# Patient Record
Sex: FEMALE | Race: WHITE | NOT HISPANIC OR LATINO | Employment: OTHER | URBAN - METROPOLITAN AREA
[De-identification: names, ages, dates, MRNs, and addresses within clinical notes are randomized per-mention and may not be internally consistent; named-entity substitution may affect disease eponyms.]

---

## 2018-12-26 ENCOUNTER — HISTORICAL (OUTPATIENT)
Dept: RADIOLOGY | Facility: HOSPITAL | Age: 68
End: 2018-12-26

## 2018-12-26 LAB
ALB BF TYPE: NORMAL
ALBUMIN FLD-MCNC: 2.3 GM/DL
AMYLASE FLD-CCNC: 21 UNIT/L
APTT PPP: 28.6 SECOND(S) (ref 24.8–36.9)
ERYTHROCYTE [DISTWIDTH] IN BLOOD BY AUTOMATED COUNT: 13.2 % (ref 11.5–17)
GLUCOSE FLD-MCNC: 68 MG/DL
HCT VFR BLD AUTO: 41.4 % (ref 37–47)
HGB BLD-MCNC: 12.7 GM/DL (ref 12–16)
INR PPP: 1.1 (ref 0–1.3)
LDH FLD-CCNC: 429 U/L
MCH RBC QN AUTO: 26 PG (ref 27–31)
MCHC RBC AUTO-ENTMCNC: 30.7 GM/DL (ref 33–36)
MCV RBC AUTO: 84.7 FL (ref 80–94)
PLATELET # BLD AUTO: 300 X10(3)/MCL (ref 130–400)
PMV BLD AUTO: 9.4 FL (ref 9.4–12.4)
PROT FLD-MCNC: 5.6 GM/DL
PROTHROMBIN TIME: 14.3 SECOND(S) (ref 12.2–14.7)
RBC # BLD AUTO: 4.89 X10(6)/MCL (ref 4.2–5.4)
TRIGL FLD-MCNC: 50 MG/DL
WBC # SPEC AUTO: 9.5 X10(3)/MCL (ref 4.5–11.5)

## 2018-12-27 LAB — GRAM STN SPEC: NORMAL

## 2018-12-31 LAB — FINAL CULTURE: NORMAL

## 2019-01-07 ENCOUNTER — HISTORICAL (OUTPATIENT)
Dept: RADIOLOGY | Facility: HOSPITAL | Age: 69
End: 2019-01-07

## 2019-01-11 ENCOUNTER — HISTORICAL (OUTPATIENT)
Dept: RADIOLOGY | Facility: HOSPITAL | Age: 69
End: 2019-01-11

## 2019-01-11 LAB — POC CREATININE: 0.7 MG/DL (ref 0.6–1.3)

## 2019-01-15 ENCOUNTER — HISTORICAL (OUTPATIENT)
Dept: INFUSION THERAPY | Facility: HOSPITAL | Age: 69
End: 2019-01-15

## 2019-01-15 LAB
ABS NEUT (OLG): 6.71 X10(3)/MCL (ref 2.1–9.2)
ALBUMIN SERPL-MCNC: 2.6 GM/DL (ref 3.4–5)
ALP SERPL-CCNC: 112 UNIT/L (ref 38–126)
ALT SERPL-CCNC: 14 UNIT/L (ref 12–78)
ANION GAP SERPL CALC-SCNC: 18 MMOL/L
AST SERPL-CCNC: 39 UNIT/L (ref 15–37)
BASOPHILS # BLD AUTO: 0 X10(3)/MCL (ref 0–0.2)
BASOPHILS NFR BLD AUTO: 0.2 %
BILIRUB SERPL-MCNC: 0.7 MG/DL (ref 0.2–1)
BILIRUBIN DIRECT+TOT PNL SERPL-MCNC: 0.2 MG/DL (ref 0–0.2)
BILIRUBIN DIRECT+TOT PNL SERPL-MCNC: 0.5 MG/DL (ref 0–0.8)
BUN SERPL-MCNC: 8 MG/DL (ref 8–26)
CANCER AG125 SERPL-ACNC: 666.6 IU/ML (ref 1.5–35)
CHLORIDE SERPL-SCNC: 101 MMOL/L (ref 98–109)
CREAT SERPL-MCNC: 0.6 MG/DL (ref 0.6–1.3)
EOSINOPHIL # BLD AUTO: 0.1 X10(3)/MCL (ref 0–0.9)
EOSINOPHIL NFR BLD AUTO: 0.7 %
ERYTHROCYTE [DISTWIDTH] IN BLOOD BY AUTOMATED COUNT: 13.8 % (ref 11.5–17)
GLUCOSE SERPL-MCNC: 112 MG/DL (ref 70–105)
HCT VFR BLD AUTO: 41.1 % (ref 37–47)
HCT VFR BLD CALC: 39 % (ref 38–51)
HGB BLD-MCNC: 12.2 GM/DL (ref 12–16)
HGB BLD-MCNC: 13.3 MG/DL (ref 12–17)
LIVER PROFILE INTERP: ABNORMAL
LYMPHOCYTES # BLD AUTO: 1.1 X10(3)/MCL (ref 0.6–4.6)
LYMPHOCYTES NFR BLD AUTO: 12.9 %
MCH RBC QN AUTO: 25.6 PG (ref 27–31)
MCHC RBC AUTO-ENTMCNC: 29.7 GM/DL (ref 33–36)
MCV RBC AUTO: 86.3 FL (ref 80–94)
MONOCYTES # BLD AUTO: 0.8 X10(3)/MCL (ref 0.1–1.3)
MONOCYTES NFR BLD AUTO: 9.5 %
NEUTROPHILS # BLD AUTO: 6.7 X10(3)/MCL (ref 2.1–9.2)
NEUTROPHILS NFR BLD AUTO: 76.5 %
PLATELET # BLD AUTO: 324 X10(3)/MCL (ref 130–400)
PMV BLD AUTO: 9 FL (ref 9.4–12.4)
POC IONIZED CALCIUM: 1.1 MMOL/L (ref 1.12–1.32)
POC TCO2: 25 MMOL/L (ref 24–29)
POTASSIUM BLD-SCNC: 3.5 MMOL/L (ref 3.5–4.9)
PROT SERPL-MCNC: 6.7 GM/DL (ref 6.4–8.2)
RBC # BLD AUTO: 4.76 X10(6)/MCL (ref 4.2–5.4)
SODIUM BLD-SCNC: 140 MMOL/L (ref 138–146)
WBC # SPEC AUTO: 8.8 X10(3)/MCL (ref 4.5–11.5)

## 2019-01-22 ENCOUNTER — HISTORICAL (OUTPATIENT)
Dept: ADMINISTRATIVE | Facility: HOSPITAL | Age: 69
End: 2019-01-22

## 2019-01-22 LAB
ABS NEUT (OLG): 2.22 X10(3)/MCL (ref 2.1–9.2)
ALBUMIN SERPL-MCNC: 2.9 GM/DL (ref 3.4–5)
ALBUMIN/GLOB SERPL: 0.7 {RATIO}
ALP SERPL-CCNC: 99 UNIT/L (ref 38–126)
ALT SERPL-CCNC: 31 UNIT/L (ref 12–78)
AST SERPL-CCNC: 54 UNIT/L (ref 15–37)
BASOPHILS # BLD AUTO: 0 X10(3)/MCL (ref 0–0.2)
BASOPHILS NFR BLD AUTO: 0.3 %
BILIRUB SERPL-MCNC: 0.5 MG/DL (ref 0.2–1)
BILIRUBIN DIRECT+TOT PNL SERPL-MCNC: 0.2 MG/DL (ref 0–0.2)
BILIRUBIN DIRECT+TOT PNL SERPL-MCNC: 0.3 MG/DL (ref 0–0.8)
BUN SERPL-MCNC: 9 MG/DL (ref 7–18)
CALCIUM SERPL-MCNC: 8.9 MG/DL (ref 8.5–10.1)
CHLORIDE SERPL-SCNC: 101 MMOL/L (ref 98–107)
CO2 SERPL-SCNC: 29 MMOL/L (ref 21–32)
CREAT SERPL-MCNC: 0.73 MG/DL (ref 0.55–1.02)
EOSINOPHIL # BLD AUTO: 0 X10(3)/MCL (ref 0–0.9)
EOSINOPHIL NFR BLD AUTO: 1.6 %
ERYTHROCYTE [DISTWIDTH] IN BLOOD BY AUTOMATED COUNT: 13.9 % (ref 11.5–17)
GLOBULIN SER-MCNC: 4 GM/DL (ref 2.4–3.5)
GLUCOSE SERPL-MCNC: 126 MG/DL (ref 74–106)
HCT VFR BLD AUTO: 37.6 % (ref 37–47)
HGB BLD-MCNC: 11.4 GM/DL (ref 12–16)
LYMPHOCYTES # BLD AUTO: 0.7 X10(3)/MCL (ref 0.6–4.6)
LYMPHOCYTES NFR BLD AUTO: 23.8 %
MCH RBC QN AUTO: 25.9 PG (ref 27–31)
MCHC RBC AUTO-ENTMCNC: 30.3 GM/DL (ref 33–36)
MCV RBC AUTO: 85.3 FL (ref 80–94)
MONOCYTES # BLD AUTO: 0.1 X10(3)/MCL (ref 0.1–1.3)
MONOCYTES NFR BLD AUTO: 2.6 %
NEUTROPHILS # BLD AUTO: 2.2 X10(3)/MCL (ref 2.1–9.2)
NEUTROPHILS NFR BLD AUTO: 71.4 %
PLATELET # BLD AUTO: 259 X10(3)/MCL (ref 130–400)
PMV BLD AUTO: 9.2 FL (ref 9.4–12.4)
POTASSIUM SERPL-SCNC: 3.9 MMOL/L (ref 3.5–5.1)
PROT SERPL-MCNC: 6.9 GM/DL (ref 6.4–8.2)
RBC # BLD AUTO: 4.41 X10(6)/MCL (ref 4.2–5.4)
SODIUM SERPL-SCNC: 139 MMOL/L (ref 136–145)
WBC # SPEC AUTO: 3.1 X10(3)/MCL (ref 4.5–11.5)

## 2019-01-29 ENCOUNTER — HISTORICAL (OUTPATIENT)
Dept: ADMINISTRATIVE | Facility: HOSPITAL | Age: 69
End: 2019-01-29

## 2019-01-29 LAB
ABS NEUT (OLG): 0.42 X10(3)/MCL (ref 2.1–9.2)
ALBUMIN SERPL-MCNC: 2.8 GM/DL (ref 3.4–5)
ALBUMIN/GLOB SERPL: 0.7 RATIO (ref 1.1–2)
ALP SERPL-CCNC: 100 UNIT/L (ref 38–126)
ALT SERPL-CCNC: 28 UNIT/L (ref 12–78)
AST SERPL-CCNC: 31 UNIT/L (ref 15–37)
BASOPHILS # BLD AUTO: 0 X10(3)/MCL (ref 0–0.2)
BASOPHILS NFR BLD AUTO: 0.5 %
BILIRUB SERPL-MCNC: 0.6 MG/DL (ref 0.2–1)
BILIRUBIN DIRECT+TOT PNL SERPL-MCNC: 0.2 MG/DL (ref 0–0.5)
BILIRUBIN DIRECT+TOT PNL SERPL-MCNC: 0.4 MG/DL (ref 0–0.8)
BUN SERPL-MCNC: 6 MG/DL (ref 7–18)
CALCIUM SERPL-MCNC: 9 MG/DL (ref 8.5–10.1)
CHLORIDE SERPL-SCNC: 103 MMOL/L (ref 98–107)
CO2 SERPL-SCNC: 31 MMOL/L (ref 21–32)
CREAT SERPL-MCNC: 0.61 MG/DL (ref 0.55–1.02)
EOSINOPHIL # BLD AUTO: 0 X10(3)/MCL (ref 0–0.9)
EOSINOPHIL NFR BLD AUTO: 0.9 %
EOSINOPHIL NFR BLD MANUAL: 1 % (ref 0–8)
ERYTHROCYTE [DISTWIDTH] IN BLOOD BY AUTOMATED COUNT: 14.5 % (ref 11.5–17)
GLOBULIN SER-MCNC: 4.1 GM/DL (ref 2.4–3.5)
GLUCOSE SERPL-MCNC: 102 MG/DL (ref 74–106)
HCT VFR BLD AUTO: 37.9 % (ref 37–47)
HGB BLD-MCNC: 11.3 GM/DL (ref 12–16)
LYMPHOCYTES # BLD AUTO: 1 X10(3)/MCL (ref 0.6–4.6)
LYMPHOCYTES NFR BLD AUTO: 48.6 %
LYMPHOCYTES NFR BLD MANUAL: 3 /MCL
LYMPHOCYTES NFR BLD MANUAL: 52 % (ref 13–40)
MCH RBC QN AUTO: 25.5 PG (ref 27–31)
MCHC RBC AUTO-ENTMCNC: 29.8 GM/DL (ref 33–36)
MCV RBC AUTO: 85.4 FL (ref 80–94)
MONOCYTES # BLD AUTO: 0.6 X10(3)/MCL (ref 0.1–1.3)
MONOCYTES NFR BLD AUTO: 30.4 %
MONOCYTES NFR BLD MANUAL: 25 % (ref 2–11)
NEUTROPHILS # BLD AUTO: 0.4 X10(3)/MCL (ref 2.1–9.2)
NEUTROPHILS NFR BLD AUTO: 19.6 %
NEUTROPHILS NFR BLD MANUAL: 22 % (ref 47–80)
PLATELET # BLD AUTO: 208 X10(3)/MCL (ref 130–400)
PLATELET # BLD EST: NORMAL 10*3/UL
PMV BLD AUTO: 8.6 FL (ref 9.4–12.4)
POTASSIUM SERPL-SCNC: 3 MMOL/L (ref 3.5–5.1)
PROT SERPL-MCNC: 6.9 GM/DL (ref 6.4–8.2)
RBC # BLD AUTO: 4.44 X10(6)/MCL (ref 4.2–5.4)
RBC MORPH BLD: NORMAL
SODIUM SERPL-SCNC: 141 MMOL/L (ref 136–145)
WBC # SPEC AUTO: 2.1 X10(3)/MCL (ref 4.5–11.5)

## 2019-02-05 ENCOUNTER — HISTORICAL (OUTPATIENT)
Dept: INFUSION THERAPY | Facility: HOSPITAL | Age: 69
End: 2019-02-05

## 2019-02-05 LAB
ABS NEUT (OLG): 3.32 X10(3)/MCL (ref 2.1–9.2)
ANION GAP SERPL CALC-SCNC: 16 MMOL/L
APTT PPP: 30 SECOND(S) (ref 24.8–36.9)
BASOPHILS # BLD AUTO: 0 X10(3)/MCL (ref 0–0.2)
BASOPHILS NFR BLD AUTO: 0.4 %
BUN SERPL-MCNC: 5 MG/DL (ref 8–26)
CHLORIDE SERPL-SCNC: 103 MMOL/L (ref 98–109)
CREAT SERPL-MCNC: 0.6 MG/DL (ref 0.6–1.3)
EOSINOPHIL # BLD AUTO: 0 X10(3)/MCL (ref 0–0.9)
EOSINOPHIL NFR BLD AUTO: 1 %
ERYTHROCYTE [DISTWIDTH] IN BLOOD BY AUTOMATED COUNT: 15.4 % (ref 11.5–17)
GLUCOSE SERPL-MCNC: 100 MG/DL (ref 70–105)
HCT VFR BLD AUTO: 37.6 % (ref 37–47)
HCT VFR BLD CALC: 35 % (ref 38–51)
HGB BLD-MCNC: 11.3 GM/DL (ref 12–16)
HGB BLD-MCNC: 11.9 MG/DL (ref 12–17)
INR PPP: 1.5 (ref 0–1.3)
LYMPHOCYTES # BLD AUTO: 1.2 X10(3)/MCL (ref 0.6–4.6)
LYMPHOCYTES NFR BLD AUTO: 23 %
MCH RBC QN AUTO: 25.5 PG (ref 27–31)
MCHC RBC AUTO-ENTMCNC: 30.1 GM/DL (ref 33–36)
MCV RBC AUTO: 84.9 FL (ref 80–94)
MONOCYTES # BLD AUTO: 0.5 X10(3)/MCL (ref 0.1–1.3)
MONOCYTES NFR BLD AUTO: 9.2 %
NEUTROPHILS # BLD AUTO: 3.3 X10(3)/MCL (ref 2.1–9.2)
NEUTROPHILS NFR BLD AUTO: 66.4 %
PLATELET # BLD AUTO: 155 X10(3)/MCL (ref 130–400)
PMV BLD AUTO: 8.2 FL (ref 9.4–12.4)
POC IONIZED CALCIUM: 1.15 MMOL/L (ref 1.12–1.32)
POC TCO2: 27 MMOL/L (ref 24–29)
POTASSIUM BLD-SCNC: 3.4 MMOL/L (ref 3.5–4.9)
PROTHROMBIN TIME: 18.4 SECOND(S) (ref 12.2–14.7)
RBC # BLD AUTO: 4.43 X10(6)/MCL (ref 4.2–5.4)
SODIUM BLD-SCNC: 142 MMOL/L (ref 138–146)
WBC # SPEC AUTO: 5 X10(3)/MCL (ref 4.5–11.5)

## 2019-02-06 ENCOUNTER — HISTORICAL (OUTPATIENT)
Dept: RADIOLOGY | Facility: HOSPITAL | Age: 69
End: 2019-02-06

## 2019-02-06 LAB
APTT PPP: 27.6 SECOND(S) (ref 24.8–36.9)
INR PPP: 1.2 (ref 0–1.3)
PROTHROMBIN TIME: 15.8 SECOND(S) (ref 12.2–14.7)

## 2019-02-12 ENCOUNTER — HISTORICAL (OUTPATIENT)
Dept: ADMINISTRATIVE | Facility: HOSPITAL | Age: 69
End: 2019-02-12

## 2019-02-12 LAB
ABS NEUT (OLG): 1 X10(3)/MCL (ref 2.1–9.2)
ALBUMIN SERPL-MCNC: 3.3 GM/DL (ref 3.4–5)
ALP SERPL-CCNC: 54 UNIT/L (ref 38–126)
ALT SERPL-CCNC: 25 UNIT/L (ref 12–78)
ANION GAP SERPL CALC-SCNC: 16 MMOL/L
AST SERPL-CCNC: 29 UNIT/L (ref 15–37)
BASOPHILS # BLD AUTO: 0 X10(3)/MCL (ref 0–0.2)
BASOPHILS NFR BLD AUTO: 0.5 %
BILIRUB SERPL-MCNC: 0.3 MG/DL (ref 0.2–1)
BILIRUBIN DIRECT+TOT PNL SERPL-MCNC: 0.1 MG/DL (ref 0–0.5)
BILIRUBIN DIRECT+TOT PNL SERPL-MCNC: 0.2 MG/DL (ref 0–0.8)
BUN SERPL-MCNC: 10 MG/DL (ref 8–26)
CHLORIDE SERPL-SCNC: 101 MMOL/L (ref 98–109)
CREAT SERPL-MCNC: 0.6 MG/DL (ref 0.6–1.3)
EOSINOPHIL # BLD AUTO: 0 X10(3)/MCL (ref 0–0.9)
EOSINOPHIL NFR BLD AUTO: 2.3 %
ERYTHROCYTE [DISTWIDTH] IN BLOOD BY AUTOMATED COUNT: 15.4 % (ref 11.5–17)
GLUCOSE SERPL-MCNC: 105 MG/DL (ref 70–105)
HCT VFR BLD AUTO: 32.5 % (ref 37–47)
HCT VFR BLD CALC: 33 % (ref 38–51)
HGB BLD-MCNC: 11.2 MG/DL (ref 12–17)
HGB BLD-MCNC: 9.9 GM/DL (ref 12–16)
LIVER PROFILE INTERP: ABNORMAL
LYMPHOCYTES # BLD AUTO: 1.1 X10(3)/MCL (ref 0.6–4.6)
LYMPHOCYTES NFR BLD AUTO: 48 %
MCH RBC QN AUTO: 26.1 PG (ref 27–31)
MCHC RBC AUTO-ENTMCNC: 30.5 GM/DL (ref 33–36)
MCV RBC AUTO: 85.5 FL (ref 80–94)
MONOCYTES # BLD AUTO: 0.1 X10(3)/MCL (ref 0.1–1.3)
MONOCYTES NFR BLD AUTO: 3.6 %
NEUTROPHILS # BLD AUTO: 1 X10(3)/MCL (ref 2.1–9.2)
NEUTROPHILS NFR BLD AUTO: 45.1 %
PLATELET # BLD AUTO: 104 X10(3)/MCL (ref 130–400)
PMV BLD AUTO: 8.9 FL (ref 9.4–12.4)
POC IONIZED CALCIUM: 1.19 MMOL/L (ref 1.12–1.32)
POC TCO2: 29 MMOL/L (ref 24–29)
POTASSIUM BLD-SCNC: 3.6 MMOL/L (ref 3.5–4.9)
PROT SERPL-MCNC: 6.9 GM/DL (ref 6.4–8.2)
RBC # BLD AUTO: 3.8 X10(6)/MCL (ref 4.2–5.4)
SODIUM BLD-SCNC: 141 MMOL/L (ref 138–146)
WBC # SPEC AUTO: 2.2 X10(3)/MCL (ref 4.5–11.5)

## 2019-02-19 ENCOUNTER — HISTORICAL (OUTPATIENT)
Dept: HEMATOLOGY/ONCOLOGY | Facility: CLINIC | Age: 69
End: 2019-02-19

## 2019-02-19 LAB
ABS NEUT (OLG): 0.18 X10(3)/MCL (ref 2.1–9.2)
ALBUMIN SERPL-MCNC: 4 GM/DL (ref 3.4–5)
ALP SERPL-CCNC: 69 UNIT/L (ref 38–126)
ALT SERPL-CCNC: 22 UNIT/L (ref 12–78)
AST SERPL-CCNC: 21 UNIT/L (ref 15–37)
BASOPHILS # BLD AUTO: 0 X10(3)/MCL (ref 0–0.2)
BASOPHILS NFR BLD AUTO: 0.4 %
BILIRUB SERPL-MCNC: 0.6 MG/DL (ref 0.2–1)
BILIRUBIN DIRECT+TOT PNL SERPL-MCNC: 0.2 MG/DL (ref 0–0.5)
BILIRUBIN DIRECT+TOT PNL SERPL-MCNC: 0.4 MG/DL (ref 0–0.8)
CANCER AG125 SERPL-ACNC: 174 IU/ML (ref 1.5–35)
EOSINOPHIL # BLD AUTO: 0 X10(3)/MCL (ref 0–0.9)
EOSINOPHIL NFR BLD AUTO: 0.9 %
ERYTHROCYTE [DISTWIDTH] IN BLOOD BY AUTOMATED COUNT: 17.3 % (ref 11.5–17)
HCT VFR BLD AUTO: 39 % (ref 37–47)
HGB BLD-MCNC: 11.7 GM/DL (ref 12–16)
LIVER PROFILE INTERP: ABNORMAL
LYMPHOCYTES # BLD AUTO: 1.3 X10(3)/MCL (ref 0.6–4.6)
LYMPHOCYTES NFR BLD AUTO: 59.6 %
MCH RBC QN AUTO: 25.9 PG (ref 27–31)
MCHC RBC AUTO-ENTMCNC: 30 GM/DL (ref 33–36)
MCV RBC AUTO: 86.3 FL (ref 80–94)
MONOCYTES # BLD AUTO: 0.7 X10(3)/MCL (ref 0.1–1.3)
MONOCYTES NFR BLD AUTO: 30.9 %
NEUTROPHILS # BLD AUTO: 0.2 X10(3)/MCL (ref 2.1–9.2)
NEUTROPHILS NFR BLD AUTO: 8.2 %
PLATELET # BLD AUTO: 261 X10(3)/MCL (ref 130–400)
PMV BLD AUTO: 8.7 FL (ref 9.4–12.4)
PROT SERPL-MCNC: 8.4 GM/DL (ref 6.4–8.2)
RBC # BLD AUTO: 4.52 X10(6)/MCL (ref 4.2–5.4)
WBC # SPEC AUTO: 2.2 X10(3)/MCL (ref 4.5–11.5)

## 2019-02-26 ENCOUNTER — HISTORICAL (OUTPATIENT)
Dept: INFUSION THERAPY | Facility: HOSPITAL | Age: 69
End: 2019-02-26

## 2019-02-26 LAB
ABS NEUT (OLG): 1.85 X10(3)/MCL (ref 2.1–9.2)
ALBUMIN SERPL-MCNC: 3.8 GM/DL (ref 3.4–5)
ALP SERPL-CCNC: 65 UNIT/L (ref 38–126)
ALT SERPL-CCNC: 20 UNIT/L (ref 12–78)
ANION GAP SERPL CALC-SCNC: 19 MMOL/L
AST SERPL-CCNC: 24 UNIT/L (ref 15–37)
BASOPHILS # BLD AUTO: 0 X10(3)/MCL (ref 0–0.2)
BASOPHILS NFR BLD AUTO: 0.3 %
BILIRUB SERPL-MCNC: 0.4 MG/DL (ref 0.2–1)
BILIRUBIN DIRECT+TOT PNL SERPL-MCNC: 0.2 MG/DL (ref 0–0.2)
BILIRUBIN DIRECT+TOT PNL SERPL-MCNC: 0.2 MG/DL (ref 0–0.8)
BUN SERPL-MCNC: 7 MG/DL (ref 8–26)
CHLORIDE SERPL-SCNC: 101 MMOL/L (ref 98–109)
CREAT SERPL-MCNC: 0.7 MG/DL (ref 0.6–1.3)
EOSINOPHIL # BLD AUTO: 0 X10(3)/MCL (ref 0–0.9)
EOSINOPHIL NFR BLD AUTO: 0.3 %
ERYTHROCYTE [DISTWIDTH] IN BLOOD BY AUTOMATED COUNT: 18.2 % (ref 11.5–17)
GLUCOSE SERPL-MCNC: 97 MG/DL (ref 70–105)
HCT VFR BLD AUTO: 38.5 % (ref 37–47)
HCT VFR BLD CALC: 36 % (ref 38–51)
HGB BLD-MCNC: 11.7 GM/DL (ref 12–16)
HGB BLD-MCNC: 12.2 MG/DL (ref 12–17)
LIVER PROFILE INTERP: NORMAL
LYMPHOCYTES # BLD AUTO: 1.4 X10(3)/MCL (ref 0.6–4.6)
LYMPHOCYTES NFR BLD AUTO: 36.8 %
MCH RBC QN AUTO: 26.2 PG (ref 27–31)
MCHC RBC AUTO-ENTMCNC: 30.4 GM/DL (ref 33–36)
MCV RBC AUTO: 86.3 FL (ref 80–94)
MONOCYTES # BLD AUTO: 0.5 X10(3)/MCL (ref 0.1–1.3)
MONOCYTES NFR BLD AUTO: 13.9 %
NEUTROPHILS # BLD AUTO: 1.8 X10(3)/MCL (ref 2.1–9.2)
NEUTROPHILS NFR BLD AUTO: 48.7 %
PLATELET # BLD AUTO: 194 X10(3)/MCL (ref 130–400)
PMV BLD AUTO: 8.1 FL (ref 9.4–12.4)
POC IONIZED CALCIUM: 1.2 MMOL/L (ref 1.12–1.32)
POC TCO2: 26 MMOL/L (ref 24–29)
POTASSIUM BLD-SCNC: 3.9 MMOL/L (ref 3.5–4.9)
PROT SERPL-MCNC: 7.6 GM/DL (ref 6.4–8.2)
RBC # BLD AUTO: 4.46 X10(6)/MCL (ref 4.2–5.4)
SODIUM BLD-SCNC: 141 MMOL/L (ref 138–146)
WBC # SPEC AUTO: 3.8 X10(3)/MCL (ref 4.5–11.5)

## 2019-03-04 ENCOUNTER — HISTORICAL (OUTPATIENT)
Dept: RADIOLOGY | Facility: HOSPITAL | Age: 69
End: 2019-03-04

## 2019-03-06 ENCOUNTER — HISTORICAL (OUTPATIENT)
Dept: ADMINISTRATIVE | Facility: HOSPITAL | Age: 69
End: 2019-03-06

## 2019-03-06 LAB
ABS NEUT (OLG): 6.92 X10(3)/MCL (ref 2.1–9.2)
ALBUMIN SERPL-MCNC: 3.9 GM/DL (ref 3.4–5)
ALP SERPL-CCNC: 86 UNIT/L (ref 38–126)
ALT SERPL-CCNC: 22 UNIT/L (ref 12–78)
ANION GAP SERPL CALC-SCNC: 16 MMOL/L
AST SERPL-CCNC: 23 UNIT/L (ref 15–37)
BASOPHILS # BLD AUTO: 0 X10(3)/MCL (ref 0–0.2)
BASOPHILS NFR BLD AUTO: 0.1 %
BILIRUB SERPL-MCNC: 0.4 MG/DL (ref 0.2–1)
BILIRUBIN DIRECT+TOT PNL SERPL-MCNC: 0.1 MG/DL (ref 0–0.2)
BILIRUBIN DIRECT+TOT PNL SERPL-MCNC: 0.3 MG/DL (ref 0–0.8)
BUN SERPL-MCNC: 13 MG/DL (ref 8–26)
CHLORIDE SERPL-SCNC: 101 MMOL/L (ref 98–109)
CREAT SERPL-MCNC: 0.8 MG/DL (ref 0.6–1.3)
EOSINOPHIL # BLD AUTO: 0 X10(3)/MCL (ref 0–0.9)
EOSINOPHIL NFR BLD AUTO: 0.4 %
ERYTHROCYTE [DISTWIDTH] IN BLOOD BY AUTOMATED COUNT: 18.6 % (ref 11.5–17)
GLUCOSE SERPL-MCNC: 110 MG/DL (ref 70–105)
HCT VFR BLD AUTO: 36.6 % (ref 37–47)
HCT VFR BLD CALC: 36 % (ref 38–51)
HGB BLD-MCNC: 11.2 GM/DL (ref 12–16)
HGB BLD-MCNC: 12.2 MG/DL (ref 12–17)
LIVER PROFILE INTERP: NORMAL
LYMPHOCYTES # BLD AUTO: 2.2 X10(3)/MCL (ref 0.6–4.6)
LYMPHOCYTES NFR BLD AUTO: 19.8 %
MCH RBC QN AUTO: 26.9 PG (ref 27–31)
MCHC RBC AUTO-ENTMCNC: 30.6 GM/DL (ref 33–36)
MCV RBC AUTO: 88 FL (ref 80–94)
MONOCYTES # BLD AUTO: 1.8 X10(3)/MCL (ref 0.1–1.3)
MONOCYTES NFR BLD AUTO: 16.1 %
NEUTROPHILS # BLD AUTO: 6.9 X10(3)/MCL (ref 2.1–9.2)
NEUTROPHILS NFR BLD AUTO: 61 %
PLATELET # BLD AUTO: 138 X10(3)/MCL (ref 130–400)
PMV BLD AUTO: 9.7 FL (ref 9.4–12.4)
POC IONIZED CALCIUM: 1.17 MMOL/L (ref 1.12–1.32)
POC TCO2: 27 MMOL/L (ref 24–29)
POTASSIUM BLD-SCNC: 4.2 MMOL/L (ref 3.5–4.9)
PROT SERPL-MCNC: 7.3 GM/DL (ref 6.4–8.2)
RBC # BLD AUTO: 4.16 X10(6)/MCL (ref 4.2–5.4)
SODIUM BLD-SCNC: 139 MMOL/L (ref 138–146)
WBC # SPEC AUTO: 11.3 X10(3)/MCL (ref 4.5–11.5)

## 2019-03-12 ENCOUNTER — HISTORICAL (OUTPATIENT)
Dept: HEMATOLOGY/ONCOLOGY | Facility: CLINIC | Age: 69
End: 2019-03-12

## 2019-03-12 LAB
ABS NEUT (OLG): 6.05 X10(3)/MCL (ref 2.1–9.2)
ALBUMIN SERPL-MCNC: 3.7 GM/DL (ref 3.4–5)
ALP SERPL-CCNC: 91 UNIT/L (ref 38–126)
ALT SERPL-CCNC: 24 UNIT/L (ref 12–78)
ANION GAP SERPL CALC-SCNC: 17 MMOL/L
AST SERPL-CCNC: 22 UNIT/L (ref 15–37)
BASOPHILS # BLD AUTO: 0 X10(3)/MCL (ref 0–0.2)
BASOPHILS NFR BLD AUTO: 0.3 %
BILIRUB SERPL-MCNC: 0.4 MG/DL (ref 0.2–1)
BILIRUBIN DIRECT+TOT PNL SERPL-MCNC: 0.1 MG/DL (ref 0–0.2)
BILIRUBIN DIRECT+TOT PNL SERPL-MCNC: 0.3 MG/DL (ref 0–0.8)
BUN SERPL-MCNC: 14 MG/DL (ref 8–26)
CANCER AG125 SERPL-ACNC: 48.6 IU/ML (ref 1.5–35)
CHLORIDE SERPL-SCNC: 99 MMOL/L (ref 98–109)
CREAT SERPL-MCNC: 0.6 MG/DL (ref 0.6–1.3)
EOSINOPHIL # BLD AUTO: 0 X10(3)/MCL (ref 0–0.9)
EOSINOPHIL NFR BLD AUTO: 0.5 %
ERYTHROCYTE [DISTWIDTH] IN BLOOD BY AUTOMATED COUNT: 19.7 % (ref 11.5–17)
GLUCOSE SERPL-MCNC: 78 MG/DL (ref 70–105)
HCT VFR BLD AUTO: 38.7 % (ref 37–47)
HCT VFR BLD CALC: 36 % (ref 38–51)
HGB BLD-MCNC: 11.9 GM/DL (ref 12–16)
HGB BLD-MCNC: 12.2 MG/DL (ref 12–17)
LIVER PROFILE INTERP: NORMAL
LYMPHOCYTES # BLD AUTO: 1.7 X10(3)/MCL (ref 0.6–4.6)
LYMPHOCYTES NFR BLD AUTO: 20 %
MCH RBC QN AUTO: 27.2 PG (ref 27–31)
MCHC RBC AUTO-ENTMCNC: 30.7 GM/DL (ref 33–36)
MCV RBC AUTO: 88.6 FL (ref 80–94)
MONOCYTES # BLD AUTO: 0.8 X10(3)/MCL (ref 0.1–1.3)
MONOCYTES NFR BLD AUTO: 8.7 %
NEUTROPHILS # BLD AUTO: 6 X10(3)/MCL (ref 2.1–9.2)
NEUTROPHILS NFR BLD AUTO: 69.7 %
PLATELET # BLD AUTO: 136 X10(3)/MCL (ref 130–400)
PMV BLD AUTO: 8.9 FL (ref 9.4–12.4)
POC IONIZED CALCIUM: 1.14 MMOL/L (ref 1.12–1.32)
POC TCO2: 29 MMOL/L (ref 24–29)
POTASSIUM BLD-SCNC: 3.8 MMOL/L (ref 3.5–4.9)
PROT SERPL-MCNC: 7.3 GM/DL (ref 6.4–8.2)
RBC # BLD AUTO: 4.37 X10(6)/MCL (ref 4.2–5.4)
SODIUM BLD-SCNC: 140 MMOL/L (ref 138–146)
WBC # SPEC AUTO: 8.7 X10(3)/MCL (ref 4.5–11.5)

## 2019-04-08 ENCOUNTER — HISTORICAL (OUTPATIENT)
Dept: ADMINISTRATIVE | Facility: HOSPITAL | Age: 69
End: 2019-04-08

## 2019-04-08 LAB
ABS NEUT (OLG): 3.96 X10(3)/MCL (ref 2.1–9.2)
ALBUMIN SERPL-MCNC: 4 GM/DL (ref 3.4–5)
ALP SERPL-CCNC: 64 UNIT/L (ref 38–126)
ALT SERPL-CCNC: 19 UNIT/L (ref 12–78)
ANION GAP SERPL CALC-SCNC: 16 MMOL/L
AST SERPL-CCNC: 20 UNIT/L (ref 15–37)
BASOPHILS # BLD AUTO: 0 X10(3)/MCL (ref 0–0.2)
BASOPHILS NFR BLD AUTO: 0.2 %
BILIRUB SERPL-MCNC: 0.8 MG/DL (ref 0.2–1)
BILIRUBIN DIRECT+TOT PNL SERPL-MCNC: 0.2 MG/DL (ref 0–0.2)
BILIRUBIN DIRECT+TOT PNL SERPL-MCNC: 0.6 MG/DL (ref 0–0.8)
BUN SERPL-MCNC: 13 MG/DL (ref 8–26)
CHLORIDE SERPL-SCNC: 100 MMOL/L (ref 98–109)
CREAT SERPL-MCNC: 0.6 MG/DL (ref 0.6–1.3)
EOSINOPHIL # BLD AUTO: 0.1 X10(3)/MCL (ref 0–0.9)
EOSINOPHIL NFR BLD AUTO: 1.1 %
ERYTHROCYTE [DISTWIDTH] IN BLOOD BY AUTOMATED COUNT: 18.4 % (ref 11.5–17)
GLUCOSE SERPL-MCNC: 95 MG/DL (ref 70–105)
HCT VFR BLD AUTO: 36.8 % (ref 37–47)
HCT VFR BLD CALC: 35 % (ref 38–51)
HGB BLD-MCNC: 11.3 GM/DL (ref 12–16)
HGB BLD-MCNC: 11.9 MG/DL (ref 12–17)
LIVER PROFILE INTERP: NORMAL
LYMPHOCYTES # BLD AUTO: 1.6 X10(3)/MCL (ref 0.6–4.6)
LYMPHOCYTES NFR BLD AUTO: 25.7 %
MCH RBC QN AUTO: 28.8 PG (ref 27–31)
MCHC RBC AUTO-ENTMCNC: 30.7 GM/DL (ref 33–36)
MCV RBC AUTO: 93.6 FL (ref 80–94)
MONOCYTES # BLD AUTO: 0.5 X10(3)/MCL (ref 0.1–1.3)
MONOCYTES NFR BLD AUTO: 8.3 %
NEUTROPHILS # BLD AUTO: 4 X10(3)/MCL (ref 2.1–9.2)
NEUTROPHILS NFR BLD AUTO: 64.5 %
PLATELET # BLD AUTO: 190 X10(3)/MCL (ref 130–400)
PMV BLD AUTO: 8.3 FL (ref 9.4–12.4)
POC IONIZED CALCIUM: 1.19 MMOL/L (ref 1.12–1.32)
POC TCO2: 28 MMOL/L (ref 24–29)
POTASSIUM BLD-SCNC: 3.9 MMOL/L (ref 3.5–4.9)
PROT SERPL-MCNC: 7.8 GM/DL (ref 6.4–8.2)
RBC # BLD AUTO: 3.93 X10(6)/MCL (ref 4.2–5.4)
SODIUM BLD-SCNC: 139 MMOL/L (ref 138–146)
WBC # SPEC AUTO: 6.1 X10(3)/MCL (ref 4.5–11.5)

## 2019-04-09 ENCOUNTER — HISTORICAL (OUTPATIENT)
Dept: INFUSION THERAPY | Facility: HOSPITAL | Age: 69
End: 2019-04-09

## 2019-04-16 ENCOUNTER — HISTORICAL (OUTPATIENT)
Dept: ADMINISTRATIVE | Facility: HOSPITAL | Age: 69
End: 2019-04-16

## 2019-04-16 LAB
ABS NEUT (OLG): 2.63 X10(3)/MCL (ref 2.1–9.2)
ALBUMIN SERPL-MCNC: 3.8 GM/DL (ref 3.4–5)
ALP SERPL-CCNC: 77 UNIT/L (ref 38–126)
ALT SERPL-CCNC: 20 UNIT/L (ref 12–78)
ANION GAP SERPL CALC-SCNC: 14 MMOL/L
AST SERPL-CCNC: 21 UNIT/L (ref 15–37)
BASOPHILS # BLD AUTO: 0 X10(3)/MCL (ref 0–0.2)
BASOPHILS NFR BLD AUTO: 0.2 %
BILIRUB SERPL-MCNC: 0.4 MG/DL (ref 0.2–1)
BILIRUBIN DIRECT+TOT PNL SERPL-MCNC: 0.1 MG/DL (ref 0–0.2)
BILIRUBIN DIRECT+TOT PNL SERPL-MCNC: 0.3 MG/DL (ref 0–0.8)
BUN SERPL-MCNC: 12 MG/DL (ref 8–26)
CHLORIDE SERPL-SCNC: 100 MMOL/L (ref 98–109)
CREAT SERPL-MCNC: 0.7 MG/DL (ref 0.6–1.3)
EOSINOPHIL # BLD AUTO: 0.2 X10(3)/MCL (ref 0–0.9)
EOSINOPHIL NFR BLD AUTO: 3.1 %
ERYTHROCYTE [DISTWIDTH] IN BLOOD BY AUTOMATED COUNT: 17 % (ref 11.5–17)
GLUCOSE SERPL-MCNC: 105 MG/DL (ref 70–105)
HCT VFR BLD AUTO: 36.3 % (ref 37–47)
HCT VFR BLD CALC: 35 % (ref 38–51)
HGB BLD-MCNC: 11 GM/DL (ref 12–16)
HGB BLD-MCNC: 11.9 MG/DL (ref 12–17)
LIVER PROFILE INTERP: NORMAL
LYMPHOCYTES # BLD AUTO: 1.2 X10(3)/MCL (ref 0.6–4.6)
LYMPHOCYTES NFR BLD AUTO: 24.6 %
MCH RBC QN AUTO: 29.3 PG (ref 27–31)
MCHC RBC AUTO-ENTMCNC: 30.3 GM/DL (ref 33–36)
MCV RBC AUTO: 96.5 FL (ref 80–94)
MONOCYTES # BLD AUTO: 0.8 X10(3)/MCL (ref 0.1–1.3)
MONOCYTES NFR BLD AUTO: 17 %
NEUTROPHILS # BLD AUTO: 2.6 X10(3)/MCL (ref 2.1–9.2)
NEUTROPHILS NFR BLD AUTO: 54.5 %
PLATELET # BLD AUTO: 95 X10(3)/MCL (ref 130–400)
PMV BLD AUTO: 10 FL (ref 9.4–12.4)
POC IONIZED CALCIUM: 1.17 MMOL/L (ref 1.12–1.32)
POC TCO2: 30 MMOL/L (ref 24–29)
POTASSIUM BLD-SCNC: 4.3 MMOL/L (ref 3.5–4.9)
PROT SERPL-MCNC: 7.4 GM/DL (ref 6.4–8.2)
RBC # BLD AUTO: 3.76 X10(6)/MCL (ref 4.2–5.4)
SODIUM BLD-SCNC: 138 MMOL/L (ref 138–146)
WBC # SPEC AUTO: 4.8 X10(3)/MCL (ref 4.5–11.5)

## 2019-04-23 ENCOUNTER — HISTORICAL (OUTPATIENT)
Dept: HEMATOLOGY/ONCOLOGY | Facility: CLINIC | Age: 69
End: 2019-04-23

## 2019-04-23 LAB
ABS NEUT (OLG): 5.6 X10(3)/MCL (ref 2.1–9.2)
ALBUMIN SERPL-MCNC: 3.8 GM/DL (ref 3.4–5)
ALP SERPL-CCNC: 87 UNIT/L (ref 38–126)
ALT SERPL-CCNC: 20 UNIT/L (ref 12–78)
ANION GAP SERPL CALC-SCNC: 16 MMOL/L
AST SERPL-CCNC: 17 UNIT/L (ref 15–37)
BASOPHILS # BLD AUTO: 0 X10(3)/MCL (ref 0–0.2)
BASOPHILS NFR BLD AUTO: 0.3 %
BILIRUB SERPL-MCNC: 0.4 MG/DL (ref 0.2–1)
BILIRUBIN DIRECT+TOT PNL SERPL-MCNC: 0.1 MG/DL (ref 0–0.5)
BILIRUBIN DIRECT+TOT PNL SERPL-MCNC: 0.3 MG/DL (ref 0–0.8)
BUN SERPL-MCNC: 13 MG/DL (ref 8–26)
CANCER AG125 SERPL-ACNC: 16.6 IU/ML (ref 1.5–35)
CHLORIDE SERPL-SCNC: 98 MMOL/L (ref 98–109)
CREAT SERPL-MCNC: 0.7 MG/DL (ref 0.6–1.3)
EOSINOPHIL # BLD AUTO: 0 X10(3)/MCL (ref 0–0.9)
EOSINOPHIL NFR BLD AUTO: 0.1 %
ERYTHROCYTE [DISTWIDTH] IN BLOOD BY AUTOMATED COUNT: 17 % (ref 11.5–17)
GLUCOSE SERPL-MCNC: 95 MG/DL (ref 70–105)
HCT VFR BLD AUTO: 37.9 % (ref 37–47)
HCT VFR BLD CALC: 38 % (ref 38–51)
HGB BLD-MCNC: 11.7 GM/DL (ref 12–16)
HGB BLD-MCNC: 12.9 MG/DL (ref 12–17)
LIVER PROFILE INTERP: NORMAL
LYMPHOCYTES # BLD AUTO: 1.6 X10(3)/MCL (ref 0.6–4.6)
LYMPHOCYTES NFR BLD AUTO: 20.4 %
MCH RBC QN AUTO: 29.5 PG (ref 27–31)
MCHC RBC AUTO-ENTMCNC: 30.9 GM/DL (ref 33–36)
MCV RBC AUTO: 95.7 FL (ref 80–94)
MONOCYTES # BLD AUTO: 0.6 X10(3)/MCL (ref 0.1–1.3)
MONOCYTES NFR BLD AUTO: 8.2 %
NEUTROPHILS # BLD AUTO: 5.6 X10(3)/MCL (ref 2.1–9.2)
NEUTROPHILS NFR BLD AUTO: 70.7 %
PLATELET # BLD AUTO: 154 X10(3)/MCL (ref 130–400)
PMV BLD AUTO: 8.8 FL (ref 9.4–12.4)
POC IONIZED CALCIUM: 1.23 MMOL/L (ref 1.12–1.32)
POC TCO2: 30 MMOL/L (ref 24–29)
POTASSIUM BLD-SCNC: 4.2 MMOL/L (ref 3.5–4.9)
PROT SERPL-MCNC: 7.5 GM/DL (ref 6.4–8.2)
RBC # BLD AUTO: 3.96 X10(6)/MCL (ref 4.2–5.4)
SODIUM BLD-SCNC: 139 MMOL/L (ref 138–146)
WBC # SPEC AUTO: 7.9 X10(3)/MCL (ref 4.5–11.5)

## 2019-04-30 ENCOUNTER — HISTORICAL (OUTPATIENT)
Dept: INFUSION THERAPY | Facility: HOSPITAL | Age: 69
End: 2019-04-30

## 2019-04-30 LAB
ABS NEUT (OLG): 2.9 X10(3)/MCL (ref 2.1–9.2)
ALBUMIN SERPL-MCNC: 3.4 GM/DL (ref 3.4–5)
ALP SERPL-CCNC: 68 UNIT/L (ref 38–126)
ALT SERPL-CCNC: 19 UNIT/L (ref 12–78)
ANION GAP SERPL CALC-SCNC: 15 MMOL/L
AST SERPL-CCNC: 17 UNIT/L (ref 15–37)
BASOPHILS # BLD AUTO: 0 X10(3)/MCL (ref 0–0.2)
BASOPHILS NFR BLD AUTO: 0.2 %
BILIRUB SERPL-MCNC: 0.4 MG/DL (ref 0.2–1)
BILIRUBIN DIRECT+TOT PNL SERPL-MCNC: 0.1 MG/DL (ref 0–0.5)
BILIRUBIN DIRECT+TOT PNL SERPL-MCNC: 0.3 MG/DL (ref 0–0.8)
BUN SERPL-MCNC: 15 MG/DL (ref 8–26)
CHLORIDE SERPL-SCNC: 102 MMOL/L (ref 98–109)
CREAT SERPL-MCNC: 0.7 MG/DL (ref 0.6–1.3)
EOSINOPHIL # BLD AUTO: 0 X10(3)/MCL (ref 0–0.9)
EOSINOPHIL NFR BLD AUTO: 0.6 %
ERYTHROCYTE [DISTWIDTH] IN BLOOD BY AUTOMATED COUNT: 16.3 % (ref 11.5–17)
GLUCOSE SERPL-MCNC: 85 MG/DL (ref 70–105)
HCT VFR BLD AUTO: 36.7 % (ref 37–47)
HCT VFR BLD CALC: 35 % (ref 38–51)
HGB BLD-MCNC: 11.2 GM/DL (ref 12–16)
HGB BLD-MCNC: 11.9 MG/DL (ref 12–17)
LIVER PROFILE INTERP: NORMAL
LYMPHOCYTES # BLD AUTO: 1.2 X10(3)/MCL (ref 0.6–4.6)
LYMPHOCYTES NFR BLD AUTO: 24.9 %
MCH RBC QN AUTO: 29.9 PG (ref 27–31)
MCHC RBC AUTO-ENTMCNC: 30.5 GM/DL (ref 33–36)
MCV RBC AUTO: 97.9 FL (ref 80–94)
MONOCYTES # BLD AUTO: 0.5 X10(3)/MCL (ref 0.1–1.3)
MONOCYTES NFR BLD AUTO: 11.6 %
NEUTROPHILS # BLD AUTO: 2.9 X10(3)/MCL (ref 2.1–9.2)
NEUTROPHILS NFR BLD AUTO: 62.5 %
PLATELET # BLD AUTO: 139 X10(3)/MCL (ref 130–400)
PMV BLD AUTO: 8.2 FL (ref 9.4–12.4)
POC IONIZED CALCIUM: 1.19 MMOL/L (ref 1.12–1.32)
POC TCO2: 29 MMOL/L (ref 24–29)
POTASSIUM BLD-SCNC: 4 MMOL/L (ref 3.5–4.9)
PROT SERPL-MCNC: 6.8 GM/DL (ref 6.4–8.2)
RBC # BLD AUTO: 3.75 X10(6)/MCL (ref 4.2–5.4)
SODIUM BLD-SCNC: 141 MMOL/L (ref 138–146)
WBC # SPEC AUTO: 4.6 X10(3)/MCL (ref 4.5–11.5)

## 2019-05-07 ENCOUNTER — HISTORICAL (OUTPATIENT)
Dept: ADMINISTRATIVE | Facility: HOSPITAL | Age: 69
End: 2019-05-07

## 2019-05-07 LAB
ABS NEUT (OLG): 4.34 X10(3)/MCL (ref 2.1–9.2)
ALBUMIN SERPL-MCNC: 3.7 GM/DL (ref 3.4–5)
ALP SERPL-CCNC: 95 UNIT/L (ref 38–126)
ALT SERPL-CCNC: 34 UNIT/L (ref 12–78)
ANION GAP SERPL CALC-SCNC: 14 MMOL/L
AST SERPL-CCNC: 27 UNIT/L (ref 15–37)
BASOPHILS # BLD AUTO: 0 X10(3)/MCL (ref 0–0.2)
BASOPHILS NFR BLD AUTO: 0.1 %
BILIRUB SERPL-MCNC: 0.4 MG/DL (ref 0.2–1)
BILIRUBIN DIRECT+TOT PNL SERPL-MCNC: 0.1 MG/DL (ref 0–0.2)
BILIRUBIN DIRECT+TOT PNL SERPL-MCNC: 0.3 MG/DL (ref 0–0.8)
BUN SERPL-MCNC: 12 MG/DL (ref 8–26)
CHLORIDE SERPL-SCNC: 98 MMOL/L (ref 98–109)
CREAT SERPL-MCNC: 0.7 MG/DL (ref 0.6–1.3)
EOSINOPHIL # BLD AUTO: 0.1 X10(3)/MCL (ref 0–0.9)
EOSINOPHIL NFR BLD AUTO: 0.9 %
ERYTHROCYTE [DISTWIDTH] IN BLOOD BY AUTOMATED COUNT: 15.1 % (ref 11.5–17)
GLUCOSE SERPL-MCNC: 111 MG/DL (ref 70–105)
HCT VFR BLD AUTO: 36.4 % (ref 37–47)
HCT VFR BLD CALC: 34 % (ref 38–51)
HGB BLD-MCNC: 11.2 GM/DL (ref 12–16)
HGB BLD-MCNC: 11.6 MG/DL (ref 12–17)
LIVER PROFILE INTERP: NORMAL
LYMPHOCYTES # BLD AUTO: 1.3 X10(3)/MCL (ref 0.6–4.6)
LYMPHOCYTES NFR BLD AUTO: 19.6 %
MCH RBC QN AUTO: 29.9 PG (ref 27–31)
MCHC RBC AUTO-ENTMCNC: 30.8 GM/DL (ref 33–36)
MCV RBC AUTO: 97.3 FL (ref 80–94)
MONOCYTES # BLD AUTO: 0.9 X10(3)/MCL (ref 0.1–1.3)
MONOCYTES NFR BLD AUTO: 13.8 %
NEUTROPHILS # BLD AUTO: 4.3 X10(3)/MCL (ref 2.1–9.2)
NEUTROPHILS NFR BLD AUTO: 65 %
PLATELET # BLD AUTO: 102 X10(3)/MCL (ref 130–400)
PMV BLD AUTO: 9.3 FL (ref 9.4–12.4)
POC IONIZED CALCIUM: 1.17 MMOL/L (ref 1.12–1.32)
POC TCO2: 32 MMOL/L (ref 24–29)
POTASSIUM BLD-SCNC: 3.6 MMOL/L (ref 3.5–4.9)
PROT SERPL-MCNC: 7 GM/DL (ref 6.4–8.2)
RBC # BLD AUTO: 3.74 X10(6)/MCL (ref 4.2–5.4)
SODIUM BLD-SCNC: 139 MMOL/L (ref 138–146)
WBC # SPEC AUTO: 6.7 X10(3)/MCL (ref 4.5–11.5)

## 2019-05-14 ENCOUNTER — HISTORICAL (OUTPATIENT)
Dept: HEMATOLOGY/ONCOLOGY | Facility: CLINIC | Age: 69
End: 2019-05-14

## 2019-05-14 LAB
ABS NEUT (OLG): 6.34 X10(3)/MCL (ref 2.1–9.2)
ALBUMIN SERPL-MCNC: 3.8 GM/DL (ref 3.4–5)
ALP SERPL-CCNC: 102 UNIT/L (ref 38–126)
ALT SERPL-CCNC: 24 UNIT/L (ref 12–78)
ANION GAP SERPL CALC-SCNC: 19 MMOL/L
AST SERPL-CCNC: 24 UNIT/L (ref 15–37)
BASOPHILS # BLD AUTO: 0 X10(3)/MCL (ref 0–0.2)
BASOPHILS NFR BLD AUTO: 0.2 %
BILIRUB SERPL-MCNC: 0.3 MG/DL (ref 0.2–1)
BILIRUBIN DIRECT+TOT PNL SERPL-MCNC: 0.1 MG/DL (ref 0–0.2)
BILIRUBIN DIRECT+TOT PNL SERPL-MCNC: 0.2 MG/DL (ref 0–0.8)
BUN SERPL-MCNC: 12 MG/DL (ref 8–26)
CHLORIDE SERPL-SCNC: 98 MMOL/L (ref 98–109)
CREAT SERPL-MCNC: 0.7 MG/DL (ref 0.6–1.3)
EOSINOPHIL # BLD AUTO: 0 X10(3)/MCL (ref 0–0.9)
EOSINOPHIL NFR BLD AUTO: 0.5 %
ERYTHROCYTE [DISTWIDTH] IN BLOOD BY AUTOMATED COUNT: 15.3 % (ref 11.5–17)
GLUCOSE SERPL-MCNC: 93 MG/DL (ref 70–105)
HCT VFR BLD AUTO: 38.4 % (ref 37–47)
HCT VFR BLD CALC: 38 % (ref 38–51)
HGB BLD-MCNC: 11.9 GM/DL (ref 12–16)
HGB BLD-MCNC: 12.9 MG/DL (ref 12–17)
LIVER PROFILE INTERP: NORMAL
LYMPHOCYTES # BLD AUTO: 1.6 X10(3)/MCL (ref 0.6–4.6)
LYMPHOCYTES NFR BLD AUTO: 18.1 %
MCH RBC QN AUTO: 30.3 PG (ref 27–31)
MCHC RBC AUTO-ENTMCNC: 31 GM/DL (ref 33–36)
MCV RBC AUTO: 97.7 FL (ref 80–94)
MONOCYTES # BLD AUTO: 0.8 X10(3)/MCL (ref 0.1–1.3)
MONOCYTES NFR BLD AUTO: 9.1 %
NEUTROPHILS # BLD AUTO: 6.3 X10(3)/MCL (ref 2.1–9.2)
NEUTROPHILS NFR BLD AUTO: 71.8 %
PLATELET # BLD AUTO: 129 X10(3)/MCL (ref 130–400)
PMV BLD AUTO: 8.5 FL (ref 9.4–12.4)
POC IONIZED CALCIUM: 1.19 MMOL/L (ref 1.12–1.32)
POC TCO2: 28 MMOL/L (ref 24–29)
POTASSIUM BLD-SCNC: 3.8 MMOL/L (ref 3.5–4.9)
PROT SERPL-MCNC: 7.1 GM/DL (ref 6.4–8.2)
RBC # BLD AUTO: 3.93 X10(6)/MCL (ref 4.2–5.4)
SODIUM BLD-SCNC: 140 MMOL/L (ref 138–146)
WBC # SPEC AUTO: 8.8 X10(3)/MCL (ref 4.5–11.5)

## 2019-05-21 ENCOUNTER — HISTORICAL (OUTPATIENT)
Dept: INFUSION THERAPY | Facility: HOSPITAL | Age: 69
End: 2019-05-21

## 2019-05-21 LAB
ABS NEUT (OLG): 4.13 X10(3)/MCL (ref 2.1–9.2)
ALBUMIN SERPL-MCNC: 4.2 GM/DL (ref 3.4–5)
ALP SERPL-CCNC: 75 UNIT/L (ref 38–126)
ALT SERPL-CCNC: 23 UNIT/L (ref 12–78)
ANION GAP SERPL CALC-SCNC: 16 MMOL/L
AST SERPL-CCNC: 22 UNIT/L (ref 15–37)
BASOPHILS # BLD AUTO: 0 X10(3)/MCL (ref 0–0.2)
BASOPHILS NFR BLD AUTO: 0.2 %
BILIRUB SERPL-MCNC: 0.5 MG/DL (ref 0.2–1)
BILIRUBIN DIRECT+TOT PNL SERPL-MCNC: 0.1 MG/DL (ref 0–0.2)
BILIRUBIN DIRECT+TOT PNL SERPL-MCNC: 0.4 MG/DL (ref 0–0.8)
BUN SERPL-MCNC: 16 MG/DL (ref 8–26)
CANCER AG125 SERPL-ACNC: 11 IU/ML (ref 1.5–35)
CHLORIDE SERPL-SCNC: 103 MMOL/L (ref 98–109)
CREAT SERPL-MCNC: 0.9 MG/DL (ref 0.6–1.3)
EOSINOPHIL # BLD AUTO: 0 X10(3)/MCL (ref 0–0.9)
EOSINOPHIL NFR BLD AUTO: 0.2 %
ERYTHROCYTE [DISTWIDTH] IN BLOOD BY AUTOMATED COUNT: 15 % (ref 11.5–17)
GLUCOSE SERPL-MCNC: 112 MG/DL (ref 70–105)
HCT VFR BLD AUTO: 38.4 % (ref 37–47)
HCT VFR BLD CALC: 38 % (ref 38–51)
HGB BLD-MCNC: 12.2 GM/DL (ref 12–16)
HGB BLD-MCNC: 12.9 MG/DL (ref 12–17)
LIVER PROFILE INTERP: NORMAL
LYMPHOCYTES # BLD AUTO: 1.6 X10(3)/MCL (ref 0.6–4.6)
LYMPHOCYTES NFR BLD AUTO: 24.5 %
MCH RBC QN AUTO: 30.7 PG (ref 27–31)
MCHC RBC AUTO-ENTMCNC: 31.8 GM/DL (ref 33–36)
MCV RBC AUTO: 96.5 FL (ref 80–94)
MONOCYTES # BLD AUTO: 0.7 X10(3)/MCL (ref 0.1–1.3)
MONOCYTES NFR BLD AUTO: 10.7 %
NEUTROPHILS # BLD AUTO: 4.1 X10(3)/MCL (ref 2.1–9.2)
NEUTROPHILS NFR BLD AUTO: 64.2 %
PLATELET # BLD AUTO: 119 X10(3)/MCL (ref 130–400)
PMV BLD AUTO: 8.3 FL (ref 9.4–12.4)
POC IONIZED CALCIUM: 1.14 MMOL/L (ref 1.12–1.32)
POC TCO2: 26 MMOL/L (ref 24–29)
POTASSIUM BLD-SCNC: 3.4 MMOL/L (ref 3.5–4.9)
PROT SERPL-MCNC: 7.5 GM/DL (ref 6.4–8.2)
RBC # BLD AUTO: 3.98 X10(6)/MCL (ref 4.2–5.4)
SODIUM BLD-SCNC: 141 MMOL/L (ref 138–146)
WBC # SPEC AUTO: 6.4 X10(3)/MCL (ref 4.5–11.5)

## 2019-05-29 ENCOUNTER — HISTORICAL (OUTPATIENT)
Dept: ADMINISTRATIVE | Facility: HOSPITAL | Age: 69
End: 2019-05-29

## 2019-05-29 LAB
ABS NEUT (OLG): 3.47 X10(3)/MCL (ref 2.1–9.2)
ALBUMIN SERPL-MCNC: 3.8 GM/DL (ref 3.4–5)
ALP SERPL-CCNC: 97 UNIT/L (ref 38–126)
ALT SERPL-CCNC: 27 UNIT/L (ref 12–78)
ANION GAP SERPL CALC-SCNC: 19 MMOL/L
AST SERPL-CCNC: 22 UNIT/L (ref 15–37)
BILIRUB SERPL-MCNC: 0.3 MG/DL (ref 0.2–1)
BILIRUBIN DIRECT+TOT PNL SERPL-MCNC: 0.1 MG/DL (ref 0–0.5)
BILIRUBIN DIRECT+TOT PNL SERPL-MCNC: 0.2 MG/DL (ref 0–0.8)
BUN SERPL-MCNC: 11 MG/DL (ref 8–26)
CHLORIDE SERPL-SCNC: 97 MMOL/L (ref 98–109)
CREAT SERPL-MCNC: 0.7 MG/DL (ref 0.6–1.3)
EOSINOPHIL # BLD AUTO: 0.1 X10(3)/MCL (ref 0–0.9)
EOSINOPHIL NFR BLD AUTO: 1.4 %
ERYTHROCYTE [DISTWIDTH] IN BLOOD BY AUTOMATED COUNT: 14.4 % (ref 11.5–17)
GLUCOSE SERPL-MCNC: 100 MG/DL (ref 70–105)
HCT VFR BLD AUTO: 36.7 % (ref 37–47)
HCT VFR BLD CALC: 37 % (ref 38–51)
HGB BLD-MCNC: 11.5 GM/DL (ref 12–16)
HGB BLD-MCNC: 12.6 MG/DL (ref 12–17)
LIVER PROFILE INTERP: NORMAL
LYMPHOCYTES # BLD AUTO: 1.2 X10(3)/MCL (ref 0.6–4.6)
LYMPHOCYTES NFR BLD AUTO: 21.2 %
MCH RBC QN AUTO: 30.9 PG (ref 27–31)
MCHC RBC AUTO-ENTMCNC: 31.3 GM/DL (ref 33–36)
MCV RBC AUTO: 98.7 FL (ref 80–94)
MONOCYTES # BLD AUTO: 0.9 X10(3)/MCL (ref 0.1–1.3)
MONOCYTES NFR BLD AUTO: 15.6 %
NEUTROPHILS # BLD AUTO: 3.5 X10(3)/MCL (ref 2.1–9.2)
NEUTROPHILS NFR BLD AUTO: 61.4 %
PLATELET # BLD AUTO: 85 X10(3)/MCL (ref 130–400)
PMV BLD AUTO: 9.3 FL (ref 9.4–12.4)
POC IONIZED CALCIUM: 1.23 MMOL/L (ref 1.12–1.32)
POC TCO2: 29 MMOL/L (ref 24–29)
POTASSIUM BLD-SCNC: 4 MMOL/L (ref 3.5–4.9)
PROT SERPL-MCNC: 7.5 GM/DL (ref 6.4–8.2)
RBC # BLD AUTO: 3.72 X10(6)/MCL (ref 4.2–5.4)
SODIUM BLD-SCNC: 139 MMOL/L (ref 138–146)
WBC # SPEC AUTO: 5.6 X10(3)/MCL (ref 4.5–11.5)

## 2019-06-04 ENCOUNTER — HISTORICAL (OUTPATIENT)
Dept: HEMATOLOGY/ONCOLOGY | Facility: CLINIC | Age: 69
End: 2019-06-04

## 2019-06-04 LAB
ABS NEUT (OLG): 6.17 X10(3)/MCL (ref 2.1–9.2)
ALBUMIN SERPL-MCNC: 3.8 GM/DL (ref 3.4–5)
ALP SERPL-CCNC: 103 UNIT/L (ref 38–126)
ALT SERPL-CCNC: 22 UNIT/L (ref 12–78)
ANION GAP SERPL CALC-SCNC: 15 MMOL/L
AST SERPL-CCNC: 20 UNIT/L (ref 15–37)
BASOPHILS # BLD AUTO: 0 X10(3)/MCL (ref 0–0.2)
BASOPHILS NFR BLD AUTO: 0.1 %
BILIRUB SERPL-MCNC: 0.7 MG/DL (ref 0.2–1)
BILIRUBIN DIRECT+TOT PNL SERPL-MCNC: 0.1 MG/DL (ref 0–0.2)
BILIRUBIN DIRECT+TOT PNL SERPL-MCNC: 0.6 MG/DL (ref 0–0.8)
BUN SERPL-MCNC: 15 MG/DL (ref 8–26)
CHLORIDE SERPL-SCNC: 98 MMOL/L (ref 98–109)
CREAT SERPL-MCNC: 0.8 MG/DL (ref 0.6–1.3)
EOSINOPHIL # BLD AUTO: 0 X10(3)/MCL (ref 0–0.9)
EOSINOPHIL NFR BLD AUTO: 0.6 %
ERYTHROCYTE [DISTWIDTH] IN BLOOD BY AUTOMATED COUNT: 14.5 % (ref 11.5–17)
GLUCOSE SERPL-MCNC: 100 MG/DL (ref 70–105)
HCT VFR BLD AUTO: 38.9 % (ref 37–47)
HCT VFR BLD CALC: 37 % (ref 38–51)
HGB BLD-MCNC: 12.2 GM/DL (ref 12–16)
HGB BLD-MCNC: 12.6 MG/DL (ref 12–17)
LIVER PROFILE INTERP: NORMAL
LYMPHOCYTES # BLD AUTO: 1.5 X10(3)/MCL (ref 0.6–4.6)
LYMPHOCYTES NFR BLD AUTO: 17.5 %
MCH RBC QN AUTO: 31 PG (ref 27–31)
MCHC RBC AUTO-ENTMCNC: 31.4 GM/DL (ref 33–36)
MCV RBC AUTO: 98.7 FL (ref 80–94)
MONOCYTES # BLD AUTO: 0.7 X10(3)/MCL (ref 0.1–1.3)
MONOCYTES NFR BLD AUTO: 7.9 %
NEUTROPHILS # BLD AUTO: 6.2 X10(3)/MCL (ref 2.1–9.2)
NEUTROPHILS NFR BLD AUTO: 73.7 %
PLATELET # BLD AUTO: 127 X10(3)/MCL (ref 130–400)
PMV BLD AUTO: 9.1 FL (ref 9.4–12.4)
POC IONIZED CALCIUM: 1.11 MMOL/L (ref 1.12–1.32)
POC TCO2: 31 MMOL/L (ref 24–29)
POTASSIUM BLD-SCNC: 3.9 MMOL/L (ref 3.5–4.9)
PROT SERPL-MCNC: 7.4 GM/DL (ref 6.4–8.2)
RBC # BLD AUTO: 3.94 X10(6)/MCL (ref 4.2–5.4)
SODIUM BLD-SCNC: 139 MMOL/L (ref 138–146)
WBC # SPEC AUTO: 8.4 X10(3)/MCL (ref 4.5–11.5)

## 2019-06-07 ENCOUNTER — HISTORICAL (OUTPATIENT)
Dept: RADIOLOGY | Facility: HOSPITAL | Age: 69
End: 2019-06-07

## 2019-06-10 ENCOUNTER — HISTORICAL (OUTPATIENT)
Dept: ADMINISTRATIVE | Facility: HOSPITAL | Age: 69
End: 2019-06-10

## 2019-06-10 LAB
ABS NEUT (OLG): 2.86 X10(3)/MCL (ref 2.1–9.2)
ALBUMIN SERPL-MCNC: 3.8 GM/DL (ref 3.4–5)
ALP SERPL-CCNC: 79 UNIT/L (ref 38–126)
ALT SERPL-CCNC: 23 UNIT/L (ref 12–78)
ANION GAP SERPL CALC-SCNC: 16 MMOL/L
AST SERPL-CCNC: 18 UNIT/L (ref 15–37)
BILIRUB SERPL-MCNC: 0.4 MG/DL (ref 0.2–1)
BILIRUBIN DIRECT+TOT PNL SERPL-MCNC: 0.1 MG/DL (ref 0–0.2)
BILIRUBIN DIRECT+TOT PNL SERPL-MCNC: 0.3 MG/DL (ref 0–0.8)
BUN SERPL-MCNC: 18 MG/DL (ref 8–26)
CANCER AG125 SERPL-ACNC: 10.8 IU/ML (ref 1.5–35)
CHLORIDE SERPL-SCNC: 100 MMOL/L (ref 98–109)
CREAT SERPL-MCNC: 0.7 MG/DL (ref 0.6–1.3)
EOSINOPHIL # BLD AUTO: 0 X10(3)/MCL (ref 0–0.9)
EOSINOPHIL NFR BLD AUTO: 0.4 %
ERYTHROCYTE [DISTWIDTH] IN BLOOD BY AUTOMATED COUNT: 14.4 % (ref 11.5–17)
GLUCOSE SERPL-MCNC: 97 MG/DL (ref 70–105)
HCT VFR BLD AUTO: 39 % (ref 37–47)
HCT VFR BLD CALC: 37 % (ref 38–51)
HGB BLD-MCNC: 12.1 GM/DL (ref 12–16)
HGB BLD-MCNC: 12.6 MG/DL (ref 12–17)
LIVER PROFILE INTERP: NORMAL
LYMPHOCYTES # BLD AUTO: 1.4 X10(3)/MCL (ref 0.6–4.6)
LYMPHOCYTES NFR BLD AUTO: 28.3 %
MCH RBC QN AUTO: 31 PG (ref 27–31)
MCHC RBC AUTO-ENTMCNC: 31 GM/DL (ref 33–36)
MCV RBC AUTO: 100 FL (ref 80–94)
MONOCYTES # BLD AUTO: 0.6 X10(3)/MCL (ref 0.1–1.3)
MONOCYTES NFR BLD AUTO: 11.7 %
NEUTROPHILS # BLD AUTO: 2.9 X10(3)/MCL (ref 2.1–9.2)
NEUTROPHILS NFR BLD AUTO: 59.6 %
PLATELET # BLD AUTO: 122 X10(3)/MCL (ref 130–400)
PMV BLD AUTO: 8.2 FL (ref 9.4–12.4)
POC IONIZED CALCIUM: 1.24 MMOL/L (ref 1.12–1.32)
POC TCO2: 31 MMOL/L (ref 24–29)
POTASSIUM BLD-SCNC: 4.5 MMOL/L (ref 3.5–4.9)
PROT SERPL-MCNC: 7.4 GM/DL (ref 6.4–8.2)
RBC # BLD AUTO: 3.9 X10(6)/MCL (ref 4.2–5.4)
SODIUM BLD-SCNC: 142 MMOL/L (ref 138–146)
WBC # SPEC AUTO: 4.8 X10(3)/MCL (ref 4.5–11.5)

## 2019-09-13 ENCOUNTER — HISTORICAL (OUTPATIENT)
Dept: HEMATOLOGY/ONCOLOGY | Facility: CLINIC | Age: 69
End: 2019-09-13

## 2019-09-13 LAB
ABS NEUT (OLG): 2.48 X10(3)/MCL (ref 2.1–9.2)
ALBUMIN SERPL-MCNC: 3.9 GM/DL (ref 3.4–5)
ALBUMIN/GLOB SERPL: 1.2 {RATIO}
ALP SERPL-CCNC: 66 UNIT/L (ref 38–126)
ALT SERPL-CCNC: 23 UNIT/L (ref 12–78)
AST SERPL-CCNC: 19 UNIT/L (ref 15–37)
BASOPHILS # BLD AUTO: 0 X10(3)/MCL (ref 0–0.2)
BASOPHILS NFR BLD AUTO: 0.2 %
BILIRUB SERPL-MCNC: 0.8 MG/DL (ref 0.2–1)
BILIRUBIN DIRECT+TOT PNL SERPL-MCNC: 0.2 MG/DL (ref 0–0.2)
BILIRUBIN DIRECT+TOT PNL SERPL-MCNC: 0.6 MG/DL (ref 0–0.8)
BUN SERPL-MCNC: 16 MG/DL (ref 7–18)
CALCIUM SERPL-MCNC: 9.8 MG/DL (ref 8.5–10.1)
CANCER AG125 SERPL-ACNC: 38.1 IU/ML (ref 1.5–35)
CHLORIDE SERPL-SCNC: 99 MMOL/L (ref 98–107)
CO2 SERPL-SCNC: 32 MMOL/L (ref 21–32)
CREAT SERPL-MCNC: 0.75 MG/DL (ref 0.55–1.02)
EOSINOPHIL # BLD AUTO: 0.1 X10(3)/MCL (ref 0–0.9)
EOSINOPHIL NFR BLD AUTO: 1.6 %
ERYTHROCYTE [DISTWIDTH] IN BLOOD BY AUTOMATED COUNT: 12 % (ref 11.5–17)
GLOBULIN SER-MCNC: 3.3 GM/DL (ref 2.4–3.5)
GLUCOSE SERPL-MCNC: 77 MG/DL (ref 74–106)
HCT VFR BLD AUTO: 43.7 % (ref 37–47)
HGB BLD-MCNC: 13.9 GM/DL (ref 12–16)
LYMPHOCYTES # BLD AUTO: 1.3 X10(3)/MCL (ref 0.6–4.6)
LYMPHOCYTES NFR BLD AUTO: 30.6 %
MCH RBC QN AUTO: 30.3 PG (ref 27–31)
MCHC RBC AUTO-ENTMCNC: 31.8 GM/DL (ref 33–36)
MCV RBC AUTO: 95.2 FL (ref 80–94)
MONOCYTES # BLD AUTO: 0.4 X10(3)/MCL (ref 0.1–1.3)
MONOCYTES NFR BLD AUTO: 9.6 %
NEUTROPHILS # BLD AUTO: 2.5 X10(3)/MCL (ref 2.1–9.2)
NEUTROPHILS NFR BLD AUTO: 58 %
PLATELET # BLD AUTO: 127 X10(3)/MCL (ref 130–400)
PMV BLD AUTO: 8.4 FL (ref 9.4–12.4)
POTASSIUM SERPL-SCNC: 3.6 MMOL/L (ref 3.5–5.1)
PROT SERPL-MCNC: 7.2 GM/DL (ref 6.4–8.2)
RBC # BLD AUTO: 4.59 X10(6)/MCL (ref 4.2–5.4)
SODIUM SERPL-SCNC: 139 MMOL/L (ref 136–145)
WBC # SPEC AUTO: 4.3 X10(3)/MCL (ref 4.5–11.5)

## 2019-09-25 ENCOUNTER — HISTORICAL (OUTPATIENT)
Dept: RADIOLOGY | Facility: HOSPITAL | Age: 69
End: 2019-09-25

## 2019-09-27 ENCOUNTER — HISTORICAL (OUTPATIENT)
Dept: INFUSION THERAPY | Facility: HOSPITAL | Age: 69
End: 2019-09-27

## 2019-10-31 ENCOUNTER — HISTORICAL (OUTPATIENT)
Dept: ADMINISTRATIVE | Facility: HOSPITAL | Age: 69
End: 2019-10-31

## 2019-10-31 LAB
ABS NEUT (OLG): 2.7 X10(3)/MCL (ref 2.1–9.2)
ALBUMIN SERPL-MCNC: 3.9 GM/DL (ref 3.4–5)
ALBUMIN/GLOB SERPL: 1.1 {RATIO}
ALP SERPL-CCNC: 56 UNIT/L (ref 38–126)
ALT SERPL-CCNC: 22 UNIT/L (ref 12–78)
AST SERPL-CCNC: 16 UNIT/L (ref 15–37)
BASOPHILS # BLD AUTO: 0 X10(3)/MCL (ref 0–0.2)
BASOPHILS NFR BLD AUTO: 0.4 %
BILIRUB SERPL-MCNC: 0.6 MG/DL (ref 0.2–1)
BILIRUBIN DIRECT+TOT PNL SERPL-MCNC: 0.2 MG/DL (ref 0–0.2)
BILIRUBIN DIRECT+TOT PNL SERPL-MCNC: 0.4 MG/DL (ref 0–0.8)
BUN SERPL-MCNC: 12 MG/DL (ref 7–18)
CALCIUM SERPL-MCNC: 9.6 MG/DL (ref 8.5–10.1)
CANCER AG125 SERPL-ACNC: 25.8 IU/ML (ref 1.5–35)
CHLORIDE SERPL-SCNC: 97 MMOL/L (ref 98–107)
CO2 SERPL-SCNC: 32 MMOL/L (ref 21–32)
CREAT SERPL-MCNC: 0.73 MG/DL (ref 0.55–1.02)
EOSINOPHIL # BLD AUTO: 0.2 X10(3)/MCL (ref 0–0.9)
EOSINOPHIL NFR BLD AUTO: 4.3 %
ERYTHROCYTE [DISTWIDTH] IN BLOOD BY AUTOMATED COUNT: 12.8 % (ref 11.5–17)
GLOBULIN SER-MCNC: 3.5 GM/DL (ref 2.4–3.5)
GLUCOSE SERPL-MCNC: 99 MG/DL (ref 74–106)
HCT VFR BLD AUTO: 41.4 % (ref 37–47)
HGB BLD-MCNC: 13.1 GM/DL (ref 12–16)
LYMPHOCYTES # BLD AUTO: 1.9 X10(3)/MCL (ref 0.6–4.6)
LYMPHOCYTES NFR BLD AUTO: 34.9 %
MCH RBC QN AUTO: 29.2 PG (ref 27–31)
MCHC RBC AUTO-ENTMCNC: 31.6 GM/DL (ref 33–36)
MCV RBC AUTO: 92.4 FL (ref 80–94)
MONOCYTES # BLD AUTO: 0.6 X10(3)/MCL (ref 0.1–1.3)
MONOCYTES NFR BLD AUTO: 11.7 %
NEUTROPHILS # BLD AUTO: 2.7 X10(3)/MCL (ref 2.1–9.2)
NEUTROPHILS NFR BLD AUTO: 48.5 %
PLATELET # BLD AUTO: 483 X10(3)/MCL (ref 130–400)
PMV BLD AUTO: 8 FL (ref 9.4–12.4)
POTASSIUM SERPL-SCNC: 4 MMOL/L (ref 3.5–5.1)
PROT SERPL-MCNC: 7.4 GM/DL (ref 6.4–8.2)
RBC # BLD AUTO: 4.48 X10(6)/MCL (ref 4.2–5.4)
SODIUM SERPL-SCNC: 135 MMOL/L (ref 136–145)
WBC # SPEC AUTO: 5.6 X10(3)/MCL (ref 4.5–11.5)

## 2019-11-14 ENCOUNTER — HISTORICAL (OUTPATIENT)
Dept: HEMATOLOGY/ONCOLOGY | Facility: CLINIC | Age: 69
End: 2019-11-14

## 2019-11-14 LAB
ABS NEUT (OLG): 2.19 X10(3)/MCL (ref 2.1–9.2)
ALBUMIN SERPL-MCNC: 4 GM/DL (ref 3.4–5)
ALBUMIN/GLOB SERPL: 1.2 RATIO (ref 1.1–2)
ALP SERPL-CCNC: 61 UNIT/L (ref 38–126)
ALT SERPL-CCNC: 22 UNIT/L (ref 12–78)
AST SERPL-CCNC: 22 UNIT/L (ref 15–37)
BASOPHILS # BLD AUTO: 0 X10(3)/MCL (ref 0–0.2)
BASOPHILS NFR BLD AUTO: 0.5 %
BILIRUB SERPL-MCNC: 1.1 MG/DL (ref 0.2–1)
BILIRUBIN DIRECT+TOT PNL SERPL-MCNC: 0.2 MG/DL (ref 0–0.5)
BILIRUBIN DIRECT+TOT PNL SERPL-MCNC: 0.9 MG/DL (ref 0–0.8)
BUN SERPL-MCNC: 12 MG/DL (ref 7–18)
CALCIUM SERPL-MCNC: 9.6 MG/DL (ref 8.5–10.1)
CHLORIDE SERPL-SCNC: 102 MMOL/L (ref 98–107)
CO2 SERPL-SCNC: 30 MMOL/L (ref 21–32)
CREAT SERPL-MCNC: 0.99 MG/DL (ref 0.55–1.02)
EOSINOPHIL # BLD AUTO: 0.1 X10(3)/MCL (ref 0–0.9)
EOSINOPHIL NFR BLD AUTO: 2.3 %
ERYTHROCYTE [DISTWIDTH] IN BLOOD BY AUTOMATED COUNT: 13 % (ref 11.5–17)
GLOBULIN SER-MCNC: 3.3 GM/DL (ref 2.4–3.5)
GLUCOSE SERPL-MCNC: 96 MG/DL (ref 74–106)
HCT VFR BLD AUTO: 44.4 % (ref 37–47)
HGB BLD-MCNC: 14 GM/DL (ref 12–16)
LYMPHOCYTES # BLD AUTO: 1.6 X10(3)/MCL (ref 0.6–4.6)
LYMPHOCYTES NFR BLD AUTO: 35.7 %
MCH RBC QN AUTO: 29.5 PG (ref 27–31)
MCHC RBC AUTO-ENTMCNC: 31.5 GM/DL (ref 33–36)
MCV RBC AUTO: 93.7 FL (ref 80–94)
MONOCYTES # BLD AUTO: 0.5 X10(3)/MCL (ref 0.1–1.3)
MONOCYTES NFR BLD AUTO: 12 %
NEUTROPHILS # BLD AUTO: 2.2 X10(3)/MCL (ref 2.1–9.2)
NEUTROPHILS NFR BLD AUTO: 49.5 %
PLATELET # BLD AUTO: 326 X10(3)/MCL (ref 130–400)
PMV BLD AUTO: 8.2 FL (ref 9.4–12.4)
POTASSIUM SERPL-SCNC: 4 MMOL/L (ref 3.5–5.1)
PROT SERPL-MCNC: 7.3 GM/DL (ref 6.4–8.2)
RBC # BLD AUTO: 4.74 X10(6)/MCL (ref 4.2–5.4)
SODIUM SERPL-SCNC: 138 MMOL/L (ref 136–145)
WBC # SPEC AUTO: 4.4 X10(3)/MCL (ref 4.5–11.5)

## 2019-11-21 ENCOUNTER — HISTORICAL (OUTPATIENT)
Dept: ADMINISTRATIVE | Facility: HOSPITAL | Age: 69
End: 2019-11-21

## 2019-11-21 LAB
ABS NEUT (OLG): 2.24 X10(3)/MCL (ref 2.1–9.2)
ALBUMIN SERPL-MCNC: 3.9 GM/DL (ref 3.4–5)
ALBUMIN/GLOB SERPL: 1.1 RATIO (ref 1.1–2)
ALP SERPL-CCNC: 78 UNIT/L (ref 38–126)
ALT SERPL-CCNC: 22 UNIT/L (ref 12–78)
AST SERPL-CCNC: 24 UNIT/L (ref 15–37)
BASOPHILS # BLD AUTO: 0 X10(3)/MCL (ref 0–0.2)
BASOPHILS NFR BLD AUTO: 0.4 %
BILIRUB SERPL-MCNC: 1 MG/DL (ref 0.2–1)
BILIRUBIN DIRECT+TOT PNL SERPL-MCNC: 0.3 MG/DL (ref 0–0.5)
BILIRUBIN DIRECT+TOT PNL SERPL-MCNC: 0.7 MG/DL (ref 0–0.8)
BUN SERPL-MCNC: 14 MG/DL (ref 7–18)
CALCIUM SERPL-MCNC: 9.4 MG/DL (ref 8.5–10.1)
CHLORIDE SERPL-SCNC: 103 MMOL/L (ref 98–107)
CO2 SERPL-SCNC: 29 MMOL/L (ref 21–32)
CREAT SERPL-MCNC: 0.85 MG/DL (ref 0.55–1.02)
EOSINOPHIL # BLD AUTO: 0.2 X10(3)/MCL (ref 0–0.9)
EOSINOPHIL NFR BLD AUTO: 3.4 %
ERYTHROCYTE [DISTWIDTH] IN BLOOD BY AUTOMATED COUNT: 12.8 % (ref 11.5–17)
GLOBULIN SER-MCNC: 3.4 GM/DL (ref 2.4–3.5)
GLUCOSE SERPL-MCNC: 103 MG/DL (ref 74–106)
HCT VFR BLD AUTO: 41.5 % (ref 37–47)
HGB BLD-MCNC: 13.1 GM/DL (ref 12–16)
LYMPHOCYTES # BLD AUTO: 2 X10(3)/MCL (ref 0.6–4.6)
LYMPHOCYTES NFR BLD AUTO: 42.3 %
MCH RBC QN AUTO: 29.4 PG (ref 27–31)
MCHC RBC AUTO-ENTMCNC: 31.6 GM/DL (ref 33–36)
MCV RBC AUTO: 93.3 FL (ref 80–94)
MONOCYTES # BLD AUTO: 0.3 X10(3)/MCL (ref 0.1–1.3)
MONOCYTES NFR BLD AUTO: 6.5 %
NEUTROPHILS # BLD AUTO: 2.2 X10(3)/MCL (ref 2.1–9.2)
NEUTROPHILS NFR BLD AUTO: 47.2 %
PLATELET # BLD AUTO: 202 X10(3)/MCL (ref 130–400)
PMV BLD AUTO: 8.4 FL (ref 9.4–12.4)
POTASSIUM SERPL-SCNC: 3.7 MMOL/L (ref 3.5–5.1)
PROT SERPL-MCNC: 7.3 GM/DL (ref 6.4–8.2)
RBC # BLD AUTO: 4.45 X10(6)/MCL (ref 4.2–5.4)
SODIUM SERPL-SCNC: 139 MMOL/L (ref 136–145)
WBC # SPEC AUTO: 4.8 X10(3)/MCL (ref 4.5–11.5)

## 2019-11-26 ENCOUNTER — HISTORICAL (OUTPATIENT)
Dept: INFUSION THERAPY | Facility: HOSPITAL | Age: 69
End: 2019-11-26

## 2019-11-26 LAB
ABS NEUT (OLG): 2.73 X10(3)/MCL (ref 2.1–9.2)
ALBUMIN SERPL-MCNC: 3.8 GM/DL (ref 3.4–5)
ALBUMIN/GLOB SERPL: 1.2 RATIO (ref 1.1–2)
ALP SERPL-CCNC: 77 UNIT/L (ref 38–126)
ALT SERPL-CCNC: 24 UNIT/L (ref 12–78)
ANISOCYTOSIS BLD QL SMEAR: 0
AST SERPL-CCNC: 30 UNIT/L (ref 15–37)
BASOPHILS # BLD AUTO: 0 X10(3)/MCL (ref 0–0.2)
BASOPHILS NFR BLD AUTO: 0.4 %
BASOPHILS NFR BLD MANUAL: 2 % (ref 0–2)
BILIRUB SERPL-MCNC: 1.1 MG/DL (ref 0.2–1)
BILIRUBIN DIRECT+TOT PNL SERPL-MCNC: 0.2 MG/DL (ref 0–0.5)
BILIRUBIN DIRECT+TOT PNL SERPL-MCNC: 0.9 MG/DL (ref 0–0.8)
BUN SERPL-MCNC: 17 MG/DL (ref 7–18)
CALCIUM SERPL-MCNC: 9.1 MG/DL (ref 8.5–10.1)
CANCER AG125 SERPL-ACNC: 8.4 IU/ML (ref 1.5–35)
CHLORIDE SERPL-SCNC: 106 MMOL/L (ref 98–107)
CO2 SERPL-SCNC: 29 MMOL/L (ref 21–32)
CREAT SERPL-MCNC: 0.93 MG/DL (ref 0.55–1.02)
EOSINOPHIL # BLD AUTO: 0.1 X10(3)/MCL (ref 0–0.9)
EOSINOPHIL NFR BLD AUTO: 1.5 %
ERYTHROCYTE [DISTWIDTH] IN BLOOD BY AUTOMATED COUNT: 12.8 % (ref 11.5–17)
GLOBULIN SER-MCNC: 3.1 GM/DL (ref 2.4–3.5)
GLUCOSE SERPL-MCNC: 101 MG/DL (ref 74–106)
HCT VFR BLD AUTO: 37.5 % (ref 37–47)
HGB BLD-MCNC: 11.9 GM/DL (ref 12–16)
HYPOCHROMIA BLD QL SMEAR: 0
LYMPHOCYTES # BLD AUTO: 2.1 X10(3)/MCL (ref 0.6–4.6)
LYMPHOCYTES NFR BLD AUTO: 40.2 %
LYMPHOCYTES NFR BLD MANUAL: 34 % (ref 13–40)
MACROCYTES BLD QL SMEAR: 0
MCH RBC QN AUTO: 29.6 PG (ref 27–31)
MCHC RBC AUTO-ENTMCNC: 31.7 GM/DL (ref 33–36)
MCV RBC AUTO: 93.3 FL (ref 80–94)
MICROCYTES BLD QL SMEAR: 0
MONOCYTES # BLD AUTO: 0.3 X10(3)/MCL (ref 0.1–1.3)
MONOCYTES NFR BLD AUTO: 5.7 %
MONOCYTES NFR BLD MANUAL: 4 % (ref 2–11)
NEUTROPHILS # BLD AUTO: 2.7 X10(3)/MCL (ref 2.1–9.2)
NEUTROPHILS NFR BLD AUTO: 52 %
NEUTROPHILS NFR BLD MANUAL: 61 % (ref 47–80)
PLATELET # BLD AUTO: 87 X10(3)/MCL (ref 130–400)
PLATELET # BLD EST: NORMAL 10*3/UL
PMV BLD AUTO: 9.5 FL (ref 9.4–12.4)
POIKILOCYTOSIS BLD QL SMEAR: 0
POLYCHROMASIA BLD QL SMEAR: 0
POTASSIUM SERPL-SCNC: 3.9 MMOL/L (ref 3.5–5.1)
PROT SERPL-MCNC: 6.9 GM/DL (ref 6.4–8.2)
RBC # BLD AUTO: 4.02 X10(6)/MCL (ref 4.2–5.4)
SCHISTOCYTES BLD QL AUTO: 0
SODIUM SERPL-SCNC: 140 MMOL/L (ref 136–145)
SPHEROCYTES BLD QL SMEAR: 0
TARGETS BLD QL SMEAR: 0
WBC # SPEC AUTO: 5.2 X10(3)/MCL (ref 4.5–11.5)

## 2019-12-05 ENCOUNTER — HISTORICAL (OUTPATIENT)
Dept: HEMATOLOGY/ONCOLOGY | Facility: CLINIC | Age: 69
End: 2019-12-05

## 2019-12-05 LAB
ABS NEUT (OLG): 0.87 X10(3)/MCL (ref 2.1–9.2)
ALBUMIN SERPL-MCNC: 3.9 GM/DL (ref 3.4–5)
ALBUMIN/GLOB SERPL: 1.1 {RATIO}
ALP SERPL-CCNC: 93 UNIT/L (ref 38–126)
ALT SERPL-CCNC: 34 UNIT/L (ref 12–78)
AST SERPL-CCNC: 30 UNIT/L (ref 15–37)
BASOPHILS # BLD AUTO: 0 X10(3)/MCL (ref 0–0.2)
BASOPHILS NFR BLD AUTO: 0.6 %
BILIRUB SERPL-MCNC: 0.8 MG/DL (ref 0.2–1)
BILIRUBIN DIRECT+TOT PNL SERPL-MCNC: 0.2 MG/DL (ref 0–0.2)
BILIRUBIN DIRECT+TOT PNL SERPL-MCNC: 0.6 MG/DL (ref 0–0.8)
BUN SERPL-MCNC: 16 MG/DL (ref 7–18)
CALCIUM SERPL-MCNC: 9.4 MG/DL (ref 8.5–10.1)
CHLORIDE SERPL-SCNC: 103 MMOL/L (ref 98–107)
CO2 SERPL-SCNC: 29 MMOL/L (ref 21–32)
CREAT SERPL-MCNC: 0.95 MG/DL (ref 0.55–1.02)
EOSINOPHIL # BLD AUTO: 0.1 X10(3)/MCL (ref 0–0.9)
EOSINOPHIL NFR BLD AUTO: 3 %
ERYTHROCYTE [DISTWIDTH] IN BLOOD BY AUTOMATED COUNT: 12.4 % (ref 11.5–17)
GLOBULIN SER-MCNC: 3.7 GM/DL (ref 2.4–3.5)
GLUCOSE SERPL-MCNC: 98 MG/DL (ref 74–106)
HCT VFR BLD AUTO: 37.6 % (ref 37–47)
HGB BLD-MCNC: 12.1 GM/DL (ref 12–16)
LYMPHOCYTES # BLD AUTO: 2 X10(3)/MCL (ref 0.6–4.6)
LYMPHOCYTES NFR BLD AUTO: 59.6 %
MCH RBC QN AUTO: 29.7 PG (ref 27–31)
MCHC RBC AUTO-ENTMCNC: 32.2 GM/DL (ref 33–36)
MCV RBC AUTO: 92.2 FL (ref 80–94)
MONOCYTES # BLD AUTO: 0.3 X10(3)/MCL (ref 0.1–1.3)
MONOCYTES NFR BLD AUTO: 10.3 %
NEUTROPHILS # BLD AUTO: 0.9 X10(3)/MCL (ref 2.1–9.2)
NEUTROPHILS NFR BLD AUTO: 26.5 %
PLATELET # BLD AUTO: 54 X10(3)/MCL (ref 130–400)
PMV BLD AUTO: 9.3 FL (ref 9.4–12.4)
POTASSIUM SERPL-SCNC: 4.1 MMOL/L (ref 3.5–5.1)
PROT SERPL-MCNC: 7.6 GM/DL (ref 6.4–8.2)
RBC # BLD AUTO: 4.08 X10(6)/MCL (ref 4.2–5.4)
SODIUM SERPL-SCNC: 139 MMOL/L (ref 136–145)
WBC # SPEC AUTO: 3.3 X10(3)/MCL (ref 4.5–11.5)

## 2019-12-12 ENCOUNTER — HISTORICAL (OUTPATIENT)
Dept: HEMATOLOGY/ONCOLOGY | Facility: CLINIC | Age: 69
End: 2019-12-12

## 2019-12-12 LAB
ABS NEUT (OLG): 0.76 X10(3)/MCL (ref 2.1–9.2)
ANISOCYTOSIS BLD QL SMEAR: 0
BASOPHILS # BLD AUTO: 0 X10(3)/MCL (ref 0–0.2)
BASOPHILS NFR BLD AUTO: 0.9 %
EOSINOPHIL # BLD AUTO: 0 X10(3)/MCL (ref 0–0.9)
EOSINOPHIL NFR BLD AUTO: 0.9 %
EOSINOPHIL NFR BLD MANUAL: 3 % (ref 0–8)
ERYTHROCYTE [DISTWIDTH] IN BLOOD BY AUTOMATED COUNT: 12.4 % (ref 11.5–17)
HCT VFR BLD AUTO: 36.8 % (ref 37–47)
HGB BLD-MCNC: 11.8 GM/DL (ref 12–16)
HYPOCHROMIA BLD QL SMEAR: 0
LYMPHOCYTES # BLD AUTO: 2 X10(3)/MCL (ref 0.6–4.6)
LYMPHOCYTES NFR BLD AUTO: 59 %
LYMPHOCYTES NFR BLD MANUAL: 65 % (ref 13–40)
MACROCYTES BLD QL SMEAR: 0
MCH RBC QN AUTO: 29.2 PG (ref 27–31)
MCHC RBC AUTO-ENTMCNC: 32.1 GM/DL (ref 33–36)
MCV RBC AUTO: 91.1 FL (ref 80–94)
MICROCYTES BLD QL SMEAR: 0
MONOCYTES # BLD AUTO: 0.6 X10(3)/MCL (ref 0.1–1.3)
MONOCYTES NFR BLD AUTO: 17.3 %
MONOCYTES NFR BLD MANUAL: 10 % (ref 2–11)
NEUTROPHILS # BLD AUTO: 0.76 X10(3)/MCL (ref 2.1–9.2)
NEUTROPHILS # BLD AUTO: 0.8 X10(3)/MCL (ref 2.1–9.2)
NEUTROPHILS NFR BLD AUTO: 21.8 %
NEUTROPHILS NFR BLD MANUAL: 22 % (ref 47–80)
PLATELET # BLD AUTO: 111 X10(3)/MCL (ref 130–400)
PLATELET # BLD EST: NORMAL 10*3/UL
PMV BLD AUTO: 10.5 FL (ref 9.4–12.4)
POIKILOCYTOSIS BLD QL SMEAR: 0
POLYCHROMASIA BLD QL SMEAR: 0
RBC # BLD AUTO: 4.04 X10(6)/MCL (ref 4.2–5.4)
SCHISTOCYTES BLD QL AUTO: 0
SPHEROCYTES BLD QL SMEAR: 0
TARGETS BLD QL SMEAR: 0
WBC # SPEC AUTO: 3.5 X10(3)/MCL (ref 4.5–11.5)

## 2019-12-19 ENCOUNTER — HISTORICAL (OUTPATIENT)
Dept: HEMATOLOGY/ONCOLOGY | Facility: CLINIC | Age: 69
End: 2019-12-19

## 2019-12-19 LAB
ABS NEUT (OLG): 1.16 X10(3)/MCL (ref 2.1–9.2)
BASOPHILS # BLD AUTO: 0 X10(3)/MCL (ref 0–0.2)
BASOPHILS NFR BLD AUTO: 0.5 %
EOSINOPHIL # BLD AUTO: 0.1 X10(3)/MCL (ref 0–0.9)
EOSINOPHIL NFR BLD AUTO: 1.7 %
ERYTHROCYTE [DISTWIDTH] IN BLOOD BY AUTOMATED COUNT: 13 % (ref 11.5–17)
HCT VFR BLD AUTO: 34.8 % (ref 37–47)
HGB BLD-MCNC: 11.2 GM/DL (ref 12–16)
LYMPHOCYTES # BLD AUTO: 1.9 X10(3)/MCL (ref 0.6–4.6)
LYMPHOCYTES NFR BLD AUTO: 46.1 %
MCH RBC QN AUTO: 29.6 PG (ref 27–31)
MCHC RBC AUTO-ENTMCNC: 32.2 GM/DL (ref 33–36)
MCV RBC AUTO: 92.1 FL (ref 80–94)
MONOCYTES # BLD AUTO: 1 X10(3)/MCL (ref 0.1–1.3)
MONOCYTES NFR BLD AUTO: 23.4 %
NEUTROPHILS # BLD AUTO: 1.2 X10(3)/MCL (ref 2.1–9.2)
NEUTROPHILS NFR BLD AUTO: 28.3 %
PLATELET # BLD AUTO: 507 X10(3)/MCL (ref 130–400)
PMV BLD AUTO: 8.3 FL (ref 9.4–12.4)
RBC # BLD AUTO: 3.78 X10(6)/MCL (ref 4.2–5.4)
WBC # SPEC AUTO: 4.1 X10(3)/MCL (ref 4.5–11.5)

## 2019-12-30 ENCOUNTER — HISTORICAL (OUTPATIENT)
Dept: HEMATOLOGY/ONCOLOGY | Facility: CLINIC | Age: 69
End: 2019-12-30

## 2019-12-30 LAB
ABS NEUT (OLG): 2.53 X10(3)/MCL (ref 2.1–9.2)
ALBUMIN SERPL-MCNC: 3.7 GM/DL (ref 3.4–5)
ALBUMIN/GLOB SERPL: 1 {RATIO}
ALP SERPL-CCNC: 79 UNIT/L (ref 38–126)
ALT SERPL-CCNC: 22 UNIT/L (ref 12–78)
AST SERPL-CCNC: 21 UNIT/L (ref 15–37)
BASOPHILS # BLD AUTO: 0 X10(3)/MCL (ref 0–0.2)
BASOPHILS NFR BLD AUTO: 0.4 %
BILIRUB SERPL-MCNC: 0.6 MG/DL (ref 0.2–1)
BILIRUBIN DIRECT+TOT PNL SERPL-MCNC: 0.1 MG/DL (ref 0–0.2)
BILIRUBIN DIRECT+TOT PNL SERPL-MCNC: 0.5 MG/DL (ref 0–0.8)
BUN SERPL-MCNC: 16 MG/DL (ref 7–18)
CALCIUM SERPL-MCNC: 9.3 MG/DL (ref 8.5–10.1)
CANCER AG125 SERPL-ACNC: 8.8 IU/ML (ref 1.5–35)
CHLORIDE SERPL-SCNC: 100 MMOL/L (ref 98–107)
CO2 SERPL-SCNC: 30 MMOL/L (ref 21–32)
CREAT SERPL-MCNC: 0.72 MG/DL (ref 0.55–1.02)
EOSINOPHIL # BLD AUTO: 0.1 X10(3)/MCL (ref 0–0.9)
EOSINOPHIL NFR BLD AUTO: 2.2 %
EOSINOPHIL NFR BLD MANUAL: 5 % (ref 0–8)
ERYTHROCYTE [DISTWIDTH] IN BLOOD BY AUTOMATED COUNT: 16.5 % (ref 11.5–17)
GLOBULIN SER-MCNC: 3.6 GM/DL (ref 2.4–3.5)
GLUCOSE SERPL-MCNC: 95 MG/DL (ref 74–106)
GRANULOCYTES NFR BLD MANUAL: 46 % (ref 47–80)
HCT VFR BLD AUTO: 37.3 % (ref 37–47)
HGB BLD-MCNC: 11.7 GM/DL (ref 12–16)
LYMPHOCYTES # BLD AUTO: 1.8 X10(3)/MCL (ref 0.6–4.6)
LYMPHOCYTES NFR BLD AUTO: 33.6 %
LYMPHOCYTES NFR BLD MANUAL: 32 % (ref 13–40)
MACROCYTES BLD QL SMEAR: ABNORMAL
MCH RBC QN AUTO: 30.2 PG (ref 27–31)
MCHC RBC AUTO-ENTMCNC: 31.4 GM/DL (ref 33–36)
MCV RBC AUTO: 96.1 FL (ref 80–94)
MONOCYTES # BLD AUTO: 0.9 X10(3)/MCL (ref 0.1–1.3)
MONOCYTES NFR BLD AUTO: 16.8 %
MONOCYTES NFR BLD MANUAL: 17 % (ref 2–11)
NEUTROPHILS # BLD AUTO: 2.5 X10(3)/MCL (ref 2.1–9.2)
NEUTROPHILS NFR BLD AUTO: 46.8 %
PLATELET # BLD AUTO: 441 X10(3)/MCL (ref 130–400)
PLATELET # BLD EST: ABNORMAL 10*3/UL
PMV BLD AUTO: 8.2 FL (ref 9.4–12.4)
POTASSIUM SERPL-SCNC: 3.6 MMOL/L (ref 3.5–5.1)
PROT SERPL-MCNC: 7.3 GM/DL (ref 6.4–8.2)
RBC # BLD AUTO: 3.88 X10(6)/MCL (ref 4.2–5.4)
SODIUM SERPL-SCNC: 138 MMOL/L (ref 136–145)
WBC # SPEC AUTO: 5.4 X10(3)/MCL (ref 4.5–11.5)

## 2020-01-07 ENCOUNTER — HISTORICAL (OUTPATIENT)
Dept: HEMATOLOGY/ONCOLOGY | Facility: CLINIC | Age: 70
End: 2020-01-07

## 2020-01-07 LAB
ABS NEUT (OLG): 3.72 X10(3)/MCL (ref 2.1–9.2)
ANION GAP SERPL CALC-SCNC: 14 MMOL/L
BASOPHILS # BLD AUTO: 0 X10(3)/MCL (ref 0–0.2)
BASOPHILS NFR BLD AUTO: 0.4 %
BUN SERPL-MCNC: 10 MG/DL (ref 8–26)
CHLORIDE SERPL-SCNC: 100 MMOL/L (ref 98–109)
CREAT SERPL-MCNC: 0.8 MG/DL (ref 0.6–1.3)
EOSINOPHIL # BLD AUTO: 0.1 X10(3)/MCL (ref 0–0.9)
EOSINOPHIL NFR BLD AUTO: 1.4 %
ERYTHROCYTE [DISTWIDTH] IN BLOOD BY AUTOMATED COUNT: 17.1 % (ref 11.5–17)
GLUCOSE SERPL-MCNC: 94 MG/DL (ref 70–105)
HCT VFR BLD AUTO: 39.2 % (ref 37–47)
HCT VFR BLD CALC: 42 % (ref 38–51)
HGB BLD-MCNC: 12.5 GM/DL (ref 12–16)
HGB BLD-MCNC: 14.3 MG/DL (ref 12–17)
LYMPHOCYTES # BLD AUTO: 2.3 X10(3)/MCL (ref 0.6–4.6)
LYMPHOCYTES NFR BLD AUTO: 32.9 %
MCH RBC QN AUTO: 30.4 PG (ref 27–31)
MCHC RBC AUTO-ENTMCNC: 31.9 GM/DL (ref 33–36)
MCV RBC AUTO: 95.4 FL (ref 80–94)
MONOCYTES # BLD AUTO: 0.8 X10(3)/MCL (ref 0.1–1.3)
MONOCYTES NFR BLD AUTO: 11.3 %
NEUTROPHILS # BLD AUTO: 3.7 X10(3)/MCL (ref 2.1–9.2)
NEUTROPHILS NFR BLD AUTO: 53.9 %
PLATELET # BLD AUTO: 361 X10(3)/MCL (ref 130–400)
PMV BLD AUTO: 7.8 FL (ref 9.4–12.4)
POC IONIZED CALCIUM: 1.24 MMOL/L (ref 1.12–1.32)
POC TCO2: 29 MMOL/L (ref 24–29)
POTASSIUM BLD-SCNC: 3.9 MMOL/L (ref 3.5–4.9)
RBC # BLD AUTO: 4.11 X10(6)/MCL (ref 4.2–5.4)
SODIUM BLD-SCNC: 138 MMOL/L (ref 138–146)
WBC # SPEC AUTO: 6.9 X10(3)/MCL (ref 4.5–11.5)

## 2020-01-14 ENCOUNTER — HISTORICAL (OUTPATIENT)
Dept: HEMATOLOGY/ONCOLOGY | Facility: CLINIC | Age: 70
End: 2020-01-14

## 2020-01-14 LAB
ABS NEUT (OLG): 2.5 X10(3)/MCL (ref 2.1–9.2)
ANION GAP SERPL CALC-SCNC: 12 MMOL/L
BASOPHILS # BLD AUTO: 0 X10(3)/MCL (ref 0–0.2)
BASOPHILS NFR BLD AUTO: 0.5 %
BUN SERPL-MCNC: 12 MG/DL (ref 8–26)
CHLORIDE SERPL-SCNC: 101 MMOL/L (ref 98–109)
CREAT SERPL-MCNC: 0.7 MG/DL (ref 0.6–1.3)
EOSINOPHIL # BLD AUTO: 0.2 X10(3)/MCL (ref 0–0.9)
EOSINOPHIL NFR BLD AUTO: 3.1 %
ERYTHROCYTE [DISTWIDTH] IN BLOOD BY AUTOMATED COUNT: 17.1 % (ref 11.5–17)
GLUCOSE SERPL-MCNC: 94 MG/DL (ref 70–105)
HCT VFR BLD AUTO: 39.5 % (ref 37–47)
HCT VFR BLD CALC: 40 % (ref 38–51)
HGB BLD-MCNC: 12.3 GM/DL (ref 12–16)
HGB BLD-MCNC: 13.6 MG/DL (ref 12–17)
LYMPHOCYTES # BLD AUTO: 2.3 X10(3)/MCL (ref 0.6–4.6)
LYMPHOCYTES NFR BLD AUTO: 39.9 %
MCH RBC QN AUTO: 30.1 PG (ref 27–31)
MCHC RBC AUTO-ENTMCNC: 31.1 GM/DL (ref 33–36)
MCV RBC AUTO: 96.8 FL (ref 80–94)
MONOCYTES # BLD AUTO: 0.7 X10(3)/MCL (ref 0.1–1.3)
MONOCYTES NFR BLD AUTO: 12.8 %
NEUTROPHILS # BLD AUTO: 2.5 X10(3)/MCL (ref 2.1–9.2)
NEUTROPHILS NFR BLD AUTO: 43.5 %
PLATELET # BLD AUTO: 428 X10(3)/MCL (ref 130–400)
PMV BLD AUTO: 8.1 FL (ref 9.4–12.4)
POC IONIZED CALCIUM: 1.2 MMOL/L (ref 1.12–1.32)
POC TCO2: 29 MMOL/L (ref 24–29)
POTASSIUM BLD-SCNC: 4.1 MMOL/L (ref 3.5–4.9)
RBC # BLD AUTO: 4.08 X10(6)/MCL (ref 4.2–5.4)
SODIUM BLD-SCNC: 137 MMOL/L (ref 138–146)
WBC # SPEC AUTO: 5.8 X10(3)/MCL (ref 4.5–11.5)

## 2020-01-21 ENCOUNTER — HISTORICAL (OUTPATIENT)
Dept: HEMATOLOGY/ONCOLOGY | Facility: CLINIC | Age: 70
End: 2020-01-21

## 2020-01-21 LAB
ABS NEUT (OLG): 2.66 X10(3)/MCL (ref 2.1–9.2)
ANION GAP SERPL CALC-SCNC: 13 MMOL/L
BASOPHILS # BLD AUTO: 0 X10(3)/MCL (ref 0–0.2)
BASOPHILS NFR BLD AUTO: 0.4 %
BUN SERPL-MCNC: 11 MG/DL (ref 8–26)
CHLORIDE SERPL-SCNC: 99 MMOL/L (ref 98–109)
CREAT SERPL-MCNC: 0.8 MG/DL (ref 0.6–1.3)
EOSINOPHIL # BLD AUTO: 0.2 X10(3)/MCL (ref 0–0.9)
EOSINOPHIL NFR BLD AUTO: 2.8 %
ERYTHROCYTE [DISTWIDTH] IN BLOOD BY AUTOMATED COUNT: 17 % (ref 11.5–17)
GLUCOSE SERPL-MCNC: 100 MG/DL (ref 70–105)
HCT VFR BLD AUTO: 40.1 % (ref 37–47)
HCT VFR BLD CALC: 41 % (ref 38–51)
HGB BLD-MCNC: 12.6 GM/DL (ref 12–16)
HGB BLD-MCNC: 13.9 MG/DL (ref 12–17)
LYMPHOCYTES # BLD AUTO: 1.8 X10(3)/MCL (ref 0.6–4.6)
LYMPHOCYTES NFR BLD AUTO: 34.2 %
MCH RBC QN AUTO: 30.4 PG (ref 27–31)
MCHC RBC AUTO-ENTMCNC: 31.4 GM/DL (ref 33–36)
MCV RBC AUTO: 96.9 FL (ref 80–94)
MONOCYTES # BLD AUTO: 0.7 X10(3)/MCL (ref 0.1–1.3)
MONOCYTES NFR BLD AUTO: 13.3 %
NEUTROPHILS # BLD AUTO: 2.7 X10(3)/MCL (ref 2.1–9.2)
NEUTROPHILS NFR BLD AUTO: 49.1 %
PLATELET # BLD AUTO: 408 X10(3)/MCL (ref 130–400)
PMV BLD AUTO: 8.2 FL (ref 9.4–12.4)
POC IONIZED CALCIUM: 1.21 MMOL/L (ref 1.12–1.32)
POC TCO2: 31 MMOL/L (ref 24–29)
POTASSIUM BLD-SCNC: 4.3 MMOL/L (ref 3.5–4.9)
RBC # BLD AUTO: 4.14 X10(6)/MCL (ref 4.2–5.4)
SODIUM BLD-SCNC: 138 MMOL/L (ref 138–146)
WBC # SPEC AUTO: 5.4 X10(3)/MCL (ref 4.5–11.5)

## 2020-01-28 ENCOUNTER — HISTORICAL (OUTPATIENT)
Dept: ADMINISTRATIVE | Facility: HOSPITAL | Age: 70
End: 2020-01-28

## 2020-01-28 LAB
ABS NEUT (OLG): 2.15 X10(3)/MCL (ref 2.1–9.2)
ALBUMIN SERPL-MCNC: 3.8 GM/DL (ref 3.4–5)
ALP SERPL-CCNC: 82 UNIT/L (ref 38–126)
ALT SERPL-CCNC: 15 UNIT/L (ref 12–78)
ANION GAP SERPL CALC-SCNC: 13 MMOL/L
AST SERPL-CCNC: 21 UNIT/L (ref 15–37)
BASOPHILS # BLD AUTO: 0 X10(3)/MCL (ref 0–0.2)
BASOPHILS NFR BLD AUTO: 0.8 %
BILIRUB SERPL-MCNC: 0.9 MG/DL (ref 0.2–1)
BILIRUBIN DIRECT+TOT PNL SERPL-MCNC: 0.2 MG/DL (ref 0–0.5)
BILIRUBIN DIRECT+TOT PNL SERPL-MCNC: 0.7 MG/DL (ref 0–0.8)
BUN SERPL-MCNC: 11 MG/DL (ref 8–26)
CHLORIDE SERPL-SCNC: 100 MMOL/L (ref 98–109)
CREAT SERPL-MCNC: 0.9 MG/DL (ref 0.6–1.3)
EOSINOPHIL # BLD AUTO: 0.1 X10(3)/MCL (ref 0–0.9)
EOSINOPHIL NFR BLD AUTO: 2.2 %
ERYTHROCYTE [DISTWIDTH] IN BLOOD BY AUTOMATED COUNT: 17.1 % (ref 11.5–17)
GLUCOSE SERPL-MCNC: 95 MG/DL (ref 70–105)
HCT VFR BLD AUTO: 41.3 % (ref 37–47)
HCT VFR BLD CALC: 43 % (ref 38–51)
HGB BLD-MCNC: 13 GM/DL (ref 12–16)
HGB BLD-MCNC: 14.6 MG/DL (ref 12–17)
LIVER PROFILE INTERP: NORMAL
LYMPHOCYTES # BLD AUTO: 2 X10(3)/MCL (ref 0.6–4.6)
LYMPHOCYTES NFR BLD AUTO: 40.3 %
MCH RBC QN AUTO: 30.8 PG (ref 27–31)
MCHC RBC AUTO-ENTMCNC: 31.5 GM/DL (ref 33–36)
MCV RBC AUTO: 97.9 FL (ref 80–94)
MONOCYTES # BLD AUTO: 0.7 X10(3)/MCL (ref 0.1–1.3)
MONOCYTES NFR BLD AUTO: 14 %
NEUTROPHILS # BLD AUTO: 2.2 X10(3)/MCL (ref 2.1–9.2)
NEUTROPHILS NFR BLD AUTO: 42.5 %
PLATELET # BLD AUTO: 363 X10(3)/MCL (ref 130–400)
PMV BLD AUTO: 8.2 FL (ref 9.4–12.4)
POC IONIZED CALCIUM: 1.16 MMOL/L (ref 1.12–1.32)
POC TCO2: 30 MMOL/L (ref 24–29)
POTASSIUM BLD-SCNC: 3.9 MMOL/L (ref 3.5–4.9)
PROT SERPL-MCNC: 7.6 GM/DL (ref 6.4–8.2)
RBC # BLD AUTO: 4.22 X10(6)/MCL (ref 4.2–5.4)
SODIUM BLD-SCNC: 138 MMOL/L (ref 138–146)
WBC # SPEC AUTO: 5.1 X10(3)/MCL (ref 4.5–11.5)

## 2020-02-24 ENCOUNTER — HISTORICAL (OUTPATIENT)
Dept: HEMATOLOGY/ONCOLOGY | Facility: CLINIC | Age: 70
End: 2020-02-24

## 2020-02-24 LAB
ABS NEUT (OLG): 2.4 X10(3)/MCL (ref 2.1–9.2)
ALBUMIN SERPL-MCNC: 3.9 GM/DL (ref 3.4–5)
ALBUMIN/GLOB SERPL: 1.1 {RATIO}
ALP SERPL-CCNC: 85 UNIT/L (ref 38–126)
ALT SERPL-CCNC: 19 UNIT/L (ref 12–78)
AST SERPL-CCNC: 21 UNIT/L (ref 15–37)
BASOPHILS # BLD AUTO: 0 X10(3)/MCL (ref 0–0.2)
BASOPHILS NFR BLD AUTO: 0.6 %
BILIRUB SERPL-MCNC: 0.9 MG/DL (ref 0.2–1)
BILIRUBIN DIRECT+TOT PNL SERPL-MCNC: 0.2 MG/DL (ref 0–0.2)
BILIRUBIN DIRECT+TOT PNL SERPL-MCNC: 0.7 MG/DL (ref 0–0.8)
BUN SERPL-MCNC: 10 MG/DL (ref 7–18)
CALCIUM SERPL-MCNC: 9.4 MG/DL (ref 8.5–10.1)
CHLORIDE SERPL-SCNC: 100 MMOL/L (ref 98–107)
CO2 SERPL-SCNC: 33 MMOL/L (ref 21–32)
CREAT SERPL-MCNC: 0.78 MG/DL (ref 0.55–1.02)
EOSINOPHIL # BLD AUTO: 0.1 X10(3)/MCL (ref 0–0.9)
EOSINOPHIL NFR BLD AUTO: 1.2 %
ERYTHROCYTE [DISTWIDTH] IN BLOOD BY AUTOMATED COUNT: 15.7 % (ref 11.5–17)
GLOBULIN SER-MCNC: 3.6 GM/DL (ref 2.4–3.5)
GLUCOSE SERPL-MCNC: 124 MG/DL (ref 74–106)
HCT VFR BLD AUTO: 42.1 % (ref 37–47)
HGB BLD-MCNC: 13.2 GM/DL (ref 12–16)
LYMPHOCYTES # BLD AUTO: 2 X10(3)/MCL (ref 0.6–4.6)
LYMPHOCYTES NFR BLD AUTO: 40.2 %
MCH RBC QN AUTO: 31.5 PG (ref 27–31)
MCHC RBC AUTO-ENTMCNC: 31.4 GM/DL (ref 33–36)
MCV RBC AUTO: 100.5 FL (ref 80–94)
MONOCYTES # BLD AUTO: 0.6 X10(3)/MCL (ref 0.1–1.3)
MONOCYTES NFR BLD AUTO: 10.8 %
NEUTROPHILS # BLD AUTO: 2.4 X10(3)/MCL (ref 2.1–9.2)
NEUTROPHILS NFR BLD AUTO: 47.2 %
PLATELET # BLD AUTO: 337 X10(3)/MCL (ref 130–400)
PMV BLD AUTO: 8.3 FL (ref 9.4–12.4)
POTASSIUM SERPL-SCNC: 3.7 MMOL/L (ref 3.5–5.1)
PROT SERPL-MCNC: 7.5 GM/DL (ref 6.4–8.2)
RBC # BLD AUTO: 4.19 X10(6)/MCL (ref 4.2–5.4)
SODIUM SERPL-SCNC: 138 MMOL/L (ref 136–145)
WBC # SPEC AUTO: 5.1 X10(3)/MCL (ref 4.5–11.5)

## 2020-03-20 ENCOUNTER — HISTORICAL (OUTPATIENT)
Dept: HEMATOLOGY/ONCOLOGY | Facility: CLINIC | Age: 70
End: 2020-03-20

## 2020-03-20 LAB
ABS NEUT (OLG): 2.18 X10(3)/MCL (ref 2.1–9.2)
ALBUMIN SERPL-MCNC: 3.8 GM/DL (ref 3.4–5)
ALBUMIN/GLOB SERPL: 1.1 RATIO (ref 1.1–2)
ALP SERPL-CCNC: 82 UNIT/L (ref 38–126)
ALT SERPL-CCNC: 17 UNIT/L (ref 12–78)
AST SERPL-CCNC: 24 UNIT/L (ref 15–37)
BASOPHILS # BLD AUTO: 0 X10(3)/MCL (ref 0–0.2)
BASOPHILS NFR BLD AUTO: 0.4 %
BILIRUB SERPL-MCNC: 0.9 MG/DL (ref 0.2–1)
BILIRUBIN DIRECT+TOT PNL SERPL-MCNC: 0.2 MG/DL (ref 0–0.5)
BILIRUBIN DIRECT+TOT PNL SERPL-MCNC: 0.7 MG/DL (ref 0–0.8)
BUN SERPL-MCNC: 11 MG/DL (ref 7–18)
CALCIUM SERPL-MCNC: 9.3 MG/DL (ref 8.5–10.1)
CANCER AG125 SERPL-ACNC: 8.3 IU/ML (ref 1.5–35)
CHLORIDE SERPL-SCNC: 104 MMOL/L (ref 98–107)
CO2 SERPL-SCNC: 31 MMOL/L (ref 21–32)
CREAT SERPL-MCNC: 0.82 MG/DL (ref 0.55–1.02)
EOSINOPHIL # BLD AUTO: 0.1 X10(3)/MCL (ref 0–0.9)
EOSINOPHIL NFR BLD AUTO: 1.3 %
ERYTHROCYTE [DISTWIDTH] IN BLOOD BY AUTOMATED COUNT: 13.6 % (ref 11.5–17)
GLOBULIN SER-MCNC: 3.6 GM/DL (ref 2.4–3.5)
GLUCOSE SERPL-MCNC: 94 MG/DL (ref 74–106)
HCT VFR BLD AUTO: 42.8 % (ref 37–47)
HGB BLD-MCNC: 13.7 GM/DL (ref 12–16)
LYMPHOCYTES # BLD AUTO: 1.8 X10(3)/MCL (ref 0.6–4.6)
LYMPHOCYTES NFR BLD AUTO: 38.8 %
MCH RBC QN AUTO: 32.2 PG (ref 27–31)
MCHC RBC AUTO-ENTMCNC: 32 GM/DL (ref 33–36)
MCV RBC AUTO: 100.5 FL (ref 80–94)
MONOCYTES # BLD AUTO: 0.6 X10(3)/MCL (ref 0.1–1.3)
MONOCYTES NFR BLD AUTO: 12.6 %
NEUTROPHILS # BLD AUTO: 2.2 X10(3)/MCL (ref 2.1–9.2)
NEUTROPHILS NFR BLD AUTO: 46.7 %
PLATELET # BLD AUTO: 337 X10(3)/MCL (ref 130–400)
PMV BLD AUTO: 8.6 FL (ref 9.4–12.4)
POTASSIUM SERPL-SCNC: 4 MMOL/L (ref 3.5–5.1)
PROT SERPL-MCNC: 7.4 GM/DL (ref 6.4–8.2)
RBC # BLD AUTO: 4.26 X10(6)/MCL (ref 4.2–5.4)
SODIUM SERPL-SCNC: 141 MMOL/L (ref 136–145)
WBC # SPEC AUTO: 4.7 X10(3)/MCL (ref 4.5–11.5)

## 2020-04-21 ENCOUNTER — HISTORICAL (OUTPATIENT)
Dept: HEMATOLOGY/ONCOLOGY | Facility: CLINIC | Age: 70
End: 2020-04-21

## 2020-04-21 LAB
ABS NEUT (OLG): 2.42 X10(3)/MCL (ref 2.1–9.2)
ALBUMIN SERPL-MCNC: 4 GM/DL (ref 3.4–5)
ALBUMIN/GLOB SERPL: 1.2 RATIO (ref 1.1–2)
ALP SERPL-CCNC: 83 UNIT/L (ref 40–150)
ALT SERPL-CCNC: 12 UNIT/L (ref 0–55)
AST SERPL-CCNC: 23 UNIT/L (ref 5–34)
BASOPHILS # BLD AUTO: 0 X10(3)/MCL (ref 0–0.2)
BASOPHILS NFR BLD AUTO: 0.2 %
BILIRUB SERPL-MCNC: 0.7 MG/DL
BILIRUBIN DIRECT+TOT PNL SERPL-MCNC: 0.2 MG/DL (ref 0–0.5)
BILIRUBIN DIRECT+TOT PNL SERPL-MCNC: 0.5 MG/DL (ref 0–0.8)
BUN SERPL-MCNC: 7 MG/DL (ref 9.8–20.1)
CALCIUM SERPL-MCNC: 9.3 MG/DL (ref 8.4–10.2)
CANCER AG125 SERPL-ACNC: 12 UNIT/ML (ref 0–35)
CHLORIDE SERPL-SCNC: 104 MMOL/L (ref 98–107)
CO2 SERPL-SCNC: 28 MMOL/L (ref 23–31)
CREAT SERPL-MCNC: 0.8 MG/DL (ref 0.55–1.02)
EOSINOPHIL # BLD AUTO: 0.1 X10(3)/MCL (ref 0–0.9)
EOSINOPHIL NFR BLD AUTO: 1.3 %
ERYTHROCYTE [DISTWIDTH] IN BLOOD BY AUTOMATED COUNT: 13.3 % (ref 11.5–17)
GLOBULIN SER-MCNC: 3.4 GM/DL (ref 2.4–3.5)
GLUCOSE SERPL-MCNC: 88 MG/DL (ref 82–115)
HCT VFR BLD AUTO: 43.8 % (ref 37–47)
HGB BLD-MCNC: 14 GM/DL (ref 12–16)
LYMPHOCYTES # BLD AUTO: 2.4 X10(3)/MCL (ref 0.6–4.6)
LYMPHOCYTES NFR BLD AUTO: 43.6 %
MCH RBC QN AUTO: 31.4 PG (ref 27–31)
MCHC RBC AUTO-ENTMCNC: 32 GM/DL (ref 33–36)
MCV RBC AUTO: 98.2 FL (ref 80–94)
MONOCYTES # BLD AUTO: 0.6 X10(3)/MCL (ref 0.1–1.3)
MONOCYTES NFR BLD AUTO: 11.1 %
NEUTROPHILS # BLD AUTO: 2.4 X10(3)/MCL (ref 2.1–9.2)
NEUTROPHILS NFR BLD AUTO: 43.8 %
PLATELET # BLD AUTO: 326 X10(3)/MCL (ref 130–400)
PMV BLD AUTO: 8.6 FL (ref 9.4–12.4)
POTASSIUM SERPL-SCNC: 4.4 MMOL/L (ref 3.5–5.1)
PROT SERPL-MCNC: 7.4 GM/DL (ref 5.8–7.6)
RBC # BLD AUTO: 4.46 X10(6)/MCL (ref 4.2–5.4)
SODIUM SERPL-SCNC: 141 MMOL/L (ref 136–145)
WBC # SPEC AUTO: 5.5 X10(3)/MCL (ref 4.5–11.5)

## 2020-05-15 ENCOUNTER — HISTORICAL (OUTPATIENT)
Dept: HEMATOLOGY/ONCOLOGY | Facility: CLINIC | Age: 70
End: 2020-05-15

## 2020-05-15 LAB
ABS NEUT (OLG): 2.56 X10(3)/MCL (ref 2.1–9.2)
ALBUMIN SERPL-MCNC: 3.9 GM/DL (ref 3.4–5)
ALBUMIN/GLOB SERPL: 1.2 RATIO (ref 1.1–2)
ALP SERPL-CCNC: 84 UNIT/L (ref 40–150)
ALT SERPL-CCNC: 10 UNIT/L (ref 0–55)
AST SERPL-CCNC: 20 UNIT/L (ref 5–34)
BASOPHILS # BLD AUTO: 0 X10(3)/MCL (ref 0–0.2)
BASOPHILS NFR BLD AUTO: 0.2 %
BILIRUB SERPL-MCNC: 1.2 MG/DL
BILIRUBIN DIRECT+TOT PNL SERPL-MCNC: 0.3 MG/DL (ref 0–0.5)
BILIRUBIN DIRECT+TOT PNL SERPL-MCNC: 0.9 MG/DL (ref 0–0.8)
BUN SERPL-MCNC: 11.8 MG/DL (ref 9.8–20.1)
CALCIUM SERPL-MCNC: 9.1 MG/DL (ref 8.4–10.2)
CANCER AG125 SERPL-ACNC: 14.1 UNIT/ML (ref 0–35)
CHLORIDE SERPL-SCNC: 103 MMOL/L (ref 98–107)
CO2 SERPL-SCNC: 30 MMOL/L (ref 23–31)
CREAT SERPL-MCNC: 0.79 MG/DL (ref 0.55–1.02)
EOSINOPHIL # BLD AUTO: 0 X10(3)/MCL (ref 0–0.9)
EOSINOPHIL NFR BLD AUTO: 0.7 %
ERYTHROCYTE [DISTWIDTH] IN BLOOD BY AUTOMATED COUNT: 13.7 % (ref 11.5–17)
GLOBULIN SER-MCNC: 3.3 GM/DL (ref 2.4–3.5)
GLUCOSE SERPL-MCNC: 92 MG/DL (ref 82–115)
HCT VFR BLD AUTO: 44.2 % (ref 37–47)
HGB BLD-MCNC: 14.1 GM/DL (ref 12–16)
LYMPHOCYTES # BLD AUTO: 2.3 X10(3)/MCL (ref 0.6–4.6)
LYMPHOCYTES NFR BLD AUTO: 41.8 %
MCH RBC QN AUTO: 31 PG (ref 27–31)
MCHC RBC AUTO-ENTMCNC: 31.9 GM/DL (ref 33–36)
MCV RBC AUTO: 97.1 FL (ref 80–94)
MONOCYTES # BLD AUTO: 0.6 X10(3)/MCL (ref 0.1–1.3)
MONOCYTES NFR BLD AUTO: 10.7 %
NEUTROPHILS # BLD AUTO: 2.6 X10(3)/MCL (ref 2.1–9.2)
NEUTROPHILS NFR BLD AUTO: 46.4 %
PLATELET # BLD AUTO: 336 X10(3)/MCL (ref 130–400)
PMV BLD AUTO: 8 FL (ref 9.4–12.4)
POTASSIUM SERPL-SCNC: 4.1 MMOL/L (ref 3.5–5.1)
PROT SERPL-MCNC: 7.2 GM/DL (ref 5.8–7.6)
RBC # BLD AUTO: 4.55 X10(6)/MCL (ref 4.2–5.4)
SODIUM SERPL-SCNC: 140 MMOL/L (ref 136–145)
WBC # SPEC AUTO: 5.5 X10(3)/MCL (ref 4.5–11.5)

## 2020-06-16 ENCOUNTER — HISTORICAL (OUTPATIENT)
Dept: HEMATOLOGY/ONCOLOGY | Facility: CLINIC | Age: 70
End: 2020-06-16

## 2020-06-16 LAB
ABS NEUT (OLG): 2.84 X10(3)/MCL (ref 2.1–9.2)
ALBUMIN SERPL-MCNC: 3.8 GM/DL (ref 3.4–4.8)
ALBUMIN/GLOB SERPL: 1.2 RATIO (ref 1.1–2)
ALP SERPL-CCNC: 72 UNIT/L (ref 40–150)
ALT SERPL-CCNC: 11 UNIT/L (ref 0–55)
AST SERPL-CCNC: 20 UNIT/L (ref 5–34)
BASOPHILS # BLD AUTO: 0 X10(3)/MCL (ref 0–0.2)
BASOPHILS NFR BLD AUTO: 0.5 %
BILIRUB SERPL-MCNC: 1.3 MG/DL
BILIRUBIN DIRECT+TOT PNL SERPL-MCNC: 0.4 MG/DL (ref 0–0.5)
BILIRUBIN DIRECT+TOT PNL SERPL-MCNC: 0.9 MG/DL (ref 0–0.8)
BUN SERPL-MCNC: 13.1 MG/DL (ref 9.8–20.1)
CALCIUM SERPL-MCNC: 8.9 MG/DL (ref 8.4–10.2)
CANCER AG125 SERPL-ACNC: 10.9 UNIT/ML (ref 0–35)
CHLORIDE SERPL-SCNC: 105 MMOL/L (ref 98–107)
CO2 SERPL-SCNC: 27 MMOL/L (ref 23–31)
CREAT SERPL-MCNC: 0.79 MG/DL (ref 0.55–1.02)
EOSINOPHIL # BLD AUTO: 0.1 X10(3)/MCL (ref 0–0.9)
EOSINOPHIL NFR BLD AUTO: 1.3 %
ERYTHROCYTE [DISTWIDTH] IN BLOOD BY AUTOMATED COUNT: 14.2 % (ref 11.5–17)
GLOBULIN SER-MCNC: 3.2 GM/DL (ref 2.4–3.5)
GLUCOSE SERPL-MCNC: 94 MG/DL (ref 82–115)
HCT VFR BLD AUTO: 40.3 % (ref 37–47)
HGB BLD-MCNC: 12.9 GM/DL (ref 12–16)
LYMPHOCYTES # BLD AUTO: 1.9 X10(3)/MCL (ref 0.6–4.6)
LYMPHOCYTES NFR BLD AUTO: 34.7 %
MCH RBC QN AUTO: 31.9 PG (ref 27–31)
MCHC RBC AUTO-ENTMCNC: 32 GM/DL (ref 33–36)
MCV RBC AUTO: 99.5 FL (ref 80–94)
MONOCYTES # BLD AUTO: 0.7 X10(3)/MCL (ref 0.1–1.3)
MONOCYTES NFR BLD AUTO: 11.9 %
NEUTROPHILS # BLD AUTO: 2.8 X10(3)/MCL (ref 2.1–9.2)
NEUTROPHILS NFR BLD AUTO: 51.4 %
PLATELET # BLD AUTO: 322 X10(3)/MCL (ref 130–400)
PMV BLD AUTO: 8.4 FL (ref 9.4–12.4)
POTASSIUM SERPL-SCNC: 4.4 MMOL/L (ref 3.5–5.1)
PROT SERPL-MCNC: 7 GM/DL (ref 5.8–7.6)
RBC # BLD AUTO: 4.05 X10(6)/MCL (ref 4.2–5.4)
SODIUM SERPL-SCNC: 141 MMOL/L (ref 136–145)
WBC # SPEC AUTO: 5.5 X10(3)/MCL (ref 4.5–11.5)

## 2020-07-14 ENCOUNTER — HISTORICAL (OUTPATIENT)
Dept: HEMATOLOGY/ONCOLOGY | Facility: CLINIC | Age: 70
End: 2020-07-14

## 2020-07-14 LAB
ABS NEUT (OLG): 3.08 X10(3)/MCL (ref 2.1–9.2)
ALBUMIN SERPL-MCNC: 3.9 GM/DL (ref 3.4–5)
ALBUMIN/GLOB SERPL: 1.2 RATIO (ref 1.1–2)
ALP SERPL-CCNC: 81 UNIT/L (ref 40–150)
ALT SERPL-CCNC: 10 UNIT/L (ref 0–55)
AST SERPL-CCNC: 20 UNIT/L (ref 5–34)
BASOPHILS # BLD AUTO: 0 X10(3)/MCL (ref 0–0.2)
BASOPHILS NFR BLD AUTO: 0.5 %
BILIRUB SERPL-MCNC: 0.8 MG/DL
BILIRUBIN DIRECT+TOT PNL SERPL-MCNC: 0.2 MG/DL (ref 0–0.5)
BILIRUBIN DIRECT+TOT PNL SERPL-MCNC: 0.6 MG/DL (ref 0–0.8)
BUN SERPL-MCNC: 10.2 MG/DL (ref 9.8–20.1)
CALCIUM SERPL-MCNC: 9.5 MG/DL (ref 8.4–10.2)
CANCER AG125 SERPL-ACNC: 12.2 UNIT/ML (ref 0–35)
CHLORIDE SERPL-SCNC: 103 MMOL/L (ref 98–107)
CO2 SERPL-SCNC: 26 MMOL/L (ref 23–31)
CREAT SERPL-MCNC: 0.84 MG/DL (ref 0.55–1.02)
EOSINOPHIL # BLD AUTO: 0.1 X10(3)/MCL (ref 0–0.9)
EOSINOPHIL NFR BLD AUTO: 1.2 %
ERYTHROCYTE [DISTWIDTH] IN BLOOD BY AUTOMATED COUNT: 14 % (ref 11.5–17)
GLOBULIN SER-MCNC: 3.2 GM/DL (ref 2.4–3.5)
GLUCOSE SERPL-MCNC: 95 MG/DL (ref 82–115)
HCT VFR BLD AUTO: 42.6 % (ref 37–47)
HGB BLD-MCNC: 13.6 GM/DL (ref 12–16)
LYMPHOCYTES # BLD AUTO: 2.1 X10(3)/MCL (ref 0.6–4.6)
LYMPHOCYTES NFR BLD AUTO: 34.8 %
MCH RBC QN AUTO: 31.9 PG (ref 27–31)
MCHC RBC AUTO-ENTMCNC: 31.9 GM/DL (ref 33–36)
MCV RBC AUTO: 99.8 FL (ref 80–94)
MONOCYTES # BLD AUTO: 0.7 X10(3)/MCL (ref 0.1–1.3)
MONOCYTES NFR BLD AUTO: 11.6 %
NEUTROPHILS # BLD AUTO: 3.1 X10(3)/MCL (ref 2.1–9.2)
NEUTROPHILS NFR BLD AUTO: 51.7 %
PLATELET # BLD AUTO: 328 X10(3)/MCL (ref 130–400)
PMV BLD AUTO: 8.1 FL (ref 9.4–12.4)
POTASSIUM SERPL-SCNC: 4.3 MMOL/L (ref 3.5–5.1)
PROT SERPL-MCNC: 7.1 GM/DL (ref 5.8–7.6)
RBC # BLD AUTO: 4.27 X10(6)/MCL (ref 4.2–5.4)
SODIUM SERPL-SCNC: 139 MMOL/L (ref 136–145)
WBC # SPEC AUTO: 6 X10(3)/MCL (ref 4.5–11.5)

## 2020-08-10 ENCOUNTER — HISTORICAL (OUTPATIENT)
Dept: RADIOLOGY | Facility: HOSPITAL | Age: 70
End: 2020-08-10

## 2020-09-08 ENCOUNTER — HISTORICAL (OUTPATIENT)
Dept: HEMATOLOGY/ONCOLOGY | Facility: CLINIC | Age: 70
End: 2020-09-08

## 2020-09-08 LAB
ABS NEUT (OLG): 3.05 X10(3)/MCL (ref 2.1–9.2)
ALBUMIN SERPL-MCNC: 4 GM/DL (ref 3.4–4.8)
ALBUMIN/GLOB SERPL: 1.3 RATIO (ref 1.1–2)
ALP SERPL-CCNC: 81 UNIT/L (ref 40–150)
ALT SERPL-CCNC: 13 UNIT/L (ref 0–55)
AST SERPL-CCNC: 23 UNIT/L (ref 5–34)
BASOPHILS # BLD AUTO: 0 X10(3)/MCL (ref 0–0.2)
BASOPHILS NFR BLD AUTO: 0.5 %
BILIRUB SERPL-MCNC: 1 MG/DL
BILIRUBIN DIRECT+TOT PNL SERPL-MCNC: 0.3 MG/DL (ref 0–0.5)
BILIRUBIN DIRECT+TOT PNL SERPL-MCNC: 0.7 MG/DL (ref 0–0.8)
BUN SERPL-MCNC: 12.2 MG/DL (ref 9.8–20.1)
CALCIUM SERPL-MCNC: 9.3 MG/DL (ref 8.4–10.2)
CANCER AG125 SERPL-ACNC: 13.4 UNIT/ML (ref 0–35)
CHLORIDE SERPL-SCNC: 97 MMOL/L (ref 98–107)
CO2 SERPL-SCNC: 31 MMOL/L (ref 23–31)
CREAT SERPL-MCNC: 0.77 MG/DL (ref 0.55–1.02)
EOSINOPHIL # BLD AUTO: 0.1 X10(3)/MCL (ref 0–0.9)
EOSINOPHIL NFR BLD AUTO: 1.2 %
ERYTHROCYTE [DISTWIDTH] IN BLOOD BY AUTOMATED COUNT: 13.4 % (ref 11.5–17)
GLOBULIN SER-MCNC: 3.1 GM/DL (ref 2.4–3.5)
GLUCOSE SERPL-MCNC: 100 MG/DL (ref 82–115)
HCT VFR BLD AUTO: 44 % (ref 37–47)
HGB BLD-MCNC: 14.4 GM/DL (ref 12–16)
LYMPHOCYTES # BLD AUTO: 2.1 X10(3)/MCL (ref 0.6–4.6)
LYMPHOCYTES NFR BLD AUTO: 35.5 %
MCH RBC QN AUTO: 32.1 PG (ref 27–31)
MCHC RBC AUTO-ENTMCNC: 32.7 GM/DL (ref 33–36)
MCV RBC AUTO: 98 FL (ref 80–94)
MONOCYTES # BLD AUTO: 0.7 X10(3)/MCL (ref 0.1–1.3)
MONOCYTES NFR BLD AUTO: 12.1 %
NEUTROPHILS # BLD AUTO: 3 X10(3)/MCL (ref 2.1–9.2)
NEUTROPHILS NFR BLD AUTO: 50.7 %
PLATELET # BLD AUTO: 350 X10(3)/MCL (ref 130–400)
PMV BLD AUTO: 8.3 FL (ref 9.4–12.4)
POTASSIUM SERPL-SCNC: 3.4 MMOL/L (ref 3.5–5.1)
PROT SERPL-MCNC: 7.1 GM/DL (ref 5.8–7.6)
RBC # BLD AUTO: 4.49 X10(6)/MCL (ref 4.2–5.4)
SODIUM SERPL-SCNC: 137 MMOL/L (ref 136–145)
WBC # SPEC AUTO: 6 X10(3)/MCL (ref 4.5–11.5)

## 2020-11-10 ENCOUNTER — HISTORICAL (OUTPATIENT)
Dept: HEMATOLOGY/ONCOLOGY | Facility: CLINIC | Age: 70
End: 2020-11-10

## 2020-11-10 LAB
ABS NEUT (OLG): 3.16 X10(3)/MCL (ref 2.1–9.2)
ALBUMIN SERPL-MCNC: 3.8 GM/DL (ref 3.4–4.8)
ALBUMIN/GLOB SERPL: 1.1 RATIO (ref 1.1–2)
ALP SERPL-CCNC: 78 UNIT/L (ref 40–150)
ALT SERPL-CCNC: 14 UNIT/L (ref 0–55)
AST SERPL-CCNC: 24 UNIT/L (ref 5–34)
BASOPHILS # BLD AUTO: 0 X10(3)/MCL (ref 0–0.2)
BASOPHILS NFR BLD AUTO: 0.3 %
BILIRUB SERPL-MCNC: 0.9 MG/DL
BILIRUBIN DIRECT+TOT PNL SERPL-MCNC: 0.3 MG/DL (ref 0–0.5)
BILIRUBIN DIRECT+TOT PNL SERPL-MCNC: 0.6 MG/DL (ref 0–0.8)
BUN SERPL-MCNC: 13.7 MG/DL (ref 9.8–20.1)
CALCIUM SERPL-MCNC: 9 MG/DL (ref 8.4–10.2)
CANCER AG125 SERPL-ACNC: 42.9 UNIT/ML (ref 0–35)
CHLORIDE SERPL-SCNC: 97 MMOL/L (ref 98–107)
CO2 SERPL-SCNC: 31 MMOL/L (ref 23–31)
CREAT SERPL-MCNC: 0.88 MG/DL (ref 0.55–1.02)
EOSINOPHIL # BLD AUTO: 0.1 X10(3)/MCL (ref 0–0.9)
EOSINOPHIL NFR BLD AUTO: 1.7 %
ERYTHROCYTE [DISTWIDTH] IN BLOOD BY AUTOMATED COUNT: 13.2 % (ref 11.5–17)
GLOBULIN SER-MCNC: 3.4 GM/DL (ref 2.4–3.5)
GLUCOSE SERPL-MCNC: 78 MG/DL (ref 82–115)
HCT VFR BLD AUTO: 43.6 % (ref 37–47)
HGB BLD-MCNC: 14.3 GM/DL (ref 12–16)
LYMPHOCYTES # BLD AUTO: 1.9 X10(3)/MCL (ref 0.6–4.6)
LYMPHOCYTES NFR BLD AUTO: 31.6 %
MCH RBC QN AUTO: 32.2 PG (ref 27–31)
MCHC RBC AUTO-ENTMCNC: 32.8 GM/DL (ref 33–36)
MCV RBC AUTO: 98.2 FL (ref 80–94)
MONOCYTES # BLD AUTO: 0.8 X10(3)/MCL (ref 0.1–1.3)
MONOCYTES NFR BLD AUTO: 13 %
NEUTROPHILS # BLD AUTO: 3.2 X10(3)/MCL (ref 2.1–9.2)
NEUTROPHILS NFR BLD AUTO: 53.2 %
PLATELET # BLD AUTO: 343 X10(3)/MCL (ref 130–400)
PMV BLD AUTO: 8.2 FL (ref 9.4–12.4)
POTASSIUM SERPL-SCNC: 4.3 MMOL/L (ref 3.5–5.1)
PROT SERPL-MCNC: 7.2 GM/DL (ref 5.8–7.6)
RBC # BLD AUTO: 4.44 X10(6)/MCL (ref 4.2–5.4)
SODIUM SERPL-SCNC: 138 MMOL/L (ref 136–145)
WBC # SPEC AUTO: 5.9 X10(3)/MCL (ref 4.5–11.5)

## 2020-11-12 ENCOUNTER — HISTORICAL (OUTPATIENT)
Dept: ADMINISTRATIVE | Facility: HOSPITAL | Age: 70
End: 2020-11-12

## 2020-11-20 ENCOUNTER — HISTORICAL (OUTPATIENT)
Dept: RADIOLOGY | Facility: HOSPITAL | Age: 70
End: 2020-11-20

## 2020-12-08 ENCOUNTER — HISTORICAL (OUTPATIENT)
Dept: INFUSION THERAPY | Facility: HOSPITAL | Age: 70
End: 2020-12-08

## 2020-12-08 LAB
ABS NEUT (OLG): 7.12 X10(3)/MCL (ref 2.1–9.2)
ALBUMIN SERPL-MCNC: 3.6 GM/DL (ref 3.4–4.8)
ALP SERPL-CCNC: 77 UNIT/L (ref 40–150)
ALT SERPL-CCNC: 13 UNIT/L (ref 0–55)
ANION GAP SERPL CALC-SCNC: 15 MMOL/L
AST SERPL-CCNC: 22 UNIT/L (ref 5–34)
BASOPHILS # BLD AUTO: 0 X10(3)/MCL (ref 0–0.2)
BASOPHILS NFR BLD AUTO: 0.1 %
BILIRUB SERPL-MCNC: 1 MG/DL
BILIRUBIN DIRECT+TOT PNL SERPL-MCNC: 0.3 MG/DL (ref 0–0.5)
BILIRUBIN DIRECT+TOT PNL SERPL-MCNC: 0.7 MG/DL (ref 0–0.8)
BUN SERPL-MCNC: 8 MG/DL (ref 8–26)
CANCER AG125 SERPL-ACNC: 79.5 UNIT/ML (ref 0–35)
CHLORIDE SERPL-SCNC: 102 MMOL/L (ref 98–109)
CREAT SERPL-MCNC: 0.7 MG/DL (ref 0.6–1.3)
EOSINOPHIL # BLD AUTO: 0 X10(3)/MCL (ref 0–0.9)
EOSINOPHIL NFR BLD AUTO: 0.1 %
ERYTHROCYTE [DISTWIDTH] IN BLOOD BY AUTOMATED COUNT: 13.6 % (ref 11.5–17)
GLUCOSE SERPL-MCNC: 106 MG/DL (ref 70–105)
HCT VFR BLD AUTO: 41.3 % (ref 37–47)
HCT VFR BLD CALC: 42 % (ref 38–51)
HGB BLD-MCNC: 13.3 GM/DL (ref 12–16)
HGB BLD-MCNC: 14.3 MG/DL (ref 12–17)
LIVER PROFILE INTERP: NORMAL
LYMPHOCYTES # BLD AUTO: 3.3 X10(3)/MCL (ref 0.6–4.6)
LYMPHOCYTES NFR BLD AUTO: 28 %
MCH RBC QN AUTO: 32.2 PG (ref 27–31)
MCHC RBC AUTO-ENTMCNC: 32.2 GM/DL (ref 33–36)
MCV RBC AUTO: 100 FL (ref 80–94)
MONOCYTES # BLD AUTO: 1.4 X10(3)/MCL (ref 0.1–1.3)
MONOCYTES NFR BLD AUTO: 12 %
NEUTROPHILS # BLD AUTO: 7.1 X10(3)/MCL (ref 2.1–9.2)
NEUTROPHILS NFR BLD AUTO: 59.6 %
PLATELET # BLD AUTO: 327 X10(3)/MCL (ref 130–400)
PMV BLD AUTO: 8.2 FL (ref 9.4–12.4)
POC IONIZED CALCIUM: 1.13 MMOL/L (ref 1.12–1.32)
POC TCO2: 30 MMOL/L (ref 24–29)
POTASSIUM BLD-SCNC: 4.2 MMOL/L (ref 3.5–4.9)
PROT SERPL-MCNC: 7.2 GM/DL (ref 5.8–7.6)
RBC # BLD AUTO: 4.13 X10(6)/MCL (ref 4.2–5.4)
SODIUM BLD-SCNC: 141 MMOL/L (ref 138–146)
WBC # SPEC AUTO: 11.9 X10(3)/MCL (ref 4.5–11.5)

## 2020-12-15 ENCOUNTER — HISTORICAL (OUTPATIENT)
Dept: ADMINISTRATIVE | Facility: HOSPITAL | Age: 70
End: 2020-12-15

## 2020-12-15 LAB
ABS NEUT (OLG): 0.76 X10(3)/MCL (ref 2.1–9.2)
ALBUMIN SERPL-MCNC: 3.8 GM/DL (ref 3.4–4.8)
ALBUMIN/GLOB SERPL: 1.2 RATIO (ref 1.1–2)
ALP SERPL-CCNC: 84 UNIT/L (ref 40–150)
ALT SERPL-CCNC: 19 UNIT/L (ref 0–55)
AST SERPL-CCNC: 26 UNIT/L (ref 5–34)
BASOPHILS # BLD AUTO: 0 X10(3)/MCL (ref 0–0.2)
BASOPHILS NFR BLD AUTO: 0.3 %
BILIRUB SERPL-MCNC: 0.6 MG/DL
BILIRUBIN DIRECT+TOT PNL SERPL-MCNC: 0.2 MG/DL (ref 0–0.5)
BILIRUBIN DIRECT+TOT PNL SERPL-MCNC: 0.4 MG/DL (ref 0–0.8)
BUN SERPL-MCNC: 9.2 MG/DL (ref 9.8–20.1)
CALCIUM SERPL-MCNC: 8.8 MG/DL (ref 8.4–10.2)
CHLORIDE SERPL-SCNC: 98 MMOL/L (ref 98–107)
CO2 SERPL-SCNC: 33 MMOL/L (ref 23–31)
CREAT SERPL-MCNC: 0.69 MG/DL (ref 0.55–1.02)
EOSINOPHIL # BLD AUTO: 0.1 X10(3)/MCL (ref 0–0.9)
EOSINOPHIL NFR BLD AUTO: 3 %
ERYTHROCYTE [DISTWIDTH] IN BLOOD BY AUTOMATED COUNT: 12.9 % (ref 11.5–17)
GLOBULIN SER-MCNC: 3.1 GM/DL (ref 2.4–3.5)
GLUCOSE SERPL-MCNC: 83 MG/DL (ref 82–115)
HCT VFR BLD AUTO: 40.4 % (ref 37–47)
HGB BLD-MCNC: 13.5 GM/DL (ref 12–16)
LYMPHOCYTES # BLD AUTO: 2 X10(3)/MCL (ref 0.6–4.6)
LYMPHOCYTES NFR BLD AUTO: 66.2 %
MCH RBC QN AUTO: 32.7 PG (ref 27–31)
MCHC RBC AUTO-ENTMCNC: 33.4 GM/DL (ref 33–36)
MCV RBC AUTO: 97.8 FL (ref 80–94)
MONOCYTES # BLD AUTO: 0.1 X10(3)/MCL (ref 0.1–1.3)
MONOCYTES NFR BLD AUTO: 4.4 %
NEUTROPHILS # BLD AUTO: 0.8 X10(3)/MCL (ref 2.1–9.2)
NEUTROPHILS NFR BLD AUTO: 25.8 %
PLATELET # BLD AUTO: 244 X10(3)/MCL (ref 130–400)
PMV BLD AUTO: 9.4 FL (ref 9.4–12.4)
POTASSIUM SERPL-SCNC: 4.4 MMOL/L (ref 3.5–5.1)
PROT SERPL-MCNC: 6.9 GM/DL (ref 5.8–7.6)
RBC # BLD AUTO: 4.13 X10(6)/MCL (ref 4.2–5.4)
SODIUM SERPL-SCNC: 138 MMOL/L (ref 136–145)
WBC # SPEC AUTO: 3 X10(3)/MCL (ref 4.5–11.5)

## 2020-12-22 ENCOUNTER — HISTORICAL (OUTPATIENT)
Dept: HEMATOLOGY/ONCOLOGY | Facility: CLINIC | Age: 70
End: 2020-12-22

## 2020-12-22 LAB
ABS NEUT (OLG): 0.41 X10(3)/MCL (ref 2.1–9.2)
ALBUMIN SERPL-MCNC: 4 GM/DL (ref 3.4–4.8)
ALBUMIN/GLOB SERPL: 1.2 RATIO (ref 1.1–2)
ALP SERPL-CCNC: 84 UNIT/L (ref 40–150)
ALT SERPL-CCNC: 22 UNIT/L (ref 0–55)
AST SERPL-CCNC: 29 UNIT/L (ref 5–34)
BASOPHILS # BLD AUTO: 0 X10(3)/MCL (ref 0–0.2)
BASOPHILS NFR BLD AUTO: 0.2 %
BILIRUB SERPL-MCNC: 0.3 MG/DL
BILIRUBIN DIRECT+TOT PNL SERPL-MCNC: 0.1 MG/DL (ref 0–0.8)
BILIRUBIN DIRECT+TOT PNL SERPL-MCNC: 0.2 MG/DL (ref 0–0.5)
BUN SERPL-MCNC: 9.2 MG/DL (ref 9.8–20.1)
CALCIUM SERPL-MCNC: 9 MG/DL (ref 8.4–10.2)
CHLORIDE SERPL-SCNC: 100 MMOL/L (ref 98–107)
CO2 SERPL-SCNC: 33 MMOL/L (ref 23–31)
CREAT SERPL-MCNC: 0.76 MG/DL (ref 0.55–1.02)
EOSINOPHIL # BLD AUTO: 0 X10(3)/MCL (ref 0–0.9)
EOSINOPHIL NFR BLD AUTO: 0.7 %
ERYTHROCYTE [DISTWIDTH] IN BLOOD BY AUTOMATED COUNT: 13.3 % (ref 11.5–17)
GLOBULIN SER-MCNC: 3.3 GM/DL (ref 2.4–3.5)
GLUCOSE SERPL-MCNC: 110 MG/DL (ref 82–115)
HCT VFR BLD AUTO: 41.1 % (ref 37–47)
HGB BLD-MCNC: 13.5 GM/DL (ref 12–16)
LYMPHOCYTES # BLD AUTO: 2.9 X10(3)/MCL (ref 0.6–4.6)
LYMPHOCYTES NFR BLD AUTO: 63.6 %
MCH RBC QN AUTO: 32.1 PG (ref 27–31)
MCHC RBC AUTO-ENTMCNC: 32.8 GM/DL (ref 33–36)
MCV RBC AUTO: 97.6 FL (ref 80–94)
MONOCYTES # BLD AUTO: 1.2 X10(3)/MCL (ref 0.1–1.3)
MONOCYTES NFR BLD AUTO: 26.2 %
NEUTROPHILS # BLD AUTO: 0.4 X10(3)/MCL (ref 2.1–9.2)
NEUTROPHILS NFR BLD AUTO: 9.1 %
PLATELET # BLD AUTO: 318 X10(3)/MCL (ref 130–400)
PMV BLD AUTO: 8.4 FL (ref 9.4–12.4)
POTASSIUM SERPL-SCNC: 4.2 MMOL/L (ref 3.5–5.1)
PROT SERPL-MCNC: 7.3 GM/DL (ref 5.8–7.6)
RBC # BLD AUTO: 4.21 X10(6)/MCL (ref 4.2–5.4)
SODIUM SERPL-SCNC: 140 MMOL/L (ref 136–145)
WBC # SPEC AUTO: 4.5 X10(3)/MCL (ref 4.5–11.5)

## 2020-12-23 ENCOUNTER — HISTORICAL (OUTPATIENT)
Dept: INFUSION THERAPY | Facility: HOSPITAL | Age: 70
End: 2020-12-23

## 2020-12-24 ENCOUNTER — HISTORICAL (OUTPATIENT)
Dept: INFUSION THERAPY | Facility: HOSPITAL | Age: 70
End: 2020-12-24

## 2020-12-29 ENCOUNTER — HISTORICAL (OUTPATIENT)
Dept: INFUSION THERAPY | Facility: HOSPITAL | Age: 70
End: 2020-12-29

## 2020-12-29 LAB
ABS NEUT (OLG): 1.25 X10(3)/MCL (ref 2.1–9.2)
ANION GAP SERPL CALC-SCNC: 15 MMOL/L
BASOPHILS # BLD AUTO: 0 X10(3)/MCL (ref 0–0.2)
BASOPHILS NFR BLD AUTO: 0.4 %
BUN SERPL-MCNC: 12 MG/DL (ref 8–26)
CHLORIDE SERPL-SCNC: 100 MMOL/L (ref 98–109)
CREAT SERPL-MCNC: 0.6 MG/DL (ref 0.6–1.3)
EOSINOPHIL # BLD AUTO: 0 X10(3)/MCL (ref 0–0.9)
EOSINOPHIL NFR BLD AUTO: 0.9 %
ERYTHROCYTE [DISTWIDTH] IN BLOOD BY AUTOMATED COUNT: 14 % (ref 11.5–17)
GLUCOSE SERPL-MCNC: 105 MG/DL (ref 70–105)
HCT VFR BLD AUTO: 41.8 % (ref 37–47)
HCT VFR BLD CALC: 43 % (ref 38–51)
HGB BLD-MCNC: 13.9 GM/DL (ref 12–16)
HGB BLD-MCNC: 14.6 MG/DL (ref 12–17)
LYMPHOCYTES # BLD AUTO: 2.2 X10(3)/MCL (ref 0.6–4.6)
LYMPHOCYTES NFR BLD AUTO: 49.4 %
MCH RBC QN AUTO: 32.3 PG (ref 27–31)
MCHC RBC AUTO-ENTMCNC: 33.3 GM/DL (ref 33–36)
MCV RBC AUTO: 97.2 FL (ref 80–94)
MONOCYTES # BLD AUTO: 0.9 X10(3)/MCL (ref 0.1–1.3)
MONOCYTES NFR BLD AUTO: 20.9 %
NEUTROPHILS # BLD AUTO: 1.2 X10(3)/MCL (ref 2.1–9.2)
NEUTROPHILS NFR BLD AUTO: 28 %
PLATELET # BLD AUTO: 326 X10(3)/MCL (ref 130–400)
PMV BLD AUTO: 8.3 FL (ref 9.4–12.4)
POC IONIZED CALCIUM: 1.13 MMOL/L (ref 1.12–1.32)
POC TCO2: 29 MMOL/L (ref 24–29)
POTASSIUM BLD-SCNC: 3.7 MMOL/L (ref 3.5–4.9)
RBC # BLD AUTO: 4.3 X10(6)/MCL (ref 4.2–5.4)
SODIUM BLD-SCNC: 139 MMOL/L (ref 138–146)
WBC # SPEC AUTO: 4.5 X10(3)/MCL (ref 4.5–11.5)

## 2021-01-05 ENCOUNTER — HISTORICAL (OUTPATIENT)
Dept: ADMINISTRATIVE | Facility: HOSPITAL | Age: 71
End: 2021-01-05

## 2021-01-05 LAB
ABS NEUT (OLG): 1.22 X10(3)/MCL (ref 2.1–9.2)
ALBUMIN SERPL-MCNC: 3.7 GM/DL (ref 3.4–4.8)
ALBUMIN/GLOB SERPL: 1.1 RATIO (ref 1.1–2)
ALP SERPL-CCNC: 108 UNIT/L (ref 40–150)
ALT SERPL-CCNC: 19 UNIT/L (ref 0–55)
AST SERPL-CCNC: 22 UNIT/L (ref 5–34)
BASOPHILS # BLD AUTO: 0 X10(3)/MCL (ref 0–0.2)
BASOPHILS NFR BLD AUTO: 0.4 %
BILIRUB SERPL-MCNC: 0.6 MG/DL
BILIRUBIN DIRECT+TOT PNL SERPL-MCNC: 0.2 MG/DL (ref 0–0.5)
BILIRUBIN DIRECT+TOT PNL SERPL-MCNC: 0.4 MG/DL (ref 0–0.8)
BUN SERPL-MCNC: 12.5 MG/DL (ref 9.8–20.1)
CALCIUM SERPL-MCNC: 8.8 MG/DL (ref 8.4–10.2)
CANCER AG125 SERPL-ACNC: 28.9 UNIT/ML (ref 0–35)
CHLORIDE SERPL-SCNC: 98 MMOL/L (ref 98–107)
CO2 SERPL-SCNC: 32 MMOL/L (ref 23–31)
CREAT SERPL-MCNC: 0.65 MG/DL (ref 0.55–1.02)
EOSINOPHIL # BLD AUTO: 0.1 X10(3)/MCL (ref 0–0.9)
EOSINOPHIL NFR BLD AUTO: 1.4 %
EOSINOPHIL NFR BLD MANUAL: 1 % (ref 0–8)
ERYTHROCYTE [DISTWIDTH] IN BLOOD BY AUTOMATED COUNT: 14 % (ref 11.5–17)
GLOBULIN SER-MCNC: 3.3 GM/DL (ref 2.4–3.5)
GLUCOSE SERPL-MCNC: 89 MG/DL (ref 82–115)
HCT VFR BLD AUTO: 41.3 % (ref 37–47)
HGB BLD-MCNC: 13.4 GM/DL (ref 12–16)
LYMPHOCYTES # BLD AUTO: 2.4 X10(3)/MCL (ref 0.6–4.6)
LYMPHOCYTES NFR BLD AUTO: 46.9 %
LYMPHOCYTES NFR BLD MANUAL: 58 % (ref 13–40)
MCH RBC QN AUTO: 32 PG (ref 27–31)
MCHC RBC AUTO-ENTMCNC: 32.4 GM/DL (ref 33–36)
MCV RBC AUTO: 98.6 FL (ref 80–94)
MONOCYTES # BLD AUTO: 1.3 X10(3)/MCL (ref 0.1–1.3)
MONOCYTES NFR BLD AUTO: 26.2 %
MONOCYTES NFR BLD MANUAL: 17 % (ref 2–11)
NEUTROPHILS # BLD AUTO: 1.2 X10(3)/MCL (ref 2.1–9.2)
NEUTROPHILS NFR BLD AUTO: 24.3 %
NEUTROPHILS NFR BLD MANUAL: 24 % (ref 47–80)
PLATELET # BLD AUTO: 151 X10(3)/MCL (ref 130–400)
PLATELET # BLD EST: NORMAL 10*3/UL
PMV BLD AUTO: 10.2 FL (ref 9.4–12.4)
POTASSIUM SERPL-SCNC: 4.2 MMOL/L (ref 3.5–5.1)
PROT SERPL-MCNC: 7 GM/DL (ref 5.8–7.6)
RBC # BLD AUTO: 4.19 X10(6)/MCL (ref 4.2–5.4)
RBC MORPH BLD: NORMAL
SODIUM SERPL-SCNC: 138 MMOL/L (ref 136–145)
WBC # SPEC AUTO: 5 X10(3)/MCL (ref 4.5–11.5)

## 2021-01-12 ENCOUNTER — HISTORICAL (OUTPATIENT)
Dept: HEMATOLOGY/ONCOLOGY | Facility: CLINIC | Age: 71
End: 2021-01-12

## 2021-01-12 LAB
ABS NEUT (OLG): 5 X10(3)/MCL (ref 2.1–9.2)
ALBUMIN SERPL-MCNC: 3.7 GM/DL (ref 3.4–4.8)
ALBUMIN/GLOB SERPL: 1 RATIO (ref 1.1–2)
ALP SERPL-CCNC: 104 UNIT/L (ref 40–150)
ALT SERPL-CCNC: 19 UNIT/L (ref 0–55)
AST SERPL-CCNC: 26 UNIT/L (ref 5–34)
BASOPHILS # BLD AUTO: 0.1 X10(3)/MCL (ref 0–0.2)
BASOPHILS NFR BLD AUTO: 0.6 %
BILIRUB SERPL-MCNC: 0.3 MG/DL
BILIRUBIN DIRECT+TOT PNL SERPL-MCNC: 0.1 MG/DL (ref 0–0.5)
BILIRUBIN DIRECT+TOT PNL SERPL-MCNC: 0.2 MG/DL (ref 0–0.8)
BUN SERPL-MCNC: 12.2 MG/DL (ref 9.8–20.1)
CALCIUM SERPL-MCNC: 9.1 MG/DL (ref 8.4–10.2)
CHLORIDE SERPL-SCNC: 101 MMOL/L (ref 98–107)
CO2 SERPL-SCNC: 27 MMOL/L (ref 23–31)
CREAT SERPL-MCNC: 0.68 MG/DL (ref 0.55–1.02)
EOSINOPHIL # BLD AUTO: 0.1 X10(3)/MCL (ref 0–0.9)
EOSINOPHIL NFR BLD AUTO: 0.6 %
ERYTHROCYTE [DISTWIDTH] IN BLOOD BY AUTOMATED COUNT: 15.3 % (ref 11.5–17)
GLOBULIN SER-MCNC: 3.6 GM/DL (ref 2.4–3.5)
GLUCOSE SERPL-MCNC: 95 MG/DL (ref 82–115)
HCT VFR BLD AUTO: 40 % (ref 37–47)
HGB BLD-MCNC: 13.1 GM/DL (ref 12–16)
LYMPHOCYTES # BLD AUTO: 3.2 X10(3)/MCL (ref 0.6–4.6)
LYMPHOCYTES NFR BLD AUTO: 32.6 %
MCH RBC QN AUTO: 32.2 PG (ref 27–31)
MCHC RBC AUTO-ENTMCNC: 32.8 GM/DL (ref 33–36)
MCV RBC AUTO: 98.3 FL (ref 80–94)
MONOCYTES # BLD AUTO: 1.5 X10(3)/MCL (ref 0.1–1.3)
MONOCYTES NFR BLD AUTO: 14.9 %
NEUTROPHILS # BLD AUTO: 5 X10(3)/MCL (ref 2.1–9.2)
NEUTROPHILS NFR BLD AUTO: 51 %
PLATELET # BLD AUTO: 271 X10(3)/MCL (ref 130–400)
PMV BLD AUTO: 9.5 FL (ref 9.4–12.4)
POTASSIUM SERPL-SCNC: 3.9 MMOL/L (ref 3.5–5.1)
PROT SERPL-MCNC: 7.3 GM/DL (ref 5.8–7.6)
RBC # BLD AUTO: 4.07 X10(6)/MCL (ref 4.2–5.4)
SODIUM SERPL-SCNC: 141 MMOL/L (ref 136–145)
WBC # SPEC AUTO: 9.8 X10(3)/MCL (ref 4.5–11.5)

## 2021-01-19 ENCOUNTER — HISTORICAL (OUTPATIENT)
Dept: INFUSION THERAPY | Facility: HOSPITAL | Age: 71
End: 2021-01-19

## 2021-01-19 LAB
ABS NEUT (OLG): 3.01 X10(3)/MCL (ref 2.1–9.2)
ANION GAP SERPL CALC-SCNC: 14 MMOL/L
BASOPHILS # BLD AUTO: 0.1 X10(3)/MCL (ref 0–0.2)
BASOPHILS NFR BLD AUTO: 0.9 %
BUN SERPL-MCNC: 17 MG/DL (ref 8–26)
CHLORIDE SERPL-SCNC: 99 MMOL/L (ref 98–109)
CREAT SERPL-MCNC: 0.6 MG/DL (ref 0.6–1.3)
EOSINOPHIL # BLD AUTO: 0 X10(3)/MCL (ref 0–0.9)
EOSINOPHIL NFR BLD AUTO: 0.4 %
ERYTHROCYTE [DISTWIDTH] IN BLOOD BY AUTOMATED COUNT: 15.7 % (ref 11.5–17)
GLUCOSE SERPL-MCNC: 104 MG/DL (ref 70–105)
HCT VFR BLD AUTO: 41.9 % (ref 37–47)
HCT VFR BLD CALC: 43 % (ref 38–51)
HGB BLD-MCNC: 13.8 GM/DL (ref 12–16)
HGB BLD-MCNC: 14.6 MG/DL (ref 12–17)
LYMPHOCYTES # BLD AUTO: 2.6 X10(3)/MCL (ref 0.6–4.6)
LYMPHOCYTES NFR BLD AUTO: 37.5 %
MCH RBC QN AUTO: 32.7 PG (ref 27–31)
MCHC RBC AUTO-ENTMCNC: 32.9 GM/DL (ref 33–36)
MCV RBC AUTO: 99.3 FL (ref 80–94)
MONOCYTES # BLD AUTO: 1.3 X10(3)/MCL (ref 0.1–1.3)
MONOCYTES NFR BLD AUTO: 18.1 %
NEUTROPHILS # BLD AUTO: 3 X10(3)/MCL (ref 2.1–9.2)
NEUTROPHILS NFR BLD AUTO: 43.1 %
PLATELET # BLD AUTO: 495 X10(3)/MCL (ref 130–400)
PMV BLD AUTO: 8.2 FL (ref 9.4–12.4)
POC IONIZED CALCIUM: 1.14 MMOL/L (ref 1.12–1.32)
POC TCO2: 30 MMOL/L (ref 24–29)
POTASSIUM BLD-SCNC: 3.7 MMOL/L (ref 3.5–4.9)
RBC # BLD AUTO: 4.22 X10(6)/MCL (ref 4.2–5.4)
SODIUM BLD-SCNC: 139 MMOL/L (ref 138–146)
WBC # SPEC AUTO: 7 X10(3)/MCL (ref 4.5–11.5)

## 2021-01-21 ENCOUNTER — HISTORICAL (OUTPATIENT)
Dept: RADIOLOGY | Facility: HOSPITAL | Age: 71
End: 2021-01-21

## 2021-01-26 ENCOUNTER — HISTORICAL (OUTPATIENT)
Dept: ADMINISTRATIVE | Facility: HOSPITAL | Age: 71
End: 2021-01-26

## 2021-01-26 LAB
ABS NEUT (OLG): 4.48 X10(3)/MCL (ref 2.1–9.2)
ALBUMIN SERPL-MCNC: 3.6 GM/DL (ref 3.4–4.8)
ALBUMIN/GLOB SERPL: 1.3 RATIO (ref 1.1–2)
ALP SERPL-CCNC: 106 UNIT/L (ref 40–150)
ALT SERPL-CCNC: 19 UNIT/L (ref 0–55)
AST SERPL-CCNC: 23 UNIT/L (ref 5–34)
BASOPHILS # BLD AUTO: 0 X10(3)/MCL (ref 0–0.2)
BASOPHILS NFR BLD AUTO: 0.4 %
BILIRUB SERPL-MCNC: 0.4 MG/DL
BILIRUBIN DIRECT+TOT PNL SERPL-MCNC: 0.2 MG/DL (ref 0–0.5)
BILIRUBIN DIRECT+TOT PNL SERPL-MCNC: 0.2 MG/DL (ref 0–0.8)
BUN SERPL-MCNC: 13.8 MG/DL (ref 9.8–20.1)
CALCIUM SERPL-MCNC: 8.9 MG/DL (ref 8.4–10.2)
CANCER AG125 SERPL-ACNC: 12.8 UNIT/ML (ref 0–35)
CHLORIDE SERPL-SCNC: 96 MMOL/L (ref 98–107)
CO2 SERPL-SCNC: 31 MMOL/L (ref 23–31)
CREAT SERPL-MCNC: 0.83 MG/DL (ref 0.55–1.02)
EOSINOPHIL # BLD AUTO: 0.1 X10(3)/MCL (ref 0–0.9)
EOSINOPHIL NFR BLD AUTO: 1.1 %
ERYTHROCYTE [DISTWIDTH] IN BLOOD BY AUTOMATED COUNT: 15.2 % (ref 11.5–17)
GLOBULIN SER-MCNC: 2.8 GM/DL (ref 2.4–3.5)
GLUCOSE SERPL-MCNC: 100 MG/DL (ref 82–115)
HCT VFR BLD AUTO: 36.6 % (ref 37–47)
HGB BLD-MCNC: 11.9 GM/DL (ref 12–16)
LYMPHOCYTES # BLD AUTO: 3.2 X10(3)/MCL (ref 0.6–4.6)
LYMPHOCYTES NFR BLD AUTO: 31 %
MCH RBC QN AUTO: 32.1 PG (ref 27–31)
MCHC RBC AUTO-ENTMCNC: 32.5 GM/DL (ref 33–36)
MCV RBC AUTO: 98.7 FL (ref 80–94)
MONOCYTES # BLD AUTO: 2.4 X10(3)/MCL (ref 0.1–1.3)
MONOCYTES NFR BLD AUTO: 23.3 %
NEUTROPHILS # BLD AUTO: 4.5 X10(3)/MCL (ref 2.1–9.2)
NEUTROPHILS NFR BLD AUTO: 43.8 %
PLATELET # BLD AUTO: 191 X10(3)/MCL (ref 130–400)
PMV BLD AUTO: 9.6 FL (ref 9.4–12.4)
POTASSIUM SERPL-SCNC: 3.6 MMOL/L (ref 3.5–5.1)
PROT SERPL-MCNC: 6.4 GM/DL (ref 5.8–7.6)
RBC # BLD AUTO: 3.71 X10(6)/MCL (ref 4.2–5.4)
SODIUM SERPL-SCNC: 138 MMOL/L (ref 136–145)
WBC # SPEC AUTO: 10.2 X10(3)/MCL (ref 4.5–11.5)

## 2021-02-02 ENCOUNTER — HISTORICAL (OUTPATIENT)
Dept: HEMATOLOGY/ONCOLOGY | Facility: CLINIC | Age: 71
End: 2021-02-02

## 2021-02-02 LAB
ABS NEUT (OLG): 4.17 X10(3)/MCL (ref 2.1–9.2)
ALBUMIN SERPL-MCNC: 3.8 GM/DL (ref 3.4–4.8)
ALBUMIN/GLOB SERPL: 1.1 RATIO (ref 1.1–2)
ALP SERPL-CCNC: 106 UNIT/L (ref 40–150)
ALT SERPL-CCNC: 22 UNIT/L (ref 0–55)
AST SERPL-CCNC: 26 UNIT/L (ref 5–34)
BASOPHILS # BLD AUTO: 0 X10(3)/MCL (ref 0–0.2)
BASOPHILS NFR BLD AUTO: 0.7 %
BILIRUB SERPL-MCNC: 0.3 MG/DL
BILIRUBIN DIRECT+TOT PNL SERPL-MCNC: 0.1 MG/DL (ref 0–0.5)
BILIRUBIN DIRECT+TOT PNL SERPL-MCNC: 0.2 MG/DL (ref 0–0.8)
BUN SERPL-MCNC: 12.4 MG/DL (ref 9.8–20.1)
CALCIUM SERPL-MCNC: 8.9 MG/DL (ref 8.4–10.2)
CHLORIDE SERPL-SCNC: 103 MMOL/L (ref 98–107)
CO2 SERPL-SCNC: 30 MMOL/L (ref 23–31)
CREAT SERPL-MCNC: 0.65 MG/DL (ref 0.55–1.02)
EOSINOPHIL # BLD AUTO: 0.1 X10(3)/MCL (ref 0–0.9)
EOSINOPHIL NFR BLD AUTO: 1.2 %
ERYTHROCYTE [DISTWIDTH] IN BLOOD BY AUTOMATED COUNT: 16.3 % (ref 11.5–17)
GLOBULIN SER-MCNC: 3.4 GM/DL (ref 2.4–3.5)
GLUCOSE SERPL-MCNC: 100 MG/DL (ref 82–115)
HCT VFR BLD AUTO: 39.5 % (ref 37–47)
HGB BLD-MCNC: 13.1 GM/DL (ref 12–16)
LYMPHOCYTES # BLD AUTO: 2.1 X10(3)/MCL (ref 0.6–4.6)
LYMPHOCYTES NFR BLD AUTO: 28.1 %
MCH RBC QN AUTO: 32.9 PG (ref 27–31)
MCHC RBC AUTO-ENTMCNC: 33.2 GM/DL (ref 33–36)
MCV RBC AUTO: 99.2 FL (ref 80–94)
MONOCYTES # BLD AUTO: 1 X10(3)/MCL (ref 0.1–1.3)
MONOCYTES NFR BLD AUTO: 14 %
NEUTROPHILS # BLD AUTO: 4.2 X10(3)/MCL (ref 2.1–9.2)
NEUTROPHILS NFR BLD AUTO: 55.7 %
PLATELET # BLD AUTO: 288 X10(3)/MCL (ref 130–400)
PMV BLD AUTO: 8.7 FL (ref 9.4–12.4)
POTASSIUM SERPL-SCNC: 3.8 MMOL/L (ref 3.5–5.1)
PROT SERPL-MCNC: 7.2 GM/DL (ref 5.8–7.6)
RBC # BLD AUTO: 3.98 X10(6)/MCL (ref 4.2–5.4)
SODIUM SERPL-SCNC: 137 MMOL/L (ref 136–145)
WBC # SPEC AUTO: 7.5 X10(3)/MCL (ref 4.5–11.5)

## 2021-02-09 ENCOUNTER — HISTORICAL (OUTPATIENT)
Dept: INFUSION THERAPY | Facility: HOSPITAL | Age: 71
End: 2021-02-09

## 2021-02-09 LAB
ABS NEUT (OLG): 2.7 X10(3)/MCL (ref 2.1–9.2)
ANION GAP SERPL CALC-SCNC: 13 MMOL/L
BASOPHILS # BLD AUTO: 0 X10(3)/MCL (ref 0–0.2)
BASOPHILS NFR BLD AUTO: 0.6 %
BUN SERPL-MCNC: 16 MG/DL (ref 8–26)
CHLORIDE SERPL-SCNC: 101 MMOL/L (ref 98–109)
CREAT SERPL-MCNC: 0.6 MG/DL (ref 0.6–1.3)
EOSINOPHIL # BLD AUTO: 0.1 X10(3)/MCL (ref 0–0.9)
EOSINOPHIL NFR BLD AUTO: 0.9 %
EOSINOPHIL NFR BLD MANUAL: 1 % (ref 0–8)
ERYTHROCYTE [DISTWIDTH] IN BLOOD BY AUTOMATED COUNT: 16.1 % (ref 11.5–17)
GLUCOSE SERPL-MCNC: 102 MG/DL (ref 70–105)
HCT VFR BLD AUTO: 38.2 % (ref 37–47)
HCT VFR BLD CALC: 40 % (ref 38–51)
HGB BLD-MCNC: 12.6 GM/DL (ref 12–16)
HGB BLD-MCNC: 13.6 MG/DL (ref 12–17)
LYMPHOCYTES # BLD AUTO: 2.7 X10(3)/MCL (ref 0.6–4.6)
LYMPHOCYTES NFR BLD AUTO: 39.6 %
LYMPHOCYTES NFR BLD MANUAL: 43 % (ref 13–40)
MCH RBC QN AUTO: 32.6 PG (ref 27–31)
MCHC RBC AUTO-ENTMCNC: 33 GM/DL (ref 33–36)
MCV RBC AUTO: 99 FL (ref 80–94)
MONOCYTES # BLD AUTO: 1.3 X10(3)/MCL (ref 0.1–1.3)
MONOCYTES NFR BLD AUTO: 19.4 %
MONOCYTES NFR BLD MANUAL: 17 % (ref 2–11)
NEUTROPHILS # BLD AUTO: 2.7 X10(3)/MCL (ref 2.1–9.2)
NEUTROPHILS NFR BLD AUTO: 39.4 %
NEUTROPHILS NFR BLD MANUAL: 39 % (ref 47–80)
PLATELET # BLD AUTO: 428 X10(3)/MCL (ref 130–400)
PLATELET # BLD EST: ABNORMAL 10*3/UL
PMV BLD AUTO: 8.3 FL (ref 9.4–12.4)
POC IONIZED CALCIUM: 1.16 MMOL/L (ref 1.12–1.32)
POC TCO2: 29 MMOL/L (ref 24–29)
POTASSIUM BLD-SCNC: 3.6 MMOL/L (ref 3.5–4.9)
RBC # BLD AUTO: 3.86 X10(6)/MCL (ref 4.2–5.4)
RBC MORPH BLD: NORMAL
SODIUM BLD-SCNC: 139 MMOL/L (ref 138–146)
WBC # SPEC AUTO: 6.8 X10(3)/MCL (ref 4.5–11.5)

## 2021-02-18 ENCOUNTER — HISTORICAL (OUTPATIENT)
Dept: ADMINISTRATIVE | Facility: HOSPITAL | Age: 71
End: 2021-02-18

## 2021-02-18 LAB
ABS NEUT (OLG): 12.31 X10(3)/MCL (ref 2.1–9.2)
ALBUMIN SERPL-MCNC: 3.9 GM/DL (ref 3.4–4.8)
ALBUMIN/GLOB SERPL: 1.2 RATIO (ref 1.1–2)
ALP SERPL-CCNC: 113 UNIT/L (ref 40–150)
ALT SERPL-CCNC: 18 UNIT/L (ref 0–55)
AST SERPL-CCNC: 24 UNIT/L (ref 5–34)
BASOPHILS # BLD AUTO: 0 X10(3)/MCL (ref 0–0.2)
BASOPHILS NFR BLD AUTO: 0.3 %
BILIRUB SERPL-MCNC: 0.4 MG/DL
BILIRUBIN DIRECT+TOT PNL SERPL-MCNC: 0.2 MG/DL (ref 0–0.5)
BILIRUBIN DIRECT+TOT PNL SERPL-MCNC: 0.2 MG/DL (ref 0–0.8)
BUN SERPL-MCNC: 6.7 MG/DL (ref 9.8–20.1)
CALCIUM SERPL-MCNC: 9 MG/DL (ref 8.4–10.2)
CANCER AG125 SERPL-ACNC: 11.6 UNIT/ML (ref 0–35)
CHLORIDE SERPL-SCNC: 97 MMOL/L (ref 98–107)
CO2 SERPL-SCNC: 31 MMOL/L (ref 23–31)
CREAT SERPL-MCNC: 0.75 MG/DL (ref 0.55–1.02)
EOSINOPHIL # BLD AUTO: 0.1 X10(3)/MCL (ref 0–0.9)
EOSINOPHIL NFR BLD AUTO: 0.6 %
ERYTHROCYTE [DISTWIDTH] IN BLOOD BY AUTOMATED COUNT: 15.8 % (ref 11.5–17)
GLOBULIN SER-MCNC: 3.3 GM/DL (ref 2.4–3.5)
GLUCOSE SERPL-MCNC: 101 MG/DL (ref 82–115)
HCT VFR BLD AUTO: 38.1 % (ref 37–47)
HGB BLD-MCNC: 12.5 GM/DL (ref 12–16)
LYMPHOCYTES # BLD AUTO: 3.6 X10(3)/MCL (ref 0.6–4.6)
LYMPHOCYTES NFR BLD AUTO: 18.2 %
MCH RBC QN AUTO: 32.8 PG (ref 27–31)
MCHC RBC AUTO-ENTMCNC: 32.8 GM/DL (ref 33–36)
MCV RBC AUTO: 100 FL (ref 80–94)
MONOCYTES # BLD AUTO: 3.4 X10(3)/MCL (ref 0.1–1.3)
MONOCYTES NFR BLD AUTO: 17.5 %
NEUTROPHILS # BLD AUTO: 12.3 X10(3)/MCL (ref 2.1–9.2)
NEUTROPHILS NFR BLD AUTO: 63 %
PLATELET # BLD AUTO: 206 X10(3)/MCL (ref 130–400)
PMV BLD AUTO: 9.6 FL (ref 9.4–12.4)
POTASSIUM SERPL-SCNC: 3.6 MMOL/L (ref 3.5–5.1)
PROT SERPL-MCNC: 7.2 GM/DL (ref 5.8–7.6)
RBC # BLD AUTO: 3.81 X10(6)/MCL (ref 4.2–5.4)
SODIUM SERPL-SCNC: 136 MMOL/L (ref 136–145)
WBC # SPEC AUTO: 19.6 X10(3)/MCL (ref 4.5–11.5)

## 2021-03-02 ENCOUNTER — HISTORICAL (OUTPATIENT)
Dept: INFUSION THERAPY | Facility: HOSPITAL | Age: 71
End: 2021-03-02

## 2021-03-02 LAB
ABS NEUT (OLG): 4.08 X10(3)/MCL (ref 2.1–9.2)
ANION GAP SERPL CALC-SCNC: 16 MMOL/L
BASOPHILS # BLD AUTO: 0 X10(3)/MCL (ref 0–0.2)
BASOPHILS NFR BLD AUTO: 0.6 %
BUN SERPL-MCNC: 12 MG/DL (ref 8–26)
CHLORIDE SERPL-SCNC: 99 MMOL/L (ref 98–109)
CREAT SERPL-MCNC: 0.5 MG/DL (ref 0.6–1.3)
EOSINOPHIL # BLD AUTO: 0.1 X10(3)/MCL (ref 0–0.9)
EOSINOPHIL NFR BLD AUTO: 1.1 %
ERYTHROCYTE [DISTWIDTH] IN BLOOD BY AUTOMATED COUNT: 15.8 % (ref 11.5–17)
GLUCOSE SERPL-MCNC: 103 MG/DL (ref 70–105)
HCT VFR BLD AUTO: 37.2 % (ref 37–47)
HCT VFR BLD CALC: 38 % (ref 38–51)
HGB BLD-MCNC: 12.1 GM/DL (ref 12–16)
HGB BLD-MCNC: 12.9 MG/DL (ref 12–17)
LYMPHOCYTES # BLD AUTO: 2.3 X10(3)/MCL (ref 0.6–4.6)
LYMPHOCYTES NFR BLD AUTO: 28.4 %
MCH RBC QN AUTO: 32.8 PG (ref 27–31)
MCHC RBC AUTO-ENTMCNC: 32.5 GM/DL (ref 33–36)
MCV RBC AUTO: 100.8 FL (ref 80–94)
MONOCYTES # BLD AUTO: 1.5 X10(3)/MCL (ref 0.1–1.3)
MONOCYTES NFR BLD AUTO: 18.6 %
NEUTROPHILS # BLD AUTO: 4.1 X10(3)/MCL (ref 2.1–9.2)
NEUTROPHILS NFR BLD AUTO: 51.2 %
PLATELET # BLD AUTO: 439 X10(3)/MCL (ref 130–400)
PMV BLD AUTO: 8.2 FL (ref 9.4–12.4)
POC IONIZED CALCIUM: 1.19 MMOL/L (ref 1.12–1.32)
POC TCO2: 28 MMOL/L (ref 24–29)
POTASSIUM BLD-SCNC: 3.7 MMOL/L (ref 3.5–4.9)
RBC # BLD AUTO: 3.69 X10(6)/MCL (ref 4.2–5.4)
SODIUM BLD-SCNC: 138 MMOL/L (ref 138–146)
WBC # SPEC AUTO: 8 X10(3)/MCL (ref 4.5–11.5)

## 2021-03-09 ENCOUNTER — HISTORICAL (OUTPATIENT)
Dept: ADMINISTRATIVE | Facility: HOSPITAL | Age: 71
End: 2021-03-09

## 2021-03-09 LAB
ABS NEUT (OLG): 2.2 X10(3)/MCL (ref 2.1–9.2)
ALBUMIN SERPL-MCNC: 3.8 GM/DL (ref 3.4–4.8)
ALP SERPL-CCNC: 99 UNIT/L (ref 40–150)
ALT SERPL-CCNC: 19 UNIT/L (ref 0–55)
ANION GAP SERPL CALC-SCNC: 16 MMOL/L
AST SERPL-CCNC: 24 UNIT/L (ref 5–34)
BASOPHILS # BLD AUTO: 0 X10(3)/MCL (ref 0–0.2)
BASOPHILS NFR BLD AUTO: 0.7 %
BILIRUB SERPL-MCNC: 0.6 MG/DL
BILIRUBIN DIRECT+TOT PNL SERPL-MCNC: 0.2 MG/DL (ref 0–0.5)
BILIRUBIN DIRECT+TOT PNL SERPL-MCNC: 0.4 MG/DL (ref 0–0.8)
BUN SERPL-MCNC: 10 MG/DL (ref 8–26)
CANCER AG125 SERPL-ACNC: 9.1 UNIT/ML (ref 0–35)
CHLORIDE SERPL-SCNC: 94 MMOL/L (ref 98–109)
CREAT SERPL-MCNC: 0.5 MG/DL (ref 0.6–1.3)
EOSINOPHIL # BLD AUTO: 0.2 X10(3)/MCL (ref 0–0.9)
EOSINOPHIL NFR BLD AUTO: 2.5 %
ERYTHROCYTE [DISTWIDTH] IN BLOOD BY AUTOMATED COUNT: 15.3 % (ref 11.5–17)
GLUCOSE SERPL-MCNC: 105 MG/DL (ref 70–105)
HCT VFR BLD AUTO: 36.1 % (ref 37–47)
HCT VFR BLD CALC: 38 % (ref 38–51)
HGB BLD-MCNC: 11.8 GM/DL (ref 12–16)
HGB BLD-MCNC: 12.9 MG/DL (ref 12–17)
LIVER PROFILE INTERP: NORMAL
LYMPHOCYTES # BLD AUTO: 2.3 X10(3)/MCL (ref 0.6–4.6)
LYMPHOCYTES NFR BLD AUTO: 37.9 %
MCH RBC QN AUTO: 33 PG (ref 27–31)
MCHC RBC AUTO-ENTMCNC: 32.7 GM/DL (ref 33–36)
MCV RBC AUTO: 100.8 FL (ref 80–94)
MONOCYTES # BLD AUTO: 1.4 X10(3)/MCL (ref 0.1–1.3)
MONOCYTES NFR BLD AUTO: 22.4 %
NEUTROPHILS # BLD AUTO: 2.2 X10(3)/MCL (ref 2.1–9.2)
NEUTROPHILS NFR BLD AUTO: 36.3 %
PLATELET # BLD AUTO: 227 X10(3)/MCL (ref 130–400)
PMV BLD AUTO: 10 FL (ref 9.4–12.4)
POC IONIZED CALCIUM: 1.2 MMOL/L (ref 1.12–1.32)
POC TCO2: 30 MMOL/L (ref 24–29)
POTASSIUM BLD-SCNC: 3.5 MMOL/L (ref 3.5–4.9)
PROT SERPL-MCNC: 7 GM/DL (ref 5.8–7.6)
RBC # BLD AUTO: 3.58 X10(6)/MCL (ref 4.2–5.4)
SODIUM BLD-SCNC: 135 MMOL/L (ref 138–146)
WBC # SPEC AUTO: 6 X10(3)/MCL (ref 4.5–11.5)

## 2021-03-23 ENCOUNTER — HISTORICAL (OUTPATIENT)
Dept: INFUSION THERAPY | Facility: HOSPITAL | Age: 71
End: 2021-03-23

## 2021-03-23 LAB
ABS NEUT (OLG): 1.9 X10(3)/MCL (ref 2.1–9.2)
ANION GAP SERPL CALC-SCNC: 13 MMOL/L
BASOPHILS # BLD AUTO: 0 X10(3)/MCL (ref 0–0.2)
BASOPHILS NFR BLD AUTO: 0.6 %
BUN SERPL-MCNC: 14 MG/DL (ref 8–26)
CHLORIDE SERPL-SCNC: 99 MMOL/L (ref 98–109)
CREAT SERPL-MCNC: 0.6 MG/DL (ref 0.6–1.3)
EOSINOPHIL # BLD AUTO: 0.1 X10(3)/MCL (ref 0–0.9)
EOSINOPHIL NFR BLD AUTO: 1.1 %
ERYTHROCYTE [DISTWIDTH] IN BLOOD BY AUTOMATED COUNT: 15.8 % (ref 11.5–17)
GLUCOSE SERPL-MCNC: 103 MG/DL (ref 70–105)
HCT VFR BLD AUTO: 38.9 % (ref 37–47)
HCT VFR BLD CALC: 41 % (ref 38–51)
HGB BLD-MCNC: 12.6 GM/DL (ref 12–16)
HGB BLD-MCNC: 13.9 MG/DL (ref 12–17)
LYMPHOCYTES # BLD AUTO: 2.9 X10(3)/MCL (ref 0.6–4.6)
LYMPHOCYTES NFR BLD AUTO: 44.9 %
MCH RBC QN AUTO: 32.9 PG (ref 27–31)
MCHC RBC AUTO-ENTMCNC: 32.4 GM/DL (ref 33–36)
MCV RBC AUTO: 101.6 FL (ref 80–94)
MONOCYTES # BLD AUTO: 1.5 X10(3)/MCL (ref 0.1–1.3)
MONOCYTES NFR BLD AUTO: 23.8 %
NEUTROPHILS # BLD AUTO: 1.9 X10(3)/MCL (ref 2.1–9.2)
NEUTROPHILS NFR BLD AUTO: 29.4 %
PLATELET # BLD AUTO: 421 X10(3)/MCL (ref 130–400)
PMV BLD AUTO: 8.2 FL (ref 9.4–12.4)
POC IONIZED CALCIUM: 1.15 MMOL/L (ref 1.12–1.32)
POC TCO2: 30 MMOL/L (ref 24–29)
POTASSIUM BLD-SCNC: 3.8 MMOL/L (ref 3.5–4.9)
RBC # BLD AUTO: 3.83 X10(6)/MCL (ref 4.2–5.4)
SODIUM BLD-SCNC: 137 MMOL/L (ref 138–146)
WBC # SPEC AUTO: 6.5 X10(3)/MCL (ref 4.5–11.5)

## 2021-03-30 ENCOUNTER — HISTORICAL (OUTPATIENT)
Dept: RADIOLOGY | Facility: HOSPITAL | Age: 71
End: 2021-03-30

## 2021-03-31 ENCOUNTER — HISTORICAL (OUTPATIENT)
Dept: ADMINISTRATIVE | Facility: HOSPITAL | Age: 71
End: 2021-03-31

## 2021-03-31 LAB
ABS NEUT (OLG): 7.14 X10(3)/MCL (ref 2.1–9.2)
ALBUMIN SERPL-MCNC: 3.9 GM/DL (ref 3.4–4.8)
ALBUMIN/GLOB SERPL: 1.3 RATIO (ref 1.1–2)
ALP SERPL-CCNC: 98 UNIT/L (ref 40–150)
ALT SERPL-CCNC: 18 UNIT/L (ref 0–55)
AST SERPL-CCNC: 26 UNIT/L (ref 5–34)
BASOPHILS # BLD AUTO: 0 X10(3)/MCL (ref 0–0.2)
BASOPHILS NFR BLD AUTO: 0.2 %
BILIRUB SERPL-MCNC: 0.3 MG/DL
BILIRUBIN DIRECT+TOT PNL SERPL-MCNC: 0.1 MG/DL (ref 0–0.5)
BILIRUBIN DIRECT+TOT PNL SERPL-MCNC: 0.2 MG/DL (ref 0–0.8)
BUN SERPL-MCNC: 13.3 MG/DL (ref 9.8–20.1)
CALCIUM SERPL-MCNC: 8.9 MG/DL (ref 8.4–10.2)
CANCER AG125 SERPL-ACNC: 8.7 UNIT/ML (ref 0–35)
CHLORIDE SERPL-SCNC: 100 MMOL/L (ref 98–107)
CO2 SERPL-SCNC: 29 MMOL/L (ref 23–31)
CREAT SERPL-MCNC: 0.67 MG/DL (ref 0.55–1.02)
EOSINOPHIL # BLD AUTO: 0.2 X10(3)/MCL (ref 0–0.9)
EOSINOPHIL NFR BLD AUTO: 1.6 %
ERYTHROCYTE [DISTWIDTH] IN BLOOD BY AUTOMATED COUNT: 14.8 % (ref 11.5–17)
GLOBULIN SER-MCNC: 3.1 GM/DL (ref 2.4–3.5)
GLUCOSE SERPL-MCNC: 99 MG/DL (ref 82–115)
HCT VFR BLD AUTO: 36.8 % (ref 37–47)
HGB BLD-MCNC: 11.9 GM/DL (ref 12–16)
LYMPHOCYTES # BLD AUTO: 3.6 X10(3)/MCL (ref 0.6–4.6)
LYMPHOCYTES NFR BLD AUTO: 25.5 %
MCH RBC QN AUTO: 33 PG (ref 27–31)
MCHC RBC AUTO-ENTMCNC: 32.3 GM/DL (ref 33–36)
MCV RBC AUTO: 101.9 FL (ref 80–94)
MONOCYTES # BLD AUTO: 3 X10(3)/MCL (ref 0.1–1.3)
MONOCYTES NFR BLD AUTO: 21.5 %
NEUTROPHILS # BLD AUTO: 7.1 X10(3)/MCL (ref 2.1–9.2)
NEUTROPHILS NFR BLD AUTO: 50.8 %
PLATELET # BLD AUTO: 206 X10(3)/MCL (ref 130–400)
PMV BLD AUTO: 9.5 FL (ref 9.4–12.4)
POTASSIUM SERPL-SCNC: 3.7 MMOL/L (ref 3.5–5.1)
PROT SERPL-MCNC: 7 GM/DL (ref 5.8–7.6)
RBC # BLD AUTO: 3.61 X10(6)/MCL (ref 4.2–5.4)
SODIUM SERPL-SCNC: 138 MMOL/L (ref 136–145)
WBC # SPEC AUTO: 14 X10(3)/MCL (ref 4.5–11.5)

## 2021-04-13 ENCOUNTER — HISTORICAL (OUTPATIENT)
Dept: INFUSION THERAPY | Facility: HOSPITAL | Age: 71
End: 2021-04-13

## 2021-04-13 LAB
ABS NEUT (OLG): 2.38 X10(3)/MCL (ref 2.1–9.2)
ANION GAP SERPL CALC-SCNC: 15 MMOL/L
BASOPHILS # BLD AUTO: 0 X10(3)/MCL (ref 0–0.2)
BASOPHILS NFR BLD AUTO: 0.6 %
BUN SERPL-MCNC: 9 MG/DL (ref 8–26)
CHLORIDE SERPL-SCNC: 99 MMOL/L (ref 98–109)
CREAT SERPL-MCNC: 0.6 MG/DL (ref 0.6–1.3)
EOSINOPHIL # BLD AUTO: 0 X10(3)/MCL (ref 0–0.9)
EOSINOPHIL NFR BLD AUTO: 0.6 %
ERYTHROCYTE [DISTWIDTH] IN BLOOD BY AUTOMATED COUNT: 15.1 % (ref 11.5–17)
GLUCOSE SERPL-MCNC: 101 MG/DL (ref 70–105)
HCT VFR BLD AUTO: 39.2 % (ref 37–47)
HCT VFR BLD CALC: 41 % (ref 38–51)
HGB BLD-MCNC: 12.5 GM/DL (ref 12–16)
HGB BLD-MCNC: 13.9 MG/DL (ref 12–17)
LYMPHOCYTES # BLD AUTO: 2.8 X10(3)/MCL (ref 0.6–4.6)
LYMPHOCYTES NFR BLD AUTO: 43.7 %
MCH RBC QN AUTO: 32.5 PG (ref 27–31)
MCHC RBC AUTO-ENTMCNC: 31.9 GM/DL (ref 33–36)
MCV RBC AUTO: 101.8 FL (ref 80–94)
MONOCYTES # BLD AUTO: 1.1 X10(3)/MCL (ref 0.1–1.3)
MONOCYTES NFR BLD AUTO: 17.7 %
NEUTROPHILS # BLD AUTO: 2.4 X10(3)/MCL (ref 2.1–9.2)
NEUTROPHILS NFR BLD AUTO: 37.4 %
PLATELET # BLD AUTO: 466 X10(3)/MCL (ref 130–400)
PMV BLD AUTO: 8.4 FL (ref 9.4–12.4)
POC IONIZED CALCIUM: 1.2 MMOL/L (ref 1.12–1.32)
POC TCO2: 31 MMOL/L (ref 24–29)
POTASSIUM BLD-SCNC: 4.1 MMOL/L (ref 3.5–4.9)
RBC # BLD AUTO: 3.85 X10(6)/MCL (ref 4.2–5.4)
SODIUM BLD-SCNC: 141 MMOL/L (ref 138–146)
WBC # SPEC AUTO: 6.4 X10(3)/MCL (ref 4.5–11.5)

## 2021-05-03 ENCOUNTER — HISTORICAL (OUTPATIENT)
Dept: ADMINISTRATIVE | Facility: HOSPITAL | Age: 71
End: 2021-05-03

## 2021-05-03 LAB
ABS NEUT (OLG): 1.51 X10(3)/MCL (ref 2.1–9.2)
ANION GAP SERPL CALC-SCNC: 15 MMOL/L
BASOPHILS # BLD AUTO: 0 X10(3)/MCL (ref 0–0.2)
BASOPHILS NFR BLD AUTO: 0.4 %
BUN SERPL-MCNC: 10 MG/DL (ref 8–26)
CANCER AG125 SERPL-ACNC: 8.4 UNIT/ML (ref 0–35)
CHLORIDE SERPL-SCNC: 97 MMOL/L (ref 98–109)
CREAT SERPL-MCNC: 0.7 MG/DL (ref 0.6–1.3)
EOSINOPHIL # BLD AUTO: 0.1 X10(3)/MCL (ref 0–0.9)
EOSINOPHIL NFR BLD AUTO: 1.7 %
ERYTHROCYTE [DISTWIDTH] IN BLOOD BY AUTOMATED COUNT: 14 % (ref 11.5–17)
GLUCOSE SERPL-MCNC: 102 MG/DL (ref 70–105)
HCT VFR BLD AUTO: 41.6 % (ref 37–47)
HCT VFR BLD CALC: 44 % (ref 38–51)
HGB BLD-MCNC: 13.5 GM/DL (ref 12–16)
HGB BLD-MCNC: 15 MG/DL (ref 12–17)
LYMPHOCYTES # BLD AUTO: 3 X10(3)/MCL (ref 0.6–4.6)
LYMPHOCYTES NFR BLD AUTO: 57 %
MCH RBC QN AUTO: 32.5 PG (ref 27–31)
MCHC RBC AUTO-ENTMCNC: 32.5 GM/DL (ref 33–36)
MCV RBC AUTO: 100.2 FL (ref 80–94)
MONOCYTES # BLD AUTO: 0.7 X10(3)/MCL (ref 0.1–1.3)
MONOCYTES NFR BLD AUTO: 12.6 %
NEUTROPHILS # BLD AUTO: 1.5 X10(3)/MCL (ref 2.1–9.2)
NEUTROPHILS NFR BLD AUTO: 28.3 %
PLATELET # BLD AUTO: 354 X10(3)/MCL (ref 130–400)
PMV BLD AUTO: 8.4 FL (ref 9.4–12.4)
POC IONIZED CALCIUM: 1.18 MMOL/L (ref 1.12–1.32)
POC TCO2: 30 MMOL/L (ref 24–29)
POTASSIUM BLD-SCNC: 3.7 MMOL/L (ref 3.5–4.9)
RBC # BLD AUTO: 4.15 X10(6)/MCL (ref 4.2–5.4)
SODIUM BLD-SCNC: 137 MMOL/L (ref 138–146)
WBC # SPEC AUTO: 5.3 X10(3)/MCL (ref 4.5–11.5)

## 2021-05-04 ENCOUNTER — HISTORICAL (OUTPATIENT)
Dept: INFUSION THERAPY | Facility: HOSPITAL | Age: 71
End: 2021-05-04

## 2021-05-25 ENCOUNTER — HISTORICAL (OUTPATIENT)
Dept: INFUSION THERAPY | Facility: HOSPITAL | Age: 71
End: 2021-05-25

## 2021-05-25 LAB
ABS NEUT (OLG): 1.94 X10(3)/MCL (ref 2.1–9.2)
ANION GAP SERPL CALC-SCNC: 15 MMOL/L
BASOPHILS # BLD AUTO: 0 X10(3)/MCL (ref 0–0.2)
BASOPHILS NFR BLD AUTO: 0.2 %
BUN SERPL-MCNC: 12 MG/DL (ref 8–26)
CHLORIDE SERPL-SCNC: 97 MMOL/L (ref 98–109)
CREAT SERPL-MCNC: 0.7 MG/DL (ref 0.6–1.3)
EOSINOPHIL # BLD AUTO: 0.1 X10(3)/MCL (ref 0–0.9)
EOSINOPHIL NFR BLD AUTO: 1.5 %
ERYTHROCYTE [DISTWIDTH] IN BLOOD BY AUTOMATED COUNT: 13.6 % (ref 11.5–17)
GLUCOSE SERPL-MCNC: 88 MG/DL (ref 70–105)
HCT VFR BLD AUTO: 43.3 % (ref 37–47)
HCT VFR BLD CALC: 44 % (ref 38–51)
HGB BLD-MCNC: 13.9 GM/DL (ref 12–16)
HGB BLD-MCNC: 15 MG/DL (ref 12–17)
LYMPHOCYTES # BLD AUTO: 2.7 X10(3)/MCL (ref 0.6–4.6)
LYMPHOCYTES NFR BLD AUTO: 49.4 %
MCH RBC QN AUTO: 32 PG (ref 27–31)
MCHC RBC AUTO-ENTMCNC: 32.1 GM/DL (ref 33–36)
MCV RBC AUTO: 99.5 FL (ref 80–94)
MONOCYTES # BLD AUTO: 0.8 X10(3)/MCL (ref 0.1–1.3)
MONOCYTES NFR BLD AUTO: 13.6 %
NEUTROPHILS # BLD AUTO: 1.9 X10(3)/MCL (ref 2.1–9.2)
NEUTROPHILS NFR BLD AUTO: 35.1 %
PLATELET # BLD AUTO: 335 X10(3)/MCL (ref 130–400)
PMV BLD AUTO: 8.1 FL (ref 9.4–12.4)
POC IONIZED CALCIUM: 1.24 MMOL/L (ref 1.12–1.32)
POC TCO2: 31 MMOL/L (ref 24–29)
POTASSIUM BLD-SCNC: 4.1 MMOL/L (ref 3.5–4.9)
RBC # BLD AUTO: 4.35 X10(6)/MCL (ref 4.2–5.4)
SODIUM BLD-SCNC: 138 MMOL/L (ref 138–146)
WBC # SPEC AUTO: 5.5 X10(3)/MCL (ref 4.5–11.5)

## 2021-06-14 ENCOUNTER — HISTORICAL (OUTPATIENT)
Dept: ADMINISTRATIVE | Facility: HOSPITAL | Age: 71
End: 2021-06-14

## 2021-06-14 LAB
ABS NEUT (OLG): 1.76 X10(3)/MCL (ref 2.1–9.2)
ANION GAP SERPL CALC-SCNC: 12 MMOL/L
BASOPHILS # BLD AUTO: 0 X10(3)/MCL (ref 0–0.2)
BASOPHILS NFR BLD AUTO: 0.2 %
BUN SERPL-MCNC: 12 MG/DL (ref 8–26)
CANCER AG125 SERPL-ACNC: 7.5 UNIT/ML (ref 0–35)
CHLORIDE SERPL-SCNC: 96 MMOL/L (ref 98–109)
CREAT SERPL-MCNC: 0.7 MG/DL (ref 0.6–1.3)
EOSINOPHIL # BLD AUTO: 0.1 X10(3)/MCL (ref 0–0.9)
EOSINOPHIL NFR BLD AUTO: 1.6 %
ERYTHROCYTE [DISTWIDTH] IN BLOOD BY AUTOMATED COUNT: 13.4 % (ref 11.5–17)
GLUCOSE SERPL-MCNC: 92 MG/DL (ref 70–105)
HCT VFR BLD AUTO: 41.7 % (ref 37–47)
HCT VFR BLD CALC: 43 % (ref 38–51)
HGB BLD-MCNC: 13.6 GM/DL (ref 12–16)
HGB BLD-MCNC: 14.6 MG/DL (ref 12–17)
LYMPHOCYTES # BLD AUTO: 2.9 X10(3)/MCL (ref 0.6–4.6)
LYMPHOCYTES NFR BLD AUTO: 51.2 %
MCH RBC QN AUTO: 31.3 PG (ref 27–31)
MCHC RBC AUTO-ENTMCNC: 32.6 GM/DL (ref 33–36)
MCV RBC AUTO: 96.1 FL (ref 80–94)
MONOCYTES # BLD AUTO: 0.9 X10(3)/MCL (ref 0.1–1.3)
MONOCYTES NFR BLD AUTO: 15.7 %
NEUTROPHILS # BLD AUTO: 1.8 X10(3)/MCL (ref 2.1–9.2)
NEUTROPHILS NFR BLD AUTO: 31.3 %
PLATELET # BLD AUTO: 310 X10(3)/MCL (ref 130–400)
PMV BLD AUTO: 8 FL (ref 9.4–12.4)
POC IONIZED CALCIUM: 1.16 MMOL/L (ref 1.12–1.32)
POC TCO2: 31 MMOL/L (ref 24–29)
POTASSIUM BLD-SCNC: 3.9 MMOL/L (ref 3.5–4.9)
RBC # BLD AUTO: 4.34 X10(6)/MCL (ref 4.2–5.4)
SODIUM BLD-SCNC: 134 MMOL/L (ref 138–146)
WBC # SPEC AUTO: 5.6 X10(3)/MCL (ref 4.5–11.5)

## 2021-06-15 ENCOUNTER — HISTORICAL (OUTPATIENT)
Dept: INFUSION THERAPY | Facility: HOSPITAL | Age: 71
End: 2021-06-15

## 2021-06-17 ENCOUNTER — HOSPITAL ENCOUNTER (OUTPATIENT)
Dept: INTENSIVE CARE | Facility: HOSPITAL | Age: 71
End: 2021-06-19
Attending: INTERNAL MEDICINE | Admitting: INTERNAL MEDICINE

## 2021-06-17 LAB
ABS NEUT (OLG): 3.15 X10(3)/MCL (ref 2.1–9.2)
ALBUMIN SERPL-MCNC: 3.9 GM/DL (ref 3.4–4.8)
ALBUMIN/GLOB SERPL: 1 RATIO (ref 1.1–2)
ALP SERPL-CCNC: 57 UNIT/L (ref 40–150)
ALT SERPL-CCNC: 19 UNIT/L (ref 0–55)
AMPHET UR QL SCN: NEGATIVE
ANISOCYTOSIS BLD QL SMEAR: 1
APTT PPP: 31.2 SECOND(S) (ref 23.2–33.7)
AST SERPL-CCNC: 30 UNIT/L (ref 5–34)
BARBITURATE SCN PRESENT UR: NEGATIVE
BENZODIAZ UR QL SCN: NEGATIVE
BILIRUB SERPL-MCNC: 1 MG/DL
BILIRUBIN DIRECT+TOT PNL SERPL-MCNC: 0.3 MG/DL (ref 0–0.5)
BILIRUBIN DIRECT+TOT PNL SERPL-MCNC: 0.7 MG/DL (ref 0–0.8)
BUN SERPL-MCNC: 9 MG/DL (ref 9.8–20.1)
CALCIUM SERPL-MCNC: 10.2 MG/DL (ref 8.4–10.2)
CANNABINOIDS UR QL SCN: NEGATIVE
CHLORIDE SERPL-SCNC: 97 MMOL/L (ref 98–107)
CHOLEST SERPL-MCNC: 228 MG/DL
CHOLEST/HDLC SERPL: 4 {RATIO} (ref 0–5)
CK SERPL-CCNC: 109 U/L (ref 29–168)
CO2 SERPL-SCNC: 29 MMOL/L (ref 23–31)
COCAINE UR QL SCN: NEGATIVE
CREAT SERPL-MCNC: 0.77 MG/DL (ref 0.55–1.02)
CRP SERPL HS-MCNC: 0.06 MG/DL
EOSINOPHIL NFR BLD MANUAL: 1 % (ref 0–8)
ERYTHROCYTE [DISTWIDTH] IN BLOOD BY AUTOMATED COUNT: 13.6 % (ref 11.5–17)
ERYTHROCYTE [SEDIMENTATION RATE] IN BLOOD: 14 MM/HR (ref 0–20)
EST. AVERAGE GLUCOSE BLD GHB EST-MCNC: 108.3 MG/DL
GLOBULIN SER-MCNC: 3.9 GM/DL (ref 2.4–3.5)
GLUCOSE SERPL-MCNC: 113 MG/DL (ref 82–115)
HBA1C MFR BLD: 5.4 %
HCT VFR BLD AUTO: 45.4 % (ref 37–47)
HDLC SERPL-MCNC: 64 MG/DL (ref 35–60)
HGB BLD-MCNC: 14.7 GM/DL (ref 12–16)
INR PPP: 1 (ref 0–1.3)
LDLC SERPL CALC-MCNC: 148 MG/DL (ref 50–140)
LYMPHOCYTES NFR BLD MANUAL: 48 % (ref 13–40)
MACROCYTES BLD QL SMEAR: 1 /MCL
MCH RBC QN AUTO: 31.7 PG (ref 27–31)
MCHC RBC AUTO-ENTMCNC: 32.4 GM/DL (ref 33–36)
MCV RBC AUTO: 97.8 FL (ref 80–94)
MICROCYTES BLD QL SMEAR: 1
MONOCYTES NFR BLD MANUAL: 13 % (ref 2–11)
NEUTROPHILS NFR BLD MANUAL: 38 % (ref 47–80)
NRBC BLD MANUAL-RTO: 1 %
OPIATES UR QL SCN: NEGATIVE
PCP UR QL: NEGATIVE
PH UR STRIP.AUTO: 7.5 [PH] (ref 5–7.5)
PLATELET # BLD AUTO: 380 X10(3)/MCL (ref 130–400)
PLATELET # BLD EST: NORMAL 10*3/UL
PMV BLD AUTO: 8.7 FL (ref 7.4–10.4)
POTASSIUM SERPL-SCNC: 4.2 MMOL/L (ref 3.5–5.1)
PROT SERPL-MCNC: 7.8 GM/DL (ref 5.8–7.6)
PROTHROMBIN TIME: 12.7 SECOND(S) (ref 12.5–14.5)
RBC # BLD AUTO: 4.64 X10(6)/MCL (ref 4.2–5.4)
RBC MORPH BLD: ABNORMAL
SARS-COV-2 AG RESP QL IA.RAPID: NEGATIVE
SODIUM SERPL-SCNC: 137 MMOL/L (ref 136–145)
SP GR FLD REFRACTOMETRY: 1 (ref 1–1.03)
TRIGL SERPL-MCNC: 78 MG/DL (ref 37–140)
TROPONIN I SERPL-MCNC: <0.01 NG/ML (ref 0–0.04)
VLDLC SERPL CALC-MCNC: 16 MG/DL
WBC # SPEC AUTO: 7.5 X10(3)/MCL (ref 4.5–11.5)

## 2021-07-06 ENCOUNTER — HISTORICAL (OUTPATIENT)
Dept: INFUSION THERAPY | Facility: HOSPITAL | Age: 71
End: 2021-07-06

## 2021-07-06 LAB
ABS NEUT (OLG): 2.93 X10(3)/MCL (ref 2.1–9.2)
ANION GAP SERPL CALC-SCNC: 18 MMOL/L
BASOPHILS # BLD AUTO: 0 X10(3)/MCL (ref 0–0.2)
BASOPHILS NFR BLD AUTO: 0.3 %
BUN SERPL-MCNC: 13 MG/DL (ref 8–26)
CHLORIDE SERPL-SCNC: 95 MMOL/L (ref 98–109)
CREAT SERPL-MCNC: 0.9 MG/DL (ref 0.6–1.3)
EOSINOPHIL # BLD AUTO: 0.1 X10(3)/MCL (ref 0–0.9)
EOSINOPHIL NFR BLD AUTO: 1.9 %
ERYTHROCYTE [DISTWIDTH] IN BLOOD BY AUTOMATED COUNT: 13.2 % (ref 11.5–17)
GLUCOSE SERPL-MCNC: 101 MG/DL (ref 70–105)
HCT VFR BLD AUTO: 42.3 % (ref 37–47)
HCT VFR BLD CALC: 44 % (ref 38–51)
HGB BLD-MCNC: 13.8 GM/DL (ref 12–16)
HGB BLD-MCNC: 15 MG/DL (ref 12–17)
LYMPHOCYTES # BLD AUTO: 3.1 X10(3)/MCL (ref 0.6–4.6)
LYMPHOCYTES NFR BLD AUTO: 43.9 %
MCH RBC QN AUTO: 31.4 PG (ref 27–31)
MCHC RBC AUTO-ENTMCNC: 32.6 GM/DL (ref 33–36)
MCV RBC AUTO: 96.4 FL (ref 80–94)
MONOCYTES # BLD AUTO: 0.8 X10(3)/MCL (ref 0.1–1.3)
MONOCYTES NFR BLD AUTO: 12.1 %
NEUTROPHILS # BLD AUTO: 2.9 X10(3)/MCL (ref 2.1–9.2)
NEUTROPHILS NFR BLD AUTO: 41.7 %
PLATELET # BLD AUTO: 315 X10(3)/MCL (ref 130–400)
PMV BLD AUTO: 8.3 FL (ref 9.4–12.4)
POC IONIZED CALCIUM: 1.21 MMOL/L (ref 1.12–1.32)
POC TCO2: 32 MMOL/L (ref 24–29)
POTASSIUM BLD-SCNC: 3.7 MMOL/L (ref 3.5–4.9)
RBC # BLD AUTO: 4.39 X10(6)/MCL (ref 4.2–5.4)
SODIUM BLD-SCNC: 140 MMOL/L (ref 138–146)
WBC # SPEC AUTO: 7 X10(3)/MCL (ref 4.5–11.5)

## 2021-07-26 ENCOUNTER — HISTORICAL (OUTPATIENT)
Dept: ADMINISTRATIVE | Facility: HOSPITAL | Age: 71
End: 2021-07-26

## 2021-07-26 LAB
ABS NEUT (OLG): 2.88 X10(3)/MCL (ref 2.1–9.2)
ANION GAP SERPL CALC-SCNC: 20 MMOL/L
BASOPHILS # BLD AUTO: 0 X10(3)/MCL (ref 0–0.2)
BASOPHILS NFR BLD AUTO: 0.4 %
BUN SERPL-MCNC: 19 MG/DL (ref 8–26)
CANCER AG125 SERPL-ACNC: 8.2 UNIT/ML (ref 0–35)
CHLORIDE SERPL-SCNC: 97 MMOL/L (ref 98–109)
CREAT SERPL-MCNC: 0.8 MG/DL (ref 0.6–1.3)
EOSINOPHIL # BLD AUTO: 0.1 X10(3)/MCL (ref 0–0.9)
EOSINOPHIL NFR BLD AUTO: 1.1 %
ERYTHROCYTE [DISTWIDTH] IN BLOOD BY AUTOMATED COUNT: 13.3 % (ref 11.5–17)
GLUCOSE SERPL-MCNC: 89 MG/DL (ref 70–105)
HCT VFR BLD AUTO: 44.9 % (ref 37–47)
HCT VFR BLD CALC: 46 % (ref 38–51)
HGB BLD-MCNC: 14.7 GM/DL (ref 12–16)
HGB BLD-MCNC: 15.6 MG/DL (ref 12–17)
LYMPHOCYTES # BLD AUTO: 3.3 X10(3)/MCL (ref 0.6–4.6)
LYMPHOCYTES NFR BLD AUTO: 44.9 %
MCH RBC QN AUTO: 30.8 PG (ref 27–31)
MCHC RBC AUTO-ENTMCNC: 32.7 GM/DL (ref 33–36)
MCV RBC AUTO: 94.1 FL (ref 80–94)
MONOCYTES # BLD AUTO: 1 X10(3)/MCL (ref 0.1–1.3)
MONOCYTES NFR BLD AUTO: 14.1 %
NEUTROPHILS # BLD AUTO: 2.9 X10(3)/MCL (ref 2.1–9.2)
NEUTROPHILS NFR BLD AUTO: 39.5 %
PLATELET # BLD AUTO: 317 X10(3)/MCL (ref 130–400)
PMV BLD AUTO: 8.1 FL (ref 9.4–12.4)
POC IONIZED CALCIUM: 1.12 MMOL/L (ref 1.12–1.32)
POC TCO2: 27 MMOL/L (ref 24–29)
POTASSIUM BLD-SCNC: 3.7 MMOL/L (ref 3.5–4.9)
RBC # BLD AUTO: 4.77 X10(6)/MCL (ref 4.2–5.4)
SODIUM BLD-SCNC: 139 MMOL/L (ref 138–146)
WBC # SPEC AUTO: 7.3 X10(3)/MCL (ref 4.5–11.5)

## 2021-07-27 ENCOUNTER — HISTORICAL (OUTPATIENT)
Dept: INFUSION THERAPY | Facility: HOSPITAL | Age: 71
End: 2021-07-27

## 2021-08-17 ENCOUNTER — HISTORICAL (OUTPATIENT)
Dept: INFUSION THERAPY | Facility: HOSPITAL | Age: 71
End: 2021-08-17

## 2021-08-17 LAB
ABS NEUT (OLG): 4.81 X10(3)/MCL (ref 2.1–9.2)
ANION GAP SERPL CALC-SCNC: 19 MMOL/L
BASOPHILS # BLD AUTO: 0 X10(3)/MCL (ref 0–0.2)
BASOPHILS NFR BLD AUTO: 0.2 %
BUN SERPL-MCNC: 16 MG/DL (ref 8–26)
CANCER AG125 SERPL-ACNC: 8.9 UNIT/ML (ref 0–35)
CHLORIDE SERPL-SCNC: 96 MMOL/L (ref 98–109)
CREAT SERPL-MCNC: 0.8 MG/DL (ref 0.6–1.3)
EOSINOPHIL # BLD AUTO: 0.1 X10(3)/MCL (ref 0–0.9)
EOSINOPHIL NFR BLD AUTO: 0.9 %
ERYTHROCYTE [DISTWIDTH] IN BLOOD BY AUTOMATED COUNT: 14 % (ref 11.5–17)
GLUCOSE SERPL-MCNC: 106 MG/DL (ref 70–105)
HCT VFR BLD AUTO: 44.7 % (ref 37–47)
HCT VFR BLD CALC: 45 % (ref 38–51)
HGB BLD-MCNC: 14.2 GM/DL (ref 12–16)
HGB BLD-MCNC: 15.3 MG/DL (ref 12–17)
LYMPHOCYTES # BLD AUTO: 3 X10(3)/MCL (ref 0.6–4.6)
LYMPHOCYTES NFR BLD AUTO: 33.5 %
MCH RBC QN AUTO: 30.7 PG (ref 27–31)
MCHC RBC AUTO-ENTMCNC: 31.8 GM/DL (ref 33–36)
MCV RBC AUTO: 96.8 FL (ref 80–94)
MONOCYTES # BLD AUTO: 1 X10(3)/MCL (ref 0.1–1.3)
MONOCYTES NFR BLD AUTO: 11.3 %
NEUTROPHILS # BLD AUTO: 4.8 X10(3)/MCL (ref 2.1–9.2)
NEUTROPHILS NFR BLD AUTO: 54 %
PLATELET # BLD AUTO: 329 X10(3)/MCL (ref 130–400)
PMV BLD AUTO: 8.3 FL (ref 9.4–12.4)
POC IONIZED CALCIUM: 1.19 MMOL/L (ref 1.12–1.32)
POC TCO2: 29 MMOL/L (ref 24–29)
POTASSIUM BLD-SCNC: 3.9 MMOL/L (ref 3.5–4.9)
RBC # BLD AUTO: 4.62 X10(6)/MCL (ref 4.2–5.4)
SODIUM BLD-SCNC: 138 MMOL/L (ref 138–146)
WBC # SPEC AUTO: 8.9 X10(3)/MCL (ref 4.5–11.5)

## 2021-08-18 ENCOUNTER — HISTORICAL (OUTPATIENT)
Dept: RADIOLOGY | Facility: HOSPITAL | Age: 71
End: 2021-08-18

## 2021-09-02 ENCOUNTER — HISTORICAL (OUTPATIENT)
Dept: ADMINISTRATIVE | Facility: HOSPITAL | Age: 71
End: 2021-09-02

## 2021-09-02 LAB
ABS NEUT (OLG): 2.59 X10(3)/MCL (ref 2.1–9.2)
ANION GAP SERPL CALC-SCNC: 18 MMOL/L
BASOPHILS # BLD AUTO: 0 X10(3)/MCL (ref 0–0.2)
BASOPHILS NFR BLD AUTO: 0.3 %
BUN SERPL-MCNC: 21 MG/DL (ref 8–26)
CANCER AG125 SERPL-ACNC: 8.9 UNIT/ML (ref 0–35)
CHLORIDE SERPL-SCNC: 97 MMOL/L (ref 98–109)
CREAT SERPL-MCNC: 0.7 MG/DL (ref 0.6–1.3)
EOSINOPHIL # BLD AUTO: 0.2 X10(3)/MCL (ref 0–0.9)
EOSINOPHIL NFR BLD AUTO: 3 %
ERYTHROCYTE [DISTWIDTH] IN BLOOD BY AUTOMATED COUNT: 14.5 % (ref 11.5–17)
GLUCOSE SERPL-MCNC: 90 MG/DL (ref 70–105)
HCT VFR BLD AUTO: 44.4 % (ref 37–47)
HCT VFR BLD CALC: 45 % (ref 38–51)
HGB BLD-MCNC: 14.1 GM/DL (ref 12–16)
HGB BLD-MCNC: 15.3 MG/DL (ref 12–17)
LYMPHOCYTES # BLD AUTO: 2.9 X10(3)/MCL (ref 0.6–4.6)
LYMPHOCYTES NFR BLD AUTO: 43.1 %
MCH RBC QN AUTO: 30.5 PG (ref 27–31)
MCHC RBC AUTO-ENTMCNC: 31.8 GM/DL (ref 33–36)
MCV RBC AUTO: 95.9 FL (ref 80–94)
MONOCYTES # BLD AUTO: 1 X10(3)/MCL (ref 0.1–1.3)
MONOCYTES NFR BLD AUTO: 14.8 %
NEUTROPHILS # BLD AUTO: 2.6 X10(3)/MCL (ref 2.1–9.2)
NEUTROPHILS NFR BLD AUTO: 38.7 %
PLATELET # BLD AUTO: 350 X10(3)/MCL (ref 130–400)
PMV BLD AUTO: 8.2 FL (ref 9.4–12.4)
POC IONIZED CALCIUM: 1.2 MMOL/L (ref 1.12–1.32)
POC TCO2: 29 MMOL/L (ref 24–29)
POTASSIUM BLD-SCNC: 3.8 MMOL/L (ref 3.5–4.9)
RBC # BLD AUTO: 4.63 X10(6)/MCL (ref 4.2–5.4)
SODIUM BLD-SCNC: 139 MMOL/L (ref 138–146)
WBC # SPEC AUTO: 6.7 X10(3)/MCL (ref 4.5–11.5)

## 2021-09-07 ENCOUNTER — HISTORICAL (OUTPATIENT)
Dept: INFUSION THERAPY | Facility: HOSPITAL | Age: 71
End: 2021-09-07

## 2021-09-07 LAB
ABS NEUT (OLG): 1.97 X10(3)/MCL (ref 2.1–9.2)
ANION GAP SERPL CALC-SCNC: 16 MMOL/L
BASOPHILS # BLD AUTO: 0 X10(3)/MCL (ref 0–0.2)
BASOPHILS NFR BLD AUTO: 0.3 %
BUN SERPL-MCNC: 18 MG/DL (ref 8–26)
CANCER AG125 SERPL-ACNC: 9.1 UNIT/ML (ref 0–35)
CHLORIDE SERPL-SCNC: 97 MMOL/L (ref 98–109)
CREAT SERPL-MCNC: 0.9 MG/DL (ref 0.6–1.3)
EOSINOPHIL # BLD AUTO: 0.1 X10(3)/MCL (ref 0–0.9)
EOSINOPHIL NFR BLD AUTO: 1.7 %
EOSINOPHIL NFR BLD MANUAL: 1 % (ref 0–8)
ERYTHROCYTE [DISTWIDTH] IN BLOOD BY AUTOMATED COUNT: 14.7 % (ref 11.5–17)
GLUCOSE SERPL-MCNC: 90 MG/DL (ref 70–105)
HCT VFR BLD AUTO: 44.9 % (ref 37–47)
HCT VFR BLD CALC: 45 % (ref 38–51)
HGB BLD-MCNC: 14.1 GM/DL (ref 12–16)
HGB BLD-MCNC: 15.3 MG/DL (ref 12–17)
LYMPHOCYTES # BLD AUTO: 3.2 X10(3)/MCL (ref 0.6–4.6)
LYMPHOCYTES NFR BLD AUTO: 50.2 %
LYMPHOCYTES NFR BLD MANUAL: 1 /MCL
LYMPHOCYTES NFR BLD MANUAL: 48 % (ref 13–40)
MCH RBC QN AUTO: 30.7 PG (ref 27–31)
MCHC RBC AUTO-ENTMCNC: 31.4 GM/DL (ref 33–36)
MCV RBC AUTO: 97.8 FL (ref 80–94)
MONOCYTES # BLD AUTO: 1.1 X10(3)/MCL (ref 0.1–1.3)
MONOCYTES NFR BLD AUTO: 16.7 %
MONOCYTES NFR BLD MANUAL: 16 % (ref 2–11)
NEUTROPHILS # BLD AUTO: 2 X10(3)/MCL (ref 2.1–9.2)
NEUTROPHILS NFR BLD AUTO: 31.1 %
NEUTROPHILS NFR BLD MANUAL: 35 % (ref 47–80)
PLATELET # BLD AUTO: 339 X10(3)/MCL (ref 130–400)
PLATELET # BLD EST: NORMAL 10*3/UL
PMV BLD AUTO: 8.5 FL (ref 9.4–12.4)
POC IONIZED CALCIUM: 1.16 MMOL/L (ref 1.12–1.32)
POC TCO2: 32 MMOL/L (ref 24–29)
POTASSIUM BLD-SCNC: 4 MMOL/L (ref 3.5–4.9)
RBC # BLD AUTO: 4.59 X10(6)/MCL (ref 4.2–5.4)
RBC MORPH BLD: NORMAL
SODIUM BLD-SCNC: 140 MMOL/L (ref 138–146)
WBC # SPEC AUTO: 6.3 X10(3)/MCL (ref 4.5–11.5)

## 2021-09-28 ENCOUNTER — HISTORICAL (OUTPATIENT)
Dept: INFUSION THERAPY | Facility: HOSPITAL | Age: 71
End: 2021-09-28

## 2021-09-28 LAB
ABS NEUT (OLG): 2.88 X10(3)/MCL (ref 2.1–9.2)
ALBUMIN SERPL-MCNC: 3.8 GM/DL (ref 3.4–4.8)
ALBUMIN/GLOB SERPL: 1.2 RATIO (ref 1.1–2)
ALP SERPL-CCNC: 59 UNIT/L (ref 40–150)
ALT SERPL-CCNC: 20 UNIT/L (ref 0–55)
AST SERPL-CCNC: 32 UNIT/L (ref 5–34)
BASOPHILS # BLD AUTO: 0 X10(3)/MCL (ref 0–0.2)
BASOPHILS NFR BLD AUTO: 0.1 %
BILIRUB SERPL-MCNC: 1.6 MG/DL
BILIRUBIN DIRECT+TOT PNL SERPL-MCNC: 0.6 MG/DL (ref 0–0.5)
BILIRUBIN DIRECT+TOT PNL SERPL-MCNC: 1 MG/DL (ref 0–0.8)
BUN SERPL-MCNC: 19.8 MG/DL (ref 9.8–20.1)
CALCIUM SERPL-MCNC: 9.4 MG/DL (ref 8.4–10.2)
CHLORIDE SERPL-SCNC: 99 MMOL/L (ref 98–107)
CO2 SERPL-SCNC: 30 MMOL/L (ref 23–31)
CREAT SERPL-MCNC: 0.75 MG/DL (ref 0.55–1.02)
EOSINOPHIL # BLD AUTO: 0.2 X10(3)/MCL (ref 0–0.9)
EOSINOPHIL NFR BLD AUTO: 2.1 %
ERYTHROCYTE [DISTWIDTH] IN BLOOD BY AUTOMATED COUNT: 14.7 % (ref 11.5–17)
GLOBULIN SER-MCNC: 3.1 GM/DL (ref 2.4–3.5)
GLUCOSE SERPL-MCNC: 117 MG/DL (ref 82–115)
HCT VFR BLD AUTO: 42.1 % (ref 37–47)
HGB BLD-MCNC: 13.4 GM/DL (ref 12–16)
LYMPHOCYTES # BLD AUTO: 3.2 X10(3)/MCL (ref 0.6–4.6)
LYMPHOCYTES NFR BLD AUTO: 45.3 %
MCH RBC QN AUTO: 31.2 PG (ref 27–31)
MCHC RBC AUTO-ENTMCNC: 31.8 GM/DL (ref 33–36)
MCV RBC AUTO: 97.9 FL (ref 80–94)
MONOCYTES # BLD AUTO: 0.9 X10(3)/MCL (ref 0.1–1.3)
MONOCYTES NFR BLD AUTO: 12.3 %
NEUTROPHILS # BLD AUTO: 2.9 X10(3)/MCL (ref 2.1–9.2)
NEUTROPHILS NFR BLD AUTO: 40.1 %
PLATELET # BLD AUTO: 317 X10(3)/MCL (ref 130–400)
PMV BLD AUTO: 8.1 FL (ref 9.4–12.4)
POTASSIUM SERPL-SCNC: 4.1 MMOL/L (ref 3.5–5.1)
PROT SERPL-MCNC: 6.9 GM/DL (ref 5.8–7.6)
RBC # BLD AUTO: 4.3 X10(6)/MCL (ref 4.2–5.4)
SODIUM SERPL-SCNC: 139 MMOL/L (ref 136–145)
WBC # SPEC AUTO: 7.2 X10(3)/MCL (ref 4.5–11.5)

## 2021-10-18 ENCOUNTER — HISTORICAL (OUTPATIENT)
Dept: ADMINISTRATIVE | Facility: HOSPITAL | Age: 71
End: 2021-10-18

## 2021-10-18 LAB
ABS NEUT (OLG): 3.35 X10(3)/MCL (ref 2.1–9.2)
ANION GAP SERPL CALC-SCNC: 17 MMOL/L
ANISOCYTOSIS BLD QL SMEAR: 1
BASOPHILS # BLD AUTO: 0 X10(3)/MCL (ref 0–0.2)
BASOPHILS NFR BLD AUTO: 0.1 %
BASOPHILS NFR BLD MANUAL: 1 % (ref 0–2)
BUN SERPL-MCNC: 12 MG/DL (ref 8–26)
CANCER AG125 SERPL-ACNC: 10 UNIT/ML (ref 0–35)
CHLORIDE SERPL-SCNC: 95 MMOL/L (ref 98–109)
CREAT SERPL-MCNC: 0.8 MG/DL (ref 0.6–1.3)
EOSINOPHIL # BLD AUTO: 0 X10(3)/MCL (ref 0–0.9)
EOSINOPHIL NFR BLD AUTO: 0.6 %
EOSINOPHIL NFR BLD MANUAL: 1 % (ref 0–8)
ERYTHROCYTE [DISTWIDTH] IN BLOOD BY AUTOMATED COUNT: 15 % (ref 11.5–17)
GLUCOSE SERPL-MCNC: 90 MG/DL (ref 70–105)
HCT VFR BLD AUTO: 52 % (ref 37–47)
HCT VFR BLD CALC: 47 % (ref 38–51)
HGB BLD-MCNC: 16 GM/DL (ref 12–16)
HGB BLD-MCNC: 16 MG/DL (ref 12–17)
HYPOCHROMIA BLD QL SMEAR: 0
LYMPHOCYTES # BLD AUTO: 2.2 X10(3)/MCL (ref 0.6–4.6)
LYMPHOCYTES NFR BLD AUTO: 31.2 %
LYMPHOCYTES NFR BLD MANUAL: 14 % (ref 13–40)
MACROCYTES BLD QL SMEAR: 1
MCH RBC QN AUTO: 31 PG (ref 27–31)
MCHC RBC AUTO-ENTMCNC: 30.8 GM/DL (ref 33–36)
MCV RBC AUTO: 100.8 FL (ref 80–94)
MICROCYTES BLD QL SMEAR: 0
MONOCYTES # BLD AUTO: 1.4 X10(3)/MCL (ref 0.1–1.3)
MONOCYTES NFR BLD AUTO: 19.7 %
MONOCYTES NFR BLD MANUAL: 21 % (ref 2–11)
NEUTROPHILS # BLD AUTO: 3.4 X10(3)/MCL (ref 2.1–9.2)
NEUTROPHILS NFR BLD AUTO: 48.3 %
NEUTROPHILS NFR BLD MANUAL: 64 % (ref 47–80)
PLATELET # BLD AUTO: 256 X10(3)/MCL (ref 130–400)
PLATELET # BLD EST: NORMAL 10*3/UL
PMV BLD AUTO: 8.3 FL (ref 9.4–12.4)
POC IONIZED CALCIUM: 1.2 MMOL/L (ref 1.12–1.32)
POC TCO2: 29 MMOL/L (ref 24–29)
POIKILOCYTOSIS BLD QL SMEAR: 1
POLYCHROMASIA BLD QL SMEAR: 0
POTASSIUM BLD-SCNC: 3.8 MMOL/L (ref 3.5–4.9)
RBC # BLD AUTO: 5.16 X10(6)/MCL (ref 4.2–5.4)
RBC MORPH BLD: ABNORMAL
SCHISTOCYTES BLD QL AUTO: 0
SODIUM BLD-SCNC: 136 MMOL/L (ref 138–146)
SPHEROCYTES BLD QL SMEAR: 0
TARGETS BLD QL SMEAR: 0
WBC # SPEC AUTO: 7 X10(3)/MCL (ref 4.5–11.5)

## 2021-10-19 ENCOUNTER — HISTORICAL (OUTPATIENT)
Dept: INFUSION THERAPY | Facility: HOSPITAL | Age: 71
End: 2021-10-19

## 2021-11-09 ENCOUNTER — HISTORICAL (OUTPATIENT)
Dept: INFUSION THERAPY | Facility: HOSPITAL | Age: 71
End: 2021-11-09

## 2021-11-09 LAB
ABS NEUT (OLG): 3.39 X10(3)/MCL (ref 2.1–9.2)
ANION GAP SERPL CALC-SCNC: 18 MMOL/L
BASOPHILS # BLD AUTO: 0 X10(3)/MCL (ref 0–0.2)
BASOPHILS NFR BLD AUTO: 0.1 %
BUN SERPL-MCNC: 14 MG/DL (ref 8–26)
CHLORIDE SERPL-SCNC: 90 MMOL/L (ref 98–109)
CREAT SERPL-MCNC: 0.8 MG/DL (ref 0.6–1.3)
EOSINOPHIL # BLD AUTO: 0.1 X10(3)/MCL (ref 0–0.9)
EOSINOPHIL NFR BLD AUTO: 1.5 %
ERYTHROCYTE [DISTWIDTH] IN BLOOD BY AUTOMATED COUNT: 14.3 % (ref 11.5–17)
GLUCOSE SERPL-MCNC: 119 MG/DL (ref 70–105)
HCT VFR BLD AUTO: 43.7 % (ref 37–47)
HCT VFR BLD CALC: 46 % (ref 38–51)
HGB BLD-MCNC: 14.3 GM/DL (ref 12–16)
HGB BLD-MCNC: 15.6 MG/DL (ref 12–17)
LYMPHOCYTES # BLD AUTO: 2.8 X10(3)/MCL (ref 0.6–4.6)
LYMPHOCYTES NFR BLD AUTO: 39.2 %
MCH RBC QN AUTO: 31.3 PG (ref 27–31)
MCHC RBC AUTO-ENTMCNC: 32.7 GM/DL (ref 33–36)
MCV RBC AUTO: 95.6 FL (ref 80–94)
MONOCYTES # BLD AUTO: 0.9 X10(3)/MCL (ref 0.1–1.3)
MONOCYTES NFR BLD AUTO: 12.2 %
NEUTROPHILS # BLD AUTO: 3.4 X10(3)/MCL (ref 2.1–9.2)
NEUTROPHILS NFR BLD AUTO: 47 %
PLATELET # BLD AUTO: 317 X10(3)/MCL (ref 130–400)
PMV BLD AUTO: 8.3 FL (ref 9.4–12.4)
POC IONIZED CALCIUM: 1.07 MMOL/L (ref 1.12–1.32)
POC TCO2: 30 MMOL/L (ref 24–29)
POTASSIUM BLD-SCNC: 3.9 MMOL/L (ref 3.5–4.9)
RBC # BLD AUTO: 4.57 X10(6)/MCL (ref 4.2–5.4)
SODIUM BLD-SCNC: 132 MMOL/L (ref 138–146)
WBC # SPEC AUTO: 7.2 X10(3)/MCL (ref 4.5–11.5)

## 2021-11-29 ENCOUNTER — HISTORICAL (OUTPATIENT)
Dept: ADMINISTRATIVE | Facility: HOSPITAL | Age: 71
End: 2021-11-29

## 2021-11-29 LAB
ABS NEUT (OLG): 3.35 X10(3)/MCL (ref 2.1–9.2)
ANION GAP SERPL CALC-SCNC: 18 MMOL/L
BASOPHILS # BLD AUTO: 0 X10(3)/MCL (ref 0–0.2)
BASOPHILS NFR BLD AUTO: 0.3 %
BUN SERPL-MCNC: 18 MG/DL (ref 8–26)
CANCER AG125 SERPL-ACNC: 10.3 UNIT/ML (ref 0–35)
CHLORIDE SERPL-SCNC: 96 MMOL/L (ref 98–109)
CREAT SERPL-MCNC: 0.7 MG/DL (ref 0.6–1.3)
EOSINOPHIL # BLD AUTO: 0.2 X10(3)/MCL (ref 0–0.9)
EOSINOPHIL NFR BLD AUTO: 2.2 %
ERYTHROCYTE [DISTWIDTH] IN BLOOD BY AUTOMATED COUNT: 13.7 % (ref 11.5–17)
GLUCOSE SERPL-MCNC: 95 MG/DL (ref 70–105)
HCT VFR BLD AUTO: 44.7 % (ref 37–47)
HCT VFR BLD CALC: 45 % (ref 38–51)
HGB BLD-MCNC: 14.8 GM/DL (ref 12–16)
HGB BLD-MCNC: 15.3 MG/DL (ref 12–17)
LYMPHOCYTES # BLD AUTO: 3 X10(3)/MCL (ref 0.6–4.6)
LYMPHOCYTES NFR BLD AUTO: 39.3 %
MCH RBC QN AUTO: 31.2 PG (ref 27–31)
MCHC RBC AUTO-ENTMCNC: 33.1 GM/DL (ref 33–36)
MCV RBC AUTO: 94.3 FL (ref 80–94)
MONOCYTES # BLD AUTO: 1.2 X10(3)/MCL (ref 0.1–1.3)
MONOCYTES NFR BLD AUTO: 14.9 %
NEUTROPHILS # BLD AUTO: 3.4 X10(3)/MCL (ref 2.1–9.2)
NEUTROPHILS NFR BLD AUTO: 43.2 %
PLATELET # BLD AUTO: 334 X10(3)/MCL (ref 130–400)
PMV BLD AUTO: 8.3 FL (ref 9.4–12.4)
POC IONIZED CALCIUM: 1.14 MMOL/L (ref 1.12–1.32)
POC TCO2: 27 MMOL/L (ref 24–29)
POTASSIUM BLD-SCNC: 3.7 MMOL/L (ref 3.5–4.9)
RBC # BLD AUTO: 4.74 X10(6)/MCL (ref 4.2–5.4)
SODIUM BLD-SCNC: 137 MMOL/L (ref 138–146)
WBC # SPEC AUTO: 7.7 X10(3)/MCL (ref 4.5–11.5)

## 2021-11-30 ENCOUNTER — HISTORICAL (OUTPATIENT)
Dept: INFUSION THERAPY | Facility: HOSPITAL | Age: 71
End: 2021-11-30

## 2021-12-21 ENCOUNTER — HISTORICAL (OUTPATIENT)
Dept: INFUSION THERAPY | Facility: HOSPITAL | Age: 71
End: 2021-12-21

## 2021-12-21 LAB
ABS NEUT (OLG): 3.72 X10(3)/MCL (ref 2.1–9.2)
ANION GAP SERPL CALC-SCNC: 14 MMOL/L
BASOPHILS # BLD AUTO: 0 X10(3)/MCL (ref 0–0.2)
BASOPHILS NFR BLD AUTO: 0.2 %
BUN SERPL-MCNC: 15 MG/DL (ref 8–26)
CANCER AG125 SERPL-ACNC: 9.8 UNIT/ML (ref 0–35)
CHLORIDE SERPL-SCNC: 95 MMOL/L (ref 98–109)
CREAT SERPL-MCNC: 0.7 MG/DL (ref 0.6–1.3)
EOSINOPHIL # BLD AUTO: 0.1 X10(3)/MCL (ref 0–0.9)
EOSINOPHIL NFR BLD AUTO: 1.5 %
ERYTHROCYTE [DISTWIDTH] IN BLOOD BY AUTOMATED COUNT: 13.3 % (ref 11.5–17)
GLUCOSE SERPL-MCNC: 91 MG/DL (ref 70–105)
HCT VFR BLD AUTO: 44.2 % (ref 37–47)
HCT VFR BLD CALC: 46 % (ref 38–51)
HGB BLD-MCNC: 14.5 GM/DL (ref 12–16)
HGB BLD-MCNC: 15.6 MG/DL (ref 12–17)
LYMPHOCYTES # BLD AUTO: 3.2 X10(3)/MCL (ref 0.6–4.6)
LYMPHOCYTES NFR BLD AUTO: 39.4 %
MCH RBC QN AUTO: 31.2 PG (ref 27–31)
MCHC RBC AUTO-ENTMCNC: 32.8 GM/DL (ref 33–36)
MCV RBC AUTO: 95.1 FL (ref 80–94)
MONOCYTES # BLD AUTO: 1 X10(3)/MCL (ref 0.1–1.3)
MONOCYTES NFR BLD AUTO: 12.7 %
NEUTROPHILS # BLD AUTO: 3.7 X10(3)/MCL (ref 2.1–9.2)
NEUTROPHILS NFR BLD AUTO: 46.1 %
PLATELET # BLD AUTO: 322 X10(3)/MCL (ref 130–400)
PMV BLD AUTO: 8.3 FL (ref 9.4–12.4)
POC IONIZED CALCIUM: 1.15 MMOL/L (ref 1.12–1.32)
POC TCO2: 30 MMOL/L (ref 24–29)
POTASSIUM BLD-SCNC: 3.6 MMOL/L (ref 3.5–4.9)
RBC # BLD AUTO: 4.65 X10(6)/MCL (ref 4.2–5.4)
SODIUM BLD-SCNC: 135 MMOL/L (ref 138–146)
WBC # SPEC AUTO: 8.1 X10(3)/MCL (ref 4.5–11.5)

## 2022-01-11 ENCOUNTER — HISTORICAL (OUTPATIENT)
Dept: INFUSION THERAPY | Facility: HOSPITAL | Age: 72
End: 2022-01-11

## 2022-01-11 LAB
ABS NEUT (OLG): 3.47 X10(3)/MCL (ref 2.1–9.2)
ANION GAP SERPL CALC-SCNC: 13 MMOL/L
BASOPHILS # BLD AUTO: 0 X10(3)/MCL (ref 0–0.2)
BASOPHILS NFR BLD AUTO: 0.3 %
BUN SERPL-MCNC: 18 MG/DL (ref 8–26)
CANCER AG125 SERPL-ACNC: 10 UNIT/ML (ref 0–35)
CHLORIDE SERPL-SCNC: 96 MMOL/L (ref 98–109)
CREAT SERPL-MCNC: 0.7 MG/DL (ref 0.6–1.3)
EOSINOPHIL # BLD AUTO: 0.1 X10(3)/MCL (ref 0–0.9)
EOSINOPHIL NFR BLD AUTO: 1.5 %
ERYTHROCYTE [DISTWIDTH] IN BLOOD BY AUTOMATED COUNT: 13.8 % (ref 11.5–17)
EST CREAT CLEARANCE SER (OHS): 60.07 ML/MIN
GLUCOSE SERPL-MCNC: 79 MG/DL (ref 70–105)
HCT VFR BLD AUTO: 44.3 % (ref 37–47)
HCT VFR BLD CALC: 45 % (ref 38–51)
HGB BLD-MCNC: 14.3 GM/DL (ref 12–16)
HGB BLD-MCNC: 15.3 MG/DL (ref 12–17)
LYMPHOCYTES # BLD AUTO: 3.5 X10(3)/MCL (ref 0.6–4.6)
LYMPHOCYTES NFR BLD AUTO: 43.6 %
MCH RBC QN AUTO: 31 PG (ref 27–31)
MCHC RBC AUTO-ENTMCNC: 32.3 GM/DL (ref 33–36)
MCV RBC AUTO: 96.1 FL (ref 80–94)
MONOCYTES # BLD AUTO: 0.8 X10(3)/MCL (ref 0.1–1.3)
MONOCYTES NFR BLD AUTO: 10.7 %
NEUTROPHILS # BLD AUTO: 3.5 X10(3)/MCL (ref 2.1–9.2)
NEUTROPHILS NFR BLD AUTO: 43.8 %
PLATELET # BLD AUTO: 318 X10(3)/MCL (ref 130–400)
PMV BLD AUTO: 8.2 FL (ref 9.4–12.4)
POC IONIZED CALCIUM: 1.27 MMOL/L (ref 1.12–1.32)
POC TCO2: 34 MMOL/L (ref 24–29)
POTASSIUM BLD-SCNC: 3.7 MMOL/L (ref 3.5–4.9)
RBC # BLD AUTO: 4.61 X10(6)/MCL (ref 4.2–5.4)
SODIUM BLD-SCNC: 138 MMOL/L (ref 138–146)
WBC # SPEC AUTO: 7.9 X10(3)/MCL (ref 4.5–11.5)

## 2022-02-01 ENCOUNTER — HISTORICAL (OUTPATIENT)
Dept: INFUSION THERAPY | Facility: HOSPITAL | Age: 72
End: 2022-02-01

## 2022-02-01 LAB
ABS NEUT (OLG): 6 (ref 2.1–9.2)
ANION GAP SERPL CALC-SCNC: 16 MMOL/L
BASOPHILS # BLD AUTO: 0 10*3/UL (ref 0–0.2)
BASOPHILS NFR BLD AUTO: 0.2 %
BUN SERPL-MCNC: 15 MG/DL (ref 8–26)
CANCER AG125 SERPL-ACNC: 10.9 (ref 0–35)
CHLORIDE SERPL-SCNC: 94 MMOL/L (ref 98–109)
CREAT SERPL-MCNC: 0.7 MG/DL (ref 0.6–1.3)
EOSINOPHIL # BLD AUTO: 0 10*3/UL (ref 0–0.9)
EOSINOPHIL NFR BLD AUTO: 0.4 %
ERYTHROCYTE [DISTWIDTH] IN BLOOD BY AUTOMATED COUNT: 13.6 % (ref 11.5–17)
GLUCOSE SERPL-MCNC: 99 MG/DL (ref 70–105)
HCT VFR BLD AUTO: 45.5 % (ref 37–47)
HCT VFR BLD CALC: 47 % (ref 38–51)
HGB BLD-MCNC: 14.8 G/DL (ref 12–16)
HGB BLD-MCNC: 16 G/DL (ref 12–17)
LYMPHOCYTES # BLD AUTO: 2.3 10*3/UL (ref 0.6–4.6)
LYMPHOCYTES NFR BLD AUTO: 24.2 %
MANUAL DIFF? (OHS): NO
MCH RBC QN AUTO: 30.7 PG (ref 27–31)
MCHC RBC AUTO-ENTMCNC: 32.5 G/DL (ref 33–36)
MCV RBC AUTO: 94.4 FL (ref 80–94)
MONOCYTES # BLD AUTO: 1.1 10*3/UL (ref 0.1–1.3)
MONOCYTES NFR BLD AUTO: 11.5 %
NEUTROPHILS # BLD AUTO: 6 10*3/UL (ref 2.1–9.2)
NEUTROPHILS NFR BLD AUTO: 63.5 %
PLATELET # BLD AUTO: 332 10*3/UL (ref 130–400)
PMV BLD AUTO: 8.1 FL (ref 9.4–12.4)
POC IONIZED CALCIUM: 1.19 (ref 1.12–1.32)
POC TCO2: 29 (ref 24–29)
POTASSIUM BLD-SCNC: 3.9 MMOL/L (ref 3.5–4.9)
RBC # BLD AUTO: 4.82 10*6/UL (ref 4.2–5.4)
SODIUM BLD-SCNC: 134 MMOL/L (ref 138–146)
WBC # SPEC AUTO: 9.5 10*3/UL (ref 4.5–11.5)

## 2022-02-21 ENCOUNTER — HISTORICAL (OUTPATIENT)
Dept: ADMINISTRATIVE | Facility: HOSPITAL | Age: 72
End: 2022-02-21

## 2022-02-21 LAB
ABS NEUT (OLG): 4.43 (ref 2.1–9.2)
ANION GAP SERPL CALC-SCNC: 16 MMOL/L
BASOPHILS # BLD AUTO: 0 10*3/UL (ref 0–0.2)
BASOPHILS NFR BLD AUTO: 0.2 %
BUN SERPL-MCNC: 18 MG/DL (ref 8–26)
CANCER AG125 SERPL-ACNC: 10.2 (ref 0–35)
CHLORIDE SERPL-SCNC: 93 MMOL/L (ref 98–109)
CREAT SERPL-MCNC: 0.7 MG/DL (ref 0.6–1.3)
EOSINOPHIL # BLD AUTO: 0.1 10*3/UL (ref 0–0.9)
EOSINOPHIL NFR BLD AUTO: 1.4 %
ERYTHROCYTE [DISTWIDTH] IN BLOOD BY AUTOMATED COUNT: 13.8 % (ref 11.5–17)
GLUCOSE SERPL-MCNC: 122 MG/DL (ref 70–105)
HCT VFR BLD AUTO: 45.8 % (ref 37–47)
HCT VFR BLD CALC: 46 % (ref 38–51)
HGB BLD-MCNC: 14.7 G/DL (ref 12–16)
HGB BLD-MCNC: 15.6 G/DL (ref 12–17)
LYMPHOCYTES # BLD AUTO: 3.5 10*3/UL (ref 0.6–4.6)
LYMPHOCYTES NFR BLD AUTO: 38.7 %
MANUAL DIFF? (OHS): NO
MCH RBC QN AUTO: 30.5 PG (ref 27–31)
MCHC RBC AUTO-ENTMCNC: 32.1 G/DL (ref 33–36)
MCV RBC AUTO: 95 FL (ref 80–94)
MONOCYTES # BLD AUTO: 1 10*3/UL (ref 0.1–1.3)
MONOCYTES NFR BLD AUTO: 10.6 %
NEUTROPHILS # BLD AUTO: 4.4 10*3/UL (ref 2.1–9.2)
NEUTROPHILS NFR BLD AUTO: 49 %
PLATELET # BLD AUTO: 346 10*3/UL (ref 130–400)
PMV BLD AUTO: 8.2 FL (ref 9.4–12.4)
POC IONIZED CALCIUM: 1.1 (ref 1.12–1.32)
POC TCO2: 31 (ref 24–29)
POTASSIUM BLD-SCNC: 4.1 MMOL/L (ref 3.5–4.9)
RBC # BLD AUTO: 4.82 10*6/UL (ref 4.2–5.4)
SODIUM BLD-SCNC: 135 MMOL/L (ref 138–146)
WBC # SPEC AUTO: 9.1 10*3/UL (ref 4.5–11.5)

## 2022-02-22 ENCOUNTER — HISTORICAL (OUTPATIENT)
Dept: INFUSION THERAPY | Facility: HOSPITAL | Age: 72
End: 2022-02-22

## 2022-03-15 ENCOUNTER — HISTORICAL (OUTPATIENT)
Dept: INFUSION THERAPY | Facility: HOSPITAL | Age: 72
End: 2022-03-15

## 2022-03-15 LAB
ABS NEUT (OLG): 7.78 (ref 2.1–9.2)
ANION GAP SERPL CALC-SCNC: 17 MMOL/L
BASOPHILS # BLD AUTO: 0 10*3/UL (ref 0–0.2)
BASOPHILS NFR BLD AUTO: 0.1 %
BUN SERPL-MCNC: 14 MG/DL (ref 8–26)
CANCER AG125 SERPL-ACNC: 10.3 (ref 0–35)
CHLORIDE SERPL-SCNC: 92 MMOL/L (ref 98–109)
CREAT SERPL-MCNC: 0.6 MG/DL (ref 0.6–1.3)
EOSINOPHIL # BLD AUTO: 0.1 10*3/UL (ref 0–0.9)
EOSINOPHIL NFR BLD AUTO: 0.6 %
ERYTHROCYTE [DISTWIDTH] IN BLOOD BY AUTOMATED COUNT: 13.5 % (ref 11.5–17)
GLUCOSE SERPL-MCNC: 104 MG/DL (ref 70–105)
HCT VFR BLD AUTO: 42.1 % (ref 37–47)
HCT VFR BLD CALC: 43 % (ref 38–51)
HGB BLD-MCNC: 13.7 G/DL (ref 12–16)
HGB BLD-MCNC: 14.6 G/DL (ref 12–17)
LYMPHOCYTES # BLD AUTO: 3.2 10*3/UL (ref 0.6–4.6)
LYMPHOCYTES NFR BLD AUTO: 25.4 %
MANUAL DIFF? (OHS): NO
MCH RBC QN AUTO: 30.6 PG (ref 27–31)
MCHC RBC AUTO-ENTMCNC: 32.5 G/DL (ref 33–36)
MCV RBC AUTO: 94.2 FL (ref 80–94)
MONOCYTES # BLD AUTO: 1.5 10*3/UL (ref 0.1–1.3)
MONOCYTES NFR BLD AUTO: 12.1 %
NEUTROPHILS # BLD AUTO: 7.8 10*3/UL (ref 2.1–9.2)
NEUTROPHILS NFR BLD AUTO: 61.6 %
PLATELET # BLD AUTO: 319 10*3/UL (ref 130–400)
PMV BLD AUTO: 7.9 FL (ref 9.4–12.4)
POC IONIZED CALCIUM: 1.05 (ref 1.12–1.32)
POC TCO2: 30 (ref 24–29)
POTASSIUM BLD-SCNC: 3.9 MMOL/L (ref 3.5–4.9)
RBC # BLD AUTO: 4.47 10*6/UL (ref 4.2–5.4)
SODIUM BLD-SCNC: 134 MMOL/L (ref 138–146)
WBC # SPEC AUTO: 12.6 10*3/UL (ref 4.5–11.5)

## 2022-03-18 DIAGNOSIS — C56.9 MALIGNANT NEOPLASM OF OVARY, UNSPECIFIED LATERALITY: ICD-10-CM

## 2022-04-04 ENCOUNTER — HISTORICAL (OUTPATIENT)
Dept: HEMATOLOGY/ONCOLOGY | Facility: CLINIC | Age: 72
End: 2022-04-04

## 2022-04-04 LAB
ABS NEUT (OLG): 2.79 (ref 2.1–9.2)
ANION GAP SERPL CALC-SCNC: 16 MMOL/L
BASOPHILS # BLD AUTO: 0 10*3/UL (ref 0–0.2)
BASOPHILS NFR BLD AUTO: 0.1 %
BUN SERPL-MCNC: 16 MG/DL (ref 8–26)
CANCER AG125 SERPL-ACNC: 11.5 (ref 0–35)
CHLORIDE SERPL-SCNC: 96 MMOL/L (ref 98–109)
CREAT SERPL-MCNC: 0.7 MG/DL (ref 0.6–1.3)
EOSINOPHIL # BLD AUTO: 0.1 10*3/UL (ref 0–0.9)
EOSINOPHIL NFR BLD AUTO: 1.2 %
ERYTHROCYTE [DISTWIDTH] IN BLOOD BY AUTOMATED COUNT: 13.6 % (ref 11.5–17)
GLUCOSE SERPL-MCNC: 93 MG/DL (ref 70–105)
HCT VFR BLD AUTO: 44.2 % (ref 37–47)
HCT VFR BLD CALC: 38 % (ref 38–51)
HGB BLD-MCNC: 12.9 G/DL (ref 12–17)
HGB BLD-MCNC: 14.4 G/DL (ref 12–16)
LYMPHOCYTES # BLD AUTO: 3.1 10*3/UL (ref 0.6–4.6)
LYMPHOCYTES NFR BLD AUTO: 46 %
MANUAL DIFF? (OHS): NO
MCH RBC QN AUTO: 30.9 PG (ref 27–31)
MCHC RBC AUTO-ENTMCNC: 32.6 G/DL (ref 33–36)
MCV RBC AUTO: 94.8 FL (ref 80–94)
MONOCYTES # BLD AUTO: 0.8 10*3/UL (ref 0.1–1.3)
MONOCYTES NFR BLD AUTO: 11.6 %
NEUTROPHILS # BLD AUTO: 2.8 10*3/UL (ref 2.1–9.2)
NEUTROPHILS NFR BLD AUTO: 41 %
PLATELET # BLD AUTO: 362 10*3/UL (ref 130–400)
PMV BLD AUTO: 7.8 FL (ref 9.4–12.4)
POC IONIZED CALCIUM: 1.18 (ref 1.12–1.32)
POC TCO2: 30 (ref 24–29)
POTASSIUM BLD-SCNC: 3.8 MMOL/L (ref 3.5–4.9)
RBC # BLD AUTO: 4.66 10*6/UL (ref 4.2–5.4)
SODIUM BLD-SCNC: 137 MMOL/L (ref 138–146)
WBC # SPEC AUTO: 6.8 10*3/UL (ref 4.5–11.5)

## 2022-04-05 ENCOUNTER — HISTORICAL (OUTPATIENT)
Dept: INFUSION THERAPY | Facility: HOSPITAL | Age: 72
End: 2022-04-05

## 2022-04-26 ENCOUNTER — HISTORICAL (OUTPATIENT)
Dept: INFUSION THERAPY | Facility: HOSPITAL | Age: 72
End: 2022-04-26
Payer: MEDICARE

## 2022-04-26 LAB
ABS NEUT (OLG): 3.82 (ref 2.1–9.2)
ANION GAP SERPL CALC-SCNC: 17 MMOL/L
BASOPHILS # BLD AUTO: 0 10*3/UL (ref 0–0.2)
BASOPHILS NFR BLD AUTO: 0.1 %
BUN SERPL-MCNC: 14 MG/DL (ref 8–26)
CANCER AG125 SERPL-ACNC: 9.6 (ref 0–35)
CHLORIDE SERPL-SCNC: 94 MMOL/L (ref 98–109)
CREAT SERPL-MCNC: 0.7 MG/DL (ref 0.6–1.3)
EOSINOPHIL # BLD AUTO: 0.1 10*3/UL (ref 0–0.9)
EOSINOPHIL NFR BLD AUTO: 1 %
ERYTHROCYTE [DISTWIDTH] IN BLOOD BY AUTOMATED COUNT: 13.7 % (ref 11.5–17)
GLUCOSE SERPL-MCNC: 95 MG/DL (ref 70–105)
HCT VFR BLD AUTO: 43.6 % (ref 37–47)
HCT VFR BLD CALC: 43 % (ref 38–51)
HGB BLD-MCNC: 14 G/DL (ref 12–16)
HGB BLD-MCNC: 14.6 G/DL (ref 12–17)
LYMPHOCYTES # BLD AUTO: 3.3 10*3/UL (ref 0.6–4.6)
LYMPHOCYTES NFR BLD AUTO: 40.2 %
MANUAL DIFF? (OHS): NO
MCH RBC QN AUTO: 30.6 PG (ref 27–31)
MCHC RBC AUTO-ENTMCNC: 32.1 G/DL (ref 33–36)
MCV RBC AUTO: 95.2 FL (ref 80–94)
MONOCYTES # BLD AUTO: 1 10*3/UL (ref 0.1–1.3)
MONOCYTES NFR BLD AUTO: 12.5 %
NEUTROPHILS # BLD AUTO: 3.8 10*3/UL (ref 2.1–9.2)
NEUTROPHILS NFR BLD AUTO: 46.1 %
PLATELET # BLD AUTO: 337 10*3/UL (ref 130–400)
PMV BLD AUTO: 8.2 FL (ref 9.4–12.4)
POC IONIZED CALCIUM: 1.15 (ref 1.12–1.32)
POC TCO2: 28 (ref 24–29)
POTASSIUM BLD-SCNC: 3.6 MMOL/L (ref 3.5–4.9)
RBC # BLD AUTO: 4.58 10*6/UL (ref 4.2–5.4)
SODIUM BLD-SCNC: 134 MMOL/L (ref 138–146)
WBC # SPEC AUTO: 8.3 10*3/UL (ref 4.5–11.5)

## 2022-04-30 NOTE — PROGRESS NOTES
Patient:   Shayna Ledezma             MRN: [id number]            FIN: [id number]               Age:   68 years     Sex:  Female     :  1950   Associated Diagnoses:   None   Author:   Renetta Corral MD      Patient noted to have incidental small PE on CT scan done today.  I spoke with patient and she is not having any increasing SOB or chest pain.  She feels pretty good today.  I had patient come in to office.   Vital signs all good.  Start Eliquis 10mg PO BID X 7 days then change to 5mg PO BID.  She was given samples and started first dose in clinic today.  Prescription given to Mandie Caceres for insurance approval.  Will notify Dr. Dale Rosas will hold off on mediport for now in light of new PE diagnosis.    Renetta Corral MD

## 2022-04-30 NOTE — PROGRESS NOTES
Patient:   Shayna Ledezma            MRN: [id number]            FIN: [id number]               Age:   69 years     Sex:  Female     :  1950   Associated Diagnoses:   None   Author:   Renetta Corral MD       and platelets 54K.  Left message for patient to discontinue Zejula.  Will check labs weekly until recovery.  Plan to resume Zejula at lower dose 100mg daily once recovered.    Renetta Corral MD

## 2022-04-30 NOTE — PROGRESS NOTES
Patient:   Shayna Ledezma            MRN: [id number]            FIN: [id number]               Age:   68 years     Sex:  Female     :  1950   Associated Diagnoses:   None   Author:   Tom Carr      Received a call from Dr. Santana's office at [facility] in [place]. Plans are for surgery on Monday 3/18/19. Dr. Santana also requesting perioperative management of her Eliquis. According to UpToDate, the bleeding risk is determined primarily by the type of surgery. Guidelines recommend holding the Eliquis for 48 hours prior to the procedure and resuming 48-72 hours after surgery. In patients undergoing a very high bleeding risk procedure, a longer period of interruption may be warranted. Faxed over a copy of her most recent scan results to her office as well.   Will reschedule her follow-up appointment with Dr. Corral in 3 weeks or sooner if needed.     HORACIO Yates

## 2022-04-30 NOTE — PROGRESS NOTES
Patient:   Shyana Ledezma             MRN: [id number]            FIN: [id number]               Age:   68 years     Sex:  Female     :  1950   Associated Diagnoses:   Malignant neoplasm of left ovary   Author:   Tom Carr      Referring physician: Dr. Gamaliel Carlos  Reason for Referral: Ovarian Cancer  GYN ONC at [facility]: Dr. Mike Santana    Ovarian Cancer Stage IIIC vs. IV--diagnosed 18  Procedure/pathology: Paracentesis with removal of 5L of fluid done 18--serous carcinoma, high grade, c/w ovarian primary, PAX-8, CK7 and MOC-31 positive, CDX-2, CK-20 and Calretinin-negative.  Imagin. CT A/P w/ and w/o contrast done at [facility] 18--large volume ascites with multiple peritoneal implants, right pericolic gutter implant measures 2.5X3.3cm, LUQ omental/mesenteric implant measures 3X3.6cm, omental carcinomatosis, extensive enhancing soft tissue surrounding sigmoid colon vs. large sigmoid mass, left ovarian cystic lesion appears to have some internal septations measures 3.5X3.8cm, enhancing periportal soft tissue density likely lymphadenopathy with some narrowing of the portal vein noted, but without internal filling defect, borderline splenomegaly, subtle solid lesion w/n central spleen measures 2.4X2.5cm, likely metastatic, with peripheral area of diminished enhancement suggesting splenic infarct, soft tissue implant abutting gallbladder measures 2X2.2cm, no right adnexal mass, trace bilateral layering pleural fluid, small moderate-sized hiatal hernia, few borderline enlarged lymph nodes w/n right epicardial fat pad.  2. CT of chest on 19--Some prominent right cardiophrenic lymph nodes are nonspecific and can be followed on future surveillance scans. Otherwise no CT evidence of metastatic disease to the chest. While exam was not tailored for evaluation of the pulmonary arteries I suspect there is pulmonary thromboembolic disease. Peritoneal carcinomatosis  seen in the upper abdomen. Critical Result: Pulmonary Embolus.    Procedures:   Paracentesis on 12/28/18 with removal of 5 Liters.  Paracentesis on 01/07/19 with removal of 3.5 Liters.    :  12/19/18 1102  01/15/19 666    Treatment Plan:  1. Neoadjuvant Carboplatin/Taxol every 3 weeks x 3 cycles then repeat scans. Possible debulking surgery at that time.   Started on 1/15/19. Due for Cycle # 2 on 2/5/19. Neulasta on hold.     2. Eliquis BID for incidental PE on imaging.       Chief Complaint   1/22/2019 13:17 CST      no bowel movement x2 weeks      Interval History   Current complaint: The patient presents to clinic for scheduled treatment visit for her ovarian cancer. She was seen by Dr. Santana at [facility] on 1/14/19 who recommended 3 cycles of Carbo/Taxol then repeat imaging with possible debulking procedure. She was started on her 1st cycle on 1/15/19. She tolerated very well. Mediport was cancelled due to incidental finding of a PE on scans. No problems with peripheral IV during treatment. Her only complaint today is that she has not had a bowel movement in 2 weeks. States she has had leakage of mucous daily but no feces. She mentions her abdomen is getting more distended since her most recent paracentesis on 1/7/19 (3.5 Liters removed) but she does not feel bloated or uncomfortable. She does not have any worsening SOB at this time. She does have a 6lb weight gain since her last appointment. Denies any nausea or vomiting. Admits to decreased oral intake as well. No other problems reported.      Review of Systems   Constitutional:  Fatigue, Loss of appetite, Weight gain, No fever, No chills, No weakness.    Eye:  No recent visual problem.    Ear/Nose/Mouth/Throat:  No decreased hearing, No nasal congestion, No sore throat.    Respiratory:  No shortness of breath, No cough, No wheezing.    Cardiovascular:  No chest pain, No palpitations, No peripheral edema.    Gastrointestinal:  Constipation,  increasing abdominal distension, No nausea, No vomiting, No diarrhea.         Abdominal pain: Left, Lower quadrant.    Hematology/Lymphatics:  No bruising tendency, No bleeding tendency.    Immunologic:  Chemotherapy.    Musculoskeletal:  No back pain, No joint pain.    Integumentary:  No rash, No skin lesion.    Neurologic:  Alert and oriented X4, No confusion, No headache.    Psychiatric:  No anxiety, No depression.    All other systems are negative      Health Status   Allergies:    Allergic Reactions (Selected)  Severity Not Documented  Codeine- Itching.  Percocet 10/325- Unknown.  Percodan- Unknown.   Current medications:  (Selected)   Outpatient Medications  Ordered  Heparin Flush 100 U/mL - 5 mL: 500 units, form: Injection, IV Push, Once-chemo, first dose 01/15/19 13:33:00 CST, stop date 01/15/19 13:33:00 CST, Routine, Days 1  Documented Medications  Documented  ELIQUIS 5 MG TABS: Oral, BID  Vitamin B Complex oral capsule: 1 tab(s), Oral, Daily, 0 Refill(s)  Vitamin B6 250 mg oral capsule: 1, Oral, Daily, 0 Refill(s)  Zofran 8 mg oral tablet: See Instructions, PRN PRN as needed for nausea/vomiting, 1 tab(s) Oral TID for 3 days after chemotherapy and q8hr, # 30, 4 Refill(s)  promethazine 25 mg oral tablet: 25 mg = 1 tab(s), Oral, q4hr, PRN PRN as needed for nausea/vomiting, # 30 tab(s), 1 Refill(s)      Histories   Past Medical History:    No active or resolved past medical history items have been selected or recorded.   Family History:    COPD (chronic obstructive pulmonary disease).  Brother     Procedure history:    Endometrial polyp removal on 2018 at 67 Years.  Tonsillectomy.  Breast biopsy.  .   Social History        Social & Psychosocial Habits    Alcohol    Comment: Denies alcohol use. 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        Physical Examination   Vital Signs   1/22/2019 13:17 CST      Temperature Oral          36.9 DegC                             Temperature Oral (calculated)             98.42 DegF                             Peripheral Pulse Rate     112 bpm  HI                             SpO2                      94 %                             Systolic Blood Pressure   118 mmHg                             Diastolic Blood Pressure  89 mmHg     General:  Alert and oriented, No acute distress, pleasant white female.    Eye:  Normal conjunctiva, Vision unchanged.    HENT:  Normocephalic, Normal hearing, Oral mucosa is moist.    Neck:  Supple, No jugular venous distention, No lymphadenopathy, No thyromegaly.    Respiratory:  Respirations are non-labored, Breath sounds are equal, decreased breath sounds in bases.    Cardiovascular:  Normal rate, Regular rhythm, No murmur, No gallop, Normal peripheral perfusion, Trace edema to bilateral lower extremities.    Gastrointestinal:  Soft, Non-tender, Normal bowel sounds, No organomegaly, +moderate abdominal distention, No masses, no tenderness.    Lymphatics:  No lymphadenopathy neck, axilla, groin.    Musculoskeletal:  Normal range of motion, Normal strength, No deformity, Normal gait.    Integumentary:  Warm, Intact.    Neurologic:  Alert, Oriented, Normal sensory, Normal motor function.    Psychiatric:  Cooperative, Appropriate mood & affect.    Cognition and Speech:  Oriented, Speech clear and coherent.    ECOG Performance Scale: 1- Strenuous physical activity restricted; fully ambulatory and able to carry out light work.      Review / Management   Results review:  Lab results   1/22/2019 13:00 CST      Sodium  Lvl                139 mmol/L                             Potassium Lvl             3.9 mmol/L                             Chloride                  101 mmol/L                             CO2                       29.0 mmol/L                             Calcium Lvl               8.9 mg/dL                             Glucose Lvl               126 mg/dL  HI                             BUN                       9.0 mg/dL                             Creatinine                0.73 mg/dL                             eGFR-AA                   >60 mL/min/1.73 m2  NA                             eGFR-SERA                  >60 mL/min/1.73 m2  NA                             Bili Total                0.5 mg/dL                             Bili Direct               0.20 mg/dL                             Bili Indirect             0.30 mg/dL                             AST                       54 unit/L  HI                             ALT                       31 unit/L                             Alk Phos                  99 unit/L                             Total Protein             6.9 gm/dL                             Albumin Lvl               2.90 gm/dL  LOW                             Globulin                  4.00 gm/dL  HI                             A/G Ratio                 0.7  NA    1/22/2019 12:54 CST      WBC                       3.1 x10(3)/mcL  LOW                             RBC                       4.41 x10(6)/mcL                             Hgb                       11.4 gm/dL  LOW                             Hct                       37.6 %                             Platelet                  259 x10(3)/mcL                             MCV                       85.3 fL                             MCH                       25.9 pg  LOW                             MCHC                      30.3 gm/dL  LOW                             RDW                       13.9 %                             MPV                        9.2 fL  LOW                             Abs Neut                  2.22 x10(3)/mcL                             NEUT%                     71.4 %  NA                             NEUT#                     2.2 x10(3)/mcL                             LYMPH%                    23.8 %  NA                             LYMPH#                    0.7 x10(3)/mcL                             MONO%                     2.6 %  NA                             MONO#                     0.1 x10(3)/mcL                             EOS%                      1.6 %  NA                             EOS#                      0.0 x10(3)/mcL                             BASO%                     0.3 %  NA                             BASO#                     0.0 x10(3)/mcL  .       Impression and Plan   Diagnosis     Malignant neoplasm of left ovary ([id number] C56.2).     Plan:  Patient with left ovarian cancer diagnosed 12/26/18, appears to be stage IIIC vs. IV with diffuse peritoneal disease, possible splenic involvement, epicardial lymph nodes and bilateral pleural effusions.  Patient seen and evaluated by Dr. Mike Santana at [facility] in [place].   Treatment recommended upfront with chemotherapy Carboplatin/Paclitaxel x 3 cycles followed by repeat scans (likely will plan PET) then debulking surgery.     Started Cycle # 1 of Carbo/Taxol on 1/15/19 and tolerated fairly well.   Labs today show mild anemia and leukopenia but ANC is 2200. Labs are otherwise good. Neulasta on hold.   Her only complaint today is severe constipation. States she has not had a decent BM in over 2 weeks. Daily leakage of clear, mucous like drainage. Not taking any laxatives or stools softeners at this time.   Due to her symptoms, abdominal KUB was ordered with results negative for acute findings. This was discussed with patient and she was told to take Magnesium Citrate x 1 bottle now and start Miralax daily. If no BM by Thursday she was told to contact the  clinic again.   She has increasing abdominal distenstion as well but not too bothersome at this time. No worsening of her SOB. Most recent paracentesis was on 1/7/19 with removal of 3.5 Liters of fluid.   CT of chest for complete staging showed no evdience of metastatic disease but an incidental finding of PE. Started on Eliquis and now on the 5mg BID dosing.   Mediport has been put on hold due to starting Eliquis. No problems tolerating treatment peripherally thus far so will continue.   Continue weekly labs.   RTC for labs and treatment only on 2/5/19 for Cycle # 2.   Follow-up in 3 weeks with labs.   Genetic testing appointment on 2/19/19.     All questions answered at this time.      HORACIO Yates

## 2022-04-30 NOTE — PROGRESS NOTES
Patient:   Shayna Ledezma            MRN: [id number]            FIN: [id number]               Age:   69 years     Sex:  Female     :  1950   Associated Diagnoses:   None   Author:   Renetta Corral MD      Labs improved but ANC still slightly low.  Will hold off on restarting medication until after her visit on 20.  Plan is to resume Zejula at the 100mg daily dose.  Patient reports that her BP is still slightly elevated but improving.  Plan to monitor for now.   She will likely have to start BP medication if BP goes back up when the Zejula is restarted.    Renetta Corral MD

## 2022-04-30 NOTE — ED PROVIDER NOTES
Patient:   Shayna Ledezma            MRN: [id number]            FIN: [id number]               Age:   70 years     Sex:  Female     :  1950   Associated Diagnoses:   Brain TIA   Author:   Tho Barrios MD      Basic Information   Time seen: Date & time 2021 15:26:00.   History source: Patient.   Arrival mode: Private vehicle, walking.   History limitation: None.      History of Present Illness   The patient presents with Patient states that today she has slurred speech and difficulty speaking today. patient states symptoms have resolved. states that she is currently on chemo for tx. 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  and Patient is a 70-year-old female presenting with complaint of acute onset of expressive aphasia at around 2 PM this afternoon.  Patient states symptoms lasted 20-30 minutes.  Patient is on Eliquis 5 mg by mouth twice a day secondary to history of pulmonary embolus in .  Patient does have a history of ovarian cancer diagnosed in 2019.  She states she is currently on Avastin.  Patient denies any complaints at this time.  Patient has no primary care physician.  Patient denies headache or chest pain.  No nausea vomiting..  The character of symptoms is no weakness.  The degree at onset was moderate.  The degree at maximum was moderate.  The degree at present is none.  Associated symptoms: none.        Review of Systems   Constitutional symptoms:  No fever, no chills, no sweats, no weakness, no fatigue, no decreased activity.    Skin symptoms:  No jaundice, no rash, no pruritus, no abrasions, no breakdown, no burns, no dryness, no petechiae, no lesion.    Eye symptoms:  Vision unchanged, no pain, no blurred vision.    ENMT symptoms:  No ear pain, no sore throat, no nasal congestion, no sinus pain.    Respiratory symptoms:  No shortness of breath, no orthopnea, no cough, no hemoptysis, no stridor, no wheezing.    Cardiovascular symptoms:  No  chest pain, no palpitations, no tachycardia, no syncope, no diaphoresis, no peripheral edema.    Gastrointestinal symptoms:  No abdominal pain, no nausea, no vomiting, no diarrhea, no constipation, no rectal bleeding, no rectal pain.    Genitourinary symptoms:  No dysuria, no hematuria.    Musculoskeletal symptoms:  No back pain, no Muscle pain, no Joint pain.    Neurologic symptoms:  No headache, no dizziness, no altered level of consciousness, no numbness, no tingling, no weakness.              Additional review of systems information: All systems reviewed as documented in chart.      Health Status   Allergies:    Allergic Reactions (Selected)  Severity Not Documented  Codeine- Itching.  Percocet 10/325- Unknown.  Percodan- Unknown.  ,    Allergies (3) Active Reaction  codeine itching  Percocet 10/325 unknown  Percodan unknown    .   Medications:  (Selected)   Inpatient Medications  Ordered  Heparin Flush 100 U/mL - 5 mL: 500 units, form: Injection, IV Push, Once-chemo, first dose 04/09/19 13:05:00 CDT, stop date 04/09/19 13:05:00 CDT, Routine, Days 1  Future  Benadryl 50mg/ml Inj: 25 mg, form: Infusion, IV Piggyback, Once-chemo, Infuse over: 20 minute(s), first dose 07/06/21 14:00:00 CDT, stop date 07/06/21 14:00:00 CDT, Routine, Days 1, Future Order  Benadryl 50mg/ml Inj: 25 mg, form: Infusion, IV Piggyback, Once-chemo, Infuse over: 20 minute(s), first dose 07/27/21 14:00:00 CDT, stop date 07/27/21 14:00:00 CDT, Routine, Days 1, Future Order  Zirabev (for IVPB): 975 mg, form: Soln, IV Piggyback, Once-chemo, first dose 07/06/21 14:00:00 CDT, stop date 07/06/21 14:00:00 CDT, Future Order, Days 1  Zirabev (for IVPB): 975 mg, form: Soln, IV Piggyback, Once-chemo, first dose 07/27/21 14:00:00 CDT, stop date 07/27/21 14:00:00 CDT, Future Order, Days 1  acetaminophen: 650 mg, form: Tab, Oral, Once-chemo, first dose 07/06/21 14:00:00 CDT, stop date 07/06/21 14:00:00 CDT, Routine, Days 1, Future Order  acetaminophen: 650  mg, form: Tab, Oral, Once-chemo, first dose 21 14:00:00 CDT, stop date 21 14:00:00 CDT, Routine, Days 1, Future Order  Prescriptions  Prescribed  Compazine 5 mg tab: See Instructions, TAKE 1 TABLET BY MOUTH EVERY 6 HOURS AS NEEDED FOR NAUSEA AND VOMITING, # 30 tab(s), 1 Refill(s), Pharmacy: [id number], 157.5, cm, Height/Length Dosing, 12/15/20 10:49:00 CST, 65.4, kg, Weight Dosing, 12/15/20 10:49:...  Eliquis 5 mg oral tablet: See Instructions, TAKE 1 TABLET BY MOUTH TWICE DAILY, # 180 tab(s), 1 Refill(s), Pharmacy: [facility] #[id number], 157, cm, Height/Length Dosing, 21 10:18:00 CDT, 65.1, kg, Weight Dosing, 21 10:18:00 CDT  hydrochlorothiazide 12.5 mg oral tablet: 12.5 mg = 1 tab(s), Oral, Daily, # 30 tab(s), 3 Refill(s), Pharmacy: [facility] #[id number], 157.5, cm, Height/Length Dosing, 12/15/20 10:49:00 CST, 65.4, kg, Weight Dosing, 12/15/20 10:49:00 CST  Documented Medications  Documented  Vitamin B Complex oral capsule: 1 tab(s), Oral, Daily, 0 Refill(s)  Vitamin B6 250 mg oral capsule: tab 1, Oral, Daily, 0 Refill(s)  Zofran 8 mg oral tablet: 8 mg = 1 tab(s), Oral, q8hr, PRN PRN as needed for nausea/vomiting, # 30, 4 Refill(s)  azithromycin 250 mg oral tablet: Oral  glutamine oral powder for reconstitution: 10 gms, Oral, BID, 0 Refill(s)  polyethylene glycol 3350 ( MiraLax ): 17 gm, Oral, Daily, PRN PRN constipation, dissolve in water before taking, 0 Refill(s)  .      Past Medical/ Family/ Social History   Medical history:    No active or resolved past medical history items have been selected or recorded.  .   Surgical history:    Endometrial polyp removal on 2018 at 67 Years.  Tonsillectomy.  Breast biopsy.  .  .   Family history:    COPD (chronic obstructive pulmonary disease).  Brother    .   Social history: Not significant.      Physical Examination               Vital Signs   Vital Signs   2021 16:21 CDT      Peripheral Pulse Rate      75 bpm                             SpO2                      99 %                             Oxygen Therapy            Room air                             Systolic Blood Pressure   147 mmHg  HI                             Diastolic Blood Pressure  118 mmHg  HI                             Mean Arterial Pressure, Cuff              128 mmHg    6/17/2021 15:25 CDT      Temperature Oral          36.6 DegC                             Temperature Oral (calculated)             97.88 DegF                             Peripheral Pulse Rate     95 bpm                             Respiratory Rate          18 br/min                             SpO2                      99 %                             Oxygen Therapy            Room air                             Systolic Blood Pressure   175 mmHg  HI                             Diastolic Blood Pressure  96 mmHg  HI  .   Oxygen saturation.   General:  Alert, no acute distress.    Skin:  Warm, dry, no rash.    Head:  Normocephalic, atraumatic.    Neck:  Supple, trachea midline, no tenderness.    Eye:  Vision grossly normal.   Ears, nose, mouth and throat:  Oral mucosa moist.   Respiratory:  Lungs are clear to auscultation, respirations are non-labored, breath sounds are equal, Symmetrical chest wall expansion.    Cardiovascular:  Regular rate and rhythm, No murmur, Normal peripheral perfusion.    Gastrointestinal:  Soft, Nontender, Non distended, Normal bowel sounds, No organomegaly.    Musculoskeletal:  Normal ROM, normal strength, no tenderness, no swelling, no deformity.    Neurological:  Alert and oriented to person, place, time, and situation, No focal neurological deficit observed, normal sensory observed, normal motor observed, normal speech observed, No aphasia currently.    Psychiatric:  Cooperative, appropriate mood & affect.       Medical Decision Making   Differential Diagnosis:  Non-hemorrhagic cerebral vascular accident, hemorrhagic cerebral vascular accident,  transient ischemic attack, hypoglycemia.    Electrocardiogram:  Time 6/17/2021 17:50:00, rate 61, normal sinus rhythm, No ST-T changes, no ectopy, normal FL & QRS intervals, EP Interp.    Results review:  Lab results : Lab View   6/17/2021 16:28 CDT      Est Creat Clearance Ser   55.41 mL/min    6/17/2021 15:43 CDT      Sodium Lvl                137 mmol/L                             Potassium Lvl             4.2 mmol/L                             Chloride                  97 mmol/L  LOW                             CO2                       29 mmol/L                             Calcium Lvl               10.2 mg/dL                             Glucose Lvl               113 mg/dL                             BUN                       9.0 mg/dL  LOW                             Creatinine                0.77 mg/dL                             eGFR-AA                   >60  NA                             eGFR-SERA                  >60 mL/min/1.73 m2  NA                             Bili Total                1.0 mg/dL                             Bili Direct               0.3 mg/dL                             Bili Indirect             0.70 mg/dL                             AST                       30 unit/L                             ALT                       19 unit/L                             Alk Phos                  57 unit/L                             Total Protein             7.8 gm/dL  HI                             Albumin Lvl               3.9 gm/dL                             Globulin                  3.9 gm/dL  HI                             A/G Ratio                 1.0 ratio  LOW                             WBC                       7.5 x10(3)/mcL                             RBC                       4.64 x10(6)/mcL                             Hgb                       14.7 gm/dL                             Hct                       45.4 %                             Platelet                  380 x10(3)/mcL                              MCV                       97.8 fL  HI                             MCH                       31.7 pg  HI                             MCHC                      32.4 gm/dL  LOW                             RDW                       13.6 %                             MPV                       8.7 fL  LOW                             Abs Neut                  3.15 x10(3)/mcL                             Neut Man                  38 %  LOW                             Lymph Man                 48 %  HI                             Monocyte Man              13 %  HI                             Eos Man                   1 %                             NRBC Man                  1 %  NA                             Platelet Est              Normal                             Anisocyte                 1  HI                             Microcyte                 1  HI                             Macrocyte                 1 /mcL  HI                             RBC Morph                 Abnormal    .   Radiology results:  Rad Results (ST)  < 12 hrs   Accession: [id number]  Order: CT Head W/O Contrast  Report Dt/Tm: 06/17/2021 15:43  Report:   CT HEAD NONCONTRAST 6/17/2021     INDICATION: Neuro deficit, acute, stroke suspected.     TECHNIQUE: Multiple axial CT images were obtained from the cranial  vertex through the skull base without the administration of  intravenous contrast and reformatted in the coronal and sagittal  planes. Total  mGy*cm. Automated exposure control was utilized  for this examination as a radiation dose lowering technique.     COMPARISON: None available.        FINDINGS:  No acute hyperdense intracranial hemorrhage or abnormal  intra-axial/extra-axial fluid collections. No focally hyperdense  intracranial vasculature, intracranial mass effect, or midline shift.  Mild right cerebellar tonsillar ectopia without brianne Chiari  malformation. No sulcal effacement or focal loss of  gray-white  differentiation. Age-related global cortical involutional changes are  noted, with associated expansion of the ventricular system and  subarachnoid spaces to a degree that is commensurate with the level of  sulcal prominence.     Periventricular white matter hypodensity is noted that is nonspecific,  but most likely to represent chronic microvascular white matter  ischemic changes. The ventricular system is otherwise normal in size  and configuration. The basal cisterns are clear. Mild calcific  atherosclerosis of the bilateral carotid siphons and intracranial  vertebral artery segments.     No acute fractures are identified in the calvarium or imaged facial  bones. The imaged paranasal sinuses, mastoid air cells, and tympanic  spaces are clear. Left isis bullosa.        IMPRESSION:  No acute intracranial abnormalities. Mild age-related global cortical  involutional changes and chronic microvascular white matter ischemic  changes.        .      Reexamination/ Reevaluation   Time: 6/17/2021 17:06:00 .   Vital signs   results included from flowsheet : Vital Signs   6/17/2021 16:21 CDT      Peripheral Pulse Rate     75 bpm                             SpO2                      99 %                             Oxygen Therapy            Room air                             Systolic Blood Pressure   147 mmHg  HI                             Diastolic Blood Pressure  118 mmHg  HI                             Mean Arterial Pressure, Cuff              128 mmHg    6/17/2021 15:25 CDT      Temperature Oral          36.6 DegC                             Temperature Oral (calculated)             97.88 DegF                             Peripheral Pulse Rate     95 bpm                             Respiratory Rate          18 br/min                             SpO2                      99 %                             Oxygen Therapy            Room air                             Systolic Blood Pressure   175 mmHg  HI                              Diastolic Blood Pressure  96 mmHg  HI     Course: well controlled.      Impression and Plan   Diagnosis   Brain TIA ([id number]-GO G45.9)      Calls-Consults   -  6/17/2021 17:49:00 , Ashish CRESPO.    Plan   Condition: Stable.    Disposition: Admit time  6/17/2021 17:49:00, Admit to Inpatient Unit.    Counseled: Patient, Family, Regarding diagnosis, Regarding diagnostic results, Regarding treatment plan, Patient indicated understanding of instructions.

## 2022-04-30 NOTE — PROGRESS NOTES
Patient:   Shayna Ledezma            MRN: [id number]            FIN: [id number]               Age:   70 years     Sex:  Female     :  1950   Associated Diagnoses:   None   Author:   Renetta Corral MD      CT C/A/P shows RLL lung mass 3.3cm, likely metastasis from ovarian primary.  Case discussed with Dr. Mike Santana.  Will obtain PET/CT.  If no other disease, may consider referral to CT surgery for surgical resection to Dr. Terrell.  Patient notified of plan.  Will have her follow-up in clinic after PET.    Renetta Corral MD

## 2022-04-30 NOTE — PROGRESS NOTES
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 Gamaliel Carlos  GYN ONC at [facility]: Dr. 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 Paracentesis with removal of 5L of fluid done 18--serous carcinoma, high grade, c/w ovarian primary, PAX-8, CK7 and MOC-31 positive, CDX-2, CK-20 and Calretinin-negative.  2. S/p Exploratory Lap, radical tumor debulking including total abdominal hysterectomy, bilateral salpingo-oophorectomy, bilateral pelvic lymph node sampling, appendectomy, mobilization of the left ureter, complete gross resection of the disease with removal of mesenteric disease, as well as omental disese and parasplenic disease by Dr. Santana 3/18/19--Metastatic high grade serous carcinoma. Tissue/organ involvement: right ovary, appendectomy, omentum, mesentery (including small bowel mesentery) and multiple other sites designated: periumbilical, perisplenic, transverse colon, splenic flexure, perirectal, left periureteral. 2 lymph nodes negative. [id number]. FIGO stage IIIC. [employer] somatic instability testing positive for genomic instability, negative BRCA1 and BRCA2 tumor testing.  3. Splenectomy done 10/21/19--splenic involvement with metastatic high grade serous carcinoma, 5 benign lymph nodes. Caris testing showed LAM, NTRK1/2/3 negative, TMB low, BRCA1/2 negative, ER/MO negative, CATHY negative, SPEN pathogenic variant Exon 11, TP53 pathogenic variant Exon 5, PD-L1 positive CPS 15, Genomic ELODIA high.  Imagin. CT A/P w/ and w/o contrast done at [facility] 18--large  volume ascites with multiple peritoneal implants, right pericolic gutter implant measures 2.5X3.3cm, LUQ omental/mesenteric implant measures 3X3.6cm, omental carcinomatosis, extensive enhancing soft tissue surrounding sigmoid colon vs. large sigmoid mass, left ovarian cystic lesion appears to have some internal septations measures 3.5X3.8cm, enhancing periportal soft tissue density likely lymphadenopathy with some narrowing of the portal vein noted, but without internal filling defect, borderline splenomegaly, subtle solid lesion w/n central spleen measures 2.4X2.5cm, likely metastatic, with peripheral area of diminished enhancement suggesting splenic infarct, soft tissue implant abutting gallbladder measures 2X2.2cm, no right adnexal mass, trace bilateral layering pleural fluid, small moderate-sized hiatal hernia, few borderline enlarged lymph nodes w/n right epicardial fat pad.  2. CT of chest on 1/11/19--Some prominent right cardiophrenic lymph nodes are nonspecific and can be followed on future surveillance scans. Otherwise no CT evidence of metastatic disease to the chest. While exam was not tailored for evaluation of the pulmonary arteries I suspect there is pulmonary thromboembolic disease. Peritoneal carcinomatosis seen in the upper abdomen. Critical Result: Pulmonary Embolus.  3. CT C/A/P 3/4/19--Positive response to therapy. No new or progressive metastatic disease in the chest, abdomen or pelvis. Stable to improved findings include smaller pericardial lymph nodes, andrew hepatis adenopathy, peritoneal carcinomatosis, smaller left adnexal lesion; no evidence of PE within limitations of the study.  5. CT PE protocol chest/A/P 6/11/19--No PE, improved pericardial lymph node with minimal size on current examination, improved peritoneal carcinomatosis and left adnexal implant, splenic ischemia evolved into small infarct, size improvement of one of splenic hypodense lesion and mild size increase of second  hypodense lesion, favored to be benign/cystic, no new findings.  6. PET/CT 9/25/19--s/p hysterectomy and bilateral oophorectomy, no evidence for locally recurrent/residual disease, intensely hypermetabolic hypodense lesion within the spleen 3.5X3.3cm concerning for metastatic focus. No other abnormal focus of disease.  7. CT C/A/P 11/13/20--Right lower lobe 3.3 cm mass is presumed metastatic, otherwise no evident residual, recurrent or metastatic disease.   8. PET/CT 11/20/20--Hypermetabolic right lung base lesion and suspected metastatic periportal adenopathy, no additional sites of FDG-avid otherwise aggressive appearing pathology through the neck, chest, abdomen, or pelvis.  9. PET/CT 1/21/21--Interval decrease in size of the right lower lobe mass with decreased FDG uptake, now measures 1.7 x 2.2 cm (previous 3.3 x 3.1 cm), resolution of FDG uptake in the suspected periportal lymph node which is not identifiable on noncontrast CT. No evidence of new or progressive hypermetabolic metastatic disease.    Procedures:   1. Paracentesis on 12/28/18 with removal of 5 Liters.  2. Paracentesis on 01/07/19 with removal of 3.5 Liters.  3. Paracentesis on 02/06/19 with removal of 2.5 Liters.  4. Mediport placed 12/2020 by Dr. Serge Gentile--removed 8/26/21 due to mediport fracture.    Germline genetic testing done by [facility] 1/29/19--negative for BRCA1/2, two (2) variants of uncertain significance (VUS): CATHY p.E0416Y/p.J2497C and MLH1 p.P603L.     :  12/19/18 1102  01/15/19 666  02/19/19 174  03/12/19 48.6  04/23/19 16.6  05/21/19 11.0  06/10/19 10.8  09/13/19 38.1  10/31/19 25.8  12/30/19 8.8  03/20/20 8.3  04/21/20 12.0  05/11/20 14.0  07/14/20 12.2  09/08/20 13.4  11/10/20  42.9  12/08/20 79.5  01/05/21 28.9  01/26/21 12.8  02/18/21 11.6  03/09/21  9.1  05/03/21 8.4  06/14/21 7.5  08/17/21 8.9  12/21/21--9.8    Treatment History:  1. Neoadjuvant Carboplatin/Taxol every 3 weeks x 3 cycles. 1/15/19 - 2/26/19.  Neulasta added.   2. Surgery--tumor debulking KORI/BSO 3/18/19.  3. Adjuvant Carboplatin/Taxol x 3 additions cycles 4/9/19--5/21/19.  4. Splenectomy 10/21/19.  5. Niraparib maintenance (dose reduction to 100mg daily for cytopenias) 11/8/19--12/1/20 (stopped due to progression).  6. Carboplatin/Paclitaxel/Avastin X 6 cycles completed 12/8/21--3/24/21 (complete response).    Current Treatment:  1. Eliquis BID for incidental PE on imaging.    2. Avastin maintenance started on 4/13/21.   Cycle 14 due on 1/11/22.       Visit Information   Visit type:  Scheduled follow-up.    Accompanied by:  No one.       Chief Complaint       1/11/2022 12:59 CST      patient states no new concerns today    1/11/2022 12:54 CST      patient states no new concerns today        Interval History   Current complaint: Patient presents for scheduled follow-up of ovarian cancer. She is doing well. Continues on Avastin without problems. No other new complaints today. BP has been good at home. She has no abdominal pain, bowels are moving.      Review of Systems   Constitutional:  No fever, No chills, No weakness, No fatigue.    Eye:  No recent visual problem.    Ear/Nose/Mouth/Throat:  No decreased hearing, No nasal congestion, No sore throat.    Respiratory:  No shortness of breath, No cough, No sputum production, No wheezing.    Cardiovascular:  No chest pain, No palpitations, No peripheral edema.    Gastrointestinal:  No nausea, No vomiting, No diarrhea, No constipation, No abdominal pain.    Genitourinary:  s/p total abdominal hysterectomy, BSO and tumor debulking on 3/18/19, s/p splenectomy on 10/21/19.    Hematology/Lymphatics:  No bruising tendency, No bleeding tendency.    Immunologic   Musculoskeletal:  No back pain, No joint pain.    Integumentary:  No rash, No skin lesion.    Neurologic:  Alert and oriented X4, s/p TIA 6/2021, No confusion, No numbness, No tingling, No headache.    Psychiatric:  No anxiety, No depression.    All other  systems are negative      Health Status   Allergies:    Allergies (3) Active Reaction  codeine itching  Percocet 10/325 unknown  Percodan unknown     Current medications:  (Selected)   Outpatient Medications  Ordered  Heparin Flush 100 U/mL - 5 mL: 500 units, form: Injection, IV Push, Once-chemo, first dose 04/09/19 13:05:00 CDT, stop date 04/09/19 13:05:00 CDT, Routine, Days 1  Future  Benadryl 50mg/ml Inj: 25 mg, form: Injection, IV Piggyback, Once-chemo, Infuse over: 20 minute(s), first dose 01/11/22 13:30:00 CST, stop date 01/11/22 13:30:00 CST, Routine, Days 1, Future Order  Zirabev (for IVPB): 1,000 mg, form: Soln, IV Piggyback, Once-chemo, Infuse over: 30 minute(s), first dose 01/11/22 13:30:00 CST, stop date 01/11/22 13:30:00 CST, Future Order, Days 1  acetaminophen: 650 mg, form: Tab, Oral, Once-chemo, first dose 01/11/22 13:30:00 CST, stop date 01/11/22 13:30:00 CST, Routine, Days 1, Future Order  Prescriptions  Prescribed  Eliquis 5 mg oral tablet: See Instructions, TAKE 1 TABLET BY MOUTH TWICE DAILY, # 180 tab(s), 1 Refill(s), Pharmacy: "[facility] #[id number], 160, cm, Height/Length Dosing, 11/30/21 13:42:00 CST, 65.5, kg, Weight Dosing, 11/30/21 13:42:00 CST  aspirin 81 mg oral Delayed Release (EC) tablet: 81 mg = 1 tab(s), Oral, Daily, # 30 tab(s), 6 Refill(s), Pharmacy: "[facility] #[id number], 160, cm, Height/Length Dosing, 06/17/21 21:10:00 CDT, 62, kg, Weight Dosing, 06/17/21 21:10:00 CDT  atorvastatin 40 mg oral tablet: 40 mg = 1 tab(s), Oral, Daily, # 30 tab(s), 11 Refill(s), Pharmacy: "[facility] #[id number], 160, cm, Height/Length Dosing, 06/17/21 21:10:00 CDT, 62, kg, Weight Dosing, 06/17/21 21:10:00 CDT  hydrochlorothiazide 25 mg oral tablet: 25 mg = 1 tab(s), Oral, Daily, # 30 tab(s), 6 Refill(s), Pharmacy: [facility] #[id number], 160, cm, Height/Length Dosing, 07/26/21 10:03:00 CDT, 63.6, kg, Weight Dosing, 07/26/21 10:03:00 CDT  Documented Medications  Documented  Vitamin  B Complex oral capsule: 1 tab(s), Oral, Daily, 0 Refill(s)  Vitamin B6 250 mg oral capsule: tab 1, Oral, Daily, 0 Refill(s)  doxycycline hyclate 100 mg oral capsule: 100 mg = 1 cap(s), Oral, BID  glutamine oral powder for reconstitution: 5 gm =, Oral, 6x/Day, 0 Refill(s)   Problem list:    Active Problems (7)  Cancer of uterine adnexa   Leukopenia due to antineoplastic chemotherapy   Lung metastases   Malignant ascites   Ovarian cancer   Peritoneal carcinomatosis   Pulmonary emboli         Histories   Past Medical History:    No active or resolved past medical history items have been selected or recorded.   Family History:    COPD (chronic obstructive pulmonary disease).  Brother     Procedure history:    Total abdominal hysterectomy ([id number]) on 3/1/2019 at 68 Years.  Endometrial polyp removal on 2018 at 67 Years.  Tonsillectomy.  Breast biopsy.  .  Spleen operation ([id number]).   Social History        Social & Psychosocial Habits    Alcohol    Comment: Denies alcohol use. 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     Alcohol  Comment: Denies alcohol use.    Employment/School  Status: Retired  Description: Consultant    Home/Environment  Lives with: Alone    Tobacco  Use: Never.        Physical Examination   Vital Signs   2022 12:59 CST      Temperature Oral          36.4 DegC                             Temperature Oral (calculated)             97.52 DegF                             Peripheral Pulse Rate     69 bpm                              Respiratory Rate          16 br/min                             SpO2                      98 %                             Oxygen Therapy            Room air                             Systolic Blood Pressure   155 mmHg  HI                             Diastolic Blood Pressure  91 mmHg  HI                             Blood Pressure Location   Right arm                             Manual Cuff BP            No                             O2 SAT at rest            98 %     General:  Alert and oriented, No acute distress, Pleasant  female.    Eye:  Pupils are equal, round and reactive to light, Normal conjunctiva, Vision unchanged.    HENT:  Normocephalic, Normal hearing, Oral mucosa is moist.    Neck:  Supple, Non-tender, No lymphadenopathy.    Respiratory:  Lungs are clear to auscultation, Respirations are non-labored, Symmetrical chest wall expansion.    Cardiovascular:  Normal rate, Regular rhythm, Normal peripheral perfusion, No edema.    Gastrointestinal:  Soft, Non-tender, Non-distended, Normal bowel sounds, No organomegaly.    Lymphatics:  No lymphadenopathy neck, axilla, groin.    Musculoskeletal:  Normal range of motion, Normal strength, No deformity, Normal gait.    Integumentary:  Warm, Dry, Left CW mediport site s/p removal--healed well, intact.    Neurologic:  Alert, Oriented.    Cognition and Speech:  Oriented, Speech clear and coherent, Functional cognition intact.    Psychiatric:  Cooperative, Appropriate mood & affect.    ECOG Performance Scale: 1- Strenuous physical activity restricted; fully ambulatory and able to carry out light work.      Review / Management   Results review:  Lab results   1/11/2022 13:02 CST      Est Creat Clearance Ser   60.07 mL/min    1/11/2022 12:38 CST      POC TCO2                  34.0 mmol/L  HI                             POC Hb                    15.3 mg/dL                             POC Hct                   45.0 %                             POC  Sodium                138 mmol/L                             POC Potassium             3.7 mmol/L                             POC Chloride              96 mmol/L  LOW                             POC Ion Calcium           1.27 mmol/L                             POC Glucose               79 mg/dL                             POC BUN                   18.0 mg/dL                             POC Creatinine            0.7 mg/dL                             POC AGAP                  13.0  NA    1/11/2022 12:31 CST      WBC                       7.9 x10(3)/mcL                             RBC                       4.61 x10(6)/mcL                             Hgb                       14.3 gm/dL                             Hct                       44.3 %                             Platelet                  318 x10(3)/mcL                             MCV                       96.1 fL  HI                             MCH                       31.0 pg                             MCHC                      32.3 gm/dL  LOW                             RDW                       13.8 %                             MPV                       8.2 fL  LOW                             Abs Neut                  3.47 x10(3)/mcL                             NEUT%                     43.8 %  NA                             NEUT#                     3.5 x10(3)/mcL                             LYMPH%                    43.6 %  NA                             LYMPH#                    3.5 x10(3)/mcL                             MONO%                     10.7 %  NA                             MONO#                     0.8 x10(3)/mcL                             EOS%                      1.5 %  NA                             EOS#                      0.1 x10(3)/mcL                             BASO%                     0.3 %  NA                             BASO#                     0.0 x10(3)/mcL                             UA Prot Clinic            Negative   .       Impression and Plan   Diagnosis     Malignant neoplasm of left ovary (NBL04-PB C56.2).     Pulmonary embolism (NHO99-NW I26).     Malignant ascites (AFO75-EA R18.0).     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     Secondary malignant neoplasm of right lung (AXI23-SF C78.01).     TIA (transient ischemic attack) (YES87-NU G45.9).     Plan:  Patient with left ovarian cancer diagnosed 12/26/18, appears to be stage IIIC vs. IV with diffuse peritoneal disease, possible splenic involvement, epicardial lymph nodes and bilateral pleural effusions.  Patient seen and evaluated by Dr. Mike Santana at [facility] in [place].   Treatment recommended upfront with chemotherapy Carboplatin/Paclitaxel x 3 cycles.  Started Cycle # 1 of Carbo/Taxol on 1/15/19. Completed Cycle 3 on 2/26/19.   She underwent total abdominal hysterectomy, BSO and tumor debulking on 3/18/19 with Dr. Santana with complete gross resection of disease. FIGO Stage IIIC papillary serous fallopian tube cancer.   Patient resumed chemotherapy with cycle 4 on 4/9/19. Completed cycle 6 on 5/21/19.  Labs show anemia resolved and mild thrombocytopenia only.  CT PE protocol chest with no PE, A/P with improvement in disease with minimal measurable disease likely represents scar tissue.    Testing on tumor was positive for genomic instability but negative for BRCA mutation.  Per NCCN guidelines, maintenance can be considered for BRCA mutated germline category 1 and somatic category 2A. There is some evidence that PARP inhibitors have some activity as well in tumors with genomic instability. But the PARP maintenance is not approved for this indication. Offered treatment to patient and after discussion she declined and made decision to continue with observation only.    PET/CT done for rising CA-125 showed hypermetabolic splenic lesion which is not new, just enlarged from previous. Case discussed with Dr. Mike Santana.  Patient is now s/p splenectomy done  10/21/19. Consistent with splenic involvement with high grade serous carcinoma.     Patient received 1st round of vaccine series on 9/27/19--Flu vaccine, Prevnar 13, Menveo, Bexsero and Haemophilus B.  Additional vaccines given on  11/26/19--Pneumovax, Menveo and Bexsero followed by Pneumovax and Menveo every 5 years.    Dr. Santana recommended Niraparib maintenance based on new data showing activity in HRD positive ovarian cancer and patient started on 11/8/19, required a dose reduction due to cytopenias.  Rising CA-125 noted in 11/2020. CT showed new RLL lung mass. Follow-up PET done 11/20/20 showed RLL lung mass hypermetabolic and hypermetabolic periportal adenopathy.     Recommended 2nd line treatment with Carboplatin/Taxol/Avastin.   Started cycle 1 on 12/8/20. Neulasta added with cycle 2.   PET/CT done 1/21/21 after 3 cycles showed excellent treatment response with decrease in lung mass and resolved abdominal adenopathy.  Completed Cycle 6 on 3/24/21.  PET from 3/30/21 showed complete resolution of lung mass and no other disease.  Avastin maintenance started on 4/13/21.   Hospitalized for TIA after her 4th cycle of Avastin. Work-up for stroke and cause otherwise negative. Patient started on baby ASA by neurology.  Discussed there are no clear guidelines for changing/discontinuing treatment for TIA/CVA related to Avastin.  Since her symptoms resolved and there were no residual effects, recommended to continue with Avastin since it is working well for her disease and since a baby ASA was added for prevention.  BP has been good. Currently on HCTZ 25mg daily.  S/p mediport removal for fracture on 8/26/21. Continue Avastin through peripheral vein for now.    Patient s/p cycle 13.  CBC and BMP good, will f/u LFT and CA-125. Last CA-125 remains WNL.  Proceed with cycle 14 today.  Labs and treatment only cycle 15 on 2/1/22.  RTC T/D visit in 6 weeks. Cycle 16 due on 2/22/22.    Will continue to check . No further  scans until rise in CA-125.  She is now >6 months from completing last chemotherapy, so still considered platinum-sensitive. Will need replacement of mediport when she needs to resume chemotherapy.   Continue Eliquis 5mg BID and ASA.    All questions answered at this time.        Renetta Corral MD

## 2022-04-30 NOTE — PROGRESS NOTES
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 Gamaliel Carlos  GYN ONC at [facility]: Dr. 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 Paracentesis with removal of 5L of fluid done 18--serous carcinoma, high grade, c/w ovarian primary, PAX-8, CK7 and MOC-31 positive, CDX-2, CK-20 and Calretinin-negative.  2. S/p Exploratory Lap, radical tumor debulking including total abdominal hysterectomy, bilateral salpingo-oophorectomy, bilateral pelvic lymph node sampling, appendectomy, mobilization of the left ureter, complete gross resection of the disease with removal of mesenteric disease, as well as omental disese and parasplenic disease by Dr. Santana--Metastatic high grade serous carcinoma. Tissue/organ involvement: right ovary, appendectomy, omentum, mesentery (including small bowel mesentery) and multiple other sites designated: periumbilical, perisplenic, transverse colon, splenic flexure, perirectal, left periureteral. 2 lymph nodes negative. [id number]. FIGO stage IIIC. Myriad somatic instability testing positive for genomic instability, negative BRCA1 and BRCA2 tumor testing.  3. Splenectomy done 10/21/19--splenic involvement with metastatic high grade serous carcinoma, 5 benign lymph nodes.   Imagin. CT A/P w/ and w/o contrast done at [facility] 18--large volume ascites with multiple peritoneal implants, right pericolic gutter implant measures 2.5X3.3cm, LUQ omental/mesenteric implant measures 3X3.6cm, omental carcinomatosis, extensive enhancing soft tissue surrounding sigmoid colon vs. large sigmoid mass, left ovarian cystic lesion appears to have  some internal septations measures 3.5X3.8cm, enhancing periportal soft tissue density likely lymphadenopathy with some narrowing of the portal vein noted, but without internal filling defect, borderline splenomegaly, subtle solid lesion w/n central spleen measures 2.4X2.5cm, likely metastatic, with peripheral area of diminished enhancement suggesting splenic infarct, soft tissue implant abutting gallbladder measures 2X2.2cm, no right adnexal mass, trace bilateral layering pleural fluid, small moderate-sized hiatal hernia, few borderline enlarged lymph nodes w/n right epicardial fat pad.  2. CT of chest on 1/11/19--Some prominent right cardiophrenic lymph nodes are nonspecific and can be followed on future surveillance scans. Otherwise no CT evidence of metastatic disease to the chest. While exam was not tailored for evaluation of the pulmonary arteries I suspect there is pulmonary thromboembolic disease. Peritoneal carcinomatosis seen in the upper abdomen. Critical Result: Pulmonary Embolus.  3. CT C/A/P 3/4/19--Positive response to therapy. No new or progressive metastatic disease in the chest, abdomen or pelvis. Stable to improved findings include smaller pericardial lymph nodes, andrew hepatis adenopathy, peritoneal carcinomatosis, smaller left adnexal lesion; no evidence of PE within limitations of the study.  5. CT PE protocol chest/A/P 6/11/19--No PE, improved pericardial lymph node with minimal size on current examination, improved peritoneal carcinomatosis and left adnexal implant, splenic ischemia evolved into small infarct, size improvement of one of splenic hypodense lesion and mild size increase of second hypodense lesion, favored to be benign/cystic, no new findings.  6. PET/CT 9/25/19--s/p hysterectomy and bilateral oophorectomy, no evidence for locally recurrent/residual disease, intensely hypermetabolic hypodense lesion within the spleen 3.5X3.3cm concerning for metastatic focus. No other abnormal  focus of disease.    Procedures:   Paracentesis on 12/28/18 with removal of 5 Liters.  Paracentesis on 01/07/19 with removal of 3.5 Liters.  Paracentesis on 02/06/19 with removal of 2.5 Liters.    Germline genetic testing done by [facility] [facility] 1/29/19--negative for BRCA1/2, two (2) variants of uncertain significance (VUS): CATHY p.A2467C/p.U8072E and MLH1 p.P603L.     :  12/19/18 1102  01/15/19 666  02/19/19 174  03/12/19 48.6  04/23/19 16.6  05/21/19 11.0  06/10/19 10.8  09/13/19 38.1  10/31/19 25.8  12/30/19 8.8    Treatment History:  1. Neoadjuvant Carboplatin/Taxol every 3 weeks x 3 cycles. 1/15/19 - 2/26/19. Neulasta added.   2. Adjuvant Carboplatin/Taxol x 3 additions cycles 4/9/19--5/21/19.    Current Treatment:  1. Eliquis BID for incidental PE on imaging.    2. Niraparib 200mg PO daily started on 11/8/19. Held due to cytopenias on 12/5/19.   Restarted on 1/2/20 at 100mg PO daily.       Visit Information   Visit type:  Scheduled follow-up.    Accompanied by:  Spouse.       Chief Complaint   1/28/2020 9:45 CST       No C/O        Interval History   Current complaint: Patient presents for follow-up of ovarian cancer.  She started Niraparib at 100mg daily. Labs have all been good and she is tolerating well. She has not noticed any other side effects. BP has been running good at home and she brought in readings.      Review of Systems   Constitutional:  No fever, No chills, No weakness, No fatigue, No weight loss.    Eye:  No recent visual problem.    Ear/Nose/Mouth/Throat:  No decreased hearing, No nasal congestion, No sore throat.    Respiratory:  No shortness of breath, No cough, No wheezing.    Cardiovascular:  No chest pain, No palpitations, No peripheral edema.    Gastrointestinal:  No nausea, No vomiting, No diarrhea, No abdominal pain.    Genitourinary:  s/p total abdominal hysterectomy, BSO and tumor debulking on 3/18/19, s/p splenectomy on 10/21/19.    Hematology/Lymphatics:  No bruising tendency,  No bleeding tendency.    Immunologic   Musculoskeletal:  No back pain, No joint pain.    Integumentary:  No rash, No skin lesion.    Neurologic:  Alert and oriented X4, No confusion, No headache.    Psychiatric:  No anxiety, No depression.    All other systems are negative      Health Status   Allergies:    Allergies (3) Active Reaction  codeine itching  Percocet 10/325 unknown  Percodan unknown     Current medications:  (Selected)   Outpatient Medications  Ordered  Heparin Flush 100 U/mL - 5 mL: 500 units, form: Injection, IV Push, Once-chemo, first dose 19 13:05:00 CDT, stop date 19 13:05:00 CDT, Routine, Days 1  Prescriptions  Prescribed  Eliquis 5 mg oral tablet: 5 mg = 1 tab(s), Oral, BID, # 60 tab(s), 6 Refill(s), Pharmacy: [id number], 157.5, cm, Height/Length Dosing, 19 14:38:00 CST, 57.7, kg, Weight Dosing, 19 14:38:00 CST  Documented Medications  Documented  Zejula 100 mg oral capsule: tab  2, Oral, Daily, 0 Refill(s)   Problem list:    Active Problems (6)  Cancer of uterine adnexa   Leukopenia due to antineoplastic chemotherapy   Malignant ascites   Ovarian cancer   Peritoneal carcinomatosis   Review of care plan         Histories   Past Medical History:    No active or resolved past medical history items have been selected or recorded.   Family History:    COPD (chronic obstructive pulmonary disease).  Brother     Procedure history:    Endometrial polyp removal on 2018 at 67 Years.  Tonsillectomy.  Breast biopsy.  .   Social History        Social & Psychosocial Habits    Alcohol    Comment: Denies alcohol use. 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    Home/Environment  2019  Lives with: Alone    Tobacco  01/15/2019  Use: Never (less than 100 in l    Patient Wants Consult For Cessation Counseling N/A    2019  Use: Never (less than 100 in l    Patient Wants  Consult For Cessation Counseling N/A    01/29/2019  Use: Never (less than 100 in l    Patient Wants Consult For Cessation Counseling No    02/05/2019  Use: Never (less than 100 in l    Patient Wants Consult For Cessation Counseling N/A    02/12/2019  Use: Never (less than 100 in l    Patient Wants Consult For Cessation Counseling N/A    02/26/2019  Use: Never (less than 100 in l    Patient Wants Consult For Cessation Counseling N/A    02/27/2019  Use: Never (less than 100 in l    Patient Wants Consult For Cessation Counseling N/A    04/08/2019  Use: Never (less than 100 in l    Patient Wants Consult For Cessation Counseling N/A    04/16/2019  Use: Never (less than 100 in l    Patient Wants Consult For Cessation Counseling N/A    05/07/2019  Use: Never (less than 100 in l    Patient Wants Consult For Cessation Counseling N/A    05/29/2019  Use: Never (less than 100 in l    Patient Wants Consult For Cessation Counseling N/A    06/10/2019  Use: Never (less than 100 in l    Patient Wants Consult For Cessation Counseling No    06/10/2019  Use: Never (less than 100 in l    Patient Wants Consult For Cessation Counseling No    06/10/2019  Use: Never (less than 100 in l    Patient Wants Consult For Cessation Counseling No    Smokeless tobacco use: Never    06/10/2019  Use: Never (less than 100 in l    Patient Wants Consult For Cessation Counseling N/A    01/28/2020  Use: Never (less than 100 in l    Patient Wants Consult For Cessation Counseling N/A    Abuse/Neglect  10/31/2019  SHX Any signs of abuse or neglect No    01/28/2020  SHX Any signs of abuse or neglect No    Feels unsafe at home: No    Safe place to go: Yes  .     Alcohol  Comment: Denies alcohol use.    Employment/School  Status: Retired  Description: Consultant    Home/Environment  Lives with: Alone    Tobacco  Use: Never.        Physical Examination      Vital Signs (last 24 hrs)_____  Last Charted___________  Temp Oral     36.7 DegC  (JAN 28 09:45)  Heart Rate  Peripheral   74 bpm  (JAN 28 09:45)  SBP      H 159mmHg  (JAN 28 09:45)  DBP      90 mmHg  (JAN 28 09:45)  SpO2      98 %  (JAN 28 09:45)  Weight      59.9 kg  (JAN 28 09:45)  Height      157.5 cm  (JAN 28 09:45)  BMI      24.15  (JAN 28 09:45)     General:  Alert and oriented, No acute distress, pleasant white female.    Eye:  Normal conjunctiva, Vision unchanged.    HENT:  Normocephalic, Normal hearing, Oral mucosa is moist.    Neck:  Supple, Non-tender, No jugular venous distention, No lymphadenopathy, No thyromegaly.    Respiratory:  Lungs are clear to auscultation, Respirations are non-labored, Breath sounds are equal.    Cardiovascular:  Normal rate, Regular rhythm, No murmur, No gallop, Bradycardia, Normal peripheral perfusion, No edema.    Gastrointestinal:  Soft, Non-tender, Non-distended, Normal bowel sounds, No organomegaly, No masses, no tenderness, Midline abdominal incision intact, healed well .    Lymphatics:  No lymphadenopathy neck, axilla, groin.    Musculoskeletal:  Normal range of motion, Normal strength, No deformity, Normal gait.    Integumentary:  Warm, Intact.    Neurologic:  Alert, Oriented, Normal sensory, Normal motor function.    Psychiatric:  Cooperative, Appropriate mood & affect.    Cognition and Speech:  Oriented, Speech clear and coherent.    ECOG Performance Scale: 1- Strenuous physical activity restricted; fully ambulatory and able to carry out light work.      Review / Management   Results review:  Lab results   1/28/2020 9:49 CST       POC TCO2                  30.0 mmol/L  HI                             POC Hb                    14.6 mg/dL                             POC Hct                   43.0 %                             POC Sodium                138 mmol/L                             POC Potassium             3.9 mmol/L                             POC Chloride              100 mmol/L                             POC Ion Calcium           1.16 mmol/L                              POC Glucose               95 mg/dL                             POC BUN                   11.0 mg/dL                             POC Creatinine            0.9 mg/dL                             POC AGAP                  13.0  NA    1/28/2020 9:37 CST       WBC                       5.1 x10(3)/mcL                             RBC                       4.22 x10(6)/mcL                             Hgb                       13.0 gm/dL                             Hct                       41.3 %                             Platelet                  363 x10(3)/mcL                             MCV                       97.9 fL  HI                             MCH                       30.8 pg                             MCHC                      31.5 gm/dL  LOW                             RDW                       17.1 %  HI                             MPV                       8.2 fL  LOW                             Abs Neut                  2.15 x10(3)/mcL                             NEUT%                     42.5 %  NA                             NEUT#                     2.2 x10(3)/mcL                             LYMPH%                    40.3 %  NA                             LYMPH#                    2.0 x10(3)/mcL                             MONO%                     14.0 %  NA                             MONO#                     0.7 x10(3)/mcL                             EOS%                      2.2 %  NA                             EOS#                      0.1 x10(3)/mcL                             BASO%                     0.8 %  NA                             BASO#                     0.0 x10(3)/mcL  .       Impression and Plan   Diagnosis     Malignant neoplasm of left ovary (FWK74-AL C56.2).     Pulmonary embolism (ACA20-KN I26).     Malignant ascites (WHR42-LN R18.0).     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     Plan:  Patient with left ovarian cancer diagnosed 12/26/18, appears to be stage IIIC vs. IV  with diffuse peritoneal disease, possible splenic involvement, epicardial lymph nodes and bilateral pleural effusions.  Patient seen and evaluated by Dr. Mike Santana at [facility] in [place].   Treatment recommended upfront with chemotherapy Carboplatin/Paclitaxel x 3 cycles.  Started Cycle # 1 of Carbo/Taxol on 1/15/19. Completed Cycle 3 on 2/26/19.   She underwent total abdominal hysterectomy, BSO and tumor debulking on 3/18/19 with Dr. Santana with complete gross resection of disease. FIGO Stage IIIC papillary serous fallopian tube cancer.   Patient resumed chemotherapy with cycle 4 on 4/9/19. Completed cycle 6 on 5/21/19.  Labs show anemia resolved and mild thrombocytopenia only.  CT PE protocol chest with no PE, A/P with improvement in disease with minimal measurable disease likely represents scar tissue.    Testing on tumor was positive for genomic instability but negative for BRCA mutation.  Per NCCN guidelines, maintenance can be considered for BRCA mutated germline category 1 and somatic category 2A. There is some evidence that PARP inhibitors have some activity as well in tumors with genomic instability. But the PARP maintenance is not approved for this indication. Offered treatment to patient and after discussion she declined and made decision to continue with observation only.    PET/CT done for rising CA-125 showed hypermetabolic splenic lesion which is not new, just enlarged from previous.  Case discussed with Dr. Mike Santana.  Patient is now s/p splenectomy done 10/21/19. Awaiting final pathology report but was c/w splenic involvement with high grade serous carcinoma.     Patient received 1st round of vaccine series on 9/27/19--Flu vaccine, Prevnar 13, Menveo, Bexsero and Haemophilus B.  Additional vaccines needed in 8 weeks and will give on 11/26/19--Pneumovax, Menveo and Bexsero followed by Pneumovax and Menveo every 5 years.    Dr. Santana has recommended Niraparib maintenance based on new  data showing activity in HRD positive ovarian cancer.   Patient started Niraparib 200mg PO daily on 11/8/19 and she was tolerating well but on 12/5/19 it was held due to cytopenias.   Dose decreased to 100mg and restarted on 1/2/20 and she is tolerating well.  Weekly labs all good and BP also good.    Labs only in 4 weeks.  RTC 8 weeks for follow-up with repeat labs including CA-125.    Continue Eliquis 5mg BID.   Continue to monitor BP at home.     All questions answered at this time.        Renetta Corral MD

## 2022-04-30 NOTE — PROGRESS NOTES
Patient:   Shayna Ledezma             MRN: [id number]            FIN: [id number]               Age:   68 years     Sex:  Female     :  1950   Associated Diagnoses:   None   Author:   Renetta Corral MD      . No fever.  Patient reports she has an antibiotic that we called in for her at home.  Patient instructed to call and notify nurse which antibiotic.  She will need to be on Levaquin X 7 days.  Fever precautions given.  Neulasta with next cycle. Patient apparently told infusion nurses she was not supposed to get neulasta with her treatment for cycle 2 which was incorrect.  She did not receive neulasta and now has grade 4 neutropenia.    Renetta Corral MD

## 2022-05-03 NOTE — HISTORICAL [facility] CERNER
This is a historical note converted from Addi. Formatting and pictures may have been removed.  Please reference Addi for original formatting and attached multimedia. Chief Complaint  Difficulty speaking  History of Present Illness  This is?a 70-year-old female medical history of?left ovarian cancer Stage IV?diagnosed?in December 2018?currently on Avastin maintenance?last dose 6/15/2021,?incidental pulmonary emboli found on staging CT scan?currently on Eliquis,?presented the ED?with complaint of?difficulty speaking?that started?around 2 PM and lasted for about 15 minutes. ?Report?that today afternoon she was talking over the phone with her friend?and?she was trying to read?a news article?suddenly she was?unable?to read the word, report that she understand the word but?what she was saying was?gibberish, this lasted for about?15 minutes and completely resolved. Denies any blurry vision,?headache,?extremity weakness or numbness.?of note?patient report a similar episode?1 year ago?but did not seek medical attention at that time.?  ?  On arrival to ED, She was afebrile, hypertensive, EKG NSR.? CT head show no acute intracranial findings.? Referred to hospitalist medicine service for further evaluation management.  Review of Systems  Except as documented, all other systems reviewed and are negative.  Physical Exam  Vitals & Measurements  T:?36.6? ?C (Oral)? HR:?68(Peripheral)? RR:?18? BP:?171/84? SpO2:?98%? WT:?62?kg? BMI:?53.13?  General: appears comfortable  HEENT:?NC/AT  Neck:?no JVD  Chest:?CTABL, no rales or rhonchi, no accessory muscle use  CVS:?regular rhythm. Normal S1/S2.? No pedal edema  Abdomen:?nondistended, normoactive bowel sound, soft and non-tender.  Extremities:?no clubbing or cyanosis  Skin:?warm and dry  Neuro:?AAOx3, face symmetrical?with no facial droop,?EOMI,?PERRLA,?power 5 5 in all extremities,?sensation intact?to light touch  Psych:?cooperative  Assessment/Plan  1. ?Transient expressive  aphasia--TIA  2.? Hypertension,?uncontrolled  3.? Hypercholesterolemia-   ?  HX of Stage IV left ovarian cancer?on Avastin, chemotherapy-induced neuropathy, incidental pulmonary emboli on Eliquis  ?  Plan:  ?  -Stroke?protocol  -MRI brain WO, CTA H&N, TTE - pending  -Continue Eliquis 5mg BID  -Start aspirin 81mg daily  -Start atorvastatin 40mg daily  -Allowing permissive HTN, Start?lisinopril 20mg and resume HCTZ from tomorrow AM  -PT/OT/SLP evaluation  -Neurology consult  -VTE PPX: On Eliquis  -GI PPX: PPI PO?daily  ?  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  ?   Incidental pulmonary emboli -on Eliquis  Chemotherapy-induced neuropathy  Hypertension  Procedure/Surgical History  Abdominal paracentesis (diagnostic or therapeutic); with imaging guidance (2019)  Drainage of Peritoneal Cavity, Percutaneous Approach (2019)  Ultrasonography of Abdomen (2019)  Abdominal paracentesis (diagnostic or therapeutic); with imaging guidance (2019)  Drainage of Peritoneal Cavity, Percutaneous Approach (2019)  Ultrasonography of Abdomen (2019)  Abdominal paracentesis (diagnostic or therapeutic); with imaging guidance (2018)  Drainage of Peritoneal Cavity, Percutaneous Approach (2018)  Ultrasonography of Abdomen (2018)  Endometrial polyp removal (2018)  Breast biopsy    Tonsillectomy   Medications  Inpatient  atorvastatin, 40 mg= 1 tab(s), Oral, Daily  Eliquis 5 mg oral tablet, 5 mg= 1 tab(s), Oral, BID  Heparin Flush 100  U/mL - 5 mL, 500 units= 5 mL, IV Push, Once-chemo  hydrALAZINE 20 mg/mL injectable solution, 10 mg= 0.5 mL, IV Push, q4hr, PRN  hydrochlorothiazide, 12.5 mg= 0.5 tab(s), Oral, Daily  labetalol, 10 mg= 2 mL, IV Push, q10min, PRN  lisinopril 10 mg oral tablet, 20 mg= 2 tab(s), Oral, Daily  MiraLax (polyethylene glycol 3350), 17 gm= 1 packet(s), Oral, Daily, PRN  NS (0.9% Sodium Chloride) Infusion 1,000 mL, 1000 mL, IV  Protonix, 40 mg= 1 tab(s), Oral, Daily  pyridoxine, 250 mg= 5 tab(s), Oral, Daily  Tylenol, 1000 mg= 2 tab(s), Oral, q6hr, PRN  Vitamin B Complex with C and Folic Acid oral capsule, 1 cap(s), Oral, Daily  Zofran 2 mg/mL injectable solution, 4 mg= 2 mL, IV Push, q4hr, PRN  Home  Compazine 5 mg tab, See Instructions  Eliquis 5 mg oral tablet, See Instructions  glutamine oral powder for reconstitution, 10 gms, Oral, BID,? ?Still taking, not as prescribed: Pt states that sghe has been take med every now and then  hydrochlorothiazide 12.5 mg oral tablet, 12.5 mg= 1 tab(s), Oral, Daily, 3 refills  polyethylene glycol 3350 ( MiraLax ), 17 gm, Oral, Daily, PRN  Vitamin B Complex oral capsule, 1 tab(s), Oral, Daily  Vitamin B6 250 mg oral capsule, tab 1, Oral, Daily  Zofran 8 mg oral tablet, 8 mg= 1 tab(s), Oral, q8hr, PRN  Allergies  Percocet 10/325?(unknown)  Percodan?(unknown)  codeine?(itching)  Social History  Alcohol  Substance Use  Never, 11/23/2020  Tobacco  Never (less than 100 in lifetime), No, 06/17/2021  Never (less than 100 in lifetime), N/A, 06/15/2021  Family History  COPD (chronic obstructive pulmonary disease).: Brother.  Father: History is negative  Mother: History is negative  Lab Results  General Lab  BUN:?9 mg/dL?Low (06/17/21 15:43:00)  Creatinine: 0.77 mg/dL (06/17/21 15:43:00)  POC Creatinine: 0.7 mg/dL (06/14/21 10:15:00)  Glucose Lvl: 113 mg/dL (06/17/21 15:43:00)  Hct: 45.4 % (06/17/21 15:43:00)  Hgb: 14.7 gm/dL (06/17/21 15:43:00)  INR: 1 (06/17/21 15:43:00)  WBC: 7.5 x10(3)/mcL  (06/17/21 15:43:00)  Platelet: 380 x10(3)/mcL (06/17/21 15:43:00)  Potassium Lvl: 4.2 mmol/L (06/17/21 15:43:00)  Sodium Lvl: 137 mmol/L (06/17/21 15:43:00)  Diagnostic Results  Accession:?[id number]  Order:?CT Head W/O Contrast  Report Dt/Tm:?06/17/2021 15:43  Report:?  CT HEAD NONCONTRAST 6/17/2021  ?  INDICATION: Neuro deficit, acute, stroke suspected.  ?  TECHNIQUE: Multiple axial CT images were obtained from the cranial  vertex through the skull base without the administration of  intravenous contrast and reformatted in the coronal and sagittal  planes. Total  mGy*cm. Automated exposure control was utilized  for this examination as a radiation dose lowering technique.  ?  COMPARISON: None available.  ?  ?  FINDINGS:  No acute hyperdense intracranial hemorrhage or abnormal  intra-axial/extra-axial fluid collections. No focally hyperdense  intracranial vasculature, intracranial mass effect, or midline shift.  Mild right cerebellar tonsillar ectopia without brianne Chiari  malformation. No sulcal effacement or focal loss of gray-white  differentiation. Age-related global cortical involutional changes are  noted, with associated expansion of the ventricular system and  subarachnoid spaces to a degree that is commensurate with the level of  sulcal prominence.  ?  Periventricular white matter hypodensity is noted that is nonspecific,  but most likely to represent chronic microvascular white matter  ischemic changes. The ventricular system is otherwise normal in size  and configuration. The basal cisterns are clear. Mild calcific  atherosclerosis of the bilateral carotid siphons and intracranial  vertebral artery segments.  ?  No acute fractures are identified in the calvarium or imaged facial  bones. The imaged paranasal sinuses, mastoid air cells, and tympanic  spaces are clear. Left isis bullosa.  ?  ?  IMPRESSION:  No acute intracranial abnormalities. Mild age-related global cortical  involutional changes  and chronic microvascular white matter ischemic  changes.

## 2022-05-03 NOTE — HISTORICAL [facility]
This is a historical note converted from Addi. Formatting and pictures may have been removed.  Please reference [place] for original formatting and attached multimedia. Admit and Discharge Dates  Admit Date: 06/17/2021  Discharge Date: 06/19/2021  Physicians  Attending Physician - Rodrigue GOFF, Bubba ESCOBEDO  Admitting Physician - Rodrigue GOFF, Bubba ESCOBEDO  Consulting Physician - Clayton GOFF, Dyllan RYDER  Primary Care Physician - No PCP, No  Discharge Diagnosis  1.?Transient ischemic attack?G45.9  2.?Hyperlipidemia?E78.5  3.?Hypertension?I10  Surgical Procedures  No procedures recorded for this visit.  Immunizations  No immunizations recorded for this visit.  Admission Information  Chief Complaint  ?   Difficulty speaking  History of Present Illness  This is?a 70-year-old female medical history of?left ovarian cancer Stage IV?diagnosed?in December 2018?currently on Avastin maintenance?last dose 6/15/2021,?incidental pulmonary emboli found on staging CT scan?currently on Eliquis,?presented the ED?with complaint of?difficulty speaking?that started?around 2 PM and lasted for about 15 minutes. ?Report?that today afternoon she was talking over the phone with her friend?and?she was trying to read?a news article?suddenly she was?unable?to read the word, report that she understand the word but?what she was saying was?gibberish, this lasted for about?15 minutes and completely resolved. Denies any blurry vision,?headache,?extremity weakness or numbness.?of note?patient report a similar episode?1 year ago?but did not seek medical attention at that time.?  ?   On arrival to ED, She was afebrile, hypertensive, EKG NSR.? CT head show no acute intracranial findings.? Referred to hospitalist medicine service for further evaluation management.  ?   Patient currently admitted for expressive aphasia due to TIA. ?Symptoms currently resolved. ?MRI brain?is negative.? She is currently on aspirin and?statin  ?  ?  Significant Findings  mri brain-  negative  ?  Medications (13) Active  Scheduled: (8)  apixaban 5 mg Tab ?5 mg 1 tab(s), Oral, BID  aspirin 81 mg EC Tab ?81 mg 1 tab(s), Oral, Daily  atorvastatin 40 mg Tab ?40 mg 1 tab(s), Oral, Daily  hydrochlorothiazide 25 mg Tab UD ?12.5 mg 0.5 tab(s), Oral, Daily  lisinopril 10 mg Tab UD ?20 mg 2 tab(s), Oral, Daily  mVit B Comp(NEPHROCAPS) with C & FA ?1 cap(s), Oral, Daily  pantoprazole 40 mg EC Tab ?40 mg 1 tab(s), Oral, Daily  pyridoxine 50 mg Tab UD ?250 mg 5 tab(s), Oral, Daily  Continuous: (0)  PRN: (5)  acetaminophen 500 mg Tab ?1,000 mg 2 tab(s), Oral, q6hr  hydrALAzine 20 mg/ml Inj- 1 mL ?10 mg 0.5 mL, IV Push, q4hr  labetalol 5 mg/mL 20mL vial ?10 mg 2 mL, IV Push, q10min  ondansetron 2 mg/mL inj - 2mL ?4 mg 2 mL, IV Push, q4hr  polyethylene glycol (Miralax 17 gm pkt) ?17 gm 1 packet(s), Oral, Daily  Time Spent on discharge  > 30 mins  Objective  Vitals & Measurements  T:?36.5? ?C (Oral)? TMIN:?36.5? ?C (Oral)? TMAX:?36.7? ?C (Oral)? HR:?59(Peripheral)? RR:?16? BP:?90/57? SpO2:?96%?  Physical Exam  General: age appropriate, in no acute distress  Eye: pupils are equal , round and reactive to light?  HEENT: Normocephalic  Neck: Supple?  Respiratory; Lungs are clear to auscultation, respirations are non labored?  Cardiovascular: Normal rate and rhythm, s1s2 only no edema?  Gastrointestinal: soft , non tender, normal bowel sounds?  Integumentary: Warm,?  Neurologic: Alert, oriented , no focal deficits?  Psychiatric: cooperative  Patient Discharge Condition  stable  Discharge Disposition  home   Discharge Medication Reconciliation  Prescribed  aspirin (aspirin 81 mg oral Delayed Release (EC) tablet)?81 mg, Oral, Daily  atorvastatin (atorvastatin 40 mg oral tablet)?40 mg, Oral, Daily  Continue  apixaban (Eliquis 5 mg oral tablet)?See Instructions  glutamine (glutamine oral powder for reconstitution)?10 gms, Oral, BID  hydrochlorothiazide (hydrochlorothiazide 12.5 mg oral tablet)?12.5 mg, Oral,  Daily  multivitamin (Vitamin B Complex oral capsule)?1 tab(s), Oral, Daily  ondansetron (Zofran 8 mg oral tablet)?8 mg, Oral, q8hr, PRN as needed for nausea/vomiting  polyethylene glycol 3350 (polyethylene glycol 3350 ( MiraLax ))?17 gm, Oral, Daily, PRN constipation  prochlorperazine (Compazine 5 mg tab)?See Instructions  pyridoxine (Vitamin B6 250 mg oral capsule)?tab 1, Oral, Daily  Discontinue  lisinopril (lisinopril 10 mg oral tablet)?20 mg, Oral, Daily  pantoprazole (Protonix 40 mg ORAL enteric coated tablet)?40 mg, Oral, Daily  Education and Orders Provided  Carotid Artery Disease, Easy-to-Read  Aspirin and Your Heart  Transient Ischemic Attack, Easy-to-Read  Discharge - 06/18/21 12:08:00 CDT, Home, pending mri brain read?  Follow up  Follow up with Arranged Physician  Car Seat Challenge  No Qualifying Data

## 2022-05-03 NOTE — HISTORICAL [facility]
This is a historical note converted from Addi. Formatting and pictures may have been removed.  Please reference Addi for original formatting and attached multimedia. Chief Complaint  was talking to a friend on phone and all of a sudden had trouble getting her words out around 1400. speech back to normal. slight left sided facial droop but states her smile is always crooked. speech back to normal per pt and son. +bt  Reason for Consultation  TIA  History of Present Illness  This is a 70-year-old female with PMH of left ovarian cancer stage IV currently on Avastin, pulmonary emboli on Eliquis, and HTN who presented to ED with expressive aphasia that started around 2 PM on 6/17/2021.? The expressive aphasia lasted for about 15 minutes.? The pressure in the /96, EKG NSR.? CT head on arrival was negative for acute intracranial abnormalities, mild age-related global cortical involutional changes and chronic microvascular ischemic changes noted.? Carotid ultrasound demonstrated less than 50% stenosis of the right ICA and no hemodynamically significant stenosis of the left ICA, bilateral vertebrals patent.? CT angios pending.  ?  Patient lying in bed, NAD.? She tells me?she?was on the telephone yesterday afternoon?around 2 PM. ?She started speaking gibberish. ?States she?is trying to read her friend?a newspaper article, but the words coming out were not the words she was trying to say.? This began to improve after about 15 minutes and?completely resolved after 30 minutes.? She denies any?alteration in consciousness or any associated seizure-like features.??She denies any history of seizure.? She denies any associated?vision change, focal weakness, focal paresthesias, alteration in gait.? She tells me she did not miss any doses of her Eliquis. ?She was not on statin medication at home.? She tells me she had one other episode like this in the past. ?It occurred last summer.? She had the exact same expressive  aphasia, but it resolved more quickly. ?She thought it was due to the extreme heat outside and she did not seek any care?because of the brevity of?the episode.  Review of Systems  Reviewed a 12 point review of systems which is negative unless otherwise stated in HPI  Physical Exam  Vitals & Measurements  T:?36.7? ?C (Oral)? TMIN:?36.5? ?C (Oral)? TMAX:?36.8? ?C (Oral)? HR:?60(Peripheral)? RR:?16? BP:?149/85? SpO2:?97%? WT:?62?kg? BMI:?53.13?  General:  Alert and oriented  NAD  No overt edema  ?   Cognition and Comprehension:  Speech and language intact  Follows commands  speech fluent  Attention intact  Memory for recent events intact from history taking  Affect pleasant  Fund of knowledge adequate  ?   Cranial nerves:?  CN 2_ Visual?fields (full to confrontation both eyes)  CN 3, 4, 6_ Intact, EDU, no nystagmus  CN 5_facial tactile sensation intact  CN 7_left facial asymmetry which is patients physiologic?asymmetry  CN 8_hearing intact to spoken voice  CN 9, 10, 11_voice normal, shoulder shrug ok; deltoids not fatigable?  CN 12 tongue_protrudes mid line  ?   Muscle Strength and Tone:  Normal upper extremity tone  Normal lower extremity tone  Normal upper extremity strength  Normal lower extremity strength  ?   Reflexes:  Normal and symmetric  ?   Sensation:  Intact to light touch and temperature  ?   Coordination and Gait:  Coordination?is normal  Assessment/Plan  Impression/Plan:  1.? TIA  2.? HTN  3.? HLD  4.? Stage 4 ovarian CA  5.? Hx of bilat PEs on eliquis  ?   Stroke workup  -ECHO, MRI brain ordered  -Carotid US unremarkable  -CTAMinimal atherosclerotic change with no large vessel occlusion or  critical stenosis identified  -?  -a1c 5.4  PT/OT/ST  ?   -Continue home Eliquis.? Primary team has added ASA 81 mg.? Will defer decision?as to continuation of?aspirin to MD  -Atorvastatin 40 mg?with LDL goal less than 70  ?   Further recommendations may follow from MD   Problem List/Past Medical  History  Ongoing  Cancer of uterine adnexa  Leukopenia due to antineoplastic chemotherapy  Lung metastases  Malignant ascites  Ovarian cancer  Peritoneal carcinomatosis  Pulmonary emboli  Historical  No qualifying data  Procedure/Surgical History  Abdominal paracentesis (diagnostic or therapeutic); with imaging guidance (2019)  Drainage of Peritoneal Cavity, Percutaneous Approach (2019)  Ultrasonography of Abdomen (2019)  Abdominal paracentesis (diagnostic or therapeutic); with imaging guidance (2019)  Drainage of Peritoneal Cavity, Percutaneous Approach (2019)  Ultrasonography of Abdomen (2019)  Abdominal paracentesis (diagnostic or therapeutic); with imaging guidance (2018)  Drainage of Peritoneal Cavity, Percutaneous Approach (2018)  Ultrasonography of Abdomen (2018)  Endometrial polyp removal (2018)  Breast biopsy    Tonsillectomy   Medications  Inpatient  aspirin 81 mg oral Delayed Release (EC) tablet, 81 mg= 1 tab(s), Oral, Daily  atorvastatin, 40 mg= 1 tab(s), Oral, Daily  Eliquis 5 mg oral tablet, 5 mg= 1 tab(s), Oral, BID  Heparin Flush 100 U/mL - 5 mL, 500 units= 5 mL, IV Push, Once-chemo  hydrALAZINE 20 mg/mL injectable solution, 10 mg= 0.5 mL, IV Push, q4hr, PRN  hydrochlorothiazide, 12.5 mg= 0.5 tab(s), Oral, Daily  labetalol, 10 mg= 2 mL, IV Push, q10min, PRN  lisinopril 10 mg oral tablet, 20 mg= 2 tab(s), Oral, Daily  MiraLax (polyethylene glycol 3350), 17 gm= 1 packet(s), Oral, Daily, PRN  Protonix, 40 mg= 1 tab(s), Oral, Daily  pyridoxine, 250 mg= 5 tab(s), Oral, Daily  Tylenol, 1000 mg= 2 tab(s), Oral, q6hr, PRN  Vitamin B Complex with C and Folic Acid oral capsule, 1 cap(s), Oral, Daily  Zofran 2 mg/mL injectable solution, 4 mg= 2 mL, IV Push, q4hr, PRN  Home  Compazine 5 mg tab, See Instructions  Eliquis 5 mg oral tablet, See Instructions  glutamine oral powder for reconstitution, 10 gms, Oral, BID,? ?Still taking, not as  prescribed: Pt states that sghe has been take med every now and then  hydrochlorothiazide 12.5 mg oral tablet, 12.5 mg= 1 tab(s), Oral, Daily, 3 refills  polyethylene glycol 3350 ( MiraLax ), 17 gm, Oral, Daily, PRN  Vitamin B Complex oral capsule, 1 tab(s), Oral, Daily  Vitamin B6 250 mg oral capsule, tab 1, Oral, Daily  Zofran 8 mg oral tablet, 8 mg= 1 tab(s), Oral, q8hr, PRN  Allergies  Percocet 10/325?(unknown)  Percodan?(unknown)  codeine?(itching)  Social History  Abuse/Neglect  No, 06/17/2021  No, No, Yes, 06/15/2021  Alcohol  Employment/School  Retired, Work/School description: Consultant., 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  Father: History is negative  Mother: History is negative  Immunizations  Vaccine Date Status   meningococcal conjugate vaccine 11/26/2019 Given   pneumococcal 23-polyvalent vaccine 11/26/2019 Given   meningococcal group B vaccine 11/26/2019 Given   pneumococcal 13-valent conjugate vaccine 09/27/2019 Given   haemophilus b conjugate (PRP-T) vaccine 09/27/2019 Given   meningococcal group B vaccine 09/27/2019 Given   meningococcal conjugate vaccine 09/27/2019 Given   Lab Results  Labs Last 48 hours?  ?Chemistry? Hematology/Coagulation?   Sodium Lvl: 137 mmol/L (06/17/21 15:43:00) PT: 12.7 second(s) (06/17/21 15:43:00)   Potassium Lvl: 4.2 mmol/L (06/17/21 15:43:00) INR: 1 (06/17/21 15:43:00)   Chloride:?97 mmol/L?Low (06/17/21 15:43:00) PTT: 31.2 second(s) (06/17/21 15:43:00)   CO2: 29 mmol/L (06/17/21 15:43:00) WBC: 7.5 x10(3)/mcL (06/17/21 15:43:00)   Calcium Lvl: 10.2 mg/dL (06/17/21 15:43:00) RBC: 4.64 x10(6)/mcL (06/17/21 15:43:00)   Glucose Lvl: 113 mg/dL (06/17/21 15:43:00) Hgb: 14.7 gm/dL (06/17/21  15:43:00)   EA.3 mg/dL (21 18:35:00) Hct: 45.4 % (21 15:43:00)   BUN:?9 mg/dL?Low (21 15:43:00) Platelet: 380 x10(3)/mcL (21 15:43:00)   Creatinine: 0.77 mg/dL (21 15:43:00) MCV:?97.8 fL?High (21 15:43:00)   Est Creat Clearance Ser: 55.41 mL/min (21 16:28:54) MCH:?31.7 pg?High (21 15:43:00)   eGFR-AA: >60 (21 15:43:00) MCHC:?32.4 gm/dL?Low (21 15:43:00)   eGFR-SERA: >60 (21 15:43:00) RDW: 13.6 % (21 15:43:00)   Bili Total: 1 mg/dL (21 15:43:00) MPV:?8.7 fL?Low (21 15:43:00)   Bili Direct: 0.3 mg/dL (21 15:43:00) Abs Neut: 3.15 x10(3)/mcL (21 15:43:00)   Bili Indirect: 0.7 mg/dL (21 15:43:00) Neut Man:?38 %?Low (21 15:43:00)   AST: 30 unit/L (21 15:43:00) Lymph Man:?48 %?High (21 15:43:00)   ALT: 19 unit/L (21 15:43:00) Monocyte Man:?13 %?High (21 15:43:00)   Alk Phos: 57 unit/L (21 15:43:00) Eos Man: 1 % (21 15:43:00)   Total Protein:?7.8 gm/dL?High (21 15:43:00) NRBC Man: 1 % (21 15:43:00)   Albumin Lvl: 3.9 gm/dL (21 15:43:00) Platelet Est: Normal (21 15:43:00)   Globulin:?3.9 gm/dL?High (21 15:43:00) Anisocyte:?1?High (21 15:43:00)   A/G Ratio:?1 ratio?Low (21 15:43:00) Microcyte:?1?High (21 15:43:00)   Hgb A1c: 5.4 % (21 18:35:00) Macrocyte:?1 /mcL?High (21 15:43:00)   CRP High Sens: 0.06 mg/dL (21 18:35:00) RBC Morph: Abnormal (21 15:43:00)   Chol:?228 mg/dL?High (21 18:35:00) Sed Rate: 14 mm/hr (21 18:35:00)   HDL:?64 mg/dL?High (21 18:35:00)    Tri mg/dL (21 18:35:00)    LDL:?148 mg/dL?High (21 18:35:00)    Chol/HDL: 4 (21 18:35:00)    VLDL: 16 (21 18:35:00)    Total CK: 109 U/L (21 18:35:00)    Troponin-I: <0.010 (21 18:35:00)    U pH: 7.5 (21 00:18:00)    U Spec Grav: 1.005 (21:18:00)    Diagnostic  Results  (06/17/2021 22:22 CDT CT Angio Head W W/O Contrast)  FINDINGS:  No acute intracranial change since recent prior head CT same date  cervical carotids are minimally diseased widely patent. Intracranial  carotids are minimally diseased widely patent. M1 and A1 segments  widely patent. PCAs and basilar artery widely patent. Vertebral  arteries patent.  ?  IMPRESSION:  Minimal atherosclerotic change with no large vessel occlusion or  critical stenosis identified  ?  Concur with preliminary repor      pt seen w nursing  ?  working on her laptop when I entered her room and neurologically appeared intact  ?  CTh ok  ?  await MRI  ?  Dx: TIA w aphasia in setting of eliquis but metastatic fallopian carcinoma  ?  asa added and I concur  if mri shows multiple embolic appearing strokes in different territories then would be better to switch her anticoag perhaps  if MRI neg then ok as is w asa and eliquis  ?  aim reassess tomorrow and fu on MRI

## 2022-05-09 DIAGNOSIS — C57.00 CARCINOMA OF FALLOPIAN TUBE, UNSPECIFIED LATERALITY: Primary | ICD-10-CM

## 2022-05-09 PROBLEM — C78.00 MALIGNANT NEOPLASM METASTATIC TO LUNG: Status: RESOLVED | Noted: 2022-05-09 | Resolved: 2022-05-09

## 2022-05-09 PROBLEM — R18.0 MALIGNANT ASCITES: Status: ACTIVE | Noted: 2022-05-09

## 2022-05-09 PROBLEM — D72.819 LEUKOPENIA: Status: ACTIVE | Noted: 2022-05-09

## 2022-05-09 PROBLEM — I10 HYPERTENSION: Status: ACTIVE | Noted: 2022-05-09

## 2022-05-09 PROBLEM — C78.00 MALIGNANT NEOPLASM METASTATIC TO LUNG: Status: ACTIVE | Noted: 2022-05-09

## 2022-05-09 PROBLEM — I26.99 PULMONARY EMBOLISM: Status: ACTIVE | Noted: 2022-05-09

## 2022-05-09 PROBLEM — C78.6 PERITONEAL CARCINOMATOSIS: Status: ACTIVE | Noted: 2022-03-18

## 2022-05-09 PROBLEM — G45.9 TRANSIENT ISCHEMIC ATTACK: Status: ACTIVE | Noted: 2022-05-09

## 2022-05-09 PROBLEM — C57.4: Status: ACTIVE | Noted: 2022-05-09

## 2022-05-09 NOTE — PROGRESS NOTES
Subjective:       Patient ID: Shayna Ledezma is a 71 y.o. female.  GI: Dr. Gamaliel Carlos  GYN ONC at [facility]: Dr. 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 Paracentesis with removal of 5L of fluid done 18--serous carcinoma, high grade, c/w ovarian primary, PAX-8, CK7 and MOC-31 positive, CDX-2, CK-20 and Calretinin-negative.  2. S/p Exploratory Lap, radical tumor debulking including total abdominal hysterectomy, bilateral salpingo-oophorectomy, bilateral pelvic lymph node sampling, appendectomy, mobilization of the left ureter, complete gross resection of the disease with removal of mesenteric disease, as well as omental disese and parasplenic disease by Dr. Santana 3/18/19--Metastatic high grade serous carcinoma. Tissue/organ involvement: right ovary, appendectomy, omentum, mesentery (including small bowel mesentery) and multiple other sites designated: periumbilical, perisplenic, transverse colon, splenic flexure, perirectal, left periureteral. 2 lymph nodes negative. [id number]. FIGO stage IIIC. Myriad somatic instability testing positive for genomic instability, negative BRCA1 and BRCA2 tumor testing.  3. Splenectomy done 10/21/19--splenic involvement with metastatic high grade serous carcinoma, 5 benign lymph nodes. Caris testing showed LAM, NTRK1/2/3 negative, TMB low, BRCA1/2 negative, ER/UT negative, CATHY negative, SPEN pathogenic variant Exon 11, TP53 pathogenic variant Exon 5, PD-L1 positive CPS 15, Genomic ELODIA high.  Imagin. CT A/P w/ and w/o contrast done at [facility] 18--large volume ascites with multiple peritoneal implants, right pericolic gutter implant measures 2.5X3.3cm, LUQ omental/mesenteric implant measures 3X3.6cm, omental carcinomatosis, extensive enhancing soft tissue surrounding sigmoid colon vs. large sigmoid mass, left ovarian cystic lesion appears to have some internal septations  measures 3.5X3.8cm, enhancing periportal soft tissue density likely lymphadenopathy with some narrowing of the portal vein noted, but without internal filling defect, borderline splenomegaly, subtle solid lesion w/n central spleen measures 2.4X2.5cm, likely metastatic, with peripheral area of diminished enhancement suggesting splenic infarct, soft tissue implant abutting gallbladder measures 2X2.2cm, no right adnexal mass, trace bilateral layering pleural fluid, small moderate-sized hiatal hernia, few borderline enlarged lymph nodes w/n right epicardial fat pad.  2. CT of chest on 1/11/19--Some prominent right cardiophrenic lymph nodes are nonspecific and can be followed on future surveillance scans. Otherwise no CT evidence of metastatic disease to the chest. While exam was not tailored for evaluation of the pulmonary arteries I suspect there is pulmonary thromboembolic disease. Peritoneal carcinomatosis seen in the upper abdomen. Critical Result: Pulmonary Embolus.  3. CT C/A/P 3/4/19--Positive response to therapy. No new or progressive metastatic disease in the chest, abdomen or pelvis. Stable to improved findings include smaller pericardial lymph nodes, andrew hepatis adenopathy, peritoneal carcinomatosis, smaller left adnexal lesion; no evidence of PE within limitations of the study.  5. CT PE protocol chest/A/P 6/11/19--No PE, improved pericardial lymph node with minimal size on current examination, improved peritoneal carcinomatosis and left adnexal implant, splenic ischemia evolved into small infarct, size improvement of one of splenic hypodense lesion and mild size increase of second hypodense lesion, favored to be benign/cystic, no new findings.  6. PET/CT 9/25/19--s/p hysterectomy and bilateral oophorectomy, no evidence for locally recurrent/residual disease, intensely hypermetabolic hypodense lesion within the spleen 3.5X3.3cm concerning for metastatic focus. No other abnormal focus of disease.  7. CT  C/A/P 11/13/20--Right lower lobe 3.3 cm mass is presumed metastatic, otherwise no evident residual, recurrent or metastatic disease.   8. PET/CT 11/20/20--Hypermetabolic right lung base lesion and suspected metastatic periportal adenopathy, no additional sites of FDG-avid otherwise aggressive appearing pathology through the neck, chest, abdomen, or pelvis.  9. PET/CT 1/21/21--Interval decrease in size of the right lower lobe mass with decreased FDG uptake, now measures 1.7 x 2.2 cm (previous 3.3 x 3.1 cm), resolution of FDG uptake in the suspected periportal lymph node which is not identifiable on noncontrast CT. No evidence of new or progressive hypermetabolic metastatic disease.    Procedures:   1. Paracentesis on 12/28/18 with removal of 5 Liters.  2. Paracentesis on 01/07/19 with removal of 3.5 Liters.  3. Paracentesis on 02/06/19 with removal of 2.5 Liters.  4. Mediport placed 12/2020 by Dr. Serge Gentile--removed 8/26/21 due to mediport fracture.    Germline genetic testing done by [facility] 1/29/19--negative for BRCA1/2, two (2) variants of uncertain significance (VUS): CATHY p.O5370S/p.G8055Y and MLH1 p.P603L.     :  12/19/18 1102  11/10/20  42.9  12/08/20 79.5  01/05/21 28.9  01/26/21 12.8  02/18/21 11.6  03/15/22--10.3    Treatment History:  1. Neoadjuvant Carboplatin/Taxol every 3 weeks x 3 cycles. 1/15/19 - 2/26/19. Neulasta added.   2. Surgery--tumor debulking KORI/BSO 3/18/19.  3. Adjuvant Carboplatin/Taxol x 3 additions cycles 4/9/19--5/21/19.  4. Splenectomy 10/21/19.  5. Niraparib maintenance (dose reduction to 100mg daily for cytopenias) 11/8/19--12/1/20 (stopped due to progression).  6. Carboplatin/Paclitaxel/Avastin X 6 cycles completed 12/8/21--3/24/21 (complete response).    Current Treatment:  1. Eliquis BID for incidental PE on imaging.    2. Avastin maintenance started on 4/13/21.   Cycle 20 due on 5/17/22.      Chief Complaint: Other Misc (Pt reports no new concerns  today.)    Patient presents for scheduled follow-up of ovarian cancer. She is doing well. Continues on Avastin without problems. No new complaints today. BP has been good at home. She has no abdominal pain, bowels are moving. Admits to occasional bloated feeling and fatigue but not bothersome. No other problems reported.     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     Ovarian cancer     Peritoneal carcinomatosis     TIA (transient ischemic attack)       Review of patient's allergies indicates:   Allergen Reactions    Aspirin     Cat/feline products     Codeine Itching    Oxycodone-acetaminophen Other (See Comments)    Oxycodone-aspirin Other (See Comments)      Current Outpatient Medications on File Prior to Visit   Medication Sig Dispense Refill    apixaban (ELIQUIS) 5 mg Tab   See Instructions, TAKE 1 TABLET BY MOUTH TWICE DAILY, # 180 tab(s), 1 Refill(s), Pharmacy: [id number], 160, cm, Height/Length Dosing, 11/30/21 13:42:00 CST, 65.5, kg, Weight Dosing, 11/30/21 13:42:00 CST      atorvastatin (LIPITOR) 40 MG tablet Take 40 mg by mouth.      GLUTAMINE ORAL Take 5 g by mouth.      hydroCHLOROthiazide (HYDRODIURIL) 25 MG tablet Take 25 mg by mouth.      [DISCONTINUED] aspirin (ECOTRIN) 81 MG EC tablet Take 81 mg by mouth once daily.       No current facility-administered medications on file prior to visit.      Review of Systems   Constitutional: Positive for fatigue. Negative for activity change, fever and unexpected weight change.   Eyes: Negative for visual disturbance.   Respiratory: Negative for cough and shortness of breath.    Cardiovascular: Negative for chest pain.   Gastrointestinal: Negative for abdominal pain, blood in stool, constipation, diarrhea, nausea and vomiting.        Occasional bloating   Genitourinary: Negative for difficulty urinating.   Musculoskeletal: Negative for back pain.    Integumentary:  Negative for rash.   Neurological: Negative for dizziness, weakness and headaches.   Psychiatric/Behavioral: Negative for behavioral problems and suicidal ideas.            Vitals:    05/16/22 0855   BP: 131/88   Pulse: 66   Resp: 14   Temp: 97.7 °F (36.5 °C)      Physical Exam  Vitals reviewed.   Constitutional:       Appearance: Normal appearance. She is normal weight.   HENT:      Head: Normocephalic.   Eyes:      General: Lids are normal. Vision grossly intact.      Extraocular Movements: Extraocular movements intact.      Conjunctiva/sclera: Conjunctivae normal.   Cardiovascular:      Rate and Rhythm: Normal rate and regular rhythm.      Pulses: Normal pulses.      Heart sounds: Normal heart sounds, S1 normal and S2 normal.   Pulmonary:      Effort: Pulmonary effort is normal.      Breath sounds: Normal breath sounds.   Abdominal:      General: Abdomen is flat. Bowel sounds are normal.      Palpations: Abdomen is soft.   Musculoskeletal:      Cervical back: Normal, normal range of motion and neck supple.      Thoracic back: Normal.      Lumbar back: Normal.      Right lower leg: No edema.      Left lower leg: No edema.   Feet:      Right foot:      Skin integrity: Skin integrity normal.   Lymphadenopathy:      Comments: No palpable adenopathy   Skin:     General: Skin is warm.      Capillary Refill: Capillary refill takes less than 2 seconds.      Comments: Left CW mediport removed, site intact   Neurological:      Mental Status: She is alert and oriented to person, place, and time.   Psychiatric:         Attention and Perception: Attention normal.         Mood and Affect: Mood normal.         Speech: Speech normal.         Behavior: Behavior normal. Behavior is cooperative.         Cognition and Memory: Cognition normal.         Judgment: Judgment normal.       ECOG SCORE    1 - Restricted in strenuous activity-ambulatory and able to carry out work of a light nature       Lab Results    Component Value Date    WBC 7.7 05/16/2022    RBC 4.78 05/16/2022    HGB 14.9 05/16/2022    HCT 45.7 05/16/2022    MCV 95.6 (H) 05/16/2022    MCH 31.2 (H) 05/16/2022    MCHC 32.6 (L) 05/16/2022    RDW 13.7 05/16/2022     05/16/2022    MPV 8.0 (L) 05/16/2022    CMP      Assessment:       Problem List Items Addressed This Visit        Hematology    Pulmonary embolism       Oncology    Peritoneal carcinomatosis    Relevant Orders    Protein,UA (Dipstick)    Protein,UA (Dipstick)        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 IV with diffuse peritoneal disease, possible splenic involvement, epicardial lymph nodes and bilateral pleural effusions.  Patient seen and evaluated by Dr. Mike Santana at [facility] in [place].   Treatment recommended upfront with chemotherapy Carboplatin/Paclitaxel x 3 cycles.  Completed Cycle 3 on 2/26/19.   She underwent total abdominal hysterectomy, BSO and tumor debulking on 3/18/19 with Dr. Santana with complete gross resection of disease. FIGO Stage IIIC papillary serous fallopian tube cancer.   Patient resumed chemotherapy with cycle 4 on 4/9/19. Completed cycle 6 on 5/21/19.    Testing on tumor was positive for genomic instability but negative for BRCA mutation.  Per NCCN guidelines, maintenance can be considered for BRCA mutated germline category 1 and somatic category 2A. There is some evidence that PARP inhibitors have some activity as well in tumors with genomic instability. But the PARP maintenance is not approved for this indication. Offered treatment to patient and after discussion she declined and made decision to continue with observation only.    PET/CT done for rising CA-125 showed hypermetabolic splenic lesion which is not new, just enlarged from previous. Case  discussed with Dr. Mike Santana.  Patient is now s/p splenectomy done 10/21/19. Consistent with splenic involvement with high grade serous carcinoma.   Dr. Santana recommended Niraparib maintenance based on new data showing activity in HRD positive ovarian cancer and patient started on 11/8/19, required a dose reduction due to cytopenias.  Rising CA-125 noted in 11/2020. CT showed new RLL lung mass.   Follow-up PET done 11/20/20 showed RLL lung mass hypermetabolic and hypermetabolic periportal adenopathy.     Recommended 2nd line treatment with Carboplatin/Taxol/Avastin.   Started cycle 1 on 12/8/20. Neulasta added with cycle 2.   Completed Cycle 6 on 3/24/21.  PET from 3/30/21 showed complete resolution of lung mass and no other disease.  Avastin maintenance started on 4/13/21.   Hospitalized for TIA after her 4th cycle of Avastin. Work-up for stroke and cause otherwise negative. Patient started on baby ASA by neurology.  Discussed there are no clear guidelines for changing/discontinuing treatment for TIA/CVA related to Avastin.  Since her symptoms resolved and there were no residual effects, recommended to continue with Avastin since it is working well for her disease and since a baby ASA was added for prevention.  BP has been good. Currently on HCTZ 25mg daily.  S/p mediport removal for fracture on 8/26/21. Continue Avastin through peripheral vein for now.    Due for Avastin maintenance Cycle 20 tomorrow.    CBC good, will f/u CMP and CA-125. Last CA-125 remains WNL.  Labs and treatment only cycle 21 on 6/7/22.  RTC T/D visit in 6 weeks.   Will continue to check . No further scans until rise in CA-125.  She is now >6 months from completing last chemotherapy, so still considered platinum-sensitive. Will need replacement of mediport when she needs to resume chemotherapy.   Continue Eliquis 5mg BID and ASA.  All questions answered at this time.        Tom Angeles, HORACIO

## 2022-05-11 ENCOUNTER — TELEPHONE (OUTPATIENT)
Dept: INTERNAL MEDICINE | Facility: CLINIC | Age: 72
End: 2022-05-11

## 2022-05-11 RX ORDER — ATORVASTATIN CALCIUM 40 MG/1
40 TABLET, FILM COATED ORAL
COMMUNITY
Start: 2021-06-18 | End: 2022-06-01 | Stop reason: SDUPTHER

## 2022-05-11 RX ORDER — HYDROCHLOROTHIAZIDE 25 MG/1
25 TABLET ORAL
COMMUNITY
Start: 2021-07-26 | End: 2022-08-08 | Stop reason: SDUPTHER

## 2022-05-11 RX ORDER — ATORVASTATIN CALCIUM 40 MG/1
40 TABLET, FILM COATED ORAL DAILY
Qty: 90 TABLET | Refills: 3 | Status: CANCELLED | OUTPATIENT
Start: 2022-05-11 | End: 2023-05-06

## 2022-05-11 NOTE — TELEPHONE ENCOUNTER
----- Message from Nellie Braden sent at 2022 11:40 AM CDT -----  Regarding: Medication refill  MEDICATION REFILL REQUEST      PATIENT PHONE #:[phone]     : 1950     PHARMACY: [facility] [address] [facility]      PHARMACY PHONE #: [phone]     ALLERGIES: Codeine,Percocet 10/325, Percodan     MESSAGE Pt called and sated she needs a refill on her Atorvastatin 40mg 1 tablet daily by mouth.. please advise        Proposed refill

## 2022-05-14 NOTE — PROGRESS NOTES
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 Gamaliel Carlos  GYN ONC at [facility]: Dr. 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 Paracentesis with removal of 5L of fluid done 18--serous carcinoma, high grade, c/w ovarian primary, PAX-8, CK7 and MOC-31 positive, CDX-2, CK-20 and Calretinin-negative.  2. S/p Exploratory Lap, radical tumor debulking including total abdominal hysterectomy, bilateral salpingo-oophorectomy, bilateral pelvic lymph node sampling, appendectomy, mobilization of the left ureter, complete gross resection of the disease with removal of mesenteric disease, as well as omental disese and parasplenic disease by Dr. Santana 3/18/19--Metastatic high grade serous carcinoma. Tissue/organ involvement: right ovary, appendectomy, omentum, mesentery (including small bowel mesentery) and multiple other sites designated: periumbilical, perisplenic, transverse colon, splenic flexure, perirectal, left periureteral. 2 lymph nodes negative. [id number]. FIGO stage IIIC. Swopboard somatic instability testing positive for genomic instability, negative BRCA1 and BRCA2 tumor testing.  3. Splenectomy done 10/21/19--splenic involvement with metastatic high grade serous carcinoma, 5 benign lymph nodes. Caris testing showed LAM, NTRK1/2/3 negative, TMB low, BRCA1/2 negative, ER/LA negative, CATHY negative, SPEN pathogenic variant Exon 11, TP53 pathogenic variant Exon 5, PD-L1 positive CPS 15, Genomic ELODIA high.  Imagin. CT A/P w/ and w/o contrast done at [facility] 18--large volume  ascites with multiple peritoneal implants, right pericolic gutter implant measures 2.5X3.3cm, LUQ omental/mesenteric implant measures 3X3.6cm, omental carcinomatosis, extensive enhancing soft tissue surrounding sigmoid colon vs. large sigmoid mass, left ovarian cystic lesion appears to have some internal septations measures 3.5X3.8cm, enhancing periportal soft tissue density likely lymphadenopathy with some narrowing of the portal vein noted, but without internal filling defect, borderline splenomegaly, subtle solid lesion w/n central spleen measures 2.4X2.5cm, likely metastatic, with peripheral area of diminished enhancement suggesting splenic infarct, soft tissue implant abutting gallbladder measures 2X2.2cm, no right adnexal mass, trace bilateral layering pleural fluid, small moderate-sized hiatal hernia, few borderline enlarged lymph nodes w/n right epicardial fat pad.  2. CT of chest on 1/11/19--Some prominent right cardiophrenic lymph nodes are nonspecific and can be followed on future surveillance scans. Otherwise no CT evidence of metastatic disease to the chest. While exam was not tailored for evaluation of the pulmonary arteries I suspect there is pulmonary thromboembolic disease. Peritoneal carcinomatosis seen in the upper abdomen. Critical Result: Pulmonary Embolus.  3. CT C/A/P 3/4/19--Positive response to therapy. No new or progressive metastatic disease in the chest, abdomen or pelvis. Stable to improved findings include smaller pericardial lymph nodes, andrew hepatis adenopathy, peritoneal carcinomatosis, smaller left adnexal lesion; no evidence of PE within limitations of the study.  5. CT PE protocol chest/A/P 6/11/19--No PE, improved pericardial lymph node with minimal size on current examination, improved peritoneal carcinomatosis and left adnexal implant, splenic ischemia evolved into small infarct, size improvement of one of splenic hypodense lesion and mild size increase of second hypodense  lesion, favored to be benign/cystic, no new findings.  6. PET/CT 9/25/19--s/p hysterectomy and bilateral oophorectomy, no evidence for locally recurrent/residual disease, intensely hypermetabolic hypodense lesion within the spleen 3.5X3.3cm concerning for metastatic focus. No other abnormal focus of disease.  7. CT C/A/P 11/13/20--Right lower lobe 3.3 cm mass is presumed metastatic, otherwise no evident residual, recurrent or metastatic disease.   8. PET/CT 11/20/20--Hypermetabolic right lung base lesion and suspected metastatic periportal adenopathy, no additional sites of FDG-avid otherwise aggressive appearing pathology through the neck, chest, abdomen, or pelvis.  9. PET/CT 1/21/21--Interval decrease in size of the right lower lobe mass with decreased FDG uptake, now measures 1.7 x 2.2 cm (previous 3.3 x 3.1 cm), resolution of FDG uptake in the suspected periportal lymph node which is not identifiable on noncontrast CT. No evidence of new or progressive hypermetabolic metastatic disease.    Procedures:   1. Paracentesis on 12/28/18 with removal of 5 Liters.  2. Paracentesis on 01/07/19 with removal of 3.5 Liters.  3. Paracentesis on 02/06/19 with removal of 2.5 Liters.  4. Mediport placed 12/2020 by Dr. Serge Gentile--removed 8/26/21 due to mediport fracture.    Germline genetic testing done by [facility] 1/29/19--negative for BRCA1/2, two (2) variants of uncertain significance (VUS): CATHY p.X3982R/p.D5311F and MLH1 p.P603L.     :  12/19/18 1102  01/15/19 666  02/19/19 174  03/12/19 48.6  04/23/19 16.6  05/21/19 11.0  06/10/19 10.8  09/13/19 38.1  10/31/19 25.8  12/30/19 8.8  03/20/20 8.3  04/21/20 12.0  05/11/20 14.0  07/14/20 12.2  09/08/20 13.4  11/10/20  42.9  12/08/20 79.5  01/05/21 28.9  01/26/21 12.8  02/18/21 11.6  03/09/21  9.1  05/03/21 8.4  06/14/21 7.5  08/17/21 8.9  12/21/21--9.8  03/15/22--10.3    Treatment History:  1. Neoadjuvant Carboplatin/Taxol every 3 weeks x 3 cycles. 1/15/19 -  2/26/19. Neulasta added.   2. Surgery--tumor debulking KORI/BSO 3/18/19.  3. Adjuvant Carboplatin/Taxol x 3 additions cycles 4/9/19--5/21/19.  4. Splenectomy 10/21/19.  5. Niraparib maintenance (dose reduction to 100mg daily for cytopenias) 11/8/19--12/1/20 (stopped due to progression).  6. Carboplatin/Paclitaxel/Avastin X 6 cycles completed 12/8/21--3/24/21 (complete response).    Current Treatment:  1. Eliquis BID for incidental PE on imaging.    2. Avastin maintenance started on 4/13/21.   Cycle 18 due on 4/5/22.       Visit Information   Visit type:  Scheduled follow-up.    Accompanied by:  No one.       Chief Complaint   4/4/2022 8:32 CDT        Pt reports no new concerns today.      Interval History   Current complaint: Patient presents for scheduled follow-up of ovarian cancer. She is doing well. Continues on Avastin without problems. No new complaints today. BP has been good at home. She has no abdominal pain, bowels are moving. Admits to occasional bloated feeling and fatigue.  remains normal. No other problems reported.      Review of Systems   Constitutional:  Fatigue, No fever, No chills, No weakness.    Eye:  No recent visual problem.    Ear/Nose/Mouth/Throat:  No decreased hearing, No nasal congestion, No sore throat.    Respiratory:  No shortness of breath, No cough, No sputum production, No wheezing.    Cardiovascular:  No chest pain, No palpitations, No peripheral edema.    Gastrointestinal:  occasional bloating, No nausea, No vomiting, No diarrhea, No constipation, No abdominal pain.    Genitourinary:  s/p total abdominal hysterectomy, BSO and tumor debulking on 3/18/19, s/p splenectomy on 10/21/19.    Hematology/Lymphatics:  No bruising tendency, No bleeding tendency.    Immunologic   Musculoskeletal:  No back pain, No joint pain.    Integumentary:  No rash, No skin lesion.    Neurologic:  Alert and oriented X4, s/p TIA 6/2021, No confusion, No numbness, No tingling, No headache.     Psychiatric:  No anxiety, No depression.    All other systems are negative      Health Status   Allergies:    Allergic Reactions (Selected)  Severity Not Documented  Codeine- Itching.  Percocet 10/325- Unknown.  Percodan- Unknown.   Current medications:  (Selected)   Outpatient Medications  Ordered  Heparin Flush 100 U/mL - 5 mL: 500 units, form: Injection, IV Push, Once-chemo, first dose 04/09/19 13:05:00 CDT, stop date 04/09/19 13:05:00 CDT, Routine, Days 1  Future  Benadryl 50mg/ml Inj: 25 mg, form: Injection, IV Piggyback, Once-chemo, Infuse over: 20 minute(s), first dose 04/05/22 13:00:00 CDT, stop date 04/05/22 13:00:00 CDT, Routine, Days 1, Future Order  Zirabev (for IVPB): 1,000 mg, form: Soln, IV Piggyback, Once-chemo, Infuse over: 30 minute(s), first dose 04/05/22 13:00:00 CDT, stop date 04/05/22 13:00:00 CDT, Future Order, Days 1  acetaminophen: 650 mg, form: Tab, Oral, Once-chemo, first dose 04/05/22 13:00:00 CDT, stop date 04/05/22 13:00:00 CDT, Routine, Days 1, Future Order  Prescriptions  Prescribed  Eliquis 5 mg oral tablet: See Instructions, TAKE 1 TABLET BY MOUTH TWICE DAILY, # 180 tab(s), 1 Refill(s), Pharmacy: [facility] #[id number], 160, cm, Height/Length Dosing, 02/22/22 14:33:00 CST, 65.7, kg, Weight Dosing, 02/22/22 14:33:00 CST  aspirin 81 mg oral Delayed Release (EC) tablet: 81 mg = 1 tab(s), Oral, Daily, # 30 tab(s), 6 Refill(s), Pharmacy: [facility] #[id number], 160, cm, Height/Length Dosing, 06/17/21 21:10:00 CDT, 62, kg, Weight Dosing, 06/17/21 21:10:00 CDT  atorvastatin 40 mg oral tablet: 40 mg = 1 tab(s), Oral, Daily, # 30 tab(s), 11 Refill(s), Pharmacy: [facility] #[id number], 160, cm, Height/Length Dosing, 06/17/21 21:10:00 CDT, 62, kg, Weight Dosing, 06/17/21 21:10:00 CDT  hydrochlorothiazide 25 mg oral tablet: 25 mg = 1 tab(s), Oral, Daily, # 30 tab(s), 6 Refill(s), Pharmacy: [facility] #[id number], 160, cm, Height/Length Dosing, 02/01/22 13:02:00 CST, 65.77,  kg, Weight Dosing, 22 13:02:00 CST  hydrochlorothiazide 25 mg oral tablet: 25 mg = 1 tab(s), Oral, Daily, # 30 tab(s), 6 Refill(s), Pharmacy: [facility] #[id number], 160, cm, Height/Length Dosing, 21 10:03:00 CDT, 63.6, kg, Weight Dosing, 21 10:03:00 CDT  Documented Medications  Documented  Vitamin B Complex oral capsule: 1 tab(s), Oral, Daily, 0 Refill(s)  Vitamin B6 250 mg oral capsule: tab 1, Oral, Daily, 0 Refill(s)  glutamine oral powder for reconstitution: 5 gm =, Oral, 6x/Day, 0 Refill(s)      Histories   Past Medical History:    No active or resolved past medical history items have been selected or recorded.   Family History:    COPD (chronic obstructive pulmonary disease).  Brother     Procedure history:    Total abdominal hysterectomy ([id number]) on 3/1/2019 at 68 Years.  Endometrial polyp removal on 2018 at 67 Years.  Tonsillectomy.  Breast biopsy.  .  Spleen operation ([id number]).   Social History     Alcohol  Comment: Denies alcohol use.    Employment/School  Status: Retired  Description: Consultant    Home/Environment  Lives with: Alone    Tobacco  Use: Never.        Physical Examination   Vital Signs   2022 8:34 CDT        Temperature Oral          36.6 DegC                             Temperature Oral (calculated)             97.88 DegF                             Peripheral Pulse Rate     70 bpm                             Respiratory Rate          14 br/min                             SpO2                      98 %                             Oxygen Therapy            Room air                             Systolic Blood Pressure   150 mmHg  HI                             Diastolic Blood Pressure  84 mmHg                             Blood Pressure Location   Right arm                             Manual Cuff BP            No                             O2 SAT at rest            98 %     General:  Alert and oriented, No acute distress, Pleasant  female.     Eye:  Pupils are equal, round and reactive to light, Normal conjunctiva, Vision unchanged.    HENT:  Normocephalic, Normal hearing, Oral mucosa is moist.    Neck:  Supple, Non-tender, No lymphadenopathy.    Respiratory:  Lungs are clear to auscultation, Respirations are non-labored, Symmetrical chest wall expansion.    Cardiovascular:  Normal rate, Regular rhythm, Normal peripheral perfusion, No edema.    Gastrointestinal:  Soft, Non-tender, Non-distended, Normal bowel sounds, No organomegaly.    Lymphatics:  No lymphadenopathy neck, axilla, groin.    Musculoskeletal:  Normal range of motion, Normal strength, No deformity, Normal gait.    Integumentary:  Warm, Dry, Intact, Left CW mediport site s/p removal--healed well, intact.    Neurologic:  Alert, Oriented.    Cognition and Speech:  Oriented, Speech clear and coherent, Functional cognition intact.    Psychiatric:  Cooperative, Appropriate mood & affect.    ECOG Performance Scale: 1- Strenuous physical activity restricted; fully ambulatory and able to carry out light work.      Review / Management   Results review:  Lab results   4/4/2022 8:22 CDT        WBC                       6.8 x10(3)/mcL                             RBC                       4.66 x10(6)/mcL                             Hgb                       14.4 gm/dL                             Hct                       44.2 %                             Platelet                  362 x10(3)/mcL                             MCV                       94.8 fL  HI                             MCH                       30.9 pg                             MCHC                      32.6 gm/dL  LOW                             RDW                       13.6 %                             MPV                       7.8 fL  LOW                             Abs Neut                  2.79 x10(3)/mcL                             NEUT%                     41.0 %  NA                             NEUT#                     2.8  x10(3)/mcL                             LYMPH%                    46.0 %  NA                             LYMPH#                    3.1 x10(3)/mcL                             MONO%                     11.6 %  NA                             MONO#                     0.8 x10(3)/mcL                             EOS%                      1.2 %  NA                             EOS#                      0.1 x10(3)/mcL                             BASO%                     0.1 %  NA                             BASO#                     0.0 x10(3)/mcL  .       Impression and Plan   Diagnosis     Malignant neoplasm of left ovary (KQA55-JG C56.2).     Pulmonary embolism (XKI61-ZR I26).     Malignant ascites (MOU19-PK R18.0).     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     Secondary malignant neoplasm of right lung (QPP62-GH C78.01).     TIA (transient ischemic attack) (TWN03-TW G45.9).     Plan:  Patient with left ovarian cancer diagnosed 12/26/18, appears to be stage IIIC vs. IV with diffuse peritoneal disease, possible splenic involvement, epicardial lymph nodes and bilateral pleural effusions.  Patient seen and evaluated by Dr. Mike Santana at [facility] in [place].   Treatment recommended upfront with chemotherapy Carboplatin/Paclitaxel x 3 cycles.  Started Cycle # 1 of Carbo/Taxol on 1/15/19. Completed Cycle 3 on 2/26/19.   She underwent total abdominal hysterectomy, BSO and tumor debulking on 3/18/19 with Dr. Santana with complete gross resection of disease. FIGO Stage IIIC papillary serous fallopian tube cancer.   Patient resumed chemotherapy with cycle 4 on 4/9/19. Completed cycle 6 on 5/21/19.  Labs show anemia resolved and mild thrombocytopenia only.  CT PE protocol chest with no PE, A/P with improvement in disease with minimal measurable disease likely represents scar tissue.    Testing on tumor was positive for genomic instability but negative for BRCA mutation.  Per NCCN guidelines, maintenance can be considered  for BRCA mutated germline category 1 and somatic category 2A. There is some evidence that PARP inhibitors have some activity as well in tumors with genomic instability. But the PARP maintenance is not approved for this indication. Offered treatment to patient and after discussion she declined and made decision to continue with observation only.    PET/CT done for rising CA-125 showed hypermetabolic splenic lesion which is not new, just enlarged from previous. Case discussed with Dr. Mike Santana.  Patient is now s/p splenectomy done 10/21/19. Consistent with splenic involvement with high grade serous carcinoma.     Patient received 1st round of vaccine series on 9/27/19--Flu vaccine, Prevnar 13, Menveo, Bexsero and Haemophilus B.  Additional vaccines given on  11/26/19--Pneumovax, Menveo and Bexsero followed by Pneumovax and Menveo every 5 years.    Dr. Santana recommended Niraparib maintenance based on new data showing activity in HRD positive ovarian cancer and patient started on 11/8/19, required a dose reduction due to cytopenias.  Rising CA-125 noted in 11/2020. CT showed new RLL lung mass. Follow-up PET done 11/20/20 showed RLL lung mass hypermetabolic and hypermetabolic periportal adenopathy.     Recommended 2nd line treatment with Carboplatin/Taxol/Avastin.   Started cycle 1 on 12/8/20. Neulasta added with cycle 2.   PET/CT done 1/21/21 after 3 cycles showed excellent treatment response with decrease in lung mass and resolved abdominal adenopathy.  Completed Cycle 6 on 3/24/21.  PET from 3/30/21 showed complete resolution of lung mass and no other disease.  Avastin maintenance started on 4/13/21.   Hospitalized for TIA after her 4th cycle of Avastin. Work-up for stroke and cause otherwise negative. Patient started on baby ASA by neurology.  Discussed there are no clear guidelines for changing/discontinuing treatment for TIA/CVA related to Avastin.  Since her symptoms resolved and there were no residual  effects, recommended to continue with Avastin since it is working well for her disease and since a baby ASA was added for prevention.  BP has been good. Currently on HCTZ 25mg daily.  S/p mediport removal for fracture on 8/26/21. Continue Avastin through peripheral vein for now.    Due for Avastin maintenance Cycle 18 tomorrow.   CBC good, will f/u CMP and CA-125. Last CA-125 remains WNL.  Labs and treatment only cycle 19 on 4/26/22.  RTC T/D visit in 6 weeks.   Will continue to check . No further scans until rise in CA-125.  She is now >6 months from completing last chemotherapy, so still considered platinum-sensitive. Will need replacement of mediport when she needs to resume chemotherapy.   Continue Eliquis 5mg BID and ASA.    All questions answered at this time.        Tom Angeles, MIAPC

## 2022-05-16 ENCOUNTER — LAB VISIT (OUTPATIENT)
Dept: LAB | Facility: HOSPITAL | Age: 72
End: 2022-05-16
Payer: MEDICARE

## 2022-05-16 ENCOUNTER — OFFICE VISIT (OUTPATIENT)
Dept: HEMATOLOGY/ONCOLOGY | Facility: CLINIC | Age: 72
End: 2022-05-16
Payer: MEDICARE

## 2022-05-16 VITALS
HEART RATE: 66 BPM | SYSTOLIC BLOOD PRESSURE: 131 MMHG | HEIGHT: 63 IN | TEMPERATURE: 98 F | WEIGHT: 145.31 LBS | DIASTOLIC BLOOD PRESSURE: 88 MMHG | RESPIRATION RATE: 14 BRPM | BODY MASS INDEX: 25.75 KG/M2 | OXYGEN SATURATION: 98 %

## 2022-05-16 DIAGNOSIS — C57.4 MALIGNANT NEOPLASM OF UTERINE ADNEXA: Primary | ICD-10-CM

## 2022-05-16 DIAGNOSIS — C78.6 PERITONEAL CARCINOMATOSIS: ICD-10-CM

## 2022-05-16 DIAGNOSIS — I26.99 PULMONARY EMBOLISM, UNSPECIFIED CHRONICITY, UNSPECIFIED PULMONARY EMBOLISM TYPE, UNSPECIFIED WHETHER ACUTE COR PULMONALE PRESENT: ICD-10-CM

## 2022-05-16 DIAGNOSIS — C57.00 CARCINOMA OF FALLOPIAN TUBE, UNSPECIFIED LATERALITY: ICD-10-CM

## 2022-05-16 LAB
ALBUMIN SERPL-MCNC: 4 GM/DL (ref 3.4–4.8)
ALP SERPL-CCNC: 61 UNIT/L (ref 40–150)
ALT SERPL-CCNC: 21 UNIT/L (ref 0–55)
AST SERPL-CCNC: 34 UNIT/L (ref 5–34)
BASOPHILS # BLD AUTO: 0.02 X10(3)/MCL (ref 0–0.2)
BASOPHILS NFR BLD AUTO: 0.3 %
BILIRUBIN DIRECT+TOT PNL SERPL-MCNC: 0.8 MG/DL (ref 0–0.5)
BILIRUBIN DIRECT+TOT PNL SERPL-MCNC: 1.4 MG/DL (ref 0–0.8)
BILIRUBIN DIRECT+TOT PNL SERPL-MCNC: 2.2 MG/DL
CANCER AG125 SERPL-ACNC: 10.2 UNIT/ML (ref 0–35)
EOSINOPHIL # BLD AUTO: 0.11 X10(3)/MCL (ref 0–0.9)
EOSINOPHIL NFR BLD AUTO: 1.4 %
ERYTHROCYTE [DISTWIDTH] IN BLOOD BY AUTOMATED COUNT: 13.7 % (ref 11.5–17)
HCT VFR BLD AUTO: 45.7 % (ref 37–47)
HGB BLD-MCNC: 14.9 GM/DL (ref 12–16)
IMM GRANULOCYTES # BLD AUTO: 0.01 X10(3)/MCL (ref 0–0.02)
IMM GRANULOCYTES NFR BLD AUTO: 0.1 % (ref 0–0.43)
LYMPHOCYTES # BLD AUTO: 3.3 X10(3)/MCL (ref 0.6–4.6)
LYMPHOCYTES NFR BLD AUTO: 42.7 %
MCH RBC QN AUTO: 31.2 PG (ref 27–31)
MCHC RBC AUTO-ENTMCNC: 32.6 MG/DL (ref 33–36)
MCV RBC AUTO: 95.6 FL (ref 80–94)
MONOCYTES # BLD AUTO: 0.94 X10(3)/MCL (ref 0.1–1.3)
MONOCYTES NFR BLD AUTO: 12.2 %
NEUTROPHILS # BLD AUTO: 3.3 X10(3)/MCL (ref 2.1–9.2)
NEUTROPHILS NFR BLD AUTO: 43.3 %
PLATELET # BLD AUTO: 348 X10(3)/MCL (ref 130–400)
PMV BLD AUTO: 8 FL (ref 9.4–12.4)
PROT SERPL-MCNC: 7.3 GM/DL (ref 5.8–7.6)
RBC # BLD AUTO: 4.78 X10(6)/MCL (ref 4.2–5.4)
WBC # SPEC AUTO: 7.7 X10(3)/MCL (ref 4.5–11.5)

## 2022-05-16 PROCEDURE — 3079F DIAST BP 80-89 MM HG: CPT | Mod: CPTII,S$GLB,, | Performed by: NURSE PRACTITIONER

## 2022-05-16 PROCEDURE — 36415 COLL VENOUS BLD VENIPUNCTURE: CPT

## 2022-05-16 PROCEDURE — 1101F PT FALLS ASSESS-DOCD LE1/YR: CPT | Mod: CPTII,S$GLB,, | Performed by: NURSE PRACTITIONER

## 2022-05-16 PROCEDURE — 3075F SYST BP GE 130 - 139MM HG: CPT | Mod: CPTII,S$GLB,, | Performed by: NURSE PRACTITIONER

## 2022-05-16 PROCEDURE — 1160F PR REVIEW ALL MEDS BY PRESCRIBER/CLIN PHARMACIST DOCUMENTED: ICD-10-PCS | Mod: CPTII,S$GLB,, | Performed by: NURSE PRACTITIONER

## 2022-05-16 PROCEDURE — 3008F PR BODY MASS INDEX (BMI) DOCUMENTED: ICD-10-PCS | Mod: CPTII,S$GLB,, | Performed by: NURSE PRACTITIONER

## 2022-05-16 PROCEDURE — 85025 COMPLETE CBC W/AUTO DIFF WBC: CPT

## 2022-05-16 PROCEDURE — 99999 PR PBB SHADOW E&M-EST. PATIENT-LVL III: ICD-10-PCS | Mod: PBBFAC,,, | Performed by: NURSE PRACTITIONER

## 2022-05-16 PROCEDURE — 3079F PR MOST RECENT DIASTOLIC BLOOD PRESSURE 80-89 MM HG: ICD-10-PCS | Mod: CPTII,S$GLB,, | Performed by: NURSE PRACTITIONER

## 2022-05-16 PROCEDURE — 1159F PR MEDICATION LIST DOCUMENTED IN MEDICAL RECORD: ICD-10-PCS | Mod: CPTII,S$GLB,, | Performed by: NURSE PRACTITIONER

## 2022-05-16 PROCEDURE — 1101F PR PT FALLS ASSESS DOC 0-1 FALLS W/OUT INJ PAST YR: ICD-10-PCS | Mod: CPTII,S$GLB,, | Performed by: NURSE PRACTITIONER

## 2022-05-16 PROCEDURE — 81005 URINALYSIS: CPT | Performed by: NURSE PRACTITIONER

## 2022-05-16 PROCEDURE — 86304 IMMUNOASSAY TUMOR CA 125: CPT

## 2022-05-16 PROCEDURE — 3288F FALL RISK ASSESSMENT DOCD: CPT | Mod: CPTII,S$GLB,, | Performed by: NURSE PRACTITIONER

## 2022-05-16 PROCEDURE — 99214 OFFICE O/P EST MOD 30 MIN: CPT | Mod: S$GLB,,, | Performed by: NURSE PRACTITIONER

## 2022-05-16 PROCEDURE — 3008F BODY MASS INDEX DOCD: CPT | Mod: CPTII,S$GLB,, | Performed by: NURSE PRACTITIONER

## 2022-05-16 PROCEDURE — 99999 PR PBB SHADOW E&M-EST. PATIENT-LVL III: CPT | Mod: PBBFAC,,, | Performed by: NURSE PRACTITIONER

## 2022-05-16 PROCEDURE — 1126F PR PAIN SEVERITY QUANTIFIED, NO PAIN PRESENT: ICD-10-PCS | Mod: CPTII,S$GLB,, | Performed by: NURSE PRACTITIONER

## 2022-05-16 PROCEDURE — 1126F AMNT PAIN NOTED NONE PRSNT: CPT | Mod: CPTII,S$GLB,, | Performed by: NURSE PRACTITIONER

## 2022-05-16 PROCEDURE — 1160F RVW MEDS BY RX/DR IN RCRD: CPT | Mod: CPTII,S$GLB,, | Performed by: NURSE PRACTITIONER

## 2022-05-16 PROCEDURE — 1159F MED LIST DOCD IN RCRD: CPT | Mod: CPTII,S$GLB,, | Performed by: NURSE PRACTITIONER

## 2022-05-16 PROCEDURE — 99214 PR OFFICE/OUTPT VISIT, EST, LEVL IV, 30-39 MIN: ICD-10-PCS | Mod: S$GLB,,, | Performed by: NURSE PRACTITIONER

## 2022-05-16 PROCEDURE — 80076 HEPATIC FUNCTION PANEL: CPT

## 2022-05-16 PROCEDURE — 3288F PR FALLS RISK ASSESSMENT DOCUMENTED: ICD-10-PCS | Mod: CPTII,S$GLB,, | Performed by: NURSE PRACTITIONER

## 2022-05-16 PROCEDURE — 3075F PR MOST RECENT SYSTOLIC BLOOD PRESS GE 130-139MM HG: ICD-10-PCS | Mod: CPTII,S$GLB,, | Performed by: NURSE PRACTITIONER

## 2022-05-16 RX ORDER — SODIUM CHLORIDE 0.9 % (FLUSH) 0.9 %
10 SYRINGE (ML) INJECTION
Status: CANCELLED | OUTPATIENT
Start: 2022-05-17

## 2022-05-16 RX ORDER — DIPHENHYDRAMINE HYDROCHLORIDE 50 MG/ML
50 INJECTION INTRAMUSCULAR; INTRAVENOUS ONCE AS NEEDED
Status: CANCELLED | OUTPATIENT
Start: 2022-05-17

## 2022-05-16 RX ORDER — EPINEPHRINE 0.3 MG/.3ML
0.3 INJECTION SUBCUTANEOUS ONCE AS NEEDED
Status: CANCELLED | OUTPATIENT
Start: 2022-05-17

## 2022-05-16 RX ORDER — ASPIRIN 81 MG/1
81 TABLET ORAL DAILY
COMMUNITY
Start: 2021-12-12 | End: 2022-05-16 | Stop reason: SDUPTHER

## 2022-05-16 RX ORDER — ACETAMINOPHEN 325 MG/1
650 TABLET ORAL
Status: CANCELLED
Start: 2022-05-17

## 2022-05-16 RX ORDER — DIPHENHYDRAMINE HYDROCHLORIDE 50 MG/ML
25 INJECTION INTRAMUSCULAR; INTRAVENOUS
Status: CANCELLED
Start: 2022-05-17

## 2022-05-16 RX ORDER — HEPARIN 100 UNIT/ML
500 SYRINGE INTRAVENOUS
Status: CANCELLED | OUTPATIENT
Start: 2022-05-17

## 2022-05-17 ENCOUNTER — INFUSION (OUTPATIENT)
Dept: INFUSION THERAPY | Facility: HOSPITAL | Age: 72
End: 2022-05-17
Attending: INTERNAL MEDICINE
Payer: MEDICARE

## 2022-05-17 VITALS
RESPIRATION RATE: 18 BRPM | TEMPERATURE: 98 F | HEIGHT: 63 IN | BODY MASS INDEX: 25.69 KG/M2 | HEART RATE: 78 BPM | DIASTOLIC BLOOD PRESSURE: 78 MMHG | WEIGHT: 145 LBS | SYSTOLIC BLOOD PRESSURE: 178 MMHG

## 2022-05-17 DIAGNOSIS — C57.4 MALIGNANT NEOPLASM OF UTERINE ADNEXA: ICD-10-CM

## 2022-05-17 DIAGNOSIS — C78.6 PERITONEAL CARCINOMATOSIS: Primary | ICD-10-CM

## 2022-05-17 LAB
PATH REV: NORMAL
PROT UR QL STRIP.AUTO: ABNORMAL MG/DL

## 2022-05-17 PROCEDURE — 25000003 PHARM REV CODE 250: Performed by: NURSE PRACTITIONER

## 2022-05-17 PROCEDURE — 96367 TX/PROPH/DG ADDL SEQ IV INF: CPT

## 2022-05-17 PROCEDURE — 96413 CHEMO IV INFUSION 1 HR: CPT

## 2022-05-17 PROCEDURE — 63600175 PHARM REV CODE 636 W HCPCS: Performed by: NURSE PRACTITIONER

## 2022-05-17 RX ORDER — SODIUM CHLORIDE 0.9 % (FLUSH) 0.9 %
10 SYRINGE (ML) INJECTION
Status: DISCONTINUED | OUTPATIENT
Start: 2022-05-17 | End: 2022-05-17 | Stop reason: HOSPADM

## 2022-05-17 RX ORDER — HEPARIN 100 UNIT/ML
500 SYRINGE INTRAVENOUS
Status: DISCONTINUED | OUTPATIENT
Start: 2022-05-17 | End: 2022-05-17 | Stop reason: HOSPADM

## 2022-05-17 RX ORDER — DIPHENHYDRAMINE HYDROCHLORIDE 50 MG/ML
50 INJECTION INTRAMUSCULAR; INTRAVENOUS ONCE AS NEEDED
Status: DISCONTINUED | OUTPATIENT
Start: 2022-05-17 | End: 2022-05-17 | Stop reason: HOSPADM

## 2022-05-17 RX ORDER — DIPHENHYDRAMINE HYDROCHLORIDE 50 MG/ML
25 INJECTION INTRAMUSCULAR; INTRAVENOUS
Status: COMPLETED | OUTPATIENT
Start: 2022-05-17 | End: 2022-05-17

## 2022-05-17 RX ORDER — ACETAMINOPHEN 325 MG/1
650 TABLET ORAL
Status: DISCONTINUED | OUTPATIENT
Start: 2022-05-17 | End: 2022-05-17 | Stop reason: HOSPADM

## 2022-05-17 RX ORDER — EPINEPHRINE 0.3 MG/.3ML
0.3 INJECTION SUBCUTANEOUS ONCE AS NEEDED
Status: DISCONTINUED | OUTPATIENT
Start: 2022-05-17 | End: 2022-05-17 | Stop reason: HOSPADM

## 2022-05-17 RX ADMIN — DIPHENHYDRAMINE HYDROCHLORIDE 25 MG: 50 INJECTION INTRAMUSCULAR; INTRAVENOUS at 02:05

## 2022-05-17 RX ADMIN — SODIUM CHLORIDE 1000 MG: 9 INJECTION, SOLUTION INTRAVENOUS at 02:05

## 2022-05-31 RX ORDER — SODIUM CHLORIDE 0.9 % (FLUSH) 0.9 %
10 SYRINGE (ML) INJECTION
Status: CANCELLED | OUTPATIENT
Start: 2022-06-07

## 2022-05-31 RX ORDER — EPINEPHRINE 0.3 MG/.3ML
0.3 INJECTION SUBCUTANEOUS ONCE AS NEEDED
Status: CANCELLED | OUTPATIENT
Start: 2022-06-07

## 2022-05-31 RX ORDER — DIPHENHYDRAMINE HYDROCHLORIDE 50 MG/ML
25 INJECTION INTRAMUSCULAR; INTRAVENOUS
Status: CANCELLED
Start: 2022-06-28

## 2022-05-31 RX ORDER — ACETAMINOPHEN 325 MG/1
650 TABLET ORAL
Status: CANCELLED
Start: 2022-06-07

## 2022-05-31 RX ORDER — HEPARIN 100 UNIT/ML
500 SYRINGE INTRAVENOUS
Status: CANCELLED | OUTPATIENT
Start: 2022-06-07

## 2022-05-31 RX ORDER — DIPHENHYDRAMINE HYDROCHLORIDE 50 MG/ML
50 INJECTION INTRAMUSCULAR; INTRAVENOUS ONCE AS NEEDED
Status: CANCELLED | OUTPATIENT
Start: 2022-06-28

## 2022-05-31 RX ORDER — EPINEPHRINE 0.3 MG/.3ML
0.3 INJECTION SUBCUTANEOUS ONCE AS NEEDED
Status: CANCELLED | OUTPATIENT
Start: 2022-06-28

## 2022-05-31 RX ORDER — ACETAMINOPHEN 325 MG/1
650 TABLET ORAL
Status: CANCELLED
Start: 2022-06-28

## 2022-05-31 RX ORDER — DIPHENHYDRAMINE HYDROCHLORIDE 50 MG/ML
25 INJECTION INTRAMUSCULAR; INTRAVENOUS
Status: CANCELLED
Start: 2022-06-07

## 2022-05-31 RX ORDER — SODIUM CHLORIDE 0.9 % (FLUSH) 0.9 %
10 SYRINGE (ML) INJECTION
Status: CANCELLED | OUTPATIENT
Start: 2022-06-28

## 2022-05-31 RX ORDER — HEPARIN 100 UNIT/ML
500 SYRINGE INTRAVENOUS
Status: CANCELLED | OUTPATIENT
Start: 2022-06-28

## 2022-05-31 RX ORDER — DIPHENHYDRAMINE HYDROCHLORIDE 50 MG/ML
50 INJECTION INTRAMUSCULAR; INTRAVENOUS ONCE AS NEEDED
Status: CANCELLED | OUTPATIENT
Start: 2022-06-07

## 2022-06-01 RX ORDER — ATORVASTATIN CALCIUM 40 MG/1
40 TABLET, FILM COATED ORAL DAILY
Qty: 90 TABLET | Refills: 3 | Status: SHIPPED | OUTPATIENT
Start: 2022-06-01 | End: 2023-02-17

## 2022-06-07 ENCOUNTER — INFUSION (OUTPATIENT)
Dept: INFUSION THERAPY | Facility: HOSPITAL | Age: 72
End: 2022-06-07
Attending: INTERNAL MEDICINE
Payer: MEDICARE

## 2022-06-07 VITALS
HEART RATE: 89 BPM | TEMPERATURE: 98 F | DIASTOLIC BLOOD PRESSURE: 78 MMHG | HEIGHT: 63 IN | WEIGHT: 145 LBS | SYSTOLIC BLOOD PRESSURE: 154 MMHG | BODY MASS INDEX: 25.69 KG/M2

## 2022-06-07 DIAGNOSIS — C78.6 PERITONEAL CARCINOMATOSIS: ICD-10-CM

## 2022-06-07 DIAGNOSIS — C57.4 MALIGNANT NEOPLASM OF UTERINE ADNEXA: ICD-10-CM

## 2022-06-07 DIAGNOSIS — C57.4 MALIGNANT NEOPLASM OF UTERINE ADNEXA: Primary | ICD-10-CM

## 2022-06-07 DIAGNOSIS — C78.6 SECONDARY MALIGNANT NEOPLASM OF RETROPERITONEUM AND PERITONEUM: Primary | ICD-10-CM

## 2022-06-07 LAB — PROT UR QL STRIP.AUTO: 1 MG/DL

## 2022-06-07 PROCEDURE — 96413 CHEMO IV INFUSION 1 HR: CPT

## 2022-06-07 PROCEDURE — 81005 URINALYSIS: CPT

## 2022-06-07 PROCEDURE — 63600175 PHARM REV CODE 636 W HCPCS: Performed by: INTERNAL MEDICINE

## 2022-06-07 PROCEDURE — 96375 TX/PRO/DX INJ NEW DRUG ADDON: CPT

## 2022-06-07 PROCEDURE — 25000003 PHARM REV CODE 250: Performed by: INTERNAL MEDICINE

## 2022-06-07 RX ORDER — ACETAMINOPHEN 325 MG/1
650 TABLET ORAL
Status: DISCONTINUED | OUTPATIENT
Start: 2022-06-07 | End: 2022-06-07 | Stop reason: HOSPADM

## 2022-06-07 RX ORDER — HEPARIN 100 UNIT/ML
500 SYRINGE INTRAVENOUS
Status: DISCONTINUED | OUTPATIENT
Start: 2022-06-07 | End: 2022-06-07 | Stop reason: HOSPADM

## 2022-06-07 RX ORDER — SODIUM CHLORIDE 0.9 % (FLUSH) 0.9 %
10 SYRINGE (ML) INJECTION
Status: DISCONTINUED | OUTPATIENT
Start: 2022-06-07 | End: 2022-06-07 | Stop reason: HOSPADM

## 2022-06-07 RX ORDER — DIPHENHYDRAMINE HYDROCHLORIDE 50 MG/ML
50 INJECTION INTRAMUSCULAR; INTRAVENOUS ONCE AS NEEDED
Status: DISCONTINUED | OUTPATIENT
Start: 2022-06-07 | End: 2022-06-07 | Stop reason: HOSPADM

## 2022-06-07 RX ORDER — EPINEPHRINE 0.3 MG/.3ML
0.3 INJECTION SUBCUTANEOUS ONCE AS NEEDED
Status: DISCONTINUED | OUTPATIENT
Start: 2022-06-07 | End: 2022-06-07 | Stop reason: HOSPADM

## 2022-06-07 RX ORDER — DIPHENHYDRAMINE HYDROCHLORIDE 50 MG/ML
25 INJECTION INTRAMUSCULAR; INTRAVENOUS
Status: COMPLETED | OUTPATIENT
Start: 2022-06-07 | End: 2022-06-07

## 2022-06-07 RX ADMIN — DIPHENHYDRAMINE HYDROCHLORIDE 25 MG: 50 INJECTION, SOLUTION INTRAMUSCULAR; INTRAVENOUS at 01:06

## 2022-06-07 RX ADMIN — SODIUM CHLORIDE 900 MG: 9 INJECTION, SOLUTION INTRAVENOUS at 01:06

## 2022-06-21 PROBLEM — C57.00 FALLOPIAN TUBE CARCINOMA: Status: ACTIVE | Noted: 2022-06-21

## 2022-06-21 NOTE — PROGRESS NOTES
Subjective:       Patient ID: Shayna Ledezma is a 71 y.o. female.  GI: Dr. Gamaliel Carlos  GYN ONC at [facility]: Dr. Mike Santana    1. 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 Pulmonary Embolism (Incidental on CT)--19    Procedure/pathology:   1. Paracentesis with removal of 5L of fluid done 18--serous carcinoma, high grade, c/w ovarian primary, PAX-8, CK7 and MOC-31 positive, CDX-2, CK-20 and Calretinin-negative.  2. S/p Exploratory Lap, radical tumor debulking including total abdominal hysterectomy, bilateral salpingo-oophorectomy, bilateral pelvic lymph node sampling, appendectomy, mobilization of the left ureter, complete gross resection of the disease with removal of mesenteric disease, as well as omental disese and parasplenic disease by Dr. Santana 3/18/19--Metastatic high grade serous carcinoma. Tissue/organ involvement: right ovary, appendectomy, omentum, mesentery (including small bowel mesentery) and multiple other sites designated: periumbilical, perisplenic, transverse colon, splenic flexure, perirectal, left periureteral. 2 lymph nodes negative. [id number]. FIGO stage IIIC. Myriad somatic instability testing positive for genomic instability, negative BRCA1 and BRCA2 tumor testing.  3. Splenectomy done 10/21/19--splenic involvement with metastatic high grade serous carcinoma, 5 benign lymph nodes. Caris testing showed LAM, NTRK1/2/3 negative, TMB low, BRCA1/2 negative, ER/DE negative, CATHY negative, SPEN pathogenic variant Exon 11, TP53 pathogenic variant Exon 5, PD-L1 positive CPS 15, Genomic ELODIA high.  Imagin. CT A/P w/ and w/o contrast done at [facility] 18--large volume ascites with multiple peritoneal implants, right pericolic gutter implant measures 2.5X3.3cm, LUQ omental/mesenteric implant measures 3X3.6cm, omental carcinomatosis, extensive enhancing soft tissue surrounding sigmoid colon vs. large sigmoid mass, left  ovarian cystic lesion appears to have some internal septations measures 3.5X3.8cm, enhancing periportal soft tissue density likely lymphadenopathy with some narrowing of the portal vein noted, but without internal filling defect, borderline splenomegaly, subtle solid lesion w/n central spleen measures 2.4X2.5cm, likely metastatic, with peripheral area of diminished enhancement suggesting splenic infarct, soft tissue implant abutting gallbladder measures 2X2.2cm, no right adnexal mass, trace bilateral layering pleural fluid, small moderate-sized hiatal hernia, few borderline enlarged lymph nodes w/n right epicardial fat pad.  2. CT of chest on 1/11/19--Some prominent right cardiophrenic lymph nodes are nonspecific and can be followed on future surveillance scans. Otherwise no CT evidence of metastatic disease to the chest. While exam was not tailored for evaluation of the pulmonary arteries I suspect there is pulmonary thromboembolic disease. Peritoneal carcinomatosis seen in the upper abdomen. Critical Result: Pulmonary Embolus.  3. CT C/A/P 3/4/19--Positive response to therapy. No new or progressive metastatic disease in the chest, abdomen or pelvis. Stable to improved findings include smaller pericardial lymph nodes, andrew hepatis adenopathy, peritoneal carcinomatosis, smaller left adnexal lesion; no evidence of PE within limitations of the study.  5. CT PE protocol chest/A/P 6/11/19--No PE, improved pericardial lymph node with minimal size on current examination, improved peritoneal carcinomatosis and left adnexal implant, splenic ischemia evolved into small infarct, size improvement of one of splenic hypodense lesion and mild size increase of second hypodense lesion, favored to be benign/cystic, no new findings.  6. PET/CT 9/25/19--s/p hysterectomy and bilateral oophorectomy, no evidence for locally recurrent/residual disease, intensely hypermetabolic hypodense lesion within the spleen 3.5X3.3cm concerning for  metastatic focus. No other abnormal focus of disease.  7. CT C/A/P 11/13/20--Right lower lobe 3.3 cm mass is presumed metastatic, otherwise no evident residual, recurrent or metastatic disease.   8. PET/CT 11/20/20--Hypermetabolic right lung base lesion and suspected metastatic periportal adenopathy, no additional sites of FDG-avid otherwise aggressive appearing pathology through the neck, chest, abdomen, or pelvis.  9. PET/CT 1/21/21--Interval decrease in size of the right lower lobe mass with decreased FDG uptake, now measures 1.7 x 2.2 cm (previous 3.3 x 3.1 cm), resolution of FDG uptake in the suspected periportal lymph node which is not identifiable on noncontrast CT. No evidence of new or progressive hypermetabolic metastatic disease.    Procedures:   1. Paracentesis on 12/28/18 with removal of 5 Liters.  2. Paracentesis on 01/07/19 with removal of 3.5 Liters.  3. Paracentesis on 02/06/19 with removal of 2.5 Liters.  4. Mediport placed 12/2020 by Dr. Serge Gentile--removed 8/26/21 due to mediport fracture.    Germline genetic testing done by "[employer] ([email or web address] 1/29/19--negative for BRCA1/2, two (2) variants of uncertain significance (VUS): CATHY p.Q5468V/p.L7789V and MLH1 p.P603L.     :  12/19/18 1102  11/10/20  42.9  12/08/20 79.5  01/05/21 28.9  01/26/21 12.8  02/18/21 11.6  03/15/22--10.3  04/26/22--9.6  05/16/22--10.2  06/07/22--10.1    Treatment History:  1. Neoadjuvant Carboplatin/Taxol every 3 weeks x 3 cycles. 1/15/19 - 2/26/19. Neulasta added.   2. Surgery--tumor debulking KORI/BSO 3/18/19.  3. Adjuvant Carboplatin/Taxol x 3 additions cycles 4/9/19--5/21/19.  4. Splenectomy 10/21/19.  5. Niraparib maintenance (dose reduction to 100mg daily for cytopenias) 11/8/19--12/1/20 (stopped due to progression).  6. Carboplatin/Paclitaxel/Avastin X 6 cycles completed 12/8/21--3/24/21 (complete response).    Current Treatment:  1. Eliquis BID for incidental PE on imaging 1/2019  2. Avastin maintenance started on  4/13/21.   Cycle 22 due on 6/28/22.      Chief Complaint: Other Misc (Pt reports having scabs in her sinus cavity and a lump on the inside L cheek.)    HPI   Patient presents for scheduled follow-up of ovarian cancer. She continues on Avastin every 3 weeks. Reports having some nose bleeding at times and easy bruising. She attributes it to allergies and is using nasal saline 3-4 X per day. Discussed ENT referral but she would like to hold off for now. She remains on Eliquis 5mg BID and ASA. BP has been good. Denies any other problems or complaints. She is overall feeling well.     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     Ovarian cancer     Peritoneal carcinomatosis     TIA (transient ischemic attack)       Review of patient's allergies indicates:   Allergen Reactions    Aspirin     Cat/feline products     Codeine Itching    Oxycodone-acetaminophen Other (See Comments)    Oxycodone-aspirin Other (See Comments)      Current Outpatient Medications on File Prior to Visit   Medication Sig Dispense Refill    apixaban (ELIQUIS) 5 mg Tab   See Instructions, TAKE 1 TABLET BY MOUTH TWICE DAILY, # 180 tab(s), 1 Refill(s), Pharmacy: [id number], 160, cm, Height/Length Dosing, 11/30/21 13:42:00 CST, 65.5, kg, Weight Dosing, 11/30/21 13:42:00 CST      aspirin (ECOTRIN) 81 MG EC tablet Take 81 mg by mouth once daily.      atorvastatin (LIPITOR) 40 MG tablet Take 1 tablet (40 mg total) by mouth once daily. 90 tablet 3    b complex vitamins capsule Take 1 capsule by mouth once daily.      GLUTAMINE ORAL Take 5 g by mouth.      hydroCHLOROthiazide (HYDRODIURIL) 25 MG tablet Take 25 mg by mouth.      pyridoxine, vitamin B6, (VITAMIN B-6) 250 MG Tab Take 250 mg by mouth once daily.       No current facility-administered medications on file prior to visit.      Review of Systems   Constitutional: Positive for fatigue. Negative for  activity change, fever and unexpected weight change.   HENT:        +nose bleeding    Eyes: Negative for visual disturbance.   Respiratory: Negative for cough and shortness of breath.    Cardiovascular: Negative for chest pain.   Gastrointestinal: Negative for abdominal pain, blood in stool, constipation, diarrhea, nausea and vomiting.   Genitourinary: Negative for difficulty urinating.   Musculoskeletal: Negative for back pain.   Integumentary:  Negative for rash.   Neurological: Negative for dizziness, weakness and headaches.   Psychiatric/Behavioral: Negative for behavioral problems and suicidal ideas.           Vitals:    06/27/22 0922   BP: 130/74   Pulse: 62   Resp: 14   Temp: 97.7 °F (36.5 °C)      Physical Exam  Vitals reviewed.   Constitutional:       Appearance: Normal appearance. She is normal weight.   HENT:      Head: Normocephalic.   Eyes:      General: Lids are normal. Vision grossly intact.      Extraocular Movements: Extraocular movements intact.      Conjunctiva/sclera: Conjunctivae normal.   Cardiovascular:      Rate and Rhythm: Normal rate and regular rhythm.      Pulses: Normal pulses.      Heart sounds: Normal heart sounds, S1 normal and S2 normal.   Pulmonary:      Effort: Pulmonary effort is normal.      Breath sounds: Normal breath sounds.   Abdominal:      General: Abdomen is flat. Bowel sounds are normal.      Palpations: Abdomen is soft.   Musculoskeletal:      Cervical back: Normal, normal range of motion and neck supple.      Thoracic back: Normal.      Lumbar back: Normal.      Right lower leg: No edema.      Left lower leg: No edema.   Feet:      Right foot:      Skin integrity: Skin integrity normal.   Lymphadenopathy:      Comments: No palpable adenopathy   Skin:     General: Skin is warm.      Capillary Refill: Capillary refill takes less than 2 seconds.      Comments: Left CW mediport removed, site intact   Neurological:      Mental Status: She is alert and oriented to person,  place, and time.   Psychiatric:         Attention and Perception: Attention normal.         Mood and Affect: Mood normal.         Speech: Speech normal.         Behavior: Behavior normal. Behavior is cooperative.         Cognition and Memory: Cognition normal.         Judgment: Judgment normal.       Lab Results   Component Value Date    WBC 10.8 06/27/2022    RBC 4.54 06/27/2022    HGB 14.0 06/27/2022    HCT 43.5 06/27/2022    MCV 95.8 (H) 06/27/2022    MCH 30.8 06/27/2022    MCHC 32.2 (L) 06/27/2022    RDW 13.4 06/27/2022     06/27/2022    MPV 7.9 (L) 06/27/2022    CMP      Assessment:       1. Peritoneal carcinomatosis    2. Carcinoma of fallopian tube, unspecified laterality    3. Malignant neoplasm of uterine adnexa    4. Secondary malignant neoplasm of retroperitoneum and peritoneum    5. Pulmonary embolism, unspecified chronicity, unspecified pulmonary embolism type, unspecified whether acute cor pulmonale present             Plan:     Patient with left ovarian cancer diagnosed 12/26/18, appears to be stage IIIC vs. IV with diffuse peritoneal disease, possible splenic involvement, epicardial lymph nodes and bilateral pleural effusions.  Patient seen and evaluated by Dr. Mike Santana at [facility] in [place].   Treatment recommended upfront with chemotherapy Carboplatin/Paclitaxel x 3 cycles.  Completed Cycle 3 on 2/26/19.   She underwent total abdominal hysterectomy, BSO and tumor debulking on 3/18/19 with Dr. Santana with complete gross resection of disease. FIGO Stage IIIC papillary serous fallopian tube cancer.   Patient resumed chemotherapy with cycle 4 on 4/9/19. Completed cycle 6 on 5/21/19.    Testing on tumor was positive for genomic instability but negative for BRCA mutation.  Per NCCN guidelines, maintenance can be considered for BRCA mutated germline category 1 and somatic category 2A. There is some evidence that PARP inhibitors have some activity as well in tumors with genomic  instability. But the PARP maintenance is not approved for this indication. Offered treatment to patient and after discussion she declined and made decision to continue with observation only.    PET/CT done for rising CA-125 showed hypermetabolic splenic lesion which is not new, just enlarged from previous. Case discussed with Dr. Mike Santana.  Patient is now s/p splenectomy done 10/21/19. Consistent with splenic involvement with high grade serous carcinoma.   Dr. Santana recommended Niraparib maintenance based on new data showing activity in HRD positive ovarian cancer and patient started on 11/8/19, required a dose reduction due to cytopenias.  Rising CA-125 noted in 11/2020. CT showed new RLL lung mass.   Follow-up PET done 11/20/20 showed RLL lung mass hypermetabolic and hypermetabolic periportal adenopathy.     Recommended 2nd line treatment with Carboplatin/Taxol/Avastin.   Started cycle 1 on 12/8/20. Neulasta added with cycle 2.   Completed Cycle 6 on 3/24/21.  PET from 3/30/21 showed complete resolution of lung mass and no other disease.  Avastin maintenance started on 4/13/21.   Hospitalized for TIA after her 4th cycle of Avastin. Work-up for stroke and cause otherwise negative. Patient started on baby ASA by neurology.  Discussed there are no clear guidelines for changing/discontinuing treatment for TIA/CVA related to Avastin.  Since her symptoms resolved and there were no residual effects, recommended to continue with Avastin since it is working well for her disease and since a baby ASA was added for prevention.  BP has been good. Currently on HCTZ 25mg daily.  S/p mediport removal for fracture on 8/26/21.   Continue Avastin through peripheral vein for now.    Due for Avastin maintenance Cycle 22 tomorrow.    She is tolerating well but having some nose bleeding that is improved on nasal saline. Discussed ENT referral if worsens and also discussed lowering dose of Eliquis to 2.5mg PO BID.  CBC good, will f/u  CMP and CA-125. Last CA-125 remains WNL.  Labs and treatment only cycle 23 on 7/19/22.  RTC T/D visit in 6 weeks prior to cycle 24 due on 8/9/22.    Will continue to check . No further scans until rise in CA-125.  She is now >12 months from completing last chemotherapy, so still considered platinum-sensitive. Will need replacement of mediport when she needs to resume chemotherapy.   Continue Eliquis 5mg BID and ASA.  All questions answered at this time.        Renetta Corral MD

## 2022-06-24 DIAGNOSIS — C57.4 MALIGNANT NEOPLASM OF UTERINE ADNEXA: ICD-10-CM

## 2022-06-24 DIAGNOSIS — C57.00 CARCINOMA OF FALLOPIAN TUBE, UNSPECIFIED LATERALITY: ICD-10-CM

## 2022-06-24 DIAGNOSIS — C78.6 PERITONEAL CARCINOMATOSIS: Primary | ICD-10-CM

## 2022-06-27 ENCOUNTER — LAB VISIT (OUTPATIENT)
Dept: LAB | Facility: HOSPITAL | Age: 72
End: 2022-06-27
Attending: INTERNAL MEDICINE
Payer: MEDICARE

## 2022-06-27 ENCOUNTER — OFFICE VISIT (OUTPATIENT)
Dept: HEMATOLOGY/ONCOLOGY | Facility: CLINIC | Age: 72
End: 2022-06-27
Payer: MEDICARE

## 2022-06-27 VITALS
HEIGHT: 63 IN | BODY MASS INDEX: 25.47 KG/M2 | TEMPERATURE: 98 F | SYSTOLIC BLOOD PRESSURE: 130 MMHG | OXYGEN SATURATION: 97 % | RESPIRATION RATE: 14 BRPM | WEIGHT: 143.75 LBS | HEART RATE: 62 BPM | DIASTOLIC BLOOD PRESSURE: 74 MMHG

## 2022-06-27 DIAGNOSIS — C78.6 PERITONEAL CARCINOMATOSIS: Primary | ICD-10-CM

## 2022-06-27 DIAGNOSIS — C57.4 MALIGNANT NEOPLASM OF UTERINE ADNEXA: ICD-10-CM

## 2022-06-27 DIAGNOSIS — C57.00 CARCINOMA OF FALLOPIAN TUBE, UNSPECIFIED LATERALITY: ICD-10-CM

## 2022-06-27 DIAGNOSIS — C78.6 SECONDARY MALIGNANT NEOPLASM OF RETROPERITONEUM AND PERITONEUM: ICD-10-CM

## 2022-06-27 DIAGNOSIS — I26.99 PULMONARY EMBOLISM, UNSPECIFIED CHRONICITY, UNSPECIFIED PULMONARY EMBOLISM TYPE, UNSPECIFIED WHETHER ACUTE COR PULMONALE PRESENT: ICD-10-CM

## 2022-06-27 DIAGNOSIS — C57.00 CARCINOMA OF FALLOPIAN TUBE, UNSPECIFIED LATERALITY: Primary | ICD-10-CM

## 2022-06-27 DIAGNOSIS — C78.6 PERITONEAL CARCINOMATOSIS: ICD-10-CM

## 2022-06-27 LAB
ALBUMIN SERPL-MCNC: 3.6 GM/DL (ref 3.4–4.8)
ALBUMIN/GLOB SERPL: 1.2 RATIO (ref 1.1–2)
ALP SERPL-CCNC: 63 UNIT/L (ref 40–150)
ALT SERPL-CCNC: 18 UNIT/L (ref 0–55)
AST SERPL-CCNC: 28 UNIT/L (ref 5–34)
BASOPHILS # BLD AUTO: 0.02 X10(3)/MCL (ref 0–0.2)
BASOPHILS NFR BLD AUTO: 0.2 %
BILIRUBIN DIRECT+TOT PNL SERPL-MCNC: 1.8 MG/DL
BUN SERPL-MCNC: 16.9 MG/DL (ref 9.8–20.1)
CALCIUM SERPL-MCNC: 9.2 MG/DL (ref 8.4–10.2)
CANCER AG125 SERPL-ACNC: 12.9 UNIT/ML (ref 0–35)
CHLORIDE SERPL-SCNC: 97 MMOL/L (ref 98–107)
CO2 SERPL-SCNC: 30 MMOL/L (ref 23–31)
CREAT SERPL-MCNC: 0.74 MG/DL (ref 0.55–1.02)
EOSINOPHIL # BLD AUTO: 0.15 X10(3)/MCL (ref 0–0.9)
EOSINOPHIL NFR BLD AUTO: 1.4 %
ERYTHROCYTE [DISTWIDTH] IN BLOOD BY AUTOMATED COUNT: 13.4 % (ref 11.5–17)
GLOBULIN SER-MCNC: 3 GM/DL (ref 2.4–3.5)
GLUCOSE SERPL-MCNC: 92 MG/DL (ref 82–115)
HCT VFR BLD AUTO: 43.5 % (ref 37–47)
HGB BLD-MCNC: 14 GM/DL (ref 12–16)
IMM GRANULOCYTES # BLD AUTO: 0.02 X10(3)/MCL (ref 0–0.02)
IMM GRANULOCYTES NFR BLD AUTO: 0.2 % (ref 0–0.43)
LYMPHOCYTES # BLD AUTO: 3.46 X10(3)/MCL (ref 0.6–4.6)
LYMPHOCYTES NFR BLD AUTO: 32 %
MCH RBC QN AUTO: 30.8 PG (ref 27–31)
MCHC RBC AUTO-ENTMCNC: 32.2 MG/DL (ref 33–36)
MCV RBC AUTO: 95.8 FL (ref 80–94)
MONOCYTES # BLD AUTO: 1.34 X10(3)/MCL (ref 0.1–1.3)
MONOCYTES NFR BLD AUTO: 12.4 %
NEUTROPHILS # BLD AUTO: 5.8 X10(3)/MCL (ref 2.1–9.2)
NEUTROPHILS NFR BLD AUTO: 53.8 %
PLATELET # BLD AUTO: 384 X10(3)/MCL (ref 130–400)
PMV BLD AUTO: 7.9 FL (ref 9.4–12.4)
POTASSIUM SERPL-SCNC: 3.7 MMOL/L (ref 3.5–5.1)
PROT SERPL-MCNC: 6.6 GM/DL (ref 5.8–7.6)
PROT UR QL STRIP.AUTO: ABNORMAL MG/DL
RBC # BLD AUTO: 4.54 X10(6)/MCL (ref 4.2–5.4)
SODIUM SERPL-SCNC: 135 MMOL/L (ref 136–145)
WBC # SPEC AUTO: 10.8 X10(3)/MCL (ref 4.5–11.5)

## 2022-06-27 PROCEDURE — 1101F PR PT FALLS ASSESS DOC 0-1 FALLS W/OUT INJ PAST YR: ICD-10-PCS | Mod: CPTII,S$GLB,, | Performed by: INTERNAL MEDICINE

## 2022-06-27 PROCEDURE — 99215 OFFICE O/P EST HI 40 MIN: CPT | Mod: S$GLB,,, | Performed by: INTERNAL MEDICINE

## 2022-06-27 PROCEDURE — 1159F MED LIST DOCD IN RCRD: CPT | Mod: CPTII,S$GLB,, | Performed by: INTERNAL MEDICINE

## 2022-06-27 PROCEDURE — 99999 PR PBB SHADOW E&M-EST. PATIENT-LVL IV: CPT | Mod: PBBFAC,,, | Performed by: INTERNAL MEDICINE

## 2022-06-27 PROCEDURE — 80053 COMPREHEN METABOLIC PANEL: CPT

## 2022-06-27 PROCEDURE — 3288F FALL RISK ASSESSMENT DOCD: CPT | Mod: CPTII,S$GLB,, | Performed by: INTERNAL MEDICINE

## 2022-06-27 PROCEDURE — 1101F PT FALLS ASSESS-DOCD LE1/YR: CPT | Mod: CPTII,S$GLB,, | Performed by: INTERNAL MEDICINE

## 2022-06-27 PROCEDURE — 1159F PR MEDICATION LIST DOCUMENTED IN MEDICAL RECORD: ICD-10-PCS | Mod: CPTII,S$GLB,, | Performed by: INTERNAL MEDICINE

## 2022-06-27 PROCEDURE — 1126F AMNT PAIN NOTED NONE PRSNT: CPT | Mod: CPTII,S$GLB,, | Performed by: INTERNAL MEDICINE

## 2022-06-27 PROCEDURE — 3078F DIAST BP <80 MM HG: CPT | Mod: CPTII,S$GLB,, | Performed by: INTERNAL MEDICINE

## 2022-06-27 PROCEDURE — 99999 PR PBB SHADOW E&M-EST. PATIENT-LVL IV: ICD-10-PCS | Mod: PBBFAC,,, | Performed by: INTERNAL MEDICINE

## 2022-06-27 PROCEDURE — 86304 IMMUNOASSAY TUMOR CA 125: CPT

## 2022-06-27 PROCEDURE — 1126F PR PAIN SEVERITY QUANTIFIED, NO PAIN PRESENT: ICD-10-PCS | Mod: CPTII,S$GLB,, | Performed by: INTERNAL MEDICINE

## 2022-06-27 PROCEDURE — 3078F PR MOST RECENT DIASTOLIC BLOOD PRESSURE < 80 MM HG: ICD-10-PCS | Mod: CPTII,S$GLB,, | Performed by: INTERNAL MEDICINE

## 2022-06-27 PROCEDURE — 1160F RVW MEDS BY RX/DR IN RCRD: CPT | Mod: CPTII,S$GLB,, | Performed by: INTERNAL MEDICINE

## 2022-06-27 PROCEDURE — 3075F SYST BP GE 130 - 139MM HG: CPT | Mod: CPTII,S$GLB,, | Performed by: INTERNAL MEDICINE

## 2022-06-27 PROCEDURE — 3288F PR FALLS RISK ASSESSMENT DOCUMENTED: ICD-10-PCS | Mod: CPTII,S$GLB,, | Performed by: INTERNAL MEDICINE

## 2022-06-27 PROCEDURE — 1160F PR REVIEW ALL MEDS BY PRESCRIBER/CLIN PHARMACIST DOCUMENTED: ICD-10-PCS | Mod: CPTII,S$GLB,, | Performed by: INTERNAL MEDICINE

## 2022-06-27 PROCEDURE — 85025 COMPLETE CBC W/AUTO DIFF WBC: CPT

## 2022-06-27 PROCEDURE — 36415 COLL VENOUS BLD VENIPUNCTURE: CPT

## 2022-06-27 PROCEDURE — 3008F BODY MASS INDEX DOCD: CPT | Mod: CPTII,S$GLB,, | Performed by: INTERNAL MEDICINE

## 2022-06-27 PROCEDURE — 3008F PR BODY MASS INDEX (BMI) DOCUMENTED: ICD-10-PCS | Mod: CPTII,S$GLB,, | Performed by: INTERNAL MEDICINE

## 2022-06-27 PROCEDURE — 99215 PR OFFICE/OUTPT VISIT, EST, LEVL V, 40-54 MIN: ICD-10-PCS | Mod: S$GLB,,, | Performed by: INTERNAL MEDICINE

## 2022-06-27 PROCEDURE — 81005 URINALYSIS: CPT

## 2022-06-27 PROCEDURE — 3075F PR MOST RECENT SYSTOLIC BLOOD PRESS GE 130-139MM HG: ICD-10-PCS | Mod: CPTII,S$GLB,, | Performed by: INTERNAL MEDICINE

## 2022-06-27 RX ORDER — PYRIDOXINE HCL (VITAMIN B6) 250 MG
250 TABLET ORAL DAILY
COMMUNITY

## 2022-06-27 RX ORDER — VITAMIN B COMPLEX
1 CAPSULE ORAL DAILY
COMMUNITY

## 2022-06-27 RX ORDER — ASPIRIN 81 MG/1
81 TABLET ORAL DAILY
COMMUNITY

## 2022-06-28 ENCOUNTER — INFUSION (OUTPATIENT)
Dept: INFUSION THERAPY | Facility: HOSPITAL | Age: 72
End: 2022-06-28
Attending: INTERNAL MEDICINE
Payer: MEDICARE

## 2022-06-28 VITALS
RESPIRATION RATE: 16 BRPM | SYSTOLIC BLOOD PRESSURE: 154 MMHG | TEMPERATURE: 98 F | DIASTOLIC BLOOD PRESSURE: 83 MMHG | HEART RATE: 69 BPM

## 2022-06-28 DIAGNOSIS — C78.6 PERITONEAL CARCINOMATOSIS: Primary | ICD-10-CM

## 2022-06-28 PROCEDURE — 25000003 PHARM REV CODE 250: Performed by: INTERNAL MEDICINE

## 2022-06-28 PROCEDURE — 96413 CHEMO IV INFUSION 1 HR: CPT

## 2022-06-28 PROCEDURE — 96375 TX/PRO/DX INJ NEW DRUG ADDON: CPT

## 2022-06-28 PROCEDURE — 63600175 PHARM REV CODE 636 W HCPCS: Performed by: INTERNAL MEDICINE

## 2022-06-28 RX ORDER — ACETAMINOPHEN 325 MG/1
650 TABLET ORAL
Status: DISCONTINUED | OUTPATIENT
Start: 2022-06-28 | End: 2022-06-28 | Stop reason: HOSPADM

## 2022-06-28 RX ORDER — HEPARIN 100 UNIT/ML
500 SYRINGE INTRAVENOUS
Status: DISCONTINUED | OUTPATIENT
Start: 2022-06-28 | End: 2022-06-28 | Stop reason: HOSPADM

## 2022-06-28 RX ORDER — DIPHENHYDRAMINE HYDROCHLORIDE 50 MG/ML
50 INJECTION INTRAMUSCULAR; INTRAVENOUS ONCE AS NEEDED
Status: DISCONTINUED | OUTPATIENT
Start: 2022-06-28 | End: 2022-06-28 | Stop reason: HOSPADM

## 2022-06-28 RX ORDER — DIPHENHYDRAMINE HYDROCHLORIDE 50 MG/ML
25 INJECTION INTRAMUSCULAR; INTRAVENOUS
Status: COMPLETED | OUTPATIENT
Start: 2022-06-28 | End: 2022-06-28

## 2022-06-28 RX ORDER — EPINEPHRINE 0.3 MG/.3ML
0.3 INJECTION SUBCUTANEOUS ONCE AS NEEDED
Status: DISCONTINUED | OUTPATIENT
Start: 2022-06-28 | End: 2022-06-28 | Stop reason: HOSPADM

## 2022-06-28 RX ORDER — SODIUM CHLORIDE 0.9 % (FLUSH) 0.9 %
10 SYRINGE (ML) INJECTION
Status: DISCONTINUED | OUTPATIENT
Start: 2022-06-28 | End: 2022-06-28 | Stop reason: HOSPADM

## 2022-06-28 RX ADMIN — SODIUM CHLORIDE 900 MG: 9 INJECTION, SOLUTION INTRAVENOUS at 02:06

## 2022-06-28 RX ADMIN — DIPHENHYDRAMINE HYDROCHLORIDE 25 MG: 50 INJECTION, SOLUTION INTRAMUSCULAR; INTRAVENOUS at 02:06

## 2022-07-18 RX ORDER — DIPHENHYDRAMINE HYDROCHLORIDE 50 MG/ML
50 INJECTION INTRAMUSCULAR; INTRAVENOUS ONCE AS NEEDED
Status: CANCELLED | OUTPATIENT
Start: 2022-07-19 | End: 2033-12-14

## 2022-07-18 RX ORDER — DIPHENHYDRAMINE HYDROCHLORIDE 50 MG/ML
25 INJECTION INTRAMUSCULAR; INTRAVENOUS
Status: CANCELLED | OUTPATIENT
Start: 2022-07-19

## 2022-07-18 RX ORDER — EPINEPHRINE 0.3 MG/.3ML
0.3 INJECTION SUBCUTANEOUS ONCE AS NEEDED
Status: CANCELLED | OUTPATIENT
Start: 2022-07-19 | End: 2033-12-14

## 2022-07-18 RX ORDER — SODIUM CHLORIDE 0.9 % (FLUSH) 0.9 %
10 SYRINGE (ML) INJECTION
Status: CANCELLED | OUTPATIENT
Start: 2022-07-19

## 2022-07-18 RX ORDER — ACETAMINOPHEN 325 MG/1
650 TABLET ORAL
Status: CANCELLED | OUTPATIENT
Start: 2022-07-19

## 2022-07-18 RX ORDER — HEPARIN 100 UNIT/ML
500 SYRINGE INTRAVENOUS
Status: CANCELLED | OUTPATIENT
Start: 2022-07-19

## 2022-07-19 ENCOUNTER — INFUSION (OUTPATIENT)
Dept: INFUSION THERAPY | Facility: HOSPITAL | Age: 72
End: 2022-07-19
Attending: INTERNAL MEDICINE
Payer: MEDICARE

## 2022-07-19 VITALS
RESPIRATION RATE: 16 BRPM | TEMPERATURE: 98 F | HEIGHT: 63 IN | HEART RATE: 59 BPM | BODY MASS INDEX: 25.5 KG/M2 | WEIGHT: 143.94 LBS | SYSTOLIC BLOOD PRESSURE: 148 MMHG | DIASTOLIC BLOOD PRESSURE: 80 MMHG

## 2022-07-19 DIAGNOSIS — C78.6 PERITONEAL CARCINOMATOSIS: Primary | ICD-10-CM

## 2022-07-19 PROCEDURE — 25000003 PHARM REV CODE 250: Performed by: NURSE PRACTITIONER

## 2022-07-19 PROCEDURE — 63600175 PHARM REV CODE 636 W HCPCS: Performed by: NURSE PRACTITIONER

## 2022-07-19 PROCEDURE — 96413 CHEMO IV INFUSION 1 HR: CPT

## 2022-07-19 RX ORDER — EPINEPHRINE 0.3 MG/.3ML
0.3 INJECTION SUBCUTANEOUS ONCE AS NEEDED
Status: DISCONTINUED | OUTPATIENT
Start: 2022-07-19 | End: 2022-07-19 | Stop reason: HOSPADM

## 2022-07-19 RX ORDER — DIPHENHYDRAMINE HYDROCHLORIDE 50 MG/ML
25 INJECTION INTRAMUSCULAR; INTRAVENOUS
Status: COMPLETED | OUTPATIENT
Start: 2022-07-19 | End: 2022-07-19

## 2022-07-19 RX ORDER — HEPARIN 100 UNIT/ML
500 SYRINGE INTRAVENOUS
Status: DISCONTINUED | OUTPATIENT
Start: 2022-07-19 | End: 2022-07-19 | Stop reason: HOSPADM

## 2022-07-19 RX ORDER — ACETAMINOPHEN 325 MG/1
650 TABLET ORAL
Status: DISCONTINUED | OUTPATIENT
Start: 2022-07-19 | End: 2022-07-19 | Stop reason: HOSPADM

## 2022-07-19 RX ORDER — SODIUM CHLORIDE 0.9 % (FLUSH) 0.9 %
10 SYRINGE (ML) INJECTION
Status: DISCONTINUED | OUTPATIENT
Start: 2022-07-19 | End: 2022-07-19 | Stop reason: HOSPADM

## 2022-07-19 RX ORDER — DIPHENHYDRAMINE HYDROCHLORIDE 50 MG/ML
50 INJECTION INTRAMUSCULAR; INTRAVENOUS ONCE AS NEEDED
Status: DISCONTINUED | OUTPATIENT
Start: 2022-07-19 | End: 2022-07-19 | Stop reason: HOSPADM

## 2022-07-19 RX ADMIN — SODIUM CHLORIDE: 9 INJECTION, SOLUTION INTRAVENOUS at 01:07

## 2022-07-19 RX ADMIN — SODIUM CHLORIDE 900 MG: 9 INJECTION, SOLUTION INTRAVENOUS at 02:07

## 2022-07-19 RX ADMIN — DIPHENHYDRAMINE HYDROCHLORIDE 25 MG: 50 INJECTION, SOLUTION INTRAMUSCULAR; INTRAVENOUS at 01:07

## 2022-07-25 ENCOUNTER — OFFICE VISIT (OUTPATIENT)
Dept: INTERNAL MEDICINE | Facility: CLINIC | Age: 72
End: 2022-07-25
Payer: MEDICARE

## 2022-07-25 VITALS
DIASTOLIC BLOOD PRESSURE: 88 MMHG | BODY MASS INDEX: 25.7 KG/M2 | SYSTOLIC BLOOD PRESSURE: 138 MMHG | HEIGHT: 62 IN | WEIGHT: 139.63 LBS | TEMPERATURE: 98 F | HEART RATE: 63 BPM | OXYGEN SATURATION: 98 %

## 2022-07-25 DIAGNOSIS — Z12.31 BREAST CANCER SCREENING BY MAMMOGRAM: Primary | ICD-10-CM

## 2022-07-25 DIAGNOSIS — G45.9 TRANSIENT ISCHEMIC ATTACK: ICD-10-CM

## 2022-07-25 DIAGNOSIS — I26.99 PULMONARY EMBOLISM, UNSPECIFIED CHRONICITY, UNSPECIFIED PULMONARY EMBOLISM TYPE, UNSPECIFIED WHETHER ACUTE COR PULMONALE PRESENT: ICD-10-CM

## 2022-07-25 DIAGNOSIS — C57.4 MALIGNANT NEOPLASM OF UTERINE ADNEXA: ICD-10-CM

## 2022-07-25 DIAGNOSIS — I10 HYPERTENSION, UNSPECIFIED TYPE: Primary | ICD-10-CM

## 2022-07-25 DIAGNOSIS — C57.00 CARCINOMA OF FALLOPIAN TUBE, UNSPECIFIED LATERALITY: ICD-10-CM

## 2022-07-25 PROCEDURE — 1126F AMNT PAIN NOTED NONE PRSNT: CPT | Mod: CPTII,,, | Performed by: INTERNAL MEDICINE

## 2022-07-25 PROCEDURE — 1126F PR PAIN SEVERITY QUANTIFIED, NO PAIN PRESENT: ICD-10-PCS | Mod: CPTII,,, | Performed by: INTERNAL MEDICINE

## 2022-07-25 PROCEDURE — 1101F PR PT FALLS ASSESS DOC 0-1 FALLS W/OUT INJ PAST YR: ICD-10-PCS | Mod: CPTII,,, | Performed by: INTERNAL MEDICINE

## 2022-07-25 PROCEDURE — 1160F PR REVIEW ALL MEDS BY PRESCRIBER/CLIN PHARMACIST DOCUMENTED: ICD-10-PCS | Mod: CPTII,,, | Performed by: INTERNAL MEDICINE

## 2022-07-25 PROCEDURE — 3008F PR BODY MASS INDEX (BMI) DOCUMENTED: ICD-10-PCS | Mod: CPTII,,, | Performed by: INTERNAL MEDICINE

## 2022-07-25 PROCEDURE — 3008F BODY MASS INDEX DOCD: CPT | Mod: CPTII,,, | Performed by: INTERNAL MEDICINE

## 2022-07-25 PROCEDURE — 99214 OFFICE O/P EST MOD 30 MIN: CPT | Mod: ,,, | Performed by: INTERNAL MEDICINE

## 2022-07-25 PROCEDURE — 3079F DIAST BP 80-89 MM HG: CPT | Mod: CPTII,,, | Performed by: INTERNAL MEDICINE

## 2022-07-25 PROCEDURE — 3075F PR MOST RECENT SYSTOLIC BLOOD PRESS GE 130-139MM HG: ICD-10-PCS | Mod: CPTII,,, | Performed by: INTERNAL MEDICINE

## 2022-07-25 PROCEDURE — 3075F SYST BP GE 130 - 139MM HG: CPT | Mod: CPTII,,, | Performed by: INTERNAL MEDICINE

## 2022-07-25 PROCEDURE — 1160F RVW MEDS BY RX/DR IN RCRD: CPT | Mod: CPTII,,, | Performed by: INTERNAL MEDICINE

## 2022-07-25 PROCEDURE — 3079F PR MOST RECENT DIASTOLIC BLOOD PRESSURE 80-89 MM HG: ICD-10-PCS | Mod: CPTII,,, | Performed by: INTERNAL MEDICINE

## 2022-07-25 PROCEDURE — 3288F PR FALLS RISK ASSESSMENT DOCUMENTED: ICD-10-PCS | Mod: CPTII,,, | Performed by: INTERNAL MEDICINE

## 2022-07-25 PROCEDURE — 1159F MED LIST DOCD IN RCRD: CPT | Mod: CPTII,,, | Performed by: INTERNAL MEDICINE

## 2022-07-25 PROCEDURE — 1101F PT FALLS ASSESS-DOCD LE1/YR: CPT | Mod: CPTII,,, | Performed by: INTERNAL MEDICINE

## 2022-07-25 PROCEDURE — 3288F FALL RISK ASSESSMENT DOCD: CPT | Mod: CPTII,,, | Performed by: INTERNAL MEDICINE

## 2022-07-25 PROCEDURE — 99214 PR OFFICE/OUTPT VISIT, EST, LEVL IV, 30-39 MIN: ICD-10-PCS | Mod: ,,, | Performed by: INTERNAL MEDICINE

## 2022-07-25 PROCEDURE — 1159F PR MEDICATION LIST DOCUMENTED IN MEDICAL RECORD: ICD-10-PCS | Mod: CPTII,,, | Performed by: INTERNAL MEDICINE

## 2022-07-25 RX ORDER — LISINOPRIL 10 MG/1
10 TABLET ORAL DAILY
Qty: 90 TABLET | Refills: 3 | Status: SHIPPED | OUTPATIENT
Start: 2022-07-25 | End: 2023-07-07

## 2022-07-25 NOTE — PROGRESS NOTES
Subjective:      Patient ID: Shayna Ledezma is a 71 y.o. female.    Chief Complaint: Follow-up (6 months)    Ms. Ledezma is a 71-year-old female who is here for her 6 month follow up. 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 Currently on maintenance Avastin since 2021. Patient is s/p tumor debulking KORI/BSO in 2019 after completion of neoadjuvant carboplatin Taxol and then followed by adjuvant carboplatin Taxol. Being followed by oncology closely with CA-125's. Recommendation to hold off on further scanning until elevation of CA-125.  Patient also has had a splenectomy in 2019.  Patient did have an incidental PE on her imaging and now remains on Eliquis twice daily.  Patient also has had a TIA and since then has been on aspirin and on a statin.  Blood pressure is mildly elevated with her current hydrochlorothiazide.  We discussed getting a started on a low-dose of lisinopril and she is willing to try.  Advised on importance of watching her salt intake.    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     Ovarian cancer     Peritoneal carcinomatosis     TIA (transient ischemic attack)      Past Surgical History:   Procedure Laterality Date    BREAST BIOPSY       SECTION      Endometrial polyp removal      Spleen operation      TONSILLECTOMY      TOTAL ABDOMINAL HYSTERECTOMY       Review of patient's allergies indicates:   Allergen  "Reactions    Cat/feline products     Codeine Itching    Oxycodone-acetaminophen Other (See Comments)    Oxycodone-aspirin Other (See Comments)     Social History     Socioeconomic History    Marital status: Single   Tobacco Use    Smoking status: Never Smoker    Smokeless tobacco: Never Used   Substance and Sexual Activity    Alcohol use: Not Currently     Family History   Problem Relation Age of Onset    No Known Problems Mother     No Known Problems Father     COPD Brother        Review of Systems   Constitutional: Negative.         Alert, Oriented and no acute distress   HENT: Negative for congestion, rhinorrhea, sinus pain and sore throat.    Eyes: Negative for photophobia and discharge.   Respiratory: Negative for cough, chest tightness, shortness of breath and wheezing.    Cardiovascular: Negative for chest pain, palpitations and leg swelling.   Gastrointestinal: Negative for abdominal distention, constipation, diarrhea, nausea and vomiting.   Genitourinary: Negative for difficulty urinating, dyspareunia, dysuria, frequency, hematuria and menstrual problem.   Musculoskeletal: Negative for arthralgias, joint swelling, myalgias and neck pain.   Skin: Negative for rash.   Allergic/Immunologic: Negative for immunocompromised state.   Neurological: Negative for dizziness, seizures and weakness.   Psychiatric/Behavioral: Negative for agitation and suicidal ideas.       Objective:   /88 (BP Location: Left arm, Patient Position: Sitting, BP Method: Small (Manual))   Pulse 63   Temp 97.5 °F (36.4 °C) (Temporal)   Ht 5' 2" (1.575 m)   Wt 63.3 kg (139 lb 9.6 oz)   SpO2 98%   BMI 25.53 kg/m²     Physical Exam  Constitutional:       Appearance: Normal appearance.   HENT:      Head: Normocephalic and atraumatic.      Nose: Nose normal.      Mouth/Throat:      Mouth: Mucous membranes are moist.      Pharynx: Oropharynx is clear.   Eyes:      Extraocular Movements: Extraocular movements intact.      " Pupils: Pupils are equal, round, and reactive to light.   Cardiovascular:      Rate and Rhythm: Normal rate and regular rhythm.      Pulses: Normal pulses.   Pulmonary:      Effort: Pulmonary effort is normal.      Breath sounds: Normal breath sounds.   Abdominal:      General: Bowel sounds are normal.      Palpations: Abdomen is soft.   Musculoskeletal:         General: Normal range of motion.      Cervical back: Normal range of motion and neck supple.   Skin:     General: Skin is warm.   Neurological:      General: No focal deficit present.      Mental Status: She is alert and oriented to person, place, and time. Mental status is at baseline.   Psychiatric:         Mood and Affect: Mood normal.       Assessment/ Plan:   1. Hypertension, unspecified type  Well-controlled except tends to get elevated for a few weeks after taking her chemo.  Advised getting started on lisinopril 10 mg p.o. daily and to monitor blood pressure closely  -continue hydrochlorothiazide   low-sodium diet  Some form of routine aerobic exercises emphasized  - lisinopriL 10 MG tablet; Take 1 tablet (10 mg total) by mouth once daily.  Dispense: 90 tablet; Refill: 3    2. Pulmonary embolism, unspecified chronicity, unspecified pulmonary embolism type, unspecified whether acute cor pulmonale present  -anticoagulated with Eliquis, continue    3. Carcinoma of fallopian tube, unspecified laterality  -followed by Oncology,  has been    4. Transient ischemic attack  -on aspirin and statin, advised on importance of keeping blood pressure under control    5. Malignant neoplasm of uterine adnexa  -as noted above       No orders of the defined types were placed in this encounter.      Medication List with Changes/Refills   New Medications    LISINOPRIL 10 MG TABLET    Take 1 tablet (10 mg total) by mouth once daily.   Current Medications    APIXABAN (ELIQUIS) 5 MG TAB      See Instructions, TAKE 1 TABLET BY MOUTH TWICE DAILY, # 180 tab(s), 1  Refill(s), Pharmacy: [facility] #[id number], 160, cm, Height/Length Dosing, 11/30/21 13:42:00 CST, 65.5, kg, Weight Dosing, 11/30/21 13:42:00 CST    ASPIRIN (ECOTRIN) 81 MG EC TABLET    Take 81 mg by mouth once daily.    ATORVASTATIN (LIPITOR) 40 MG TABLET    Take 1 tablet (40 mg total) by mouth once daily.    B COMPLEX VITAMINS CAPSULE    Take 1 capsule by mouth once daily.    GLUTAMINE ORAL    Take 5 g by mouth.    HYDROCHLOROTHIAZIDE (HYDRODIURIL) 25 MG TABLET    Take 25 mg by mouth.    PYRIDOXINE, VITAMIN B6, (B-6) 250 MG TAB    Take 250 mg by mouth once daily.    SODIUM CHLORIDE 0.9% SOLP 100 ML WITH BEVACIZUMAB 25 MG/ML SOLN    Inject into the vein every 21 days.      Medication List with Changes/Refills   New Medications    LISINOPRIL 10 MG TABLET    Take 1 tablet (10 mg total) by mouth once daily.       Start Date: 7/25/2022 End Date: 7/25/2023   Current Medications    APIXABAN (ELIQUIS) 5 MG TAB      See Instructions, TAKE 1 TABLET BY MOUTH TWICE DAILY, # 180 tab(s), 1 Refill(s), Pharmacy: [facility] #[id number], 160, cm, Height/Length Dosing, 11/30/21 13:42:00 CST, 65.5, kg, Weight Dosing, 11/30/21 13:42:00 CST       Start Date: 12/3/2021 End Date: --    ASPIRIN (ECOTRIN) 81 MG EC TABLET    Take 81 mg by mouth once daily.       Start Date: --        End Date: --    ATORVASTATIN (LIPITOR) 40 MG TABLET    Take 1 tablet (40 mg total) by mouth once daily.       Start Date: 6/1/2022  End Date: 5/27/2023    B COMPLEX VITAMINS CAPSULE    Take 1 capsule by mouth once daily.       Start Date: --        End Date: --    GLUTAMINE ORAL    Take 5 g by mouth.       Start Date: 11/29/2021End Date: --    HYDROCHLOROTHIAZIDE (HYDRODIURIL) 25 MG TABLET    Take 25 mg by mouth.       Start Date: 7/26/2021 End Date: --    PYRIDOXINE, VITAMIN B6, (B-6) 250 MG TAB    Take 250 mg by mouth once daily.       Start Date: --        End Date: --    SODIUM CHLORIDE 0.9% SOLP 100 ML WITH BEVACIZUMAB 25 MG/ML SOLN    Inject  into the vein every 21 days.       Start Date: --        End Date: --

## 2022-08-01 NOTE — PROGRESS NOTES
Subjective:       Patient ID: Shayna Ledezma is a 71 y.o. female.  GI: Dr. Gamaliel Carlos  GYN ONC at [facility]: Dr. Mike Santana    1. 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 Pulmonary Embolism (Incidental on CT)--19    Procedure/pathology:   1. Paracentesis with removal of 5L of fluid done 18--serous carcinoma, high grade, c/w ovarian primary, PAX-8, CK7 and MOC-31 positive, CDX-2, CK-20 and Calretinin-negative.  2. S/p Exploratory Lap, radical tumor debulking including total abdominal hysterectomy, bilateral salpingo-oophorectomy, bilateral pelvic lymph node sampling, appendectomy, mobilization of the left ureter, complete gross resection of the disease with removal of mesenteric disease, as well as omental disese and parasplenic disease by Dr. Santana 3/18/19--Metastatic high grade serous carcinoma. Tissue/organ involvement: right ovary, appendectomy, omentum, mesentery (including small bowel mesentery) and multiple other sites designated: periumbilical, perisplenic, transverse colon, splenic flexure, perirectal, left periureteral. 2 lymph nodes negative. [id number]. FIGO stage IIIC. Myriad somatic instability testing positive for genomic instability, negative BRCA1 and BRCA2 tumor testing.  3. Splenectomy done 10/21/19--splenic involvement with metastatic high grade serous carcinoma, 5 benign lymph nodes. Caris testing showed LAM, NTRK1/2/3 negative, TMB low, BRCA1/2 negative, ER/ID negative, CATHY negative, SPEN pathogenic variant Exon 11, TP53 pathogenic variant Exon 5, PD-L1 positive CPS 15, Genomic ELODIA high.  Imagin. CT A/P w/ and w/o contrast done at [facility] 18--large volume ascites with multiple peritoneal implants, right pericolic gutter implant measures 2.5X3.3cm, LUQ omental/mesenteric implant measures 3X3.6cm, omental carcinomatosis, extensive enhancing soft tissue surrounding sigmoid colon vs. large sigmoid mass, left  ovarian cystic lesion appears to have some internal septations measures 3.5X3.8cm, enhancing periportal soft tissue density likely lymphadenopathy with some narrowing of the portal vein noted, but without internal filling defect, borderline splenomegaly, subtle solid lesion w/n central spleen measures 2.4X2.5cm, likely metastatic, with peripheral area of diminished enhancement suggesting splenic infarct, soft tissue implant abutting gallbladder measures 2X2.2cm, no right adnexal mass, trace bilateral layering pleural fluid, small moderate-sized hiatal hernia, few borderline enlarged lymph nodes w/n right epicardial fat pad.  2. CT of chest on 1/11/19--Some prominent right cardiophrenic lymph nodes are nonspecific and can be followed on future surveillance scans. Otherwise no CT evidence of metastatic disease to the chest. While exam was not tailored for evaluation of the pulmonary arteries I suspect there is pulmonary thromboembolic disease. Peritoneal carcinomatosis seen in the upper abdomen. Critical Result: Pulmonary Embolus.  3. CT C/A/P 3/4/19--Positive response to therapy. No new or progressive metastatic disease in the chest, abdomen or pelvis. Stable to improved findings include smaller pericardial lymph nodes, andrew hepatis adenopathy, peritoneal carcinomatosis, smaller left adnexal lesion; no evidence of PE within limitations of the study.  5. CT PE protocol chest/A/P 6/11/19--No PE, improved pericardial lymph node with minimal size on current examination, improved peritoneal carcinomatosis and left adnexal implant, splenic ischemia evolved into small infarct, size improvement of one of splenic hypodense lesion and mild size increase of second hypodense lesion, favored to be benign/cystic, no new findings.  6. PET/CT 9/25/19--s/p hysterectomy and bilateral oophorectomy, no evidence for locally recurrent/residual disease, intensely hypermetabolic hypodense lesion within the spleen 3.5X3.3cm concerning for  metastatic focus. No other abnormal focus of disease.  7. CT C/A/P 11/13/20--Right lower lobe 3.3 cm mass is presumed metastatic, otherwise no evident residual, recurrent or metastatic disease.   8. PET/CT 11/20/20--Hypermetabolic right lung base lesion and suspected metastatic periportal adenopathy, no additional sites of FDG-avid otherwise aggressive appearing pathology through the neck, chest, abdomen, or pelvis.  9. PET/CT 1/21/21--Interval decrease in size of the right lower lobe mass with decreased FDG uptake, now measures 1.7 x 2.2 cm (previous 3.3 x 3.1 cm), resolution of FDG uptake in the suspected periportal lymph node which is not identifiable on noncontrast CT. No evidence of new or progressive hypermetabolic metastatic disease.    Procedures:   1. Paracentesis on 12/28/18 with removal of 5 Liters.  2. Paracentesis on 01/07/19 with removal of 3.5 Liters.  3. Paracentesis on 02/06/19 with removal of 2.5 Liters.  4. Mediport placed 12/2020 by Dr. Serge Gentile--removed 8/26/21 due to mediport fracture.    Germline genetic testing done by [facility] 1/29/19--negative for BRCA1/2, two (2) variants of uncertain significance (VUS): CATHY p.[id number]/p.P3456X and MLH1 p.P603L.     :  12/19/18 1102  11/10/20  42.9  12/08/20 79.5  01/05/21 28.9  01/26/21 12.8  02/18/21 11.6  03/15/22--10.3  04/26/22--9.6  05/16/22--10.2  06/07/22--10.1  07/19/22--11.6    Treatment History:  1. Neoadjuvant Carboplatin/Taxol every 3 weeks x 3 cycles. 1/15/19 - 2/26/19. Neulasta added.   2. Surgery--tumor debulking KORI/BSO 3/18/19.  3. Adjuvant Carboplatin/Taxol x 3 additions cycles 4/9/19--5/21/19.  4. Splenectomy 10/21/19.  5. Niraparib maintenance (dose reduction to 100mg daily for cytopenias) 11/8/19--12/1/20 (stopped due to progression).  6. Carboplatin/Paclitaxel/Avastin X 6 cycles completed 12/8/21--3/24/21 (complete response).    Current Treatment:  1. Eliquis BID for incidental PE on imaging 1/2019  2. Avastin  maintenance started on 4/13/21.   Cycle 24 due on 8/9/22.      Chief Complaint: Other Misc (Pt reports no new concerns today.)    HPI   Patient presents for scheduled follow-up of ovarian cancer. She continues on Avastin every 3 weeks. Reports that her nose bleeding has resolved. She attributes it to allergies and continues using nasal saline 3-4 X per day. She remains on Eliquis 5mg BID and ASA. BP has been good. Her PCP started her on low-dose Lisinopril and her BP has improved. Denies any other problems or complaints. She is overall feeling well.     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     Ovarian cancer     Peritoneal carcinomatosis     TIA (transient ischemic attack)       Review of patient's allergies indicates:   Allergen Reactions    Cat/feline products     Codeine Itching    Oxycodone-acetaminophen Other (See Comments)    Oxycodone-aspirin Other (See Comments)      Current Outpatient Medications on File Prior to Visit   Medication Sig Dispense Refill    apixaban (ELIQUIS) 5 mg Tab   See Instructions, TAKE 1 TABLET BY MOUTH TWICE DAILY, # 180 tab(s), 1 Refill(s), Pharmacy: [id number], 160, cm, Height/Length Dosing, 11/30/21 13:42:00 CST, 65.5, kg, Weight Dosing, 11/30/21 13:42:00 CST      aspirin (ECOTRIN) 81 MG EC tablet Take 81 mg by mouth once daily.      atorvastatin (LIPITOR) 40 MG tablet Take 1 tablet (40 mg total) by mouth once daily. 90 tablet 3    b complex vitamins capsule Take 1 capsule by mouth once daily.      GLUTAMINE ORAL Take 5 g by mouth.      lisinopriL 10 MG tablet Take 1 tablet (10 mg total) by mouth once daily. 90 tablet 3    pyridoxine, vitamin B6, (B-6) 250 MG Tab Take 250 mg by mouth once daily.      [DISCONTINUED] hydroCHLOROthiazide (HYDRODIURIL) 25 MG tablet  Take 25 mg by mouth.      sodium chloride 0.9% SolP 100 mL with bevacizumab 25 mg/mL Soln Inject into the vein every 21 days.       No current facility-administered medications on file prior to visit.      Review of Systems   Constitutional: Negative for activity change, appetite change, fever and unexpected weight change.   HENT: Negative for mouth sores.         +nose bleeding, better   Eyes: Negative for visual disturbance.   Respiratory: Negative for cough and shortness of breath.    Cardiovascular: Negative for chest pain.   Gastrointestinal: Negative for abdominal pain, blood in stool, constipation, diarrhea, nausea and vomiting.   Genitourinary: Negative for difficulty urinating and frequency.   Musculoskeletal: Negative for back pain.   Integumentary:  Negative for rash.   Neurological: Negative for dizziness, weakness and headaches.   Hematological: Negative for adenopathy.   Psychiatric/Behavioral: Negative for behavioral problems and suicidal ideas. The patient is not nervous/anxious.         Vitals:    08/08/22 1259   BP: 105/68   Pulse: 64   Resp: 14   Temp: 97.9 °F (36.6 °C)      Physical Exam  Vitals reviewed.   Constitutional:       Appearance: Normal appearance. She is normal weight.   HENT:      Head: Normocephalic.   Eyes:      General: Lids are normal. Vision grossly intact.      Extraocular Movements: Extraocular movements intact.      Conjunctiva/sclera: Conjunctivae normal.   Cardiovascular:      Rate and Rhythm: Normal rate and regular rhythm.      Pulses: Normal pulses.      Heart sounds: Normal heart sounds, S1 normal and S2 normal.   Pulmonary:      Effort: Pulmonary effort is normal.      Breath sounds: Normal breath sounds.   Abdominal:      General: Abdomen is flat. Bowel sounds are normal. There is no distension.      Palpations: Abdomen is soft.      Tenderness: There is no abdominal tenderness.   Musculoskeletal:      Cervical back: Normal range of motion and neck supple.      Right  lower leg: No edema.      Left lower leg: No edema.   Feet:      Right foot:      Skin integrity: Skin integrity normal.   Skin:     General: Skin is warm and moist.      Capillary Refill: Capillary refill takes less than 2 seconds.      Comments: Left CW mediport removed, site intact   Neurological:      General: No focal deficit present.      Mental Status: She is alert and oriented to person, place, and time.   Psychiatric:         Attention and Perception: Attention normal.         Mood and Affect: Mood normal.         Speech: Speech normal.         Behavior: Behavior normal. Behavior is cooperative.         Cognition and Memory: Cognition normal.         Judgment: Judgment normal.       Lab Results   Component Value Date    WBC 10.2 08/08/2022    RBC 4.45 08/08/2022    HGB 13.8 08/08/2022    HCT 42.0 08/08/2022    MCV 94.4 (H) 08/08/2022    MCH 31.0 08/08/2022    MCHC 32.9 (L) 08/08/2022    RDW 13.4 08/08/2022     08/08/2022    MPV 8.1 08/08/2022    CMP      Assessment:       1. Malignant neoplasm of uterine adnexa    2. Peritoneal carcinomatosis             Plan:     Patient with left ovarian cancer diagnosed 12/26/18, appears to be stage IIIC vs. IV with diffuse peritoneal disease, possible splenic involvement, epicardial lymph nodes and bilateral pleural effusions.  Patient seen and evaluated by Dr. Mike Santana at [facility] in [place].   Treatment recommended upfront with chemotherapy Carboplatin/Paclitaxel x 3 cycles.  Completed Cycle 3 on 2/26/19.   She underwent total abdominal hysterectomy, BSO and tumor debulking on 3/18/19 with Dr. Santana with complete gross resection of disease. FIGO Stage IIIC papillary serous fallopian tube cancer.   Patient resumed chemotherapy with cycle 4 on 4/9/19. Completed cycle 6 on 5/21/19.    Testing on tumor was positive for genomic instability but negative for BRCA mutation.  Per NCCN guidelines, maintenance can be considered for BRCA mutated germline  category 1 and somatic category 2A. There is some evidence that PARP inhibitors have some activity as well in tumors with genomic instability. But the PARP maintenance is not approved for this indication. Offered treatment to patient and after discussion she declined and made decision to continue with observation only.    PET/CT done for rising CA-125 showed hypermetabolic splenic lesion which is not new, just enlarged from previous. Case discussed with Dr. Mike Santana.  Patient is now s/p splenectomy done 10/21/19. Consistent with splenic involvement with high grade serous carcinoma.   Dr. Santana recommended Niraparib maintenance based on new data showing activity in HRD positive ovarian cancer and patient started on 11/8/19, required a dose reduction due to cytopenias.  Rising CA-125 noted in 11/2020. CT showed new RLL lung mass.   Follow-up PET done 11/20/20 showed RLL lung mass hypermetabolic and hypermetabolic periportal adenopathy.     Recommended 2nd line treatment with Carboplatin/Taxol/Avastin.   Started cycle 1 on 12/8/20. Neulasta added with cycle 2.   Completed Cycle 6 on 3/24/21.  PET from 3/30/21 showed complete resolution of lung mass and no other disease.  Avastin maintenance started on 4/13/21.   Hospitalized for TIA after her 4th cycle of Avastin. Work-up for stroke and cause otherwise negative. Patient started on baby ASA by neurology.  Discussed there are no clear guidelines for changing/discontinuing treatment for TIA/CVA related to Avastin.  Since her symptoms resolved and there were no residual effects, recommended to continue with Avastin since it is working well for her disease and since a baby ASA was added for prevention.  BP has been good. Currently on HCTZ 25mg daily.  S/p mediport removal for fracture on 8/26/21.   Continue Avastin through peripheral vein for now.    Due for Avastin maintenance Cycle 24 tomorrow.    She is tolerating well but having some nose bleeding that is improved  on nasal saline. Discussed ENT referral if worsens and also discussed lowering dose of Eliquis to 2.5mg PO BID. Symptoms have improved so will hold off for now.   CBC good, will f/u CMP and CA-125. Last CA-125 remains WNL.  Labs and treatment only cycle 25 on 8/30/22.  RTC T/D visit in 6 weeks prior to cycle 26.    Will continue to check . No further scans until rise in CA-125.  She is now >12 months from completing last chemotherapy, so still considered platinum-sensitive. Will need replacement of mediport when she needs to resume chemotherapy.   Continue Eliquis 5mg BID and ASA.  All questions answered at this time.        Tom Angeles, MIAPC

## 2022-08-08 ENCOUNTER — LAB VISIT (OUTPATIENT)
Dept: LAB | Facility: HOSPITAL | Age: 72
End: 2022-08-08
Attending: INTERNAL MEDICINE
Payer: MEDICARE

## 2022-08-08 ENCOUNTER — OFFICE VISIT (OUTPATIENT)
Dept: HEMATOLOGY/ONCOLOGY | Facility: CLINIC | Age: 72
End: 2022-08-08
Payer: MEDICARE

## 2022-08-08 VITALS
BODY MASS INDEX: 24.57 KG/M2 | HEART RATE: 64 BPM | HEIGHT: 63 IN | OXYGEN SATURATION: 96 % | SYSTOLIC BLOOD PRESSURE: 105 MMHG | TEMPERATURE: 98 F | WEIGHT: 138.69 LBS | DIASTOLIC BLOOD PRESSURE: 68 MMHG | RESPIRATION RATE: 14 BRPM

## 2022-08-08 DIAGNOSIS — C78.6 PERITONEAL CARCINOMATOSIS: ICD-10-CM

## 2022-08-08 DIAGNOSIS — C57.00 CARCINOMA OF FALLOPIAN TUBE, UNSPECIFIED LATERALITY: ICD-10-CM

## 2022-08-08 DIAGNOSIS — C57.4 MALIGNANT NEOPLASM OF UTERINE ADNEXA: Primary | ICD-10-CM

## 2022-08-08 LAB
ALBUMIN SERPL-MCNC: 4 GM/DL (ref 3.4–4.8)
ALBUMIN/GLOB SERPL: 1.3 RATIO (ref 1.1–2)
ALP SERPL-CCNC: 56 UNIT/L (ref 40–150)
ALT SERPL-CCNC: 18 UNIT/L (ref 0–55)
AST SERPL-CCNC: 34 UNIT/L (ref 5–34)
BASOPHILS # BLD AUTO: 0.02 X10(3)/MCL (ref 0–0.2)
BASOPHILS NFR BLD AUTO: 0.2 %
BILIRUBIN DIRECT+TOT PNL SERPL-MCNC: 2.4 MG/DL
BUN SERPL-MCNC: 30.7 MG/DL (ref 9.8–20.1)
CALCIUM SERPL-MCNC: 9.6 MG/DL (ref 8.4–10.2)
CANCER AG125 SERPL-ACNC: 12.5 UNIT/ML (ref 0–35)
CHLORIDE SERPL-SCNC: 99 MMOL/L (ref 98–107)
CO2 SERPL-SCNC: 27 MMOL/L (ref 23–31)
CREAT SERPL-MCNC: 1.06 MG/DL (ref 0.55–1.02)
EOSINOPHIL # BLD AUTO: 0.15 X10(3)/MCL (ref 0–0.9)
EOSINOPHIL NFR BLD AUTO: 1.5 %
ERYTHROCYTE [DISTWIDTH] IN BLOOD BY AUTOMATED COUNT: 13.4 % (ref 11.5–17)
GFR SERPLBLD CREATININE-BSD FMLA CKD-EPI: 56 MLS/MIN/1.73/M2
GLOBULIN SER-MCNC: 3.2 GM/DL (ref 2.4–3.5)
GLUCOSE SERPL-MCNC: 88 MG/DL (ref 82–115)
HCT VFR BLD AUTO: 42 % (ref 37–47)
HGB BLD-MCNC: 13.8 GM/DL (ref 12–16)
IMM GRANULOCYTES # BLD AUTO: 0.02 X10(3)/MCL (ref 0–0.04)
IMM GRANULOCYTES NFR BLD AUTO: 0.2 %
LYMPHOCYTES # BLD AUTO: 3.03 X10(3)/MCL (ref 0.6–4.6)
LYMPHOCYTES NFR BLD AUTO: 29.8 %
MCH RBC QN AUTO: 31 PG (ref 27–31)
MCHC RBC AUTO-ENTMCNC: 32.9 MG/DL (ref 33–36)
MCV RBC AUTO: 94.4 FL (ref 80–94)
MONOCYTES # BLD AUTO: 1.05 X10(3)/MCL (ref 0.1–1.3)
MONOCYTES NFR BLD AUTO: 10.3 %
NEUTROPHILS # BLD AUTO: 5.9 X10(3)/MCL (ref 2.1–9.2)
NEUTROPHILS NFR BLD AUTO: 58 %
PLATELET # BLD AUTO: 334 X10(3)/MCL (ref 130–400)
PMV BLD AUTO: 8.1 FL (ref 7.4–10.4)
POTASSIUM SERPL-SCNC: 4.5 MMOL/L (ref 3.5–5.1)
PROT SERPL-MCNC: 7.2 GM/DL (ref 5.8–7.6)
RBC # BLD AUTO: 4.45 X10(6)/MCL (ref 4.2–5.4)
SODIUM SERPL-SCNC: 137 MMOL/L (ref 136–145)
WBC # SPEC AUTO: 10.2 X10(3)/MCL (ref 4.5–11.5)

## 2022-08-08 PROCEDURE — 1126F AMNT PAIN NOTED NONE PRSNT: CPT | Mod: CPTII,S$GLB,, | Performed by: NURSE PRACTITIONER

## 2022-08-08 PROCEDURE — 3078F DIAST BP <80 MM HG: CPT | Mod: CPTII,S$GLB,, | Performed by: NURSE PRACTITIONER

## 2022-08-08 PROCEDURE — 1159F PR MEDICATION LIST DOCUMENTED IN MEDICAL RECORD: ICD-10-PCS | Mod: CPTII,S$GLB,, | Performed by: NURSE PRACTITIONER

## 2022-08-08 PROCEDURE — 85025 COMPLETE CBC W/AUTO DIFF WBC: CPT

## 2022-08-08 PROCEDURE — 3008F BODY MASS INDEX DOCD: CPT | Mod: CPTII,S$GLB,, | Performed by: NURSE PRACTITIONER

## 2022-08-08 PROCEDURE — 80053 COMPREHEN METABOLIC PANEL: CPT

## 2022-08-08 PROCEDURE — 99999 PR PBB SHADOW E&M-EST. PATIENT-LVL IV: ICD-10-PCS | Mod: PBBFAC,,, | Performed by: NURSE PRACTITIONER

## 2022-08-08 PROCEDURE — 3074F PR MOST RECENT SYSTOLIC BLOOD PRESSURE < 130 MM HG: ICD-10-PCS | Mod: CPTII,S$GLB,, | Performed by: NURSE PRACTITIONER

## 2022-08-08 PROCEDURE — 99214 OFFICE O/P EST MOD 30 MIN: CPT | Mod: S$GLB,,, | Performed by: NURSE PRACTITIONER

## 2022-08-08 PROCEDURE — 3288F PR FALLS RISK ASSESSMENT DOCUMENTED: ICD-10-PCS | Mod: CPTII,S$GLB,, | Performed by: NURSE PRACTITIONER

## 2022-08-08 PROCEDURE — 99999 PR PBB SHADOW E&M-EST. PATIENT-LVL IV: CPT | Mod: PBBFAC,,, | Performed by: NURSE PRACTITIONER

## 2022-08-08 PROCEDURE — 1160F PR REVIEW ALL MEDS BY PRESCRIBER/CLIN PHARMACIST DOCUMENTED: ICD-10-PCS | Mod: CPTII,S$GLB,, | Performed by: NURSE PRACTITIONER

## 2022-08-08 PROCEDURE — 1101F PR PT FALLS ASSESS DOC 0-1 FALLS W/OUT INJ PAST YR: ICD-10-PCS | Mod: CPTII,S$GLB,, | Performed by: NURSE PRACTITIONER

## 2022-08-08 PROCEDURE — 3078F PR MOST RECENT DIASTOLIC BLOOD PRESSURE < 80 MM HG: ICD-10-PCS | Mod: CPTII,S$GLB,, | Performed by: NURSE PRACTITIONER

## 2022-08-08 PROCEDURE — 4010F ACE/ARB THERAPY RXD/TAKEN: CPT | Mod: CPTII,S$GLB,, | Performed by: NURSE PRACTITIONER

## 2022-08-08 PROCEDURE — 86304 IMMUNOASSAY TUMOR CA 125: CPT

## 2022-08-08 PROCEDURE — 1126F PR PAIN SEVERITY QUANTIFIED, NO PAIN PRESENT: ICD-10-PCS | Mod: CPTII,S$GLB,, | Performed by: NURSE PRACTITIONER

## 2022-08-08 PROCEDURE — 1159F MED LIST DOCD IN RCRD: CPT | Mod: CPTII,S$GLB,, | Performed by: NURSE PRACTITIONER

## 2022-08-08 PROCEDURE — 36415 COLL VENOUS BLD VENIPUNCTURE: CPT

## 2022-08-08 PROCEDURE — 3074F SYST BP LT 130 MM HG: CPT | Mod: CPTII,S$GLB,, | Performed by: NURSE PRACTITIONER

## 2022-08-08 PROCEDURE — 1101F PT FALLS ASSESS-DOCD LE1/YR: CPT | Mod: CPTII,S$GLB,, | Performed by: NURSE PRACTITIONER

## 2022-08-08 PROCEDURE — 1160F RVW MEDS BY RX/DR IN RCRD: CPT | Mod: CPTII,S$GLB,, | Performed by: NURSE PRACTITIONER

## 2022-08-08 PROCEDURE — 4010F PR ACE/ARB THEARPY RXD/TAKEN: ICD-10-PCS | Mod: CPTII,S$GLB,, | Performed by: NURSE PRACTITIONER

## 2022-08-08 PROCEDURE — 99214 PR OFFICE/OUTPT VISIT, EST, LEVL IV, 30-39 MIN: ICD-10-PCS | Mod: S$GLB,,, | Performed by: NURSE PRACTITIONER

## 2022-08-08 PROCEDURE — 3008F PR BODY MASS INDEX (BMI) DOCUMENTED: ICD-10-PCS | Mod: CPTII,S$GLB,, | Performed by: NURSE PRACTITIONER

## 2022-08-08 PROCEDURE — 3288F FALL RISK ASSESSMENT DOCD: CPT | Mod: CPTII,S$GLB,, | Performed by: NURSE PRACTITIONER

## 2022-08-08 RX ORDER — HEPARIN 100 UNIT/ML
500 SYRINGE INTRAVENOUS
Status: CANCELLED | OUTPATIENT
Start: 2022-08-09

## 2022-08-08 RX ORDER — DIPHENHYDRAMINE HYDROCHLORIDE 50 MG/ML
25 INJECTION INTRAMUSCULAR; INTRAVENOUS
Status: CANCELLED | OUTPATIENT
Start: 2022-08-30

## 2022-08-08 RX ORDER — SODIUM CHLORIDE 0.9 % (FLUSH) 0.9 %
10 SYRINGE (ML) INJECTION
Status: CANCELLED | OUTPATIENT
Start: 2022-08-09

## 2022-08-08 RX ORDER — ACETAMINOPHEN 325 MG/1
650 TABLET ORAL
Status: CANCELLED | OUTPATIENT
Start: 2022-08-30

## 2022-08-08 RX ORDER — ACETAMINOPHEN 325 MG/1
650 TABLET ORAL
Status: CANCELLED | OUTPATIENT
Start: 2022-08-09

## 2022-08-08 RX ORDER — DIPHENHYDRAMINE HYDROCHLORIDE 50 MG/ML
25 INJECTION INTRAMUSCULAR; INTRAVENOUS
Status: CANCELLED | OUTPATIENT
Start: 2022-08-09

## 2022-08-08 RX ORDER — HYDROCHLOROTHIAZIDE 25 MG/1
25 TABLET ORAL DAILY
Qty: 90 TABLET | Refills: 3 | Status: SHIPPED | OUTPATIENT
Start: 2022-08-08 | End: 2023-08-02

## 2022-08-08 RX ORDER — EPINEPHRINE 0.3 MG/.3ML
0.3 INJECTION SUBCUTANEOUS ONCE AS NEEDED
Status: CANCELLED | OUTPATIENT
Start: 2022-08-09

## 2022-08-08 RX ORDER — EPINEPHRINE 0.3 MG/.3ML
0.3 INJECTION SUBCUTANEOUS ONCE AS NEEDED
Status: CANCELLED | OUTPATIENT
Start: 2022-08-30

## 2022-08-08 RX ORDER — DIPHENHYDRAMINE HYDROCHLORIDE 50 MG/ML
50 INJECTION INTRAMUSCULAR; INTRAVENOUS ONCE AS NEEDED
Status: CANCELLED | OUTPATIENT
Start: 2022-08-09

## 2022-08-08 RX ORDER — DIPHENHYDRAMINE HYDROCHLORIDE 50 MG/ML
50 INJECTION INTRAMUSCULAR; INTRAVENOUS ONCE AS NEEDED
Status: CANCELLED | OUTPATIENT
Start: 2022-08-30

## 2022-08-08 RX ORDER — SODIUM CHLORIDE 0.9 % (FLUSH) 0.9 %
10 SYRINGE (ML) INJECTION
Status: CANCELLED | OUTPATIENT
Start: 2022-08-30

## 2022-08-08 RX ORDER — HEPARIN 100 UNIT/ML
500 SYRINGE INTRAVENOUS
Status: CANCELLED | OUTPATIENT
Start: 2022-08-30

## 2022-08-09 ENCOUNTER — INFUSION (OUTPATIENT)
Dept: INFUSION THERAPY | Facility: HOSPITAL | Age: 72
End: 2022-08-09
Attending: INTERNAL MEDICINE
Payer: MEDICARE

## 2022-08-09 VITALS
HEIGHT: 63 IN | WEIGHT: 144.81 LBS | BODY MASS INDEX: 25.66 KG/M2 | SYSTOLIC BLOOD PRESSURE: 155 MMHG | RESPIRATION RATE: 20 BRPM | HEART RATE: 65 BPM | DIASTOLIC BLOOD PRESSURE: 72 MMHG

## 2022-08-09 DIAGNOSIS — C78.6 PERITONEAL CARCINOMATOSIS: Primary | ICD-10-CM

## 2022-08-09 PROCEDURE — 96413 CHEMO IV INFUSION 1 HR: CPT

## 2022-08-09 PROCEDURE — 63600175 PHARM REV CODE 636 W HCPCS: Mod: TB | Performed by: NURSE PRACTITIONER

## 2022-08-09 PROCEDURE — 96375 TX/PRO/DX INJ NEW DRUG ADDON: CPT

## 2022-08-09 PROCEDURE — 25000003 PHARM REV CODE 250: Performed by: NURSE PRACTITIONER

## 2022-08-09 RX ORDER — ACETAMINOPHEN 325 MG/1
650 TABLET ORAL
Status: DISCONTINUED | OUTPATIENT
Start: 2022-08-09 | End: 2022-08-09 | Stop reason: HOSPADM

## 2022-08-09 RX ORDER — SODIUM CHLORIDE 0.9 % (FLUSH) 0.9 %
10 SYRINGE (ML) INJECTION
Status: DISCONTINUED | OUTPATIENT
Start: 2022-08-09 | End: 2022-08-09 | Stop reason: HOSPADM

## 2022-08-09 RX ORDER — DIPHENHYDRAMINE HYDROCHLORIDE 50 MG/ML
25 INJECTION INTRAMUSCULAR; INTRAVENOUS
Status: COMPLETED | OUTPATIENT
Start: 2022-08-09 | End: 2022-08-09

## 2022-08-09 RX ORDER — HEPARIN 100 UNIT/ML
500 SYRINGE INTRAVENOUS
Status: DISCONTINUED | OUTPATIENT
Start: 2022-08-09 | End: 2022-08-09 | Stop reason: HOSPADM

## 2022-08-09 RX ORDER — EPINEPHRINE 0.3 MG/.3ML
0.3 INJECTION SUBCUTANEOUS ONCE AS NEEDED
Status: DISCONTINUED | OUTPATIENT
Start: 2022-08-09 | End: 2022-08-09 | Stop reason: HOSPADM

## 2022-08-09 RX ORDER — DIPHENHYDRAMINE HYDROCHLORIDE 50 MG/ML
50 INJECTION INTRAMUSCULAR; INTRAVENOUS ONCE AS NEEDED
Status: DISCONTINUED | OUTPATIENT
Start: 2022-08-09 | End: 2022-08-09 | Stop reason: HOSPADM

## 2022-08-09 RX ADMIN — DIPHENHYDRAMINE HYDROCHLORIDE 25 MG: 50 INJECTION, SOLUTION INTRAMUSCULAR; INTRAVENOUS at 01:08

## 2022-08-09 RX ADMIN — SODIUM CHLORIDE 900 MG: 9 INJECTION, SOLUTION INTRAVENOUS at 01:08

## 2022-08-30 ENCOUNTER — LAB VISIT (OUTPATIENT)
Dept: LAB | Facility: HOSPITAL | Age: 72
End: 2022-08-30
Attending: INTERNAL MEDICINE
Payer: MEDICARE

## 2022-08-30 ENCOUNTER — INFUSION (OUTPATIENT)
Dept: INFUSION THERAPY | Facility: HOSPITAL | Age: 72
End: 2022-08-30
Attending: INTERNAL MEDICINE
Payer: MEDICARE

## 2022-08-30 VITALS
SYSTOLIC BLOOD PRESSURE: 141 MMHG | HEART RATE: 76 BPM | TEMPERATURE: 98 F | HEIGHT: 63 IN | RESPIRATION RATE: 18 BRPM | DIASTOLIC BLOOD PRESSURE: 96 MMHG | BODY MASS INDEX: 25.66 KG/M2 | WEIGHT: 144.81 LBS

## 2022-08-30 DIAGNOSIS — C57.00 CARCINOMA OF FALLOPIAN TUBE, UNSPECIFIED LATERALITY: ICD-10-CM

## 2022-08-30 DIAGNOSIS — C57.4 MALIGNANT NEOPLASM OF UTERINE ADNEXA: ICD-10-CM

## 2022-08-30 DIAGNOSIS — C78.6 PERITONEAL CARCINOMATOSIS: ICD-10-CM

## 2022-08-30 DIAGNOSIS — C78.6 PERITONEAL CARCINOMATOSIS: Primary | ICD-10-CM

## 2022-08-30 LAB
ALBUMIN SERPL-MCNC: 3.9 GM/DL (ref 3.4–4.8)
ALBUMIN/GLOB SERPL: 1.3 RATIO (ref 1.1–2)
ALP SERPL-CCNC: 54 UNIT/L (ref 40–150)
ALT SERPL-CCNC: 19 UNIT/L (ref 0–55)
AST SERPL-CCNC: 29 UNIT/L (ref 5–34)
BASOPHILS # BLD AUTO: 0.02 X10(3)/MCL (ref 0–0.2)
BASOPHILS NFR BLD AUTO: 0.2 %
BILIRUBIN DIRECT+TOT PNL SERPL-MCNC: 1.3 MG/DL
BUN SERPL-MCNC: 20.5 MG/DL (ref 9.8–20.1)
CALCIUM SERPL-MCNC: 9.6 MG/DL (ref 8.4–10.2)
CHLORIDE SERPL-SCNC: 98 MMOL/L (ref 98–107)
CO2 SERPL-SCNC: 29 MMOL/L (ref 23–31)
CREAT SERPL-MCNC: 0.78 MG/DL (ref 0.55–1.02)
EOSINOPHIL # BLD AUTO: 0.15 X10(3)/MCL (ref 0–0.9)
EOSINOPHIL NFR BLD AUTO: 1.7 %
ERYTHROCYTE [DISTWIDTH] IN BLOOD BY AUTOMATED COUNT: 13.6 % (ref 11.5–17)
GFR SERPLBLD CREATININE-BSD FMLA CKD-EPI: >60 MLS/MIN/1.73/M2
GLOBULIN SER-MCNC: 3 GM/DL (ref 2.4–3.5)
GLUCOSE SERPL-MCNC: 97 MG/DL (ref 82–115)
HCT VFR BLD AUTO: 40.7 % (ref 37–47)
HGB BLD-MCNC: 13.1 GM/DL (ref 12–16)
IMM GRANULOCYTES # BLD AUTO: 0.01 X10(3)/MCL (ref 0–0.04)
IMM GRANULOCYTES NFR BLD AUTO: 0.1 %
LYMPHOCYTES # BLD AUTO: 4.06 X10(3)/MCL (ref 0.6–4.6)
LYMPHOCYTES NFR BLD AUTO: 45.2 %
MCH RBC QN AUTO: 30.8 PG (ref 27–31)
MCHC RBC AUTO-ENTMCNC: 32.2 MG/DL (ref 33–36)
MCV RBC AUTO: 95.5 FL (ref 80–94)
MONOCYTES # BLD AUTO: 0.91 X10(3)/MCL (ref 0.1–1.3)
MONOCYTES NFR BLD AUTO: 10.1 %
NEUTROPHILS # BLD AUTO: 3.8 X10(3)/MCL (ref 2.1–9.2)
NEUTROPHILS NFR BLD AUTO: 42.7 %
PLATELET # BLD AUTO: 354 X10(3)/MCL (ref 130–400)
PMV BLD AUTO: 8.2 FL (ref 7.4–10.4)
POTASSIUM SERPL-SCNC: 4.9 MMOL/L (ref 3.5–5.1)
PROT SERPL-MCNC: 6.9 GM/DL (ref 5.8–7.6)
PROT UR QL STRIP.AUTO: ABNORMAL MG/DL
RBC # BLD AUTO: 4.26 X10(6)/MCL (ref 4.2–5.4)
SODIUM SERPL-SCNC: 132 MMOL/L (ref 136–145)
WBC # SPEC AUTO: 9 X10(3)/MCL (ref 4.5–11.5)

## 2022-08-30 PROCEDURE — 96413 CHEMO IV INFUSION 1 HR: CPT

## 2022-08-30 PROCEDURE — 81005 URINALYSIS: CPT | Performed by: INTERNAL MEDICINE

## 2022-08-30 PROCEDURE — 36415 COLL VENOUS BLD VENIPUNCTURE: CPT

## 2022-08-30 PROCEDURE — 80053 COMPREHEN METABOLIC PANEL: CPT

## 2022-08-30 PROCEDURE — 63600175 PHARM REV CODE 636 W HCPCS: Mod: TB | Performed by: NURSE PRACTITIONER

## 2022-08-30 PROCEDURE — 85025 COMPLETE CBC W/AUTO DIFF WBC: CPT

## 2022-08-30 PROCEDURE — 96375 TX/PRO/DX INJ NEW DRUG ADDON: CPT

## 2022-08-30 PROCEDURE — 25000003 PHARM REV CODE 250: Performed by: NURSE PRACTITIONER

## 2022-08-30 RX ORDER — SODIUM CHLORIDE 0.9 % (FLUSH) 0.9 %
10 SYRINGE (ML) INJECTION
Status: DISCONTINUED | OUTPATIENT
Start: 2022-08-30 | End: 2022-08-30 | Stop reason: HOSPADM

## 2022-08-30 RX ORDER — ACETAMINOPHEN 325 MG/1
650 TABLET ORAL
Status: DISCONTINUED | OUTPATIENT
Start: 2022-08-30 | End: 2022-08-30 | Stop reason: HOSPADM

## 2022-08-30 RX ORDER — HEPARIN 100 UNIT/ML
500 SYRINGE INTRAVENOUS
Status: DISCONTINUED | OUTPATIENT
Start: 2022-08-30 | End: 2022-08-30 | Stop reason: HOSPADM

## 2022-08-30 RX ORDER — DIPHENHYDRAMINE HYDROCHLORIDE 50 MG/ML
25 INJECTION INTRAMUSCULAR; INTRAVENOUS
Status: COMPLETED | OUTPATIENT
Start: 2022-08-30 | End: 2022-08-30

## 2022-08-30 RX ORDER — DIPHENHYDRAMINE HYDROCHLORIDE 50 MG/ML
50 INJECTION INTRAMUSCULAR; INTRAVENOUS ONCE AS NEEDED
Status: DISCONTINUED | OUTPATIENT
Start: 2022-08-30 | End: 2022-08-30 | Stop reason: HOSPADM

## 2022-08-30 RX ORDER — EPINEPHRINE 0.3 MG/.3ML
0.3 INJECTION SUBCUTANEOUS ONCE AS NEEDED
Status: DISCONTINUED | OUTPATIENT
Start: 2022-08-30 | End: 2022-08-30 | Stop reason: HOSPADM

## 2022-08-30 RX ADMIN — DIPHENHYDRAMINE HYDROCHLORIDE 25 MG: 50 INJECTION, SOLUTION INTRAMUSCULAR; INTRAVENOUS at 01:08

## 2022-08-30 RX ADMIN — SODIUM CHLORIDE 900 MG: 9 INJECTION, SOLUTION INTRAVENOUS at 01:08

## 2022-09-06 ENCOUNTER — HOSPITAL ENCOUNTER (OUTPATIENT)
Dept: RADIOLOGY | Facility: HOSPITAL | Age: 72
Discharge: HOME | End: 2022-09-06
Attending: INTERNAL MEDICINE
Payer: MEDICARE

## 2022-09-06 DIAGNOSIS — Z12.31 BREAST CANCER SCREENING BY MAMMOGRAM: ICD-10-CM

## 2022-09-06 PROCEDURE — 77067 MAMMO DIGITAL SCREENING BILAT WITH TOMO: ICD-10-PCS | Mod: 26,,, | Performed by: RADIOLOGY

## 2022-09-06 PROCEDURE — 77063 BREAST TOMOSYNTHESIS BI: CPT | Mod: 26,,, | Performed by: RADIOLOGY

## 2022-09-06 PROCEDURE — 77067 SCR MAMMO BI INCL CAD: CPT | Mod: 26,,, | Performed by: RADIOLOGY

## 2022-09-06 PROCEDURE — 77063 MAMMO DIGITAL SCREENING BILAT WITH TOMO: ICD-10-PCS | Mod: 26,,, | Performed by: RADIOLOGY

## 2022-09-06 PROCEDURE — 77067 SCR MAMMO BI INCL CAD: CPT | Mod: TC

## 2022-09-15 RX ORDER — HEPARIN 100 UNIT/ML
500 SYRINGE INTRAVENOUS
Status: CANCELLED | OUTPATIENT
Start: 2022-10-11

## 2022-09-15 RX ORDER — HEPARIN 100 UNIT/ML
500 SYRINGE INTRAVENOUS
Status: CANCELLED | OUTPATIENT
Start: 2022-09-20

## 2022-09-15 RX ORDER — DIPHENHYDRAMINE HYDROCHLORIDE 50 MG/ML
25 INJECTION INTRAMUSCULAR; INTRAVENOUS
Status: CANCELLED | OUTPATIENT
Start: 2022-09-20

## 2022-09-15 RX ORDER — SODIUM CHLORIDE 0.9 % (FLUSH) 0.9 %
10 SYRINGE (ML) INJECTION
Status: CANCELLED | OUTPATIENT
Start: 2022-09-20

## 2022-09-15 RX ORDER — DIPHENHYDRAMINE HYDROCHLORIDE 50 MG/ML
25 INJECTION INTRAMUSCULAR; INTRAVENOUS
Status: CANCELLED | OUTPATIENT
Start: 2022-10-11

## 2022-09-15 RX ORDER — EPINEPHRINE 0.3 MG/.3ML
0.3 INJECTION SUBCUTANEOUS ONCE AS NEEDED
Status: CANCELLED | OUTPATIENT
Start: 2022-10-11

## 2022-09-15 RX ORDER — EPINEPHRINE 0.3 MG/.3ML
0.3 INJECTION SUBCUTANEOUS ONCE AS NEEDED
Status: CANCELLED | OUTPATIENT
Start: 2022-09-20

## 2022-09-15 RX ORDER — ACETAMINOPHEN 325 MG/1
650 TABLET ORAL
Status: CANCELLED | OUTPATIENT
Start: 2022-09-20

## 2022-09-15 RX ORDER — DIPHENHYDRAMINE HYDROCHLORIDE 50 MG/ML
50 INJECTION INTRAMUSCULAR; INTRAVENOUS ONCE AS NEEDED
Status: CANCELLED | OUTPATIENT
Start: 2022-10-11

## 2022-09-15 RX ORDER — ACETAMINOPHEN 325 MG/1
650 TABLET ORAL
Status: CANCELLED | OUTPATIENT
Start: 2022-10-11

## 2022-09-15 RX ORDER — DIPHENHYDRAMINE HYDROCHLORIDE 50 MG/ML
50 INJECTION INTRAMUSCULAR; INTRAVENOUS ONCE AS NEEDED
Status: CANCELLED | OUTPATIENT
Start: 2022-09-20

## 2022-09-15 RX ORDER — SODIUM CHLORIDE 0.9 % (FLUSH) 0.9 %
10 SYRINGE (ML) INJECTION
Status: CANCELLED | OUTPATIENT
Start: 2022-10-11

## 2022-09-15 NOTE — PROGRESS NOTES
Subjective:       Patient ID: Shayna Ledezma is a 72 y.o. female.  GI: Dr. Gamaliel Carlos  GYN ONC at [facility]: Dr. Mike Santana    1. 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 Pulmonary Embolism (Incidental on CT)--19    Procedure/pathology:   1. Paracentesis with removal of 5L of fluid done 18--serous carcinoma, high grade, c/w ovarian primary, PAX-8, CK7 and MOC-31 positive, CDX-2, CK-20 and Calretinin-negative.  2. S/p Exploratory Lap, radical tumor debulking including total abdominal hysterectomy, bilateral salpingo-oophorectomy, bilateral pelvic lymph node sampling, appendectomy, mobilization of the left ureter, complete gross resection of the disease with removal of mesenteric disease, as well as omental disese and parasplenic disease by Dr. Santana 3/18/19--Metastatic high grade serous carcinoma. Tissue/organ involvement: right ovary, appendectomy, omentum, mesentery (including small bowel mesentery) and multiple other sites designated: periumbilical, perisplenic, transverse colon, splenic flexure, perirectal, left periureteral. 2 lymph nodes negative. [id number]. FIGO stage IIIC. Myriad somatic instability testing positive for genomic instability, negative BRCA1 and BRCA2 tumor testing.  3. Splenectomy done 10/21/19--splenic involvement with metastatic high grade serous carcinoma, 5 benign lymph nodes. Caris testing showed LAM, NTRK1/2/3 negative, TMB low, BRCA1/2 negative, ER/WV negative, CATHY negative, SPEN pathogenic variant Exon 11, TP53 pathogenic variant Exon 5, PD-L1 positive CPS 15, Genomic ELODIA high.  Imagin. CT A/P w/ and w/o contrast done at [facility] 18--large volume ascites with multiple peritoneal implants, right pericolic gutter implant measures 2.5X3.3cm, LUQ omental/mesenteric implant measures 3X3.6cm, omental carcinomatosis, extensive enhancing soft tissue surrounding sigmoid colon vs. large sigmoid mass, left  ovarian cystic lesion appears to have some internal septations measures 3.5X3.8cm, enhancing periportal soft tissue density likely lymphadenopathy with some narrowing of the portal vein noted, but without internal filling defect, borderline splenomegaly, subtle solid lesion w/n central spleen measures 2.4X2.5cm, likely metastatic, with peripheral area of diminished enhancement suggesting splenic infarct, soft tissue implant abutting gallbladder measures 2X2.2cm, no right adnexal mass, trace bilateral layering pleural fluid, small moderate-sized hiatal hernia, few borderline enlarged lymph nodes w/n right epicardial fat pad.  2. CT of chest on 1/11/19--Some prominent right cardiophrenic lymph nodes are nonspecific and can be followed on future surveillance scans. Otherwise no CT evidence of metastatic disease to the chest. While exam was not tailored for evaluation of the pulmonary arteries I suspect there is pulmonary thromboembolic disease. Peritoneal carcinomatosis seen in the upper abdomen. Critical Result: Pulmonary Embolus.  3. CT C/A/P 3/4/19--Positive response to therapy. No new or progressive metastatic disease in the chest, abdomen or pelvis. Stable to improved findings include smaller pericardial lymph nodes, andrew hepatis adenopathy, peritoneal carcinomatosis, smaller left adnexal lesion; no evidence of PE within limitations of the study.  5. CT PE protocol chest/A/P 6/11/19--No PE, improved pericardial lymph node with minimal size on current examination, improved peritoneal carcinomatosis and left adnexal implant, splenic ischemia evolved into small infarct, size improvement of one of splenic hypodense lesion and mild size increase of second hypodense lesion, favored to be benign/cystic, no new findings.  6. PET/CT 9/25/19--s/p hysterectomy and bilateral oophorectomy, no evidence for locally recurrent/residual disease, intensely hypermetabolic hypodense lesion within the spleen 3.5X3.3cm concerning for  metastatic focus. No other abnormal focus of disease.  7. CT C/A/P 11/13/20--Right lower lobe 3.3 cm mass is presumed metastatic, otherwise no evident residual, recurrent or metastatic disease.   8. PET/CT 11/20/20--Hypermetabolic right lung base lesion and suspected metastatic periportal adenopathy, no additional sites of FDG-avid otherwise aggressive appearing pathology through the neck, chest, abdomen, or pelvis.  9. PET/CT 1/21/21--Interval decrease in size of the right lower lobe mass with decreased FDG uptake, now measures 1.7 x 2.2 cm (previous 3.3 x 3.1 cm), resolution of FDG uptake in the suspected periportal lymph node which is not identifiable on noncontrast CT. No evidence of new or progressive hypermetabolic metastatic disease.    Procedures:   1. Paracentesis on 12/28/18 with removal of 5 Liters.  2. Paracentesis on 01/07/19 with removal of 3.5 Liters.  3. Paracentesis on 02/06/19 with removal of 2.5 Liters.  4. Mediport placed 12/2020 by Dr. Serge Gentile--removed 8/26/21 due to mediport fracture.    Germline genetic testing done by [employer] 1/29/19--negative for BRCA1/2, two (2) variants of uncertain significance (VUS): CATHY p.I2624F/p.[id number] and MLH1 p.P603L.     :  12/19/18 1102  11/10/20  42.9  12/08/20 79.5  01/05/21 28.9  01/26/21 12.8  02/18/21 11.6  03/15/22--10.3  04/26/22--9.6  05/16/22--10.2  06/07/22--10.1  07/19/22--11.6  08/08/22--12.5    Treatment History:  1. Neoadjuvant Carboplatin/Taxol every 3 weeks x 3 cycles. 1/15/19 - 2/26/19. Neulasta added.   2. Surgery--tumor debulking KORI/BSO 3/18/19.  3. Adjuvant Carboplatin/Taxol x 3 additions cycles 4/9/19--5/21/19.  4. Splenectomy 10/21/19.  5. Niraparib maintenance (dose reduction to 100mg daily for cytopenias) 11/8/19--12/1/20 (stopped due to progression).  6. Carboplatin/Paclitaxel/Avastin X 6 cycles completed 12/8/21--3/24/21 (complete response).    Current Treatment:  1. Eliquis BID for incidental PE on imaging 1/2019  2.  Avastin maintenance started on 4/13/21.   Cycle 26 due on 9/20/22.      Chief Complaint: Other Misc (Pt reports no new concerns today.)    HPI   Patient presents for scheduled follow-up of ovarian cancer. She continues on Avastin every 3 weeks. She did have some diarrhea last week, unknown cause but now resolved. No constipation or abdominal pain. Her BP has been running good. She did get recalled from a screening MMG for an asymmetry in right breast and that is scheduled soon. No other new problems reported.     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     Ovarian cancer     Peritoneal carcinomatosis     TIA (transient ischemic attack)       Review of patient's allergies indicates:   Allergen Reactions    Cat/feline products     Codeine Itching    Oxycodone-acetaminophen Other (See Comments)    Oxycodone-aspirin Other (See Comments)      Current Outpatient Medications on File Prior to Visit   Medication Sig Dispense Refill    apixaban (ELIQUIS) 5 mg Tab   See Instructions, TAKE 1 TABLET BY MOUTH TWICE DAILY, # 180 tab(s), 1 Refill(s), Pharmacy: [id number], 160, cm, Height/Length Dosing, 11/30/21 13:42:00 CST, 65.5, kg, Weight Dosing, 11/30/21 13:42:00 CST      aspirin (ECOTRIN) 81 MG EC tablet Take 81 mg by mouth once daily.      atorvastatin (LIPITOR) 40 MG tablet Take 1 tablet (40 mg total) by mouth once daily. 90 tablet 3    b complex vitamins capsule Take 1 capsule by mouth once daily.      GLUTAMINE ORAL Take 5 g by mouth.      hydroCHLOROthiazide (HYDRODIURIL) 25 MG tablet Take 1 tablet (25 mg total) by mouth once daily. 90 tablet 3    lisinopriL 10 MG tablet Take 1 tablet (10 mg total) by mouth once daily. 90 tablet 3    pyridoxine, vitamin B6, (B-6) 250 MG Tab Take 250 mg by mouth once daily.      sodium chloride  0.9% SolP 100 mL with bevacizumab 25 mg/mL Soln Inject into the vein every 21 days.       No current facility-administered medications on file prior to visit.      Review of Systems   Constitutional:  Negative for activity change, appetite change, fever and unexpected weight change.   HENT:  Negative for mouth sores.    Eyes:  Negative for visual disturbance.   Respiratory:  Negative for cough and shortness of breath.    Cardiovascular:  Negative for chest pain.   Gastrointestinal:  Negative for abdominal pain, blood in stool, constipation, diarrhea, nausea and vomiting.   Genitourinary:  Negative for difficulty urinating and frequency.   Musculoskeletal:  Negative for back pain.   Integumentary:  Negative for rash.   Neurological:  Negative for dizziness, weakness and headaches.   Hematological:  Negative for adenopathy.   Psychiatric/Behavioral:  Negative for behavioral problems and suicidal ideas. The patient is not nervous/anxious.       Vitals:    09/19/22 1245   BP: 113/76   Pulse: 62   Resp: 14   Temp: 98.2 °F (36.8 °C)        Physical Exam  Vitals reviewed.   Constitutional:       Appearance: Normal appearance. She is normal weight.   HENT:      Head: Normocephalic.   Eyes:      General: Lids are normal. Vision grossly intact.      Extraocular Movements: Extraocular movements intact.      Conjunctiva/sclera: Conjunctivae normal.   Cardiovascular:      Rate and Rhythm: Normal rate and regular rhythm.      Pulses: Normal pulses.      Heart sounds: Normal heart sounds, S1 normal and S2 normal.   Pulmonary:      Effort: Pulmonary effort is normal.      Breath sounds: Normal breath sounds.   Abdominal:      General: Abdomen is flat. Bowel sounds are normal. There is no distension.      Palpations: Abdomen is soft.      Tenderness: There is no abdominal tenderness.   Musculoskeletal:      Cervical back: Normal range of motion and neck supple.      Right lower leg: No edema.      Left lower leg: No edema.   Feet:       Right foot:      Skin integrity: Skin integrity normal.   Skin:     General: Skin is warm and moist.      Capillary Refill: Capillary refill takes less than 2 seconds.      Comments: Left CW mediport removed, site intact   Neurological:      General: No focal deficit present.      Mental Status: She is alert and oriented to person, place, and time.   Psychiatric:         Attention and Perception: Attention normal.         Mood and Affect: Mood normal.         Speech: Speech normal.         Behavior: Behavior normal. Behavior is cooperative.         Cognition and Memory: Cognition normal.         Judgment: Judgment normal.     Lab Results   Component Value Date    WBC 9.3 09/19/2022    RBC 4.25 09/19/2022    HGB 13.0 09/19/2022    HCT 40.5 09/19/2022    MCV 95.3 (H) 09/19/2022    MCH 30.6 09/19/2022    MCHC 32.1 (L) 09/19/2022    RDW 13.8 09/19/2022     09/19/2022    MPV 8.1 09/19/2022    CMP      Assessment:       1. Peritoneal carcinomatosis    2. Malignant neoplasm of uterine adnexa               Plan:     Patient with left ovarian cancer diagnosed 12/26/18, appears to be stage IIIC vs. IV with diffuse peritoneal disease, possible splenic involvement, epicardial lymph nodes and bilateral pleural effusions.  Patient seen and evaluated by Dr. Mike Santana at [facility] in [place].   Treatment recommended upfront with chemotherapy Carboplatin/Paclitaxel x 3 cycles.  Completed Cycle 3 on 2/26/19.   She underwent total abdominal hysterectomy, BSO and tumor debulking on 3/18/19 with Dr. Santana with complete gross resection of disease. FIGO Stage IIIC papillary serous fallopian tube cancer.   Patient resumed chemotherapy with cycle 4 on 4/9/19. Completed cycle 6 on 5/21/19.    Testing on tumor was positive for genomic instability but negative for BRCA mutation.  Per NCCN guidelines, maintenance can be considered for BRCA mutated germline category 1 and somatic category 2A. There is some evidence that  PARP inhibitors have some activity as well in tumors with genomic instability. But the PARP maintenance is not approved for this indication. Offered treatment to patient and after discussion she declined and made decision to continue with observation only.    PET/CT done for rising CA-125 showed hypermetabolic splenic lesion which is not new, just enlarged from previous. Case discussed with Dr. Mike Santana.  Patient is now s/p splenectomy done 10/21/19. Consistent with splenic involvement with high grade serous carcinoma.   Dr. Santana recommended Niraparib maintenance based on new data showing activity in HRD positive ovarian cancer and patient started on 11/8/19, required a dose reduction due to cytopenias.  Rising CA-125 noted in 11/2020. CT showed new RLL lung mass.   Follow-up PET done 11/20/20 showed RLL lung mass hypermetabolic and hypermetabolic periportal adenopathy.     Recommended 2nd line treatment with Carboplatin/Taxol/Avastin.   Started cycle 1 on 12/8/20. Neulasta added with cycle 2.   Completed Cycle 6 on 3/24/21.  PET from 3/30/21 showed complete resolution of lung mass and no other disease.    Avastin maintenance started on 4/13/21.   Hospitalized for TIA after her 4th cycle of Avastin. Work-up for stroke and cause otherwise negative. Patient started on baby ASA by neurology.  Discussed there are no clear guidelines for changing/discontinuing treatment for TIA/CVA related to Avastin.  Since her symptoms resolved and there were no residual effects, recommended to continue with Avastin since it is working well for her disease and since a baby ASA was added for prevention. She was also continued on Eliquis for h/o PE.  S/p mediport removal for fracture on 8/26/21.   Continue Avastin through peripheral vein for now.    Patient continues on Avastin maintenance without problems.    BP has been good. Currently on HCTZ 25mg daily.      Due for Avastin maintenance Cycle 26 tomorrow.  Was having some nose  bleeding but has resolved.     CBC good, will f/u CMP and CA-125. Last CA-125 remains WNL.  Labs on 10/10/22 and treatment only cycle 27 on 10/11/22.  RTC T/D visit in 6 weeks prior to cycle 28.    Will continue to check . No further scans until rise in CA-125.  She is now >12 months from completing last chemotherapy, so still considered platinum-sensitive. Will need replacement of mediport when she needs to resume chemotherapy.   Continue Eliquis 5mg BID and ASA.  All questions answered at this time.        Renetta Corral MD

## 2022-09-19 ENCOUNTER — LAB VISIT (OUTPATIENT)
Dept: LAB | Facility: HOSPITAL | Age: 72
End: 2022-09-19
Attending: INTERNAL MEDICINE
Payer: MEDICARE

## 2022-09-19 ENCOUNTER — OFFICE VISIT (OUTPATIENT)
Dept: HEMATOLOGY/ONCOLOGY | Facility: CLINIC | Age: 72
End: 2022-09-19
Payer: MEDICARE

## 2022-09-19 VITALS
SYSTOLIC BLOOD PRESSURE: 113 MMHG | DIASTOLIC BLOOD PRESSURE: 76 MMHG | TEMPERATURE: 98 F | OXYGEN SATURATION: 98 % | WEIGHT: 140.38 LBS | HEART RATE: 62 BPM | BODY MASS INDEX: 24.88 KG/M2 | RESPIRATION RATE: 14 BRPM | HEIGHT: 63 IN

## 2022-09-19 DIAGNOSIS — C78.6 PERITONEAL CARCINOMATOSIS: ICD-10-CM

## 2022-09-19 DIAGNOSIS — C57.00 CARCINOMA OF FALLOPIAN TUBE, UNSPECIFIED LATERALITY: ICD-10-CM

## 2022-09-19 DIAGNOSIS — C57.4 MALIGNANT NEOPLASM OF UTERINE ADNEXA: ICD-10-CM

## 2022-09-19 DIAGNOSIS — C78.6 PERITONEAL CARCINOMATOSIS: Primary | ICD-10-CM

## 2022-09-19 LAB
ALBUMIN SERPL-MCNC: 4 GM/DL (ref 3.4–4.8)
ALBUMIN/GLOB SERPL: 1.3 RATIO (ref 1.1–2)
ALP SERPL-CCNC: 50 UNIT/L (ref 40–150)
ALT SERPL-CCNC: 19 UNIT/L (ref 0–55)
AST SERPL-CCNC: 33 UNIT/L (ref 5–34)
BASOPHILS # BLD AUTO: 0.02 X10(3)/MCL (ref 0–0.2)
BASOPHILS NFR BLD AUTO: 0.2 %
BILIRUBIN DIRECT+TOT PNL SERPL-MCNC: 0.5 MG/DL (ref 0–0.5)
BILIRUBIN DIRECT+TOT PNL SERPL-MCNC: 1.7 MG/DL
BUN SERPL-MCNC: 15.7 MG/DL (ref 9.8–20.1)
CALCIUM SERPL-MCNC: 9.8 MG/DL (ref 8.4–10.2)
CHLORIDE SERPL-SCNC: 99 MMOL/L (ref 98–107)
CO2 SERPL-SCNC: 30 MMOL/L (ref 23–31)
CREAT SERPL-MCNC: 0.75 MG/DL (ref 0.55–1.02)
EOSINOPHIL # BLD AUTO: 0.27 X10(3)/MCL (ref 0–0.9)
EOSINOPHIL NFR BLD AUTO: 2.9 %
ERYTHROCYTE [DISTWIDTH] IN BLOOD BY AUTOMATED COUNT: 13.8 % (ref 11.5–17)
GFR SERPLBLD CREATININE-BSD FMLA CKD-EPI: >60 MLS/MIN/1.73/M2
GLOBULIN SER-MCNC: 3.1 GM/DL (ref 2.4–3.5)
GLUCOSE SERPL-MCNC: 104 MG/DL (ref 82–115)
HCT VFR BLD AUTO: 40.5 % (ref 37–47)
HGB BLD-MCNC: 13 GM/DL (ref 12–16)
IMM GRANULOCYTES # BLD AUTO: 0 X10(3)/MCL (ref 0–0.04)
IMM GRANULOCYTES NFR BLD AUTO: 0 %
LYMPHOCYTES # BLD AUTO: 3.7 X10(3)/MCL (ref 0.6–4.6)
LYMPHOCYTES NFR BLD AUTO: 40 %
MCH RBC QN AUTO: 30.6 PG (ref 27–31)
MCHC RBC AUTO-ENTMCNC: 32.1 MG/DL (ref 33–36)
MCV RBC AUTO: 95.3 FL (ref 80–94)
MONOCYTES # BLD AUTO: 0.96 X10(3)/MCL (ref 0.1–1.3)
MONOCYTES NFR BLD AUTO: 10.4 %
NEUTROPHILS # BLD AUTO: 4.3 X10(3)/MCL (ref 2.1–9.2)
NEUTROPHILS NFR BLD AUTO: 46.5 %
PLATELET # BLD AUTO: 358 X10(3)/MCL (ref 130–400)
PMV BLD AUTO: 8.1 FL (ref 7.4–10.4)
POTASSIUM SERPL-SCNC: 4.9 MMOL/L (ref 3.5–5.1)
PROT SERPL-MCNC: 7.1 GM/DL (ref 5.8–7.6)
PROT UR QL STRIP.AUTO: ABNORMAL MG/DL
RBC # BLD AUTO: 4.25 X10(6)/MCL (ref 4.2–5.4)
SODIUM SERPL-SCNC: 134 MMOL/L (ref 136–145)
WBC # SPEC AUTO: 9.3 X10(3)/MCL (ref 4.5–11.5)

## 2022-09-19 PROCEDURE — 1126F AMNT PAIN NOTED NONE PRSNT: CPT | Mod: CPTII,S$GLB,, | Performed by: INTERNAL MEDICINE

## 2022-09-19 PROCEDURE — 3074F PR MOST RECENT SYSTOLIC BLOOD PRESSURE < 130 MM HG: ICD-10-PCS | Mod: CPTII,S$GLB,, | Performed by: INTERNAL MEDICINE

## 2022-09-19 PROCEDURE — 99215 PR OFFICE/OUTPT VISIT, EST, LEVL V, 40-54 MIN: ICD-10-PCS | Mod: S$GLB,,, | Performed by: INTERNAL MEDICINE

## 2022-09-19 PROCEDURE — 82248 BILIRUBIN DIRECT: CPT

## 2022-09-19 PROCEDURE — 4010F ACE/ARB THERAPY RXD/TAKEN: CPT | Mod: CPTII,S$GLB,, | Performed by: INTERNAL MEDICINE

## 2022-09-19 PROCEDURE — 80053 COMPREHEN METABOLIC PANEL: CPT

## 2022-09-19 PROCEDURE — 3008F PR BODY MASS INDEX (BMI) DOCUMENTED: ICD-10-PCS | Mod: CPTII,S$GLB,, | Performed by: INTERNAL MEDICINE

## 2022-09-19 PROCEDURE — 1159F PR MEDICATION LIST DOCUMENTED IN MEDICAL RECORD: ICD-10-PCS | Mod: CPTII,S$GLB,, | Performed by: INTERNAL MEDICINE

## 2022-09-19 PROCEDURE — 1101F PR PT FALLS ASSESS DOC 0-1 FALLS W/OUT INJ PAST YR: ICD-10-PCS | Mod: CPTII,S$GLB,, | Performed by: INTERNAL MEDICINE

## 2022-09-19 PROCEDURE — 86304 IMMUNOASSAY TUMOR CA 125: CPT | Performed by: NURSE PRACTITIONER

## 2022-09-19 PROCEDURE — 3078F DIAST BP <80 MM HG: CPT | Mod: CPTII,S$GLB,, | Performed by: INTERNAL MEDICINE

## 2022-09-19 PROCEDURE — 4010F PR ACE/ARB THEARPY RXD/TAKEN: ICD-10-PCS | Mod: CPTII,S$GLB,, | Performed by: INTERNAL MEDICINE

## 2022-09-19 PROCEDURE — 3074F SYST BP LT 130 MM HG: CPT | Mod: CPTII,S$GLB,, | Performed by: INTERNAL MEDICINE

## 2022-09-19 PROCEDURE — 3078F PR MOST RECENT DIASTOLIC BLOOD PRESSURE < 80 MM HG: ICD-10-PCS | Mod: CPTII,S$GLB,, | Performed by: INTERNAL MEDICINE

## 2022-09-19 PROCEDURE — 36415 COLL VENOUS BLD VENIPUNCTURE: CPT

## 2022-09-19 PROCEDURE — 1160F PR REVIEW ALL MEDS BY PRESCRIBER/CLIN PHARMACIST DOCUMENTED: ICD-10-PCS | Mod: CPTII,S$GLB,, | Performed by: INTERNAL MEDICINE

## 2022-09-19 PROCEDURE — 99999 PR PBB SHADOW E&M-EST. PATIENT-LVL IV: ICD-10-PCS | Mod: PBBFAC,,, | Performed by: INTERNAL MEDICINE

## 2022-09-19 PROCEDURE — 85025 COMPLETE CBC W/AUTO DIFF WBC: CPT

## 2022-09-19 PROCEDURE — 1160F RVW MEDS BY RX/DR IN RCRD: CPT | Mod: CPTII,S$GLB,, | Performed by: INTERNAL MEDICINE

## 2022-09-19 PROCEDURE — 1126F PR PAIN SEVERITY QUANTIFIED, NO PAIN PRESENT: ICD-10-PCS | Mod: CPTII,S$GLB,, | Performed by: INTERNAL MEDICINE

## 2022-09-19 PROCEDURE — 3288F FALL RISK ASSESSMENT DOCD: CPT | Mod: CPTII,S$GLB,, | Performed by: INTERNAL MEDICINE

## 2022-09-19 PROCEDURE — 99999 PR PBB SHADOW E&M-EST. PATIENT-LVL IV: CPT | Mod: PBBFAC,,, | Performed by: INTERNAL MEDICINE

## 2022-09-19 PROCEDURE — 3288F PR FALLS RISK ASSESSMENT DOCUMENTED: ICD-10-PCS | Mod: CPTII,S$GLB,, | Performed by: INTERNAL MEDICINE

## 2022-09-19 PROCEDURE — 1101F PT FALLS ASSESS-DOCD LE1/YR: CPT | Mod: CPTII,S$GLB,, | Performed by: INTERNAL MEDICINE

## 2022-09-19 PROCEDURE — 1159F MED LIST DOCD IN RCRD: CPT | Mod: CPTII,S$GLB,, | Performed by: INTERNAL MEDICINE

## 2022-09-19 PROCEDURE — 3008F BODY MASS INDEX DOCD: CPT | Mod: CPTII,S$GLB,, | Performed by: INTERNAL MEDICINE

## 2022-09-19 PROCEDURE — 81005 URINALYSIS: CPT | Performed by: INTERNAL MEDICINE

## 2022-09-19 PROCEDURE — 99215 OFFICE O/P EST HI 40 MIN: CPT | Mod: S$GLB,,, | Performed by: INTERNAL MEDICINE

## 2022-09-19 RX ORDER — ACETAMINOPHEN 325 MG/1
650 TABLET ORAL
Status: CANCELLED | OUTPATIENT
Start: 2022-11-01

## 2022-09-19 RX ORDER — DIPHENHYDRAMINE HYDROCHLORIDE 50 MG/ML
50 INJECTION INTRAMUSCULAR; INTRAVENOUS ONCE AS NEEDED
Status: CANCELLED | OUTPATIENT
Start: 2022-11-01

## 2022-09-19 RX ORDER — HEPARIN 100 UNIT/ML
500 SYRINGE INTRAVENOUS
Status: CANCELLED | OUTPATIENT
Start: 2022-11-01

## 2022-09-19 RX ORDER — SODIUM CHLORIDE 0.9 % (FLUSH) 0.9 %
10 SYRINGE (ML) INJECTION
Status: CANCELLED | OUTPATIENT
Start: 2022-11-01

## 2022-09-19 RX ORDER — DIPHENHYDRAMINE HYDROCHLORIDE 50 MG/ML
25 INJECTION INTRAMUSCULAR; INTRAVENOUS
Status: CANCELLED | OUTPATIENT
Start: 2022-11-01

## 2022-09-19 RX ORDER — EPINEPHRINE 0.3 MG/.3ML
0.3 INJECTION SUBCUTANEOUS ONCE AS NEEDED
Status: CANCELLED | OUTPATIENT
Start: 2022-11-01

## 2022-09-20 ENCOUNTER — INFUSION (OUTPATIENT)
Dept: INFUSION THERAPY | Facility: HOSPITAL | Age: 72
End: 2022-09-20
Attending: INTERNAL MEDICINE
Payer: MEDICARE

## 2022-09-20 VITALS
HEART RATE: 55 BPM | BODY MASS INDEX: 25.8 KG/M2 | SYSTOLIC BLOOD PRESSURE: 146 MMHG | DIASTOLIC BLOOD PRESSURE: 78 MMHG | WEIGHT: 140.19 LBS | OXYGEN SATURATION: 99 % | TEMPERATURE: 98 F | HEIGHT: 62 IN | RESPIRATION RATE: 18 BRPM

## 2022-09-20 DIAGNOSIS — C78.6 PERITONEAL CARCINOMATOSIS: Primary | ICD-10-CM

## 2022-09-20 PROCEDURE — 63600175 PHARM REV CODE 636 W HCPCS: Performed by: INTERNAL MEDICINE

## 2022-09-20 PROCEDURE — 96413 CHEMO IV INFUSION 1 HR: CPT

## 2022-09-20 PROCEDURE — 25000003 PHARM REV CODE 250: Performed by: INTERNAL MEDICINE

## 2022-09-20 PROCEDURE — 96375 TX/PRO/DX INJ NEW DRUG ADDON: CPT

## 2022-09-20 RX ORDER — SODIUM CHLORIDE 0.9 % (FLUSH) 0.9 %
10 SYRINGE (ML) INJECTION
Status: DISCONTINUED | OUTPATIENT
Start: 2022-09-20 | End: 2022-09-20 | Stop reason: HOSPADM

## 2022-09-20 RX ORDER — DIPHENHYDRAMINE HYDROCHLORIDE 50 MG/ML
50 INJECTION INTRAMUSCULAR; INTRAVENOUS ONCE AS NEEDED
Status: DISCONTINUED | OUTPATIENT
Start: 2022-09-20 | End: 2022-09-20 | Stop reason: HOSPADM

## 2022-09-20 RX ORDER — HEPARIN 100 UNIT/ML
500 SYRINGE INTRAVENOUS
Status: DISCONTINUED | OUTPATIENT
Start: 2022-09-20 | End: 2022-09-20 | Stop reason: HOSPADM

## 2022-09-20 RX ORDER — EPINEPHRINE 0.3 MG/.3ML
0.3 INJECTION SUBCUTANEOUS ONCE AS NEEDED
Status: DISCONTINUED | OUTPATIENT
Start: 2022-09-20 | End: 2022-09-20 | Stop reason: HOSPADM

## 2022-09-20 RX ORDER — ACETAMINOPHEN 325 MG/1
650 TABLET ORAL
Status: DISCONTINUED | OUTPATIENT
Start: 2022-09-20 | End: 2022-09-20 | Stop reason: HOSPADM

## 2022-09-20 RX ORDER — DIPHENHYDRAMINE HYDROCHLORIDE 50 MG/ML
25 INJECTION INTRAMUSCULAR; INTRAVENOUS
Status: COMPLETED | OUTPATIENT
Start: 2022-09-20 | End: 2022-09-20

## 2022-09-20 RX ADMIN — SODIUM CHLORIDE 900 MG: 9 INJECTION, SOLUTION INTRAVENOUS at 03:09

## 2022-09-20 RX ADMIN — DIPHENHYDRAMINE HYDROCHLORIDE 25 MG: 50 INJECTION, SOLUTION INTRAMUSCULAR; INTRAVENOUS at 03:09

## 2022-09-21 ENCOUNTER — HOSPITAL ENCOUNTER (OUTPATIENT)
Dept: RADIOLOGY | Facility: HOSPITAL | Age: 72
Discharge: HOME | End: 2022-09-21
Attending: INTERNAL MEDICINE
Payer: MEDICARE

## 2022-09-21 DIAGNOSIS — R92.8 ABNORMAL MAMMOGRAM: ICD-10-CM

## 2022-09-21 PROCEDURE — 76642 ULTRASOUND BREAST LIMITED: CPT | Mod: 26,RT,, | Performed by: STUDENT IN AN ORGANIZED HEALTH CARE EDUCATION/TRAINING PROGRAM

## 2022-09-21 PROCEDURE — 76642 ULTRASOUND BREAST LIMITED: CPT | Mod: TC,RT

## 2022-09-21 PROCEDURE — 77061 MAMMO DIGITAL DIAGNOSTIC RIGHT WITH TOMO: ICD-10-PCS | Mod: 26,RT,, | Performed by: STUDENT IN AN ORGANIZED HEALTH CARE EDUCATION/TRAINING PROGRAM

## 2022-09-21 PROCEDURE — 76642 US BREAST RIGHT LIMITED: ICD-10-PCS | Mod: 26,RT,, | Performed by: STUDENT IN AN ORGANIZED HEALTH CARE EDUCATION/TRAINING PROGRAM

## 2022-09-21 PROCEDURE — 77065 DX MAMMO INCL CAD UNI: CPT | Mod: TC,RT

## 2022-09-21 PROCEDURE — 77065 MAMMO DIGITAL DIAGNOSTIC RIGHT WITH TOMO: ICD-10-PCS | Mod: 26,RT,, | Performed by: STUDENT IN AN ORGANIZED HEALTH CARE EDUCATION/TRAINING PROGRAM

## 2022-09-21 PROCEDURE — 77065 DX MAMMO INCL CAD UNI: CPT | Mod: 26,RT,, | Performed by: STUDENT IN AN ORGANIZED HEALTH CARE EDUCATION/TRAINING PROGRAM

## 2022-09-21 PROCEDURE — 77061 BREAST TOMOSYNTHESIS UNI: CPT | Mod: 26,RT,, | Performed by: STUDENT IN AN ORGANIZED HEALTH CARE EDUCATION/TRAINING PROGRAM

## 2022-10-10 ENCOUNTER — LAB VISIT (OUTPATIENT)
Dept: LAB | Facility: HOSPITAL | Age: 72
End: 2022-10-10
Attending: INTERNAL MEDICINE
Payer: MEDICARE

## 2022-10-10 DIAGNOSIS — C57.4 MALIGNANT NEOPLASM OF UTERINE ADNEXA: ICD-10-CM

## 2022-10-10 DIAGNOSIS — C78.6 PERITONEAL CARCINOMATOSIS: ICD-10-CM

## 2022-10-10 LAB
ALBUMIN SERPL-MCNC: 3.9 GM/DL (ref 3.4–4.8)
ALBUMIN/GLOB SERPL: 1.2 RATIO (ref 1.1–2)
ALP SERPL-CCNC: 52 UNIT/L (ref 40–150)
ALT SERPL-CCNC: 21 UNIT/L (ref 0–55)
AST SERPL-CCNC: 34 UNIT/L (ref 5–34)
BASOPHILS # BLD AUTO: 0.02 X10(3)/MCL (ref 0–0.2)
BASOPHILS NFR BLD AUTO: 0.2 %
BILIRUBIN DIRECT+TOT PNL SERPL-MCNC: 1.3 MG/DL
BUN SERPL-MCNC: 23.1 MG/DL (ref 9.8–20.1)
CALCIUM SERPL-MCNC: 9.5 MG/DL (ref 8.4–10.2)
CHLORIDE SERPL-SCNC: 100 MMOL/L (ref 98–107)
CO2 SERPL-SCNC: 31 MMOL/L (ref 23–31)
CREAT SERPL-MCNC: 0.69 MG/DL (ref 0.55–1.02)
EOSINOPHIL # BLD AUTO: 0.14 X10(3)/MCL (ref 0–0.9)
EOSINOPHIL NFR BLD AUTO: 1.6 %
ERYTHROCYTE [DISTWIDTH] IN BLOOD BY AUTOMATED COUNT: 13.6 % (ref 11.5–17)
GFR SERPLBLD CREATININE-BSD FMLA CKD-EPI: >60 MLS/MIN/1.73/M2
GLOBULIN SER-MCNC: 3.2 GM/DL (ref 2.4–3.5)
GLUCOSE SERPL-MCNC: 87 MG/DL (ref 82–115)
HCT VFR BLD AUTO: 40.3 % (ref 37–47)
HGB BLD-MCNC: 13 GM/DL (ref 12–16)
IMM GRANULOCYTES # BLD AUTO: 0.01 X10(3)/MCL (ref 0–0.04)
IMM GRANULOCYTES NFR BLD AUTO: 0.1 %
LYMPHOCYTES # BLD AUTO: 3.72 X10(3)/MCL (ref 0.6–4.6)
LYMPHOCYTES NFR BLD AUTO: 43.2 %
MCH RBC QN AUTO: 30.9 PG (ref 27–31)
MCHC RBC AUTO-ENTMCNC: 32.3 MG/DL (ref 33–36)
MCV RBC AUTO: 95.7 FL (ref 80–94)
MONOCYTES # BLD AUTO: 1.13 X10(3)/MCL (ref 0.1–1.3)
MONOCYTES NFR BLD AUTO: 13.1 %
NEUTROPHILS # BLD AUTO: 3.6 X10(3)/MCL (ref 2.1–9.2)
NEUTROPHILS NFR BLD AUTO: 41.8 %
PLATELET # BLD AUTO: 360 X10(3)/MCL (ref 130–400)
PMV BLD AUTO: 7.9 FL (ref 7.4–10.4)
POTASSIUM SERPL-SCNC: 4.3 MMOL/L (ref 3.5–5.1)
PROT SERPL-MCNC: 7.1 GM/DL (ref 5.8–7.6)
PROT UR QL STRIP.AUTO: ABNORMAL MG/DL
RBC # BLD AUTO: 4.21 X10(6)/MCL (ref 4.2–5.4)
SODIUM SERPL-SCNC: 137 MMOL/L (ref 136–145)
WBC # SPEC AUTO: 8.6 X10(3)/MCL (ref 4.5–11.5)

## 2022-10-10 PROCEDURE — 80053 COMPREHEN METABOLIC PANEL: CPT

## 2022-10-10 PROCEDURE — 81005 URINALYSIS: CPT

## 2022-10-10 PROCEDURE — 36415 COLL VENOUS BLD VENIPUNCTURE: CPT

## 2022-10-10 PROCEDURE — 85025 COMPLETE CBC W/AUTO DIFF WBC: CPT

## 2022-10-11 ENCOUNTER — INFUSION (OUTPATIENT)
Dept: INFUSION THERAPY | Facility: HOSPITAL | Age: 72
End: 2022-10-11
Attending: INTERNAL MEDICINE
Payer: MEDICARE

## 2022-10-11 VITALS
DIASTOLIC BLOOD PRESSURE: 79 MMHG | SYSTOLIC BLOOD PRESSURE: 131 MMHG | OXYGEN SATURATION: 99 % | RESPIRATION RATE: 18 BRPM | BODY MASS INDEX: 25.8 KG/M2 | HEIGHT: 62 IN | WEIGHT: 140.19 LBS | HEART RATE: 61 BPM | TEMPERATURE: 98 F

## 2022-10-11 DIAGNOSIS — C78.6 PERITONEAL CARCINOMATOSIS: Primary | ICD-10-CM

## 2022-10-11 PROCEDURE — 96375 TX/PRO/DX INJ NEW DRUG ADDON: CPT

## 2022-10-11 PROCEDURE — 25000003 PHARM REV CODE 250: Performed by: INTERNAL MEDICINE

## 2022-10-11 PROCEDURE — 96413 CHEMO IV INFUSION 1 HR: CPT

## 2022-10-11 PROCEDURE — 63600175 PHARM REV CODE 636 W HCPCS: Performed by: INTERNAL MEDICINE

## 2022-10-11 RX ORDER — SODIUM CHLORIDE 0.9 % (FLUSH) 0.9 %
10 SYRINGE (ML) INJECTION
Status: DISCONTINUED | OUTPATIENT
Start: 2022-10-11 | End: 2022-10-11 | Stop reason: HOSPADM

## 2022-10-11 RX ORDER — EPINEPHRINE 0.3 MG/.3ML
0.3 INJECTION SUBCUTANEOUS ONCE AS NEEDED
Status: DISCONTINUED | OUTPATIENT
Start: 2022-10-11 | End: 2022-10-11 | Stop reason: HOSPADM

## 2022-10-11 RX ORDER — DIPHENHYDRAMINE HYDROCHLORIDE 50 MG/ML
25 INJECTION INTRAMUSCULAR; INTRAVENOUS
Status: COMPLETED | OUTPATIENT
Start: 2022-10-11 | End: 2022-10-11

## 2022-10-11 RX ORDER — HEPARIN 100 UNIT/ML
500 SYRINGE INTRAVENOUS
Status: DISCONTINUED | OUTPATIENT
Start: 2022-10-11 | End: 2022-10-11 | Stop reason: HOSPADM

## 2022-10-11 RX ORDER — ACETAMINOPHEN 325 MG/1
650 TABLET ORAL
Status: DISCONTINUED | OUTPATIENT
Start: 2022-10-11 | End: 2022-10-11 | Stop reason: HOSPADM

## 2022-10-11 RX ORDER — DIPHENHYDRAMINE HYDROCHLORIDE 50 MG/ML
50 INJECTION INTRAMUSCULAR; INTRAVENOUS ONCE AS NEEDED
Status: DISCONTINUED | OUTPATIENT
Start: 2022-10-11 | End: 2022-10-11 | Stop reason: HOSPADM

## 2022-10-11 RX ADMIN — DIPHENHYDRAMINE HYDROCHLORIDE 25 MG: 50 INJECTION, SOLUTION INTRAMUSCULAR; INTRAVENOUS at 02:10

## 2022-10-11 RX ADMIN — SODIUM CHLORIDE 900 MG: 9 INJECTION, SOLUTION INTRAVENOUS at 02:10

## 2022-10-24 NOTE — PROGRESS NOTES
Subjective:       Patient ID: Shayna Ledezma is a 72 y.o. female.  GI: Dr. Gamaliel Carlos  GYN ONC at [facility]: Dr. Mike aSntana    1. 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 Pulmonary Embolism (Incidental on CT)--19    Procedure/pathology:   1. Paracentesis with removal of 5L of fluid done 18--serous carcinoma, high grade, c/w ovarian primary, PAX-8, CK7 and MOC-31 positive, CDX-2, CK-20 and Calretinin-negative.  2. S/p Exploratory Lap, radical tumor debulking including total abdominal hysterectomy, bilateral salpingo-oophorectomy, bilateral pelvic lymph node sampling, appendectomy, mobilization of the left ureter, complete gross resection of the disease with removal of mesenteric disease, as well as omental disese and parasplenic disease by Dr. Santana 3/18/19--Metastatic high grade serous carcinoma. Tissue/organ involvement: right ovary, appendectomy, omentum, mesentery (including small bowel mesentery) and multiple other sites designated: periumbilical, perisplenic, transverse colon, splenic flexure, perirectal, left periureteral. 2 lymph nodes negative. dtD5tmL0. FIGO stage IIIC. Myriad somatic instability testing positive for genomic instability, negative BRCA1 and BRCA2 tumor testing.  3. Splenectomy done 10/21/19--splenic involvement with metastatic high grade serous carcinoma, 5 benign lymph nodes. Caris testing showed LAM, NTRK1/2/3 negative, TMB low, BRCA1/2 negative, ER/WI negative, CATHY negative, SPEN pathogenic variant Exon 11, TP53 pathogenic variant Exon 5, PD-L1 positive CPS 15, Genomic ELODIA high.  Imagin. CT A/P w/ and w/o contrast done at [facility] 18--large volume ascites with multiple peritoneal implants, right pericolic gutter implant measures 2.5X3.3cm, LUQ omental/mesenteric implant measures 3X3.6cm, omental carcinomatosis, extensive enhancing soft tissue surrounding sigmoid colon vs. large sigmoid mass, left  ovarian cystic lesion appears to have some internal septations measures 3.5X3.8cm, enhancing periportal soft tissue density likely lymphadenopathy with some narrowing of the portal vein noted, but without internal filling defect, borderline splenomegaly, subtle solid lesion w/n central spleen measures 2.4X2.5cm, likely metastatic, with peripheral area of diminished enhancement suggesting splenic infarct, soft tissue implant abutting gallbladder measures 2X2.2cm, no right adnexal mass, trace bilateral layering pleural fluid, small moderate-sized hiatal hernia, few borderline enlarged lymph nodes w/n right epicardial fat pad.  2. CT of chest on 1/11/19--Some prominent right cardiophrenic lymph nodes are nonspecific and can be followed on future surveillance scans. Otherwise no CT evidence of metastatic disease to the chest. While exam was not tailored for evaluation of the pulmonary arteries I suspect there is pulmonary thromboembolic disease. Peritoneal carcinomatosis seen in the upper abdomen. Critical Result: Pulmonary Embolus.  3. CT C/A/P 3/4/19--Positive response to therapy. No new or progressive metastatic disease in the chest, abdomen or pelvis. Stable to improved findings include smaller pericardial lymph nodes, andrew hepatis adenopathy, peritoneal carcinomatosis, smaller left adnexal lesion; no evidence of PE within limitations of the study.  5. CT PE protocol chest/A/P 6/11/19--No PE, improved pericardial lymph node with minimal size on current examination, improved peritoneal carcinomatosis and left adnexal implant, splenic ischemia evolved into small infarct, size improvement of one of splenic hypodense lesion and mild size increase of second hypodense lesion, favored to be benign/cystic, no new findings.  6. PET/CT 9/25/19--s/p hysterectomy and bilateral oophorectomy, no evidence for locally recurrent/residual disease, intensely hypermetabolic hypodense lesion within the spleen 3.5X3.3cm concerning for  metastatic focus. No other abnormal focus of disease.  7. CT C/A/P 11/13/20--Right lower lobe 3.3 cm mass is presumed metastatic, otherwise no evident residual, recurrent or metastatic disease.   8. PET/CT 11/20/20--Hypermetabolic right lung base lesion and suspected metastatic periportal adenopathy, no additional sites of FDG-avid otherwise aggressive appearing pathology through the neck, chest, abdomen, or pelvis.  9. PET/CT 1/21/21--Interval decrease in size of the right lower lobe mass with decreased FDG uptake, now measures 1.7 x 2.2 cm (previous 3.3 x 3.1 cm), resolution of FDG uptake in the suspected periportal lymph node which is not identifiable on noncontrast CT. No evidence of new or progressive hypermetabolic metastatic disease.    Procedures:   1. Paracentesis on 12/28/18 with removal of 5 Liters.  2. Paracentesis on 01/07/19 with removal of 3.5 Liters.  3. Paracentesis on 02/06/19 with removal of 2.5 Liters.  4. Mediport placed 12/2020 by Dr. Serge Gentile--removed 8/26/21 due to mediport fracture.    Germline genetic testing done by [facility] 1/29/19--negative for BRCA1/2, two (2) variants of uncertain significance (VUS): CATHY p.[id number]/p.V0875V and MLH1 p.P603L.     :  12/19/18 1102  11/10/20  42.9  12/08/20 79.5  01/05/21 28.9  01/26/21 12.8  02/18/21 11.6  03/15/22--10.3  04/26/22--9.6  05/16/22--10.2  06/07/22--10.1  07/19/22--11.6  08/08/22--12.5  09/19/22--12.7    Treatment History:  1. Neoadjuvant Carboplatin/Taxol every 3 weeks x 3 cycles. 1/15/19 - 2/26/19. Neulasta added.   2. Surgery--tumor debulking KORI/BSO 3/18/19.  3. Adjuvant Carboplatin/Taxol x 3 additions cycles 4/9/19--5/21/19.  4. Splenectomy 10/21/19.  5. Niraparib maintenance (dose reduction to 100mg daily for cytopenias) 11/8/19--12/1/20 (stopped due to progression).  6. Carboplatin/Paclitaxel/Avastin X 6 cycles completed 12/8/21--3/24/21 (complete response).    Current Treatment:  1. Eliquis BID for incidental PE on  imaging 1/2019  2. Avastin maintenance started on 4/13/21.   Cycle 28 due on 11/1/22.      Chief Complaint: Other Misc (Pt reports no new concerns today.)    HPI   Patient presents for scheduled follow-up of ovarian cancer. She continues on Avastin every 3 weeks. No constipation or abdominal pain. Her BP has been running good. She did get recalled from a screening MMG for an asymmetry in right breast and then diagnostic MMG and US came back with benign findings. Plans are to repeat in 1 year. No other new problems reported.     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     Ovarian cancer     Peritoneal carcinomatosis     TIA (transient ischemic attack)       Review of patient's allergies indicates:   Allergen Reactions    Codeine Itching    Oxycodone-acetaminophen Other (See Comments)     Unknown    Oxycodone-aspirin Other (See Comments)     Unknown      Current Outpatient Medications on File Prior to Visit   Medication Sig Dispense Refill    apixaban (ELIQUIS) 5 mg Tab   See Instructions, TAKE 1 TABLET BY MOUTH TWICE DAILY, # 180 tab(s), 1 Refill(s), Pharmacy: [id number], 160, cm, Height/Length Dosing, 11/30/21 13:42:00 CST, 65.5, kg, Weight Dosing, 11/30/21 13:42:00 CST      aspirin (ECOTRIN) 81 MG EC tablet Take 81 mg by mouth once daily.      atorvastatin (LIPITOR) 40 MG tablet Take 1 tablet (40 mg total) by mouth once daily. 90 tablet 3    b complex vitamins capsule Take 1 capsule by mouth once daily.      GLUTAMINE ORAL Take 5 g by mouth.      hydroCHLOROthiazide (HYDRODIURIL) 25 MG tablet Take 1 tablet (25 mg total) by mouth once daily. 90 tablet 3    lisinopriL 10 MG tablet Take 1 tablet (10 mg total) by mouth once daily. 90 tablet 3    pyridoxine, vitamin B6, (B-6) 250 MG Tab Take 250 mg by mouth once daily.       sodium chloride 0.9% SolP 100 mL with bevacizumab 25 mg/mL Soln Inject into the vein every 21 days.       No current facility-administered medications on file prior to visit.      Review of Systems   Constitutional:  Negative for activity change, appetite change, fever and unexpected weight change.   HENT:  Negative for mouth sores.    Eyes:  Negative for visual disturbance.   Respiratory:  Negative for cough and shortness of breath.    Cardiovascular:  Negative for chest pain.   Gastrointestinal:  Negative for abdominal pain, blood in stool, constipation, diarrhea, nausea and vomiting.   Genitourinary:  Negative for difficulty urinating and frequency.   Musculoskeletal:  Negative for back pain.   Integumentary:  Negative for rash.   Neurological:  Negative for dizziness, weakness and headaches.   Hematological:  Negative for adenopathy.   Psychiatric/Behavioral:  Negative for behavioral problems and suicidal ideas. The patient is not nervous/anxious.       Vitals:    10/31/22 1414   BP: 115/75   Pulse: 69   Resp: 14   Temp: 97.9 °F (36.6 °C)      Physical Exam  Vitals reviewed.   Constitutional:       Appearance: Normal appearance. She is normal weight.   HENT:      Head: Normocephalic.   Eyes:      General: Lids are normal. Vision grossly intact.      Extraocular Movements: Extraocular movements intact.      Conjunctiva/sclera: Conjunctivae normal.   Cardiovascular:      Rate and Rhythm: Normal rate and regular rhythm.      Pulses: Normal pulses.      Heart sounds: Normal heart sounds, S1 normal and S2 normal.   Pulmonary:      Effort: Pulmonary effort is normal.      Breath sounds: Normal breath sounds.   Abdominal:      General: Abdomen is flat. Bowel sounds are normal. There is no distension.      Palpations: Abdomen is soft.      Tenderness: There is no abdominal tenderness.   Musculoskeletal:      Cervical back: Normal range of motion and neck supple.      Right lower leg: No edema.      Left lower leg: No  edema.   Feet:      Right foot:      Skin integrity: Skin integrity normal.   Skin:     General: Skin is warm and moist.      Capillary Refill: Capillary refill takes less than 2 seconds.      Comments: Left CW mediport removed, site intact   Neurological:      General: No focal deficit present.      Mental Status: She is alert and oriented to person, place, and time.   Psychiatric:         Attention and Perception: Attention normal.         Mood and Affect: Mood normal.         Speech: Speech normal.         Behavior: Behavior normal. Behavior is cooperative.         Cognition and Memory: Cognition normal.         Judgment: Judgment normal.     Lab Results   Component Value Date    WBC 9.7 10/31/2022    RBC 4.32 10/31/2022    HGB 13.4 10/31/2022    HCT 41.4 10/31/2022    MCV 95.8 (H) 10/31/2022    MCH 31.0 10/31/2022    MCHC 32.4 (L) 10/31/2022    RDW 13.1 10/31/2022     10/31/2022    MPV 8.0 10/31/2022     ECOG SCORE    1 - Restricted in strenuous activity-ambulatory and able to carry out work of a light nature            Assessment:       1. Peritoneal carcinomatosis    2. Malignant neoplasm of uterine adnexa    3. Carcinoma of fallopian tube, unspecified laterality               Plan:     Patient with left ovarian cancer diagnosed 12/26/18, appears to be stage IIIC vs. IV with diffuse peritoneal disease, possible splenic involvement, epicardial lymph nodes and bilateral pleural effusions.  Patient seen and evaluated by Dr. Mike Santana at [facility] in [place].   Treatment recommended upfront with chemotherapy Carboplatin/Paclitaxel x 3 cycles.  Completed Cycle 3 on 2/26/19.   She underwent total abdominal hysterectomy, BSO and tumor debulking on 3/18/19 with Dr. Santana with complete gross resection of disease. FIGO Stage IIIC papillary serous fallopian tube cancer.   Patient resumed chemotherapy with cycle 4 on 4/9/19. Completed cycle 6 on 5/21/19.    Testing on tumor was positive for genomic  instability but negative for BRCA mutation.  Per NCCN guidelines, maintenance can be considered for BRCA mutated germline category 1 and somatic category 2A. There is some evidence that PARP inhibitors have some activity as well in tumors with genomic instability. But the PARP maintenance is not approved for this indication. Offered treatment to patient and after discussion she declined and made decision to continue with observation only.    PET/CT done for rising CA-125 showed hypermetabolic splenic lesion which is not new, just enlarged from previous. Case discussed with Dr. Mike Santana.  Patient is now s/p splenectomy done 10/21/19. Consistent with splenic involvement with high grade serous carcinoma.   Dr. Santana recommended Niraparib maintenance based on new data showing activity in HRD positive ovarian cancer and patient started on 11/8/19, required a dose reduction due to cytopenias.  Rising CA-125 noted in 11/2020. CT showed new RLL lung mass.   Follow-up PET done 11/20/20 showed RLL lung mass hypermetabolic and hypermetabolic periportal adenopathy.     Recommended 2nd line treatment with Carboplatin/Taxol/Avastin.   Started cycle 1 on 12/8/20. Neulasta added with cycle 2.   Completed Cycle 6 on 3/24/21.  PET from 3/30/21 showed complete resolution of lung mass and no other disease.    Avastin maintenance started on 4/13/21.   Hospitalized for TIA after her 4th cycle of Avastin. Work-up for stroke and cause otherwise negative. Patient started on baby ASA by neurology.  Discussed there are no clear guidelines for changing/discontinuing treatment for TIA/CVA related to Avastin.  Since her symptoms resolved and there were no residual effects, recommended to continue with Avastin since it is working well for her disease and since a baby ASA was added for prevention. She was also continued on Eliquis for h/o PE.  S/p mediport removal for fracture on 8/26/21.   Continue Avastin through peripheral vein for  now.    Patient continues on Avastin maintenance without problems.  BP has been good. Currently on HCTZ 25mg daily.      Due for Avastin maintenance Cycle 28 tomorrow.    CBC good, will f/u CMP and CA-125. Last CA-125 remains WNL.  Labs on 11/21/22 and treatment only cycle 29 on 11/22/22.  RTC T/D visit in 6 weeks prior to cycle 30.    Will continue to check . No further scans until rise in CA-125.  She is now >12 months from completing last chemotherapy, so still considered platinum-sensitive. Will need replacement of mediport when she needs to resume chemotherapy.   Continue Eliquis 5mg BID and ASA.  All questions answered at this time.        [facility], [facility]

## 2022-10-31 ENCOUNTER — TELEPHONE (OUTPATIENT)
Dept: HEMATOLOGY/ONCOLOGY | Facility: CLINIC | Age: 72
End: 2022-10-31

## 2022-10-31 ENCOUNTER — OFFICE VISIT (OUTPATIENT)
Dept: HEMATOLOGY/ONCOLOGY | Facility: CLINIC | Age: 72
End: 2022-10-31
Payer: MEDICARE

## 2022-10-31 VITALS
BODY MASS INDEX: 25.47 KG/M2 | RESPIRATION RATE: 14 BRPM | SYSTOLIC BLOOD PRESSURE: 115 MMHG | DIASTOLIC BLOOD PRESSURE: 75 MMHG | HEART RATE: 69 BPM | HEIGHT: 62 IN | OXYGEN SATURATION: 98 % | TEMPERATURE: 98 F | WEIGHT: 138.38 LBS

## 2022-10-31 DIAGNOSIS — C57.00 CARCINOMA OF FALLOPIAN TUBE, UNSPECIFIED LATERALITY: ICD-10-CM

## 2022-10-31 DIAGNOSIS — C78.6 PERITONEAL CARCINOMATOSIS: Primary | ICD-10-CM

## 2022-10-31 DIAGNOSIS — C57.4 MALIGNANT NEOPLASM OF UTERINE ADNEXA: ICD-10-CM

## 2022-10-31 PROCEDURE — 99214 OFFICE O/P EST MOD 30 MIN: CPT | Mod: S$GLB,,, | Performed by: NURSE PRACTITIONER

## 2022-10-31 PROCEDURE — 99999 PR PBB SHADOW E&M-EST. PATIENT-LVL IV: ICD-10-PCS | Mod: PBBFAC,,, | Performed by: NURSE PRACTITIONER

## 2022-10-31 PROCEDURE — 1160F PR REVIEW ALL MEDS BY PRESCRIBER/CLIN PHARMACIST DOCUMENTED: ICD-10-PCS | Mod: CPTII,S$GLB,, | Performed by: NURSE PRACTITIONER

## 2022-10-31 PROCEDURE — 3078F DIAST BP <80 MM HG: CPT | Mod: CPTII,S$GLB,, | Performed by: NURSE PRACTITIONER

## 2022-10-31 PROCEDURE — 3074F SYST BP LT 130 MM HG: CPT | Mod: CPTII,S$GLB,, | Performed by: NURSE PRACTITIONER

## 2022-10-31 PROCEDURE — 4010F PR ACE/ARB THEARPY RXD/TAKEN: ICD-10-PCS | Mod: CPTII,S$GLB,, | Performed by: NURSE PRACTITIONER

## 2022-10-31 PROCEDURE — 99999 PR PBB SHADOW E&M-EST. PATIENT-LVL IV: CPT | Mod: PBBFAC,,, | Performed by: NURSE PRACTITIONER

## 2022-10-31 PROCEDURE — 1126F AMNT PAIN NOTED NONE PRSNT: CPT | Mod: CPTII,S$GLB,, | Performed by: NURSE PRACTITIONER

## 2022-10-31 PROCEDURE — 99214 PR OFFICE/OUTPT VISIT, EST, LEVL IV, 30-39 MIN: ICD-10-PCS | Mod: S$GLB,,, | Performed by: NURSE PRACTITIONER

## 2022-10-31 PROCEDURE — 1159F MED LIST DOCD IN RCRD: CPT | Mod: CPTII,S$GLB,, | Performed by: NURSE PRACTITIONER

## 2022-10-31 PROCEDURE — 1160F RVW MEDS BY RX/DR IN RCRD: CPT | Mod: CPTII,S$GLB,, | Performed by: NURSE PRACTITIONER

## 2022-10-31 PROCEDURE — 4010F ACE/ARB THERAPY RXD/TAKEN: CPT | Mod: CPTII,S$GLB,, | Performed by: NURSE PRACTITIONER

## 2022-10-31 PROCEDURE — 3074F PR MOST RECENT SYSTOLIC BLOOD PRESSURE < 130 MM HG: ICD-10-PCS | Mod: CPTII,S$GLB,, | Performed by: NURSE PRACTITIONER

## 2022-10-31 PROCEDURE — 1126F PR PAIN SEVERITY QUANTIFIED, NO PAIN PRESENT: ICD-10-PCS | Mod: CPTII,S$GLB,, | Performed by: NURSE PRACTITIONER

## 2022-10-31 PROCEDURE — 1159F PR MEDICATION LIST DOCUMENTED IN MEDICAL RECORD: ICD-10-PCS | Mod: CPTII,S$GLB,, | Performed by: NURSE PRACTITIONER

## 2022-10-31 PROCEDURE — 3078F PR MOST RECENT DIASTOLIC BLOOD PRESSURE < 80 MM HG: ICD-10-PCS | Mod: CPTII,S$GLB,, | Performed by: NURSE PRACTITIONER

## 2022-10-31 RX ORDER — DIPHENHYDRAMINE HYDROCHLORIDE 50 MG/ML
25 INJECTION INTRAMUSCULAR; INTRAVENOUS
Status: CANCELLED | OUTPATIENT
Start: 2022-11-22

## 2022-10-31 RX ORDER — EPINEPHRINE 0.3 MG/.3ML
0.3 INJECTION SUBCUTANEOUS ONCE AS NEEDED
Status: CANCELLED | OUTPATIENT
Start: 2022-11-22

## 2022-10-31 RX ORDER — DIPHENHYDRAMINE HYDROCHLORIDE 50 MG/ML
50 INJECTION INTRAMUSCULAR; INTRAVENOUS ONCE AS NEEDED
Status: CANCELLED | OUTPATIENT
Start: 2022-11-22

## 2022-10-31 RX ORDER — ACETAMINOPHEN 325 MG/1
650 TABLET ORAL
Status: CANCELLED | OUTPATIENT
Start: 2022-11-22

## 2022-10-31 RX ORDER — HEPARIN 100 UNIT/ML
500 SYRINGE INTRAVENOUS
Status: CANCELLED | OUTPATIENT
Start: 2022-11-22

## 2022-10-31 RX ORDER — SODIUM CHLORIDE 0.9 % (FLUSH) 0.9 %
10 SYRINGE (ML) INJECTION
Status: CANCELLED | OUTPATIENT
Start: 2022-11-22

## 2022-11-01 ENCOUNTER — INFUSION (OUTPATIENT)
Dept: INFUSION THERAPY | Facility: HOSPITAL | Age: 72
End: 2022-11-01
Attending: INTERNAL MEDICINE
Payer: MEDICARE

## 2022-11-01 VITALS
OXYGEN SATURATION: 96 % | RESPIRATION RATE: 18 BRPM | TEMPERATURE: 98 F | SYSTOLIC BLOOD PRESSURE: 148 MMHG | HEART RATE: 81 BPM | DIASTOLIC BLOOD PRESSURE: 76 MMHG

## 2022-11-01 DIAGNOSIS — C78.6 PERITONEAL CARCINOMATOSIS: Primary | ICD-10-CM

## 2022-11-01 DIAGNOSIS — C57.4 MALIGNANT NEOPLASM OF UTERINE ADNEXA: ICD-10-CM

## 2022-11-01 LAB — PROT UR QL STRIP.AUTO: ABNORMAL MG/DL

## 2022-11-01 PROCEDURE — 96375 TX/PRO/DX INJ NEW DRUG ADDON: CPT

## 2022-11-01 PROCEDURE — 25000003 PHARM REV CODE 250: Performed by: INTERNAL MEDICINE

## 2022-11-01 PROCEDURE — 63600175 PHARM REV CODE 636 W HCPCS: Performed by: INTERNAL MEDICINE

## 2022-11-01 PROCEDURE — 81005 URINALYSIS: CPT

## 2022-11-01 PROCEDURE — 96413 CHEMO IV INFUSION 1 HR: CPT

## 2022-11-01 RX ORDER — EPINEPHRINE 0.3 MG/.3ML
0.3 INJECTION SUBCUTANEOUS ONCE AS NEEDED
Status: DISCONTINUED | OUTPATIENT
Start: 2022-11-01 | End: 2022-11-01 | Stop reason: HOSPADM

## 2022-11-01 RX ORDER — ACETAMINOPHEN 325 MG/1
650 TABLET ORAL
Status: DISCONTINUED | OUTPATIENT
Start: 2022-11-01 | End: 2022-11-01 | Stop reason: HOSPADM

## 2022-11-01 RX ORDER — DIPHENHYDRAMINE HYDROCHLORIDE 50 MG/ML
50 INJECTION INTRAMUSCULAR; INTRAVENOUS ONCE AS NEEDED
Status: DISCONTINUED | OUTPATIENT
Start: 2022-11-01 | End: 2022-11-01 | Stop reason: HOSPADM

## 2022-11-01 RX ORDER — SODIUM CHLORIDE 0.9 % (FLUSH) 0.9 %
10 SYRINGE (ML) INJECTION
Status: DISCONTINUED | OUTPATIENT
Start: 2022-11-01 | End: 2022-11-01 | Stop reason: HOSPADM

## 2022-11-01 RX ORDER — DIPHENHYDRAMINE HYDROCHLORIDE 50 MG/ML
25 INJECTION INTRAMUSCULAR; INTRAVENOUS
Status: COMPLETED | OUTPATIENT
Start: 2022-11-01 | End: 2022-11-01

## 2022-11-01 RX ORDER — HEPARIN 100 UNIT/ML
500 SYRINGE INTRAVENOUS
Status: DISCONTINUED | OUTPATIENT
Start: 2022-11-01 | End: 2022-11-01 | Stop reason: HOSPADM

## 2022-11-01 RX ADMIN — DIPHENHYDRAMINE HYDROCHLORIDE 25 MG: 50 INJECTION, SOLUTION INTRAMUSCULAR; INTRAVENOUS at 01:11

## 2022-11-01 RX ADMIN — SODIUM CHLORIDE 900 MG: 9 INJECTION, SOLUTION INTRAVENOUS at 02:11

## 2022-11-21 ENCOUNTER — LAB VISIT (OUTPATIENT)
Dept: LAB | Facility: HOSPITAL | Age: 72
End: 2022-11-21
Attending: INTERNAL MEDICINE
Payer: MEDICARE

## 2022-11-21 DIAGNOSIS — C78.6 PERITONEAL CARCINOMATOSIS: ICD-10-CM

## 2022-11-21 DIAGNOSIS — C57.00 CARCINOMA OF FALLOPIAN TUBE, UNSPECIFIED LATERALITY: ICD-10-CM

## 2022-11-21 DIAGNOSIS — C57.4 MALIGNANT NEOPLASM OF UTERINE ADNEXA: ICD-10-CM

## 2022-11-21 DIAGNOSIS — C57.4 MALIGNANT NEOPLASM OF UTERINE ADNEXA: Primary | ICD-10-CM

## 2022-11-21 LAB
ALBUMIN SERPL-MCNC: 3.9 GM/DL (ref 3.4–4.8)
ALBUMIN/GLOB SERPL: 1.2 RATIO (ref 1.1–2)
ALP SERPL-CCNC: 57 UNIT/L (ref 40–150)
ALT SERPL-CCNC: 21 UNIT/L (ref 0–55)
AST SERPL-CCNC: 31 UNIT/L (ref 5–34)
BASOPHILS # BLD AUTO: 0.02 X10(3)/MCL (ref 0–0.2)
BASOPHILS NFR BLD AUTO: 0.2 %
BILIRUBIN DIRECT+TOT PNL SERPL-MCNC: 1.1 MG/DL
BUN SERPL-MCNC: 21.6 MG/DL (ref 9.8–20.1)
CALCIUM SERPL-MCNC: 9.9 MG/DL (ref 8.4–10.2)
CHLORIDE SERPL-SCNC: 95 MMOL/L (ref 98–107)
CO2 SERPL-SCNC: 28 MMOL/L (ref 23–31)
CREAT SERPL-MCNC: 0.77 MG/DL (ref 0.55–1.02)
EOSINOPHIL # BLD AUTO: 0.15 X10(3)/MCL (ref 0–0.9)
EOSINOPHIL NFR BLD AUTO: 1.7 %
ERYTHROCYTE [DISTWIDTH] IN BLOOD BY AUTOMATED COUNT: 13 % (ref 11.5–17)
GFR SERPLBLD CREATININE-BSD FMLA CKD-EPI: >60 MLS/MIN/1.73/M2
GLOBULIN SER-MCNC: 3.2 GM/DL (ref 2.4–3.5)
GLUCOSE SERPL-MCNC: 89 MG/DL (ref 82–115)
HCT VFR BLD AUTO: 40.8 % (ref 37–47)
HGB BLD-MCNC: 13.1 GM/DL (ref 12–16)
IMM GRANULOCYTES # BLD AUTO: 0.01 X10(3)/MCL (ref 0–0.04)
IMM GRANULOCYTES NFR BLD AUTO: 0.1 %
LYMPHOCYTES # BLD AUTO: 3.55 X10(3)/MCL (ref 0.6–4.6)
LYMPHOCYTES NFR BLD AUTO: 40.5 %
MCH RBC QN AUTO: 31.3 PG (ref 27–31)
MCHC RBC AUTO-ENTMCNC: 32.1 MG/DL (ref 33–36)
MCV RBC AUTO: 97.6 FL (ref 80–94)
MONOCYTES # BLD AUTO: 0.88 X10(3)/MCL (ref 0.1–1.3)
MONOCYTES NFR BLD AUTO: 10 %
NEUTROPHILS # BLD AUTO: 4.2 X10(3)/MCL (ref 2.1–9.2)
NEUTROPHILS NFR BLD AUTO: 47.5 %
PLATELET # BLD AUTO: 391 X10(3)/MCL (ref 130–400)
PMV BLD AUTO: 7.9 FL (ref 7.4–10.4)
POTASSIUM SERPL-SCNC: 4.8 MMOL/L (ref 3.5–5.1)
PROT SERPL-MCNC: 7.1 GM/DL (ref 5.8–7.6)
PROT UR QL STRIP.AUTO: ABNORMAL MG/DL
RBC # BLD AUTO: 4.18 X10(6)/MCL (ref 4.2–5.4)
SODIUM SERPL-SCNC: 133 MMOL/L (ref 136–145)
WBC # SPEC AUTO: 8.8 X10(3)/MCL (ref 4.5–11.5)

## 2022-11-21 PROCEDURE — 36415 COLL VENOUS BLD VENIPUNCTURE: CPT

## 2022-11-21 PROCEDURE — 80053 COMPREHEN METABOLIC PANEL: CPT

## 2022-11-21 PROCEDURE — 85025 COMPLETE CBC W/AUTO DIFF WBC: CPT

## 2022-11-22 ENCOUNTER — INFUSION (OUTPATIENT)
Dept: INFUSION THERAPY | Facility: HOSPITAL | Age: 72
End: 2022-11-22
Attending: INTERNAL MEDICINE
Payer: MEDICARE

## 2022-11-22 VITALS
RESPIRATION RATE: 20 BRPM | DIASTOLIC BLOOD PRESSURE: 83 MMHG | WEIGHT: 138.25 LBS | HEIGHT: 62 IN | TEMPERATURE: 98 F | BODY MASS INDEX: 25.44 KG/M2 | SYSTOLIC BLOOD PRESSURE: 122 MMHG | HEART RATE: 71 BPM

## 2022-11-22 DIAGNOSIS — C78.6 PERITONEAL CARCINOMATOSIS: Primary | ICD-10-CM

## 2022-11-22 PROCEDURE — 25000003 PHARM REV CODE 250: Performed by: NURSE PRACTITIONER

## 2022-11-22 PROCEDURE — 96413 CHEMO IV INFUSION 1 HR: CPT

## 2022-11-22 PROCEDURE — 63600175 PHARM REV CODE 636 W HCPCS: Mod: TB | Performed by: NURSE PRACTITIONER

## 2022-11-22 PROCEDURE — 96375 TX/PRO/DX INJ NEW DRUG ADDON: CPT

## 2022-11-22 RX ORDER — EPINEPHRINE 0.3 MG/.3ML
0.3 INJECTION SUBCUTANEOUS ONCE AS NEEDED
Status: DISCONTINUED | OUTPATIENT
Start: 2022-11-22 | End: 2022-11-22 | Stop reason: HOSPADM

## 2022-11-22 RX ORDER — DIPHENHYDRAMINE HYDROCHLORIDE 50 MG/ML
50 INJECTION INTRAMUSCULAR; INTRAVENOUS ONCE AS NEEDED
Status: DISCONTINUED | OUTPATIENT
Start: 2022-11-22 | End: 2022-11-22 | Stop reason: HOSPADM

## 2022-11-22 RX ORDER — SODIUM CHLORIDE 0.9 % (FLUSH) 0.9 %
10 SYRINGE (ML) INJECTION
Status: DISCONTINUED | OUTPATIENT
Start: 2022-11-22 | End: 2022-11-22 | Stop reason: HOSPADM

## 2022-11-22 RX ORDER — DIPHENHYDRAMINE HYDROCHLORIDE 50 MG/ML
25 INJECTION INTRAMUSCULAR; INTRAVENOUS
Status: COMPLETED | OUTPATIENT
Start: 2022-11-22 | End: 2022-11-22

## 2022-11-22 RX ORDER — ACETAMINOPHEN 325 MG/1
650 TABLET ORAL
Status: DISCONTINUED | OUTPATIENT
Start: 2022-11-22 | End: 2022-11-22 | Stop reason: HOSPADM

## 2022-11-22 RX ORDER — HEPARIN 100 UNIT/ML
500 SYRINGE INTRAVENOUS
Status: DISCONTINUED | OUTPATIENT
Start: 2022-11-22 | End: 2022-11-22 | Stop reason: HOSPADM

## 2022-11-22 RX ADMIN — DIPHENHYDRAMINE HYDROCHLORIDE 25 MG: 50 INJECTION, SOLUTION INTRAMUSCULAR; INTRAVENOUS at 02:11

## 2022-11-22 RX ADMIN — SODIUM CHLORIDE: 9 INJECTION, SOLUTION INTRAVENOUS at 02:11

## 2022-11-22 RX ADMIN — SODIUM CHLORIDE 900 MG: 9 INJECTION, SOLUTION INTRAVENOUS at 02:11

## 2022-12-07 NOTE — PROGRESS NOTES
Subjective:       Patient ID: Shayna Ledezma is a 72 y.o. female.  GI: Dr. Gamaliel Carlos  GYN ONC at [facility]: Dr. Mike Santana    1. 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 Pulmonary Embolism (Incidental on CT)--19    Procedure/pathology:   1. Paracentesis with removal of 5L of fluid done 18--serous carcinoma, high grade, c/w ovarian primary, PAX-8, CK7 and MOC-31 positive, CDX-2, CK-20 and Calretinin-negative.  2. S/p Exploratory Lap, radical tumor debulking including total abdominal hysterectomy, bilateral salpingo-oophorectomy, bilateral pelvic lymph node sampling, appendectomy, mobilization of the left ureter, complete gross resection of the disease with removal of mesenteric disease, as well as omental disese and parasplenic disease by Dr. Santana 3/18/19--Metastatic high grade serous carcinoma. Tissue/organ involvement: right ovary, appendectomy, omentum, mesentery (including small bowel mesentery) and multiple other sites designated: periumbilical, perisplenic, transverse colon, splenic flexure, perirectal, left periureteral. 2 lymph nodes negative. [id number]. FIGO stage IIIC. Myriad somatic instability testing positive for genomic instability, negative BRCA1 and BRCA2 tumor testing.  3. Splenectomy done 10/21/19--splenic involvement with metastatic high grade serous carcinoma, 5 benign lymph nodes. Caris testing showed LAM, NTRK1/2/3 negative, TMB low, BRCA1/2 negative, ER/SD negative, CATHY negative, SPEN pathogenic variant Exon 11, TP53 pathogenic variant Exon 5, PD-L1 positive CPS 15, Genomic ELODIA high.  Imagin. CT A/P w/ and w/o contrast done at [facility] 18--large volume ascites with multiple peritoneal implants, right pericolic gutter implant measures 2.5X3.3cm, LUQ omental/mesenteric implant measures 3X3.6cm, omental carcinomatosis, extensive enhancing soft tissue surrounding sigmoid colon vs. large sigmoid mass, left  ovarian cystic lesion appears to have some internal septations measures 3.5X3.8cm, enhancing periportal soft tissue density likely lymphadenopathy with some narrowing of the portal vein noted, but without internal filling defect, borderline splenomegaly, subtle solid lesion w/n central spleen measures 2.4X2.5cm, likely metastatic, with peripheral area of diminished enhancement suggesting splenic infarct, soft tissue implant abutting gallbladder measures 2X2.2cm, no right adnexal mass, trace bilateral layering pleural fluid, small moderate-sized hiatal hernia, few borderline enlarged lymph nodes w/n right epicardial fat pad.  2. CT of chest on 1/11/19--Some prominent right cardiophrenic lymph nodes are nonspecific and can be followed on future surveillance scans. Otherwise no CT evidence of metastatic disease to the chest. While exam was not tailored for evaluation of the pulmonary arteries I suspect there is pulmonary thromboembolic disease. Peritoneal carcinomatosis seen in the upper abdomen. Critical Result: Pulmonary Embolus.  3. CT C/A/P 3/4/19--Positive response to therapy. No new or progressive metastatic disease in the chest, abdomen or pelvis. Stable to improved findings include smaller pericardial lymph nodes, andrew hepatis adenopathy, peritoneal carcinomatosis, smaller left adnexal lesion; no evidence of PE within limitations of the study.  5. CT PE protocol chest/A/P 6/11/19--No PE, improved pericardial lymph node with minimal size on current examination, improved peritoneal carcinomatosis and left adnexal implant, splenic ischemia evolved into small infarct, size improvement of one of splenic hypodense lesion and mild size increase of second hypodense lesion, favored to be benign/cystic, no new findings.  6. PET/CT 9/25/19--s/p hysterectomy and bilateral oophorectomy, no evidence for locally recurrent/residual disease, intensely hypermetabolic hypodense lesion within the spleen 3.5X3.3cm concerning for  metastatic focus. No other abnormal focus of disease.  7. CT C/A/P 11/13/20--Right lower lobe 3.3 cm mass is presumed metastatic, otherwise no evident residual, recurrent or metastatic disease.   8. PET/CT 11/20/20--Hypermetabolic right lung base lesion and suspected metastatic periportal adenopathy, no additional sites of FDG-avid otherwise aggressive appearing pathology through the neck, chest, abdomen, or pelvis.  9. PET/CT 1/21/21--Interval decrease in size of the right lower lobe mass with decreased FDG uptake, now measures 1.7 x 2.2 cm (previous 3.3 x 3.1 cm), resolution of FDG uptake in the suspected periportal lymph node which is not identifiable on noncontrast CT. No evidence of new or progressive hypermetabolic metastatic disease.    Procedures:   1. Paracentesis on 12/28/18 with removal of 5 Liters.  2. Paracentesis on 01/07/19 with removal of 3.5 Liters.  3. Paracentesis on 02/06/19 with removal of 2.5 Liters.  4. Mediport placed 12/2020 by Dr. Serge Gentile--removed 8/26/21 due to mediport fracture.    Germline genetic testing done by [employer] 1/29/19--negative for BRCA1/2, two (2) variants of uncertain significance (VUS): CATHY p.[id number]/p.[id number] and MLH1 p.P603L.     :  12/19/18 1102  11/10/20  42.9  12/08/20 79.5  01/05/21 28.9  01/26/21 12.8  02/18/21 11.6  03/15/22--10.3  04/26/22--9.6  05/16/22--10.2  06/07/22--10.1  07/19/22--11.6  08/08/22--12.5  09/19/22--12.7  10/31/22--13.5    Treatment History:  1. Neoadjuvant Carboplatin/Taxol every 3 weeks x 3 cycles. 1/15/19 - 2/26/19. Neulasta added.   2. Surgery--tumor debulking KORI/BSO 3/18/19.  3. Adjuvant Carboplatin/Taxol x 3 additions cycles 4/9/19--5/21/19.  4. Splenectomy 10/21/19.  5. Niraparib maintenance (dose reduction to 100mg daily for cytopenias) 11/8/19--12/1/20 (stopped due to progression).  6. Carboplatin/Paclitaxel/Avastin X 6 cycles completed 12/8/21--3/24/21 (complete response).    Current Treatment:  1. Eliquis BID for  incidental PE on imaging 1/2019  2. Avastin maintenance started on 4/13/21.   Cycle 30 due on 12/13/22.      Chief Complaint: Other Misc (Pt reports no new concerns today.)      HPI   Patient presents for scheduled follow-up of ovarian cancer. She continues on Avastin every 3 weeks without problems. Her BP is good and she has no complaints.     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     Ovarian cancer     Peritoneal carcinomatosis     TIA (transient ischemic attack)       Review of patient's allergies indicates:   Allergen Reactions    Codeine Itching    Oxycodone-acetaminophen Other (See Comments)     Unknown    Oxycodone-aspirin Other (See Comments)     Unknown      Current Outpatient Medications on File Prior to Visit   Medication Sig Dispense Refill    apixaban (ELIQUIS) 5 mg Tab   See Instructions, TAKE 1 TABLET BY MOUTH TWICE DAILY, # 180 tab(s), 1 Refill(s), Pharmacy: [id number], 160, cm, Height/Length Dosing, 11/30/21 13:42:00 CST, 65.5, kg, Weight Dosing, 11/30/21 13:42:00 CST      aspirin (ECOTRIN) 81 MG EC tablet Take 81 mg by mouth once daily.      atorvastatin (LIPITOR) 40 MG tablet Take 1 tablet (40 mg total) by mouth once daily. 90 tablet 3    b complex vitamins capsule Take 1 capsule by mouth once daily.      GLUTAMINE ORAL Take 5 g by mouth.      hydroCHLOROthiazide (HYDRODIURIL) 25 MG tablet Take 1 tablet (25 mg total) by mouth once daily. 90 tablet 3    lisinopriL 10 MG tablet Take 1 tablet (10 mg total) by mouth once daily. 90 tablet 3    pyridoxine, vitamin B6, (B-6) 250 MG Tab Take 250 mg by mouth once daily.      sodium chloride 0.9% SolP 100 mL with bevacizumab 25 mg/mL Soln Inject into the vein every 21 days.       No current facility-administered medications on file prior to visit.      Review  of Systems   Constitutional:  Negative for activity change, appetite change, fever and unexpected weight change.   HENT:  Negative for mouth sores.    Eyes:  Negative for visual disturbance.   Respiratory:  Negative for cough and shortness of breath.    Cardiovascular:  Negative for chest pain.   Gastrointestinal:  Negative for abdominal pain, blood in stool, constipation, diarrhea, nausea and vomiting.   Genitourinary:  Negative for difficulty urinating and frequency.   Musculoskeletal:  Negative for back pain.   Integumentary:  Negative for rash.   Neurological:  Negative for dizziness, weakness and headaches.   Hematological:  Negative for adenopathy.   Psychiatric/Behavioral:  Negative for behavioral problems and suicidal ideas. The patient is not nervous/anxious.       Vitals:    12/12/22 1252   BP: 130/82   Pulse: (!) 55   Resp: 14   Temp: 97.5 °F (36.4 °C)        Physical Exam  Vitals reviewed.   Constitutional:       Appearance: Normal appearance. She is normal weight.   HENT:      Head: Normocephalic.   Eyes:      General: Lids are normal. Vision grossly intact.      Extraocular Movements: Extraocular movements intact.      Conjunctiva/sclera: Conjunctivae normal.   Cardiovascular:      Rate and Rhythm: Normal rate and regular rhythm.      Pulses: Normal pulses.      Heart sounds: Normal heart sounds, S1 normal and S2 normal.   Pulmonary:      Effort: Pulmonary effort is normal.      Breath sounds: Normal breath sounds.   Abdominal:      General: Abdomen is flat. Bowel sounds are normal. There is no distension.      Palpations: Abdomen is soft.      Tenderness: There is no abdominal tenderness.   Musculoskeletal:      Cervical back: Normal range of motion and neck supple.      Right lower leg: No edema.      Left lower leg: No edema.   Feet:      Right foot:      Skin integrity: Skin integrity normal.   Skin:     General: Skin is warm and moist.      Capillary Refill: Capillary refill takes less than 2  seconds.      Comments: Left CW mediport removed, site intact   Neurological:      General: No focal deficit present.      Mental Status: She is alert and oriented to person, place, and time.   Psychiatric:         Attention and Perception: Attention normal.         Mood and Affect: Mood normal.         Speech: Speech normal.         Behavior: Behavior normal. Behavior is cooperative.         Cognition and Memory: Cognition normal.         Judgment: Judgment normal.     Lab Results   Component Value Date    WBC 8.8 11/21/2022    RBC 4.18 (L) 11/21/2022    HGB 13.1 11/21/2022    HCT 40.8 11/21/2022    MCV 97.6 (H) 11/21/2022    MCH 31.3 (H) 11/21/2022    MCHC 32.1 (L) 11/21/2022    RDW 13.0 11/21/2022     11/21/2022    MPV 7.9 11/21/2022          Assessment:       1. Peritoneal carcinomatosis          Plan:     Patient with left ovarian cancer diagnosed 12/26/18, appears to be stage IIIC vs. IV with diffuse peritoneal disease, possible splenic involvement, epicardial lymph nodes and bilateral pleural effusions.  Patient seen and evaluated by Dr. Mike Santana at [facility] in [place].   Treatment recommended upfront with chemotherapy Carboplatin/Paclitaxel x 3 cycles.  Completed Cycle 3 on 2/26/19.   She underwent total abdominal hysterectomy, BSO and tumor debulking on 3/18/19 with Dr. Santana with complete gross resection of disease. FIGO Stage IIIC papillary serous fallopian tube cancer.   Patient resumed chemotherapy with cycle 4 on 4/9/19. Completed cycle 6 on 5/21/19.    Testing on tumor was positive for genomic instability but negative for BRCA mutation.  Per NCCN guidelines, maintenance can be considered for BRCA mutated germline category 1 and somatic category 2A. There is some evidence that PARP inhibitors have some activity as well in tumors with genomic instability. But the PARP maintenance is not approved for this indication. Offered treatment to patient and after discussion she declined and  made decision to continue with observation only.    PET/CT done for rising CA-125 showed hypermetabolic splenic lesion which is not new, just enlarged from previous. Case discussed with Dr. Mike Santana.  Patient is now s/p splenectomy done 10/21/19. Consistent with splenic involvement with high grade serous carcinoma.   Dr. Santana recommended Niraparib maintenance based on new data showing activity in HRD positive ovarian cancer and patient started on 11/8/19, required a dose reduction due to cytopenias.  Rising CA-125 noted in 11/2020. CT showed new RLL lung mass.   Follow-up PET done 11/20/20 showed RLL lung mass hypermetabolic and hypermetabolic periportal adenopathy.     Recommended 2nd line treatment with Carboplatin/Taxol/Avastin.   Started cycle 1 on 12/8/20. Neulasta added with cycle 2.   Completed Cycle 6 on 3/24/21.  PET from 3/30/21 showed complete resolution of lung mass and no other disease.    Avastin maintenance started on 4/13/21.   Hospitalized for TIA after her 4th cycle of Avastin. Work-up for stroke and cause otherwise negative. Patient started on baby ASA by neurology.  Discussed there are no clear guidelines for changing/discontinuing treatment for TIA/CVA related to Avastin.  Since her symptoms resolved and there were no residual effects, recommended to continue with Avastin since it is working well for her disease and since a baby ASA was added for prevention. She was also continued on Eliquis for h/o PE.  S/p mediport removal for fracture on 8/26/21.   Continue Avastin through peripheral vein for now.    Patient continues on Avastin maintenance without problems.  BP has been good. Currently on HCTZ 25mg daily.      Due for Avastin maintenance Cycle 30 tomorrow.    CBC good, will f/u CMP and CA-125. Last CA-125 remains WNL.    Proceed with treatment tomorrow pending labs. Will also check protein UA and continue checking every 3 weeks.  Labs on 1/3/23 and treatment only cycle 31 on  1/4/23.  RTC T/D visit in 6 weeks prior to cycle 32.    Will continue to check . No further scans until rise in CA-125.  She is now >12 months from completing last chemotherapy, so still considered platinum-sensitive. Will need replacement of mediport when she needs to resume chemotherapy.   Continue Eliquis 5mg BID and ASA.  All questions answered at this time.        Renetta Corral MD

## 2022-12-12 ENCOUNTER — TELEPHONE (OUTPATIENT)
Dept: HEMATOLOGY/ONCOLOGY | Facility: CLINIC | Age: 72
End: 2022-12-12

## 2022-12-12 ENCOUNTER — OFFICE VISIT (OUTPATIENT)
Dept: HEMATOLOGY/ONCOLOGY | Facility: CLINIC | Age: 72
End: 2022-12-12
Payer: MEDICARE

## 2022-12-12 ENCOUNTER — LAB VISIT (OUTPATIENT)
Dept: LAB | Facility: HOSPITAL | Age: 72
End: 2022-12-12
Attending: INTERNAL MEDICINE
Payer: MEDICARE

## 2022-12-12 VITALS
DIASTOLIC BLOOD PRESSURE: 82 MMHG | TEMPERATURE: 98 F | SYSTOLIC BLOOD PRESSURE: 130 MMHG | OXYGEN SATURATION: 99 % | WEIGHT: 137.13 LBS | BODY MASS INDEX: 25.23 KG/M2 | HEART RATE: 55 BPM | RESPIRATION RATE: 14 BRPM | HEIGHT: 62 IN

## 2022-12-12 DIAGNOSIS — C57.00 CARCINOMA OF FALLOPIAN TUBE, UNSPECIFIED LATERALITY: ICD-10-CM

## 2022-12-12 DIAGNOSIS — C57.8 MALIGNANT NEOPLASM OF OVERLAPPING SITES OF FEMALE GENITAL ORGANS: ICD-10-CM

## 2022-12-12 DIAGNOSIS — C57.4 MALIGNANT NEOPLASM OF UTERINE ADNEXA: ICD-10-CM

## 2022-12-12 DIAGNOSIS — C78.6 PERITONEAL CARCINOMATOSIS: Primary | ICD-10-CM

## 2022-12-12 DIAGNOSIS — C78.6 PERITONEAL CARCINOMATOSIS: ICD-10-CM

## 2022-12-12 LAB
ALBUMIN SERPL-MCNC: 4 GM/DL (ref 3.4–4.8)
ALBUMIN/GLOB SERPL: 1.3 RATIO (ref 1.1–2)
ALP SERPL-CCNC: 54 UNIT/L (ref 40–150)
ALT SERPL-CCNC: 22 UNIT/L (ref 0–55)
AST SERPL-CCNC: 33 UNIT/L (ref 5–34)
BILIRUBIN DIRECT+TOT PNL SERPL-MCNC: 1.7 MG/DL
BUN SERPL-MCNC: 17.8 MG/DL (ref 9.8–20.1)
CALCIUM SERPL-MCNC: 9.7 MG/DL (ref 8.4–10.2)
CANCER AG125 SERPL-ACNC: 13.3 UNIT/ML (ref 0–35)
CHLORIDE SERPL-SCNC: 97 MMOL/L (ref 98–107)
CO2 SERPL-SCNC: 30 MMOL/L (ref 23–31)
CREAT SERPL-MCNC: 0.77 MG/DL (ref 0.55–1.02)
GFR SERPLBLD CREATININE-BSD FMLA CKD-EPI: >60 MLS/MIN/1.73/M2
GLOBULIN SER-MCNC: 3.1 GM/DL (ref 2.4–3.5)
GLUCOSE SERPL-MCNC: 87 MG/DL (ref 82–115)
POTASSIUM SERPL-SCNC: 4.6 MMOL/L (ref 3.5–5.1)
PROT SERPL-MCNC: 7.1 GM/DL (ref 5.8–7.6)
PROT UR QL STRIP.AUTO: NEGATIVE MG/DL
SODIUM SERPL-SCNC: 131 MMOL/L (ref 136–145)

## 2022-12-12 PROCEDURE — 1160F PR REVIEW ALL MEDS BY PRESCRIBER/CLIN PHARMACIST DOCUMENTED: ICD-10-PCS | Mod: CPTII,S$GLB,, | Performed by: INTERNAL MEDICINE

## 2022-12-12 PROCEDURE — 3075F SYST BP GE 130 - 139MM HG: CPT | Mod: CPTII,S$GLB,, | Performed by: INTERNAL MEDICINE

## 2022-12-12 PROCEDURE — 1159F PR MEDICATION LIST DOCUMENTED IN MEDICAL RECORD: ICD-10-PCS | Mod: CPTII,S$GLB,, | Performed by: INTERNAL MEDICINE

## 2022-12-12 PROCEDURE — 3075F PR MOST RECENT SYSTOLIC BLOOD PRESS GE 130-139MM HG: ICD-10-PCS | Mod: CPTII,S$GLB,, | Performed by: INTERNAL MEDICINE

## 2022-12-12 PROCEDURE — 1159F MED LIST DOCD IN RCRD: CPT | Mod: CPTII,S$GLB,, | Performed by: INTERNAL MEDICINE

## 2022-12-12 PROCEDURE — 1160F RVW MEDS BY RX/DR IN RCRD: CPT | Mod: CPTII,S$GLB,, | Performed by: INTERNAL MEDICINE

## 2022-12-12 PROCEDURE — 3079F DIAST BP 80-89 MM HG: CPT | Mod: CPTII,S$GLB,, | Performed by: INTERNAL MEDICINE

## 2022-12-12 PROCEDURE — 81005 URINALYSIS: CPT | Performed by: INTERNAL MEDICINE

## 2022-12-12 PROCEDURE — 99999 PR PBB SHADOW E&M-EST. PATIENT-LVL IV: CPT | Mod: PBBFAC,,, | Performed by: INTERNAL MEDICINE

## 2022-12-12 PROCEDURE — 3008F BODY MASS INDEX DOCD: CPT | Mod: CPTII,S$GLB,, | Performed by: INTERNAL MEDICINE

## 2022-12-12 PROCEDURE — 3288F PR FALLS RISK ASSESSMENT DOCUMENTED: ICD-10-PCS | Mod: CPTII,S$GLB,, | Performed by: INTERNAL MEDICINE

## 2022-12-12 PROCEDURE — 1101F PT FALLS ASSESS-DOCD LE1/YR: CPT | Mod: CPTII,S$GLB,, | Performed by: INTERNAL MEDICINE

## 2022-12-12 PROCEDURE — 99215 OFFICE O/P EST HI 40 MIN: CPT | Mod: S$GLB,,, | Performed by: INTERNAL MEDICINE

## 2022-12-12 PROCEDURE — 36415 COLL VENOUS BLD VENIPUNCTURE: CPT

## 2022-12-12 PROCEDURE — 99215 PR OFFICE/OUTPT VISIT, EST, LEVL V, 40-54 MIN: ICD-10-PCS | Mod: S$GLB,,, | Performed by: INTERNAL MEDICINE

## 2022-12-12 PROCEDURE — 1101F PR PT FALLS ASSESS DOC 0-1 FALLS W/OUT INJ PAST YR: ICD-10-PCS | Mod: CPTII,S$GLB,, | Performed by: INTERNAL MEDICINE

## 2022-12-12 PROCEDURE — 3288F FALL RISK ASSESSMENT DOCD: CPT | Mod: CPTII,S$GLB,, | Performed by: INTERNAL MEDICINE

## 2022-12-12 PROCEDURE — 4010F PR ACE/ARB THEARPY RXD/TAKEN: ICD-10-PCS | Mod: CPTII,S$GLB,, | Performed by: INTERNAL MEDICINE

## 2022-12-12 PROCEDURE — 86304 IMMUNOASSAY TUMOR CA 125: CPT

## 2022-12-12 PROCEDURE — 1126F PR PAIN SEVERITY QUANTIFIED, NO PAIN PRESENT: ICD-10-PCS | Mod: CPTII,S$GLB,, | Performed by: INTERNAL MEDICINE

## 2022-12-12 PROCEDURE — 99999 PR PBB SHADOW E&M-EST. PATIENT-LVL IV: ICD-10-PCS | Mod: PBBFAC,,, | Performed by: INTERNAL MEDICINE

## 2022-12-12 PROCEDURE — 85025 COMPLETE CBC W/AUTO DIFF WBC: CPT

## 2022-12-12 PROCEDURE — 80053 COMPREHEN METABOLIC PANEL: CPT

## 2022-12-12 PROCEDURE — 3008F PR BODY MASS INDEX (BMI) DOCUMENTED: ICD-10-PCS | Mod: CPTII,S$GLB,, | Performed by: INTERNAL MEDICINE

## 2022-12-12 PROCEDURE — 4010F ACE/ARB THERAPY RXD/TAKEN: CPT | Mod: CPTII,S$GLB,, | Performed by: INTERNAL MEDICINE

## 2022-12-12 PROCEDURE — 1126F AMNT PAIN NOTED NONE PRSNT: CPT | Mod: CPTII,S$GLB,, | Performed by: INTERNAL MEDICINE

## 2022-12-12 PROCEDURE — 3079F PR MOST RECENT DIASTOLIC BLOOD PRESSURE 80-89 MM HG: ICD-10-PCS | Mod: CPTII,S$GLB,, | Performed by: INTERNAL MEDICINE

## 2022-12-12 RX ORDER — DIPHENHYDRAMINE HYDROCHLORIDE 50 MG/ML
50 INJECTION INTRAMUSCULAR; INTRAVENOUS ONCE AS NEEDED
Status: CANCELLED | OUTPATIENT
Start: 2023-01-04

## 2022-12-12 RX ORDER — ACETAMINOPHEN 325 MG/1
650 TABLET ORAL
Status: CANCELLED | OUTPATIENT
Start: 2023-01-04

## 2022-12-12 RX ORDER — EPINEPHRINE 0.3 MG/.3ML
0.3 INJECTION SUBCUTANEOUS ONCE AS NEEDED
Status: CANCELLED | OUTPATIENT
Start: 2022-12-13

## 2022-12-12 RX ORDER — EPINEPHRINE 0.3 MG/.3ML
0.3 INJECTION SUBCUTANEOUS ONCE AS NEEDED
Status: CANCELLED | OUTPATIENT
Start: 2023-01-04

## 2022-12-12 RX ORDER — SODIUM CHLORIDE 0.9 % (FLUSH) 0.9 %
10 SYRINGE (ML) INJECTION
Status: CANCELLED | OUTPATIENT
Start: 2023-01-04

## 2022-12-12 RX ORDER — DIPHENHYDRAMINE HYDROCHLORIDE 50 MG/ML
25 INJECTION INTRAMUSCULAR; INTRAVENOUS
Status: CANCELLED | OUTPATIENT
Start: 2023-01-04

## 2022-12-12 RX ORDER — HEPARIN 100 UNIT/ML
500 SYRINGE INTRAVENOUS
Status: CANCELLED | OUTPATIENT
Start: 2022-12-13

## 2022-12-12 RX ORDER — SODIUM CHLORIDE 0.9 % (FLUSH) 0.9 %
10 SYRINGE (ML) INJECTION
Status: CANCELLED | OUTPATIENT
Start: 2022-12-13

## 2022-12-12 RX ORDER — ACETAMINOPHEN 325 MG/1
650 TABLET ORAL
Status: CANCELLED | OUTPATIENT
Start: 2022-12-13

## 2022-12-12 RX ORDER — HEPARIN 100 UNIT/ML
500 SYRINGE INTRAVENOUS
Status: CANCELLED | OUTPATIENT
Start: 2023-01-04

## 2022-12-12 RX ORDER — DIPHENHYDRAMINE HYDROCHLORIDE 50 MG/ML
50 INJECTION INTRAMUSCULAR; INTRAVENOUS ONCE AS NEEDED
Status: CANCELLED | OUTPATIENT
Start: 2022-12-13

## 2022-12-12 RX ORDER — DIPHENHYDRAMINE HYDROCHLORIDE 50 MG/ML
25 INJECTION INTRAMUSCULAR; INTRAVENOUS
Status: CANCELLED | OUTPATIENT
Start: 2022-12-13

## 2022-12-13 ENCOUNTER — INFUSION (OUTPATIENT)
Dept: INFUSION THERAPY | Facility: HOSPITAL | Age: 72
End: 2022-12-13
Attending: INTERNAL MEDICINE
Payer: MEDICARE

## 2022-12-13 VITALS
RESPIRATION RATE: 18 BRPM | TEMPERATURE: 98 F | HEART RATE: 64 BPM | SYSTOLIC BLOOD PRESSURE: 116 MMHG | WEIGHT: 138.5 LBS | BODY MASS INDEX: 25.33 KG/M2 | OXYGEN SATURATION: 99 % | DIASTOLIC BLOOD PRESSURE: 70 MMHG

## 2022-12-13 DIAGNOSIS — C78.6 PERITONEAL CARCINOMATOSIS: Primary | ICD-10-CM

## 2022-12-13 LAB
BASOPHILS # BLD AUTO: 0.03 X10(3)/MCL (ref 0–0.2)
BASOPHILS NFR BLD AUTO: 0.4 %
EOSINOPHIL # BLD AUTO: 0.16 X10(3)/MCL (ref 0–0.9)
EOSINOPHIL NFR BLD AUTO: 2 %
ERYTHROCYTE [DISTWIDTH] IN BLOOD BY AUTOMATED COUNT: 12.7 % (ref 11.5–17)
HCT VFR BLD AUTO: 40.8 % (ref 37–47)
HGB BLD-MCNC: 13.1 GM/DL (ref 12–16)
IMM GRANULOCYTES # BLD AUTO: 0.01 X10(3)/MCL (ref 0–0.04)
IMM GRANULOCYTES NFR BLD AUTO: 0.1 %
LYMPHOCYTES # BLD AUTO: 3.87 X10(3)/MCL (ref 0.6–4.6)
LYMPHOCYTES NFR BLD AUTO: 47.5 %
MCH RBC QN AUTO: 31.3 PG (ref 27–31)
MCHC RBC AUTO-ENTMCNC: 32.1 MG/DL (ref 33–36)
MCV RBC AUTO: 97.4 FL (ref 80–94)
MONOCYTES # BLD AUTO: 0.98 X10(3)/MCL (ref 0.1–1.3)
MONOCYTES NFR BLD AUTO: 12 %
NEUTROPHILS # BLD AUTO: 3.1 X10(3)/MCL (ref 2.1–9.2)
NEUTROPHILS NFR BLD AUTO: 38 %
PLATELET # BLD AUTO: 420 X10(3)/MCL (ref 130–400)
PMV BLD AUTO: 8.9 FL (ref 7.4–10.4)
RBC # BLD AUTO: 4.19 X10(6)/MCL (ref 4.2–5.4)
WBC # SPEC AUTO: 8.2 X10(3)/MCL (ref 4.5–11.5)

## 2022-12-13 PROCEDURE — 25000003 PHARM REV CODE 250: Performed by: INTERNAL MEDICINE

## 2022-12-13 PROCEDURE — 63600175 PHARM REV CODE 636 W HCPCS: Mod: TB | Performed by: INTERNAL MEDICINE

## 2022-12-13 PROCEDURE — 96375 TX/PRO/DX INJ NEW DRUG ADDON: CPT

## 2022-12-13 PROCEDURE — 96413 CHEMO IV INFUSION 1 HR: CPT

## 2022-12-13 RX ORDER — EPINEPHRINE 0.3 MG/.3ML
0.3 INJECTION SUBCUTANEOUS ONCE AS NEEDED
Status: DISCONTINUED | OUTPATIENT
Start: 2022-12-13 | End: 2022-12-13 | Stop reason: HOSPADM

## 2022-12-13 RX ORDER — HEPARIN 100 UNIT/ML
500 SYRINGE INTRAVENOUS
Status: DISCONTINUED | OUTPATIENT
Start: 2022-12-13 | End: 2022-12-13 | Stop reason: HOSPADM

## 2022-12-13 RX ORDER — DIPHENHYDRAMINE HYDROCHLORIDE 50 MG/ML
25 INJECTION INTRAMUSCULAR; INTRAVENOUS
Status: COMPLETED | OUTPATIENT
Start: 2022-12-13 | End: 2022-12-13

## 2022-12-13 RX ORDER — SODIUM CHLORIDE 0.9 % (FLUSH) 0.9 %
10 SYRINGE (ML) INJECTION
Status: DISCONTINUED | OUTPATIENT
Start: 2022-12-13 | End: 2022-12-13 | Stop reason: HOSPADM

## 2022-12-13 RX ORDER — DIPHENHYDRAMINE HYDROCHLORIDE 50 MG/ML
50 INJECTION INTRAMUSCULAR; INTRAVENOUS ONCE AS NEEDED
Status: DISCONTINUED | OUTPATIENT
Start: 2022-12-13 | End: 2022-12-13 | Stop reason: HOSPADM

## 2022-12-13 RX ORDER — ACETAMINOPHEN 325 MG/1
650 TABLET ORAL
Status: DISCONTINUED | OUTPATIENT
Start: 2022-12-13 | End: 2022-12-13 | Stop reason: HOSPADM

## 2022-12-13 RX ADMIN — SODIUM CHLORIDE 900 MG: 9 INJECTION, SOLUTION INTRAVENOUS at 02:12

## 2022-12-13 RX ADMIN — DIPHENHYDRAMINE HYDROCHLORIDE 25 MG: 50 INJECTION INTRAMUSCULAR; INTRAVENOUS at 02:12

## 2023-01-03 ENCOUNTER — LAB VISIT (OUTPATIENT)
Dept: LAB | Facility: HOSPITAL | Age: 73
End: 2023-01-03
Attending: INTERNAL MEDICINE
Payer: MEDICARE

## 2023-01-03 DIAGNOSIS — C57.8 MALIGNANT NEOPLASM OF OVERLAPPING SITES OF FEMALE GENITAL ORGANS: ICD-10-CM

## 2023-01-03 DIAGNOSIS — C78.6 PERITONEAL CARCINOMATOSIS: ICD-10-CM

## 2023-01-03 LAB
ALBUMIN SERPL-MCNC: 3.9 G/DL (ref 3.4–4.8)
ALBUMIN/GLOB SERPL: 1.3 RATIO (ref 1.1–2)
ALP SERPL-CCNC: 51 UNIT/L (ref 40–150)
ALT SERPL-CCNC: 22 UNIT/L (ref 0–55)
AST SERPL-CCNC: 35 UNIT/L (ref 5–34)
BASOPHILS # BLD AUTO: 0.02 X10(3)/MCL (ref 0–0.2)
BASOPHILS NFR BLD AUTO: 0.2 %
BILIRUBIN DIRECT+TOT PNL SERPL-MCNC: 1.4 MG/DL
BUN SERPL-MCNC: 15.5 MG/DL (ref 9.8–20.1)
CALCIUM SERPL-MCNC: 9.5 MG/DL (ref 8.4–10.2)
CHLORIDE SERPL-SCNC: 99 MMOL/L (ref 98–107)
CO2 SERPL-SCNC: 30 MMOL/L (ref 23–31)
CREAT SERPL-MCNC: 0.81 MG/DL (ref 0.55–1.02)
EOSINOPHIL # BLD AUTO: 0.14 X10(3)/MCL (ref 0–0.9)
EOSINOPHIL NFR BLD AUTO: 1.7 %
ERYTHROCYTE [DISTWIDTH] IN BLOOD BY AUTOMATED COUNT: 12.8 % (ref 11–14.5)
GFR SERPLBLD CREATININE-BSD FMLA CKD-EPI: 77 MLS/MIN/1.73/M2
GLOBULIN SER-MCNC: 3 GM/DL (ref 2.4–3.5)
GLUCOSE SERPL-MCNC: 102 MG/DL (ref 82–115)
HCT VFR BLD AUTO: 40.2 % (ref 37–47)
HGB BLD-MCNC: 12.8 GM/DL (ref 12–16)
IMM GRANULOCYTES # BLD AUTO: 0.01 X10(3)/MCL (ref 0–0.04)
IMM GRANULOCYTES NFR BLD AUTO: 0.1 %
LYMPHOCYTES # BLD AUTO: 3.34 X10(3)/MCL (ref 0.6–4.6)
LYMPHOCYTES NFR BLD AUTO: 40.5 %
MCH RBC QN AUTO: 31 PG
MCHC RBC AUTO-ENTMCNC: 31.8 MG/DL (ref 33–36)
MCV RBC AUTO: 97.3 FL (ref 80–94)
MONOCYTES # BLD AUTO: 0.97 X10(3)/MCL (ref 0.1–1.3)
MONOCYTES NFR BLD AUTO: 11.8 %
NEUTROPHILS # BLD AUTO: 3.76 X10(3)/MCL (ref 2.1–9.2)
NEUTROPHILS NFR BLD AUTO: 45.7 %
PLATELET # BLD AUTO: 342 X10(3)/MCL (ref 140–371)
PMV BLD AUTO: 7.8 FL (ref 9.4–12.4)
POTASSIUM SERPL-SCNC: 4.9 MMOL/L (ref 3.5–5.1)
PROT SERPL-MCNC: 6.9 GM/DL (ref 5.8–7.6)
PROT UR QL STRIP.AUTO: ABNORMAL MG/DL
RBC # BLD AUTO: 4.13 X10(6)/MCL (ref 4.2–5.4)
SODIUM SERPL-SCNC: 133 MMOL/L (ref 136–145)
WBC # SPEC AUTO: 8.2 X10(3)/MCL (ref 4.5–11.5)

## 2023-01-03 PROCEDURE — 86304 IMMUNOASSAY TUMOR CA 125: CPT

## 2023-01-03 PROCEDURE — 80053 COMPREHEN METABOLIC PANEL: CPT

## 2023-01-03 PROCEDURE — 36415 COLL VENOUS BLD VENIPUNCTURE: CPT

## 2023-01-03 PROCEDURE — 81005 URINALYSIS: CPT

## 2023-01-03 PROCEDURE — 85025 COMPLETE CBC W/AUTO DIFF WBC: CPT

## 2023-01-04 ENCOUNTER — INFUSION (OUTPATIENT)
Dept: INFUSION THERAPY | Facility: HOSPITAL | Age: 73
End: 2023-01-04
Attending: INTERNAL MEDICINE
Payer: MEDICARE

## 2023-01-04 VITALS
DIASTOLIC BLOOD PRESSURE: 77 MMHG | HEART RATE: 65 BPM | OXYGEN SATURATION: 99 % | RESPIRATION RATE: 18 BRPM | SYSTOLIC BLOOD PRESSURE: 154 MMHG

## 2023-01-04 DIAGNOSIS — C78.6 PERITONEAL CARCINOMATOSIS: Primary | ICD-10-CM

## 2023-01-04 LAB — CANCER AG125 SERPL-ACNC: 12 UNIT/ML (ref 0–35)

## 2023-01-04 PROCEDURE — 25000003 PHARM REV CODE 250: Performed by: INTERNAL MEDICINE

## 2023-01-04 PROCEDURE — 96413 CHEMO IV INFUSION 1 HR: CPT

## 2023-01-04 PROCEDURE — 63600175 PHARM REV CODE 636 W HCPCS: Mod: TB | Performed by: INTERNAL MEDICINE

## 2023-01-04 RX ORDER — DIPHENHYDRAMINE HYDROCHLORIDE 50 MG/ML
25 INJECTION INTRAMUSCULAR; INTRAVENOUS
Status: COMPLETED | OUTPATIENT
Start: 2023-01-04 | End: 2023-01-04

## 2023-01-04 RX ORDER — HEPARIN 100 UNIT/ML
500 SYRINGE INTRAVENOUS
Status: DISCONTINUED | OUTPATIENT
Start: 2023-01-04 | End: 2023-01-04 | Stop reason: HOSPADM

## 2023-01-04 RX ORDER — ACETAMINOPHEN 325 MG/1
650 TABLET ORAL
Status: DISCONTINUED | OUTPATIENT
Start: 2023-01-04 | End: 2023-01-04 | Stop reason: HOSPADM

## 2023-01-04 RX ORDER — EPINEPHRINE 0.3 MG/.3ML
0.3 INJECTION SUBCUTANEOUS ONCE AS NEEDED
Status: DISCONTINUED | OUTPATIENT
Start: 2023-01-04 | End: 2023-01-04 | Stop reason: HOSPADM

## 2023-01-04 RX ORDER — SODIUM CHLORIDE 0.9 % (FLUSH) 0.9 %
10 SYRINGE (ML) INJECTION
Status: DISCONTINUED | OUTPATIENT
Start: 2023-01-04 | End: 2023-01-04 | Stop reason: HOSPADM

## 2023-01-04 RX ORDER — DIPHENHYDRAMINE HYDROCHLORIDE 50 MG/ML
50 INJECTION INTRAMUSCULAR; INTRAVENOUS ONCE AS NEEDED
Status: DISCONTINUED | OUTPATIENT
Start: 2023-01-04 | End: 2023-01-04 | Stop reason: HOSPADM

## 2023-01-04 RX ADMIN — SODIUM CHLORIDE 900 MG: 9 INJECTION, SOLUTION INTRAVENOUS at 02:01

## 2023-01-04 RX ADMIN — DIPHENHYDRAMINE HYDROCHLORIDE 25 MG: 50 INJECTION, SOLUTION INTRAMUSCULAR; INTRAVENOUS at 02:01

## 2023-01-13 NOTE — PROGRESS NOTES
Subjective:       Patient ID: Shayna Ledezma is a 72 y.o. female.  GI: Dr. Gamaliel Carlos  GYN ONC at [facility]: Dr. Mike Santana    1. 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 Pulmonary Embolism (Incidental on CT)--19    Procedure/pathology:   1. Paracentesis with removal of 5L of fluid done 18--serous carcinoma, high grade, c/w ovarian primary, PAX-8, CK7 and MOC-31 positive, CDX-2, CK-20 and Calretinin-negative.  2. S/p Exploratory Lap, radical tumor debulking including total abdominal hysterectomy, bilateral salpingo-oophorectomy, bilateral pelvic lymph node sampling, appendectomy, mobilization of the left ureter, complete gross resection of the disease with removal of mesenteric disease, as well as omental disese and parasplenic disease by Dr. Santana 3/18/19--Metastatic high grade serous carcinoma. Tissue/organ involvement: right ovary, appendectomy, omentum, mesentery (including small bowel mesentery) and multiple other sites designated: periumbilical, perisplenic, transverse colon, splenic flexure, perirectal, left periureteral. 2 lymph nodes negative. [id number]. FIGO stage IIIC. Myriad somatic instability testing positive for genomic instability, negative BRCA1 and BRCA2 tumor testing.  3. Splenectomy done 10/21/19--splenic involvement with metastatic high grade serous carcinoma, 5 benign lymph nodes. Caris testing showed LAM, NTRK1/2/3 negative, TMB low, BRCA1/2 negative, ER/ID negative, CATHY negative, SPEN pathogenic variant Exon 11, TP53 pathogenic variant Exon 5, PD-L1 positive CPS 15, Genomic ELODIA high.  Imagin. CT A/P w/ and w/o contrast done at [facility] 18--large volume ascites with multiple peritoneal implants, right pericolic gutter implant measures 2.5X3.3cm, LUQ omental/mesenteric implant measures 3X3.6cm, omental carcinomatosis, extensive enhancing soft tissue surrounding sigmoid colon vs. large sigmoid mass, left  ovarian cystic lesion appears to have some internal septations measures 3.5X3.8cm, enhancing periportal soft tissue density likely lymphadenopathy with some narrowing of the portal vein noted, but without internal filling defect, borderline splenomegaly, subtle solid lesion w/n central spleen measures 2.4X2.5cm, likely metastatic, with peripheral area of diminished enhancement suggesting splenic infarct, soft tissue implant abutting gallbladder measures 2X2.2cm, no right adnexal mass, trace bilateral layering pleural fluid, small moderate-sized hiatal hernia, few borderline enlarged lymph nodes w/n right epicardial fat pad.  2. CT of chest on 1/11/19--Some prominent right cardiophrenic lymph nodes are nonspecific and can be followed on future surveillance scans. Otherwise no CT evidence of metastatic disease to the chest. While exam was not tailored for evaluation of the pulmonary arteries I suspect there is pulmonary thromboembolic disease. Peritoneal carcinomatosis seen in the upper abdomen. Critical Result: Pulmonary Embolus.  3. CT C/A/P 3/4/19--Positive response to therapy. No new or progressive metastatic disease in the chest, abdomen or pelvis. Stable to improved findings include smaller pericardial lymph nodes, andrew hepatis adenopathy, peritoneal carcinomatosis, smaller left adnexal lesion; no evidence of PE within limitations of the study.  5. CT PE protocol chest/A/P 6/11/19--No PE, improved pericardial lymph node with minimal size on current examination, improved peritoneal carcinomatosis and left adnexal implant, splenic ischemia evolved into small infarct, size improvement of one of splenic hypodense lesion and mild size increase of second hypodense lesion, favored to be benign/cystic, no new findings.  6. PET/CT 9/25/19--s/p hysterectomy and bilateral oophorectomy, no evidence for locally recurrent/residual disease, intensely hypermetabolic hypodense lesion within the spleen 3.5X3.3cm concerning for  metastatic focus. No other abnormal focus of disease.  7. CT C/A/P 11/13/20--Right lower lobe 3.3 cm mass is presumed metastatic, otherwise no evident residual, recurrent or metastatic disease.   8. PET/CT 11/20/20--Hypermetabolic right lung base lesion and suspected metastatic periportal adenopathy, no additional sites of FDG-avid otherwise aggressive appearing pathology through the neck, chest, abdomen, or pelvis.  9. PET/CT 1/21/21--Interval decrease in size of the right lower lobe mass with decreased FDG uptake, now measures 1.7 x 2.2 cm (previous 3.3 x 3.1 cm), resolution of FDG uptake in the suspected periportal lymph node which is not identifiable on noncontrast CT. No evidence of new or progressive hypermetabolic metastatic disease.    Procedures:   1. Paracentesis on 12/28/18 with removal of 5 Liters.  2. Paracentesis on 01/07/19 with removal of 3.5 Liters.  3. Paracentesis on 02/06/19 with removal of 2.5 Liters.  4. Mediport placed 12/2020 by Dr. Serge Gentile--removed 8/26/21 due to mediport fracture.    Germline genetic testing done by [facility] 1/29/19--negative for BRCA1/2, two (2) variants of uncertain significance (VUS): CATHY p.S3253R/p.H0301H and MLH1 p.P603L.     :  12/19/18 1102  11/10/20  42.9  12/08/20 79.5  01/05/21 28.9  01/26/21 12.8  02/18/21 11.6  03/15/22--10.3  04/26/22--9.6  05/16/22--10.2  06/07/22--10.1  07/19/22--11.6  08/08/22--12.5  09/19/22--12.7  10/31/22--13.5  01/03/23--12.0    Treatment History:  1. Neoadjuvant Carboplatin/Taxol every 3 weeks x 3 cycles. 1/15/19 - 2/26/19. Neulasta added.   2. Surgery--tumor debulking KORI/BSO 3/18/19.  3. Adjuvant Carboplatin/Taxol x 3 additions cycles 4/9/19--5/21/19.  4. Splenectomy 10/21/19.  5. Niraparib maintenance (dose reduction to 100mg daily for cytopenias) 11/8/19--12/1/20 (stopped due to progression).  6. Carboplatin/Paclitaxel/Avastin X 6 cycles completed 12/8/21--3/24/21 (complete response).    Current Treatment:  1.  Eliquis BID for incidental PE on imaging 1/2019  2. Avastin maintenance started on 4/13/21.   Cycle 32 due on 1/24/23.      Chief Complaint: Other Misc (Pt reports no new concerns today.)    HPI   Patient presents for scheduled follow-up of ovarian cancer. She continues on Avastin every 3 weeks without problems. Her BP is good and she has no complaints. Appetite good and weight stable. Bowels are moving well. Denies any new pain or discomfort. She did have an episode of epistaxis a few weeks ago, no additional episodes.     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      atorvastatin (LIPITOR) 40 MG tablet Take 1 tablet (40 mg total) by mouth once daily. 90 tablet 3    b complex vitamins capsule Take 1 capsule by mouth once daily.      GLUTAMINE ORAL Take 5 g by mouth.      hydroCHLOROthiazide (HYDRODIURIL) 25 MG tablet Take 1 tablet (25 mg total) by mouth once daily. 90 tablet 3    lisinopriL 10 MG tablet Take 1 tablet (10 mg total) by mouth once daily. 90 tablet 3    pyridoxine, vitamin B6, (B-6) 250 MG Tab Take 250 mg by mouth once daily.      sodium chloride 0.9% SolP 100 mL with bevacizumab 25 mg/mL Soln Inject into the vein every 21 days.       No current facility-administered medications on file prior to visit.       Review of Systems   Constitutional:  Negative for activity change, appetite change, fever and unexpected weight change.   HENT:  Negative for mouth sores.    Eyes:  Negative for visual disturbance.   Respiratory:  Negative for cough and shortness of breath.    Cardiovascular:  Negative for chest pain.   Gastrointestinal:  Negative for abdominal pain, blood in stool, constipation, diarrhea, nausea and vomiting.   Genitourinary:  Negative for difficulty urinating and frequency.   Musculoskeletal:  Negative for back pain.   Integumentary:  Negative for rash.   Neurological:  Negative for dizziness, weakness and headaches.   Hematological:  Negative for adenopathy.   Psychiatric/Behavioral:  Negative for behavioral problems and suicidal ideas. The patient is not nervous/anxious.       Vitals:    01/23/23 1403   BP: 127/82   Pulse: 68   Resp: 14   Temp: 97.8 °F (36.6 °C)      Physical Exam  Vitals reviewed.   Constitutional:       Appearance: Normal appearance. She is normal weight.   HENT:      Head: Normocephalic.   Eyes:      General: Lids are normal. Vision grossly intact.      Extraocular Movements: Extraocular movements intact.      Conjunctiva/sclera: Conjunctivae normal.   Cardiovascular:      Rate and Rhythm: Normal rate and regular rhythm.      Pulses: Normal pulses.      Heart sounds: Normal heart sounds, S1 normal and S2 normal.   Pulmonary:      Effort: Pulmonary effort is normal.      Breath sounds: Normal breath sounds.   Abdominal:      General: Abdomen is flat. Bowel sounds are normal. There is no distension.      Palpations: Abdomen is soft.      Tenderness: There is no abdominal tenderness.   Musculoskeletal:      Cervical back: Normal range of motion and neck supple.      Right lower leg: No edema.      Left lower leg: No edema.   Feet:      Right foot:      Skin integrity: Skin integrity normal.   Skin:     General: Skin is warm and moist.      Capillary Refill: Capillary refill takes less than 2  seconds.      Comments: Left CW mediport removed, site intact   Neurological:      General: No focal deficit present.      Mental Status: She is alert and oriented to person, place, and time.   Psychiatric:         Attention and Perception: Attention normal.         Mood and Affect: Mood normal.         Speech: Speech normal.         Behavior: Behavior normal. Behavior is cooperative.         Cognition and Memory: Cognition normal.         Judgment: Judgment normal.     Lab Visit on 01/23/2023   Component Date Value    Protein, UA 01/23/2023 Trace (A)     WBC 01/23/2023 8.9     RBC 01/23/2023 4.29     Hgb 01/23/2023 13.2     Hct 01/23/2023 41.1     MCV 01/23/2023 95.8 (H)     MCH 01/23/2023 30.8     MCHC 01/23/2023 32.1 (L)     RDW 01/23/2023 12.7     Platelet 01/23/2023 350     MPV 01/23/2023 7.8     Neut % 01/23/2023 50.8     Lymph % 01/23/2023 37.4     Mono % 01/23/2023 10.6     Eos % 01/23/2023 0.9     Basophil % 01/23/2023 0.2     Lymph # 01/23/2023 3.31     Neut # 01/23/2023 4.50     Mono # 01/23/2023 0.94     Eos # 01/23/2023 0.08     Baso # 01/23/2023 0.02     IG# 01/23/2023 0.01     IG% 01/23/2023 0.1           Assessment:       1. Malignant neoplasm of uterine adnexa    2. Carcinoma of fallopian tube, unspecified laterality    3. Peritoneal carcinomatosis    4. Pulmonary embolism, unspecified chronicity, unspecified pulmonary embolism type, unspecified whether acute cor pulmonale present          Plan:     Patient with left ovarian cancer diagnosed 12/26/18, appears to be stage IIIC vs. IV with diffuse peritoneal disease, possible splenic involvement, epicardial lymph nodes and bilateral pleural effusions.  Patient seen and evaluated by Dr. Mike Santana at [facility] in [place].   Treatment recommended upfront with chemotherapy Carboplatin/Paclitaxel x 3 cycles.  Completed Cycle 3 on 2/26/19.   She underwent total abdominal hysterectomy, BSO and tumor debulking on 3/18/19 with Dr. Santana with  complete gross resection of disease. FIGO Stage IIIC papillary serous fallopian tube cancer.   Patient resumed chemotherapy with cycle 4 on 4/9/19. Completed cycle 6 on 5/21/19.    Testing on tumor was positive for genomic instability but negative for BRCA mutation.  Per NCCN guidelines, maintenance can be considered for BRCA mutated germline category 1 and somatic category 2A. There is some evidence that PARP inhibitors have some activity as well in tumors with genomic instability. But the PARP maintenance is not approved for this indication. Offered treatment to patient and after discussion she declined and made decision to continue with observation only.    PET/CT done for rising CA-125 showed hypermetabolic splenic lesion which is not new, just enlarged from previous. Case discussed with Dr. Mike Santana.  Patient is now s/p splenectomy done 10/21/19. Consistent with splenic involvement with high grade serous carcinoma.   Dr. Santana recommended Niraparib maintenance based on new data showing activity in HRD positive ovarian cancer and patient started on 11/8/19, required a dose reduction due to cytopenias.  Rising CA-125 noted in 11/2020. CT showed new RLL lung mass.   Follow-up PET done 11/20/20 showed RLL lung mass hypermetabolic and hypermetabolic periportal adenopathy.     Recommended 2nd line treatment with Carboplatin/Taxol/Avastin.   Started cycle 1 on 12/8/20. Neulasta added with cycle 2.   Completed Cycle 6 on 3/24/21.  PET from 3/30/21 showed complete resolution of lung mass and no other disease.    Avastin maintenance started on 4/13/21.   Hospitalized for TIA after her 4th cycle of Avastin. Work-up for stroke and cause otherwise negative. Patient started on baby ASA by neurology.  Discussed there are no clear guidelines for changing/discontinuing treatment for TIA/CVA related to Avastin.  Since her symptoms resolved and there were no residual effects, recommended to continue with Avastin since it is  working well for her disease and since a baby ASA was added for prevention. She was also continued on Eliquis for h/o PE.  S/p mediport removal for fracture on 8/26/21.   Continue Avastin through peripheral vein for now.    Patient continues on Avastin maintenance without problems.  BP has been good. Currently on HCTZ 25mg daily.      Due for Avastin maintenance Cycle 32 tomorrow.    CBC good, will f/u CMP. Last CA-125 remains WNL.  Proceed with treatment tomorrow pending labs. Will continue checking protein UA every 3 weeks.  Labs on 2/13/23 and treatment only cycle 33 on 2/14/23.  RTC T/D visit in 6 weeks prior to cycle 34.    Will continue to check . No further scans until rise in CA-125.  She is now >12 months from completing last chemotherapy, so still considered platinum-sensitive. Will need replacement of mediport when she needs to resume chemotherapy.   Continue Eliquis 5mg BID and ASA.  All questions answered at this time.        Tom Angeles, HORACIO

## 2023-01-23 ENCOUNTER — OFFICE VISIT (OUTPATIENT)
Dept: HEMATOLOGY/ONCOLOGY | Facility: CLINIC | Age: 73
End: 2023-01-23
Payer: MEDICARE

## 2023-01-23 ENCOUNTER — TELEPHONE (OUTPATIENT)
Dept: HEMATOLOGY/ONCOLOGY | Facility: CLINIC | Age: 73
End: 2023-01-23

## 2023-01-23 VITALS
HEIGHT: 64 IN | TEMPERATURE: 98 F | SYSTOLIC BLOOD PRESSURE: 127 MMHG | RESPIRATION RATE: 14 BRPM | OXYGEN SATURATION: 99 % | WEIGHT: 138.19 LBS | BODY MASS INDEX: 23.59 KG/M2 | HEART RATE: 68 BPM | DIASTOLIC BLOOD PRESSURE: 82 MMHG

## 2023-01-23 DIAGNOSIS — C57.4 MALIGNANT NEOPLASM OF UTERINE ADNEXA: Primary | ICD-10-CM

## 2023-01-23 DIAGNOSIS — C57.00 CARCINOMA OF FALLOPIAN TUBE, UNSPECIFIED LATERALITY: ICD-10-CM

## 2023-01-23 DIAGNOSIS — I10 HYPERTENSION, UNSPECIFIED TYPE: Primary | ICD-10-CM

## 2023-01-23 DIAGNOSIS — I26.99 PULMONARY EMBOLISM, UNSPECIFIED CHRONICITY, UNSPECIFIED PULMONARY EMBOLISM TYPE, UNSPECIFIED WHETHER ACUTE COR PULMONALE PRESENT: ICD-10-CM

## 2023-01-23 DIAGNOSIS — C78.6 PERITONEAL CARCINOMATOSIS: ICD-10-CM

## 2023-01-23 PROCEDURE — 4010F ACE/ARB THERAPY RXD/TAKEN: CPT | Mod: CPTII,S$GLB,, | Performed by: NURSE PRACTITIONER

## 2023-01-23 PROCEDURE — 1126F AMNT PAIN NOTED NONE PRSNT: CPT | Mod: CPTII,S$GLB,, | Performed by: NURSE PRACTITIONER

## 2023-01-23 PROCEDURE — 1160F RVW MEDS BY RX/DR IN RCRD: CPT | Mod: CPTII,S$GLB,, | Performed by: NURSE PRACTITIONER

## 2023-01-23 PROCEDURE — 3008F PR BODY MASS INDEX (BMI) DOCUMENTED: ICD-10-PCS | Mod: CPTII,S$GLB,, | Performed by: NURSE PRACTITIONER

## 2023-01-23 PROCEDURE — 1101F PT FALLS ASSESS-DOCD LE1/YR: CPT | Mod: CPTII,S$GLB,, | Performed by: NURSE PRACTITIONER

## 2023-01-23 PROCEDURE — 1101F PR PT FALLS ASSESS DOC 0-1 FALLS W/OUT INJ PAST YR: ICD-10-PCS | Mod: CPTII,S$GLB,, | Performed by: NURSE PRACTITIONER

## 2023-01-23 PROCEDURE — 4010F PR ACE/ARB THEARPY RXD/TAKEN: ICD-10-PCS | Mod: CPTII,S$GLB,, | Performed by: NURSE PRACTITIONER

## 2023-01-23 PROCEDURE — 99999 PR PBB SHADOW E&M-EST. PATIENT-LVL IV: ICD-10-PCS | Mod: PBBFAC,,, | Performed by: NURSE PRACTITIONER

## 2023-01-23 PROCEDURE — 99214 PR OFFICE/OUTPT VISIT, EST, LEVL IV, 30-39 MIN: ICD-10-PCS | Mod: S$GLB,,, | Performed by: NURSE PRACTITIONER

## 2023-01-23 PROCEDURE — 3079F PR MOST RECENT DIASTOLIC BLOOD PRESSURE 80-89 MM HG: ICD-10-PCS | Mod: CPTII,S$GLB,, | Performed by: NURSE PRACTITIONER

## 2023-01-23 PROCEDURE — 3288F FALL RISK ASSESSMENT DOCD: CPT | Mod: CPTII,S$GLB,, | Performed by: NURSE PRACTITIONER

## 2023-01-23 PROCEDURE — 3079F DIAST BP 80-89 MM HG: CPT | Mod: CPTII,S$GLB,, | Performed by: NURSE PRACTITIONER

## 2023-01-23 PROCEDURE — 3074F PR MOST RECENT SYSTOLIC BLOOD PRESSURE < 130 MM HG: ICD-10-PCS | Mod: CPTII,S$GLB,, | Performed by: NURSE PRACTITIONER

## 2023-01-23 PROCEDURE — 3074F SYST BP LT 130 MM HG: CPT | Mod: CPTII,S$GLB,, | Performed by: NURSE PRACTITIONER

## 2023-01-23 PROCEDURE — 99214 OFFICE O/P EST MOD 30 MIN: CPT | Mod: S$GLB,,, | Performed by: NURSE PRACTITIONER

## 2023-01-23 PROCEDURE — 1126F PR PAIN SEVERITY QUANTIFIED, NO PAIN PRESENT: ICD-10-PCS | Mod: CPTII,S$GLB,, | Performed by: NURSE PRACTITIONER

## 2023-01-23 PROCEDURE — 3288F PR FALLS RISK ASSESSMENT DOCUMENTED: ICD-10-PCS | Mod: CPTII,S$GLB,, | Performed by: NURSE PRACTITIONER

## 2023-01-23 PROCEDURE — 1160F PR REVIEW ALL MEDS BY PRESCRIBER/CLIN PHARMACIST DOCUMENTED: ICD-10-PCS | Mod: CPTII,S$GLB,, | Performed by: NURSE PRACTITIONER

## 2023-01-23 PROCEDURE — 3008F BODY MASS INDEX DOCD: CPT | Mod: CPTII,S$GLB,, | Performed by: NURSE PRACTITIONER

## 2023-01-23 PROCEDURE — 99999 PR PBB SHADOW E&M-EST. PATIENT-LVL IV: CPT | Mod: PBBFAC,,, | Performed by: NURSE PRACTITIONER

## 2023-01-23 PROCEDURE — 1159F PR MEDICATION LIST DOCUMENTED IN MEDICAL RECORD: ICD-10-PCS | Mod: CPTII,S$GLB,, | Performed by: NURSE PRACTITIONER

## 2023-01-23 PROCEDURE — 1159F MED LIST DOCD IN RCRD: CPT | Mod: CPTII,S$GLB,, | Performed by: NURSE PRACTITIONER

## 2023-01-23 RX ORDER — EPINEPHRINE 0.3 MG/.3ML
0.3 INJECTION SUBCUTANEOUS ONCE AS NEEDED
Status: CANCELLED | OUTPATIENT
Start: 2023-01-24

## 2023-01-23 RX ORDER — ACETAMINOPHEN 325 MG/1
650 TABLET ORAL
Status: CANCELLED | OUTPATIENT
Start: 2023-02-14

## 2023-01-23 RX ORDER — HEPARIN 100 UNIT/ML
500 SYRINGE INTRAVENOUS
Status: CANCELLED | OUTPATIENT
Start: 2023-02-14

## 2023-01-23 RX ORDER — DIPHENHYDRAMINE HYDROCHLORIDE 50 MG/ML
50 INJECTION INTRAMUSCULAR; INTRAVENOUS ONCE AS NEEDED
Status: CANCELLED | OUTPATIENT
Start: 2023-02-14

## 2023-01-23 RX ORDER — HEPARIN 100 UNIT/ML
500 SYRINGE INTRAVENOUS
Status: CANCELLED | OUTPATIENT
Start: 2023-01-24

## 2023-01-23 RX ORDER — ACETAMINOPHEN 325 MG/1
650 TABLET ORAL
Status: CANCELLED | OUTPATIENT
Start: 2023-01-24

## 2023-01-23 RX ORDER — DIPHENHYDRAMINE HYDROCHLORIDE 50 MG/ML
25 INJECTION INTRAMUSCULAR; INTRAVENOUS
Status: CANCELLED | OUTPATIENT
Start: 2023-01-24

## 2023-01-23 RX ORDER — SODIUM CHLORIDE 0.9 % (FLUSH) 0.9 %
10 SYRINGE (ML) INJECTION
Status: CANCELLED | OUTPATIENT
Start: 2023-01-24

## 2023-01-23 RX ORDER — DIPHENHYDRAMINE HYDROCHLORIDE 50 MG/ML
25 INJECTION INTRAMUSCULAR; INTRAVENOUS
Status: CANCELLED | OUTPATIENT
Start: 2023-02-14

## 2023-01-23 RX ORDER — DIPHENHYDRAMINE HYDROCHLORIDE 50 MG/ML
50 INJECTION INTRAMUSCULAR; INTRAVENOUS ONCE AS NEEDED
Status: CANCELLED | OUTPATIENT
Start: 2023-01-24

## 2023-01-23 RX ORDER — EPINEPHRINE 0.3 MG/.3ML
0.3 INJECTION SUBCUTANEOUS ONCE AS NEEDED
Status: CANCELLED | OUTPATIENT
Start: 2023-02-14

## 2023-01-23 RX ORDER — SODIUM CHLORIDE 0.9 % (FLUSH) 0.9 %
10 SYRINGE (ML) INJECTION
Status: CANCELLED | OUTPATIENT
Start: 2023-02-14

## 2023-01-24 ENCOUNTER — INFUSION (OUTPATIENT)
Dept: INFUSION THERAPY | Facility: HOSPITAL | Age: 73
End: 2023-01-24
Attending: INTERNAL MEDICINE
Payer: MEDICARE

## 2023-01-24 VITALS
HEIGHT: 63 IN | TEMPERATURE: 98 F | WEIGHT: 138.25 LBS | HEART RATE: 76 BPM | RESPIRATION RATE: 18 BRPM | DIASTOLIC BLOOD PRESSURE: 77 MMHG | SYSTOLIC BLOOD PRESSURE: 134 MMHG | BODY MASS INDEX: 24.5 KG/M2

## 2023-01-24 DIAGNOSIS — C78.6 PERITONEAL CARCINOMATOSIS: Primary | ICD-10-CM

## 2023-01-24 PROCEDURE — 63600175 PHARM REV CODE 636 W HCPCS: Performed by: NURSE PRACTITIONER

## 2023-01-24 PROCEDURE — 96413 CHEMO IV INFUSION 1 HR: CPT

## 2023-01-24 PROCEDURE — 25000003 PHARM REV CODE 250: Performed by: NURSE PRACTITIONER

## 2023-01-24 RX ORDER — EPINEPHRINE 0.3 MG/.3ML
0.3 INJECTION SUBCUTANEOUS ONCE AS NEEDED
Status: DISCONTINUED | OUTPATIENT
Start: 2023-01-24 | End: 2023-01-24 | Stop reason: HOSPADM

## 2023-01-24 RX ORDER — DIPHENHYDRAMINE HYDROCHLORIDE 50 MG/ML
50 INJECTION INTRAMUSCULAR; INTRAVENOUS ONCE AS NEEDED
Status: DISCONTINUED | OUTPATIENT
Start: 2023-01-24 | End: 2023-01-24 | Stop reason: HOSPADM

## 2023-01-24 RX ORDER — ACETAMINOPHEN 325 MG/1
650 TABLET ORAL
Status: DISCONTINUED | OUTPATIENT
Start: 2023-01-24 | End: 2023-01-24 | Stop reason: HOSPADM

## 2023-01-24 RX ORDER — HEPARIN 100 UNIT/ML
500 SYRINGE INTRAVENOUS
Status: DISCONTINUED | OUTPATIENT
Start: 2023-01-24 | End: 2023-01-24 | Stop reason: HOSPADM

## 2023-01-24 RX ORDER — DIPHENHYDRAMINE HYDROCHLORIDE 50 MG/ML
25 INJECTION INTRAMUSCULAR; INTRAVENOUS
Status: COMPLETED | OUTPATIENT
Start: 2023-01-24 | End: 2023-01-24

## 2023-01-24 RX ORDER — SODIUM CHLORIDE 0.9 % (FLUSH) 0.9 %
10 SYRINGE (ML) INJECTION
Status: DISCONTINUED | OUTPATIENT
Start: 2023-01-24 | End: 2023-01-24 | Stop reason: HOSPADM

## 2023-01-24 RX ADMIN — SODIUM CHLORIDE 900 MG: 9 INJECTION, SOLUTION INTRAVENOUS at 02:01

## 2023-01-24 RX ADMIN — DIPHENHYDRAMINE HYDROCHLORIDE 25 MG: 50 INJECTION, SOLUTION INTRAMUSCULAR; INTRAVENOUS at 02:01

## 2023-01-24 RX ADMIN — SODIUM CHLORIDE: 9 INJECTION, SOLUTION INTRAVENOUS at 01:01

## 2023-01-26 ENCOUNTER — TELEPHONE (OUTPATIENT)
Dept: ADMINISTRATIVE | Facility: HOSPITAL | Age: 73
End: 2023-01-26
Payer: MEDICARE

## 2023-01-26 NOTE — TELEPHONE ENCOUNTER
----- Message from Keri Curtis MA sent at 1/23/2023 12:42 PM CST -----  Regarding: Dr DOMINIQUE ACE Wednesday 2-1-23       1. Are there any outstanding Labs, imaging or referrals in the patient's chart?        Medicare Wellness Appointment    Fasting wellness labs ordered and ready to do.     Last Wellness  1-26-22         2. . Has the patient been seen in an ER, urgent care clinic, or any other health care    provider since their last visit? If yes when and where?

## 2023-01-27 ENCOUNTER — LAB VISIT (OUTPATIENT)
Dept: LAB | Facility: HOSPITAL | Age: 73
End: 2023-01-27
Attending: INTERNAL MEDICINE
Payer: MEDICARE

## 2023-01-27 DIAGNOSIS — I10 HYPERTENSION, UNSPECIFIED TYPE: ICD-10-CM

## 2023-01-27 LAB
CHOLEST SERPL-MCNC: 168 MG/DL
CHOLEST/HDLC SERPL: 3 {RATIO} (ref 0–5)
HDLC SERPL-MCNC: 66 MG/DL (ref 35–60)
LDLC SERPL CALC-MCNC: 88 MG/DL (ref 50–140)
TRIGL SERPL-MCNC: 72 MG/DL (ref 37–140)
TSH SERPL-ACNC: 3.76 UIU/ML (ref 0.35–4.94)
VLDLC SERPL CALC-MCNC: 14 MG/DL

## 2023-01-27 PROCEDURE — 80061 LIPID PANEL: CPT

## 2023-01-27 PROCEDURE — 84443 ASSAY THYROID STIM HORMONE: CPT

## 2023-01-27 PROCEDURE — 36415 COLL VENOUS BLD VENIPUNCTURE: CPT

## 2023-02-01 ENCOUNTER — OFFICE VISIT (OUTPATIENT)
Dept: INTERNAL MEDICINE | Facility: CLINIC | Age: 73
End: 2023-02-01
Payer: MEDICARE

## 2023-02-01 DIAGNOSIS — Z00.00 MEDICARE ANNUAL WELLNESS VISIT, SUBSEQUENT: ICD-10-CM

## 2023-02-01 DIAGNOSIS — C57.00 CARCINOMA OF FALLOPIAN TUBE, UNSPECIFIED LATERALITY: ICD-10-CM

## 2023-02-01 DIAGNOSIS — Z12.11 SCREEN FOR COLON CANCER: Primary | ICD-10-CM

## 2023-02-01 DIAGNOSIS — I10 PRIMARY HYPERTENSION: ICD-10-CM

## 2023-02-01 PROCEDURE — 1159F MED LIST DOCD IN RCRD: CPT | Mod: CPTII,,, | Performed by: INTERNAL MEDICINE

## 2023-02-01 PROCEDURE — 4010F ACE/ARB THERAPY RXD/TAKEN: CPT | Mod: CPTII,,, | Performed by: INTERNAL MEDICINE

## 2023-02-01 PROCEDURE — G0439 PR MEDICARE ANNUAL WELLNESS SUBSEQUENT VISIT: ICD-10-PCS | Mod: ,,, | Performed by: INTERNAL MEDICINE

## 2023-02-01 PROCEDURE — 3288F PR FALLS RISK ASSESSMENT DOCUMENTED: ICD-10-PCS | Mod: CPTII,,, | Performed by: INTERNAL MEDICINE

## 2023-02-01 PROCEDURE — 1159F PR MEDICATION LIST DOCUMENTED IN MEDICAL RECORD: ICD-10-PCS | Mod: CPTII,,, | Performed by: INTERNAL MEDICINE

## 2023-02-01 PROCEDURE — 1160F RVW MEDS BY RX/DR IN RCRD: CPT | Mod: CPTII,,, | Performed by: INTERNAL MEDICINE

## 2023-02-01 PROCEDURE — 1101F PT FALLS ASSESS-DOCD LE1/YR: CPT | Mod: CPTII,,, | Performed by: INTERNAL MEDICINE

## 2023-02-01 PROCEDURE — 1101F PR PT FALLS ASSESS DOC 0-1 FALLS W/OUT INJ PAST YR: ICD-10-PCS | Mod: CPTII,,, | Performed by: INTERNAL MEDICINE

## 2023-02-01 PROCEDURE — 1160F PR REVIEW ALL MEDS BY PRESCRIBER/CLIN PHARMACIST DOCUMENTED: ICD-10-PCS | Mod: CPTII,,, | Performed by: INTERNAL MEDICINE

## 2023-02-01 PROCEDURE — 4010F PR ACE/ARB THEARPY RXD/TAKEN: ICD-10-PCS | Mod: CPTII,,, | Performed by: INTERNAL MEDICINE

## 2023-02-01 PROCEDURE — G0439 PPPS, SUBSEQ VISIT: HCPCS | Mod: ,,, | Performed by: INTERNAL MEDICINE

## 2023-02-01 PROCEDURE — 3288F FALL RISK ASSESSMENT DOCD: CPT | Mod: CPTII,,, | Performed by: INTERNAL MEDICINE

## 2023-02-01 NOTE — ASSESSMENT & PLAN NOTE
-remains on maintenance Avastin for now, tolerating the treatment well without any acute issues   -followed by Oncology and get serial

## 2023-02-01 NOTE — ASSESSMENT & PLAN NOTE
-labs are reviewed all essentially normal  -patient is advised on importance of watching her carbohydrate intake and saturated fat intake, making the right nutritional choices and exercising on a regular basis  -up-to-date with the screening except for colonoscopy for which a referral is being sent to Gastroenterology

## 2023-02-01 NOTE — PROGRESS NOTES
Patient ID: [id number]     Chief Complaint: Medicare AWV (wellness)      HPI:     Shayna Ledezma is a 72 y.o. female here today for a Medicare Wellness. No other complaints today.     Ms. Ledezma is a 71-year-old female who is here for her 6 month follow up. 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 Currently on maintenance Avastin since 2021. Patient is s/p tumor debulking KORI/BSO in 2019 after completion of neoadjuvant carboplatin Taxol and then followed by adjuvant carboplatin Taxol. Being followed by oncology closely with CA-125's. Recommendation to hold off on further scanning until elevation of CA-125.  Patient also has had a splenectomy in 2019.  Patient did have an incidental PE on her imaging and now remains on Eliquis twice daily.  Patient also has had a TIA and since then has been on aspirin and on a statin.  We discussed regarding the importance of getting a colonoscopy and patient is in agreement considering after educating her on the importance of screening test     MCW: 23  Pneumonia vaccine: Up-to-date  CRS: Refer to Dr. 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  Ovarian cancer  Peritoneal carcinomatosis  TIA (transient ischemic attack)     Past Surgical History:   Procedure Laterality Date    APPENDECTOMY      BREAST BIOPSY       SECTION      Endometrial polyp removal      Spleen operation      TONSILLECTOMY      TOTAL ABDOMINAL HYSTERECTOMY         Review of patient's allergies indicates:   Allergen Reactions    Codeine Itching    Oxycodone-acetaminophen Other (See Comments)     Unknown    Oxycodone-aspirin Other (See Comments)     Unknown       Outpatient Medications Marked as Taking for the 23 encounter (Office Visit) with Sigrid Giang MD    Medication Sig Dispense Refill    apixaban (ELIQUIS) 5 mg Tab TAKE 1 TABLET BY MOUTH TWICE DAILY 180 tablet 0    aspirin (ECOTRIN) 81 MG EC tablet Take 81 mg by mouth once daily.      atorvastatin (LIPITOR) 40 MG tablet Take 1 tablet (40 mg total) by mouth once daily. 90 tablet 3    b complex vitamins capsule Take 1 capsule by mouth once daily.      GLUTAMINE ORAL Take 10 g by mouth Daily.      hydroCHLOROthiazide (HYDRODIURIL) 25 MG tablet Take 1 tablet (25 mg total) by mouth once daily. 90 tablet 3    lisinopriL 10 MG tablet Take 1 tablet (10 mg total) by mouth once daily. 90 tablet 3    pyridoxine, vitamin B6, (B-6) 250 MG Tab Take 250 mg by mouth once daily.      sodium chloride 0.9% SolP 100 mL with bevacizumab 25 mg/mL Soln Inject into the vein every 21 days.         Social History     Socioeconomic History    Marital status: Single   Tobacco Use    Smoking status: Never    Smokeless tobacco: Never   Substance and Sexual Activity    Alcohol use: Never    Drug use: Never    Sexual activity: Not Currently     Birth control/protection: None        Family History   Problem Relation Age of Onset    No Known Problems Mother     No Known Problems Father     COPD Brother         Patient Care Team:  Sigrid Giang MD as PCP - General (Internal Medicine)     Opioid Screening: Patient medication list reviewed, patient is not taking prescription opioids. Patient is not using additional opioids than prescribed. Patient is at low risk of substance abuse based on this opioid use history.       Subjective:     ROS  A comprehensive review of systems is obtained and is essentially negative except for that stated in the HPI     Patient Reported Health Risk Assessment  [age] 70-79  Are you male or female?: Female  During the past four weeks, how much have you been bothered by emotional problems such as feeling anxious, depressed, irritable, sad, or downhearted and blue?: Not at all  During the past five  weeks, has your physical and/or emotional health limited your social activities with family, friends, neighbors, or groups?: Not at all  During the past four weeks, how much bodily pain have you generally had?: No pain  During the past four weeks, was someone available to help if you needed and wanted help?: Yes, as much as I wanted  During the past four weeks, what was the hardest physical activity you could do for at least two minutes?: Light  Can you get to places out of walking distance without help?  (For example, can you travel alone on buses or taxis, or drive your own car?): Yes  Can you go shopping for groceries or clothes without someone's help?: Yes  Can you prepare your own meals?: Yes  Can you do your own housework without help?: Yes  Because of any health problems, do you need the help of another person with your personal care needs such as eating, bathing, dressing, or getting around the house?: No  Can you handle your own money without help?: Yes  During the past four weeks, how would you rate your health in general?: Good  How have things been going for you during the past four weeks?: Pretty well  Are you having difficulties driving your car?: No  Do you always fasten your seat belt when you are in a car?: Yes, usually  How often in the past four weeks have you been bothered by falling or dizzy when standing up?: Seldom  How often in the past four weeks have you been bothered by sexual problems?: Never  How often in the past four weeks have you been bothered by trouble eating well?: Never  How often in the past four weeks have you been bothered by teeth or denture problems?: Never  How often in the past four weeks have you been bothered with problems using the telephone?: Never  How often in the past four weeks have you been bothered by tiredness or fatigue?: Never  Have you fallen two or more times in the past year?: No  Are you afraid of falling?: No  Are you a smoker?: No  During the past four  "weeks, how many drinks of wine, beer, or other alcoholic beverages did you have?: No alcohol at all  Do you exercise for about 20 minutes three or more days a week?: Yes, some of the time  Have you been given any information to help you with hazards in your house that might hurt you?: Yes  Have you been given any information to help you with keeping track of your medications?: Yes  How often do you have trouble taking medicines the way you've been told to take them?: I always take them as prescribed  How confident are you that you can control and manage most of your health problems?: Very confident  What is your race? (Check all that apply.):     Objective:     BP (P) 116/72 (BP Location: Left arm, Patient Position: Sitting, BP Method: Small (Manual))   Pulse (P) 73   Temp (P) 97.4 °F (36.3 °C) (Temporal)   Ht (P) 5' 3" (1.6 m)   Wt (P) 63.4 kg (139 lb 12.8 oz)   SpO2 (P) 98%   BMI (P) 24.76 kg/m²     Physical Exam  Constitutional:       Appearance: Normal appearance.   HENT:      Head: Normocephalic and atraumatic.      Nose: Nose normal.      Mouth/Throat:      Mouth: Mucous membranes are moist.      Pharynx: Oropharynx is clear.   Eyes:      Extraocular Movements: Extraocular movements intact.      Pupils: Pupils are equal, round, and reactive to light.   Cardiovascular:      Rate and Rhythm: Normal rate and regular rhythm.      Pulses: Normal pulses.   Pulmonary:      Effort: Pulmonary effort is normal.      Breath sounds: Normal breath sounds.   Abdominal:      General: Bowel sounds are normal.      Palpations: Abdomen is soft.   Musculoskeletal:         General: Normal range of motion.      Cervical back: Normal range of motion and neck supple.   Skin:     General: Skin is warm.   Neurological:      General: No focal deficit present.      Mental Status: She is alert and oriented to person, place, and time. Mental status is at baseline.   Psychiatric:         Mood and Affect: Mood normal. "         No flowsheet data found.  Fall Risk Assessment - Outpatient 2/1/2023 1/23/2023 1/4/2023 12/13/2022 12/12/2022 11/22/2022 11/1/2022   Mobility Status Ambulatory Ambulatory Ambulatory - Ambulatory Ambulatory Ambulatory   Number of falls 0 0 0 0 0 0 0   Identified as fall risk 0 0 0 - 0 0 0           Depression Screening  Over the past two weeks, has the patient felt down, depressed, or hopeless?: No  Over the past two weeks, has the patient felt little interest or pleasure in doing things?: No  Functional Ability/Safety Screening  Was the patient's timed Up & Go test unsteady or longer than 30 seconds?: No  Does the patient need help with phone, transportation, shopping, preparing meals, housework, laundry, meds, or managing money?: No  Does the patient's home have rugs in the hallway, lack grab bars in the bathroom, lack handrails on the stairs or have poor lighting?: No  Have you noticed any hearing difficulties?: No  Cognitive Function (Assessed through direct observation with due consideration of information obtained by way of patient reports and/or concerns raised by family, friends, caretakers, or others)    Does the patient repeat questions/statements in the same day?: No  Does the patient have trouble remembering the date, year, and time?: No  Does the patient have difficulty managing finances?: No  Does the patient have a decreased sense of direction?: No      Assessment:     Problem List Items Addressed This Visit          Cardiac/Vascular    Hypertension     Well controlled.   Continue lisinopril 10 mg and hydrochlorothiazide 25 mg p.o. daily  Low Sodium Diet (DASH Diet - Less than 2 grams of sodium per day).  Monitor blood pressure daily and log. Report consistent numbers greater than 140/90.  Maintain healthy weight with goal BMI <30. Exercise 30 minutes per day, 5 days per week.  Smoking cessation encouraged to aid in BP reduction.                  Oncology    Fallopian tube carcinoma      -remains on maintenance Avastin for now, tolerating the treatment well without any acute issues   -followed by Oncology and get serial             Other    Medicare annual wellness visit, subsequent     -labs are reviewed all essentially normal  -patient is advised on importance of watching her carbohydrate intake and saturated fat intake, making the right nutritional choices and exercising on a regular basis  -up-to-date with the screening except for colonoscopy for which a referral is being sent to Gastroenterology          Other Visit Diagnoses       Screen for colon cancer    -  Primary    Relevant Orders    Ambulatory referral/consult to Gastroenterology            Plan:     Medicare Annual Wellness and Personalized Prevention Plan:   Fall Risk + Home Safety + Hearing Impairment + Depression Screen + Cognitive Impairment Screen + Health Risk Assessment all reviewed.       Health Maintenance Topics with due status: Not Due       Topic Last Completion Date    Mammogram 09/25/2022    Lipid Panel 01/27/2023    High Dose Statin 02/01/2023      The patient's Health Maintenance was reviewed and the following appears to be due at this time:   Health Maintenance Due   Topic Date Due    DEXA Scan  Never done    Colorectal Cancer Screening  Never done         Advance Care Planning   Deffered           Medication List with Changes/Refills   Current Medications    APIXABAN (ELIQUIS) 5 MG TAB    TAKE 1 TABLET BY MOUTH TWICE DAILY       Start Date: 12/19/2022End Date: --    ASPIRIN (ECOTRIN) 81 MG EC TABLET    Take 81 mg by mouth once daily.       Start Date: --        End Date: --    ATORVASTATIN (LIPITOR) 40 MG TABLET    Take 1 tablet (40 mg total) by mouth once daily.       Start Date: 6/1/2022  End Date: 5/27/2023    B COMPLEX VITAMINS CAPSULE    Take 1 capsule by mouth once daily.       Start Date: --        End Date: --    GLUTAMINE ORAL    Take 10 g by mouth Daily.       Start Date: 11/29/2021End Date: --     HYDROCHLOROTHIAZIDE (HYDRODIURIL) 25 MG TABLET    Take 1 tablet (25 mg total) by mouth once daily.       Start Date: 8/8/2022  End Date: --    LISINOPRIL 10 MG TABLET    Take 1 tablet (10 mg total) by mouth once daily.       Start Date: 7/25/2022 End Date: 7/25/2023    PYRIDOXINE, VITAMIN B6, (B-6) 250 MG TAB    Take 250 mg by mouth once daily.       Start Date: --        End Date: --    SODIUM CHLORIDE 0.9% SOLP 100 ML WITH BEVACIZUMAB 25 MG/ML SOLN    Inject into the vein every 21 days.       Start Date: --        End Date: --        Follow up in about 6 months (around 8/1/2023) for BP Check. In addition to their scheduled follow up, the patient has also been instructed to follow up on as needed basis.

## 2023-02-01 NOTE — ASSESSMENT & PLAN NOTE
Well controlled.   Continue lisinopril 10 mg and hydrochlorothiazide 25 mg p.o. daily  Low Sodium Diet (DASH Diet - Less than 2 grams of sodium per day).  Monitor blood pressure daily and log. Report consistent numbers greater than 140/90.  Maintain healthy weight with goal BMI <30. Exercise 30 minutes per day, 5 days per week.  Smoking cessation encouraged to aid in BP reduction.

## 2023-02-13 ENCOUNTER — LAB VISIT (OUTPATIENT)
Dept: LAB | Facility: HOSPITAL | Age: 73
End: 2023-02-13
Attending: INTERNAL MEDICINE
Payer: MEDICARE

## 2023-02-13 DIAGNOSIS — C57.4 MALIGNANT NEOPLASM OF UTERINE ADNEXA: ICD-10-CM

## 2023-02-13 DIAGNOSIS — C78.6 PERITONEAL CARCINOMATOSIS: ICD-10-CM

## 2023-02-13 DIAGNOSIS — C57.00 CARCINOMA OF FALLOPIAN TUBE, UNSPECIFIED LATERALITY: ICD-10-CM

## 2023-02-13 LAB
ALBUMIN SERPL-MCNC: 3.7 G/DL (ref 3.4–4.8)
ALBUMIN/GLOB SERPL: 1.3 RATIO (ref 1.1–2)
ALP SERPL-CCNC: 56 UNIT/L (ref 40–150)
ALT SERPL-CCNC: 21 UNIT/L (ref 0–55)
AST SERPL-CCNC: 29 UNIT/L (ref 5–34)
BASOPHILS # BLD AUTO: 0.04 X10(3)/MCL (ref 0–0.2)
BASOPHILS NFR BLD AUTO: 0.4 %
BILIRUBIN DIRECT+TOT PNL SERPL-MCNC: 1.6 MG/DL
BUN SERPL-MCNC: 18 MG/DL (ref 9.8–20.1)
CALCIUM SERPL-MCNC: 9.2 MG/DL (ref 8.4–10.2)
CANCER AG125 SERPL-ACNC: 14 UNIT/ML (ref 0–35)
CHLORIDE SERPL-SCNC: 96 MMOL/L (ref 98–107)
CO2 SERPL-SCNC: 29 MMOL/L (ref 23–31)
CREAT SERPL-MCNC: 0.84 MG/DL (ref 0.55–1.02)
EOSINOPHIL # BLD AUTO: 0.11 X10(3)/MCL (ref 0–0.9)
EOSINOPHIL NFR BLD AUTO: 1.2 %
ERYTHROCYTE [DISTWIDTH] IN BLOOD BY AUTOMATED COUNT: 12.8 % (ref 11.5–17)
GFR SERPLBLD CREATININE-BSD FMLA CKD-EPI: >60 MLS/MIN/1.73/M2
GLOBULIN SER-MCNC: 2.9 GM/DL (ref 2.4–3.5)
GLUCOSE SERPL-MCNC: 97 MG/DL (ref 82–115)
HCT VFR BLD AUTO: 41.3 % (ref 37–47)
HGB BLD-MCNC: 13.1 GM/DL (ref 12–16)
IMM GRANULOCYTES # BLD AUTO: 0.01 X10(3)/MCL (ref 0–0.04)
IMM GRANULOCYTES NFR BLD AUTO: 0.1 %
LYMPHOCYTES # BLD AUTO: 3.22 X10(3)/MCL (ref 0.6–4.6)
LYMPHOCYTES NFR BLD AUTO: 33.8 %
MCH RBC QN AUTO: 30.6 PG
MCHC RBC AUTO-ENTMCNC: 31.7 MG/DL (ref 33–36)
MCV RBC AUTO: 96.5 FL (ref 80–94)
MONOCYTES # BLD AUTO: 1.11 X10(3)/MCL (ref 0.1–1.3)
MONOCYTES NFR BLD AUTO: 11.7 %
NEUTROPHILS # BLD AUTO: 5.03 X10(3)/MCL (ref 2.1–9.2)
NEUTROPHILS NFR BLD AUTO: 52.8 %
PLATELET # BLD AUTO: 385 X10(3)/MCL (ref 130–400)
PMV BLD AUTO: 8.1 FL (ref 7.4–10.4)
POTASSIUM SERPL-SCNC: 4.8 MMOL/L (ref 3.5–5.1)
PROT SERPL-MCNC: 6.6 GM/DL (ref 5.8–7.6)
PROT UR QL STRIP.AUTO: ABNORMAL MG/DL
RBC # BLD AUTO: 4.28 X10(6)/MCL (ref 4.2–5.4)
SODIUM SERPL-SCNC: 135 MMOL/L (ref 136–145)
WBC # SPEC AUTO: 9.5 X10(3)/MCL (ref 4.5–11.5)

## 2023-02-13 PROCEDURE — 36415 COLL VENOUS BLD VENIPUNCTURE: CPT

## 2023-02-13 PROCEDURE — 80053 COMPREHEN METABOLIC PANEL: CPT

## 2023-02-13 PROCEDURE — 81005 URINALYSIS: CPT

## 2023-02-13 PROCEDURE — 86304 IMMUNOASSAY TUMOR CA 125: CPT

## 2023-02-13 PROCEDURE — 85025 COMPLETE CBC W/AUTO DIFF WBC: CPT

## 2023-02-14 ENCOUNTER — INFUSION (OUTPATIENT)
Dept: INFUSION THERAPY | Facility: HOSPITAL | Age: 73
End: 2023-02-14
Attending: INTERNAL MEDICINE
Payer: MEDICARE

## 2023-02-14 VITALS
HEART RATE: 64 BPM | DIASTOLIC BLOOD PRESSURE: 83 MMHG | TEMPERATURE: 98 F | WEIGHT: 140.81 LBS | SYSTOLIC BLOOD PRESSURE: 148 MMHG | BODY MASS INDEX: 24.94 KG/M2 | OXYGEN SATURATION: 96 %

## 2023-02-14 DIAGNOSIS — C78.6 PERITONEAL CARCINOMATOSIS: Primary | ICD-10-CM

## 2023-02-14 PROCEDURE — 63600175 PHARM REV CODE 636 W HCPCS: Mod: TB | Performed by: NURSE PRACTITIONER

## 2023-02-14 PROCEDURE — 96375 TX/PRO/DX INJ NEW DRUG ADDON: CPT

## 2023-02-14 PROCEDURE — 96413 CHEMO IV INFUSION 1 HR: CPT

## 2023-02-14 PROCEDURE — 25000003 PHARM REV CODE 250: Performed by: NURSE PRACTITIONER

## 2023-02-14 RX ORDER — HEPARIN 100 UNIT/ML
500 SYRINGE INTRAVENOUS
Status: DISCONTINUED | OUTPATIENT
Start: 2023-02-14 | End: 2023-02-14 | Stop reason: HOSPADM

## 2023-02-14 RX ORDER — DIPHENHYDRAMINE HYDROCHLORIDE 50 MG/ML
25 INJECTION INTRAMUSCULAR; INTRAVENOUS
Status: COMPLETED | OUTPATIENT
Start: 2023-02-14 | End: 2023-02-14

## 2023-02-14 RX ORDER — SODIUM CHLORIDE 0.9 % (FLUSH) 0.9 %
10 SYRINGE (ML) INJECTION
Status: DISCONTINUED | OUTPATIENT
Start: 2023-02-14 | End: 2023-02-14 | Stop reason: HOSPADM

## 2023-02-14 RX ORDER — ACETAMINOPHEN 325 MG/1
650 TABLET ORAL
Status: DISCONTINUED | OUTPATIENT
Start: 2023-02-14 | End: 2023-02-14 | Stop reason: HOSPADM

## 2023-02-14 RX ORDER — DIPHENHYDRAMINE HYDROCHLORIDE 50 MG/ML
50 INJECTION INTRAMUSCULAR; INTRAVENOUS ONCE AS NEEDED
Status: DISCONTINUED | OUTPATIENT
Start: 2023-02-14 | End: 2023-02-14 | Stop reason: HOSPADM

## 2023-02-14 RX ORDER — EPINEPHRINE 0.3 MG/.3ML
0.3 INJECTION SUBCUTANEOUS ONCE AS NEEDED
Status: DISCONTINUED | OUTPATIENT
Start: 2023-02-14 | End: 2023-02-14 | Stop reason: HOSPADM

## 2023-02-14 RX ADMIN — SODIUM CHLORIDE 900 MG: 9 INJECTION, SOLUTION INTRAVENOUS at 02:02

## 2023-02-14 RX ADMIN — DIPHENHYDRAMINE HYDROCHLORIDE 25 MG: 50 INJECTION, SOLUTION INTRAMUSCULAR; INTRAVENOUS at 02:02

## 2023-02-14 NOTE — PLAN OF CARE
Pt received c33 avastin, tolerated well.  
Upon discharge, pt would benefit from returning back home and engaging in outpatient treatment at [facility] - Dr. Carmela Golden  In [facility] access hospital - [facility], for continued medication and symptom management.

## 2023-02-16 ENCOUNTER — OFFICE VISIT (OUTPATIENT)
Dept: INTERNAL MEDICINE | Facility: CLINIC | Age: 73
End: 2023-02-16
Payer: MEDICARE

## 2023-02-16 VITALS
SYSTOLIC BLOOD PRESSURE: 138 MMHG | HEIGHT: 63 IN | DIASTOLIC BLOOD PRESSURE: 78 MMHG | RESPIRATION RATE: 18 BRPM | HEART RATE: 71 BPM | BODY MASS INDEX: 24.66 KG/M2 | WEIGHT: 139.19 LBS | OXYGEN SATURATION: 99 % | TEMPERATURE: 97 F
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
## 2023-02-16 PROCEDURE — 3075F SYST BP GE 130 - 139MM HG: CPT | Mod: CPTII,,,

## 2023-02-16 PROCEDURE — 1160F PR REVIEW ALL MEDS BY PRESCRIBER/CLIN PHARMACIST DOCUMENTED: ICD-10-PCS | Mod: CPTII,,,

## 2023-02-16 PROCEDURE — 1101F PT FALLS ASSESS-DOCD LE1/YR: CPT | Mod: CPTII,,,

## 2023-02-16 PROCEDURE — 3288F PR FALLS RISK ASSESSMENT DOCUMENTED: ICD-10-PCS | Mod: CPTII,,,

## 2023-02-16 PROCEDURE — 1101F PR PT FALLS ASSESS DOC 0-1 FALLS W/OUT INJ PAST YR: ICD-10-PCS | Mod: CPTII,,,

## 2023-02-16 PROCEDURE — 4010F ACE/ARB THERAPY RXD/TAKEN: CPT | Mod: CPTII,,,

## 2023-02-16 PROCEDURE — 3008F BODY MASS INDEX DOCD: CPT | Mod: CPTII,,,

## 2023-02-16 PROCEDURE — 3078F DIAST BP <80 MM HG: CPT | Mod: CPTII,,,

## 2023-02-16 PROCEDURE — 4010F PR ACE/ARB THEARPY RXD/TAKEN: ICD-10-PCS | Mod: CPTII,,,

## 2023-02-16 PROCEDURE — 1126F AMNT PAIN NOTED NONE PRSNT: CPT | Mod: CPTII,,,

## 2023-02-16 PROCEDURE — 3078F PR MOST RECENT DIASTOLIC BLOOD PRESSURE < 80 MM HG: ICD-10-PCS | Mod: CPTII,,,

## 2023-02-16 PROCEDURE — 99214 OFFICE O/P EST MOD 30 MIN: CPT | Mod: ,,,

## 2023-02-16 PROCEDURE — 1159F PR MEDICATION LIST DOCUMENTED IN MEDICAL RECORD: ICD-10-PCS | Mod: CPTII,,,

## 2023-02-16 PROCEDURE — 1126F PR PAIN SEVERITY QUANTIFIED, NO PAIN PRESENT: ICD-10-PCS | Mod: CPTII,,,

## 2023-02-16 PROCEDURE — 99214 PR OFFICE/OUTPT VISIT, EST, LEVL IV, 30-39 MIN: ICD-10-PCS | Mod: ,,,

## 2023-02-16 PROCEDURE — 3075F PR MOST RECENT SYSTOLIC BLOOD PRESS GE 130-139MM HG: ICD-10-PCS | Mod: CPTII,,,

## 2023-02-16 PROCEDURE — 1159F MED LIST DOCD IN RCRD: CPT | Mod: CPTII,,,

## 2023-02-16 PROCEDURE — 1160F RVW MEDS BY RX/DR IN RCRD: CPT | Mod: CPTII,,,

## 2023-02-16 PROCEDURE — 3288F FALL RISK ASSESSMENT DOCD: CPT | Mod: CPTII,,,

## 2023-02-16 PROCEDURE — 3008F PR BODY MASS INDEX (BMI) DOCUMENTED: ICD-10-PCS | Mod: CPTII,,,

## 2023-02-16 RX ORDER — AMOXICILLIN AND CLAVULANATE POTASSIUM 875; 125 MG/1; MG/1
1 TABLET, FILM COATED ORAL EVERY 12 HOURS
Qty: 14 TABLET | Refills: 0 | Status: SHIPPED | OUTPATIENT
Start: 2023-02-16 | End: 2023-02-23

## 2023-02-16 RX ORDER — MUPIROCIN 20 MG/G
OINTMENT TOPICAL 3 TIMES DAILY
Qty: 2 G | Refills: 0 | Status: SHIPPED | OUTPATIENT
Start: 2023-02-16 | End: 2023-02-23

## 2023-02-16 NOTE — PROGRESS NOTES
Patient ID: Shayna Ledezma is a 72 y.o. female.    Chief Complaint: Rash (Left forearm rash began Monday, itchy, treated with Neosporin )    72-year-old female here today for requested visit.. 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 Currently on maintenance Avastin since April 2021. Patient is s/p tumor debulking KORI/BSO in March 2019 after completion of neoadjuvant carboplatin Taxol and then followed by adjuvant carboplatin Taxol. Being followed by oncology closely with CA-125's. Recommendation to hold off on further scanning until elevation of CA-125. Patient also has had a splenectomy in October 2019.  Also significant for TIA and incidental PE (Eliquis).  Since last visit patient reports she is been fairly well, however did have recent cat bite.  See form below.  Apparently patient's own Cat (up-to-date immunizations) bit her while handling.  Did not seek care initially, since has happened before.  However subsequently started developing localized erythema, itching, and papules prompting her to seek care.  Denies fever, body aches, headaches, paresthesias or weakness of extremity.  Currently attempted to treat with topical triple antibiotic and hydrogen peroxide.  Discussed initiating on oral antibiotics as well as topical mupirocin.  Also encouraged to contact the clinic if no improvement next few days.  Otherwise no other acute medical concerns noted.  ducating her on the importance of screening test     MCW: 02/01/23  Pneumonia vaccine: Up-to-date  CRS: Refer to Dr. Gentile    Animal Bite   The incident occurred more than 2 days ago. The incident occurred at home. There is an injury to the Left forearm. The pain is mild. It is unlikely that a foreign body is present. Pertinent negatives include no chest pain, no visual disturbance, no abdominal pain, no nausea, no vomiting, no headaches, no weakness and no cough. There have been no prior injuries  to these areas. Her tetanus status is UTD. Services received include medications given.     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     Ovarian cancer     Peritoneal carcinomatosis     TIA (transient ischemic attack)       Past Surgical History:   Procedure Laterality Date    APPENDECTOMY      BREAST BIOPSY       SECTION      Endometrial polyp removal      Spleen operation      TONSILLECTOMY      TOTAL ABDOMINAL HYSTERECTOMY        Social History     Tobacco Use    Smoking status: Never    Smokeless tobacco: Never   Substance Use Topics    Alcohol use: Never    Drug use: Never          Health Maintenance Due   Topic Date Due    Hepatitis C Screening  Never done    DEXA Scan  Never done    Colorectal Cancer Screening  Never done          Patient Care Team:  Sigrid Giang MD as PCP - General (Internal Medicine)      Review of Systems   Constitutional:  Negative for fatigue and fever.   HENT:  Negative for congestion, rhinorrhea, sore throat and trouble swallowing.    Eyes:  Negative for redness and visual disturbance.   Respiratory:  Negative for cough, chest tightness and shortness of breath.    Cardiovascular:  Negative for chest pain and palpitations.   Gastrointestinal:  Negative for abdominal pain, constipation, diarrhea, nausea and vomiting.   Genitourinary:  Negative for dysuria, flank pain, frequency and urgency.   Musculoskeletal:  Negative for arthralgias, gait problem and myalgias.   Skin:  Positive for wound. Negative for rash.   Neurological:  Negative for facial asymmetry, speech difficulty, weakness and headaches.   All other systems reviewed and are negative.    Objective:   /78 (BP Location: Left arm, Patient Position: Sitting, BP Method: Medium (Manual))   Pulse 71   Temp 97.3 °F  "(36.3 °C) (Temporal)   Resp 18   Ht 5' 3" (1.6 m)   Wt 63.1 kg (139 lb 3.2 oz)   SpO2 99%   BMI 24.66 kg/m²      Physical Exam  Constitutional:       General: She is not in acute distress.     Appearance: Normal appearance.   HENT:      Right Ear: Tympanic membrane, ear canal and external ear normal.      Left Ear: Tympanic membrane, ear canal and external ear normal.      Nose: Nose normal.      Mouth/Throat:      Mouth: Mucous membranes are moist.      Pharynx: Oropharynx is clear.   Eyes:      Extraocular Movements: Extraocular movements intact.      Conjunctiva/sclera: Conjunctivae normal.      Pupils: Pupils are equal, round, and reactive to light.   Cardiovascular:      Rate and Rhythm: Normal rate and regular rhythm.      Pulses: Normal pulses.      Heart sounds: Normal heart sounds. No murmur heard.    No gallop.   Pulmonary:      Effort: Pulmonary effort is normal.      Breath sounds: Normal breath sounds. No wheezing.   Abdominal:      General: Bowel sounds are normal. There is no distension.      Palpations: Abdomen is soft. There is no mass.      Tenderness: There is no abdominal tenderness. There is no guarding.   Musculoskeletal:         General: Normal range of motion.   Skin:     General: Skin is warm and dry.      Findings: Erythema, lesion, rash and wound present. Rash is papular.             Comments: Two scabbed puncture wounds with surrounding induration, edema.  Maculopapular lesions noting spreading from puncture sites.  No warmth or tenderness.   Neurological:      Mental Status: She is alert. Mental status is at baseline.      Sensory: No sensory deficit.      Motor: No weakness.         Assessment:       ICD-10-CM ICD-9-CM   1. Cat bite, initial encounter  W55.01XA 879.8     E906.3        Plan:     Problem List Items Addressed This Visit          Orthopedic    Cat bite - Primary     -tetanus up-to-date   -known CT with immunizations up-to-date   -initiate on Augmentin x7 days "   -initiate on topical mupirocin   -cleanse with mild soap and water   -no swimming or tub bathing  -reports itching, hydroxyzine offered however patient declined at this time  -notify office if no improvement within next few days         Relevant Medications    mupirocin (BACTROBAN) 2 % ointment    amoxicillin-clavulanate 875-125mg (AUGMENTIN) 875-125 mg per tablet          Follow up for Present to ER/Urgent Care if symtoms worsen.   -plan specifics discussed above    Orders Placed This Encounter    mupirocin (BACTROBAN) 2 % ointment    amoxicillin-clavulanate 875-125mg (AUGMENTIN) 875-125 mg per tablet        Medication List with Changes/Refills   New Medications    AMOXICILLIN-CLAVULANATE 875-125MG (AUGMENTIN) 875-125 MG PER TABLET    Take 1 tablet by mouth every 12 (twelve) hours. for 7 days    MUPIROCIN (BACTROBAN) 2 % OINTMENT    Apply topically 3 (three) times daily. for 7 days   Current Medications    APIXABAN (ELIQUIS) 5 MG TAB    TAKE 1 TABLET BY MOUTH TWICE DAILY    ASPIRIN (ECOTRIN) 81 MG EC TABLET    Take 81 mg by mouth once daily.    ATORVASTATIN (LIPITOR) 40 MG TABLET    Take 1 tablet (40 mg total) by mouth once daily.    B COMPLEX VITAMINS CAPSULE    Take 1 capsule by mouth once daily.    GLUTAMINE ORAL    Take 10 g by mouth Daily.    HYDROCHLOROTHIAZIDE (HYDRODIURIL) 25 MG TABLET    Take 1 tablet (25 mg total) by mouth once daily.    LISINOPRIL 10 MG TABLET    Take 1 tablet (10 mg total) by mouth once daily.    PYRIDOXINE, VITAMIN B6, (B-6) 250 MG TAB    Take 250 mg by mouth once daily.    SODIUM CHLORIDE 0.9% SOLP 100 ML WITH BEVACIZUMAB 25 MG/ML SOLN    Inject into the vein every 21 days.

## 2023-02-17 DIAGNOSIS — E78.2 MIXED HYPERLIPIDEMIA: Primary | ICD-10-CM

## 2023-02-17 RX ORDER — ATORVASTATIN CALCIUM 40 MG/1
TABLET, FILM COATED ORAL
Qty: 90 TABLET | Refills: 3 | Status: SHIPPED | OUTPATIENT
Start: 2023-02-17 | End: 2023-05-30

## 2023-02-17 NOTE — ASSESSMENT & PLAN NOTE
-tetanus up-to-date   -known CT with immunizations up-to-date   -initiate on Augmentin x7 days   -initiate on topical mupirocin   -cleanse with mild soap and water   -no swimming or tub bathing  -reports itching, hydroxyzine offered however patient declined at this time  -notify office if no improvement within next few days

## 2023-02-20 ENCOUNTER — TELEPHONE (OUTPATIENT)
Dept: INTERNAL MEDICINE | Facility: CLINIC | Age: 73
End: 2023-02-20
Payer: MEDICARE

## 2023-02-20 NOTE — TELEPHONE ENCOUNTER
----- Message from Sapphire Smith sent at 2/20/2023 10:38 AM CST -----  Regarding: rash  Patient was told to call back in..    She states that rash, while still having, it has greatly improved.    Shayna  [phone]

## 2023-02-28 NOTE — PROGRESS NOTES
Subjective:       Patient ID: Shayna Ledezma is a 72 y.o. female.  GI: Dr. Gamaliel Carlos  GYN ONC at [facility]: Dr. Mike Santaan    1. 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 Pulmonary Embolism (Incidental on CT)--19    Procedure/pathology:   1. Paracentesis with removal of 5L of fluid done 18--serous carcinoma, high grade, c/w ovarian primary, PAX-8, CK7 and MOC-31 positive, CDX-2, CK-20 and Calretinin-negative.  2. S/p Exploratory Lap, radical tumor debulking including total abdominal hysterectomy, bilateral salpingo-oophorectomy, bilateral pelvic lymph node sampling, appendectomy, mobilization of the left ureter, complete gross resection of the disease with removal of mesenteric disease, as well as omental disese and parasplenic disease by Dr. Santana 3/18/19--Metastatic high grade serous carcinoma. Tissue/organ involvement: right ovary, appendectomy, omentum, mesentery (including small bowel mesentery) and multiple other sites designated: periumbilical, perisplenic, transverse colon, splenic flexure, perirectal, left periureteral. 2 lymph nodes negative. [id number]. FIGO stage IIIC. Myriad somatic instability testing positive for genomic instability, negative BRCA1 and BRCA2 tumor testing.  3. Splenectomy done 10/21/19--splenic involvement with metastatic high grade serous carcinoma, 5 benign lymph nodes. Caris testing showed LAM, NTRK1/2/3 negative, TMB low, BRCA1/2 negative, ER/UT negative, CATHY negative, SPEN pathogenic variant Exon 11, TP53 pathogenic variant Exon 5, PD-L1 positive CPS 15, Genomic ELODIA high.  Imagin. CT A/P w/ and w/o contrast done at [facility] 18--large volume ascites with multiple peritoneal implants, right pericolic gutter implant measures 2.5X3.3cm, LUQ omental/mesenteric implant measures 3X3.6cm, omental carcinomatosis, extensive enhancing soft tissue surrounding sigmoid colon vs. large sigmoid mass, left  ovarian cystic lesion appears to have some internal septations measures 3.5X3.8cm, enhancing periportal soft tissue density likely lymphadenopathy with some narrowing of the portal vein noted, but without internal filling defect, borderline splenomegaly, subtle solid lesion w/n central spleen measures 2.4X2.5cm, likely metastatic, with peripheral area of diminished enhancement suggesting splenic infarct, soft tissue implant abutting gallbladder measures 2X2.2cm, no right adnexal mass, trace bilateral layering pleural fluid, small moderate-sized hiatal hernia, few borderline enlarged lymph nodes w/n right epicardial fat pad.  2. CT of chest on 1/11/19--Some prominent right cardiophrenic lymph nodes are nonspecific and can be followed on future surveillance scans. Otherwise no CT evidence of metastatic disease to the chest. While exam was not tailored for evaluation of the pulmonary arteries I suspect there is pulmonary thromboembolic disease. Peritoneal carcinomatosis seen in the upper abdomen. Critical Result: Pulmonary Embolus.  3. CT C/A/P 3/4/19--Positive response to therapy. No new or progressive metastatic disease in the chest, abdomen or pelvis. Stable to improved findings include smaller pericardial lymph nodes, andrew hepatis adenopathy, peritoneal carcinomatosis, smaller left adnexal lesion; no evidence of PE within limitations of the study.  5. CT PE protocol chest/A/P 6/11/19--No PE, improved pericardial lymph node with minimal size on current examination, improved peritoneal carcinomatosis and left adnexal implant, splenic ischemia evolved into small infarct, size improvement of one of splenic hypodense lesion and mild size increase of second hypodense lesion, favored to be benign/cystic, no new findings.  6. PET/CT 9/25/19--s/p hysterectomy and bilateral oophorectomy, no evidence for locally recurrent/residual disease, intensely hypermetabolic hypodense lesion within the spleen 3.5X3.3cm concerning for  metastatic focus. No other abnormal focus of disease.  7. CT C/A/P 11/13/20--Right lower lobe 3.3 cm mass is presumed metastatic, otherwise no evident residual, recurrent or metastatic disease.   8. PET/CT 11/20/20--Hypermetabolic right lung base lesion and suspected metastatic periportal adenopathy, no additional sites of FDG-avid otherwise aggressive appearing pathology through the neck, chest, abdomen, or pelvis.  9. PET/CT 1/21/21--Interval decrease in size of the right lower lobe mass with decreased FDG uptake, now measures 1.7 x 2.2 cm (previous 3.3 x 3.1 cm), resolution of FDG uptake in the suspected periportal lymph node which is not identifiable on noncontrast CT. No evidence of new or progressive hypermetabolic metastatic disease.    Procedures:   1. Paracentesis on 12/28/18 with removal of 5 Liters.  2. Paracentesis on 01/07/19 with removal of 3.5 Liters.  3. Paracentesis on 02/06/19 with removal of 2.5 Liters.  4. Mediport placed 12/2020 by Dr. Serge Gentile--removed 8/26/21 due to mediport fracture.    Germline genetic testing done by [facility] 1/29/19--negative for BRCA1/2, two (2) variants of uncertain significance (VUS): CATHY p.[id number]/p.[id number] and MLH1 p.P603L.     :  12/19/18 1102  11/10/20  42.9  12/08/20 79.5  01/05/21 28.9  01/26/21 12.8  02/18/21 11.6  03/15/22--10.3  04/26/22--9.6  05/16/22--10.2  06/07/22--10.1  07/19/22--11.6  08/08/22--12.5  09/19/22--12.7  10/31/22--13.5  01/03/23--12.0  02/13/23--14.0    Treatment History:  1. Neoadjuvant Carboplatin/Taxol every 3 weeks x 3 cycles. 1/15/19 - 2/26/19. Neulasta added.   2. Surgery--tumor debulking KORI/BSO 3/18/19.  3. Adjuvant Carboplatin/Taxol x 3 additions cycles 4/9/19--5/21/19.  4. Splenectomy 10/21/19.  5. Niraparib maintenance (dose reduction to 100mg daily for cytopenias) 11/8/19--12/1/20 (stopped due to progression).  6. Carboplatin/Paclitaxel/Avastin X 6 cycles completed 12/8/21--3/24/21 (complete response).    Current  Treatment:  1. Eliquis BID for incidental PE on imaging 1/2019  2. Avastin maintenance started on 4/13/21.   Cycle 34 due on 3/7/23.      Chief Complaint: Other Misc (Pt reports no new concerns today.)    HPI   Patient presents for scheduled follow-up of ovarian cancer. She continues on Avastin every 3 weeks without problems. Her BP is a little high today but she has no complaints. Appetite good and weight stable. Bowels are moving well. Denies any new pain or discomfort. She does have intermittent episodes of epistaxis but not too bothersome.     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      atorvastatin (LIPITOR) 40 MG tablet TAKE 1 TABLET(40 MG) BY MOUTH EVERY DAY 90 tablet 3    b complex vitamins capsule Take 1 capsule by mouth once daily.      GLUTAMINE ORAL Take 10 g by mouth Daily.      hydroCHLOROthiazide (HYDRODIURIL) 25 MG tablet Take 1 tablet (25 mg total) by mouth once daily. 90 tablet 3    lisinopriL 10 MG tablet Take 1 tablet (10 mg total) by mouth once daily. 90 tablet 3    pyridoxine, vitamin B6, (B-6) 250 MG Tab Take 250 mg by mouth once daily.      sodium chloride 0.9% SolP 100 mL with bevacizumab 25 mg/mL Soln Inject into the vein every 21 days.       No current facility-administered medications on file  prior to visit.      Review of Systems   Constitutional:  Negative for appetite change and unexpected weight change.   HENT:  Negative for mouth sores.    Eyes:  Negative for visual disturbance.   Respiratory:  Negative for cough and shortness of breath.    Cardiovascular:  Negative for chest pain.   Gastrointestinal:  Negative for abdominal pain and diarrhea.   Genitourinary:  Negative for frequency.   Musculoskeletal:  Negative for back pain.   Integumentary:  Positive for rash.   Neurological:  Negative for headaches.   Hematological:  Negative for adenopathy.   Psychiatric/Behavioral:  The patient is not nervous/anxious.       Vitals:    03/06/23 1412   BP: (!) 149/84   Pulse: 69   Resp: 14   Temp: 97.7 °F (36.5 °C)      Physical Exam  Vitals reviewed.   Constitutional:       Appearance: Normal appearance. She is normal weight.   HENT:      Head: Normocephalic.   Eyes:      General: Lids are normal. Vision grossly intact.      Extraocular Movements: Extraocular movements intact.      Conjunctiva/sclera: Conjunctivae normal.   Cardiovascular:      Rate and Rhythm: Normal rate and regular rhythm.      Pulses: Normal pulses.      Heart sounds: Normal heart sounds, S1 normal and S2 normal.   Pulmonary:      Effort: Pulmonary effort is normal.      Breath sounds: Normal breath sounds.   Abdominal:      General: Abdomen is flat. Bowel sounds are normal. There is no distension.      Palpations: Abdomen is soft.      Tenderness: There is no abdominal tenderness.   Musculoskeletal:      Cervical back: Normal range of motion and neck supple.      Right lower leg: No edema.      Left lower leg: No edema.   Feet:      Right foot:      Skin integrity: Skin integrity normal.   Skin:     General: Skin is warm and moist.      Capillary Refill: Capillary refill takes less than 2 seconds.      Comments: Left CW mediport removed, site intact   Neurological:      General: No focal deficit present.      Mental Status: She is alert  and oriented to person, place, and time.   Psychiatric:         Attention and Perception: Attention normal.         Mood and Affect: Mood normal.         Speech: Speech normal.         Behavior: Behavior normal. Behavior is cooperative.         Cognition and Memory: Cognition normal.         Judgment: Judgment normal.     Lab Visit on 03/06/2023   Component Date Value    WBC 03/06/2023 9.1     RBC 03/06/2023 4.33     Hgb 03/06/2023 13.2     Hct 03/06/2023 41.8     MCV 03/06/2023 96.5 (H)     MCH 03/06/2023 30.5     MCHC 03/06/2023 31.6 (L)     RDW 03/06/2023 12.6     Platelet 03/06/2023 362     MPV 03/06/2023 7.7     Neut % 03/06/2023 53.8     Lymph % 03/06/2023 33.3     Mono % 03/06/2023 11.7     Eos % 03/06/2023 1.0     Basophil % 03/06/2023 0.2     Lymph # 03/06/2023 3.02     Neut # 03/06/2023 4.87     Mono # 03/06/2023 1.06     Eos # 03/06/2023 0.09     Baso # 03/06/2023 0.02     IG# 03/06/2023 0.00     IG% 03/06/2023 0.0     Protein, UA 03/06/2023 Trace (A)           Assessment:       1. Peritoneal carcinomatosis    2. Malignant neoplasm of uterine adnexa    3. Carcinoma of fallopian tube, unspecified laterality          Plan:     Patient with left ovarian cancer diagnosed 12/26/18, appears to be stage IIIC vs. IV with diffuse peritoneal disease, possible splenic involvement, epicardial lymph nodes and bilateral pleural effusions.  Patient seen and evaluated by Dr. Mike Santana at [facility] in [place].   Treatment recommended upfront with chemotherapy Carboplatin/Paclitaxel x 3 cycles.  Completed Cycle 3 on 2/26/19.   She underwent total abdominal hysterectomy, BSO and tumor debulking on 3/18/19 with Dr. Santana with complete gross resection of disease. FIGO Stage IIIC papillary serous fallopian tube cancer.   Patient resumed chemotherapy with cycle 4 on 4/9/19. Completed cycle 6 on 5/21/19.    Testing on tumor was positive for genomic instability but negative for BRCA mutation.  Per NCCN guidelines,  maintenance can be considered for BRCA mutated germline category 1 and somatic category 2A. There is some evidence that PARP inhibitors have some activity as well in tumors with genomic instability. But the PARP maintenance is not approved for this indication. Offered treatment to patient and after discussion she declined and made decision to continue with observation only.    PET/CT done for rising CA-125 showed hypermetabolic splenic lesion which is not new, just enlarged from previous. Case discussed with Dr. Mike Santaan.  Patient is now s/p splenectomy done 10/21/19. Consistent with splenic involvement with high grade serous carcinoma.   Dr. Santana recommended Niraparib maintenance based on new data showing activity in HRD positive ovarian cancer and patient started on 11/8/19, required a dose reduction due to cytopenias.  Rising CA-125 noted in 11/2020. CT showed new RLL lung mass.   Follow-up PET done 11/20/20 showed RLL lung mass hypermetabolic and hypermetabolic periportal adenopathy.     Recommended 2nd line treatment with Carboplatin/Taxol/Avastin.   Started cycle 1 on 12/8/20. Neulasta added with cycle 2.   Completed Cycle 6 on 3/24/21.  PET from 3/30/21 showed complete resolution of lung mass and no other disease.    Avastin maintenance started on 4/13/21.   Hospitalized for TIA after her 4th cycle of Avastin. Work-up for stroke and cause otherwise negative. Patient started on baby ASA by neurology.  Discussed there are no clear guidelines for changing/discontinuing treatment for TIA/CVA related to Avastin.  Since her symptoms resolved and there were no residual effects, recommended to continue with Avastin since it is working well for her disease and since a baby ASA was added for prevention. She was also continued on Eliquis for h/o PE.  S/p mediport removal for fracture on 8/26/21.   Continue Avastin through peripheral vein for now.    Patient continues on Avastin maintenance without problems.  BP has  been good. Currently on HCTZ 25mg daily.      Due for Avastin maintenance Cycle 34 tomorrow.    CBC good, will f/u CMP. Last CA-125 remains WNL.  Proceed with treatment tomorrow pending labs. Will continue checking protein UA every 3 weeks.  Labs on 3/27/23 and treatment only cycle 35 on 3/28/23.  RTC T/D visit in 6 weeks prior to cycle 36.    Will continue to check . No further scans until rise in CA-125.  She is now >12 months from completing last chemotherapy, so still considered platinum-sensitive. Will need replacement of mediport when she needs to resume chemotherapy.   Continue Eliquis 5mg BID and ASA.  All questions answered at this time.        Tom Angeles, MIAPC

## 2023-03-06 ENCOUNTER — OFFICE VISIT (OUTPATIENT)
Dept: HEMATOLOGY/ONCOLOGY | Facility: CLINIC | Age: 73
End: 2023-03-06
Payer: MEDICARE

## 2023-03-06 VITALS
DIASTOLIC BLOOD PRESSURE: 84 MMHG | WEIGHT: 140.31 LBS | TEMPERATURE: 98 F | RESPIRATION RATE: 14 BRPM | HEART RATE: 69 BPM | BODY MASS INDEX: 24.86 KG/M2 | SYSTOLIC BLOOD PRESSURE: 149 MMHG | OXYGEN SATURATION: 99 % | HEIGHT: 63 IN

## 2023-03-06 DIAGNOSIS — C78.6 PERITONEAL CARCINOMATOSIS: Primary | ICD-10-CM

## 2023-03-06 DIAGNOSIS — C57.4 MALIGNANT NEOPLASM OF UTERINE ADNEXA: ICD-10-CM

## 2023-03-06 DIAGNOSIS — C57.00 CARCINOMA OF FALLOPIAN TUBE, UNSPECIFIED LATERALITY: ICD-10-CM

## 2023-03-06 LAB — CANCER AG125 SERPL-ACNC: 13.9 UNIT/ML (ref 0–35)

## 2023-03-06 PROCEDURE — 1159F PR MEDICATION LIST DOCUMENTED IN MEDICAL RECORD: ICD-10-PCS | Mod: CPTII,S$GLB,, | Performed by: NURSE PRACTITIONER

## 2023-03-06 PROCEDURE — 99999 PR PBB SHADOW E&M-EST. PATIENT-LVL IV: ICD-10-PCS | Mod: PBBFAC,,, | Performed by: NURSE PRACTITIONER

## 2023-03-06 PROCEDURE — 1101F PR PT FALLS ASSESS DOC 0-1 FALLS W/OUT INJ PAST YR: ICD-10-PCS | Mod: CPTII,S$GLB,, | Performed by: NURSE PRACTITIONER

## 2023-03-06 PROCEDURE — 99999 PR PBB SHADOW E&M-EST. PATIENT-LVL IV: CPT | Mod: PBBFAC,,, | Performed by: NURSE PRACTITIONER

## 2023-03-06 PROCEDURE — 3079F DIAST BP 80-89 MM HG: CPT | Mod: CPTII,S$GLB,, | Performed by: NURSE PRACTITIONER

## 2023-03-06 PROCEDURE — 1101F PT FALLS ASSESS-DOCD LE1/YR: CPT | Mod: CPTII,S$GLB,, | Performed by: NURSE PRACTITIONER

## 2023-03-06 PROCEDURE — 3077F SYST BP >= 140 MM HG: CPT | Mod: CPTII,S$GLB,, | Performed by: NURSE PRACTITIONER

## 2023-03-06 PROCEDURE — 4010F PR ACE/ARB THEARPY RXD/TAKEN: ICD-10-PCS | Mod: CPTII,S$GLB,, | Performed by: NURSE PRACTITIONER

## 2023-03-06 PROCEDURE — 1159F MED LIST DOCD IN RCRD: CPT | Mod: CPTII,S$GLB,, | Performed by: NURSE PRACTITIONER

## 2023-03-06 PROCEDURE — 3077F PR MOST RECENT SYSTOLIC BLOOD PRESSURE >= 140 MM HG: ICD-10-PCS | Mod: CPTII,S$GLB,, | Performed by: NURSE PRACTITIONER

## 2023-03-06 PROCEDURE — 4010F ACE/ARB THERAPY RXD/TAKEN: CPT | Mod: CPTII,S$GLB,, | Performed by: NURSE PRACTITIONER

## 2023-03-06 PROCEDURE — 99214 OFFICE O/P EST MOD 30 MIN: CPT | Mod: S$GLB,,, | Performed by: NURSE PRACTITIONER

## 2023-03-06 PROCEDURE — 3288F PR FALLS RISK ASSESSMENT DOCUMENTED: ICD-10-PCS | Mod: CPTII,S$GLB,, | Performed by: NURSE PRACTITIONER

## 2023-03-06 PROCEDURE — 3008F PR BODY MASS INDEX (BMI) DOCUMENTED: ICD-10-PCS | Mod: CPTII,S$GLB,, | Performed by: NURSE PRACTITIONER

## 2023-03-06 PROCEDURE — 1160F RVW MEDS BY RX/DR IN RCRD: CPT | Mod: CPTII,S$GLB,, | Performed by: NURSE PRACTITIONER

## 2023-03-06 PROCEDURE — 3288F FALL RISK ASSESSMENT DOCD: CPT | Mod: CPTII,S$GLB,, | Performed by: NURSE PRACTITIONER

## 2023-03-06 PROCEDURE — 3008F BODY MASS INDEX DOCD: CPT | Mod: CPTII,S$GLB,, | Performed by: NURSE PRACTITIONER

## 2023-03-06 PROCEDURE — 1126F AMNT PAIN NOTED NONE PRSNT: CPT | Mod: CPTII,S$GLB,, | Performed by: NURSE PRACTITIONER

## 2023-03-06 PROCEDURE — 1126F PR PAIN SEVERITY QUANTIFIED, NO PAIN PRESENT: ICD-10-PCS | Mod: CPTII,S$GLB,, | Performed by: NURSE PRACTITIONER

## 2023-03-06 PROCEDURE — 3079F PR MOST RECENT DIASTOLIC BLOOD PRESSURE 80-89 MM HG: ICD-10-PCS | Mod: CPTII,S$GLB,, | Performed by: NURSE PRACTITIONER

## 2023-03-06 PROCEDURE — 1160F PR REVIEW ALL MEDS BY PRESCRIBER/CLIN PHARMACIST DOCUMENTED: ICD-10-PCS | Mod: CPTII,S$GLB,, | Performed by: NURSE PRACTITIONER

## 2023-03-06 PROCEDURE — 86304 IMMUNOASSAY TUMOR CA 125: CPT | Performed by: NURSE PRACTITIONER

## 2023-03-06 PROCEDURE — 99214 PR OFFICE/OUTPT VISIT, EST, LEVL IV, 30-39 MIN: ICD-10-PCS | Mod: S$GLB,,, | Performed by: NURSE PRACTITIONER

## 2023-03-06 RX ORDER — ACETAMINOPHEN 325 MG/1
650 TABLET ORAL
Status: CANCELLED | OUTPATIENT
Start: 2023-03-07

## 2023-03-06 RX ORDER — DIPHENHYDRAMINE HYDROCHLORIDE 50 MG/ML
25 INJECTION INTRAMUSCULAR; INTRAVENOUS
Status: CANCELLED | OUTPATIENT
Start: 2023-03-07

## 2023-03-06 RX ORDER — EPINEPHRINE 0.3 MG/.3ML
0.3 INJECTION SUBCUTANEOUS ONCE AS NEEDED
Status: CANCELLED | OUTPATIENT
Start: 2023-03-07

## 2023-03-06 RX ORDER — ACETAMINOPHEN 325 MG/1
650 TABLET ORAL
Status: CANCELLED | OUTPATIENT
Start: 2023-03-28

## 2023-03-06 RX ORDER — DIPHENHYDRAMINE HYDROCHLORIDE 50 MG/ML
25 INJECTION INTRAMUSCULAR; INTRAVENOUS
Status: CANCELLED | OUTPATIENT
Start: 2023-03-28

## 2023-03-06 RX ORDER — HEPARIN 100 UNIT/ML
500 SYRINGE INTRAVENOUS
Status: CANCELLED | OUTPATIENT
Start: 2023-03-28

## 2023-03-06 RX ORDER — SODIUM CHLORIDE 0.9 % (FLUSH) 0.9 %
10 SYRINGE (ML) INJECTION
Status: CANCELLED | OUTPATIENT
Start: 2023-03-28

## 2023-03-06 RX ORDER — SODIUM CHLORIDE 0.9 % (FLUSH) 0.9 %
10 SYRINGE (ML) INJECTION
Status: CANCELLED | OUTPATIENT
Start: 2023-03-07

## 2023-03-06 RX ORDER — HEPARIN 100 UNIT/ML
500 SYRINGE INTRAVENOUS
Status: CANCELLED | OUTPATIENT
Start: 2023-03-07

## 2023-03-06 RX ORDER — EPINEPHRINE 0.3 MG/.3ML
0.3 INJECTION SUBCUTANEOUS ONCE AS NEEDED
Status: CANCELLED | OUTPATIENT
Start: 2023-03-28

## 2023-03-06 RX ORDER — DIPHENHYDRAMINE HYDROCHLORIDE 50 MG/ML
50 INJECTION INTRAMUSCULAR; INTRAVENOUS ONCE AS NEEDED
Status: CANCELLED | OUTPATIENT
Start: 2023-03-28

## 2023-03-06 RX ORDER — DIPHENHYDRAMINE HYDROCHLORIDE 50 MG/ML
50 INJECTION INTRAMUSCULAR; INTRAVENOUS ONCE AS NEEDED
Status: CANCELLED | OUTPATIENT
Start: 2023-03-07

## 2023-03-07 ENCOUNTER — INFUSION (OUTPATIENT)
Dept: INFUSION THERAPY | Facility: HOSPITAL | Age: 73
End: 2023-03-07
Attending: INTERNAL MEDICINE
Payer: MEDICARE

## 2023-03-07 ENCOUNTER — TELEPHONE (OUTPATIENT)
Dept: HEMATOLOGY/ONCOLOGY | Facility: CLINIC | Age: 73
End: 2023-03-07
Payer: MEDICARE

## 2023-03-07 VITALS
RESPIRATION RATE: 20 BRPM | HEART RATE: 76 BPM | DIASTOLIC BLOOD PRESSURE: 75 MMHG | SYSTOLIC BLOOD PRESSURE: 133 MMHG | OXYGEN SATURATION: 99 %

## 2023-03-07 DIAGNOSIS — C78.6 PERITONEAL CARCINOMATOSIS: Primary | ICD-10-CM

## 2023-03-07 PROCEDURE — 63600175 PHARM REV CODE 636 W HCPCS: Performed by: NURSE PRACTITIONER

## 2023-03-07 PROCEDURE — 25000003 PHARM REV CODE 250: Performed by: NURSE PRACTITIONER

## 2023-03-07 PROCEDURE — 96413 CHEMO IV INFUSION 1 HR: CPT

## 2023-03-07 RX ORDER — EPINEPHRINE 0.3 MG/.3ML
0.3 INJECTION SUBCUTANEOUS ONCE AS NEEDED
Status: DISCONTINUED | OUTPATIENT
Start: 2023-03-07 | End: 2023-03-07 | Stop reason: HOSPADM

## 2023-03-07 RX ORDER — HEPARIN 100 UNIT/ML
500 SYRINGE INTRAVENOUS
Status: DISCONTINUED | OUTPATIENT
Start: 2023-03-07 | End: 2023-03-07 | Stop reason: HOSPADM

## 2023-03-07 RX ORDER — DIPHENHYDRAMINE HYDROCHLORIDE 50 MG/ML
50 INJECTION INTRAMUSCULAR; INTRAVENOUS ONCE AS NEEDED
Status: DISCONTINUED | OUTPATIENT
Start: 2023-03-07 | End: 2023-03-07 | Stop reason: HOSPADM

## 2023-03-07 RX ORDER — ACETAMINOPHEN 325 MG/1
650 TABLET ORAL
Status: DISCONTINUED | OUTPATIENT
Start: 2023-03-07 | End: 2023-03-07 | Stop reason: HOSPADM

## 2023-03-07 RX ORDER — DIPHENHYDRAMINE HYDROCHLORIDE 50 MG/ML
25 INJECTION INTRAMUSCULAR; INTRAVENOUS
Status: COMPLETED | OUTPATIENT
Start: 2023-03-07 | End: 2023-03-07

## 2023-03-07 RX ORDER — SODIUM CHLORIDE 0.9 % (FLUSH) 0.9 %
10 SYRINGE (ML) INJECTION
Status: DISCONTINUED | OUTPATIENT
Start: 2023-03-07 | End: 2023-03-07 | Stop reason: HOSPADM

## 2023-03-07 RX ADMIN — DIPHENHYDRAMINE HYDROCHLORIDE 25 MG: 50 INJECTION, SOLUTION INTRAMUSCULAR; INTRAVENOUS at 02:03

## 2023-03-07 RX ADMIN — SODIUM CHLORIDE 900 MG: 9 INJECTION, SOLUTION INTRAVENOUS at 02:03

## 2023-03-27 ENCOUNTER — LAB VISIT (OUTPATIENT)
Dept: LAB | Facility: HOSPITAL | Age: 73
End: 2023-03-27
Attending: INTERNAL MEDICINE
Payer: MEDICARE

## 2023-03-27 DIAGNOSIS — C78.6 PERITONEAL CARCINOMATOSIS: ICD-10-CM

## 2023-03-27 LAB
ALBUMIN SERPL-MCNC: 3.9 G/DL (ref 3.4–4.8)
ALBUMIN/GLOB SERPL: 1.3 RATIO (ref 1.1–2)
ALP SERPL-CCNC: 48 UNIT/L (ref 40–150)
ALT SERPL-CCNC: 20 UNIT/L (ref 0–55)
AST SERPL-CCNC: 32 UNIT/L (ref 5–34)
BASOPHILS # BLD AUTO: 0.02 X10(3)/MCL (ref 0–0.2)
BASOPHILS NFR BLD AUTO: 0.2 %
BILIRUBIN DIRECT+TOT PNL SERPL-MCNC: 1.7 MG/DL
BUN SERPL-MCNC: 16.9 MG/DL (ref 9.8–20.1)
CALCIUM SERPL-MCNC: 9.8 MG/DL (ref 8.4–10.2)
CHLORIDE SERPL-SCNC: 95 MMOL/L (ref 98–107)
CO2 SERPL-SCNC: 29 MMOL/L (ref 23–31)
CREAT SERPL-MCNC: 0.84 MG/DL (ref 0.55–1.02)
EOSINOPHIL # BLD AUTO: 0.11 X10(3)/MCL (ref 0–0.9)
EOSINOPHIL NFR BLD AUTO: 1.3 %
ERYTHROCYTE [DISTWIDTH] IN BLOOD BY AUTOMATED COUNT: 12.7 % (ref 11.5–17)
GFR SERPLBLD CREATININE-BSD FMLA CKD-EPI: >60 MLS/MIN/1.73/M2
GLOBULIN SER-MCNC: 2.9 GM/DL (ref 2.4–3.5)
GLUCOSE SERPL-MCNC: 121 MG/DL (ref 82–115)
HCT VFR BLD AUTO: 40.3 % (ref 37–47)
HGB BLD-MCNC: 13 G/DL (ref 12–16)
IMM GRANULOCYTES # BLD AUTO: 0.01 X10(3)/MCL (ref 0–0.04)
IMM GRANULOCYTES NFR BLD AUTO: 0.1 %
LYMPHOCYTES # BLD AUTO: 3.22 X10(3)/MCL (ref 0.6–4.6)
LYMPHOCYTES NFR BLD AUTO: 38.7 %
MCH RBC QN AUTO: 30.8 PG (ref 27–31)
MCHC RBC AUTO-ENTMCNC: 32.3 G/DL (ref 33–36)
MCV RBC AUTO: 95.5 FL (ref 80–94)
MONOCYTES # BLD AUTO: 1.07 X10(3)/MCL (ref 0.1–1.3)
MONOCYTES NFR BLD AUTO: 12.9 %
NEUTROPHILS # BLD AUTO: 3.89 X10(3)/MCL (ref 2.1–9.2)
NEUTROPHILS NFR BLD AUTO: 46.8 %
PLATELET # BLD AUTO: 342 X10(3)/MCL (ref 130–400)
PMV BLD AUTO: 7.7 FL (ref 7.4–10.4)
POTASSIUM SERPL-SCNC: 4.5 MMOL/L (ref 3.5–5.1)
PROT SERPL-MCNC: 6.8 GM/DL (ref 5.8–7.6)
PROT UR QL STRIP.AUTO: ABNORMAL MG/DL
RBC # BLD AUTO: 4.22 X10(6)/MCL (ref 4.2–5.4)
SODIUM SERPL-SCNC: 133 MMOL/L (ref 136–145)
WBC # SPEC AUTO: 8.3 X10(3)/MCL (ref 4.5–11.5)

## 2023-03-27 PROCEDURE — 36415 COLL VENOUS BLD VENIPUNCTURE: CPT

## 2023-03-27 PROCEDURE — 80053 COMPREHEN METABOLIC PANEL: CPT

## 2023-03-27 PROCEDURE — 85025 COMPLETE CBC W/AUTO DIFF WBC: CPT

## 2023-03-27 PROCEDURE — 81005 URINALYSIS: CPT

## 2023-03-28 ENCOUNTER — INFUSION (OUTPATIENT)
Dept: INFUSION THERAPY | Facility: HOSPITAL | Age: 73
End: 2023-03-28
Attending: INTERNAL MEDICINE
Payer: MEDICARE

## 2023-03-28 VITALS
HEIGHT: 63 IN | DIASTOLIC BLOOD PRESSURE: 81 MMHG | BODY MASS INDEX: 24.8 KG/M2 | WEIGHT: 140 LBS | TEMPERATURE: 98 F | OXYGEN SATURATION: 99 % | SYSTOLIC BLOOD PRESSURE: 139 MMHG | HEART RATE: 58 BPM

## 2023-03-28 DIAGNOSIS — C78.6 PERITONEAL CARCINOMATOSIS: Primary | ICD-10-CM

## 2023-03-28 PROCEDURE — 63600175 PHARM REV CODE 636 W HCPCS: Performed by: NURSE PRACTITIONER

## 2023-03-28 PROCEDURE — 96413 CHEMO IV INFUSION 1 HR: CPT

## 2023-03-28 PROCEDURE — 96375 TX/PRO/DX INJ NEW DRUG ADDON: CPT

## 2023-03-28 PROCEDURE — 25000003 PHARM REV CODE 250: Performed by: NURSE PRACTITIONER

## 2023-03-28 RX ORDER — SODIUM CHLORIDE 0.9 % (FLUSH) 0.9 %
10 SYRINGE (ML) INJECTION
Status: DISCONTINUED | OUTPATIENT
Start: 2023-03-28 | End: 2023-03-28 | Stop reason: HOSPADM

## 2023-03-28 RX ORDER — DIPHENHYDRAMINE HYDROCHLORIDE 50 MG/ML
25 INJECTION INTRAMUSCULAR; INTRAVENOUS
Status: COMPLETED | OUTPATIENT
Start: 2023-03-28 | End: 2023-03-28

## 2023-03-28 RX ORDER — EPINEPHRINE 0.3 MG/.3ML
0.3 INJECTION SUBCUTANEOUS ONCE AS NEEDED
Status: DISCONTINUED | OUTPATIENT
Start: 2023-03-28 | End: 2023-03-28 | Stop reason: HOSPADM

## 2023-03-28 RX ORDER — HEPARIN 100 UNIT/ML
500 SYRINGE INTRAVENOUS
Status: DISCONTINUED | OUTPATIENT
Start: 2023-03-28 | End: 2023-03-28 | Stop reason: HOSPADM

## 2023-03-28 RX ORDER — ACETAMINOPHEN 325 MG/1
650 TABLET ORAL
Status: DISCONTINUED | OUTPATIENT
Start: 2023-03-28 | End: 2023-03-28 | Stop reason: HOSPADM

## 2023-03-28 RX ORDER — DIPHENHYDRAMINE HYDROCHLORIDE 50 MG/ML
50 INJECTION INTRAMUSCULAR; INTRAVENOUS ONCE AS NEEDED
Status: DISCONTINUED | OUTPATIENT
Start: 2023-03-28 | End: 2023-03-28 | Stop reason: HOSPADM

## 2023-03-28 RX ADMIN — SODIUM CHLORIDE 900 MG: 9 INJECTION, SOLUTION INTRAVENOUS at 02:03

## 2023-03-28 RX ADMIN — DIPHENHYDRAMINE HYDROCHLORIDE 25 MG: 50 INJECTION, SOLUTION INTRAMUSCULAR; INTRAVENOUS at 02:03
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
## 2023-04-10 RX ORDER — DIPHENHYDRAMINE HYDROCHLORIDE 50 MG/ML
50 INJECTION INTRAMUSCULAR; INTRAVENOUS ONCE AS NEEDED
Status: CANCELLED | OUTPATIENT
Start: 2023-04-18

## 2023-04-10 RX ORDER — ACETAMINOPHEN 325 MG/1
650 TABLET ORAL
Status: CANCELLED | OUTPATIENT
Start: 2023-04-18

## 2023-04-10 RX ORDER — DIPHENHYDRAMINE HYDROCHLORIDE 50 MG/ML
25 INJECTION INTRAMUSCULAR; INTRAVENOUS
Status: CANCELLED | OUTPATIENT
Start: 2023-04-18

## 2023-04-10 RX ORDER — EPINEPHRINE 0.3 MG/.3ML
0.3 INJECTION SUBCUTANEOUS ONCE AS NEEDED
Status: CANCELLED | OUTPATIENT
Start: 2023-04-18

## 2023-04-10 RX ORDER — HEPARIN 100 UNIT/ML
500 SYRINGE INTRAVENOUS
Status: CANCELLED | OUTPATIENT
Start: 2023-04-18

## 2023-04-10 RX ORDER — SODIUM CHLORIDE 0.9 % (FLUSH) 0.9 %
10 SYRINGE (ML) INJECTION
Status: CANCELLED | OUTPATIENT
Start: 2023-04-18

## 2023-04-10 NOTE — PROGRESS NOTES
Subjective:       Patient ID: Shayna Ledezma is a 72 y.o. female.  GI: Dr. Gamaliel Carlos  GYN ONC at [facility]: Dr. Mike Santana    1. 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 Pulmonary Embolism (Incidental on CT)--19    Procedure/pathology:   1. Paracentesis with removal of 5L of fluid done 18--serous carcinoma, high grade, c/w ovarian primary, PAX-8, CK7 and MOC-31 positive, CDX-2, CK-20 and Calretinin-negative.  2. S/p Exploratory Lap, radical tumor debulking including total abdominal hysterectomy, bilateral salpingo-oophorectomy, bilateral pelvic lymph node sampling, appendectomy, mobilization of the left ureter, complete gross resection of the disease with removal of mesenteric disease, as well as omental disese and parasplenic disease by Dr. Santana 3/18/19--Metastatic high grade serous carcinoma. Tissue/organ involvement: right ovary, appendectomy, omentum, mesentery (including small bowel mesentery) and multiple other sites designated: periumbilical, perisplenic, transverse colon, splenic flexure, perirectal, left periureteral. 2 lymph nodes negative. [id number]. FIGO stage IIIC. Myriad somatic instability testing positive for genomic instability, negative BRCA1 and BRCA2 tumor testing.  3. Splenectomy done 10/21/19--splenic involvement with metastatic high grade serous carcinoma, 5 benign lymph nodes. Caris testing showed LAM, NTRK1/2/3 negative, TMB low, BRCA1/2 negative, ER/DC negative, CATHY negative, SPEN pathogenic variant Exon 11, TP53 pathogenic variant Exon 5, PD-L1 positive CPS 15, Genomic ELODIA high.  Imagin. CT A/P w/ and w/o contrast done at [facility] 18--large volume ascites with multiple peritoneal implants, right pericolic gutter implant measures 2.5X3.3cm, LUQ omental/mesenteric implant measures 3X3.6cm, omental carcinomatosis, extensive enhancing soft tissue surrounding sigmoid colon vs. large sigmoid mass, left  ovarian cystic lesion appears to have some internal septations measures 3.5X3.8cm, enhancing periportal soft tissue density likely lymphadenopathy with some narrowing of the portal vein noted, but without internal filling defect, borderline splenomegaly, subtle solid lesion w/n central spleen measures 2.4X2.5cm, likely metastatic, with peripheral area of diminished enhancement suggesting splenic infarct, soft tissue implant abutting gallbladder measures 2X2.2cm, no right adnexal mass, trace bilateral layering pleural fluid, small moderate-sized hiatal hernia, few borderline enlarged lymph nodes w/n right epicardial fat pad.  2. CT of chest on 1/11/19--Some prominent right cardiophrenic lymph nodes are nonspecific and can be followed on future surveillance scans. Otherwise no CT evidence of metastatic disease to the chest. While exam was not tailored for evaluation of the pulmonary arteries I suspect there is pulmonary thromboembolic disease. Peritoneal carcinomatosis seen in the upper abdomen. Critical Result: Pulmonary Embolus.  3. CT C/A/P 3/4/19--Positive response to therapy. No new or progressive metastatic disease in the chest, abdomen or pelvis. Stable to improved findings include smaller pericardial lymph nodes, andrew hepatis adenopathy, peritoneal carcinomatosis, smaller left adnexal lesion; no evidence of PE within limitations of the study.  5. CT PE protocol chest/A/P 6/11/19--No PE, improved pericardial lymph node with minimal size on current examination, improved peritoneal carcinomatosis and left adnexal implant, splenic ischemia evolved into small infarct, size improvement of one of splenic hypodense lesion and mild size increase of second hypodense lesion, favored to be benign/cystic, no new findings.  6. PET/CT 9/25/19--s/p hysterectomy and bilateral oophorectomy, no evidence for locally recurrent/residual disease, intensely hypermetabolic hypodense lesion within the spleen 3.5X3.3cm concerning for  metastatic focus. No other abnormal focus of disease.  7. CT C/A/P 11/13/20--Right lower lobe 3.3 cm mass is presumed metastatic, otherwise no evident residual, recurrent or metastatic disease.   8. PET/CT 11/20/20--Hypermetabolic right lung base lesion and suspected metastatic periportal adenopathy, no additional sites of FDG-avid otherwise aggressive appearing pathology through the neck, chest, abdomen, or pelvis.  9. PET/CT 1/21/21--Interval decrease in size of the right lower lobe mass with decreased FDG uptake, now measures 1.7 x 2.2 cm (previous 3.3 x 3.1 cm), resolution of FDG uptake in the suspected periportal lymph node which is not identifiable on noncontrast CT. No evidence of new or progressive hypermetabolic metastatic disease.    Procedures:   1. Paracentesis on 12/28/18 with removal of 5 Liters.  2. Paracentesis on 01/07/19 with removal of 3.5 Liters.  3. Paracentesis on 02/06/19 with removal of 2.5 Liters.  4. Mediport placed 12/2020 by Dr. Serge Gentile--removed 8/26/21 due to mediport fracture.    Germline genetic testing done by [facility] 1/29/19--negative for BRCA1/2, two (2) variants of uncertain significance (VUS): CATHY p.[id number]/p.I7001B and MLH1 p.P603L.     :  12/19/18 1102  11/10/20  42.9  12/08/20 79.5  01/05/21 28.9  01/26/21 12.8  02/18/21 11.6  03/15/22--10.3  04/26/22--9.6  05/16/22--10.2  06/07/22--10.1  07/19/22--11.6  08/08/22--12.5  09/19/22--12.7  10/31/22--13.5  01/03/23--12.0  02/13/23--14.0    Treatment History:  1. Neoadjuvant Carboplatin/Taxol every 3 weeks x 3 cycles. 1/15/19 - 2/26/19. Neulasta added.   2. Surgery--tumor debulking KORI/BSO 3/18/19.  3. Adjuvant Carboplatin/Taxol x 3 additions cycles 4/9/19--5/21/19.  4. Splenectomy 10/21/19.  5. Niraparib maintenance (dose reduction to 100mg daily for cytopenias) 11/8/19--12/1/20 (stopped due to progression).  6. Carboplatin/Paclitaxel/Avastin X 6 cycles completed 12/8/21--3/24/21 (complete response).    Current  Treatment:  1. Eliquis BID for incidental PE on imaging 1/2019  2. Avastin maintenance started on 4/13/21.   Cycle 36 due on 4/18/23.      Chief Complaint: Follow-up (No c/o)    HPI   Patient presents for scheduled follow-up of ovarian cancer. She continues on Avastin every 3 weeks without problems. Appetite good and weight stable. Bowels are moving well. BP good. Denies any new pain or discomfort. She does have intermittent episodes of epistaxis but not too bothersome. Plan for routine colonoscopy in June.     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      atorvastatin (LIPITOR) 40 MG tablet TAKE 1 TABLET(40 MG) BY MOUTH EVERY DAY 90 tablet 3    b complex vitamins capsule Take 1 capsule by mouth once daily.      ELIQUIS 5 mg Tab TAKE 1 TABLET BY MOUTH TWICE DAILY 180 tablet 0    GLUTAMINE ORAL Take 10 g by mouth Daily.      hydroCHLOROthiazide (HYDRODIURIL) 25 MG tablet Take 1 tablet (25 mg total) by mouth once daily. 90 tablet 3    lisinopriL 10 MG tablet Take 1 tablet (10 mg total) by mouth once daily. 90 tablet 3    pyridoxine, vitamin B6, (B-6) 250 MG Tab Take 250 mg by mouth once daily.      sodium chloride 0.9% SolP 100 mL with bevacizumab 25 mg/mL Soln Inject into the vein every 21 days.       No current facility-administered medications on file prior to visit.      Review of Systems    Constitutional:  Negative for appetite change and unexpected weight change.   HENT:  Negative for mouth sores.    Eyes:  Negative for visual disturbance.   Respiratory:  Negative for cough and shortness of breath.    Cardiovascular:  Negative for chest pain.   Gastrointestinal:  Negative for abdominal pain and diarrhea.   Genitourinary:  Negative for frequency.   Musculoskeletal:  Negative for back pain.   Integumentary:  Negative for rash.   Neurological:  Negative for headaches.   Hematological:  Negative for adenopathy.   Psychiatric/Behavioral:  The patient is not nervous/anxious.       Vitals:    04/17/23 1330   BP: 127/72   Pulse: 63   Temp: 98.2 °F (36.8 °C)      Physical Exam  Vitals reviewed.   Constitutional:       Appearance: Normal appearance. She is normal weight.   HENT:      Head: Normocephalic.   Eyes:      General: Lids are normal. Vision grossly intact.      Extraocular Movements: Extraocular movements intact.      Conjunctiva/sclera: Conjunctivae normal.   Cardiovascular:      Rate and Rhythm: Normal rate and regular rhythm.      Pulses: Normal pulses.      Heart sounds: Normal heart sounds, S1 normal and S2 normal.   Pulmonary:      Effort: Pulmonary effort is normal.      Breath sounds: Normal breath sounds.   Abdominal:      General: Abdomen is flat. Bowel sounds are normal. There is no distension.      Palpations: Abdomen is soft.      Tenderness: There is no abdominal tenderness.   Musculoskeletal:      Cervical back: Normal range of motion and neck supple.      Right lower leg: No edema.      Left lower leg: No edema.   Skin:     General: Skin is warm and moist.      Capillary Refill: Capillary refill takes less than 2 seconds.      Comments: Left CW mediport removed, site intact   Neurological:      General: No focal deficit present.      Mental Status: She is alert and oriented to person, place, and time.   Psychiatric:         Attention and Perception: Attention normal.         Mood and  Affect: Mood normal.         Speech: Speech normal.         Behavior: Behavior normal. Behavior is cooperative.         Cognition and Memory: Cognition normal.         Judgment: Judgment normal.     Lab Visit on 04/17/2023   Component Date Value    Protein, UA 04/17/2023 Trace (A)     WBC 04/17/2023 8.9     RBC 04/17/2023 4.22     Hgb 04/17/2023 12.9     Hct 04/17/2023 40.5     MCV 04/17/2023 96.0 (H)     MCH 04/17/2023 30.6     MCHC 04/17/2023 31.9 (L)     RDW 04/17/2023 12.8     Platelet 04/17/2023 353     MPV 04/17/2023 7.9     Neut % 04/17/2023 48.9     Lymph % 04/17/2023 38.7     Mono % 04/17/2023 10.9     Eos % 04/17/2023 1.1     Basophil % 04/17/2023 0.3     Lymph # 04/17/2023 3.45     Neut # 04/17/2023 4.36     Mono # 04/17/2023 0.97     Eos # 04/17/2023 0.10     Baso # 04/17/2023 0.03     IG# 04/17/2023 0.01     IG% 04/17/2023 0.1           Assessment:       1. Peritoneal carcinomatosis    2. Pulmonary embolism, unspecified chronicity, unspecified pulmonary embolism type, unspecified whether acute cor pulmonale present    3. Malignant neoplasm of uterine adnexa          Plan:     Patient with left ovarian cancer diagnosed 12/26/18, appears to be stage IIIC vs. IV with diffuse peritoneal disease, possible splenic involvement, epicardial lymph nodes and bilateral pleural effusions.  Patient seen and evaluated by Dr. Mike Santana at [facility] in [place].   Treatment recommended upfront with chemotherapy Carboplatin/Paclitaxel x 3 cycles.  Completed Cycle 3 on 2/26/19.   She underwent total abdominal hysterectomy, BSO and tumor debulking on 3/18/19 with Dr. Santana with complete gross resection of disease. FIGO Stage IIIC papillary serous fallopian tube cancer.   Patient resumed chemotherapy with cycle 4 on 4/9/19. Completed cycle 6 on 5/21/19.    Testing on tumor was positive for genomic instability but negative for BRCA mutation.  Per NCCN guidelines, maintenance can be considered for BRCA mutated  germline category 1 and somatic category 2A. There is some evidence that PARP inhibitors have some activity as well in tumors with genomic instability. But the PARP maintenance is not approved for this indication. Offered treatment to patient and after discussion she declined and made decision to continue with observation only.    PET/CT done for rising CA-125 showed hypermetabolic splenic lesion which is not new, just enlarged from previous. Case discussed with Dr. Mike Santana.  Patient is now s/p splenectomy done 10/21/19. Consistent with splenic involvement with high grade serous carcinoma.   Dr. Santana recommended Niraparib maintenance based on new data showing activity in HRD positive ovarian cancer and patient started on 11/8/19, required a dose reduction due to cytopenias.  Rising CA-125 noted in 11/2020. CT showed new RLL lung mass.   Follow-up PET done 11/20/20 showed RLL lung mass hypermetabolic and hypermetabolic periportal adenopathy.     Recommended 2nd line treatment with Carboplatin/Taxol/Avastin.   Started cycle 1 on 12/8/20. Neulasta added with cycle 2.   Completed Cycle 6 on 3/24/21.  PET from 3/30/21 showed complete resolution of lung mass and no other disease.    Avastin maintenance started on 4/13/21.   Hospitalized for TIA after her 4th cycle of Avastin. Work-up for stroke and cause otherwise negative. Patient started on baby ASA by neurology.  Discussed there are no clear guidelines for changing/discontinuing treatment for TIA/CVA related to Avastin.  Since her symptoms resolved and there were no residual effects, recommended to continue with Avastin since it is working well for her disease and since a baby ASA was added for prevention. She was also continued on Eliquis for h/o PE.  S/p mediport removal for fracture on 8/26/21.   Continue Avastin through peripheral veins for now.    Patient continues on Avastin maintenance without problems.  BP has been good. Currently on HCTZ 25mg daily.       Due for Avastin maintenance Cycle 36 tomorrow.    CBC good, will f/u CMP and . Last CA-125 remains WNL.  Proceed with treatment tomorrow pending labs. Will continue checking protein UA every 3 weeks.  Labs on 5/8/23 and treatment only cycle 37 on 5/9/23.  RTC T/D visit in 6 weeks prior to cycle 38.    Will continue to check . No further scans until rise in CA-125.  She is now >12 months from completing last chemotherapy, so still considered platinum-sensitive. Will need replacement of mediport when she needs to resume chemotherapy.   Continue Eliquis 5mg BID and ASA.  All questions answered at this time.        Tom Angeles, MIAPC

## 2023-04-17 ENCOUNTER — OFFICE VISIT (OUTPATIENT)
Dept: HEMATOLOGY/ONCOLOGY | Facility: CLINIC | Age: 73
End: 2023-04-17
Payer: MEDICARE

## 2023-04-17 ENCOUNTER — TELEPHONE (OUTPATIENT)
Dept: HEMATOLOGY/ONCOLOGY | Facility: CLINIC | Age: 73
End: 2023-04-17

## 2023-04-17 VITALS
SYSTOLIC BLOOD PRESSURE: 127 MMHG | WEIGHT: 140 LBS | OXYGEN SATURATION: 98 % | HEART RATE: 63 BPM | HEIGHT: 62 IN | DIASTOLIC BLOOD PRESSURE: 72 MMHG | TEMPERATURE: 98 F | BODY MASS INDEX: 25.76 KG/M2

## 2023-04-17 DIAGNOSIS — C57.4 MALIGNANT NEOPLASM OF UTERINE ADNEXA: ICD-10-CM

## 2023-04-17 DIAGNOSIS — I26.99 PULMONARY EMBOLISM, UNSPECIFIED CHRONICITY, UNSPECIFIED PULMONARY EMBOLISM TYPE, UNSPECIFIED WHETHER ACUTE COR PULMONALE PRESENT: ICD-10-CM

## 2023-04-17 DIAGNOSIS — C78.6 PERITONEAL CARCINOMATOSIS: Primary | ICD-10-CM

## 2023-04-17 PROCEDURE — 3074F PR MOST RECENT SYSTOLIC BLOOD PRESSURE < 130 MM HG: ICD-10-PCS | Mod: CPTII,S$GLB,, | Performed by: NURSE PRACTITIONER

## 2023-04-17 PROCEDURE — 1160F RVW MEDS BY RX/DR IN RCRD: CPT | Mod: CPTII,S$GLB,, | Performed by: NURSE PRACTITIONER

## 2023-04-17 PROCEDURE — 1126F AMNT PAIN NOTED NONE PRSNT: CPT | Mod: CPTII,S$GLB,, | Performed by: NURSE PRACTITIONER

## 2023-04-17 PROCEDURE — 3078F PR MOST RECENT DIASTOLIC BLOOD PRESSURE < 80 MM HG: ICD-10-PCS | Mod: CPTII,S$GLB,, | Performed by: NURSE PRACTITIONER

## 2023-04-17 PROCEDURE — 1126F PR PAIN SEVERITY QUANTIFIED, NO PAIN PRESENT: ICD-10-PCS | Mod: CPTII,S$GLB,, | Performed by: NURSE PRACTITIONER

## 2023-04-17 PROCEDURE — 99214 OFFICE O/P EST MOD 30 MIN: CPT | Mod: S$GLB,,, | Performed by: NURSE PRACTITIONER

## 2023-04-17 PROCEDURE — 3078F DIAST BP <80 MM HG: CPT | Mod: CPTII,S$GLB,, | Performed by: NURSE PRACTITIONER

## 2023-04-17 PROCEDURE — 3074F SYST BP LT 130 MM HG: CPT | Mod: CPTII,S$GLB,, | Performed by: NURSE PRACTITIONER

## 2023-04-17 PROCEDURE — 4010F ACE/ARB THERAPY RXD/TAKEN: CPT | Mod: CPTII,S$GLB,, | Performed by: NURSE PRACTITIONER

## 2023-04-17 PROCEDURE — 4010F PR ACE/ARB THEARPY RXD/TAKEN: ICD-10-PCS | Mod: CPTII,S$GLB,, | Performed by: NURSE PRACTITIONER

## 2023-04-17 PROCEDURE — 99999 PR PBB SHADOW E&M-EST. PATIENT-LVL IV: CPT | Mod: PBBFAC,,, | Performed by: NURSE PRACTITIONER

## 2023-04-17 PROCEDURE — 1159F MED LIST DOCD IN RCRD: CPT | Mod: CPTII,S$GLB,, | Performed by: NURSE PRACTITIONER

## 2023-04-17 PROCEDURE — 1160F PR REVIEW ALL MEDS BY PRESCRIBER/CLIN PHARMACIST DOCUMENTED: ICD-10-PCS | Mod: CPTII,S$GLB,, | Performed by: NURSE PRACTITIONER

## 2023-04-17 PROCEDURE — 1159F PR MEDICATION LIST DOCUMENTED IN MEDICAL RECORD: ICD-10-PCS | Mod: CPTII,S$GLB,, | Performed by: NURSE PRACTITIONER

## 2023-04-17 PROCEDURE — 99214 PR OFFICE/OUTPT VISIT, EST, LEVL IV, 30-39 MIN: ICD-10-PCS | Mod: S$GLB,,, | Performed by: NURSE PRACTITIONER

## 2023-04-17 PROCEDURE — 3008F BODY MASS INDEX DOCD: CPT | Mod: CPTII,S$GLB,, | Performed by: NURSE PRACTITIONER

## 2023-04-17 PROCEDURE — 99999 PR PBB SHADOW E&M-EST. PATIENT-LVL IV: ICD-10-PCS | Mod: PBBFAC,,, | Performed by: NURSE PRACTITIONER

## 2023-04-17 PROCEDURE — 3008F PR BODY MASS INDEX (BMI) DOCUMENTED: ICD-10-PCS | Mod: CPTII,S$GLB,, | Performed by: NURSE PRACTITIONER

## 2023-04-17 RX ORDER — HEPARIN 100 UNIT/ML
500 SYRINGE INTRAVENOUS
Status: CANCELLED | OUTPATIENT
Start: 2023-05-09

## 2023-04-17 RX ORDER — DIPHENHYDRAMINE HYDROCHLORIDE 50 MG/ML
50 INJECTION INTRAMUSCULAR; INTRAVENOUS ONCE AS NEEDED
Status: CANCELLED | OUTPATIENT
Start: 2023-05-09

## 2023-04-17 RX ORDER — DIPHENHYDRAMINE HYDROCHLORIDE 50 MG/ML
25 INJECTION INTRAMUSCULAR; INTRAVENOUS
Status: CANCELLED | OUTPATIENT
Start: 2023-05-09

## 2023-04-17 RX ORDER — SODIUM CHLORIDE 0.9 % (FLUSH) 0.9 %
10 SYRINGE (ML) INJECTION
Status: CANCELLED | OUTPATIENT
Start: 2023-05-09

## 2023-04-17 RX ORDER — SODIUM PICOSULFATE, MAGNESIUM OXIDE, AND ANHYDROUS CITRIC ACID 10; 3.5; 12 MG/160ML; G/160ML; G/160ML
1 LIQUID ORAL
COMMUNITY
Start: 2023-04-12 | End: 2023-08-02

## 2023-04-17 RX ORDER — EPINEPHRINE 0.3 MG/.3ML
0.3 INJECTION SUBCUTANEOUS ONCE AS NEEDED
Status: CANCELLED | OUTPATIENT
Start: 2023-05-09

## 2023-04-17 RX ORDER — ACETAMINOPHEN 325 MG/1
650 TABLET ORAL
Status: CANCELLED | OUTPATIENT
Start: 2023-05-09

## 2023-04-18 ENCOUNTER — INFUSION (OUTPATIENT)
Dept: INFUSION THERAPY | Facility: HOSPITAL | Age: 73
End: 2023-04-18
Attending: INTERNAL MEDICINE
Payer: MEDICARE

## 2023-04-18 VITALS
OXYGEN SATURATION: 98 % | TEMPERATURE: 98 F | DIASTOLIC BLOOD PRESSURE: 68 MMHG | SYSTOLIC BLOOD PRESSURE: 147 MMHG | BODY MASS INDEX: 25.76 KG/M2 | WEIGHT: 140 LBS | HEIGHT: 62 IN | HEART RATE: 57 BPM

## 2023-04-18 DIAGNOSIS — C78.6 PERITONEAL CARCINOMATOSIS: Primary | ICD-10-CM

## 2023-04-18 PROCEDURE — 96413 CHEMO IV INFUSION 1 HR: CPT

## 2023-04-18 PROCEDURE — 25000003 PHARM REV CODE 250: Performed by: NURSE PRACTITIONER

## 2023-04-18 PROCEDURE — 63600175 PHARM REV CODE 636 W HCPCS: Mod: JZ,TB | Performed by: NURSE PRACTITIONER

## 2023-04-18 RX ORDER — SODIUM CHLORIDE 0.9 % (FLUSH) 0.9 %
10 SYRINGE (ML) INJECTION
Status: DISCONTINUED | OUTPATIENT
Start: 2023-04-18 | End: 2023-04-18 | Stop reason: HOSPADM

## 2023-04-18 RX ORDER — DIPHENHYDRAMINE HYDROCHLORIDE 50 MG/ML
25 INJECTION INTRAMUSCULAR; INTRAVENOUS
Status: COMPLETED | OUTPATIENT
Start: 2023-04-18 | End: 2023-04-18

## 2023-04-18 RX ORDER — DIPHENHYDRAMINE HYDROCHLORIDE 50 MG/ML
50 INJECTION INTRAMUSCULAR; INTRAVENOUS ONCE AS NEEDED
Status: DISCONTINUED | OUTPATIENT
Start: 2023-04-18 | End: 2023-04-18 | Stop reason: HOSPADM

## 2023-04-18 RX ORDER — EPINEPHRINE 0.3 MG/.3ML
0.3 INJECTION SUBCUTANEOUS ONCE AS NEEDED
Status: DISCONTINUED | OUTPATIENT
Start: 2023-04-18 | End: 2023-04-18 | Stop reason: HOSPADM

## 2023-04-18 RX ORDER — HEPARIN 100 UNIT/ML
500 SYRINGE INTRAVENOUS
Status: DISCONTINUED | OUTPATIENT
Start: 2023-04-18 | End: 2023-04-18 | Stop reason: HOSPADM

## 2023-04-18 RX ORDER — ACETAMINOPHEN 325 MG/1
650 TABLET ORAL
Status: DISCONTINUED | OUTPATIENT
Start: 2023-04-18 | End: 2023-04-18 | Stop reason: HOSPADM

## 2023-04-18 RX ADMIN — DIPHENHYDRAMINE HYDROCHLORIDE 25 MG: 50 INJECTION, SOLUTION INTRAMUSCULAR; INTRAVENOUS at 01:04

## 2023-04-18 RX ADMIN — SODIUM CHLORIDE 900 MG: 9 INJECTION, SOLUTION INTRAVENOUS at 02:04

## 2023-05-01 ENCOUNTER — PATIENT MESSAGE (OUTPATIENT)
Dept: ADMINISTRATIVE | Facility: HOSPITAL | Age: 73
End: 2023-05-01
Payer: MEDICARE

## 2023-05-08 ENCOUNTER — LAB VISIT (OUTPATIENT)
Dept: LAB | Facility: HOSPITAL | Age: 73
End: 2023-05-08
Attending: INTERNAL MEDICINE
Payer: MEDICARE

## 2023-05-08 DIAGNOSIS — C57.4 MALIGNANT NEOPLASM OF UTERINE ADNEXA: ICD-10-CM

## 2023-05-08 DIAGNOSIS — C78.6 PERITONEAL CARCINOMATOSIS: Primary | ICD-10-CM

## 2023-05-08 DIAGNOSIS — C57.00 CARCINOMA OF FALLOPIAN TUBE, UNSPECIFIED LATERALITY: ICD-10-CM

## 2023-05-08 DIAGNOSIS — C78.6 PERITONEAL CARCINOMATOSIS: ICD-10-CM

## 2023-05-08 DIAGNOSIS — I26.99 PULMONARY EMBOLISM, UNSPECIFIED CHRONICITY, UNSPECIFIED PULMONARY EMBOLISM TYPE, UNSPECIFIED WHETHER ACUTE COR PULMONALE PRESENT: ICD-10-CM

## 2023-05-08 DIAGNOSIS — C57.8 MALIGNANT NEOPLASM OF OVERLAPPING SITES OF FEMALE GENITAL ORGANS: ICD-10-CM

## 2023-05-08 DIAGNOSIS — C78.6 SECONDARY MALIGNANT NEOPLASM OF RETROPERITONEUM AND PERITONEUM: ICD-10-CM

## 2023-05-08 PROBLEM — Z00.00 MEDICARE ANNUAL WELLNESS VISIT, SUBSEQUENT: Status: RESOLVED | Noted: 2023-02-01 | Resolved: 2023-05-08

## 2023-05-08 LAB
ALBUMIN SERPL-MCNC: 3.9 G/DL (ref 3.4–4.8)
ALBUMIN/GLOB SERPL: 1.3 RATIO (ref 1.1–2)
ALP SERPL-CCNC: 54 UNIT/L (ref 40–150)
ALT SERPL-CCNC: 18 UNIT/L (ref 0–55)
AST SERPL-CCNC: 31 UNIT/L (ref 5–34)
BASOPHILS # BLD AUTO: 0.02 X10(3)/MCL
BASOPHILS NFR BLD AUTO: 0.2 %
BILIRUBIN DIRECT+TOT PNL SERPL-MCNC: 1.4 MG/DL
BUN SERPL-MCNC: 20.9 MG/DL (ref 9.8–20.1)
CALCIUM SERPL-MCNC: 9.6 MG/DL (ref 8.4–10.2)
CHLORIDE SERPL-SCNC: 98 MMOL/L (ref 98–107)
CO2 SERPL-SCNC: 27 MMOL/L (ref 23–31)
CREAT SERPL-MCNC: 0.81 MG/DL (ref 0.55–1.02)
EOSINOPHIL # BLD AUTO: 0.14 X10(3)/MCL (ref 0–0.9)
EOSINOPHIL NFR BLD AUTO: 1.5 %
ERYTHROCYTE [DISTWIDTH] IN BLOOD BY AUTOMATED COUNT: 12.9 % (ref 11.5–17)
GFR SERPLBLD CREATININE-BSD FMLA CKD-EPI: >60 MLS/MIN/1.73/M2
GLOBULIN SER-MCNC: 2.9 GM/DL (ref 2.4–3.5)
GLUCOSE SERPL-MCNC: 82 MG/DL (ref 82–115)
HCT VFR BLD AUTO: 40.5 % (ref 37–47)
HGB BLD-MCNC: 13 G/DL (ref 12–16)
IMM GRANULOCYTES # BLD AUTO: 0.01 X10(3)/MCL (ref 0–0.04)
IMM GRANULOCYTES NFR BLD AUTO: 0.1 %
LYMPHOCYTES # BLD AUTO: 3.89 X10(3)/MCL (ref 0.6–4.6)
LYMPHOCYTES NFR BLD AUTO: 40.4 %
MCH RBC QN AUTO: 30.9 PG (ref 27–31)
MCHC RBC AUTO-ENTMCNC: 32.1 G/DL (ref 33–36)
MCV RBC AUTO: 96.2 FL (ref 80–94)
MONOCYTES # BLD AUTO: 1.02 X10(3)/MCL (ref 0.1–1.3)
MONOCYTES NFR BLD AUTO: 10.6 %
NEUTROPHILS # BLD AUTO: 4.56 X10(3)/MCL (ref 2.1–9.2)
NEUTROPHILS NFR BLD AUTO: 47.2 %
PLATELET # BLD AUTO: 346 X10(3)/MCL (ref 130–400)
PMV BLD AUTO: 7.9 FL (ref 7.4–10.4)
POTASSIUM SERPL-SCNC: 4.6 MMOL/L (ref 3.5–5.1)
PROT SERPL-MCNC: 6.8 GM/DL (ref 5.8–7.6)
RBC # BLD AUTO: 4.21 X10(6)/MCL (ref 4.2–5.4)
SODIUM SERPL-SCNC: 133 MMOL/L (ref 136–145)
WBC # SPEC AUTO: 9.64 X10(3)/MCL (ref 4.5–11.5)

## 2023-05-08 PROCEDURE — 80053 COMPREHEN METABOLIC PANEL: CPT

## 2023-05-08 PROCEDURE — 36415 COLL VENOUS BLD VENIPUNCTURE: CPT

## 2023-05-08 PROCEDURE — 85025 COMPLETE CBC W/AUTO DIFF WBC: CPT

## 2023-05-09 ENCOUNTER — INFUSION (OUTPATIENT)
Dept: INFUSION THERAPY | Facility: HOSPITAL | Age: 73
End: 2023-05-09
Attending: INTERNAL MEDICINE
Payer: MEDICARE

## 2023-05-09 VITALS
HEART RATE: 66 BPM | BODY MASS INDEX: 25.76 KG/M2 | DIASTOLIC BLOOD PRESSURE: 78 MMHG | OXYGEN SATURATION: 99 % | TEMPERATURE: 98 F | HEIGHT: 62 IN | RESPIRATION RATE: 18 BRPM | WEIGHT: 140 LBS | SYSTOLIC BLOOD PRESSURE: 137 MMHG

## 2023-05-09 DIAGNOSIS — C78.6 PERITONEAL CARCINOMATOSIS: Primary | ICD-10-CM

## 2023-05-09 DIAGNOSIS — C57.4 MALIGNANT NEOPLASM OF UTERINE ADNEXA: ICD-10-CM

## 2023-05-09 PROCEDURE — 25000003 PHARM REV CODE 250: Performed by: INTERNAL MEDICINE

## 2023-05-09 PROCEDURE — 25000003 PHARM REV CODE 250: Performed by: NURSE PRACTITIONER

## 2023-05-09 PROCEDURE — 63600175 PHARM REV CODE 636 W HCPCS: Mod: JZ,JG | Performed by: INTERNAL MEDICINE

## 2023-05-09 PROCEDURE — 96413 CHEMO IV INFUSION 1 HR: CPT

## 2023-05-09 PROCEDURE — 96375 TX/PRO/DX INJ NEW DRUG ADDON: CPT

## 2023-05-09 PROCEDURE — 63600175 PHARM REV CODE 636 W HCPCS: Performed by: NURSE PRACTITIONER

## 2023-05-09 RX ORDER — DIPHENHYDRAMINE HYDROCHLORIDE 50 MG/ML
50 INJECTION INTRAMUSCULAR; INTRAVENOUS ONCE AS NEEDED
Status: DISCONTINUED | OUTPATIENT
Start: 2023-05-09 | End: 2023-05-09 | Stop reason: HOSPADM

## 2023-05-09 RX ORDER — SODIUM CHLORIDE 0.9 % (FLUSH) 0.9 %
10 SYRINGE (ML) INJECTION
Status: DISCONTINUED | OUTPATIENT
Start: 2023-05-09 | End: 2023-05-09 | Stop reason: HOSPADM

## 2023-05-09 RX ORDER — ACETAMINOPHEN 325 MG/1
650 TABLET ORAL
Status: DISCONTINUED | OUTPATIENT
Start: 2023-05-09 | End: 2023-05-09 | Stop reason: HOSPADM

## 2023-05-09 RX ORDER — DIPHENHYDRAMINE HYDROCHLORIDE 50 MG/ML
25 INJECTION INTRAMUSCULAR; INTRAVENOUS
Status: COMPLETED | OUTPATIENT
Start: 2023-05-09 | End: 2023-05-09

## 2023-05-09 RX ORDER — HEPARIN 100 UNIT/ML
500 SYRINGE INTRAVENOUS
Status: DISCONTINUED | OUTPATIENT
Start: 2023-05-09 | End: 2023-05-09 | Stop reason: HOSPADM

## 2023-05-09 RX ORDER — EPINEPHRINE 0.3 MG/.3ML
0.3 INJECTION SUBCUTANEOUS ONCE AS NEEDED
Status: DISCONTINUED | OUTPATIENT
Start: 2023-05-09 | End: 2023-05-09 | Stop reason: HOSPADM

## 2023-05-09 RX ADMIN — SODIUM CHLORIDE: 9 INJECTION, SOLUTION INTRAVENOUS at 02:05

## 2023-05-09 RX ADMIN — DIPHENHYDRAMINE HYDROCHLORIDE 25 MG: 50 INJECTION INTRAMUSCULAR; INTRAVENOUS at 02:05

## 2023-05-09 RX ADMIN — BEVACIZUMAB-AWWB 900 MG: 100 INJECTION, SOLUTION INTRAVENOUS at 02:05

## 2023-05-21 NOTE — PROGRESS NOTES
Subjective:       Patient ID: Shayna Ledezma is a 72 y.o. female.  GI: Dr. Gamaliel Carlos  GYN ONC at [facility]: Dr. Mike Santana    1. 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 Pulmonary Embolism (Incidental on CT)--19    Procedure/pathology:   1. Paracentesis with removal of 5L of fluid done 18--serous carcinoma, high grade, c/w ovarian primary, PAX-8, CK7 and MOC-31 positive, CDX-2, CK-20 and Calretinin-negative.  2. S/p Exploratory Lap, radical tumor debulking including total abdominal hysterectomy, bilateral salpingo-oophorectomy, bilateral pelvic lymph node sampling, appendectomy, mobilization of the left ureter, complete gross resection of the disease with removal of mesenteric disease, as well as omental disese and parasplenic disease by Dr. Santana 3/18/19--Metastatic high grade serous carcinoma. Tissue/organ involvement: right ovary, appendectomy, omentum, mesentery (including small bowel mesentery) and multiple other sites designated: periumbilical, perisplenic, transverse colon, splenic flexure, perirectal, left periureteral. 2 lymph nodes negative. [id number]. FIGO stage IIIC. Myriad somatic instability testing positive for genomic instability, negative BRCA1 and BRCA2 tumor testing.  3. Splenectomy done 10/21/19--splenic involvement with metastatic high grade serous carcinoma, 5 benign lymph nodes. [facility]is testing showed LAM, NTRK1/2/3 negative, TMB low, BRCA1/2 negative, ER/AL negative, CATHY negative, SPEN pathogenic variant Exon 11, TP53 pathogenic variant Exon 5, PD-L1 positive CPS 15, Genomic ELODIA high.  Imagin. CT A/P w/ and w/o contrast done at [facility] 18--large volume ascites with multiple peritoneal implants, right pericolic gutter implant measures 2.5X3.3cm, LUQ omental/mesenteric implant measures 3X3.6cm, omental carcinomatosis, extensive enhancing soft tissue surrounding sigmoid colon vs. large sigmoid mass, left  ovarian cystic lesion appears to have some internal septations measures 3.5X3.8cm, enhancing periportal soft tissue density likely lymphadenopathy with some narrowing of the portal vein noted, but without internal filling defect, borderline splenomegaly, subtle solid lesion w/n central spleen measures 2.4X2.5cm, likely metastatic, with peripheral area of diminished enhancement suggesting splenic infarct, soft tissue implant abutting gallbladder measures 2X2.2cm, no right adnexal mass, trace bilateral layering pleural fluid, small moderate-sized hiatal hernia, few borderline enlarged lymph nodes w/n right epicardial fat pad.  2. CT of chest on 1/11/19--Some prominent right cardiophrenic lymph nodes are nonspecific and can be followed on future surveillance scans. Otherwise no CT evidence of metastatic disease to the chest. While exam was not tailored for evaluation of the pulmonary arteries I suspect there is pulmonary thromboembolic disease. Peritoneal carcinomatosis seen in the upper abdomen. Critical Result: Pulmonary Embolus.  3. CT C/A/P 3/4/19--Positive response to therapy. No new or progressive metastatic disease in the chest, abdomen or pelvis. Stable to improved findings include smaller pericardial lymph nodes, andrew hepatis adenopathy, peritoneal carcinomatosis, smaller left adnexal lesion; no evidence of PE within limitations of the study.  5. CT PE protocol chest/A/P 6/11/19--No PE, improved pericardial lymph node with minimal size on current examination, improved peritoneal carcinomatosis and left adnexal implant, splenic ischemia evolved into small infarct, size improvement of one of splenic hypodense lesion and mild size increase of second hypodense lesion, favored to be benign/cystic, no new findings.  6. PET/CT 9/25/19--s/p hysterectomy and bilateral oophorectomy, no evidence for locally recurrent/residual disease, intensely hypermetabolic hypodense lesion within the spleen 3.5X3.3cm concerning for  metastatic focus. No other abnormal focus of disease.  7. CT C/A/P 11/13/20--Right lower lobe 3.3 cm mass is presumed metastatic, otherwise no evident residual, recurrent or metastatic disease.   8. PET/CT 11/20/20--Hypermetabolic right lung base lesion and suspected metastatic periportal adenopathy, no additional sites of FDG-avid otherwise aggressive appearing pathology through the neck, chest, abdomen, or pelvis.  9. PET/CT 1/21/21--Interval decrease in size of the right lower lobe mass with decreased FDG uptake, now measures 1.7 x 2.2 cm (previous 3.3 x 3.1 cm), resolution of FDG uptake in the suspected periportal lymph node which is not identifiable on noncontrast CT. No evidence of new or progressive hypermetabolic metastatic disease.    Procedures:   1. Paracentesis on 12/28/18 with removal of 5 Liters.  2. Paracentesis on 01/07/19 with removal of 3.5 Liters.  3. Paracentesis on 02/06/19 with removal of 2.5 Liters.  4. Mediport placed 12/2020 by Dr. Serge Gnetile--removed 8/26/21 due to mediport fracture.    Germline genetic testing done by [employer] 1/29/19--negative for BRCA1/2, two (2) variants of uncertain significance (VUS): CATHY p.[id number]/p.[id number] and MLH1 p.P603L.     :  12/19/18 1102  11/10/20  42.9  12/08/20 79.5  01/05/21 28.9  01/26/21 12.8  02/18/21 11.6  03/15/22--10.3  04/26/22--9.6  05/16/22--10.2  06/07/22--10.1  07/19/22--11.6  08/08/22--12.5  09/19/22--12.7  10/31/22--13.5  01/03/23--12.0  02/13/23--14.0  03/03/23--13.9  04/17/23--14.5  05/26/23--14.6    Treatment History:  1. Neoadjuvant Carboplatin/Taxol every 3 weeks x 3 cycles. 1/15/19 - 2/26/19. Neulasta added.   2. Surgery--tumor debulking KORI/BSO 3/18/19.  3. Adjuvant Carboplatin/Taxol x 3 additions cycles 4/9/19--5/21/19.  4. Splenectomy 10/21/19.  5. Niraparib maintenance (dose reduction to 100mg daily for cytopenias) 11/8/19--12/1/20 (stopped due to progression).  6. Carboplatin/Paclitaxel/Avastin X 6 cycles completed  12/8/21--3/24/21 (complete response).    Current Treatment:  1. Eliquis BID for incidental PE on imaging 1/2019  2. Avastin maintenance started on 4/13/21.   Cycle 38 due on 5/30/23.      Chief Complaint: Other Misc (Pt reports no new concerns today.)    HPI   Patient presents for scheduled follow-up of ovarian cancer. She continues on Avastin every 3 weeks without problems. Denies any abdominal pain. BP has been good, but I discussed low sodium, likely from HCTZ so plan to increase her Lisinopril and stop HCTZ.     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      atorvastatin (LIPITOR) 40 MG tablet TAKE 1 TABLET(40 MG) BY MOUTH EVERY DAY 90 tablet 3    b complex vitamins capsule Take 1 capsule by mouth once daily.      ELIQUIS 5 mg Tab TAKE 1 TABLET BY MOUTH TWICE DAILY 180 tablet 0    GLUTAMINE ORAL Take 10 g by mouth Daily.      hydroCHLOROthiazide (HYDRODIURIL) 25 MG tablet Take 1 tablet (25 mg total) by mouth once daily. 90 tablet 3    lisinopriL 10 MG tablet Take 1 tablet (10 mg total) by mouth once daily. 90 tablet 3    pyridoxine, vitamin B6, (B-6) 250 MG Tab Take 250 mg by mouth once daily.      sodium chloride 0.9% SolP 100 mL with bevacizumab 25 mg/mL Soln Inject into the vein every 21 days.      CLENPIQ 10 mg-3.5 gram- 12 gram/160 mL Soln Take 1 kit by mouth.       No current  facility-administered medications on file prior to visit.      Review of Systems   Constitutional:  Negative for appetite change and unexpected weight change.   HENT:  Negative for mouth sores.    Eyes:  Negative for visual disturbance.   Respiratory:  Negative for cough and shortness of breath.    Cardiovascular:  Negative for chest pain.   Gastrointestinal:  Negative for abdominal pain and diarrhea.   Genitourinary:  Negative for frequency.   Musculoskeletal:  Negative for back pain.   Integumentary:  Negative for rash.   Neurological:  Negative for headaches.   Hematological:  Negative for adenopathy.   Psychiatric/Behavioral:  The patient is not nervous/anxious.       Vitals:    05/30/23 1026   BP: 127/78   Pulse: 66   Resp: 14   Temp: 98.1 °F (36.7 °C)        Physical Exam  Vitals reviewed.   Constitutional:       Appearance: Normal appearance. She is normal weight.   HENT:      Head: Normocephalic.   Eyes:      General: Lids are normal. Vision grossly intact.      Extraocular Movements: Extraocular movements intact.      Conjunctiva/sclera: Conjunctivae normal.   Cardiovascular:      Rate and Rhythm: Normal rate and regular rhythm.      Pulses: Normal pulses.      Heart sounds: Normal heart sounds, S1 normal and S2 normal.   Pulmonary:      Effort: Pulmonary effort is normal.      Breath sounds: Normal breath sounds.   Abdominal:      General: Abdomen is flat. Bowel sounds are normal. There is no distension.      Palpations: Abdomen is soft.      Tenderness: There is no abdominal tenderness.   Musculoskeletal:      Cervical back: Normal range of motion and neck supple.      Right lower leg: No edema.      Left lower leg: No edema.   Skin:     General: Skin is warm and moist.      Capillary Refill: Capillary refill takes less than 2 seconds.      Comments: Left CW mediport removed, site intact   Neurological:      General: No focal deficit present.      Mental Status: She is alert and oriented to person, place,  and time.   Psychiatric:         Attention and Perception: Attention normal.         Mood and Affect: Mood normal.         Speech: Speech normal.         Behavior: Behavior normal. Behavior is cooperative.         Cognition and Memory: Cognition normal.         Judgment: Judgment normal.     Lab Visit on 05/26/2023   Component Date Value    Cancer Antigen 125 05/26/2023 14.6     Sodium Level 05/26/2023 131 (L)     Potassium Level 05/26/2023 4.9     Chloride 05/26/2023 95 (L)     Carbon Dioxide 05/26/2023 28     Glucose Level 05/26/2023 98     Blood Urea Nitrogen 05/26/2023 20.0     Creatinine 05/26/2023 0.77     Calcium Level Total 05/26/2023 9.9     Protein Total 05/26/2023 8.0 (H)     Albumin Level 05/26/2023 4.1     Globulin 05/26/2023 3.9 (H)     Albumin/Globulin Ratio 05/26/2023 1.1     Bilirubin Total 05/26/2023 1.5     Alkaline Phosphatase 05/26/2023 61     Alanine Aminotransferase 05/26/2023 21     Aspartate Aminotransfera* 05/26/2023 33     eGFR 05/26/2023 >60     Protein, UA 05/26/2023 Trace (A)     WBC 05/26/2023 9.45     RBC 05/26/2023 4.51     Hgb 05/26/2023 13.9     Hct 05/26/2023 43.2     MCV 05/26/2023 95.8 (H)     MCH 05/26/2023 30.8     MCHC 05/26/2023 32.2 (L)     RDW 05/26/2023 13.3     Platelet 05/26/2023 372     MPV 05/26/2023 7.6     Neut % 05/26/2023 55.4     Lymph % 05/26/2023 31.1     Mono % 05/26/2023 12.0     Eos % 05/26/2023 1.3     Basophil % 05/26/2023 0.2     Lymph # 05/26/2023 2.94     Neut # 05/26/2023 5.24     Mono # 05/26/2023 1.13     Eos # 05/26/2023 0.12     Baso # 05/26/2023 0.02     IG# 05/26/2023 0.00     IG% 05/26/2023 0.0           Assessment:       1. Peritoneal carcinomatosis        Plan:     Patient with left ovarian cancer diagnosed 12/26/18, appears to be stage IIIC vs. IV with diffuse peritoneal disease, possible splenic involvement, epicardial lymph nodes and bilateral pleural effusions.  Patient seen and evaluated by Dr. Mike Santana at [facility] in  [place].   Treatment recommended upfront with chemotherapy Carboplatin/Paclitaxel x 3 cycles.  Completed Cycle 3 on 2/26/19.   She underwent total abdominal hysterectomy, BSO and tumor debulking on 3/18/19 with Dr. Santana with complete gross resection of disease. FIGO Stage IIIC papillary serous fallopian tube cancer.   Patient resumed chemotherapy with cycle 4 on 4/9/19. Completed cycle 6 on 5/21/19.    Testing on tumor was positive for genomic instability but negative for BRCA mutation.  Per NCCN guidelines, maintenance can be considered for BRCA mutated germline category 1 and somatic category 2A. There is some evidence that PARP inhibitors have some activity as well in tumors with genomic instability. But the PARP maintenance is not approved for this indication. Offered treatment to patient and after discussion she declined and made decision to continue with observation only.    PET/CT done for rising CA-125 showed hypermetabolic splenic lesion which is not new, just enlarged from previous. Case discussed with Dr. Mike Santana.  Patient is now s/p splenectomy done 10/21/19. Consistent with splenic involvement with high grade serous carcinoma.   Dr. Santana recommended Niraparib maintenance based on new data showing activity in HRD positive ovarian cancer and patient started on 11/8/19, required a dose reduction due to cytopenias.  Rising CA-125 noted in 11/2020. CT showed new RLL lung mass.   Follow-up PET done 11/20/20 showed RLL lung mass hypermetabolic and hypermetabolic periportal adenopathy.     Recommended 2nd line treatment with Carboplatin/Taxol/Avastin.   Started cycle 1 on 12/8/20. Neulasta added with cycle 2.   Completed Cycle 6 on 3/24/21.  PET from 3/30/21 showed complete resolution of lung mass and no other disease.    Avastin maintenance started on 4/13/21.   Hospitalized for TIA after her 4th cycle of Avastin. Work-up for stroke and cause otherwise negative. Patient started on baby ASA by  neurology.  Discussed there are no clear guidelines for changing/discontinuing treatment for TIA/CVA related to Avastin.  Since her symptoms resolved and there were no residual effects, recommended to continue with Avastin since it is working well for her disease and since a baby ASA was added for prevention. She was also continued on Eliquis for h/o PE.  S/p mediport removal for fracture on 8/26/21.   Continue Avastin through peripheral veins for now.    Patient continues on Avastin maintenance without problems.  BP has been good. Currently on HCTZ 25mg daily and Lisinopril.  CMP with low sodium, likely having some dehydration from HCTZ.  Stop HCTZ. Increase Lisinopril to 20mg daily from 10mg.        Due for Avastin maintenance Cycle 38 tomorrow.    Recent labs good aside from low sodium and chloride. CA-125 remains WNL.   Proceed with treatment today.     Will continue checking labs every 3 weeks with protein UA every 3 weeks.  Labs on 6/19/23 and treatment only cycle 39 on 6/20/23.  RTC T/D visit in 6 weeks prior to cycle 40.    Will continue to check . No further scans until rise in CA-125.  She is now >2 years from completing last chemotherapy, so still considered platinum-sensitive. Will need replacement of mediport when she needs to resume chemotherapy.     Continue Eliquis 5mg BID and ASA.  All questions answered at this time.        Renetta Corral MD

## 2023-05-26 ENCOUNTER — LAB VISIT (OUTPATIENT)
Dept: LAB | Facility: HOSPITAL | Age: 73
End: 2023-05-26
Attending: INTERNAL MEDICINE
Payer: MEDICARE

## 2023-05-26 DIAGNOSIS — C57.00 CARCINOMA OF FALLOPIAN TUBE, UNSPECIFIED LATERALITY: ICD-10-CM

## 2023-05-26 DIAGNOSIS — C57.4 MALIGNANT NEOPLASM OF UTERINE ADNEXA: ICD-10-CM

## 2023-05-26 DIAGNOSIS — C78.6 PERITONEAL CARCINOMATOSIS: ICD-10-CM

## 2023-05-26 DIAGNOSIS — I26.99 PULMONARY EMBOLISM, UNSPECIFIED CHRONICITY, UNSPECIFIED PULMONARY EMBOLISM TYPE, UNSPECIFIED WHETHER ACUTE COR PULMONALE PRESENT: ICD-10-CM

## 2023-05-26 LAB
ALBUMIN SERPL-MCNC: 4.1 G/DL (ref 3.4–4.8)
ALBUMIN/GLOB SERPL: 1.1 RATIO (ref 1.1–2)
ALP SERPL-CCNC: 61 UNIT/L (ref 40–150)
ALT SERPL-CCNC: 21 UNIT/L (ref 0–55)
AST SERPL-CCNC: 33 UNIT/L (ref 5–34)
BASOPHILS # BLD AUTO: 0.02 X10(3)/MCL
BASOPHILS NFR BLD AUTO: 0.2 %
BILIRUBIN DIRECT+TOT PNL SERPL-MCNC: 1.5 MG/DL
BUN SERPL-MCNC: 20 MG/DL (ref 9.8–20.1)
CALCIUM SERPL-MCNC: 9.9 MG/DL (ref 8.4–10.2)
CANCER AG125 SERPL-ACNC: 14.6 UNIT/ML (ref 0–35)
CHLORIDE SERPL-SCNC: 95 MMOL/L (ref 98–107)
CO2 SERPL-SCNC: 28 MMOL/L (ref 23–31)
CREAT SERPL-MCNC: 0.77 MG/DL (ref 0.55–1.02)
EOSINOPHIL # BLD AUTO: 0.12 X10(3)/MCL (ref 0–0.9)
EOSINOPHIL NFR BLD AUTO: 1.3 %
ERYTHROCYTE [DISTWIDTH] IN BLOOD BY AUTOMATED COUNT: 13.3 % (ref 11.5–17)
GFR SERPLBLD CREATININE-BSD FMLA CKD-EPI: >60 MLS/MIN/1.73/M2
GLOBULIN SER-MCNC: 3.9 GM/DL (ref 2.4–3.5)
GLUCOSE SERPL-MCNC: 98 MG/DL (ref 82–115)
HCT VFR BLD AUTO: 43.2 % (ref 37–47)
HGB BLD-MCNC: 13.9 G/DL (ref 12–16)
IMM GRANULOCYTES # BLD AUTO: 0 X10(3)/MCL (ref 0–0.04)
IMM GRANULOCYTES NFR BLD AUTO: 0 %
LYMPHOCYTES # BLD AUTO: 2.94 X10(3)/MCL (ref 0.6–4.6)
LYMPHOCYTES NFR BLD AUTO: 31.1 %
MCH RBC QN AUTO: 30.8 PG (ref 27–31)
MCHC RBC AUTO-ENTMCNC: 32.2 G/DL (ref 33–36)
MCV RBC AUTO: 95.8 FL (ref 80–94)
MONOCYTES # BLD AUTO: 1.13 X10(3)/MCL (ref 0.1–1.3)
MONOCYTES NFR BLD AUTO: 12 %
NEUTROPHILS # BLD AUTO: 5.24 X10(3)/MCL (ref 2.1–9.2)
NEUTROPHILS NFR BLD AUTO: 55.4 %
PLATELET # BLD AUTO: 372 X10(3)/MCL (ref 130–400)
PMV BLD AUTO: 7.6 FL (ref 7.4–10.4)
POTASSIUM SERPL-SCNC: 4.9 MMOL/L (ref 3.5–5.1)
PROT SERPL-MCNC: 8 GM/DL (ref 5.8–7.6)
PROT UR QL STRIP.AUTO: ABNORMAL MG/DL
RBC # BLD AUTO: 4.51 X10(6)/MCL (ref 4.2–5.4)
SODIUM SERPL-SCNC: 131 MMOL/L (ref 136–145)
WBC # SPEC AUTO: 9.45 X10(3)/MCL (ref 4.5–11.5)

## 2023-05-26 PROCEDURE — 36415 COLL VENOUS BLD VENIPUNCTURE: CPT

## 2023-05-26 PROCEDURE — 85025 COMPLETE CBC W/AUTO DIFF WBC: CPT

## 2023-05-26 PROCEDURE — 80053 COMPREHEN METABOLIC PANEL: CPT

## 2023-05-26 PROCEDURE — 81005 URINALYSIS: CPT

## 2023-05-26 PROCEDURE — 86304 IMMUNOASSAY TUMOR CA 125: CPT

## 2023-05-30 ENCOUNTER — INFUSION (OUTPATIENT)
Dept: INFUSION THERAPY | Facility: HOSPITAL | Age: 73
End: 2023-05-30
Attending: INTERNAL MEDICINE
Payer: MEDICARE

## 2023-05-30 ENCOUNTER — OFFICE VISIT (OUTPATIENT)
Dept: HEMATOLOGY/ONCOLOGY | Facility: CLINIC | Age: 73
End: 2023-05-30
Payer: MEDICARE

## 2023-05-30 VITALS
TEMPERATURE: 98 F | HEART RATE: 66 BPM | OXYGEN SATURATION: 99 % | RESPIRATION RATE: 14 BRPM | BODY MASS INDEX: 25.76 KG/M2 | HEIGHT: 62 IN | DIASTOLIC BLOOD PRESSURE: 78 MMHG | SYSTOLIC BLOOD PRESSURE: 127 MMHG | WEIGHT: 140 LBS

## 2023-05-30 DIAGNOSIS — C78.6 PERITONEAL CARCINOMATOSIS: Primary | ICD-10-CM

## 2023-05-30 DIAGNOSIS — E78.2 MIXED HYPERLIPIDEMIA: ICD-10-CM

## 2023-05-30 PROCEDURE — 1126F PR PAIN SEVERITY QUANTIFIED, NO PAIN PRESENT: ICD-10-PCS | Mod: CPTII,S$GLB,, | Performed by: INTERNAL MEDICINE

## 2023-05-30 PROCEDURE — 3288F FALL RISK ASSESSMENT DOCD: CPT | Mod: CPTII,S$GLB,, | Performed by: INTERNAL MEDICINE

## 2023-05-30 PROCEDURE — 3288F PR FALLS RISK ASSESSMENT DOCUMENTED: ICD-10-PCS | Mod: CPTII,S$GLB,, | Performed by: INTERNAL MEDICINE

## 2023-05-30 PROCEDURE — 1159F PR MEDICATION LIST DOCUMENTED IN MEDICAL RECORD: ICD-10-PCS | Mod: CPTII,S$GLB,, | Performed by: INTERNAL MEDICINE

## 2023-05-30 PROCEDURE — 1160F RVW MEDS BY RX/DR IN RCRD: CPT | Mod: CPTII,S$GLB,, | Performed by: INTERNAL MEDICINE

## 2023-05-30 PROCEDURE — 99999 PR PBB SHADOW E&M-EST. PATIENT-LVL IV: ICD-10-PCS | Mod: PBBFAC,,, | Performed by: INTERNAL MEDICINE

## 2023-05-30 PROCEDURE — 3008F BODY MASS INDEX DOCD: CPT | Mod: CPTII,S$GLB,, | Performed by: INTERNAL MEDICINE

## 2023-05-30 PROCEDURE — 1159F MED LIST DOCD IN RCRD: CPT | Mod: CPTII,S$GLB,, | Performed by: INTERNAL MEDICINE

## 2023-05-30 PROCEDURE — 1126F AMNT PAIN NOTED NONE PRSNT: CPT | Mod: CPTII,S$GLB,, | Performed by: INTERNAL MEDICINE

## 2023-05-30 PROCEDURE — 63600175 PHARM REV CODE 636 W HCPCS: Mod: JZ,JG | Performed by: INTERNAL MEDICINE

## 2023-05-30 PROCEDURE — 25000003 PHARM REV CODE 250: Performed by: INTERNAL MEDICINE

## 2023-05-30 PROCEDURE — 99215 PR OFFICE/OUTPT VISIT, EST, LEVL V, 40-54 MIN: ICD-10-PCS | Mod: S$GLB,,, | Performed by: INTERNAL MEDICINE

## 2023-05-30 PROCEDURE — 4010F ACE/ARB THERAPY RXD/TAKEN: CPT | Mod: CPTII,S$GLB,, | Performed by: INTERNAL MEDICINE

## 2023-05-30 PROCEDURE — 3074F SYST BP LT 130 MM HG: CPT | Mod: CPTII,S$GLB,, | Performed by: INTERNAL MEDICINE

## 2023-05-30 PROCEDURE — 96367 TX/PROPH/DG ADDL SEQ IV INF: CPT

## 2023-05-30 PROCEDURE — 96413 CHEMO IV INFUSION 1 HR: CPT

## 2023-05-30 PROCEDURE — 99215 OFFICE O/P EST HI 40 MIN: CPT | Mod: S$GLB,,, | Performed by: INTERNAL MEDICINE

## 2023-05-30 PROCEDURE — 3074F PR MOST RECENT SYSTOLIC BLOOD PRESSURE < 130 MM HG: ICD-10-PCS | Mod: CPTII,S$GLB,, | Performed by: INTERNAL MEDICINE

## 2023-05-30 PROCEDURE — 99999 PR PBB SHADOW E&M-EST. PATIENT-LVL IV: CPT | Mod: PBBFAC,,, | Performed by: INTERNAL MEDICINE

## 2023-05-30 PROCEDURE — 4010F PR ACE/ARB THEARPY RXD/TAKEN: ICD-10-PCS | Mod: CPTII,S$GLB,, | Performed by: INTERNAL MEDICINE

## 2023-05-30 PROCEDURE — 1101F PR PT FALLS ASSESS DOC 0-1 FALLS W/OUT INJ PAST YR: ICD-10-PCS | Mod: CPTII,S$GLB,, | Performed by: INTERNAL MEDICINE

## 2023-05-30 PROCEDURE — 3008F PR BODY MASS INDEX (BMI) DOCUMENTED: ICD-10-PCS | Mod: CPTII,S$GLB,, | Performed by: INTERNAL MEDICINE

## 2023-05-30 PROCEDURE — 1101F PT FALLS ASSESS-DOCD LE1/YR: CPT | Mod: CPTII,S$GLB,, | Performed by: INTERNAL MEDICINE

## 2023-05-30 PROCEDURE — 1160F PR REVIEW ALL MEDS BY PRESCRIBER/CLIN PHARMACIST DOCUMENTED: ICD-10-PCS | Mod: CPTII,S$GLB,, | Performed by: INTERNAL MEDICINE

## 2023-05-30 PROCEDURE — 3078F PR MOST RECENT DIASTOLIC BLOOD PRESSURE < 80 MM HG: ICD-10-PCS | Mod: CPTII,S$GLB,, | Performed by: INTERNAL MEDICINE

## 2023-05-30 PROCEDURE — 3078F DIAST BP <80 MM HG: CPT | Mod: CPTII,S$GLB,, | Performed by: INTERNAL MEDICINE

## 2023-05-30 RX ORDER — HEPARIN 100 UNIT/ML
500 SYRINGE INTRAVENOUS
Status: DISCONTINUED | OUTPATIENT
Start: 2023-05-30 | End: 2023-05-30 | Stop reason: HOSPADM

## 2023-05-30 RX ORDER — EPINEPHRINE 0.3 MG/.3ML
0.3 INJECTION SUBCUTANEOUS ONCE AS NEEDED
Status: DISCONTINUED | OUTPATIENT
Start: 2023-05-30 | End: 2023-05-30 | Stop reason: HOSPADM

## 2023-05-30 RX ORDER — DIPHENHYDRAMINE HYDROCHLORIDE 50 MG/ML
25 INJECTION INTRAMUSCULAR; INTRAVENOUS
Status: CANCELLED | OUTPATIENT
Start: 2023-05-30

## 2023-05-30 RX ORDER — DIPHENHYDRAMINE HYDROCHLORIDE 50 MG/ML
50 INJECTION INTRAMUSCULAR; INTRAVENOUS ONCE AS NEEDED
Status: CANCELLED | OUTPATIENT
Start: 2023-05-30

## 2023-05-30 RX ORDER — SODIUM CHLORIDE 0.9 % (FLUSH) 0.9 %
10 SYRINGE (ML) INJECTION
Status: CANCELLED | OUTPATIENT
Start: 2023-05-30

## 2023-05-30 RX ORDER — ACETAMINOPHEN 325 MG/1
650 TABLET ORAL
Status: DISCONTINUED | OUTPATIENT
Start: 2023-05-30 | End: 2023-05-30 | Stop reason: HOSPADM

## 2023-05-30 RX ORDER — SODIUM CHLORIDE 0.9 % (FLUSH) 0.9 %
10 SYRINGE (ML) INJECTION
Status: DISCONTINUED | OUTPATIENT
Start: 2023-05-30 | End: 2023-05-30 | Stop reason: HOSPADM

## 2023-05-30 RX ORDER — DIPHENHYDRAMINE HYDROCHLORIDE 50 MG/ML
50 INJECTION INTRAMUSCULAR; INTRAVENOUS ONCE AS NEEDED
Status: DISCONTINUED | OUTPATIENT
Start: 2023-05-30 | End: 2023-05-30 | Stop reason: HOSPADM

## 2023-05-30 RX ORDER — HEPARIN 100 UNIT/ML
500 SYRINGE INTRAVENOUS
Status: CANCELLED | OUTPATIENT
Start: 2023-05-30

## 2023-05-30 RX ORDER — DIPHENHYDRAMINE HYDROCHLORIDE 50 MG/ML
25 INJECTION INTRAMUSCULAR; INTRAVENOUS
Status: COMPLETED | OUTPATIENT
Start: 2023-05-30 | End: 2023-05-30

## 2023-05-30 RX ORDER — EPINEPHRINE 0.3 MG/.3ML
0.3 INJECTION SUBCUTANEOUS ONCE AS NEEDED
Status: CANCELLED | OUTPATIENT
Start: 2023-05-30

## 2023-05-30 RX ORDER — LISINOPRIL 20 MG/1
20 TABLET ORAL DAILY
Qty: 30 TABLET | Refills: 3 | Status: SHIPPED | OUTPATIENT
Start: 2023-05-30 | End: 2023-09-25

## 2023-05-30 RX ORDER — ATORVASTATIN CALCIUM 40 MG/1
TABLET, FILM COATED ORAL
Qty: 90 TABLET | Refills: 3 | Status: SHIPPED | OUTPATIENT
Start: 2023-05-30 | End: 2024-02-19

## 2023-05-30 RX ORDER — ACETAMINOPHEN 325 MG/1
650 TABLET ORAL
Status: CANCELLED | OUTPATIENT
Start: 2023-05-30

## 2023-05-30 RX ADMIN — SODIUM CHLORIDE: 9 INJECTION, SOLUTION INTRAVENOUS at 11:05

## 2023-05-30 RX ADMIN — DIPHENHYDRAMINE HYDROCHLORIDE 25 MG: 50 INJECTION, SOLUTION INTRAMUSCULAR; INTRAVENOUS at 11:05

## 2023-05-30 RX ADMIN — BEVACIZUMAB-AWWB 900 MG: 100 INJECTION, SOLUTION INTRAVENOUS at 12:05

## 2023-06-12 DIAGNOSIS — I26.99 PULMONARY EMBOLISM, UNSPECIFIED CHRONICITY, UNSPECIFIED PULMONARY EMBOLISM TYPE, UNSPECIFIED WHETHER ACUTE COR PULMONALE PRESENT: ICD-10-CM

## 2023-06-12 RX ORDER — APIXABAN 5 MG/1
TABLET, FILM COATED ORAL
Qty: 180 TABLET | Refills: 0 | Status: SHIPPED | OUTPATIENT
Start: 2023-06-12 | End: 2023-09-07

## 2023-06-16 RX ORDER — HEPARIN 100 UNIT/ML
500 SYRINGE INTRAVENOUS
Status: CANCELLED | OUTPATIENT
Start: 2023-06-20

## 2023-06-16 RX ORDER — DIPHENHYDRAMINE HYDROCHLORIDE 50 MG/ML
50 INJECTION INTRAMUSCULAR; INTRAVENOUS ONCE AS NEEDED
Status: CANCELLED | OUTPATIENT
Start: 2023-06-20

## 2023-06-16 RX ORDER — ACETAMINOPHEN 325 MG/1
650 TABLET ORAL
Status: CANCELLED | OUTPATIENT
Start: 2023-06-20

## 2023-06-16 RX ORDER — SODIUM CHLORIDE 0.9 % (FLUSH) 0.9 %
10 SYRINGE (ML) INJECTION
Status: CANCELLED | OUTPATIENT
Start: 2023-06-20

## 2023-06-16 RX ORDER — EPINEPHRINE 0.3 MG/.3ML
0.3 INJECTION SUBCUTANEOUS ONCE AS NEEDED
Status: CANCELLED | OUTPATIENT
Start: 2023-06-20

## 2023-06-16 RX ORDER — DIPHENHYDRAMINE HYDROCHLORIDE 50 MG/ML
25 INJECTION INTRAMUSCULAR; INTRAVENOUS
Status: CANCELLED | OUTPATIENT
Start: 2023-06-20

## 2023-06-19 ENCOUNTER — LAB VISIT (OUTPATIENT)
Dept: LAB | Facility: HOSPITAL | Age: 73
End: 2023-06-19
Attending: INTERNAL MEDICINE
Payer: MEDICARE

## 2023-06-19 DIAGNOSIS — C78.6 PERITONEAL CARCINOMATOSIS: ICD-10-CM

## 2023-06-19 LAB
ALBUMIN SERPL-MCNC: 4 G/DL (ref 3.4–4.8)
ALBUMIN/GLOB SERPL: 1.4 RATIO (ref 1.1–2)
ALP SERPL-CCNC: 50 UNIT/L (ref 40–150)
ALT SERPL-CCNC: 22 UNIT/L (ref 0–55)
AST SERPL-CCNC: 36 UNIT/L (ref 5–34)
BASOPHILS # BLD AUTO: 0.04 X10(3)/MCL
BASOPHILS NFR BLD AUTO: 0.4 %
BILIRUBIN DIRECT+TOT PNL SERPL-MCNC: 1.4 MG/DL
BUN SERPL-MCNC: 19.2 MG/DL (ref 9.8–20.1)
CALCIUM SERPL-MCNC: 9.5 MG/DL (ref 8.4–10.2)
CHLORIDE SERPL-SCNC: 99 MMOL/L (ref 98–107)
CO2 SERPL-SCNC: 27 MMOL/L (ref 23–31)
CREAT SERPL-MCNC: 0.77 MG/DL (ref 0.55–1.02)
EOSINOPHIL # BLD AUTO: 0.14 X10(3)/MCL (ref 0–0.9)
EOSINOPHIL NFR BLD AUTO: 1.3 %
ERYTHROCYTE [DISTWIDTH] IN BLOOD BY AUTOMATED COUNT: 13.7 % (ref 11.5–17)
GFR SERPLBLD CREATININE-BSD FMLA CKD-EPI: >60 MLS/MIN/1.73/M2
GLOBULIN SER-MCNC: 2.9 GM/DL (ref 2.4–3.5)
GLUCOSE SERPL-MCNC: 86 MG/DL (ref 82–115)
HCT VFR BLD AUTO: 41.2 % (ref 37–47)
HGB BLD-MCNC: 13 G/DL (ref 12–16)
IMM GRANULOCYTES # BLD AUTO: 0.02 X10(3)/MCL (ref 0–0.04)
IMM GRANULOCYTES NFR BLD AUTO: 0.2 %
LYMPHOCYTES # BLD AUTO: 3.03 X10(3)/MCL (ref 0.6–4.6)
LYMPHOCYTES NFR BLD AUTO: 27.4 %
MCH RBC QN AUTO: 30.7 PG (ref 27–31)
MCHC RBC AUTO-ENTMCNC: 31.6 G/DL (ref 33–36)
MCV RBC AUTO: 97.2 FL (ref 80–94)
MONOCYTES # BLD AUTO: 1.14 X10(3)/MCL (ref 0.1–1.3)
MONOCYTES NFR BLD AUTO: 10.3 %
NEUTROPHILS # BLD AUTO: 6.7 X10(3)/MCL (ref 2.1–9.2)
NEUTROPHILS NFR BLD AUTO: 60.4 %
PLATELET # BLD AUTO: 350 X10(3)/MCL (ref 130–400)
PMV BLD AUTO: 8.2 FL (ref 7.4–10.4)
POTASSIUM SERPL-SCNC: 5.2 MMOL/L (ref 3.5–5.1)
PROT SERPL-MCNC: 6.9 GM/DL (ref 5.8–7.6)
PROT UR QL STRIP.AUTO: ABNORMAL MG/DL
RBC # BLD AUTO: 4.24 X10(6)/MCL (ref 4.2–5.4)
SODIUM SERPL-SCNC: 138 MMOL/L (ref 136–145)
WBC # SPEC AUTO: 11.07 X10(3)/MCL (ref 4.5–11.5)

## 2023-06-19 PROCEDURE — 80053 COMPREHEN METABOLIC PANEL: CPT

## 2023-06-19 PROCEDURE — 85025 COMPLETE CBC W/AUTO DIFF WBC: CPT

## 2023-06-19 PROCEDURE — 81005 URINALYSIS: CPT

## 2023-06-19 PROCEDURE — 36415 COLL VENOUS BLD VENIPUNCTURE: CPT

## 2023-06-20 ENCOUNTER — INFUSION (OUTPATIENT)
Dept: INFUSION THERAPY | Facility: HOSPITAL | Age: 73
End: 2023-06-20
Attending: INTERNAL MEDICINE
Payer: MEDICARE

## 2023-06-20 VITALS
HEART RATE: 67 BPM | OXYGEN SATURATION: 97 % | TEMPERATURE: 98 F | DIASTOLIC BLOOD PRESSURE: 64 MMHG | SYSTOLIC BLOOD PRESSURE: 127 MMHG

## 2023-06-20 DIAGNOSIS — C78.6 PERITONEAL CARCINOMATOSIS: Primary | ICD-10-CM

## 2023-06-20 PROCEDURE — 96375 TX/PRO/DX INJ NEW DRUG ADDON: CPT

## 2023-06-20 PROCEDURE — 25000003 PHARM REV CODE 250: Performed by: INTERNAL MEDICINE

## 2023-06-20 PROCEDURE — 96413 CHEMO IV INFUSION 1 HR: CPT

## 2023-06-20 PROCEDURE — 63600175 PHARM REV CODE 636 W HCPCS: Mod: JZ,JG | Performed by: INTERNAL MEDICINE

## 2023-06-20 RX ORDER — ACETAMINOPHEN 325 MG/1
650 TABLET ORAL
Status: DISCONTINUED | OUTPATIENT
Start: 2023-06-20 | End: 2023-06-20 | Stop reason: HOSPADM

## 2023-06-20 RX ORDER — DIPHENHYDRAMINE HYDROCHLORIDE 50 MG/ML
50 INJECTION INTRAMUSCULAR; INTRAVENOUS ONCE AS NEEDED
Status: DISCONTINUED | OUTPATIENT
Start: 2023-06-20 | End: 2023-06-20 | Stop reason: HOSPADM

## 2023-06-20 RX ORDER — HEPARIN 100 UNIT/ML
500 SYRINGE INTRAVENOUS
Status: DISCONTINUED | OUTPATIENT
Start: 2023-06-20 | End: 2023-06-20 | Stop reason: HOSPADM

## 2023-06-20 RX ORDER — SODIUM CHLORIDE 0.9 % (FLUSH) 0.9 %
10 SYRINGE (ML) INJECTION
Status: DISCONTINUED | OUTPATIENT
Start: 2023-06-20 | End: 2023-06-20 | Stop reason: HOSPADM

## 2023-06-20 RX ORDER — EPINEPHRINE 0.3 MG/.3ML
0.3 INJECTION SUBCUTANEOUS ONCE AS NEEDED
Status: DISCONTINUED | OUTPATIENT
Start: 2023-06-20 | End: 2023-06-20 | Stop reason: HOSPADM

## 2023-06-20 RX ORDER — DIPHENHYDRAMINE HYDROCHLORIDE 50 MG/ML
25 INJECTION INTRAMUSCULAR; INTRAVENOUS
Status: COMPLETED | OUTPATIENT
Start: 2023-06-20 | End: 2023-06-20

## 2023-06-20 RX ADMIN — DIPHENHYDRAMINE HYDROCHLORIDE 25 MG: 50 INJECTION INTRAMUSCULAR; INTRAVENOUS at 03:06

## 2023-06-20 RX ADMIN — BEVACIZUMAB-AWWB 900 MG: 100 INJECTION, SOLUTION INTRAVENOUS at 03:06

## 2023-06-20 RX ADMIN — SODIUM CHLORIDE: 9 INJECTION, SOLUTION INTRAVENOUS at 02:06

## 2023-07-03 NOTE — PROGRESS NOTES
Subjective:       Patient ID: Shayna Ledezma is a 72 y.o. female.  GI: Dr. Gamaliel Carlos  GYN ONC at [facility]: Dr. Mike Santana    1. 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 Pulmonary Embolism (Incidental on CT)--19    Procedure/pathology:   1. Paracentesis with removal of 5L of fluid done 18--serous carcinoma, high grade, c/w ovarian primary, PAX-8, CK7 and MOC-31 positive, CDX-2, CK-20 and Calretinin-negative.  2. S/p Exploratory Lap, radical tumor debulking including total abdominal hysterectomy, bilateral salpingo-oophorectomy, bilateral pelvic lymph node sampling, appendectomy, mobilization of the left ureter, complete gross resection of the disease with removal of mesenteric disease, as well as omental disese and parasplenic disease by Dr. Santana 3/18/19--Metastatic high grade serous carcinoma. Tissue/organ involvement: right ovary, appendectomy, omentum, mesentery (including small bowel mesentery) and multiple other sites designated: periumbilical, perisplenic, transverse colon, splenic flexure, perirectal, left periureteral. 2 lymph nodes negative. [id number]. FIGO stage IIIC. Myriad somatic instability testing positive for genomic instability, negative BRCA1 and BRCA2 tumor testing.  3. Splenectomy done 10/21/19--splenic involvement with metastatic high grade serous carcinoma, 5 benign lymph nodes. Caris testing showed LAM, NTRK1/2/3 negative, TMB low, BRCA1/2 negative, ER/NJ negative, CATHY negative, SPEN pathogenic variant Exon 11, TP53 pathogenic variant Exon 5, PD-L1 positive CPS 15, Genomic ELODIA high.  Imagin. CT A/P w/ and w/o contrast done at [facility] 18--large volume ascites with multiple peritoneal implants, right pericolic gutter implant measures 2.5X3.3cm, LUQ omental/mesenteric implant measures 3X3.6cm, omental carcinomatosis, extensive enhancing soft tissue surrounding sigmoid colon vs. large sigmoid mass, left  ovarian cystic lesion appears to have some internal septations measures 3.5X3.8cm, enhancing periportal soft tissue density likely lymphadenopathy with some narrowing of the portal vein noted, but without internal filling defect, borderline splenomegaly, subtle solid lesion w/n central spleen measures 2.4X2.5cm, likely metastatic, with peripheral area of diminished enhancement suggesting splenic infarct, soft tissue implant abutting gallbladder measures 2X2.2cm, no right adnexal mass, trace bilateral layering pleural fluid, small moderate-sized hiatal hernia, few borderline enlarged lymph nodes w/n right epicardial fat pad.  2. CT of chest on 1/11/19--Some prominent right cardiophrenic lymph nodes are nonspecific and can be followed on future surveillance scans. Otherwise no CT evidence of metastatic disease to the chest. While exam was not tailored for evaluation of the pulmonary arteries I suspect there is pulmonary thromboembolic disease. Peritoneal carcinomatosis seen in the upper abdomen. Critical Result: Pulmonary Embolus.  3. CT C/A/P 3/4/19--Positive response to therapy. No new or progressive metastatic disease in the chest, abdomen or pelvis. Stable to improved findings include smaller pericardial lymph nodes, andrew hepatis adenopathy, peritoneal carcinomatosis, smaller left adnexal lesion; no evidence of PE within limitations of the study.  5. CT PE protocol chest/A/P 6/11/19--No PE, improved pericardial lymph node with minimal size on current examination, improved peritoneal carcinomatosis and left adnexal implant, splenic ischemia evolved into small infarct, size improvement of one of splenic hypodense lesion and mild size increase of second hypodense lesion, favored to be benign/cystic, no new findings.  6. PET/CT 9/25/19--s/p hysterectomy and bilateral oophorectomy, no evidence for locally recurrent/residual disease, intensely hypermetabolic hypodense lesion within the spleen 3.5X3.3cm concerning for  metastatic focus. No other abnormal focus of disease.  7. CT C/A/P 11/13/20--Right lower lobe 3.3 cm mass is presumed metastatic, otherwise no evident residual, recurrent or metastatic disease.   8. PET/CT 11/20/20--Hypermetabolic right lung base lesion and suspected metastatic periportal adenopathy, no additional sites of FDG-avid otherwise aggressive appearing pathology through the neck, chest, abdomen, or pelvis.  9. PET/CT 1/21/21--Interval decrease in size of the right lower lobe mass with decreased FDG uptake, now measures 1.7 x 2.2 cm (previous 3.3 x 3.1 cm), resolution of FDG uptake in the suspected periportal lymph node which is not identifiable on noncontrast CT. No evidence of new or progressive hypermetabolic metastatic disease.    Procedures:   1. Paracentesis on 12/28/18 with removal of 5 Liters.  2. Paracentesis on 01/07/19 with removal of 3.5 Liters.  3. Paracentesis on 02/06/19 with removal of 2.5 Liters.  4. Mediport placed 12/2020 by Dr. Serge Gentile--removed 8/26/21 due to mediport fracture.    Germline genetic testing done by [employer] 1/29/19--negative for BRCA1/2, two (2) variants of uncertain significance (VUS): CATHY p.V4925P/p.[id number] and MLH1 p.P603L.     :  12/19/18 1102  11/10/20  42.9  12/08/20 79.5  01/05/21 28.9  01/26/21 12.8  02/18/21 11.6  03/15/22--10.3  04/26/22--9.6  05/16/22--10.2  06/07/22--10.1  07/19/22--11.6  08/08/22--12.5  09/19/22--12.7  10/31/22--13.5  01/03/23--12.0  02/13/23--14.0  03/03/23--13.9  04/17/23--14.5  05/26/23--14.6      Treatment History:  1. Neoadjuvant Carboplatin/Taxol every 3 weeks x 3 cycles. 1/15/19 - 2/26/19. Neulasta added.   2. Surgery--tumor debulking KORI/BSO 3/18/19.  3. Adjuvant Carboplatin/Taxol x 3 additions cycles 4/9/19--5/21/19.  4. Splenectomy 10/21/19.  5. Niraparib maintenance (dose reduction to 100mg daily for cytopenias) 11/8/19--12/1/20 (stopped due to progression).  6. Carboplatin/Paclitaxel/Avastin X 6 cycles completed  12/8/21--3/24/21 (complete response).    Current Treatment:  1. Eliquis BID for incidental PE on imaging 1/2019  2. Avastin maintenance started on 4/13/21.   Cycle 40 due on 7/11/23.      Chief Complaint: Other Misc (Pt reports no new concerns today.)    HPI   Patient presents for scheduled follow-up of ovarian cancer. She continues on Avastin every 3 weeks without problems. Denies any abdominal pain. BP has been good. She is inquiring about getting repeat vaccines since her splenectomy.     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      atorvastatin (LIPITOR) 40 MG tablet TAKE 1 TABLET(40 MG) BY MOUTH EVERY DAY 90 tablet 3    b complex vitamins capsule Take 1 capsule by mouth once daily.      ELIQUIS 5 mg Tab TAKE 1 TABLET BY MOUTH TWICE DAILY 180 tablet 0    GLUTAMINE ORAL Take 10 g by mouth Daily.      lisinopriL (PRINIVIL,ZESTRIL) 20 MG tablet Take 1 tablet (20 mg total) by mouth once daily. 30 tablet 3    pyridoxine, vitamin B6, (B-6) 250 MG Tab Take 250 mg by mouth once daily.      sodium chloride 0.9% SolP 100 mL with bevacizumab 25 mg/mL Soln Inject into the vein every 21 days.      CLENPIQ 10 mg-3.5 gram- 12 gram/160 mL Soln Take 1 kit by mouth.      hydroCHLOROthiazide (HYDRODIURIL) 25 MG tablet Take 1 tablet (25 mg total) by mouth once daily. (Patient not taking: Reported on 7/10/2023) 90  tablet 3    lisinopriL 10 MG tablet TAKE 1 TABLET(10 MG) BY MOUTH EVERY DAY (Patient not taking: Reported on 7/10/2023) 90 tablet 3     No current facility-administered medications on file prior to visit.      Review of Systems   Constitutional:  Negative for appetite change and unexpected weight change.   HENT:  Negative for mouth sores.    Eyes:  Negative for visual disturbance.   Respiratory:  Negative for cough and shortness of breath.    Cardiovascular:  Negative for chest pain.   Gastrointestinal:  Negative for abdominal pain and diarrhea.   Genitourinary:  Negative for frequency.   Musculoskeletal:  Negative for back pain.   Integumentary:  Negative for rash.   Neurological:  Negative for headaches.   Hematological:  Negative for adenopathy.   Psychiatric/Behavioral:  The patient is not nervous/anxious.       Vitals:    07/10/23 1104   BP: 127/83   Pulse: 64   Resp: 14   Temp: 97.5 °F (36.4 °C)     Physical Exam  Vitals reviewed.   Constitutional:       Appearance: Normal appearance. She is normal weight.   HENT:      Head: Normocephalic.   Eyes:      General: Lids are normal. Vision grossly intact.      Extraocular Movements: Extraocular movements intact.      Conjunctiva/sclera: Conjunctivae normal.   Cardiovascular:      Rate and Rhythm: Normal rate and regular rhythm.      Pulses: Normal pulses.      Heart sounds: Normal heart sounds, S1 normal and S2 normal.   Pulmonary:      Effort: Pulmonary effort is normal.      Breath sounds: Normal breath sounds.   Abdominal:      General: Abdomen is flat. Bowel sounds are normal. There is no distension.      Palpations: Abdomen is soft.      Tenderness: There is no abdominal tenderness.   Musculoskeletal:      Cervical back: Normal range of motion and neck supple.      Right lower leg: No edema.      Left lower leg: No edema.   Skin:     General: Skin is warm and moist.      Capillary Refill: Capillary refill takes less than 2 seconds.      Comments: Left CW  mediport removed, site intact   Neurological:      General: No focal deficit present.      Mental Status: She is alert and oriented to person, place, and time.   Psychiatric:         Attention and Perception: Attention normal.         Mood and Affect: Mood normal.         Speech: Speech normal.         Behavior: Behavior normal. Behavior is cooperative.         Cognition and Memory: Cognition normal.         Judgment: Judgment normal.     Lab Visit on 07/10/2023   Component Date Value    WBC 07/10/2023 9.47     RBC 07/10/2023 4.25     Hgb 07/10/2023 13.1     Hct 07/10/2023 41.1     MCV 07/10/2023 96.7 (H)     MCH 07/10/2023 30.8     MCHC 07/10/2023 31.9 (L)     RDW 07/10/2023 13.6     Platelet 07/10/2023 357     MPV 07/10/2023 8.1     Neut % 07/10/2023 60.8     Lymph % 07/10/2023 26.3     Mono % 07/10/2023 10.8     Eos % 07/10/2023 1.7     Basophil % 07/10/2023 0.3     Lymph # 07/10/2023 2.49     Neut # 07/10/2023 5.76     Mono # 07/10/2023 1.02     Eos # 07/10/2023 0.16     Baso # 07/10/2023 0.03     IG# 07/10/2023 0.01     IG% 07/10/2023 0.1           Assessment:       1. Peritoneal carcinomatosis      Plan:     Patient with left ovarian cancer diagnosed 12/26/18, appears to be stage IIIC vs. IV with diffuse peritoneal disease, possible splenic involvement, epicardial lymph nodes and bilateral pleural effusions.  Patient seen and evaluated by Dr. Mike Santana at [facility] in [place].   Treatment recommended upfront with chemotherapy Carboplatin/Paclitaxel x 3 cycles.  Completed Cycle 3 on 2/26/19.   She underwent total abdominal hysterectomy, BSO and tumor debulking on 3/18/19 with Dr. Santana with complete gross resection of disease. FIGO Stage IIIC papillary serous fallopian tube cancer.   Patient resumed chemotherapy with cycle 4 on 4/9/19. Completed cycle 6 on 5/21/19.    Testing on tumor was positive for genomic instability but negative for BRCA mutation.  Per NCCN guidelines, maintenance can be  considered for BRCA mutated germline category 1 and somatic category 2A. There is some evidence that PARP inhibitors have some activity as well in tumors with genomic instability. But the PARP maintenance is not approved for this indication. Offered treatment to patient and after discussion she declined and made decision to continue with observation only.    PET/CT done for rising CA-125 showed hypermetabolic splenic lesion which is not new, just enlarged from previous. Case discussed with Dr. Mike Santana.  Patient is now s/p splenectomy done 10/21/19. Consistent with splenic involvement with high grade serous carcinoma.   Dr. Santana recommended Niraparib maintenance based on new data showing activity in HRD positive ovarian cancer and patient started on 11/8/19, required a dose reduction due to cytopenias.  Rising CA-125 noted in 11/2020. CT showed new RLL lung mass.   Follow-up PET done 11/20/20 showed RLL lung mass hypermetabolic and hypermetabolic periportal adenopathy.     Recommended 2nd line treatment with Carboplatin/Taxol/Avastin.   Started cycle 1 on 12/8/20. Neulasta added with cycle 2.   Completed Cycle 6 on 3/24/21.  PET from 3/30/21 showed complete resolution of lung mass and no other disease.    Avastin maintenance started on 4/13/21.   Hospitalized for TIA after her 4th cycle of Avastin. Work-up for stroke and cause otherwise negative. Patient started on baby ASA by neurology.  Discussed there are no clear guidelines for changing/discontinuing treatment for TIA/CVA related to Avastin.  Since her symptoms resolved and there were no residual effects, recommended to continue with Avastin since it is working well for her disease and since a baby ASA was added for prevention. She was also continued on Eliquis for h/o PE.  S/p mediport removal for fracture on 8/26/21.   Continue Avastin through peripheral veins for now.    Patient continues on Avastin maintenance without problems.  BP good on Lisinopril  20mg daily.        Due for Avastin maintenance Cycle 40 tomorrow.    Recent labs all good. Last CA-125 remains WNL. Sodium improved since stopping HCTZ.  Proceed with treatment tomorrow.    Will continue checking labs every 3 weeks with protein UA/dipstick every 3 weeks.    Labs on 7/31/23 and treatment only cycle 41 on 8/1/23.  RTC T/D visit in 6 weeks prior to cycle 42.    Will continue to check  every 6 weeks. No further scans until rise in CA-125.  She is now >2 years from completing last chemotherapy, so still considered platinum-sensitive. Will need replacement of mediport when she needs to resume chemotherapy.     Vaccines/Boosters post-splenectomy: Menactra/Menveo every 5 years, Bexsero every 2-3 years, annual flu shot.  She is overdue for the Bexsero so will set up to be done 8/1/23  Plan for Menactra/Menveo in 9/2024 which will be 5 years after initial    Continue Eliquis 5mg BID and ASA.  All questions answered at this time.        Renetta Corral MD

## 2023-07-07 DIAGNOSIS — I10 HYPERTENSION, UNSPECIFIED TYPE: ICD-10-CM

## 2023-07-07 RX ORDER — LISINOPRIL 10 MG/1
TABLET ORAL
Qty: 90 TABLET | Refills: 3 | Status: SHIPPED | OUTPATIENT
Start: 2023-07-07 | End: 2023-08-02

## 2023-07-10 ENCOUNTER — OFFICE VISIT (OUTPATIENT)
Dept: HEMATOLOGY/ONCOLOGY | Facility: CLINIC | Age: 73
End: 2023-07-10
Payer: MEDICARE

## 2023-07-10 ENCOUNTER — LAB VISIT (OUTPATIENT)
Dept: LAB | Facility: HOSPITAL | Age: 73
End: 2023-07-10
Attending: INTERNAL MEDICINE
Payer: MEDICARE

## 2023-07-10 VITALS
SYSTOLIC BLOOD PRESSURE: 127 MMHG | DIASTOLIC BLOOD PRESSURE: 83 MMHG | WEIGHT: 135.81 LBS | OXYGEN SATURATION: 97 % | HEIGHT: 63 IN | TEMPERATURE: 98 F | BODY MASS INDEX: 24.06 KG/M2 | HEART RATE: 64 BPM | RESPIRATION RATE: 14 BRPM

## 2023-07-10 DIAGNOSIS — C78.6 PERITONEAL CARCINOMATOSIS: ICD-10-CM

## 2023-07-10 DIAGNOSIS — C78.6 PERITONEAL CARCINOMATOSIS: Primary | ICD-10-CM

## 2023-07-10 LAB
ALBUMIN SERPL-MCNC: 3.8 G/DL (ref 3.4–4.8)
ALBUMIN/GLOB SERPL: 1.3 RATIO (ref 1.1–2)
ALP SERPL-CCNC: 59 UNIT/L (ref 40–150)
ALT SERPL-CCNC: 20 UNIT/L (ref 0–55)
AST SERPL-CCNC: 31 UNIT/L (ref 5–34)
BASOPHILS # BLD AUTO: 0.03 X10(3)/MCL
BASOPHILS NFR BLD AUTO: 0.3 %
BILIRUBIN DIRECT+TOT PNL SERPL-MCNC: 1.3 MG/DL
BUN SERPL-MCNC: 19.3 MG/DL (ref 9.8–20.1)
CALCIUM SERPL-MCNC: 9.2 MG/DL (ref 8.4–10.2)
CHLORIDE SERPL-SCNC: 104 MMOL/L (ref 98–107)
CO2 SERPL-SCNC: 24 MMOL/L (ref 23–31)
CREAT SERPL-MCNC: 0.76 MG/DL (ref 0.55–1.02)
EOSINOPHIL # BLD AUTO: 0.16 X10(3)/MCL (ref 0–0.9)
EOSINOPHIL NFR BLD AUTO: 1.7 %
ERYTHROCYTE [DISTWIDTH] IN BLOOD BY AUTOMATED COUNT: 13.6 % (ref 11.5–17)
GFR SERPLBLD CREATININE-BSD FMLA CKD-EPI: >60 MLS/MIN/1.73/M2
GLOBULIN SER-MCNC: 2.9 GM/DL (ref 2.4–3.5)
GLUCOSE SERPL-MCNC: 72 MG/DL (ref 82–115)
HCT VFR BLD AUTO: 41.1 % (ref 37–47)
HGB BLD-MCNC: 13.1 G/DL (ref 12–16)
IMM GRANULOCYTES # BLD AUTO: 0.01 X10(3)/MCL (ref 0–0.04)
IMM GRANULOCYTES NFR BLD AUTO: 0.1 %
LYMPHOCYTES # BLD AUTO: 2.49 X10(3)/MCL (ref 0.6–4.6)
LYMPHOCYTES NFR BLD AUTO: 26.3 %
MCH RBC QN AUTO: 30.8 PG (ref 27–31)
MCHC RBC AUTO-ENTMCNC: 31.9 G/DL (ref 33–36)
MCV RBC AUTO: 96.7 FL (ref 80–94)
MONOCYTES # BLD AUTO: 1.02 X10(3)/MCL (ref 0.1–1.3)
MONOCYTES NFR BLD AUTO: 10.8 %
NEUTROPHILS # BLD AUTO: 5.76 X10(3)/MCL (ref 2.1–9.2)
NEUTROPHILS NFR BLD AUTO: 60.8 %
PLATELET # BLD AUTO: 357 X10(3)/MCL (ref 130–400)
PMV BLD AUTO: 8.1 FL (ref 7.4–10.4)
POTASSIUM SERPL-SCNC: 4.1 MMOL/L (ref 3.5–5.1)
PROT SERPL-MCNC: 6.7 GM/DL (ref 5.8–7.6)
PROT UR QL STRIP.AUTO: ABNORMAL MG/DL
RBC # BLD AUTO: 4.25 X10(6)/MCL (ref 4.2–5.4)
SODIUM SERPL-SCNC: 137 MMOL/L (ref 136–145)
WBC # SPEC AUTO: 9.47 X10(3)/MCL (ref 4.5–11.5)

## 2023-07-10 PROCEDURE — 1159F MED LIST DOCD IN RCRD: CPT | Mod: CPTII,S$GLB,, | Performed by: INTERNAL MEDICINE

## 2023-07-10 PROCEDURE — 1160F PR REVIEW ALL MEDS BY PRESCRIBER/CLIN PHARMACIST DOCUMENTED: ICD-10-PCS | Mod: CPTII,S$GLB,, | Performed by: INTERNAL MEDICINE

## 2023-07-10 PROCEDURE — 3079F PR MOST RECENT DIASTOLIC BLOOD PRESSURE 80-89 MM HG: ICD-10-PCS | Mod: CPTII,S$GLB,, | Performed by: INTERNAL MEDICINE

## 2023-07-10 PROCEDURE — 3074F SYST BP LT 130 MM HG: CPT | Mod: CPTII,S$GLB,, | Performed by: INTERNAL MEDICINE

## 2023-07-10 PROCEDURE — 99999 PR PBB SHADOW E&M-EST. PATIENT-LVL IV: CPT | Mod: PBBFAC,,, | Performed by: INTERNAL MEDICINE

## 2023-07-10 PROCEDURE — 1160F RVW MEDS BY RX/DR IN RCRD: CPT | Mod: CPTII,S$GLB,, | Performed by: INTERNAL MEDICINE

## 2023-07-10 PROCEDURE — 1126F PR PAIN SEVERITY QUANTIFIED, NO PAIN PRESENT: ICD-10-PCS | Mod: CPTII,S$GLB,, | Performed by: INTERNAL MEDICINE

## 2023-07-10 PROCEDURE — 81005 URINALYSIS: CPT | Performed by: INTERNAL MEDICINE

## 2023-07-10 PROCEDURE — 36415 COLL VENOUS BLD VENIPUNCTURE: CPT

## 2023-07-10 PROCEDURE — 99999 PR PBB SHADOW E&M-EST. PATIENT-LVL IV: ICD-10-PCS | Mod: PBBFAC,,, | Performed by: INTERNAL MEDICINE

## 2023-07-10 PROCEDURE — 85025 COMPLETE CBC W/AUTO DIFF WBC: CPT

## 2023-07-10 PROCEDURE — 1126F AMNT PAIN NOTED NONE PRSNT: CPT | Mod: CPTII,S$GLB,, | Performed by: INTERNAL MEDICINE

## 2023-07-10 PROCEDURE — 4010F ACE/ARB THERAPY RXD/TAKEN: CPT | Mod: CPTII,S$GLB,, | Performed by: INTERNAL MEDICINE

## 2023-07-10 PROCEDURE — 99215 OFFICE O/P EST HI 40 MIN: CPT | Mod: S$GLB,,, | Performed by: INTERNAL MEDICINE

## 2023-07-10 PROCEDURE — 80053 COMPREHEN METABOLIC PANEL: CPT

## 2023-07-10 PROCEDURE — 3008F BODY MASS INDEX DOCD: CPT | Mod: CPTII,S$GLB,, | Performed by: INTERNAL MEDICINE

## 2023-07-10 PROCEDURE — 3008F PR BODY MASS INDEX (BMI) DOCUMENTED: ICD-10-PCS | Mod: CPTII,S$GLB,, | Performed by: INTERNAL MEDICINE

## 2023-07-10 PROCEDURE — 4010F PR ACE/ARB THEARPY RXD/TAKEN: ICD-10-PCS | Mod: CPTII,S$GLB,, | Performed by: INTERNAL MEDICINE

## 2023-07-10 PROCEDURE — 99215 PR OFFICE/OUTPT VISIT, EST, LEVL V, 40-54 MIN: ICD-10-PCS | Mod: S$GLB,,, | Performed by: INTERNAL MEDICINE

## 2023-07-10 PROCEDURE — 1159F PR MEDICATION LIST DOCUMENTED IN MEDICAL RECORD: ICD-10-PCS | Mod: CPTII,S$GLB,, | Performed by: INTERNAL MEDICINE

## 2023-07-10 PROCEDURE — 3079F DIAST BP 80-89 MM HG: CPT | Mod: CPTII,S$GLB,, | Performed by: INTERNAL MEDICINE

## 2023-07-10 PROCEDURE — 3074F PR MOST RECENT SYSTOLIC BLOOD PRESSURE < 130 MM HG: ICD-10-PCS | Mod: CPTII,S$GLB,, | Performed by: INTERNAL MEDICINE

## 2023-07-10 RX ORDER — HEPARIN 100 UNIT/ML
500 SYRINGE INTRAVENOUS
Status: CANCELLED | OUTPATIENT
Start: 2023-07-11

## 2023-07-10 RX ORDER — DIPHENHYDRAMINE HYDROCHLORIDE 50 MG/ML
25 INJECTION INTRAMUSCULAR; INTRAVENOUS
Status: CANCELLED | OUTPATIENT
Start: 2023-07-11

## 2023-07-10 RX ORDER — DIPHENHYDRAMINE HYDROCHLORIDE 50 MG/ML
50 INJECTION INTRAMUSCULAR; INTRAVENOUS ONCE AS NEEDED
Status: CANCELLED | OUTPATIENT
Start: 2023-07-11

## 2023-07-10 RX ORDER — ACETAMINOPHEN 325 MG/1
650 TABLET ORAL
Status: CANCELLED | OUTPATIENT
Start: 2023-07-11

## 2023-07-10 RX ORDER — SODIUM CHLORIDE 0.9 % (FLUSH) 0.9 %
10 SYRINGE (ML) INJECTION
Status: CANCELLED | OUTPATIENT
Start: 2023-07-11

## 2023-07-10 RX ORDER — EPINEPHRINE 0.3 MG/.3ML
0.3 INJECTION SUBCUTANEOUS ONCE AS NEEDED
Status: CANCELLED | OUTPATIENT
Start: 2023-07-11

## 2023-07-11 ENCOUNTER — INFUSION (OUTPATIENT)
Dept: INFUSION THERAPY | Facility: HOSPITAL | Age: 73
End: 2023-07-11
Attending: INTERNAL MEDICINE
Payer: MEDICARE

## 2023-07-11 VITALS
TEMPERATURE: 98 F | SYSTOLIC BLOOD PRESSURE: 149 MMHG | HEART RATE: 66 BPM | DIASTOLIC BLOOD PRESSURE: 87 MMHG | OXYGEN SATURATION: 98 % | RESPIRATION RATE: 16 BRPM

## 2023-07-11 DIAGNOSIS — C78.6 PERITONEAL CARCINOMATOSIS: Primary | ICD-10-CM

## 2023-07-11 PROCEDURE — 96375 TX/PRO/DX INJ NEW DRUG ADDON: CPT

## 2023-07-11 PROCEDURE — 25000003 PHARM REV CODE 250: Performed by: INTERNAL MEDICINE

## 2023-07-11 PROCEDURE — 96413 CHEMO IV INFUSION 1 HR: CPT

## 2023-07-11 PROCEDURE — 63600175 PHARM REV CODE 636 W HCPCS: Performed by: INTERNAL MEDICINE

## 2023-07-11 RX ORDER — DIPHENHYDRAMINE HYDROCHLORIDE 50 MG/ML
25 INJECTION INTRAMUSCULAR; INTRAVENOUS
Status: COMPLETED | OUTPATIENT
Start: 2023-07-11 | End: 2023-07-11

## 2023-07-11 RX ORDER — EPINEPHRINE 0.3 MG/.3ML
0.3 INJECTION SUBCUTANEOUS ONCE AS NEEDED
Status: DISCONTINUED | OUTPATIENT
Start: 2023-07-11 | End: 2023-07-11 | Stop reason: HOSPADM

## 2023-07-11 RX ORDER — SODIUM CHLORIDE 0.9 % (FLUSH) 0.9 %
10 SYRINGE (ML) INJECTION
Status: DISCONTINUED | OUTPATIENT
Start: 2023-07-11 | End: 2023-07-11 | Stop reason: HOSPADM

## 2023-07-11 RX ORDER — HEPARIN 100 UNIT/ML
500 SYRINGE INTRAVENOUS
Status: DISCONTINUED | OUTPATIENT
Start: 2023-07-11 | End: 2023-07-11 | Stop reason: HOSPADM

## 2023-07-11 RX ORDER — ACETAMINOPHEN 325 MG/1
650 TABLET ORAL
Status: DISCONTINUED | OUTPATIENT
Start: 2023-07-11 | End: 2023-07-11 | Stop reason: HOSPADM

## 2023-07-11 RX ORDER — DIPHENHYDRAMINE HYDROCHLORIDE 50 MG/ML
50 INJECTION INTRAMUSCULAR; INTRAVENOUS ONCE AS NEEDED
Status: DISCONTINUED | OUTPATIENT
Start: 2023-07-11 | End: 2023-07-11 | Stop reason: HOSPADM

## 2023-07-11 RX ADMIN — DIPHENHYDRAMINE HYDROCHLORIDE 25 MG: 50 INJECTION INTRAMUSCULAR; INTRAVENOUS at 02:07

## 2023-07-11 RX ADMIN — BEVACIZUMAB-AWWB 900 MG: 100 INJECTION, SOLUTION INTRAVENOUS at 02:07

## 2023-07-11 NOTE — PLAN OF CARE
Problem: Adult Inpatient Plan of Care  Goal: Plan of Care Review  Outcome: Met  Flowsheets (Taken 7/11/2023 1530)  Plan of Care Reviewed With: patient  Goal: Absence of Hospital-Acquired Illness or Injury  Outcome: Met  Intervention: Identify and Manage Fall Risk  Flowsheets (Taken 7/11/2023 1530)  Safety Promotion/Fall Prevention:   assistive device/personal item within reach   Fall Risk reviewed with patient/family   in recliner, wheels locked     Pt tolerated C40 infusion well. Next appt reviewed; pt denied questions or further needs at the time of discharge.

## 2023-07-25 ENCOUNTER — TELEPHONE (OUTPATIENT)
Dept: INTERNAL MEDICINE | Facility: CLINIC | Age: 73
End: 2023-07-25
Payer: MEDICARE

## 2023-07-25 RX ORDER — HEPARIN 100 UNIT/ML
500 SYRINGE INTRAVENOUS
Status: CANCELLED | OUTPATIENT
Start: 2023-08-01

## 2023-07-25 RX ORDER — DIPHENHYDRAMINE HYDROCHLORIDE 50 MG/ML
50 INJECTION INTRAMUSCULAR; INTRAVENOUS ONCE AS NEEDED
Status: CANCELLED | OUTPATIENT
Start: 2023-08-01

## 2023-07-25 RX ORDER — ACETAMINOPHEN 325 MG/1
650 TABLET ORAL
Status: CANCELLED | OUTPATIENT
Start: 2023-08-01

## 2023-07-25 RX ORDER — DIPHENHYDRAMINE HYDROCHLORIDE 50 MG/ML
25 INJECTION INTRAMUSCULAR; INTRAVENOUS
Status: CANCELLED | OUTPATIENT
Start: 2023-08-01

## 2023-07-25 RX ORDER — EPINEPHRINE 0.3 MG/.3ML
0.3 INJECTION SUBCUTANEOUS ONCE AS NEEDED
Status: CANCELLED | OUTPATIENT
Start: 2023-08-01

## 2023-07-25 RX ORDER — SODIUM CHLORIDE 0.9 % (FLUSH) 0.9 %
10 SYRINGE (ML) INJECTION
Status: CANCELLED | OUTPATIENT
Start: 2023-08-01

## 2023-07-25 NOTE — TELEPHONE ENCOUNTER
----- Message from Keri Curtis MA sent at 7/24/2023  8:48 AM CDT -----  Regarding: Dr DOMINIQUE ACE Wednesday 8-2-23       1. Are there any outstanding Labs, imaging or referrals in the patient's chart?      6 month follow up    No labs required    Last wellness 2-1-23           2. . Has the patient been seen in an ER, urgent care clinic, or any other health care    provider since their last visit? If yes when and where?

## 2023-07-31 ENCOUNTER — LAB VISIT (OUTPATIENT)
Dept: LAB | Facility: HOSPITAL | Age: 73
End: 2023-07-31
Attending: INTERNAL MEDICINE
Payer: MEDICARE

## 2023-07-31 DIAGNOSIS — I26.99 PULMONARY EMBOLISM, UNSPECIFIED CHRONICITY, UNSPECIFIED PULMONARY EMBOLISM TYPE, UNSPECIFIED WHETHER ACUTE COR PULMONALE PRESENT: ICD-10-CM

## 2023-07-31 DIAGNOSIS — C57.4 MALIGNANT NEOPLASM OF UTERINE ADNEXA: ICD-10-CM

## 2023-07-31 DIAGNOSIS — C78.6 PERITONEAL CARCINOMATOSIS: ICD-10-CM

## 2023-07-31 DIAGNOSIS — C57.00 CARCINOMA OF FALLOPIAN TUBE, UNSPECIFIED LATERALITY: ICD-10-CM

## 2023-07-31 LAB
ALBUMIN SERPL-MCNC: 4.1 G/DL (ref 3.4–4.8)
ALBUMIN/GLOB SERPL: 1.5 RATIO (ref 1.1–2)
ALP SERPL-CCNC: 64 UNIT/L (ref 40–150)
ALT SERPL-CCNC: 22 UNIT/L (ref 0–55)
AST SERPL-CCNC: 39 UNIT/L (ref 5–34)
BASOPHILS # BLD AUTO: 0.03 X10(3)/MCL
BASOPHILS NFR BLD AUTO: 0.3 %
BILIRUBIN DIRECT+TOT PNL SERPL-MCNC: 1.7 MG/DL
BUN SERPL-MCNC: 14.1 MG/DL (ref 9.8–20.1)
CALCIUM SERPL-MCNC: 9.7 MG/DL (ref 8.4–10.2)
CANCER AG125 SERPL-ACNC: 13.5 UNIT/ML (ref 0–35)
CHLORIDE SERPL-SCNC: 101 MMOL/L (ref 98–107)
CO2 SERPL-SCNC: 28 MMOL/L (ref 23–31)
CREAT SERPL-MCNC: 0.81 MG/DL (ref 0.55–1.02)
EOSINOPHIL # BLD AUTO: 0.17 X10(3)/MCL (ref 0–0.9)
EOSINOPHIL NFR BLD AUTO: 2 %
ERYTHROCYTE [DISTWIDTH] IN BLOOD BY AUTOMATED COUNT: 13.7 % (ref 11.5–17)
GFR SERPLBLD CREATININE-BSD FMLA CKD-EPI: >60 MLS/MIN/1.73/M2
GLOBULIN SER-MCNC: 2.7 GM/DL (ref 2.4–3.5)
GLUCOSE SERPL-MCNC: 145 MG/DL (ref 82–115)
HCT VFR BLD AUTO: 42.4 % (ref 37–47)
HGB BLD-MCNC: 13.2 G/DL (ref 12–16)
IMM GRANULOCYTES # BLD AUTO: 0 X10(3)/MCL (ref 0–0.04)
IMM GRANULOCYTES NFR BLD AUTO: 0 %
LYMPHOCYTES # BLD AUTO: 3.76 X10(3)/MCL (ref 0.6–4.6)
LYMPHOCYTES NFR BLD AUTO: 43.8 %
MCH RBC QN AUTO: 30.7 PG (ref 27–31)
MCHC RBC AUTO-ENTMCNC: 31.1 G/DL (ref 33–36)
MCV RBC AUTO: 98.6 FL (ref 80–94)
MONOCYTES # BLD AUTO: 0.75 X10(3)/MCL (ref 0.1–1.3)
MONOCYTES NFR BLD AUTO: 8.7 %
NEUTROPHILS # BLD AUTO: 3.87 X10(3)/MCL (ref 2.1–9.2)
NEUTROPHILS NFR BLD AUTO: 45.2 %
PLATELET # BLD AUTO: 356 X10(3)/MCL (ref 130–400)
PMV BLD AUTO: 8.2 FL (ref 7.4–10.4)
POTASSIUM SERPL-SCNC: 4.6 MMOL/L (ref 3.5–5.1)
PROT SERPL-MCNC: 6.8 GM/DL (ref 5.8–7.6)
PROT UR QL STRIP.AUTO: ABNORMAL
RBC # BLD AUTO: 4.3 X10(6)/MCL (ref 4.2–5.4)
SODIUM SERPL-SCNC: 137 MMOL/L (ref 136–145)
WBC # SPEC AUTO: 8.58 X10(3)/MCL (ref 4.5–11.5)

## 2023-07-31 PROCEDURE — 86304 IMMUNOASSAY TUMOR CA 125: CPT

## 2023-07-31 PROCEDURE — 36415 COLL VENOUS BLD VENIPUNCTURE: CPT

## 2023-07-31 PROCEDURE — 81005 URINALYSIS: CPT

## 2023-07-31 PROCEDURE — 85025 COMPLETE CBC W/AUTO DIFF WBC: CPT

## 2023-07-31 PROCEDURE — 80053 COMPREHEN METABOLIC PANEL: CPT

## 2023-08-01 ENCOUNTER — INFUSION (OUTPATIENT)
Dept: INFUSION THERAPY | Facility: HOSPITAL | Age: 73
End: 2023-08-01
Attending: INTERNAL MEDICINE
Payer: MEDICARE

## 2023-08-01 VITALS
WEIGHT: 144.81 LBS | HEART RATE: 78 BPM | DIASTOLIC BLOOD PRESSURE: 89 MMHG | TEMPERATURE: 98 F | BODY MASS INDEX: 25.66 KG/M2 | HEIGHT: 63 IN | RESPIRATION RATE: 20 BRPM | SYSTOLIC BLOOD PRESSURE: 168 MMHG

## 2023-08-01 DIAGNOSIS — C78.6 PERITONEAL CARCINOMATOSIS: Primary | ICD-10-CM

## 2023-08-01 PROCEDURE — 63600175 PHARM REV CODE 636 W HCPCS: Performed by: INTERNAL MEDICINE

## 2023-08-01 PROCEDURE — 90471 IMMUNIZATION ADMIN: CPT | Performed by: INTERNAL MEDICINE

## 2023-08-01 PROCEDURE — 25000003 PHARM REV CODE 250: Performed by: INTERNAL MEDICINE

## 2023-08-01 PROCEDURE — 90620 MENB-4C VACCINE IM: CPT | Performed by: INTERNAL MEDICINE

## 2023-08-01 PROCEDURE — 96375 TX/PRO/DX INJ NEW DRUG ADDON: CPT

## 2023-08-01 PROCEDURE — 96413 CHEMO IV INFUSION 1 HR: CPT

## 2023-08-01 RX ORDER — DIPHENHYDRAMINE HYDROCHLORIDE 50 MG/ML
50 INJECTION INTRAMUSCULAR; INTRAVENOUS ONCE AS NEEDED
Status: DISCONTINUED | OUTPATIENT
Start: 2023-08-01 | End: 2023-08-01 | Stop reason: HOSPADM

## 2023-08-01 RX ORDER — HEPARIN 100 UNIT/ML
500 SYRINGE INTRAVENOUS
Status: DISCONTINUED | OUTPATIENT
Start: 2023-08-01 | End: 2023-08-01 | Stop reason: HOSPADM

## 2023-08-01 RX ORDER — EPINEPHRINE 0.3 MG/.3ML
0.3 INJECTION SUBCUTANEOUS ONCE AS NEEDED
Status: DISCONTINUED | OUTPATIENT
Start: 2023-08-01 | End: 2023-08-01 | Stop reason: HOSPADM

## 2023-08-01 RX ORDER — SODIUM CHLORIDE 0.9 % (FLUSH) 0.9 %
10 SYRINGE (ML) INJECTION
Status: DISCONTINUED | OUTPATIENT
Start: 2023-08-01 | End: 2023-08-01 | Stop reason: HOSPADM

## 2023-08-01 RX ORDER — ACETAMINOPHEN 325 MG/1
650 TABLET ORAL
Status: DISCONTINUED | OUTPATIENT
Start: 2023-08-01 | End: 2023-08-01 | Stop reason: HOSPADM

## 2023-08-01 RX ORDER — DIPHENHYDRAMINE HYDROCHLORIDE 50 MG/ML
25 INJECTION INTRAMUSCULAR; INTRAVENOUS
Status: COMPLETED | OUTPATIENT
Start: 2023-08-01 | End: 2023-08-01

## 2023-08-01 RX ADMIN — BEVACIZUMAB-AWWB 900 MG: 100 INJECTION, SOLUTION INTRAVENOUS at 02:08

## 2023-08-01 RX ADMIN — NEISSERIA MENINGITIDIS SEROGROUP B NHBA FUSION PROTEIN ANTIGEN, NEISSERIA MENINGITIDIS SEROGROUP B FHBP FUSION PROTEIN ANTIGEN AND NEISSERIA MENINGITIDIS SEROGROUP B NADA PROTEIN ANTIGEN 0.5 ML: 50; 50; 50; 25 INJECTION, SUSPENSION INTRAMUSCULAR at 02:08

## 2023-08-01 RX ADMIN — DIPHENHYDRAMINE HYDROCHLORIDE 25 MG: 50 INJECTION INTRAMUSCULAR; INTRAVENOUS at 02:08

## 2023-08-02 ENCOUNTER — OFFICE VISIT (OUTPATIENT)
Dept: INTERNAL MEDICINE | Facility: CLINIC | Age: 73
End: 2023-08-02
Payer: MEDICARE

## 2023-08-02 VITALS
SYSTOLIC BLOOD PRESSURE: 120 MMHG | HEART RATE: 70 BPM | OXYGEN SATURATION: 98 % | TEMPERATURE: 98 F | DIASTOLIC BLOOD PRESSURE: 78 MMHG | BODY MASS INDEX: 24.54 KG/M2 | HEIGHT: 62 IN | WEIGHT: 133.38 LBS

## 2023-08-02 DIAGNOSIS — I10 PRIMARY HYPERTENSION: Primary | ICD-10-CM

## 2023-08-02 DIAGNOSIS — I26.99 PULMONARY EMBOLISM, UNSPECIFIED CHRONICITY, UNSPECIFIED PULMONARY EMBOLISM TYPE, UNSPECIFIED WHETHER ACUTE COR PULMONALE PRESENT: ICD-10-CM

## 2023-08-02 PROCEDURE — 3078F DIAST BP <80 MM HG: CPT | Mod: CPTII,,, | Performed by: INTERNAL MEDICINE

## 2023-08-02 PROCEDURE — 1126F PR PAIN SEVERITY QUANTIFIED, NO PAIN PRESENT: ICD-10-PCS | Mod: CPTII,,, | Performed by: INTERNAL MEDICINE

## 2023-08-02 PROCEDURE — 3288F PR FALLS RISK ASSESSMENT DOCUMENTED: ICD-10-PCS | Mod: CPTII,,, | Performed by: INTERNAL MEDICINE

## 2023-08-02 PROCEDURE — 99213 OFFICE O/P EST LOW 20 MIN: CPT | Mod: ,,, | Performed by: INTERNAL MEDICINE

## 2023-08-02 PROCEDURE — 3074F SYST BP LT 130 MM HG: CPT | Mod: CPTII,,, | Performed by: INTERNAL MEDICINE

## 2023-08-02 PROCEDURE — 3008F BODY MASS INDEX DOCD: CPT | Mod: CPTII,,, | Performed by: INTERNAL MEDICINE

## 2023-08-02 PROCEDURE — 3288F FALL RISK ASSESSMENT DOCD: CPT | Mod: CPTII,,, | Performed by: INTERNAL MEDICINE

## 2023-08-02 PROCEDURE — 4010F ACE/ARB THERAPY RXD/TAKEN: CPT | Mod: CPTII,,, | Performed by: INTERNAL MEDICINE

## 2023-08-02 PROCEDURE — 1126F AMNT PAIN NOTED NONE PRSNT: CPT | Mod: CPTII,,, | Performed by: INTERNAL MEDICINE

## 2023-08-02 PROCEDURE — 1159F PR MEDICATION LIST DOCUMENTED IN MEDICAL RECORD: ICD-10-PCS | Mod: CPTII,,, | Performed by: INTERNAL MEDICINE

## 2023-08-02 PROCEDURE — 1160F RVW MEDS BY RX/DR IN RCRD: CPT | Mod: CPTII,,, | Performed by: INTERNAL MEDICINE

## 2023-08-02 PROCEDURE — 3074F PR MOST RECENT SYSTOLIC BLOOD PRESSURE < 130 MM HG: ICD-10-PCS | Mod: CPTII,,, | Performed by: INTERNAL MEDICINE

## 2023-08-02 PROCEDURE — 99213 PR OFFICE/OUTPT VISIT, EST, LEVL III, 20-29 MIN: ICD-10-PCS | Mod: ,,, | Performed by: INTERNAL MEDICINE

## 2023-08-02 PROCEDURE — 4010F PR ACE/ARB THEARPY RXD/TAKEN: ICD-10-PCS | Mod: CPTII,,, | Performed by: INTERNAL MEDICINE

## 2023-08-02 PROCEDURE — 3008F PR BODY MASS INDEX (BMI) DOCUMENTED: ICD-10-PCS | Mod: CPTII,,, | Performed by: INTERNAL MEDICINE

## 2023-08-02 PROCEDURE — 3078F PR MOST RECENT DIASTOLIC BLOOD PRESSURE < 80 MM HG: ICD-10-PCS | Mod: CPTII,,, | Performed by: INTERNAL MEDICINE

## 2023-08-02 PROCEDURE — 1101F PT FALLS ASSESS-DOCD LE1/YR: CPT | Mod: CPTII,,, | Performed by: INTERNAL MEDICINE

## 2023-08-02 PROCEDURE — 1101F PR PT FALLS ASSESS DOC 0-1 FALLS W/OUT INJ PAST YR: ICD-10-PCS | Mod: CPTII,,, | Performed by: INTERNAL MEDICINE

## 2023-08-02 PROCEDURE — 1160F PR REVIEW ALL MEDS BY PRESCRIBER/CLIN PHARMACIST DOCUMENTED: ICD-10-PCS | Mod: CPTII,,, | Performed by: INTERNAL MEDICINE

## 2023-08-02 PROCEDURE — 1159F MED LIST DOCD IN RCRD: CPT | Mod: CPTII,,, | Performed by: INTERNAL MEDICINE

## 2023-08-02 NOTE — PROGRESS NOTES
Subjective:      Patient ID: Shayna Ledezma is a 72 y.o. female.    Chief Complaint: Follow-up (6 month HTN/)    Ms Corral is a 72-year-old female here today for her 6 month follow-up.  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 Currently on maintenance Avastin since 2021. Patient is s/p tumor debulking KORI/BSO in 2019 after completion of neoadjuvant carboplatin Taxol and then followed by adjuvant carboplatin Taxol. Being followed by oncology closely with CA-125's. Recommendation to hold off on further scanning until elevation of CA-125. Patient also has had a splenectomy in 2019.  Also significant for TIA and incidental PE (Eliquis).  Overall patient is doing well and has no acute needs or complaints today.    Blood pressure is noted to be well controlled with her current lisinopril 20 mg p.o. daily      MCW: 23  Pneumonia vaccine: Up-to-date  CRS: Refer to Dr. 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     Ovarian cancer     Peritoneal carcinomatosis     TIA (transient ischemic attack)      Past Surgical History:   Procedure Laterality Date    APPENDECTOMY      BREAST BIOPSY       SECTION      COLONOSCOPY  2023    TOM CHACON    Endometrial polyp removal      Spleen operation      TONSILLECTOMY      TOTAL ABDOMINAL HYSTERECTOMY       Review of patient's allergies indicates:   Allergen Reactions    Codeine Itching     Oxycodone-acetaminophen Other (See Comments)     Unknown    Oxycodone-aspirin Other (See Comments)     Unknown     Social History     Socioeconomic History    Marital status: Single   Tobacco Use    Smoking status: Never    Smokeless tobacco: Never   Substance and Sexual Activity    Alcohol use: Never    Drug use: Never    Sexual activity: Not Currently     Birth control/protection: None     Social Determinants of Health     Financial Resource Strain: Low Risk  (8/2/2023)    Overall Financial Resource Strain (CARDIA)     Difficulty of Paying Living Expenses: Not hard at all   Food Insecurity: No Food Insecurity (8/2/2023)    Hunger Vital Sign     Worried About Running Out of Food in the Last Year: Never true     Ran Out of Food in the Last Year: Never true   Transportation Needs: No Transportation Needs (8/2/2023)    PRAPARE - Transportation     Lack of Transportation (Medical): No     Lack of Transportation (Non-Medical): No   Physical Activity: Sufficiently Active (8/2/2023)    Exercise Vital Sign     Days of Exercise per Week: 7 days     Minutes of Exercise per Session: 60 min   Stress: No Stress Concern Present (8/2/2023)    [employer] - Occupational Stress Questionnaire     Feeling of Stress : Not at all   Social Connections: Moderately Integrated (8/2/2023)    Social Connection and Isolation Panel [NHANES]     Frequency of Communication with Friends and Family: More than three times a week     Frequency of Social Gatherings with Friends and Family: More than three times a week     Attends [employer] Services: More than 4 times per year     Active Member of Clubs or Organizations: Yes     Attends Club or Organization Meetings: More than 4 times per year     Marital Status: Never    Housing Stability: Low Risk  (8/2/2023)    Housing Stability Vital Sign     Unable to Pay for Housing in the Last Year: No     Number of Places Lived in the Last Year: 1     Unstable Housing in  "the Last Year: No     Family History   Problem Relation Age of Onset    No Known Problems Mother     No Known Problems Father     COPD Brother        Review of Systems    A comprehensive review of systems was performed and is negative except for that stated above  Objective:   /78 (BP Location: Right arm, Patient Position: Sitting, BP Method: Small (Manual))   Pulse 70   Temp 97.8 °F (36.6 °C) (Temporal)   Ht 5' 2" (1.575 m)   Wt 60.5 kg (133 lb 6.4 oz)   SpO2 98%   BMI 24.40 kg/m²     Physical Exam  Constitutional:       Appearance: Normal appearance.   HENT:      Head: Normocephalic and atraumatic.      Nose: Nose normal.      Mouth/Throat:      Mouth: Mucous membranes are moist.      Pharynx: Oropharynx is clear.   Eyes:      Extraocular Movements: Extraocular movements intact.      Pupils: Pupils are equal, round, and reactive to light.   Cardiovascular:      Rate and Rhythm: Normal rate and regular rhythm.      Pulses: Normal pulses.   Pulmonary:      Effort: Pulmonary effort is normal.      Breath sounds: Normal breath sounds.   Abdominal:      General: Bowel sounds are normal.      Palpations: Abdomen is soft.   Musculoskeletal:         General: Normal range of motion.      Cervical back: Normal range of motion and neck supple.   Skin:     General: Skin is warm.   Neurological:      General: No focal deficit present.      Mental Status: She is alert and oriented to person, place, and time. Mental status is at baseline.   Psychiatric:         Mood and Affect: Mood normal.       Assessment/ Plan:   1. Primary hypertension  Assessment & Plan:  Well controlled.   Continue lisinopril 20 mg p.o. daily  Low Sodium Diet (DASH Diet - Less than 2 grams of sodium per day).  Monitor blood pressure daily and log. Report consistent numbers greater than 140/90.  Maintain healthy weight with goal BMI <30. Exercise 30 minutes per day, 5 days per week.  Smoking cessation encouraged to aid in BP " reduction.            2. Pulmonary embolism, unspecified chronicity, unspecified pulmonary embolism type, unspecified whether acute cor pulmonale present  Assessment & Plan:  Eliquis, continue

## 2023-08-02 NOTE — ASSESSMENT & PLAN NOTE
Well controlled.   Continue lisinopril 20 mg p.o. daily  Low Sodium Diet (DASH Diet - Less than 2 grams of sodium per day).  Monitor blood pressure daily and log. Report consistent numbers greater than 140/90.  Maintain healthy weight with goal BMI <30. Exercise 30 minutes per day, 5 days per week.  Smoking cessation encouraged to aid in BP reduction.

## 2023-08-16 NOTE — PROGRESS NOTES
Subjective:       Patient ID: Shayna Ledezma is a 72 y.o. female.  GI: Dr. Gamaliel Carlos  GYN ONC at [facility]: Dr. Mike Santana    1. 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 Pulmonary Embolism (Incidental on CT)--19    Procedure/pathology:   1. Paracentesis with removal of 5L of fluid done 18--serous carcinoma, high grade, c/w ovarian primary, PAX-8, CK7 and MOC-31 positive, CDX-2, CK-20 and Calretinin-negative.  2. S/p Exploratory Lap, radical tumor debulking including total abdominal hysterectomy, bilateral salpingo-oophorectomy, bilateral pelvic lymph node sampling, appendectomy, mobilization of the left ureter, complete gross resection of the disease with removal of mesenteric disease, as well as omental disese and parasplenic disease by Dr. Santana 3/18/19--Metastatic high grade serous carcinoma. Tissue/organ involvement: right ovary, appendectomy, omentum, mesentery (including small bowel mesentery) and multiple other sites designated: periumbilical, perisplenic, transverse colon, splenic flexure, perirectal, left periureteral. 2 lymph nodes negative. [id number]. FIGO stage IIIC. Myriad somatic instability testing positive for genomic instability, negative BRCA1 and BRCA2 tumor testing.  3. Splenectomy done 10/21/19--splenic involvement with metastatic high grade serous carcinoma, 5 benign lymph nodes. Caris testing showed LAM, NTRK1/2/3 negative, TMB low, BRCA1/2 negative, ER/MS negative, CATHY negative, SPEN pathogenic variant Exon 11, TP53 pathogenic variant Exon 5, PD-L1 positive CPS 15, Genomic ELODIA high.  Imagin. CT A/P w/ and w/o contrast done at [facility] 18--large volume ascites with multiple peritoneal implants, right pericolic gutter implant measures 2.5X3.3cm, LUQ omental/mesenteric implant measures 3X3.6cm, omental carcinomatosis, extensive enhancing soft tissue surrounding sigmoid colon vs. large sigmoid mass, left  ovarian cystic lesion appears to have some internal septations measures 3.5X3.8cm, enhancing periportal soft tissue density likely lymphadenopathy with some narrowing of the portal vein noted, but without internal filling defect, borderline splenomegaly, subtle solid lesion w/n central spleen measures 2.4X2.5cm, likely metastatic, with peripheral area of diminished enhancement suggesting splenic infarct, soft tissue implant abutting gallbladder measures 2X2.2cm, no right adnexal mass, trace bilateral layering pleural fluid, small moderate-sized hiatal hernia, few borderline enlarged lymph nodes w/n right epicardial fat pad.  2. CT of chest on 1/11/19--Some prominent right cardiophrenic lymph nodes are nonspecific and can be followed on future surveillance scans. Otherwise no CT evidence of metastatic disease to the chest. While exam was not tailored for evaluation of the pulmonary arteries I suspect there is pulmonary thromboembolic disease. Peritoneal carcinomatosis seen in the upper abdomen. Critical Result: Pulmonary Embolus.  3. CT C/A/P 3/4/19--Positive response to therapy. No new or progressive metastatic disease in the chest, abdomen or pelvis. Stable to improved findings include smaller pericardial lymph nodes, andrew hepatis adenopathy, peritoneal carcinomatosis, smaller left adnexal lesion; no evidence of PE within limitations of the study.  5. CT PE protocol chest/A/P 6/11/19--No PE, improved pericardial lymph node with minimal size on current examination, improved peritoneal carcinomatosis and left adnexal implant, splenic ischemia evolved into small infarct, size improvement of one of splenic hypodense lesion and mild size increase of second hypodense lesion, favored to be benign/cystic, no new findings.  6. PET/CT 9/25/19--s/p hysterectomy and bilateral oophorectomy, no evidence for locally recurrent/residual disease, intensely hypermetabolic hypodense lesion within the spleen 3.5X3.3cm concerning for  metastatic focus. No other abnormal focus of disease.  7. CT C/A/P 11/13/20--Right lower lobe 3.3 cm mass is presumed metastatic, otherwise no evident residual, recurrent or metastatic disease.   8. PET/CT 11/20/20--Hypermetabolic right lung base lesion and suspected metastatic periportal adenopathy, no additional sites of FDG-avid otherwise aggressive appearing pathology through the neck, chest, abdomen, or pelvis.  9. PET/CT 1/21/21--Interval decrease in size of the right lower lobe mass with decreased FDG uptake, now measures 1.7 x 2.2 cm (previous 3.3 x 3.1 cm), resolution of FDG uptake in the suspected periportal lymph node which is not identifiable on noncontrast CT. No evidence of new or progressive hypermetabolic metastatic disease.    Procedures:   1. Paracentesis on 12/28/18 with removal of 5 Liters.  2. Paracentesis on 01/07/19 with removal of 3.5 Liters.  3. Paracentesis on 02/06/19 with removal of 2.5 Liters.  4. Mediport placed 12/2020 by Dr. Serge Gentile--removed 8/26/21 due to mediport fracture.    Germline genetic testing done by [facility] 1/29/19--negative for BRCA1/2, two (2) variants of uncertain significance (VUS): CATHY p.[id number]/p.[id number] and MLH1 p.P603L.     :  12/19/18 1102  11/10/20  42.9  12/08/20 79.5  01/05/21 28.9  01/26/21 12.8  02/18/21 11.6  03/15/22--10.3  04/26/22--9.6  05/16/22--10.2  06/07/22--10.1  07/19/22--11.6  08/08/22--12.5  09/19/22--12.7  10/31/22--13.5  01/03/23--12.0  02/13/23--14.0  03/03/23--13.9  04/17/23--14.5  05/26/23--14.6  07/31/23--13.5        Treatment History:  1. Neoadjuvant Carboplatin/Taxol every 3 weeks x 3 cycles. 1/15/19 - 2/26/19. Neulasta added.   2. Surgery--tumor debulking KORI/BSO 3/18/19.  3. Adjuvant Carboplatin/Taxol x 3 additions cycles 4/9/19--5/21/19.  4. Splenectomy 10/21/19.  5. Niraparib maintenance (dose reduction to 100mg daily for cytopenias) 11/8/19--12/1/20 (stopped due to progression).  6. Carboplatin/Paclitaxel/Avastin X 6  cycles completed 12/8/21--3/24/21 (complete response).    Current Treatment:  1. Eliquis BID for incidental PE on imaging 1/2019  2. Avastin maintenance started on 4/13/21.   Cycle 42 due on 8/22/23.      Chief Complaint: Other Misc (Pt reports no new concerns today.)    HPI   Patient presents for scheduled follow-up of ovarian cancer. She continues on Avastin every 3 weeks without problems. Denies any abdominal pain. BP has been good.     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      atorvastatin (LIPITOR) 40 MG tablet TAKE 1 TABLET(40 MG) BY MOUTH EVERY DAY 90 tablet 3    b complex vitamins capsule Take 1 capsule by mouth once daily.      ELIQUIS 5 mg Tab TAKE 1 TABLET BY MOUTH TWICE DAILY 180 tablet 0    GLUTAMINE ORAL Take 10 g by mouth Daily.      lisinopriL (PRINIVIL,ZESTRIL) 20 MG tablet Take 1 tablet (20 mg total) by mouth once daily. 30 tablet 3    pyridoxine, vitamin B6, (B-6) 250 MG Tab Take 250 mg by mouth once daily.      sodium chloride 0.9% SolP 100 mL with bevacizumab 25 mg/mL Soln Inject into the vein every 21 days.       No current facility-administered medications on file prior to visit.      Review of Systems   Constitutional:  Negative for appetite change and unexpected weight change.   HENT:  Negative for mouth sores.    Eyes:  Negative for visual disturbance.   Respiratory:   Negative for cough and shortness of breath.    Cardiovascular:  Negative for chest pain.   Gastrointestinal:  Negative for abdominal pain and diarrhea.   Genitourinary:  Negative for frequency.   Musculoskeletal:  Negative for back pain.   Integumentary:  Negative for rash.   Neurological:  Negative for headaches.   Hematological:  Negative for adenopathy.   Psychiatric/Behavioral:  The patient is not nervous/anxious.         Vitals:    08/21/23 1025   BP: 132/81   Pulse: (!) 57   Resp: 14   Temp: 97.5 °F (36.4 °C)       Physical Exam  Vitals reviewed.   Constitutional:       Appearance: Normal appearance. She is normal weight.   HENT:      Head: Normocephalic.   Eyes:      General: Lids are normal. Vision grossly intact.      Extraocular Movements: Extraocular movements intact.      Conjunctiva/sclera: Conjunctivae normal.   Cardiovascular:      Rate and Rhythm: Normal rate and regular rhythm.      Pulses: Normal pulses.      Heart sounds: Normal heart sounds, S1 normal and S2 normal.   Pulmonary:      Effort: Pulmonary effort is normal.      Breath sounds: Normal breath sounds.   Abdominal:      General: Abdomen is flat. Bowel sounds are normal. There is no distension.      Palpations: Abdomen is soft.      Tenderness: There is no abdominal tenderness.   Musculoskeletal:      Cervical back: Normal range of motion and neck supple.      Right lower leg: No edema.      Left lower leg: No edema.   Skin:     General: Skin is warm and moist.      Capillary Refill: Capillary refill takes less than 2 seconds.      Comments: Left CW mediport removed, site intact   Neurological:      General: No focal deficit present.      Mental Status: She is alert and oriented to person, place, and time.   Psychiatric:         Attention and Perception: Attention normal.         Mood and Affect: Mood normal.         Speech: Speech normal.         Behavior: Behavior normal. Behavior is cooperative.         Cognition and Memory: Cognition  normal.         Judgment: Judgment normal.       Lab Visit on 08/21/2023   Component Date Value    Protein, UA 08/21/2023 1+ (A)     WBC 08/21/2023 6.64     RBC 08/21/2023 4.18 (L)     Hgb 08/21/2023 12.8     Hct 08/21/2023 40.8     MCV 08/21/2023 97.6 (H)     MCH 08/21/2023 30.6     MCHC 08/21/2023 31.4 (L)     RDW 08/21/2023 13.8     Platelet 08/21/2023 339     MPV 08/21/2023 8.3     Neut % 08/21/2023 34.7     Lymph % 08/21/2023 45.5     Mono % 08/21/2023 18.2     Eos % 08/21/2023 0.9     Basophil % 08/21/2023 0.5     Lymph # 08/21/2023 3.02     Neut # 08/21/2023 2.31     Mono # 08/21/2023 1.21     Eos # 08/21/2023 0.06     Baso # 08/21/2023 0.03     IG# 08/21/2023 0.01     IG% 08/21/2023 0.2           Assessment:       1. Peritoneal carcinomatosis        Plan:     Patient with left ovarian cancer diagnosed 12/26/18, appears to be stage IIIC vs. IV with diffuse peritoneal disease, possible splenic involvement, epicardial lymph nodes and bilateral pleural effusions.  Patient seen and evaluated by Dr. Mike Santana at [facility] in [place].   Treatment recommended upfront with chemotherapy Carboplatin/Paclitaxel x 3 cycles.  Completed Cycle 3 on 2/26/19.   She underwent total abdominal hysterectomy, BSO and tumor debulking on 3/18/19 with Dr. Santana with complete gross resection of disease. FIGO Stage IIIC papillary serous fallopian tube cancer.   Patient resumed chemotherapy with cycle 4 on 4/9/19. Completed cycle 6 on 5/21/19.    Testing on tumor was positive for genomic instability but negative for BRCA mutation.  Per NCCN guidelines, maintenance can be considered for BRCA mutated germline category 1 and somatic category 2A. There is some evidence that PARP inhibitors have some activity as well in tumors with genomic instability. But the PARP maintenance is not approved for this indication. Offered treatment to patient and after discussion she declined and made decision to continue with observation  only.    PET/CT done for rising CA-125 showed hypermetabolic splenic lesion which is not new, just enlarged from previous. Case discussed with Dr. Mike Santana.  Patient is now s/p splenectomy done 10/21/19. Consistent with splenic involvement with high grade serous carcinoma.   Dr. Santana recommended Niraparib maintenance based on new data showing activity in HRD positive ovarian cancer and patient started on 11/8/19, required a dose reduction due to cytopenias.  Rising CA-125 noted in 11/2020. CT showed new RLL lung mass.   Follow-up PET done 11/20/20 showed RLL lung mass hypermetabolic and hypermetabolic periportal adenopathy.     Recommended 2nd line treatment with Carboplatin/Taxol/Avastin.   Started cycle 1 on 12/8/20. Neulasta added with cycle 2.   Completed Cycle 6 on 3/24/21.  PET from 3/30/21 showed complete resolution of lung mass and no other disease.    Avastin maintenance started on 4/13/21.   Hospitalized for TIA after her 4th cycle of Avastin. Work-up for stroke and cause otherwise negative. Patient started on baby ASA by neurology.  Discussed there are no clear guidelines for changing/discontinuing treatment for TIA/CVA related to Avastin.  Since her symptoms resolved and there were no residual effects, recommended to continue with Avastin since it is working well for her disease and since a baby ASA was added for prevention. She was also continued on Eliquis for h/o PE.  S/p mediport removal for fracture on 8/26/21.   Continue Avastin through peripheral veins for now.    Patient continues on Avastin maintenance without problems.  BP good on Lisinopril 20mg daily.        Due for Avastin maintenance Cycle 42 tomorrow.    Recent labs all good. Last CA-125 remains WNL.   Proceed with treatment tomorrow.    Will continue checking labs every 3 weeks with protein UA/dipstick every 3 weeks.    Labs on 9/11/23 and treatment only cycle 43 on 9/12/23.  RTC T/D visit in 6 weeks prior to cycle 44.    Will  continue to check  every 6 weeks. No further scans until rise in CA-125.  She is now >2 years from completing last chemotherapy, so still considered platinum-sensitive. Will need replacement of mediport when she needs to resume chemotherapy.     Vaccines/Boosters post-splenectomy: Menactra/Menveo every 5 years, Bexsero every 2-3 years, annual flu shot.  Last Bexsero was given 8/1/23.     Plan for Menactra/Menveo in 9/2024 which will be 5 years after initial    Continue Eliquis 5mg BID and ASA.  All questions answered at this time.        Renetta Corral MD                              Inflammation Suggestive Of Cancer Camouflage Histology Text: There was a dense lymphocytic infiltrate which prevented adequate histologic evaluation of adjacent structures.

## 2023-08-21 ENCOUNTER — OFFICE VISIT (OUTPATIENT)
Dept: HEMATOLOGY/ONCOLOGY | Facility: CLINIC | Age: 73
End: 2023-08-21
Payer: MEDICARE

## 2023-08-21 VITALS
BODY MASS INDEX: 23.28 KG/M2 | DIASTOLIC BLOOD PRESSURE: 81 MMHG | OXYGEN SATURATION: 97 % | SYSTOLIC BLOOD PRESSURE: 132 MMHG | RESPIRATION RATE: 14 BRPM | WEIGHT: 131.38 LBS | TEMPERATURE: 98 F | HEART RATE: 57 BPM | HEIGHT: 63 IN

## 2023-08-21 DIAGNOSIS — Z90.81 H/O SPLENECTOMY: ICD-10-CM

## 2023-08-21 DIAGNOSIS — C78.6 PERITONEAL CARCINOMATOSIS: Primary | ICD-10-CM

## 2023-08-21 PROCEDURE — 3079F PR MOST RECENT DIASTOLIC BLOOD PRESSURE 80-89 MM HG: ICD-10-PCS | Mod: CPTII,S$GLB,, | Performed by: INTERNAL MEDICINE

## 2023-08-21 PROCEDURE — 1159F PR MEDICATION LIST DOCUMENTED IN MEDICAL RECORD: ICD-10-PCS | Mod: CPTII,S$GLB,, | Performed by: INTERNAL MEDICINE

## 2023-08-21 PROCEDURE — 3008F PR BODY MASS INDEX (BMI) DOCUMENTED: ICD-10-PCS | Mod: CPTII,S$GLB,, | Performed by: INTERNAL MEDICINE

## 2023-08-21 PROCEDURE — 1126F PR PAIN SEVERITY QUANTIFIED, NO PAIN PRESENT: ICD-10-PCS | Mod: CPTII,S$GLB,, | Performed by: INTERNAL MEDICINE

## 2023-08-21 PROCEDURE — 3079F DIAST BP 80-89 MM HG: CPT | Mod: CPTII,S$GLB,, | Performed by: INTERNAL MEDICINE

## 2023-08-21 PROCEDURE — 99999 PR PBB SHADOW E&M-EST. PATIENT-LVL IV: CPT | Mod: PBBFAC,,, | Performed by: INTERNAL MEDICINE

## 2023-08-21 PROCEDURE — 99215 OFFICE O/P EST HI 40 MIN: CPT | Mod: S$GLB,,, | Performed by: INTERNAL MEDICINE

## 2023-08-21 PROCEDURE — 4010F PR ACE/ARB THEARPY RXD/TAKEN: ICD-10-PCS | Mod: CPTII,S$GLB,, | Performed by: INTERNAL MEDICINE

## 2023-08-21 PROCEDURE — 4010F ACE/ARB THERAPY RXD/TAKEN: CPT | Mod: CPTII,S$GLB,, | Performed by: INTERNAL MEDICINE

## 2023-08-21 PROCEDURE — 1126F AMNT PAIN NOTED NONE PRSNT: CPT | Mod: CPTII,S$GLB,, | Performed by: INTERNAL MEDICINE

## 2023-08-21 PROCEDURE — 99215 PR OFFICE/OUTPT VISIT, EST, LEVL V, 40-54 MIN: ICD-10-PCS | Mod: S$GLB,,, | Performed by: INTERNAL MEDICINE

## 2023-08-21 PROCEDURE — 99999 PR PBB SHADOW E&M-EST. PATIENT-LVL IV: ICD-10-PCS | Mod: PBBFAC,,, | Performed by: INTERNAL MEDICINE

## 2023-08-21 PROCEDURE — 1160F RVW MEDS BY RX/DR IN RCRD: CPT | Mod: CPTII,S$GLB,, | Performed by: INTERNAL MEDICINE

## 2023-08-21 PROCEDURE — 1159F MED LIST DOCD IN RCRD: CPT | Mod: CPTII,S$GLB,, | Performed by: INTERNAL MEDICINE

## 2023-08-21 PROCEDURE — 3075F SYST BP GE 130 - 139MM HG: CPT | Mod: CPTII,S$GLB,, | Performed by: INTERNAL MEDICINE

## 2023-08-21 PROCEDURE — 1160F PR REVIEW ALL MEDS BY PRESCRIBER/CLIN PHARMACIST DOCUMENTED: ICD-10-PCS | Mod: CPTII,S$GLB,, | Performed by: INTERNAL MEDICINE

## 2023-08-21 PROCEDURE — 3075F PR MOST RECENT SYSTOLIC BLOOD PRESS GE 130-139MM HG: ICD-10-PCS | Mod: CPTII,S$GLB,, | Performed by: INTERNAL MEDICINE

## 2023-08-21 PROCEDURE — 3008F BODY MASS INDEX DOCD: CPT | Mod: CPTII,S$GLB,, | Performed by: INTERNAL MEDICINE

## 2023-08-21 RX ORDER — DIPHENHYDRAMINE HYDROCHLORIDE 50 MG/ML
25 INJECTION INTRAMUSCULAR; INTRAVENOUS
Status: CANCELLED | OUTPATIENT
Start: 2023-08-22

## 2023-08-21 RX ORDER — ACETAMINOPHEN 325 MG/1
650 TABLET ORAL
Status: CANCELLED | OUTPATIENT
Start: 2023-08-22

## 2023-08-21 RX ORDER — DIPHENHYDRAMINE HYDROCHLORIDE 50 MG/ML
50 INJECTION INTRAMUSCULAR; INTRAVENOUS ONCE AS NEEDED
Status: CANCELLED | OUTPATIENT
Start: 2023-08-22 | End: 2035-01-17

## 2023-08-21 RX ORDER — HEPARIN 100 UNIT/ML
500 SYRINGE INTRAVENOUS
Status: CANCELLED | OUTPATIENT
Start: 2023-08-22

## 2023-08-21 RX ORDER — EPINEPHRINE 0.3 MG/.3ML
0.3 INJECTION SUBCUTANEOUS ONCE AS NEEDED
Status: CANCELLED | OUTPATIENT
Start: 2023-08-22 | End: 2035-01-17

## 2023-08-21 RX ORDER — SODIUM CHLORIDE 0.9 % (FLUSH) 0.9 %
10 SYRINGE (ML) INJECTION
Status: CANCELLED | OUTPATIENT
Start: 2023-08-22

## 2023-08-22 ENCOUNTER — INFUSION (OUTPATIENT)
Dept: INFUSION THERAPY | Facility: HOSPITAL | Age: 73
End: 2023-08-22
Attending: INTERNAL MEDICINE
Payer: MEDICARE

## 2023-08-22 VITALS
HEART RATE: 92 BPM | SYSTOLIC BLOOD PRESSURE: 139 MMHG | WEIGHT: 131.88 LBS | BODY MASS INDEX: 23.37 KG/M2 | DIASTOLIC BLOOD PRESSURE: 75 MMHG | TEMPERATURE: 98 F

## 2023-08-22 DIAGNOSIS — C78.6 PERITONEAL CARCINOMATOSIS: Primary | ICD-10-CM

## 2023-08-22 PROCEDURE — 96375 TX/PRO/DX INJ NEW DRUG ADDON: CPT

## 2023-08-22 PROCEDURE — 25000003 PHARM REV CODE 250: Performed by: INTERNAL MEDICINE

## 2023-08-22 PROCEDURE — A4216 STERILE WATER/SALINE, 10 ML: HCPCS | Performed by: INTERNAL MEDICINE

## 2023-08-22 PROCEDURE — 96413 CHEMO IV INFUSION 1 HR: CPT

## 2023-08-22 PROCEDURE — 63600175 PHARM REV CODE 636 W HCPCS: Mod: JZ,JG | Performed by: INTERNAL MEDICINE

## 2023-08-22 RX ORDER — HEPARIN 100 UNIT/ML
500 SYRINGE INTRAVENOUS
Status: DISCONTINUED | OUTPATIENT
Start: 2023-08-22 | End: 2023-08-22 | Stop reason: HOSPADM

## 2023-08-22 RX ORDER — SODIUM CHLORIDE 0.9 % (FLUSH) 0.9 %
10 SYRINGE (ML) INJECTION
Status: DISCONTINUED | OUTPATIENT
Start: 2023-08-22 | End: 2023-08-22 | Stop reason: HOSPADM

## 2023-08-22 RX ORDER — EPINEPHRINE 0.3 MG/.3ML
0.3 INJECTION SUBCUTANEOUS ONCE AS NEEDED
Status: DISCONTINUED | OUTPATIENT
Start: 2023-08-22 | End: 2023-08-22 | Stop reason: HOSPADM

## 2023-08-22 RX ORDER — ACETAMINOPHEN 325 MG/1
650 TABLET ORAL
Status: DISCONTINUED | OUTPATIENT
Start: 2023-08-22 | End: 2023-08-22 | Stop reason: HOSPADM

## 2023-08-22 RX ORDER — DIPHENHYDRAMINE HYDROCHLORIDE 50 MG/ML
50 INJECTION INTRAMUSCULAR; INTRAVENOUS ONCE AS NEEDED
Status: DISCONTINUED | OUTPATIENT
Start: 2023-08-22 | End: 2023-08-22 | Stop reason: HOSPADM

## 2023-08-22 RX ORDER — DIPHENHYDRAMINE HYDROCHLORIDE 50 MG/ML
25 INJECTION INTRAMUSCULAR; INTRAVENOUS
Status: COMPLETED | OUTPATIENT
Start: 2023-08-22 | End: 2023-08-22

## 2023-08-22 RX ADMIN — Medication 10 ML: at 03:08

## 2023-08-22 RX ADMIN — SODIUM CHLORIDE: 9 INJECTION, SOLUTION INTRAVENOUS at 02:08

## 2023-08-22 RX ADMIN — BEVACIZUMAB-AWWB 900 MG: 100 INJECTION, SOLUTION INTRAVENOUS at 02:08

## 2023-08-22 RX ADMIN — DIPHENHYDRAMINE HYDROCHLORIDE 25 MG: 50 INJECTION INTRAMUSCULAR; INTRAVENOUS at 02:08

## 2023-09-07 DIAGNOSIS — I26.99 PULMONARY EMBOLISM, UNSPECIFIED CHRONICITY, UNSPECIFIED PULMONARY EMBOLISM TYPE, UNSPECIFIED WHETHER ACUTE COR PULMONALE PRESENT: ICD-10-CM

## 2023-09-07 RX ORDER — APIXABAN 5 MG/1
TABLET, FILM COATED ORAL
Qty: 180 TABLET | Refills: 0 | Status: SHIPPED | OUTPATIENT
Start: 2023-09-07 | End: 2023-10-02 | Stop reason: SDUPTHER

## 2023-09-11 ENCOUNTER — LAB VISIT (OUTPATIENT)
Dept: LAB | Facility: HOSPITAL | Age: 73
End: 2023-09-11
Attending: INTERNAL MEDICINE
Payer: MEDICARE

## 2023-09-11 DIAGNOSIS — C57.4 MALIGNANT NEOPLASM OF UTERINE ADNEXA: ICD-10-CM

## 2023-09-11 DIAGNOSIS — C78.6 PERITONEAL CARCINOMATOSIS: ICD-10-CM

## 2023-09-11 DIAGNOSIS — I26.99 PULMONARY EMBOLISM, UNSPECIFIED CHRONICITY, UNSPECIFIED PULMONARY EMBOLISM TYPE, UNSPECIFIED WHETHER ACUTE COR PULMONALE PRESENT: ICD-10-CM

## 2023-09-11 LAB
ALBUMIN SERPL-MCNC: 4 G/DL (ref 3.4–4.8)
ALBUMIN/GLOB SERPL: 1.5 RATIO (ref 1.1–2)
ALP SERPL-CCNC: 47 UNIT/L (ref 40–150)
ALT SERPL-CCNC: 22 UNIT/L (ref 0–55)
AST SERPL-CCNC: 36 UNIT/L (ref 5–34)
BASOPHILS # BLD AUTO: 0.03 X10(3)/MCL
BASOPHILS NFR BLD AUTO: 0.3 %
BILIRUB SERPL-MCNC: 1.6 MG/DL
BUN SERPL-MCNC: 19.6 MG/DL (ref 9.8–20.1)
CALCIUM SERPL-MCNC: 9.6 MG/DL (ref 8.4–10.2)
CHLORIDE SERPL-SCNC: 103 MMOL/L (ref 98–107)
CO2 SERPL-SCNC: 26 MMOL/L (ref 23–31)
CREAT SERPL-MCNC: 0.77 MG/DL (ref 0.55–1.02)
EOSINOPHIL # BLD AUTO: 0.12 X10(3)/MCL (ref 0–0.9)
EOSINOPHIL NFR BLD AUTO: 1.1 %
ERYTHROCYTE [DISTWIDTH] IN BLOOD BY AUTOMATED COUNT: 14.1 % (ref 11.5–17)
GFR SERPLBLD CREATININE-BSD FMLA CKD-EPI: >60 MLS/MIN/1.73/M2
GLOBULIN SER-MCNC: 2.7 GM/DL (ref 2.4–3.5)
GLUCOSE SERPL-MCNC: 83 MG/DL (ref 82–115)
HCT VFR BLD AUTO: 41.2 % (ref 37–47)
HGB BLD-MCNC: 12.8 G/DL (ref 12–16)
IMM GRANULOCYTES # BLD AUTO: 0 X10(3)/MCL (ref 0–0.04)
IMM GRANULOCYTES NFR BLD AUTO: 0 %
LYMPHOCYTES # BLD AUTO: 3.78 X10(3)/MCL (ref 0.6–4.6)
LYMPHOCYTES NFR BLD AUTO: 35.9 %
MCH RBC QN AUTO: 30.3 PG (ref 27–31)
MCHC RBC AUTO-ENTMCNC: 31.1 G/DL (ref 33–36)
MCV RBC AUTO: 97.4 FL (ref 80–94)
MONOCYTES # BLD AUTO: 1.14 X10(3)/MCL (ref 0.1–1.3)
MONOCYTES NFR BLD AUTO: 10.8 %
NEUTROPHILS # BLD AUTO: 5.46 X10(3)/MCL (ref 2.1–9.2)
NEUTROPHILS NFR BLD AUTO: 51.9 %
PLATELET # BLD AUTO: 363 X10(3)/MCL (ref 130–400)
PMV BLD AUTO: 8.5 FL (ref 7.4–10.4)
POTASSIUM SERPL-SCNC: 5 MMOL/L (ref 3.5–5.1)
PROT SERPL-MCNC: 6.7 GM/DL (ref 5.8–7.6)
PROT UR QL STRIP.AUTO: 1
RBC # BLD AUTO: 4.23 X10(6)/MCL (ref 4.2–5.4)
SODIUM SERPL-SCNC: 140 MMOL/L (ref 136–145)
WBC # SPEC AUTO: 10.53 X10(3)/MCL (ref 4.5–11.5)

## 2023-09-11 PROCEDURE — 85025 COMPLETE CBC W/AUTO DIFF WBC: CPT

## 2023-09-11 PROCEDURE — 36415 COLL VENOUS BLD VENIPUNCTURE: CPT

## 2023-09-11 PROCEDURE — 81005 URINALYSIS: CPT

## 2023-09-11 PROCEDURE — 80053 COMPREHEN METABOLIC PANEL: CPT

## 2023-09-11 RX ORDER — HEPARIN 100 UNIT/ML
500 SYRINGE INTRAVENOUS
Status: CANCELLED | OUTPATIENT
Start: 2023-09-12

## 2023-09-11 RX ORDER — ACETAMINOPHEN 325 MG/1
650 TABLET ORAL
Status: CANCELLED | OUTPATIENT
Start: 2023-09-12

## 2023-09-11 RX ORDER — DIPHENHYDRAMINE HYDROCHLORIDE 50 MG/ML
50 INJECTION INTRAMUSCULAR; INTRAVENOUS ONCE AS NEEDED
Status: CANCELLED | OUTPATIENT
Start: 2023-09-12

## 2023-09-11 RX ORDER — EPINEPHRINE 0.3 MG/.3ML
0.3 INJECTION SUBCUTANEOUS ONCE AS NEEDED
Status: CANCELLED | OUTPATIENT
Start: 2023-09-12

## 2023-09-11 RX ORDER — DIPHENHYDRAMINE HYDROCHLORIDE 50 MG/ML
25 INJECTION INTRAMUSCULAR; INTRAVENOUS
Status: CANCELLED | OUTPATIENT
Start: 2023-09-12

## 2023-09-11 RX ORDER — SODIUM CHLORIDE 0.9 % (FLUSH) 0.9 %
10 SYRINGE (ML) INJECTION
Status: CANCELLED | OUTPATIENT
Start: 2023-09-12

## 2023-09-12 ENCOUNTER — INFUSION (OUTPATIENT)
Dept: INFUSION THERAPY | Facility: HOSPITAL | Age: 73
End: 2023-09-12
Attending: INTERNAL MEDICINE
Payer: MEDICARE

## 2023-09-12 VITALS
HEART RATE: 59 BPM | OXYGEN SATURATION: 98 % | SYSTOLIC BLOOD PRESSURE: 137 MMHG | RESPIRATION RATE: 16 BRPM | HEIGHT: 62 IN | WEIGHT: 129.13 LBS | BODY MASS INDEX: 23.76 KG/M2 | DIASTOLIC BLOOD PRESSURE: 79 MMHG | TEMPERATURE: 98 F

## 2023-09-12 DIAGNOSIS — C78.6 PERITONEAL CARCINOMATOSIS: Primary | ICD-10-CM

## 2023-09-12 PROCEDURE — 96375 TX/PRO/DX INJ NEW DRUG ADDON: CPT

## 2023-09-12 PROCEDURE — 25000003 PHARM REV CODE 250: Performed by: INTERNAL MEDICINE

## 2023-09-12 PROCEDURE — 63600175 PHARM REV CODE 636 W HCPCS: Mod: JZ,JG | Performed by: INTERNAL MEDICINE

## 2023-09-12 PROCEDURE — 96413 CHEMO IV INFUSION 1 HR: CPT

## 2023-09-12 RX ORDER — SODIUM CHLORIDE 0.9 % (FLUSH) 0.9 %
10 SYRINGE (ML) INJECTION
Status: DISCONTINUED | OUTPATIENT
Start: 2023-09-12 | End: 2023-09-12 | Stop reason: HOSPADM

## 2023-09-12 RX ORDER — DIPHENHYDRAMINE HYDROCHLORIDE 50 MG/ML
50 INJECTION INTRAMUSCULAR; INTRAVENOUS ONCE AS NEEDED
Status: DISCONTINUED | OUTPATIENT
Start: 2023-09-12 | End: 2023-09-12 | Stop reason: HOSPADM

## 2023-09-12 RX ORDER — HEPARIN 100 UNIT/ML
500 SYRINGE INTRAVENOUS
Status: DISCONTINUED | OUTPATIENT
Start: 2023-09-12 | End: 2023-09-12 | Stop reason: HOSPADM

## 2023-09-12 RX ORDER — DIPHENHYDRAMINE HYDROCHLORIDE 50 MG/ML
25 INJECTION INTRAMUSCULAR; INTRAVENOUS
Status: COMPLETED | OUTPATIENT
Start: 2023-09-12 | End: 2023-09-12

## 2023-09-12 RX ORDER — ACETAMINOPHEN 325 MG/1
650 TABLET ORAL
Status: DISCONTINUED | OUTPATIENT
Start: 2023-09-12 | End: 2023-09-12 | Stop reason: HOSPADM

## 2023-09-12 RX ORDER — EPINEPHRINE 0.3 MG/.3ML
0.3 INJECTION SUBCUTANEOUS ONCE AS NEEDED
Status: DISCONTINUED | OUTPATIENT
Start: 2023-09-12 | End: 2023-09-12 | Stop reason: HOSPADM

## 2023-09-12 RX ADMIN — DIPHENHYDRAMINE HYDROCHLORIDE 25 MG: 50 INJECTION INTRAMUSCULAR; INTRAVENOUS at 02:09

## 2023-09-12 RX ADMIN — BEVACIZUMAB-AWWB 900 MG: 100 INJECTION, SOLUTION INTRAVENOUS at 02:09

## 2023-09-24 DIAGNOSIS — C78.6 PERITONEAL CARCINOMATOSIS: ICD-10-CM

## 2023-09-25 RX ORDER — LISINOPRIL 20 MG/1
20 TABLET ORAL
Qty: 30 TABLET | Refills: 3 | Status: SHIPPED | OUTPATIENT
Start: 2023-09-25 | End: 2024-01-22

## 2023-09-25 NOTE — PROGRESS NOTES
Subjective:       Patient ID: Shayna Ledezma is a 73 y.o. female.  GI: Dr. Gamaliel Carlos  GYN ONC at [facility]: Dr. Mike Santana    1. 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 Pulmonary Embolism (Incidental on CT)--19    Procedure/pathology:   1. Paracentesis with removal of 5L of fluid done 18--serous carcinoma, high grade, c/w ovarian primary, PAX-8, CK7 and MOC-31 positive, CDX-2, CK-20 and Calretinin-negative.  2. S/p Exploratory Lap, radical tumor debulking including total abdominal hysterectomy, bilateral salpingo-oophorectomy, bilateral pelvic lymph node sampling, appendectomy, mobilization of the left ureter, complete gross resection of the disease with removal of mesenteric disease, as well as omental disese and parasplenic disease by Dr. Santana 3/18/19--Metastatic high grade serous carcinoma. Tissue/organ involvement: right ovary, appendectomy, omentum, mesentery (including small bowel mesentery) and multiple other sites designated: periumbilical, perisplenic, transverse colon, splenic flexure, perirectal, left periureteral. 2 lymph nodes negative. [id number]. FIGO stage IIIC. Myriad somatic instability testing positive for genomic instability, negative BRCA1 and BRCA2 tumor testing.  3. Splenectomy done 10/21/19--splenic involvement with metastatic high grade serous carcinoma, 5 benign lymph nodes. Caris testing showed LAM, NTRK1/2/3 negative, TMB low, BRCA1/2 negative, ER/DE negative, CATHY negative, SPEN pathogenic variant Exon 11, TP53 pathogenic variant Exon 5, PD-L1 positive CPS 15, Genomic ELODIA high.  Imagin. CT A/P w/ and w/o contrast done at [facility] 18--large volume ascites with multiple peritoneal implants, right pericolic gutter implant measures 2.5X3.3cm, LUQ omental/mesenteric implant measures 3X3.6cm, omental carcinomatosis, extensive enhancing soft tissue surrounding sigmoid colon vs. large sigmoid mass, left  ovarian cystic lesion appears to have some internal septations measures 3.5X3.8cm, enhancing periportal soft tissue density likely lymphadenopathy with some narrowing of the portal vein noted, but without internal filling defect, borderline splenomegaly, subtle solid lesion w/n central spleen measures 2.4X2.5cm, likely metastatic, with peripheral area of diminished enhancement suggesting splenic infarct, soft tissue implant abutting gallbladder measures 2X2.2cm, no right adnexal mass, trace bilateral layering pleural fluid, small moderate-sized hiatal hernia, few borderline enlarged lymph nodes w/n right epicardial fat pad.  2. CT of chest on 1/11/19--Some prominent right cardiophrenic lymph nodes are nonspecific and can be followed on future surveillance scans. Otherwise no CT evidence of metastatic disease to the chest. While exam was not tailored for evaluation of the pulmonary arteries I suspect there is pulmonary thromboembolic disease. Peritoneal carcinomatosis seen in the upper abdomen. Critical Result: Pulmonary Embolus.  3. CT C/A/P 3/4/19--Positive response to therapy. No new or progressive metastatic disease in the chest, abdomen or pelvis. Stable to improved findings include smaller pericardial lymph nodes, andrew hepatis adenopathy, peritoneal carcinomatosis, smaller left adnexal lesion; no evidence of PE within limitations of the study.  5. CT PE protocol chest/A/P 6/11/19--No PE, improved pericardial lymph node with minimal size on current examination, improved peritoneal carcinomatosis and left adnexal implant, splenic ischemia evolved into small infarct, size improvement of one of splenic hypodense lesion and mild size increase of second hypodense lesion, favored to be benign/cystic, no new findings.  6. PET/CT 9/25/19--s/p hysterectomy and bilateral oophorectomy, no evidence for locally recurrent/residual disease, intensely hypermetabolic hypodense lesion within the spleen 3.5X3.3cm concerning for  metastatic focus. No other abnormal focus of disease.  7. CT C/A/P 11/13/20--Right lower lobe 3.3 cm mass is presumed metastatic, otherwise no evident residual, recurrent or metastatic disease.   8. PET/CT 11/20/20--Hypermetabolic right lung base lesion and suspected metastatic periportal adenopathy, no additional sites of FDG-avid otherwise aggressive appearing pathology through the neck, chest, abdomen, or pelvis.  9. PET/CT 1/21/21--Interval decrease in size of the right lower lobe mass with decreased FDG uptake, now measures 1.7 x 2.2 cm (previous 3.3 x 3.1 cm), resolution of FDG uptake in the suspected periportal lymph node which is not identifiable on noncontrast CT. No evidence of new or progressive hypermetabolic metastatic disease.  10. PET/CT 3/30/21--Resolved right lower lung lobe hypermetabolic mass. Otherwise, no interval change or evidence of residual disease, recurrence or new metastatic lesion.    Procedures:   1. Paracentesis on 12/28/18 with removal of 5 Liters.  2. Paracentesis on 01/07/19 with removal of 3.5 Liters.  3. Paracentesis on 02/06/19 with removal of 2.5 Liters.  4. Mediport placed 12/2020 by Dr. Serge Gentile--removed 8/26/21 due to mediport fracture.    Germline genetic testing done by [facility] 1/29/19--negative for BRCA1/2, two (2) variants of uncertain significance (VUS): CATHY p.Q6578X/p.E8920P and MLH1 p.P603L.     :  12/19/18 1102  11/10/20  42.9  12/08/20 79.5  01/05/21 28.9  01/26/21 12.8  02/18/21 11.6  03/15/22--10.3  04/26/22--9.6  05/16/22--10.2  06/07/22--10.1  07/19/22--11.6  08/08/22--12.5  09/19/22--12.7  10/31/22--13.5  01/03/23--12.0  02/13/23--14.0  03/03/23--13.9  04/17/23--14.5  05/26/23--14.6  07/31/23--13.5    Treatment History:  1. Neoadjuvant Carboplatin/Taxol every 3 weeks x 3 cycles. 1/15/19 - 2/26/19. Neulasta added.   2. Surgery--tumor debulking KORI/BSO 3/18/19.  3. Adjuvant Carboplatin/Taxol x 3 additions cycles 4/9/19--5/21/19.  4. Splenectomy  10/21/19.  5. Niraparib maintenance (dose reduction to 100mg daily for cytopenias) 11/8/19--12/1/20 (stopped due to progression).  6. Carboplatin/Paclitaxel/Avastin X 6 cycles completed 12/8/21--3/24/21 (complete response).    Current Treatment:  1. Eliquis BID for incidental PE on imaging 1/2019  2. Avastin maintenance started on 4/13/21.   Cycle 44 due on 10/3/23.      Chief Complaint: Other Misc (Pt reports no new concerns today.)    HPI   Patient presents for scheduled follow-up of ovarian cancer. She continues on Avastin every 3 weeks without problems. Denies any abdominal pain. BP has been good. She denies any abdominal pain or other problems.     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      atorvastatin (LIPITOR) 40 MG tablet TAKE 1 TABLET(40 MG) BY MOUTH EVERY DAY 90 tablet 3    b complex vitamins capsule Take 1 capsule by mouth once daily.      ELIQUIS 5 mg Tab TAKE 1 TABLET BY MOUTH TWICE DAILY 180 tablet 0    GLUTAMINE ORAL Take 10 g by mouth Daily.      lisinopriL (PRINIVIL,ZESTRIL) 20 MG tablet TAKE 1 TABLET(20 MG) BY MOUTH EVERY DAY 30 tablet 3    pyridoxine, vitamin B6, (B-6) 250 MG Tab Take 250 mg by mouth once daily.      sodium chloride 0.9% SolP 100 mL with bevacizumab 25 mg/mL Soln Inject into the vein every 21 days.       No current facility-administered medications on file prior  to visit.      Review of Systems   Constitutional:  Negative for appetite change and unexpected weight change.   HENT:  Negative for mouth sores.    Eyes:  Negative for visual disturbance.   Respiratory:  Negative for cough and shortness of breath.    Cardiovascular:  Negative for chest pain.   Gastrointestinal:  Negative for abdominal pain and diarrhea.   Genitourinary:  Negative for frequency.   Musculoskeletal:  Negative for back pain.   Integumentary:  Negative for rash.   Neurological:  Negative for headaches.   Hematological:  Negative for adenopathy.   Psychiatric/Behavioral:  The patient is not nervous/anxious.         Vitals:    10/02/23 1252   BP: 139/81   Pulse: (!) 59   Resp: 14   Temp: 97.7 °F (36.5 °C)     Physical Exam  Vitals reviewed.   Constitutional:       Appearance: Normal appearance. She is normal weight.   HENT:      Head: Normocephalic.   Eyes:      General: Lids are normal. Vision grossly intact.      Extraocular Movements: Extraocular movements intact.      Conjunctiva/sclera: Conjunctivae normal.   Cardiovascular:      Rate and Rhythm: Normal rate and regular rhythm.      Pulses: Normal pulses.      Heart sounds: Normal heart sounds, S1 normal and S2 normal.   Pulmonary:      Effort: Pulmonary effort is normal.      Breath sounds: Normal breath sounds.   Abdominal:      General: Abdomen is flat. Bowel sounds are normal. There is no distension.      Palpations: Abdomen is soft.      Tenderness: There is no abdominal tenderness.   Musculoskeletal:      Cervical back: Normal range of motion and neck supple.      Right lower leg: No edema.      Left lower leg: No edema.   Skin:     General: Skin is warm and moist.      Capillary Refill: Capillary refill takes less than 2 seconds.      Comments: Left CW mediport removed, site intact   Neurological:      General: No focal deficit present.      Mental Status: She is alert and oriented to person, place, and time.   Psychiatric:         Attention  and Perception: Attention normal.         Mood and Affect: Mood normal.         Speech: Speech normal.         Behavior: Behavior normal. Behavior is cooperative.         Cognition and Memory: Cognition normal.         Judgment: Judgment normal.       Lab Visit on 10/02/2023   Component Date Value    Protein, UA 10/02/2023 1+ (A)     WBC 10/02/2023 8.82     RBC 10/02/2023 4.46     Hgb 10/02/2023 13.5     Hct 10/02/2023 43.2     MCV 10/02/2023 96.9 (H)     MCH 10/02/2023 30.3     MCHC 10/02/2023 31.3 (L)     RDW 10/02/2023 13.7     Platelet 10/02/2023 350     MPV 10/02/2023 8.2     Neut % 10/02/2023 48.5     Lymph % 10/02/2023 39.1     Mono % 10/02/2023 11.1     Eos % 10/02/2023 1.0     Basophil % 10/02/2023 0.2     Lymph # 10/02/2023 3.45     Neut # 10/02/2023 4.27     Mono # 10/02/2023 0.98     Eos # 10/02/2023 0.09     Baso # 10/02/2023 0.02     IG# 10/02/2023 0.01     IG% 10/02/2023 0.1           Assessment:       1. Peritoneal carcinomatosis    2. Malignant neoplasm of uterine adnexa    3. Breast cancer screening by mammogram      Plan:     Patient with left ovarian cancer diagnosed 12/26/18, appears to be stage IIIC vs. IV with diffuse peritoneal disease, possible splenic involvement, epicardial lymph nodes and bilateral pleural effusions.  Patient seen and evaluated by Dr. Mike Santana at [facility] in [place].   Treatment recommended upfront with chemotherapy Carboplatin/Paclitaxel x 3 cycles.  Completed Cycle 3 on 2/26/19.   She underwent total abdominal hysterectomy, BSO and tumor debulking on 3/18/19 with Dr. Santana with complete gross resection of disease. FIGO Stage IIIC papillary serous fallopian tube cancer.   Patient resumed chemotherapy with cycle 4 on 4/9/19. Completed cycle 6 on 5/21/19.    Testing on tumor was positive for genomic instability but negative for BRCA mutation.  Per NCCN guidelines, maintenance can be considered for BRCA mutated germline category 1 and somatic category 2A.  There is some evidence that PARP inhibitors have some activity as well in tumors with genomic instability. But the PARP maintenance is not approved for this indication. Offered treatment to patient and after discussion she declined and made decision to continue with observation only.    PET/CT done for rising CA-125 showed hypermetabolic splenic lesion which is not new, just enlarged from previous. Case discussed with Dr. Mike Santana.  Patient is now s/p splenectomy done 10/21/19. Consistent with splenic involvement with high grade serous carcinoma.   Dr. Santana recommended Niraparib maintenance based on new data showing activity in HRD positive ovarian cancer and patient started on 11/8/19, required a dose reduction due to cytopenias.  Rising CA-125 noted in 11/2020. CT showed new RLL lung mass.   Follow-up PET done 11/20/20 showed RLL lung mass hypermetabolic and hypermetabolic periportal adenopathy.     Recommended 2nd line treatment with Carboplatin/Taxol/Avastin.   Started cycle 1 on 12/8/20. Neulasta added with cycle 2.   Completed Cycle 6 on 3/24/21.  PET from 3/30/21 showed complete resolution of lung mass and no other disease.    Avastin maintenance started on 4/13/21.   Hospitalized for TIA after her 4th cycle of Avastin. Work-up for stroke and cause otherwise negative. Patient started on baby ASA by neurology.  Discussed there are no clear guidelines for changing/discontinuing treatment for TIA/CVA related to Avastin.  Since her symptoms resolved and there were no residual effects, recommended to continue with Avastin since it is working well for her disease and since a baby ASA was added for prevention. She was also continued on Eliquis for h/o PE.  S/p mediport removal for fracture on 8/26/21.   Continue Avastin through peripheral veins for now.    Patient continues on Avastin maintenance without problems.  BP good on Lisinopril 20mg daily.        Due for Avastin maintenance Cycle 44 tomorrow.     CBCdiff good today, urine protein has been 1+, unchanged. CMP and CA-125 pending and will follow-up. Last CA-125 remains WNL.     Proceed with treatment tomorrow.    Will continue checking labs every 3 weeks with protein UA/dipstick every 3 weeks.    Labs on 10/23/23 and treatment only cycle 45 on 10/24/23.  RTC T/D visit in 6 weeks prior to cycle 46.    Will continue to check  every 6 weeks. No further scans until rise in CA-125.  She is now >2 years from completing last chemotherapy, so still considered platinum-sensitive. Will need replacement of mediport when she needs to resume chemotherapy.     Vaccines/Boosters post-splenectomy: Menactra/Menveo every 5 years, Bexsero every 2-3 years, annual flu shot.  Last Bexsero was given 8/1/23.     Plan for Menactra/Menveo in 9/2024 which will be 5 years after initial    Continue Eliquis 5mg BID and ASA.    All questions answered at this time.        Renetta Corral MD

## 2023-10-02 ENCOUNTER — OFFICE VISIT (OUTPATIENT)
Dept: HEMATOLOGY/ONCOLOGY | Facility: CLINIC | Age: 73
End: 2023-10-02
Payer: MEDICARE

## 2023-10-02 VITALS
OXYGEN SATURATION: 99 % | DIASTOLIC BLOOD PRESSURE: 81 MMHG | HEIGHT: 62 IN | BODY MASS INDEX: 23.46 KG/M2 | HEART RATE: 59 BPM | SYSTOLIC BLOOD PRESSURE: 139 MMHG | WEIGHT: 127.5 LBS | TEMPERATURE: 98 F | RESPIRATION RATE: 14 BRPM

## 2023-10-02 DIAGNOSIS — C57.4 MALIGNANT NEOPLASM OF UTERINE ADNEXA: ICD-10-CM

## 2023-10-02 DIAGNOSIS — C78.6 PERITONEAL CARCINOMATOSIS: Primary | ICD-10-CM

## 2023-10-02 DIAGNOSIS — Z12.31 BREAST CANCER SCREENING BY MAMMOGRAM: ICD-10-CM

## 2023-10-02 DIAGNOSIS — I26.99 PULMONARY EMBOLISM, UNSPECIFIED CHRONICITY, UNSPECIFIED PULMONARY EMBOLISM TYPE, UNSPECIFIED WHETHER ACUTE COR PULMONALE PRESENT: ICD-10-CM

## 2023-10-02 LAB — CANCER AG125 SERPL-ACNC: 13.5 UNIT/ML (ref 0–35)

## 2023-10-02 PROCEDURE — 1101F PT FALLS ASSESS-DOCD LE1/YR: CPT | Mod: CPTII,S$GLB,, | Performed by: INTERNAL MEDICINE

## 2023-10-02 PROCEDURE — 99999 PR PBB SHADOW E&M-EST. PATIENT-LVL IV: ICD-10-PCS | Mod: PBBFAC,,, | Performed by: INTERNAL MEDICINE

## 2023-10-02 PROCEDURE — 3008F PR BODY MASS INDEX (BMI) DOCUMENTED: ICD-10-PCS | Mod: CPTII,S$GLB,, | Performed by: INTERNAL MEDICINE

## 2023-10-02 PROCEDURE — 3288F FALL RISK ASSESSMENT DOCD: CPT | Mod: CPTII,S$GLB,, | Performed by: INTERNAL MEDICINE

## 2023-10-02 PROCEDURE — 1160F PR REVIEW ALL MEDS BY PRESCRIBER/CLIN PHARMACIST DOCUMENTED: ICD-10-PCS | Mod: CPTII,S$GLB,, | Performed by: INTERNAL MEDICINE

## 2023-10-02 PROCEDURE — 1159F PR MEDICATION LIST DOCUMENTED IN MEDICAL RECORD: ICD-10-PCS | Mod: CPTII,S$GLB,, | Performed by: INTERNAL MEDICINE

## 2023-10-02 PROCEDURE — 4010F ACE/ARB THERAPY RXD/TAKEN: CPT | Mod: CPTII,S$GLB,, | Performed by: INTERNAL MEDICINE

## 2023-10-02 PROCEDURE — 1126F AMNT PAIN NOTED NONE PRSNT: CPT | Mod: CPTII,S$GLB,, | Performed by: INTERNAL MEDICINE

## 2023-10-02 PROCEDURE — 1101F PR PT FALLS ASSESS DOC 0-1 FALLS W/OUT INJ PAST YR: ICD-10-PCS | Mod: CPTII,S$GLB,, | Performed by: INTERNAL MEDICINE

## 2023-10-02 PROCEDURE — 86304 IMMUNOASSAY TUMOR CA 125: CPT | Performed by: INTERNAL MEDICINE

## 2023-10-02 PROCEDURE — 3008F BODY MASS INDEX DOCD: CPT | Mod: CPTII,S$GLB,, | Performed by: INTERNAL MEDICINE

## 2023-10-02 PROCEDURE — 3075F PR MOST RECENT SYSTOLIC BLOOD PRESS GE 130-139MM HG: ICD-10-PCS | Mod: CPTII,S$GLB,, | Performed by: INTERNAL MEDICINE

## 2023-10-02 PROCEDURE — 1126F PR PAIN SEVERITY QUANTIFIED, NO PAIN PRESENT: ICD-10-PCS | Mod: CPTII,S$GLB,, | Performed by: INTERNAL MEDICINE

## 2023-10-02 PROCEDURE — 99215 PR OFFICE/OUTPT VISIT, EST, LEVL V, 40-54 MIN: ICD-10-PCS | Mod: S$GLB,,, | Performed by: INTERNAL MEDICINE

## 2023-10-02 PROCEDURE — 36415 COLL VENOUS BLD VENIPUNCTURE: CPT | Performed by: INTERNAL MEDICINE

## 2023-10-02 PROCEDURE — 1159F MED LIST DOCD IN RCRD: CPT | Mod: CPTII,S$GLB,, | Performed by: INTERNAL MEDICINE

## 2023-10-02 PROCEDURE — 3079F DIAST BP 80-89 MM HG: CPT | Mod: CPTII,S$GLB,, | Performed by: INTERNAL MEDICINE

## 2023-10-02 PROCEDURE — 4010F PR ACE/ARB THEARPY RXD/TAKEN: ICD-10-PCS | Mod: CPTII,S$GLB,, | Performed by: INTERNAL MEDICINE

## 2023-10-02 PROCEDURE — 1160F RVW MEDS BY RX/DR IN RCRD: CPT | Mod: CPTII,S$GLB,, | Performed by: INTERNAL MEDICINE

## 2023-10-02 PROCEDURE — 3079F PR MOST RECENT DIASTOLIC BLOOD PRESSURE 80-89 MM HG: ICD-10-PCS | Mod: CPTII,S$GLB,, | Performed by: INTERNAL MEDICINE

## 2023-10-02 PROCEDURE — 99999 PR PBB SHADOW E&M-EST. PATIENT-LVL IV: CPT | Mod: PBBFAC,,, | Performed by: INTERNAL MEDICINE

## 2023-10-02 PROCEDURE — 3288F PR FALLS RISK ASSESSMENT DOCUMENTED: ICD-10-PCS | Mod: CPTII,S$GLB,, | Performed by: INTERNAL MEDICINE

## 2023-10-02 PROCEDURE — 3075F SYST BP GE 130 - 139MM HG: CPT | Mod: CPTII,S$GLB,, | Performed by: INTERNAL MEDICINE

## 2023-10-02 PROCEDURE — 99215 OFFICE O/P EST HI 40 MIN: CPT | Mod: S$GLB,,, | Performed by: INTERNAL MEDICINE

## 2023-10-03 ENCOUNTER — INFUSION (OUTPATIENT)
Dept: INFUSION THERAPY | Facility: HOSPITAL | Age: 73
End: 2023-10-03
Attending: INTERNAL MEDICINE
Payer: MEDICARE

## 2023-10-03 VITALS
SYSTOLIC BLOOD PRESSURE: 138 MMHG | TEMPERATURE: 98 F | HEART RATE: 66 BPM | BODY MASS INDEX: 22.56 KG/M2 | WEIGHT: 127.31 LBS | RESPIRATION RATE: 16 BRPM | HEIGHT: 63 IN | DIASTOLIC BLOOD PRESSURE: 75 MMHG | OXYGEN SATURATION: 98 %

## 2023-10-03 DIAGNOSIS — C78.6 PERITONEAL CARCINOMATOSIS: Primary | ICD-10-CM

## 2023-10-03 PROCEDURE — 96375 TX/PRO/DX INJ NEW DRUG ADDON: CPT

## 2023-10-03 PROCEDURE — 63600175 PHARM REV CODE 636 W HCPCS: Performed by: NURSE PRACTITIONER

## 2023-10-03 PROCEDURE — 25000003 PHARM REV CODE 250: Performed by: NURSE PRACTITIONER

## 2023-10-03 PROCEDURE — 96413 CHEMO IV INFUSION 1 HR: CPT

## 2023-10-03 RX ORDER — ACETAMINOPHEN 325 MG/1
650 TABLET ORAL
Status: CANCELLED | OUTPATIENT
Start: 2023-10-24

## 2023-10-03 RX ORDER — DIPHENHYDRAMINE HYDROCHLORIDE 50 MG/ML
50 INJECTION INTRAMUSCULAR; INTRAVENOUS ONCE AS NEEDED
Status: CANCELLED | OUTPATIENT
Start: 2023-10-03

## 2023-10-03 RX ORDER — DIPHENHYDRAMINE HYDROCHLORIDE 50 MG/ML
25 INJECTION INTRAMUSCULAR; INTRAVENOUS
Status: COMPLETED | OUTPATIENT
Start: 2023-10-03 | End: 2023-10-03

## 2023-10-03 RX ORDER — EPINEPHRINE 0.3 MG/.3ML
0.3 INJECTION SUBCUTANEOUS ONCE AS NEEDED
Status: CANCELLED | OUTPATIENT
Start: 2023-10-24

## 2023-10-03 RX ORDER — ACETAMINOPHEN 325 MG/1
650 TABLET ORAL
Status: CANCELLED | OUTPATIENT
Start: 2023-10-03

## 2023-10-03 RX ORDER — HEPARIN 100 UNIT/ML
500 SYRINGE INTRAVENOUS
Status: CANCELLED | OUTPATIENT
Start: 2023-10-03

## 2023-10-03 RX ORDER — SODIUM CHLORIDE 0.9 % (FLUSH) 0.9 %
10 SYRINGE (ML) INJECTION
Status: DISCONTINUED | OUTPATIENT
Start: 2023-10-03 | End: 2023-10-03 | Stop reason: HOSPADM

## 2023-10-03 RX ORDER — EPINEPHRINE 0.3 MG/.3ML
0.3 INJECTION SUBCUTANEOUS ONCE AS NEEDED
Status: CANCELLED | OUTPATIENT
Start: 2023-10-03

## 2023-10-03 RX ORDER — HEPARIN 100 UNIT/ML
500 SYRINGE INTRAVENOUS
Status: DISCONTINUED | OUTPATIENT
Start: 2023-10-03 | End: 2023-10-03 | Stop reason: HOSPADM

## 2023-10-03 RX ORDER — ACETAMINOPHEN 325 MG/1
650 TABLET ORAL
Status: DISCONTINUED | OUTPATIENT
Start: 2023-10-03 | End: 2023-10-03 | Stop reason: HOSPADM

## 2023-10-03 RX ORDER — DIPHENHYDRAMINE HYDROCHLORIDE 50 MG/ML
25 INJECTION INTRAMUSCULAR; INTRAVENOUS
Status: CANCELLED | OUTPATIENT
Start: 2023-10-03

## 2023-10-03 RX ORDER — HEPARIN 100 UNIT/ML
500 SYRINGE INTRAVENOUS
Status: CANCELLED | OUTPATIENT
Start: 2023-10-24

## 2023-10-03 RX ORDER — DIPHENHYDRAMINE HYDROCHLORIDE 50 MG/ML
25 INJECTION INTRAMUSCULAR; INTRAVENOUS
Status: CANCELLED | OUTPATIENT
Start: 2023-10-24

## 2023-10-03 RX ORDER — DIPHENHYDRAMINE HYDROCHLORIDE 50 MG/ML
50 INJECTION INTRAMUSCULAR; INTRAVENOUS ONCE AS NEEDED
Status: DISCONTINUED | OUTPATIENT
Start: 2023-10-03 | End: 2023-10-03 | Stop reason: HOSPADM

## 2023-10-03 RX ORDER — SODIUM CHLORIDE 0.9 % (FLUSH) 0.9 %
10 SYRINGE (ML) INJECTION
Status: CANCELLED | OUTPATIENT
Start: 2023-10-24

## 2023-10-03 RX ORDER — DIPHENHYDRAMINE HYDROCHLORIDE 50 MG/ML
50 INJECTION INTRAMUSCULAR; INTRAVENOUS ONCE AS NEEDED
Status: CANCELLED | OUTPATIENT
Start: 2023-10-24

## 2023-10-03 RX ORDER — EPINEPHRINE 0.3 MG/.3ML
0.3 INJECTION SUBCUTANEOUS ONCE AS NEEDED
Status: DISCONTINUED | OUTPATIENT
Start: 2023-10-03 | End: 2023-10-03 | Stop reason: HOSPADM

## 2023-10-03 RX ORDER — SODIUM CHLORIDE 0.9 % (FLUSH) 0.9 %
10 SYRINGE (ML) INJECTION
Status: CANCELLED | OUTPATIENT
Start: 2023-10-03

## 2023-10-03 RX ADMIN — BEVACIZUMAB-AWWB 900 MG: 100 INJECTION, SOLUTION INTRAVENOUS at 03:10

## 2023-10-03 RX ADMIN — DIPHENHYDRAMINE HYDROCHLORIDE 25 MG: 50 INJECTION INTRAMUSCULAR; INTRAVENOUS at 03:10

## 2023-10-03 RX ADMIN — SODIUM CHLORIDE: 9 INJECTION, SOLUTION INTRAVENOUS at 03:10

## 2023-10-04 ENCOUNTER — HOSPITAL ENCOUNTER (OUTPATIENT)
Dept: RADIOLOGY | Facility: HOSPITAL | Age: 73
Discharge: HOME | End: 2023-10-04
Attending: INTERNAL MEDICINE
Payer: MEDICARE

## 2023-10-04 DIAGNOSIS — C78.6 PERITONEAL CARCINOMATOSIS: ICD-10-CM

## 2023-10-04 DIAGNOSIS — Z12.31 BREAST CANCER SCREENING BY MAMMOGRAM: ICD-10-CM

## 2023-10-04 DIAGNOSIS — C57.4 MALIGNANT NEOPLASM OF UTERINE ADNEXA: ICD-10-CM

## 2023-10-04 PROCEDURE — 77063 MAMMO DIGITAL SCREENING BILAT WITH TOMO: ICD-10-PCS | Mod: 26,,, | Performed by: STUDENT IN AN ORGANIZED HEALTH CARE EDUCATION/TRAINING PROGRAM

## 2023-10-04 PROCEDURE — 77067 SCR MAMMO BI INCL CAD: CPT | Mod: 26,,, | Performed by: STUDENT IN AN ORGANIZED HEALTH CARE EDUCATION/TRAINING PROGRAM

## 2023-10-04 PROCEDURE — 77063 BREAST TOMOSYNTHESIS BI: CPT | Mod: 26,,, | Performed by: STUDENT IN AN ORGANIZED HEALTH CARE EDUCATION/TRAINING PROGRAM

## 2023-10-04 PROCEDURE — 77067 SCR MAMMO BI INCL CAD: CPT | Mod: TC

## 2023-10-04 PROCEDURE — 77067 MAMMO DIGITAL SCREENING BILAT WITH TOMO: ICD-10-PCS | Mod: 26,,, | Performed by: STUDENT IN AN ORGANIZED HEALTH CARE EDUCATION/TRAINING PROGRAM

## 2023-10-23 ENCOUNTER — TELEPHONE (OUTPATIENT)
Dept: HEMATOLOGY/ONCOLOGY | Facility: CLINIC | Age: 73
End: 2023-10-23
Payer: MEDICARE

## 2023-10-23 ENCOUNTER — LAB VISIT (OUTPATIENT)
Dept: LAB | Facility: HOSPITAL | Age: 73
End: 2023-10-23
Attending: INTERNAL MEDICINE
Payer: MEDICARE

## 2023-10-23 DIAGNOSIS — C78.6 PERITONEAL CARCINOMATOSIS: ICD-10-CM

## 2023-10-23 DIAGNOSIS — C57.4 MALIGNANT NEOPLASM OF UTERINE ADNEXA: ICD-10-CM

## 2023-10-23 DIAGNOSIS — R80.9 PROTEINURIA, UNSPECIFIED TYPE: Primary | ICD-10-CM

## 2023-10-23 LAB
ALBUMIN SERPL-MCNC: 4 G/DL (ref 3.4–4.8)
ALBUMIN/GLOB SERPL: 1.4 RATIO (ref 1.1–2)
ALP SERPL-CCNC: 44 UNIT/L (ref 40–150)
ALT SERPL-CCNC: 20 UNIT/L (ref 0–55)
AST SERPL-CCNC: 35 UNIT/L (ref 5–34)
BASOPHILS # BLD AUTO: 0.02 X10(3)/MCL
BASOPHILS NFR BLD AUTO: 0.2 %
BILIRUB SERPL-MCNC: 1.6 MG/DL
BUN SERPL-MCNC: 18.1 MG/DL (ref 9.8–20.1)
CALCIUM SERPL-MCNC: 9.7 MG/DL (ref 8.4–10.2)
CANCER AG125 SERPL-ACNC: 13.8 UNIT/ML (ref 0–35)
CHLORIDE SERPL-SCNC: 103 MMOL/L (ref 98–107)
CO2 SERPL-SCNC: 28 MMOL/L (ref 23–31)
CREAT SERPL-MCNC: 0.83 MG/DL (ref 0.55–1.02)
EOSINOPHIL # BLD AUTO: 0.13 X10(3)/MCL (ref 0–0.9)
EOSINOPHIL NFR BLD AUTO: 1.5 %
ERYTHROCYTE [DISTWIDTH] IN BLOOD BY AUTOMATED COUNT: 13.6 % (ref 11.5–17)
GFR SERPLBLD CREATININE-BSD FMLA CKD-EPI: >60 MLS/MIN/1.73/M2
GLOBULIN SER-MCNC: 2.9 GM/DL (ref 2.4–3.5)
GLUCOSE SERPL-MCNC: 97 MG/DL (ref 82–115)
HCT VFR BLD AUTO: 44.5 % (ref 37–47)
HGB BLD-MCNC: 13.7 G/DL (ref 12–16)
IMM GRANULOCYTES # BLD AUTO: 0.01 X10(3)/MCL (ref 0–0.04)
IMM GRANULOCYTES NFR BLD AUTO: 0.1 %
LYMPHOCYTES # BLD AUTO: 3.4 X10(3)/MCL (ref 0.6–4.6)
LYMPHOCYTES NFR BLD AUTO: 39.9 %
MCH RBC QN AUTO: 30.2 PG (ref 27–31)
MCHC RBC AUTO-ENTMCNC: 30.8 G/DL (ref 33–36)
MCV RBC AUTO: 98.2 FL (ref 80–94)
MONOCYTES # BLD AUTO: 0.88 X10(3)/MCL (ref 0.1–1.3)
MONOCYTES NFR BLD AUTO: 10.3 %
NEUTROPHILS # BLD AUTO: 4.08 X10(3)/MCL (ref 2.1–9.2)
NEUTROPHILS NFR BLD AUTO: 48 %
PLATELET # BLD AUTO: 340 X10(3)/MCL (ref 130–400)
PMV BLD AUTO: 8.5 FL (ref 7.4–10.4)
POTASSIUM SERPL-SCNC: 4.1 MMOL/L (ref 3.5–5.1)
PROT SERPL-MCNC: 6.9 GM/DL (ref 5.8–7.6)
PROT UR QL STRIP.AUTO: ABNORMAL
RBC # BLD AUTO: 4.53 X10(6)/MCL (ref 4.2–5.4)
SODIUM SERPL-SCNC: 140 MMOL/L (ref 136–145)
WBC # SPEC AUTO: 8.52 X10(3)/MCL (ref 4.5–11.5)

## 2023-10-23 PROCEDURE — 86304 IMMUNOASSAY TUMOR CA 125: CPT

## 2023-10-23 PROCEDURE — 80053 COMPREHEN METABOLIC PANEL: CPT

## 2023-10-23 PROCEDURE — 81005 URINALYSIS: CPT

## 2023-10-23 PROCEDURE — 85025 COMPLETE CBC W/AUTO DIFF WBC: CPT

## 2023-10-23 PROCEDURE — 36415 COLL VENOUS BLD VENIPUNCTURE: CPT

## 2023-10-23 NOTE — TELEPHONE ENCOUNTER
Will obtain 24 hour urine for 2+ protein.  Continue with treatment for now, unless protein comes back significantly elevated.  Will notify patient.     Renetta Corral MD

## 2023-10-24 ENCOUNTER — INFUSION (OUTPATIENT)
Dept: INFUSION THERAPY | Facility: HOSPITAL | Age: 73
End: 2023-10-24
Attending: INTERNAL MEDICINE
Payer: MEDICARE

## 2023-10-24 VITALS
TEMPERATURE: 98 F | HEIGHT: 63 IN | BODY MASS INDEX: 21.97 KG/M2 | RESPIRATION RATE: 18 BRPM | HEART RATE: 62 BPM | OXYGEN SATURATION: 97 % | SYSTOLIC BLOOD PRESSURE: 145 MMHG | DIASTOLIC BLOOD PRESSURE: 75 MMHG | WEIGHT: 124 LBS

## 2023-10-24 DIAGNOSIS — C78.6 PERITONEAL CARCINOMATOSIS: Primary | ICD-10-CM

## 2023-10-24 PROCEDURE — 96413 CHEMO IV INFUSION 1 HR: CPT

## 2023-10-24 PROCEDURE — 63600175 PHARM REV CODE 636 W HCPCS: Mod: JZ,JG | Performed by: NURSE PRACTITIONER

## 2023-10-24 PROCEDURE — 25000003 PHARM REV CODE 250: Performed by: NURSE PRACTITIONER

## 2023-10-24 PROCEDURE — 96375 TX/PRO/DX INJ NEW DRUG ADDON: CPT

## 2023-10-24 RX ORDER — SODIUM CHLORIDE 0.9 % (FLUSH) 0.9 %
10 SYRINGE (ML) INJECTION
Status: DISCONTINUED | OUTPATIENT
Start: 2023-10-24 | End: 2023-10-24 | Stop reason: HOSPADM

## 2023-10-24 RX ORDER — EPINEPHRINE 0.3 MG/.3ML
0.3 INJECTION SUBCUTANEOUS ONCE AS NEEDED
Status: DISCONTINUED | OUTPATIENT
Start: 2023-10-24 | End: 2023-10-24 | Stop reason: HOSPADM

## 2023-10-24 RX ORDER — DIPHENHYDRAMINE HYDROCHLORIDE 50 MG/ML
25 INJECTION INTRAMUSCULAR; INTRAVENOUS
Status: COMPLETED | OUTPATIENT
Start: 2023-10-24 | End: 2023-10-24

## 2023-10-24 RX ORDER — HEPARIN 100 UNIT/ML
500 SYRINGE INTRAVENOUS
Status: DISCONTINUED | OUTPATIENT
Start: 2023-10-24 | End: 2023-10-24 | Stop reason: HOSPADM

## 2023-10-24 RX ORDER — ACETAMINOPHEN 325 MG/1
650 TABLET ORAL
Status: DISCONTINUED | OUTPATIENT
Start: 2023-10-24 | End: 2023-10-24 | Stop reason: HOSPADM

## 2023-10-24 RX ORDER — DIPHENHYDRAMINE HYDROCHLORIDE 50 MG/ML
50 INJECTION INTRAMUSCULAR; INTRAVENOUS ONCE AS NEEDED
Status: DISCONTINUED | OUTPATIENT
Start: 2023-10-24 | End: 2023-10-24 | Stop reason: HOSPADM

## 2023-10-24 RX ADMIN — BEVACIZUMAB-AWWB 900 MG: 400 INJECTION, SOLUTION INTRAVENOUS at 03:10

## 2023-10-24 RX ADMIN — DIPHENHYDRAMINE HYDROCHLORIDE 25 MG: 50 INJECTION INTRAMUSCULAR; INTRAVENOUS at 03:10

## 2023-10-24 RX ADMIN — SODIUM CHLORIDE: 9 INJECTION, SOLUTION INTRAVENOUS at 03:10

## 2023-10-24 NOTE — PLAN OF CARE
MVASI given.  Tolerated well.  Discharged home with verbal and written instuctions on 24 hour urine collection.  She voices understanding.

## 2023-10-26 ENCOUNTER — TELEPHONE (OUTPATIENT)
Dept: HEMATOLOGY/ONCOLOGY | Facility: CLINIC | Age: 73
End: 2023-10-26
Payer: MEDICARE

## 2023-10-26 ENCOUNTER — APPOINTMENT (OUTPATIENT)
Dept: LAB | Facility: HOSPITAL | Age: 73
End: 2023-10-26
Attending: INTERNAL MEDICINE
Payer: MEDICARE

## 2023-10-26 LAB
PROT 24H UR-MCNC: 242 MG/24 H (ref 0–165)
PROT UR STRIP-MCNC: 24.2 MG/DL
TOTAL VOLUME  (OHS): 1000 ML

## 2023-10-26 NOTE — TELEPHONE ENCOUNTER
24-hour urine for protein with low level 242mg/24hr. No need to change her treatment.  Patient notified.    Renetta Corral MD

## 2023-11-07 NOTE — PROGRESS NOTES
Subjective:       Patient ID: Shayna Ledezma is a 73 y.o. female.  GI: Dr. Gamaliel Carlos  GYN ONC at [facility]: Dr. Mike Santana    1. 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 Pulmonary Embolism (Incidental on CT)--19    Procedure/pathology:   1. Paracentesis with removal of 5L of fluid done 18--serous carcinoma, high grade, c/w ovarian primary, PAX-8, CK7 and MOC-31 positive, CDX-2, CK-20 and Calretinin-negative.  2. S/p Exploratory Lap, radical tumor debulking including total abdominal hysterectomy, bilateral salpingo-oophorectomy, bilateral pelvic lymph node sampling, appendectomy, mobilization of the left ureter, complete gross resection of the disease with removal of mesenteric disease, as well as omental disese and parasplenic disease by Dr. Santana 3/18/19--Metastatic high grade serous carcinoma. Tissue/organ involvement: right ovary, appendectomy, omentum, mesentery (including small bowel mesentery) and multiple other sites designated: periumbilical, perisplenic, transverse colon, splenic flexure, perirectal, left periureteral. 2 lymph nodes negative. [id number]. FIGO stage IIIC. Myriad somatic instability testing positive for genomic instability, negative BRCA1 and BRCA2 tumor testing.  3. Splenectomy done 10/21/19--splenic involvement with metastatic high grade serous carcinoma, 5 benign lymph nodes. Caris testing showed LAM, NTRK1/2/3 negative, TMB low, BRCA1/2 negative, ER/MO negative, CATHY negative, SPEN pathogenic variant Exon 11, TP53 pathogenic variant Exon 5, PD-L1 positive CPS 15, Genomic ELODIA high.  Imagin. CT A/P w/ and w/o contrast done at [facility] 18--large volume ascites with multiple peritoneal implants, right pericolic gutter implant measures 2.5X3.3cm, LUQ omental/mesenteric implant measures 3X3.6cm, omental carcinomatosis, extensive enhancing soft tissue surrounding sigmoid colon vs. large sigmoid mass, left  ovarian cystic lesion appears to have some internal septations measures 3.5X3.8cm, enhancing periportal soft tissue density likely lymphadenopathy with some narrowing of the portal vein noted, but without internal filling defect, borderline splenomegaly, subtle solid lesion w/n central spleen measures 2.4X2.5cm, likely metastatic, with peripheral area of diminished enhancement suggesting splenic infarct, soft tissue implant abutting gallbladder measures 2X2.2cm, no right adnexal mass, trace bilateral layering pleural fluid, small moderate-sized hiatal hernia, few borderline enlarged lymph nodes w/n right epicardial fat pad.  2. CT of chest on 1/11/19--Some prominent right cardiophrenic lymph nodes are nonspecific and can be followed on future surveillance scans. Otherwise no CT evidence of metastatic disease to the chest. While exam was not tailored for evaluation of the pulmonary arteries I suspect there is pulmonary thromboembolic disease. Peritoneal carcinomatosis seen in the upper abdomen. Critical Result: Pulmonary Embolus.  3. CT C/A/P 3/4/19--Positive response to therapy. No new or progressive metastatic disease in the chest, abdomen or pelvis. Stable to improved findings include smaller pericardial lymph nodes, andrew hepatis adenopathy, peritoneal carcinomatosis, smaller left adnexal lesion; no evidence of PE within limitations of the study.  5. CT PE protocol chest/A/P 6/11/19--No PE, improved pericardial lymph node with minimal size on current examination, improved peritoneal carcinomatosis and left adnexal implant, splenic ischemia evolved into small infarct, size improvement of one of splenic hypodense lesion and mild size increase of second hypodense lesion, favored to be benign/cystic, no new findings.  6. PET/CT 9/25/19--s/p hysterectomy and bilateral oophorectomy, no evidence for locally recurrent/residual disease, intensely hypermetabolic hypodense lesion within the spleen 3.5X3.3cm concerning for  metastatic focus. No other abnormal focus of disease.  7. CT C/A/P 11/13/20--Right lower lobe 3.3 cm mass is presumed metastatic, otherwise no evident residual, recurrent or metastatic disease.   8. PET/CT 11/20/20--Hypermetabolic right lung base lesion and suspected metastatic periportal adenopathy, no additional sites of FDG-avid otherwise aggressive appearing pathology through the neck, chest, abdomen, or pelvis.  9. PET/CT 1/21/21--Interval decrease in size of the right lower lobe mass with decreased FDG uptake, now measures 1.7 x 2.2 cm (previous 3.3 x 3.1 cm), resolution of FDG uptake in the suspected periportal lymph node which is not identifiable on noncontrast CT. No evidence of new or progressive hypermetabolic metastatic disease.  10. PET/CT 3/30/21--Resolved right lower lung lobe hypermetabolic mass. Otherwise, no interval change or evidence of residual disease, recurrence or new metastatic lesion.    Procedures:   1. Paracentesis on 12/28/18 with removal of 5 Liters.  2. Paracentesis on 01/07/19 with removal of 3.5 Liters.  3. Paracentesis on 02/06/19 with removal of 2.5 Liters.  4. Mediport placed 12/2020 by Dr. Serge Gentile--removed 8/26/21 due to mediport fracture.    Germline genetic testing done by [facility] 1/29/19--negative for BRCA1/2, two (2) variants of uncertain significance (VUS): CATHY p.S7044K/p.Z0460Y and MLH1 p.P603L.     :  12/19/18 1102  11/10/20  42.9  12/08/20 79.5  01/05/21 28.9  01/26/21 12.8  02/18/21 11.6  03/15/22--10.3  04/26/22--9.6  05/16/22--10.2  06/07/22--10.1  07/19/22--11.6  08/08/22--12.5  09/19/22--12.7  10/31/22--13.5  01/03/23--12.0  02/13/23--14.0  03/03/23--13.9  04/17/23--14.5  05/26/23--14.6  07/31/23--13.5    Treatment History:  1. Neoadjuvant Carboplatin/Taxol every 3 weeks x 3 cycles. 1/15/19 - 2/26/19. Neulasta added.   2. Surgery--tumor debulking KORI/BSO 3/18/19.  3. Adjuvant Carboplatin/Taxol x 3 additions cycles 4/9/19--5/21/19.  4. Splenectomy  10/21/19.  5. Niraparib maintenance (dose reduction to 100mg daily for cytopenias) 11/8/19--12/1/20 (stopped due to progression).  6. Carboplatin/Paclitaxel/Avastin X 6 cycles completed 12/8/21--3/24/21 (complete response).    Current Treatment:  1. Eliquis BID for incidental PE on imaging 1/2019  2. Avastin maintenance started on 4/13/21.   Cycle 46 due on 11/14/23.      Chief Complaint: Other Misc (Pt reports no concerns today.)    HPI   Patient presents for scheduled follow-up of ovarian cancer. She continues on Avastin every 3 weeks without problems. She did come with 2+ protein on dipstick but 24-hour urine good, so treatment has been continued. BP is a little high today, but has been good. She has no complaints. Bowels moving well, no pain or new problems.    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 180 tablet 3    aspirin (ECOTRIN) 81 MG EC tablet Take 81 mg by mouth once daily.      atorvastatin (LIPITOR) 40 MG tablet TAKE 1 TABLET(40 MG) BY MOUTH EVERY DAY 90 tablet 3    b complex vitamins capsule Take 1 capsule by mouth once daily.      GLUTAMINE ORAL Take 10 g by mouth Daily.      lisinopriL (PRINIVIL,ZESTRIL) 20 MG tablet TAKE 1 TABLET(20 MG) BY MOUTH EVERY DAY 30 tablet 3    pyridoxine, vitamin B6, (B-6) 250 MG Tab Take 250 mg by mouth once daily.      sodium chloride  0.9% SolP 100 mL with bevacizumab 25 mg/mL Soln Inject into the vein every 21 days.       No current facility-administered medications on file prior to visit.      Review of Systems   Constitutional:  Negative for appetite change and unexpected weight change.   HENT:  Negative for mouth sores.    Eyes:  Negative for visual disturbance.   Respiratory:  Negative for cough and shortness of breath.    Cardiovascular:  Negative for chest pain.   Gastrointestinal:  Negative for abdominal pain and diarrhea.   Genitourinary:  Negative for frequency.   Musculoskeletal:  Negative for back pain.   Integumentary:  Negative for rash.   Neurological:  Negative for headaches.   Hematological:  Negative for adenopathy.   Psychiatric/Behavioral:  The patient is not nervous/anxious.         Vitals:    11/13/23 1059   BP: (!) 141/82   Pulse: 68   Resp: 14   Temp: 98.1 °F (36.7 °C)       Physical Exam  Vitals reviewed.   Constitutional:       Appearance: Normal appearance. She is normal weight.   HENT:      Head: Normocephalic.   Eyes:      General: Lids are normal. Vision grossly intact.      Extraocular Movements: Extraocular movements intact.      Conjunctiva/sclera: Conjunctivae normal.   Cardiovascular:      Rate and Rhythm: Normal rate and regular rhythm.      Pulses: Normal pulses.      Heart sounds: Normal heart sounds, S1 normal and S2 normal.   Pulmonary:      Effort: Pulmonary effort is normal.      Breath sounds: Normal breath sounds.   Abdominal:      General: Abdomen is flat. Bowel sounds are normal. There is no distension.      Palpations: Abdomen is soft.      Tenderness: There is no abdominal tenderness.   Musculoskeletal:      Cervical back: Normal range of motion and neck supple.      Right lower leg: No edema.      Left lower leg: No edema.   Skin:     General: Skin is warm and moist.      Capillary Refill: Capillary refill takes less than 2 seconds.      Comments: Left CW mediport removed, site intact    Neurological:      General: No focal deficit present.      Mental Status: She is alert and oriented to person, place, and time.   Psychiatric:         Attention and Perception: Attention normal.         Mood and Affect: Mood normal.         Speech: Speech normal.         Behavior: Behavior normal. Behavior is cooperative.         Cognition and Memory: Cognition normal.         Judgment: Judgment normal.       Lab Visit on 11/13/2023   Component Date Value    Protein, UA 11/13/2023 1+ (A)     WBC 11/13/2023 8.45     RBC 11/13/2023 4.61     Hgb 11/13/2023 14.0     Hct 11/13/2023 44.6     MCV 11/13/2023 96.7 (H)     MCH 11/13/2023 30.4     MCHC 11/13/2023 31.4 (L)     RDW 11/13/2023 13.1     Platelet 11/13/2023 343     MPV 11/13/2023 8.2     Neut % 11/13/2023 49.0     Lymph % 11/13/2023 36.1     Mono % 11/13/2023 13.8     Eos % 11/13/2023 0.8     Basophil % 11/13/2023 0.2     Lymph # 11/13/2023 3.05     Neut # 11/13/2023 4.13     Mono # 11/13/2023 1.17     Eos # 11/13/2023 0.07     Baso # 11/13/2023 0.02     IG# 11/13/2023 0.01     IG% 11/13/2023 0.1           Assessment:       1. Peritoneal carcinomatosis        Plan:     Patient with left ovarian cancer diagnosed 12/26/18, appears to be stage IIIC vs. IV with diffuse peritoneal disease, possible splenic involvement, epicardial lymph nodes and bilateral pleural effusions.  Patient seen and evaluated by Dr. Mike Santana at [facility] in [place].   Treatment recommended upfront with chemotherapy Carboplatin/Paclitaxel x 3 cycles.  Completed Cycle 3 on 2/26/19.   She underwent total abdominal hysterectomy, BSO and tumor debulking on 3/18/19 with Dr. Santana with complete gross resection of disease. FIGO Stage IIIC papillary serous fallopian tube cancer.   Patient resumed chemotherapy with cycle 4 on 4/9/19. Completed cycle 6 on 5/21/19.    Testing on tumor was positive for genomic instability but negative for BRCA mutation.  Per NCCN guidelines, maintenance can  be considered for BRCA mutated germline category 1 and somatic category 2A. There is some evidence that PARP inhibitors have some activity as well in tumors with genomic instability. But the PARP maintenance is not approved for this indication. Offered treatment to patient and after discussion she declined and made decision to continue with observation only.    PET/CT done for rising CA-125 showed hypermetabolic splenic lesion which is not new, just enlarged from previous. Case discussed with Dr. Mike Santana.  Patient is now s/p splenectomy done 10/21/19. Consistent with splenic involvement with high grade serous carcinoma.   Dr. Santana recommended Niraparib maintenance based on new data showing activity in HRD positive ovarian cancer and patient started on 11/8/19, required a dose reduction due to cytopenias.  Rising CA-125 noted in 11/2020. CT showed new RLL lung mass.   Follow-up PET done 11/20/20 showed RLL lung mass hypermetabolic and hypermetabolic periportal adenopathy.     Recommended 2nd line treatment with Carboplatin/Taxol/Avastin.   Started cycle 1 on 12/8/20. Neulasta added with cycle 2.   Completed Cycle 6 on 3/24/21.  PET from 3/30/21 showed complete resolution of lung mass and no other disease.    Avastin maintenance started on 4/13/21.   Hospitalized for TIA after her 4th cycle of Avastin. Work-up for stroke and cause otherwise negative. Patient started on baby ASA by neurology.  Discussed there are no clear guidelines for changing/discontinuing treatment for TIA/CVA related to Avastin.  Since her symptoms resolved and there were no residual effects, recommended to continue with Avastin since it is working well for her disease and since a baby ASA was added for prevention. She was also continued on Eliquis for h/o PE.  S/p mediport removal for fracture on 8/26/21.   Continue Avastin through peripheral veins for now.    Patient continues on Avastin maintenance without problems.  BP good on Lisinopril  20mg daily.      2+ protein on dipstick from 10/2023, 24-hour urine okay.         Due for Avastin maintenance Cycle 46 tomorrow.      CBCdiff good today, urine protein back to 1+. CMP and CA-125 pending and will follow-up. Last CA-125 remains WNL.     Proceed with treatment tomorrow.    Will continue checking labs every 3 weeks with protein UA/dipstick every 3 weeks.    Labs on 12/4/23 and treatment only cycle 47 on 12/5/23.  RTC T/D visit in 6 weeks prior to cycle 48. Will have to see on 12/26/23 and treat on 12/27/23 due to holiday.    Will continue to check  every 6 weeks. No further scans until rise in CA-125.  She is now >2 years from completing last chemotherapy, so still considered platinum-sensitive. Will need replacement of mediport when she needs to resume chemotherapy.     Vaccines/Boosters post-splenectomy: Menactra/Menveo every 5 years, Bexsero every 2-3 years, annual flu shot.  Last Bexsero was given 8/1/23.     Plan for Menactra/Menveo in 9/2024 which will be 5 years after initial    Continue Eliquis 5mg BID and ASA.    All questions answered at this time.        Renetta Corral MD

## 2023-11-13 ENCOUNTER — OFFICE VISIT (OUTPATIENT)
Dept: HEMATOLOGY/ONCOLOGY | Facility: CLINIC | Age: 73
End: 2023-11-13
Payer: MEDICARE

## 2023-11-13 VITALS
DIASTOLIC BLOOD PRESSURE: 82 MMHG | HEIGHT: 63 IN | OXYGEN SATURATION: 99 % | HEART RATE: 68 BPM | TEMPERATURE: 98 F | WEIGHT: 124.19 LBS | SYSTOLIC BLOOD PRESSURE: 141 MMHG | BODY MASS INDEX: 22 KG/M2 | RESPIRATION RATE: 14 BRPM

## 2023-11-13 DIAGNOSIS — C78.6 PERITONEAL CARCINOMATOSIS: Primary | ICD-10-CM

## 2023-11-13 PROCEDURE — 1159F PR MEDICATION LIST DOCUMENTED IN MEDICAL RECORD: ICD-10-PCS | Mod: CPTII,S$GLB,, | Performed by: INTERNAL MEDICINE

## 2023-11-13 PROCEDURE — 1159F MED LIST DOCD IN RCRD: CPT | Mod: CPTII,S$GLB,, | Performed by: INTERNAL MEDICINE

## 2023-11-13 PROCEDURE — 1160F PR REVIEW ALL MEDS BY PRESCRIBER/CLIN PHARMACIST DOCUMENTED: ICD-10-PCS | Mod: CPTII,S$GLB,, | Performed by: INTERNAL MEDICINE

## 2023-11-13 PROCEDURE — 99999 PR PBB SHADOW E&M-EST. PATIENT-LVL IV: ICD-10-PCS | Mod: PBBFAC,,, | Performed by: INTERNAL MEDICINE

## 2023-11-13 PROCEDURE — 3288F FALL RISK ASSESSMENT DOCD: CPT | Mod: CPTII,S$GLB,, | Performed by: INTERNAL MEDICINE

## 2023-11-13 PROCEDURE — 99215 OFFICE O/P EST HI 40 MIN: CPT | Mod: S$GLB,,, | Performed by: INTERNAL MEDICINE

## 2023-11-13 PROCEDURE — 1101F PT FALLS ASSESS-DOCD LE1/YR: CPT | Mod: CPTII,S$GLB,, | Performed by: INTERNAL MEDICINE

## 2023-11-13 PROCEDURE — 3008F BODY MASS INDEX DOCD: CPT | Mod: CPTII,S$GLB,, | Performed by: INTERNAL MEDICINE

## 2023-11-13 PROCEDURE — 3079F PR MOST RECENT DIASTOLIC BLOOD PRESSURE 80-89 MM HG: ICD-10-PCS | Mod: CPTII,S$GLB,, | Performed by: INTERNAL MEDICINE

## 2023-11-13 PROCEDURE — 1126F PR PAIN SEVERITY QUANTIFIED, NO PAIN PRESENT: ICD-10-PCS | Mod: CPTII,S$GLB,, | Performed by: INTERNAL MEDICINE

## 2023-11-13 PROCEDURE — 99215 PR OFFICE/OUTPT VISIT, EST, LEVL V, 40-54 MIN: ICD-10-PCS | Mod: S$GLB,,, | Performed by: INTERNAL MEDICINE

## 2023-11-13 PROCEDURE — 3077F PR MOST RECENT SYSTOLIC BLOOD PRESSURE >= 140 MM HG: ICD-10-PCS | Mod: CPTII,S$GLB,, | Performed by: INTERNAL MEDICINE

## 2023-11-13 PROCEDURE — 4010F ACE/ARB THERAPY RXD/TAKEN: CPT | Mod: CPTII,S$GLB,, | Performed by: INTERNAL MEDICINE

## 2023-11-13 PROCEDURE — 3288F PR FALLS RISK ASSESSMENT DOCUMENTED: ICD-10-PCS | Mod: CPTII,S$GLB,, | Performed by: INTERNAL MEDICINE

## 2023-11-13 PROCEDURE — 1126F AMNT PAIN NOTED NONE PRSNT: CPT | Mod: CPTII,S$GLB,, | Performed by: INTERNAL MEDICINE

## 2023-11-13 PROCEDURE — 4010F PR ACE/ARB THEARPY RXD/TAKEN: ICD-10-PCS | Mod: CPTII,S$GLB,, | Performed by: INTERNAL MEDICINE

## 2023-11-13 PROCEDURE — 1160F RVW MEDS BY RX/DR IN RCRD: CPT | Mod: CPTII,S$GLB,, | Performed by: INTERNAL MEDICINE

## 2023-11-13 PROCEDURE — 99999 PR PBB SHADOW E&M-EST. PATIENT-LVL IV: CPT | Mod: PBBFAC,,, | Performed by: INTERNAL MEDICINE

## 2023-11-13 PROCEDURE — 1101F PR PT FALLS ASSESS DOC 0-1 FALLS W/OUT INJ PAST YR: ICD-10-PCS | Mod: CPTII,S$GLB,, | Performed by: INTERNAL MEDICINE

## 2023-11-13 PROCEDURE — 3008F PR BODY MASS INDEX (BMI) DOCUMENTED: ICD-10-PCS | Mod: CPTII,S$GLB,, | Performed by: INTERNAL MEDICINE

## 2023-11-13 PROCEDURE — 3079F DIAST BP 80-89 MM HG: CPT | Mod: CPTII,S$GLB,, | Performed by: INTERNAL MEDICINE

## 2023-11-13 PROCEDURE — 3077F SYST BP >= 140 MM HG: CPT | Mod: CPTII,S$GLB,, | Performed by: INTERNAL MEDICINE

## 2023-11-13 RX ORDER — HEPARIN 100 UNIT/ML
500 SYRINGE INTRAVENOUS
Status: CANCELLED | OUTPATIENT
Start: 2023-12-27

## 2023-11-13 RX ORDER — DIPHENHYDRAMINE HYDROCHLORIDE 50 MG/ML
25 INJECTION INTRAMUSCULAR; INTRAVENOUS
Status: CANCELLED | OUTPATIENT
Start: 2023-11-14

## 2023-11-13 RX ORDER — EPINEPHRINE 0.3 MG/.3ML
0.3 INJECTION SUBCUTANEOUS ONCE AS NEEDED
Status: CANCELLED | OUTPATIENT
Start: 2023-11-14

## 2023-11-13 RX ORDER — SODIUM CHLORIDE 0.9 % (FLUSH) 0.9 %
10 SYRINGE (ML) INJECTION
Status: CANCELLED | OUTPATIENT
Start: 2023-11-14

## 2023-11-13 RX ORDER — SODIUM CHLORIDE 0.9 % (FLUSH) 0.9 %
10 SYRINGE (ML) INJECTION
Status: CANCELLED | OUTPATIENT
Start: 2023-12-27

## 2023-11-13 RX ORDER — DIPHENHYDRAMINE HYDROCHLORIDE 50 MG/ML
25 INJECTION INTRAMUSCULAR; INTRAVENOUS
Status: CANCELLED | OUTPATIENT
Start: 2023-12-27

## 2023-11-13 RX ORDER — EPINEPHRINE 0.3 MG/.3ML
0.3 INJECTION SUBCUTANEOUS ONCE AS NEEDED
Status: CANCELLED | OUTPATIENT
Start: 2023-12-27

## 2023-11-13 RX ORDER — ACETAMINOPHEN 325 MG/1
650 TABLET ORAL
Status: CANCELLED | OUTPATIENT
Start: 2023-11-14

## 2023-11-13 RX ORDER — ACETAMINOPHEN 325 MG/1
650 TABLET ORAL
Status: CANCELLED | OUTPATIENT
Start: 2023-12-27

## 2023-11-13 RX ORDER — HEPARIN 100 UNIT/ML
500 SYRINGE INTRAVENOUS
Status: CANCELLED | OUTPATIENT
Start: 2023-11-14

## 2023-11-13 RX ORDER — DIPHENHYDRAMINE HYDROCHLORIDE 50 MG/ML
50 INJECTION INTRAMUSCULAR; INTRAVENOUS ONCE AS NEEDED
Status: CANCELLED | OUTPATIENT
Start: 2023-11-14

## 2023-11-13 RX ORDER — DIPHENHYDRAMINE HYDROCHLORIDE 50 MG/ML
50 INJECTION INTRAMUSCULAR; INTRAVENOUS ONCE AS NEEDED
Status: CANCELLED | OUTPATIENT
Start: 2023-12-27

## 2023-11-14 ENCOUNTER — INFUSION (OUTPATIENT)
Dept: INFUSION THERAPY | Facility: HOSPITAL | Age: 73
End: 2023-11-14
Attending: INTERNAL MEDICINE
Payer: MEDICARE

## 2023-11-14 VITALS
SYSTOLIC BLOOD PRESSURE: 163 MMHG | TEMPERATURE: 97 F | OXYGEN SATURATION: 99 % | DIASTOLIC BLOOD PRESSURE: 81 MMHG | HEART RATE: 59 BPM

## 2023-11-14 DIAGNOSIS — C78.6 PERITONEAL CARCINOMATOSIS: Primary | ICD-10-CM

## 2023-11-14 PROCEDURE — 63600175 PHARM REV CODE 636 W HCPCS: Performed by: INTERNAL MEDICINE

## 2023-11-14 PROCEDURE — 25000003 PHARM REV CODE 250: Performed by: INTERNAL MEDICINE

## 2023-11-14 PROCEDURE — 96376 TX/PRO/DX INJ SAME DRUG ADON: CPT

## 2023-11-14 PROCEDURE — 96413 CHEMO IV INFUSION 1 HR: CPT

## 2023-11-14 RX ORDER — HEPARIN 100 UNIT/ML
500 SYRINGE INTRAVENOUS
Status: DISCONTINUED | OUTPATIENT
Start: 2023-11-14 | End: 2023-11-14 | Stop reason: HOSPADM

## 2023-11-14 RX ORDER — DIPHENHYDRAMINE HYDROCHLORIDE 50 MG/ML
50 INJECTION INTRAMUSCULAR; INTRAVENOUS ONCE AS NEEDED
Status: DISCONTINUED | OUTPATIENT
Start: 2023-11-14 | End: 2023-11-14 | Stop reason: HOSPADM

## 2023-11-14 RX ORDER — SODIUM CHLORIDE 0.9 % (FLUSH) 0.9 %
10 SYRINGE (ML) INJECTION
Status: DISCONTINUED | OUTPATIENT
Start: 2023-11-14 | End: 2023-11-14 | Stop reason: HOSPADM

## 2023-11-14 RX ORDER — EPINEPHRINE 0.3 MG/.3ML
0.3 INJECTION SUBCUTANEOUS ONCE AS NEEDED
Status: DISCONTINUED | OUTPATIENT
Start: 2023-11-14 | End: 2023-11-14 | Stop reason: HOSPADM

## 2023-11-14 RX ORDER — DIPHENHYDRAMINE HYDROCHLORIDE 50 MG/ML
25 INJECTION INTRAMUSCULAR; INTRAVENOUS
Status: COMPLETED | OUTPATIENT
Start: 2023-11-14 | End: 2023-11-14

## 2023-11-14 RX ORDER — ACETAMINOPHEN 325 MG/1
650 TABLET ORAL
Status: DISCONTINUED | OUTPATIENT
Start: 2023-11-14 | End: 2023-11-14 | Stop reason: HOSPADM

## 2023-11-14 RX ADMIN — DIPHENHYDRAMINE HYDROCHLORIDE 25 MG: 50 INJECTION INTRAMUSCULAR; INTRAVENOUS at 03:11

## 2023-11-14 RX ADMIN — BEVACIZUMAB-AWWB 800 MG: 400 INJECTION, SOLUTION INTRAVENOUS at 03:11

## 2023-12-04 ENCOUNTER — LAB VISIT (OUTPATIENT)
Dept: LAB | Facility: HOSPITAL | Age: 73
End: 2023-12-04
Attending: INTERNAL MEDICINE
Payer: MEDICARE

## 2023-12-04 DIAGNOSIS — C78.6 PERITONEAL CARCINOMATOSIS: ICD-10-CM

## 2023-12-04 DIAGNOSIS — C57.4 MALIGNANT NEOPLASM OF UTERINE ADNEXA: ICD-10-CM

## 2023-12-04 LAB
ALBUMIN SERPL-MCNC: 4 G/DL (ref 3.4–4.8)
ALBUMIN/GLOB SERPL: 1.5 RATIO (ref 1.1–2)
ALP SERPL-CCNC: 48 UNIT/L (ref 40–150)
ALT SERPL-CCNC: 23 UNIT/L (ref 0–55)
AST SERPL-CCNC: 34 UNIT/L (ref 5–34)
BASOPHILS # BLD AUTO: 0.02 X10(3)/MCL
BASOPHILS NFR BLD AUTO: 0.3 %
BILIRUB SERPL-MCNC: 1.7 MG/DL
BUN SERPL-MCNC: 13.2 MG/DL (ref 9.8–20.1)
CALCIUM SERPL-MCNC: 9.5 MG/DL (ref 8.4–10.2)
CANCER AG125 SERPL-ACNC: 16.8 UNIT/ML (ref 0–35)
CHLORIDE SERPL-SCNC: 99 MMOL/L (ref 98–107)
CO2 SERPL-SCNC: 29 MMOL/L (ref 23–31)
CREAT SERPL-MCNC: 0.71 MG/DL (ref 0.55–1.02)
EOSINOPHIL # BLD AUTO: 0.09 X10(3)/MCL (ref 0–0.9)
EOSINOPHIL NFR BLD AUTO: 1.2 %
ERYTHROCYTE [DISTWIDTH] IN BLOOD BY AUTOMATED COUNT: 13 % (ref 11.5–17)
GFR SERPLBLD CREATININE-BSD FMLA CKD-EPI: >60 MLS/MIN/1.73/M2
GLOBULIN SER-MCNC: 2.6 GM/DL (ref 2.4–3.5)
GLUCOSE SERPL-MCNC: 102 MG/DL (ref 82–115)
HCT VFR BLD AUTO: 42.3 % (ref 37–47)
HGB BLD-MCNC: 13.4 G/DL (ref 12–16)
IMM GRANULOCYTES # BLD AUTO: 0.01 X10(3)/MCL (ref 0–0.04)
IMM GRANULOCYTES NFR BLD AUTO: 0.1 %
LYMPHOCYTES # BLD AUTO: 3.13 X10(3)/MCL (ref 0.6–4.6)
LYMPHOCYTES NFR BLD AUTO: 41 %
MCH RBC QN AUTO: 30.7 PG (ref 27–31)
MCHC RBC AUTO-ENTMCNC: 31.7 G/DL (ref 33–36)
MCV RBC AUTO: 96.8 FL (ref 80–94)
MONOCYTES # BLD AUTO: 1.01 X10(3)/MCL (ref 0.1–1.3)
MONOCYTES NFR BLD AUTO: 13.2 %
NEUTROPHILS # BLD AUTO: 3.38 X10(3)/MCL (ref 2.1–9.2)
NEUTROPHILS NFR BLD AUTO: 44.2 %
PLATELET # BLD AUTO: 355 X10(3)/MCL (ref 130–400)
PMV BLD AUTO: 8.2 FL (ref 7.4–10.4)
POTASSIUM SERPL-SCNC: 4.5 MMOL/L (ref 3.5–5.1)
PROT SERPL-MCNC: 6.6 GM/DL (ref 5.8–7.6)
PROT UR QL STRIP.AUTO: ABNORMAL
RBC # BLD AUTO: 4.37 X10(6)/MCL (ref 4.2–5.4)
SODIUM SERPL-SCNC: 136 MMOL/L (ref 136–145)
WBC # SPEC AUTO: 7.64 X10(3)/MCL (ref 4.5–11.5)

## 2023-12-04 PROCEDURE — 80053 COMPREHEN METABOLIC PANEL: CPT

## 2023-12-04 PROCEDURE — 36415 COLL VENOUS BLD VENIPUNCTURE: CPT

## 2023-12-04 PROCEDURE — 85025 COMPLETE CBC W/AUTO DIFF WBC: CPT

## 2023-12-04 PROCEDURE — 86304 IMMUNOASSAY TUMOR CA 125: CPT

## 2023-12-04 PROCEDURE — 81005 URINALYSIS: CPT

## 2023-12-04 RX ORDER — DIPHENHYDRAMINE HYDROCHLORIDE 50 MG/ML
25 INJECTION INTRAMUSCULAR; INTRAVENOUS
Status: CANCELLED | OUTPATIENT
Start: 2023-12-05

## 2023-12-04 RX ORDER — DIPHENHYDRAMINE HYDROCHLORIDE 50 MG/ML
50 INJECTION INTRAMUSCULAR; INTRAVENOUS ONCE AS NEEDED
Status: CANCELLED | OUTPATIENT
Start: 2023-12-05

## 2023-12-04 RX ORDER — SODIUM CHLORIDE 0.9 % (FLUSH) 0.9 %
10 SYRINGE (ML) INJECTION
Status: CANCELLED | OUTPATIENT
Start: 2023-12-05

## 2023-12-04 RX ORDER — HEPARIN 100 UNIT/ML
500 SYRINGE INTRAVENOUS
Status: CANCELLED | OUTPATIENT
Start: 2023-12-05

## 2023-12-04 RX ORDER — ACETAMINOPHEN 325 MG/1
650 TABLET ORAL
Status: CANCELLED | OUTPATIENT
Start: 2023-12-05

## 2023-12-04 RX ORDER — EPINEPHRINE 0.3 MG/.3ML
0.3 INJECTION SUBCUTANEOUS ONCE AS NEEDED
Status: CANCELLED | OUTPATIENT
Start: 2023-12-05

## 2023-12-05 ENCOUNTER — INFUSION (OUTPATIENT)
Dept: INFUSION THERAPY | Facility: HOSPITAL | Age: 73
End: 2023-12-05
Attending: INTERNAL MEDICINE
Payer: MEDICARE

## 2023-12-05 VITALS
BODY MASS INDEX: 22 KG/M2 | HEIGHT: 63 IN | HEART RATE: 73 BPM | SYSTOLIC BLOOD PRESSURE: 160 MMHG | DIASTOLIC BLOOD PRESSURE: 95 MMHG | RESPIRATION RATE: 20 BRPM | WEIGHT: 124.19 LBS

## 2023-12-05 DIAGNOSIS — C78.6 PERITONEAL CARCINOMATOSIS: Primary | ICD-10-CM

## 2023-12-05 PROCEDURE — 96375 TX/PRO/DX INJ NEW DRUG ADDON: CPT

## 2023-12-05 PROCEDURE — 96413 CHEMO IV INFUSION 1 HR: CPT

## 2023-12-05 PROCEDURE — 25000003 PHARM REV CODE 250: Performed by: INTERNAL MEDICINE

## 2023-12-05 PROCEDURE — 63600175 PHARM REV CODE 636 W HCPCS: Mod: JZ,JG | Performed by: INTERNAL MEDICINE

## 2023-12-05 RX ORDER — DIPHENHYDRAMINE HYDROCHLORIDE 50 MG/ML
50 INJECTION INTRAMUSCULAR; INTRAVENOUS ONCE AS NEEDED
Status: DISCONTINUED | OUTPATIENT
Start: 2023-12-05 | End: 2023-12-05 | Stop reason: HOSPADM

## 2023-12-05 RX ORDER — DIPHENHYDRAMINE HYDROCHLORIDE 50 MG/ML
25 INJECTION INTRAMUSCULAR; INTRAVENOUS
Status: COMPLETED | OUTPATIENT
Start: 2023-12-05 | End: 2023-12-05

## 2023-12-05 RX ORDER — ACETAMINOPHEN 325 MG/1
650 TABLET ORAL
Status: DISCONTINUED | OUTPATIENT
Start: 2023-12-05 | End: 2023-12-05 | Stop reason: HOSPADM

## 2023-12-05 RX ORDER — SODIUM CHLORIDE 0.9 % (FLUSH) 0.9 %
10 SYRINGE (ML) INJECTION
Status: DISCONTINUED | OUTPATIENT
Start: 2023-12-05 | End: 2023-12-05 | Stop reason: HOSPADM

## 2023-12-05 RX ORDER — EPINEPHRINE 0.3 MG/.3ML
0.3 INJECTION SUBCUTANEOUS ONCE AS NEEDED
Status: DISCONTINUED | OUTPATIENT
Start: 2023-12-05 | End: 2023-12-05 | Stop reason: HOSPADM

## 2023-12-05 RX ORDER — HEPARIN 100 UNIT/ML
500 SYRINGE INTRAVENOUS
Status: DISCONTINUED | OUTPATIENT
Start: 2023-12-05 | End: 2023-12-05 | Stop reason: HOSPADM

## 2023-12-05 RX ADMIN — DIPHENHYDRAMINE HYDROCHLORIDE 25 MG: 50 INJECTION INTRAMUSCULAR; INTRAVENOUS at 03:12

## 2023-12-05 RX ADMIN — BEVACIZUMAB-AWWB 800 MG: 400 INJECTION, SOLUTION INTRAVENOUS at 03:12

## 2023-12-22 NOTE — PROGRESS NOTES
Subjective:       Patient ID: Shayna Ledezma is a 73 y.o. female.  GI: Dr. Gamaliel Carlos  GYN ONC at [facility]: Dr. Mike Santana    1. 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 Pulmonary Embolism (Incidental on CT)--19    Procedure/pathology:   1. Paracentesis with removal of 5L of fluid done 18--serous carcinoma, high grade, c/w ovarian primary, PAX-8, CK7 and MOC-31 positive, CDX-2, CK-20 and Calretinin-negative.  2. S/p Exploratory Lap, radical tumor debulking including total abdominal hysterectomy, bilateral salpingo-oophorectomy, bilateral pelvic lymph node sampling, appendectomy, mobilization of the left ureter, complete gross resection of the disease with removal of mesenteric disease, as well as omental disese and parasplenic disease by Dr. Santana 3/18/19--Metastatic high grade serous carcinoma. Tissue/organ involvement: right ovary, appendectomy, omentum, mesentery (including small bowel mesentery) and multiple other sites designated: periumbilical, perisplenic, transverse colon, splenic flexure, perirectal, left periureteral. 2 lymph nodes negative. [id number]. FIGO stage IIIC. Myriad somatic instability testing positive for genomic instability, negative BRCA1 and BRCA2 tumor testing.  3. Splenectomy done 10/21/19--splenic involvement with metastatic high grade serous carcinoma, 5 benign lymph nodes. Caris testing showed LAM, NTRK1/2/3 negative, TMB low, BRCA1/2 negative, ER/TN negative, CATHY negative, SPEN pathogenic variant Exon 11, TP53 pathogenic variant Exon 5, PD-L1 positive CPS 15, Genomic ELODIA high.  Imagin. CT A/P w/ and w/o contrast done at [facility] 18--large volume ascites with multiple peritoneal implants, right pericolic gutter implant measures 2.5X3.3cm, LUQ omental/mesenteric implant measures 3X3.6cm, omental carcinomatosis, extensive enhancing soft tissue surrounding sigmoid colon vs. large sigmoid mass, left  ovarian cystic lesion appears to have some internal septations measures 3.5X3.8cm, enhancing periportal soft tissue density likely lymphadenopathy with some narrowing of the portal vein noted, but without internal filling defect, borderline splenomegaly, subtle solid lesion w/n central spleen measures 2.4X2.5cm, likely metastatic, with peripheral area of diminished enhancement suggesting splenic infarct, soft tissue implant abutting gallbladder measures 2X2.2cm, no right adnexal mass, trace bilateral layering pleural fluid, small moderate-sized hiatal hernia, few borderline enlarged lymph nodes w/n right epicardial fat pad.  2. CT of chest on 1/11/19--Some prominent right cardiophrenic lymph nodes are nonspecific and can be followed on future surveillance scans. Otherwise no CT evidence of metastatic disease to the chest. While exam was not tailored for evaluation of the pulmonary arteries I suspect there is pulmonary thromboembolic disease. Peritoneal carcinomatosis seen in the upper abdomen. Critical Result: Pulmonary Embolus.  3. CT C/A/P 3/4/19--Positive response to therapy. No new or progressive metastatic disease in the chest, abdomen or pelvis. Stable to improved findings include smaller pericardial lymph nodes, andrew hepatis adenopathy, peritoneal carcinomatosis, smaller left adnexal lesion; no evidence of PE within limitations of the study.  5. CT PE protocol chest/A/P 6/11/19--No PE, improved pericardial lymph node with minimal size on current examination, improved peritoneal carcinomatosis and left adnexal implant, splenic ischemia evolved into small infarct, size improvement of one of splenic hypodense lesion and mild size increase of second hypodense lesion, favored to be benign/cystic, no new findings.  6. PET/CT 9/25/19--s/p hysterectomy and bilateral oophorectomy, no evidence for locally recurrent/residual disease, intensely hypermetabolic hypodense lesion within the spleen 3.5X3.3cm concerning for  metastatic focus. No other abnormal focus of disease.  7. CT C/A/P 11/13/20--Right lower lobe 3.3 cm mass is presumed metastatic, otherwise no evident residual, recurrent or metastatic disease.   8. PET/CT 11/20/20--Hypermetabolic right lung base lesion and suspected metastatic periportal adenopathy, no additional sites of FDG-avid otherwise aggressive appearing pathology through the neck, chest, abdomen, or pelvis.  9. PET/CT 1/21/21--Interval decrease in size of the right lower lobe mass with decreased FDG uptake, now measures 1.7 x 2.2 cm (previous 3.3 x 3.1 cm), resolution of FDG uptake in the suspected periportal lymph node which is not identifiable on noncontrast CT. No evidence of new or progressive hypermetabolic metastatic disease.  10. PET/CT 3/30/21--Resolved right lower lung lobe hypermetabolic mass. Otherwise, no interval change or evidence of residual disease, recurrence or new metastatic lesion.    Procedures:   1. Paracentesis on 12/28/18 with removal of 5 Liters.  2. Paracentesis on 01/07/19 with removal of 3.5 Liters.  3. Paracentesis on 02/06/19 with removal of 2.5 Liters.  4. Mediport placed 12/2020 by Dr. Serge Gentile--removed 8/26/21 due to mediport fracture.    Germline genetic testing done by [facility] 1/29/19--negative for BRCA1/2, two (2) variants of uncertain significance (VUS): CATHY p.K4505P/p.J5809B and MLH1 p.P603L.     :  12/19/18 1102  11/10/20  42.9  12/08/20 79.5  01/05/21 28.9  01/26/21 12.8  02/18/21 11.6  03/15/22--10.3  04/26/22--9.6  05/16/22--10.2  06/07/22--10.1  07/19/22--11.6  08/08/22--12.5  09/19/22--12.7  10/31/22--13.5  01/03/23--12.0  02/13/23--14.0  03/03/23--13.9  04/17/23--14.5  05/26/23--14.6  07/31/23--13.5  11/13/23--15.6  12/04/23 --16.8    Treatment History:  1. Neoadjuvant Carboplatin/Taxol every 3 weeks x 3 cycles. 1/15/19 - 2/26/19. Neulasta added.   2. Surgery--tumor debulking KORI/BSO 3/18/19.  3. Adjuvant Carboplatin/Taxol x 3 additions cycles  4/9/19--5/21/19.  4. Splenectomy 10/21/19.  5. Niraparib maintenance (dose reduction to 100mg daily for cytopenias) 11/8/19--12/1/20 (stopped due to progression).  6. Carboplatin/Paclitaxel/Avastin X 6 cycles completed 12/8/21--3/24/21 (complete response).    Current Treatment:  1. Eliquis BID for incidental PE on imaging 1/2019  2. Avastin maintenance started on 4/13/21.   Cycle 48 due today.      Chief Complaint: Mild fatigue after treatment    HPI   Ms. Ledezma is here for scheduled on treatment follow-up visit.  She feels well.  She has an excellent performance status.  She continues on Avastin every 3 weeks with excellent tolerance.  She has an occasional nose bleed, but nothing major.  She has 1+ proteinuria on dipstick.  24-hour urine 10/26/23 was within acceptable range.  Her blood pressure is well controlled.  She has no abdominal symptoms or complaints. No abdominal bloating, nausea, or changes in bowel pattern.   level is stable.      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 180 tablet 3    aspirin (ECOTRIN) 81 MG EC tablet Take 81 mg by mouth once daily.      atorvastatin (LIPITOR) 40 MG tablet TAKE 1 TABLET(40 MG) BY MOUTH EVERY DAY 90 tablet 3    b complex vitamins capsule Take 1 capsule by mouth once daily.      GLUTAMINE ORAL Take 10 g by mouth Daily.       lisinopriL (PRINIVIL,ZESTRIL) 20 MG tablet TAKE 1 TABLET(20 MG) BY MOUTH EVERY DAY 30 tablet 3    pyridoxine, vitamin B6, (B-6) 250 MG Tab Take 250 mg by mouth once daily.      sodium chloride 0.9% SolP 100 mL with bevacizumab 25 mg/mL Soln Inject into the vein every 21 days.       Current Facility-Administered Medications on File Prior to Visit   Medication Dose Route Frequency Provider Last Rate Last Admin    acetaminophen tablet 650 mg  650 mg Oral 1 time in Clinic/HOD Renetta Corral MD        alteplase injection 2 mg  2 mg Intra-Catheter PRN Renetta Corral MD        bevacizumab-awwb (MVASI) 800 mg in sodium chloride 0.9% 132 mL infusion  800 mg Intravenous 1 time in Clinic/HOD Michelle Menjivar FNNANCY 264 mL/hr at 12/27/23 1501 800 mg at 12/27/23 1501    [COMPLETED] diphenhydrAMINE injection 25 mg  25 mg Intravenous 1 time in Clinic/HOD Renetta Corral MD   25 mg at 12/27/23 1452    diphenhydrAMINE injection 50 mg  50 mg Intravenous Once PRN Renetta Corral MD        EPINEPHrine (EPIPEN) 0.3 mg/0.3 mL pen injection 0.3 mg  0.3 mg Intramuscular Once PRN Renetta Corral MD        heparin, porcine (PF) 100 unit/mL injection flush 500 Units  500 Units Intravenous PRN Renetta Corral MD        hydrocortisone sodium succinate injection 100 mg  100 mg Intravenous Once PRN Renetta Corral MD        sodium chloride 0.9% 250 mL flush bag   Intravenous 1 time in Clinic/HOD Renetta Corral MD        sodium chloride 0.9% flush 10 mL  10 mL Intravenous PRN Renetta Corral MD          Review of Systems   Constitutional:  Negative for appetite change and unexpected weight change.   HENT:  Positive for nosebleeds (occassional). Negative for mouth sores.    Eyes:  Negative for visual disturbance.   Respiratory:  Negative for cough and shortness of breath.    Cardiovascular:  Negative for chest pain.   Gastrointestinal:  Negative for abdominal pain and diarrhea.   Genitourinary:  Negative for frequency.    Musculoskeletal:  Negative for back pain.   Integumentary:  Negative for rash.   Neurological:  Negative for headaches.   Hematological:  Negative for adenopathy.   Psychiatric/Behavioral:  The patient is not nervous/anxious.         Vitals:    12/27/23 1353   BP: 119/74   Pulse: 69   Resp: 15   Temp: 99.4 °F (37.4 °C)         Physical Exam  Vitals reviewed.   Constitutional:       Appearance: Normal appearance. She is normal weight.   HENT:      Head: Normocephalic.   Eyes:      General: Lids are normal. Vision grossly intact.      Extraocular Movements: Extraocular movements intact.      Conjunctiva/sclera: Conjunctivae normal.   Cardiovascular:      Rate and Rhythm: Normal rate and regular rhythm.      Pulses: Normal pulses.      Heart sounds: Normal heart sounds, S1 normal and S2 normal.   Pulmonary:      Effort: Pulmonary effort is normal.      Breath sounds: Normal breath sounds.   Abdominal:      General: Abdomen is flat. Bowel sounds are normal. There is no distension.      Palpations: Abdomen is soft.      Tenderness: There is no abdominal tenderness.   Musculoskeletal:      Cervical back: Normal range of motion and neck supple.      Right lower leg: No edema.      Left lower leg: No edema.   Skin:     General: Skin is warm and moist.      Capillary Refill: Capillary refill takes less than 2 seconds.      Comments: Left CW mediport removed, site intact   Neurological:      General: No focal deficit present.      Mental Status: She is alert and oriented to person, place, and time.   Psychiatric:         Attention and Perception: Attention normal.         Mood and Affect: Mood normal.         Speech: Speech normal.         Behavior: Behavior normal. Behavior is cooperative.         Cognition and Memory: Cognition normal.         Judgment: Judgment normal.       Lab Visit on 12/27/2023   Component Date Value    Sodium Level 12/27/2023 140     Potassium Level 12/27/2023 4.0     Chloride 12/27/2023 104      Carbon Dioxide 12/27/2023 28     Glucose Level 12/27/2023 95     Blood Urea Nitrogen 12/27/2023 16.5     Creatinine 12/27/2023 0.74     Calcium Level Total 12/27/2023 9.0     Protein Total 12/27/2023 6.4     Albumin Level 12/27/2023 3.8     Globulin 12/27/2023 2.6     Albumin/Globulin Ratio 12/27/2023 1.5     Bilirubin Total 12/27/2023 1.5     Alkaline Phosphatase 12/27/2023 46     Alanine Aminotransferase 12/27/2023 20     Aspartate Aminotransfera* 12/27/2023 32     eGFR 12/27/2023 >60     Protein, UA 12/27/2023 1+ (A)     WBC 12/27/2023 8.01     RBC 12/27/2023 4.36     Hgb 12/27/2023 13.5     Hct 12/27/2023 42.0     MCV 12/27/2023 96.3 (H)     MCH 12/27/2023 31.0     MCHC 12/27/2023 32.1 (L)     RDW 12/27/2023 13.3     Platelet 12/27/2023 359     MPV 12/27/2023 8.1     Neut % 12/27/2023 52.0     Lymph % 12/27/2023 37.1     Mono % 12/27/2023 9.0     Eos % 12/27/2023 1.4     Basophil % 12/27/2023 0.4     Lymph # 12/27/2023 2.97     Neut # 12/27/2023 4.17     Mono # 12/27/2023 0.72     Eos # 12/27/2023 0.11     Baso # 12/27/2023 0.03     IG# 12/27/2023 0.01     IG% 12/27/2023 0.1           Assessment:       1. Carcinoma of fallopian tube, unspecified laterality    2. Secondary malignant neoplasm of retroperitoneum and peritoneum          Plan:     Patient with left ovarian cancer diagnosed 12/26/18, appears to be stage IIIC vs. IV with diffuse peritoneal disease, possible splenic involvement, epicardial lymph nodes and bilateral pleural effusions.  Patient seen and evaluated by Dr. Miek Santana at [facility] in [place].   Treatment recommended upfront with chemotherapy Carboplatin/Paclitaxel x 3 cycles.  Completed Cycle 3 on 2/26/19.   She underwent total abdominal hysterectomy, BSO and tumor debulking on 3/18/19 with Dr. Santana with complete gross resection of disease. FIGO Stage IIIC papillary serous fallopian tube cancer.   Patient resumed chemotherapy with cycle 4 on 4/9/19. Completed cycle 6 on  5/21/19.    Testing on tumor was positive for genomic instability but negative for BRCA mutation.  Per NCCN guidelines, maintenance can be considered for BRCA mutated germline category 1 and somatic category 2A. There is some evidence that PARP inhibitors have some activity as well in tumors with genomic instability. But the PARP maintenance is not approved for this indication. Offered treatment to patient and after discussion she declined and made decision to continue with observation only.    PET/CT done for rising CA-125 showed hypermetabolic splenic lesion which is not new, just enlarged from previous. Case discussed with Dr. Mike Santana.  Patient is now s/p splenectomy done 10/21/19. Consistent with splenic involvement with high grade serous carcinoma.   Dr. Santana recommended Niraparib maintenance based on new data showing activity in HRD positive ovarian cancer and patient started on 11/8/19, required a dose reduction due to cytopenias.  Rising CA-125 noted in 11/2020. CT showed new RLL lung mass.   Follow-up PET done 11/20/20 showed RLL lung mass hypermetabolic and hypermetabolic periportal adenopathy.     Recommended 2nd line treatment with Carboplatin/Taxol/Avastin.   Started cycle 1 on 12/8/20. Neulasta added with cycle 2.   Completed Cycle 6 on 3/24/21.  PET from 3/30/21 showed complete resolution of lung mass and no other disease.    Avastin maintenance started on 4/13/21.   Hospitalized for TIA after her 4th cycle of Avastin. Work-up for stroke and cause otherwise negative. Patient started on baby ASA by neurology.  Discussed there are no clear guidelines for changing/discontinuing treatment for TIA/CVA related to Avastin.  Since her symptoms resolved and there were no residual effects, recommended to continue with Avastin since it is working well for her disease and since a baby ASA was added for prevention. She was also continued on Eliquis for h/o PE.  S/p mediport removal for fracture on 8/26/21.    Continue Avastin through peripheral veins for now.    Patient continues on Avastin maintenance without problems.  BP good on Lisinopril 20mg daily.      2+ protein on dipstick from 10/2023, 24-hour urine okay.         Due for Avastin maintenance Cycle 48 today.           Blood pressure and urine protein within range for continued treatment.      She has no symptoms or laboratory findings worrisome for disease progression.        CBC, CMP, UA/dipstick  every 3 weeks.   is being monitored every 6 weeks.  Next draw is 1/15/24.  No further scans until rise in CA-125.  She is now >2 years from completing last chemotherapy, so still considered platinum-sensitive. Will need replacement of mediport when she needs to resume chemotherapy.     Vaccines/Boosters post-splenectomy: Menactra/Menveo every 5 years, Bexsero every 2-3 years, annual flu shot.  Last Bexsero was given 8/1/23.     Plan for Menactra/Menveo in 9/2024 which will be 5 years after initial    Continue Eliquis 5mg BID and ASA.        Follow-up with Dr. Corral or Tom Angeles NP in 6 weeks.      All questions answered.       MYRA Romero, FNP-C

## 2023-12-27 ENCOUNTER — INFUSION (OUTPATIENT)
Dept: INFUSION THERAPY | Facility: HOSPITAL | Age: 73
End: 2023-12-27
Attending: INTERNAL MEDICINE
Payer: MEDICARE

## 2023-12-27 ENCOUNTER — OFFICE VISIT (OUTPATIENT)
Dept: HEMATOLOGY/ONCOLOGY | Facility: CLINIC | Age: 73
End: 2023-12-27
Payer: MEDICARE

## 2023-12-27 VITALS
WEIGHT: 124 LBS | HEART RATE: 69 BPM | HEIGHT: 62 IN | DIASTOLIC BLOOD PRESSURE: 74 MMHG | TEMPERATURE: 99 F | SYSTOLIC BLOOD PRESSURE: 119 MMHG | OXYGEN SATURATION: 97 % | BODY MASS INDEX: 22.82 KG/M2 | RESPIRATION RATE: 15 BRPM

## 2023-12-27 VITALS — RESPIRATION RATE: 16 BRPM

## 2023-12-27 DIAGNOSIS — C57.00 CARCINOMA OF FALLOPIAN TUBE, UNSPECIFIED LATERALITY: Primary | ICD-10-CM

## 2023-12-27 DIAGNOSIS — C78.6 SECONDARY MALIGNANT NEOPLASM OF RETROPERITONEUM AND PERITONEUM: Primary | ICD-10-CM

## 2023-12-27 DIAGNOSIS — C78.6 SECONDARY MALIGNANT NEOPLASM OF RETROPERITONEUM AND PERITONEUM: ICD-10-CM

## 2023-12-27 DIAGNOSIS — C78.6 PERITONEAL CARCINOMATOSIS: ICD-10-CM

## 2023-12-27 DIAGNOSIS — C57.4 MALIGNANT NEOPLASM OF UTERINE ADNEXA: ICD-10-CM

## 2023-12-27 PROCEDURE — 3078F DIAST BP <80 MM HG: CPT | Mod: CPTII,S$GLB,, | Performed by: NURSE PRACTITIONER

## 2023-12-27 PROCEDURE — 3288F FALL RISK ASSESSMENT DOCD: CPT | Mod: CPTII,S$GLB,, | Performed by: NURSE PRACTITIONER

## 2023-12-27 PROCEDURE — 4010F PR ACE/ARB THEARPY RXD/TAKEN: ICD-10-PCS | Mod: CPTII,S$GLB,, | Performed by: NURSE PRACTITIONER

## 2023-12-27 PROCEDURE — 1159F MED LIST DOCD IN RCRD: CPT | Mod: CPTII,S$GLB,, | Performed by: NURSE PRACTITIONER

## 2023-12-27 PROCEDURE — 96375 TX/PRO/DX INJ NEW DRUG ADDON: CPT

## 2023-12-27 PROCEDURE — 99214 PR OFFICE/OUTPT VISIT, EST, LEVL IV, 30-39 MIN: ICD-10-PCS | Mod: S$GLB,,, | Performed by: NURSE PRACTITIONER

## 2023-12-27 PROCEDURE — 99999 PR PBB SHADOW E&M-EST. PATIENT-LVL IV: CPT | Mod: PBBFAC,,, | Performed by: NURSE PRACTITIONER

## 2023-12-27 PROCEDURE — 3008F PR BODY MASS INDEX (BMI) DOCUMENTED: ICD-10-PCS | Mod: CPTII,S$GLB,, | Performed by: NURSE PRACTITIONER

## 2023-12-27 PROCEDURE — 3074F SYST BP LT 130 MM HG: CPT | Mod: CPTII,S$GLB,, | Performed by: NURSE PRACTITIONER

## 2023-12-27 PROCEDURE — 96413 CHEMO IV INFUSION 1 HR: CPT

## 2023-12-27 PROCEDURE — 1159F PR MEDICATION LIST DOCUMENTED IN MEDICAL RECORD: ICD-10-PCS | Mod: CPTII,S$GLB,, | Performed by: NURSE PRACTITIONER

## 2023-12-27 PROCEDURE — 99214 OFFICE O/P EST MOD 30 MIN: CPT | Mod: S$GLB,,, | Performed by: NURSE PRACTITIONER

## 2023-12-27 PROCEDURE — 1101F PT FALLS ASSESS-DOCD LE1/YR: CPT | Mod: CPTII,S$GLB,, | Performed by: NURSE PRACTITIONER

## 2023-12-27 PROCEDURE — 1101F PR PT FALLS ASSESS DOC 0-1 FALLS W/OUT INJ PAST YR: ICD-10-PCS | Mod: CPTII,S$GLB,, | Performed by: NURSE PRACTITIONER

## 2023-12-27 PROCEDURE — 3078F PR MOST RECENT DIASTOLIC BLOOD PRESSURE < 80 MM HG: ICD-10-PCS | Mod: CPTII,S$GLB,, | Performed by: NURSE PRACTITIONER

## 2023-12-27 PROCEDURE — 63600175 PHARM REV CODE 636 W HCPCS: Performed by: INTERNAL MEDICINE

## 2023-12-27 PROCEDURE — 1160F RVW MEDS BY RX/DR IN RCRD: CPT | Mod: CPTII,S$GLB,, | Performed by: NURSE PRACTITIONER

## 2023-12-27 PROCEDURE — 4010F ACE/ARB THERAPY RXD/TAKEN: CPT | Mod: CPTII,S$GLB,, | Performed by: NURSE PRACTITIONER

## 2023-12-27 PROCEDURE — 63600175 PHARM REV CODE 636 W HCPCS: Mod: JZ,JG | Performed by: NURSE PRACTITIONER

## 2023-12-27 PROCEDURE — 3074F PR MOST RECENT SYSTOLIC BLOOD PRESSURE < 130 MM HG: ICD-10-PCS | Mod: CPTII,S$GLB,, | Performed by: NURSE PRACTITIONER

## 2023-12-27 PROCEDURE — 1126F PR PAIN SEVERITY QUANTIFIED, NO PAIN PRESENT: ICD-10-PCS | Mod: CPTII,S$GLB,, | Performed by: NURSE PRACTITIONER

## 2023-12-27 PROCEDURE — 1160F PR REVIEW ALL MEDS BY PRESCRIBER/CLIN PHARMACIST DOCUMENTED: ICD-10-PCS | Mod: CPTII,S$GLB,, | Performed by: NURSE PRACTITIONER

## 2023-12-27 PROCEDURE — 3288F PR FALLS RISK ASSESSMENT DOCUMENTED: ICD-10-PCS | Mod: CPTII,S$GLB,, | Performed by: NURSE PRACTITIONER

## 2023-12-27 PROCEDURE — 99999 PR PBB SHADOW E&M-EST. PATIENT-LVL IV: ICD-10-PCS | Mod: PBBFAC,,, | Performed by: NURSE PRACTITIONER

## 2023-12-27 PROCEDURE — 25000003 PHARM REV CODE 250: Performed by: NURSE PRACTITIONER

## 2023-12-27 PROCEDURE — 1126F AMNT PAIN NOTED NONE PRSNT: CPT | Mod: CPTII,S$GLB,, | Performed by: NURSE PRACTITIONER

## 2023-12-27 PROCEDURE — 3008F BODY MASS INDEX DOCD: CPT | Mod: CPTII,S$GLB,, | Performed by: NURSE PRACTITIONER

## 2023-12-27 RX ORDER — SODIUM CHLORIDE 0.9 % (FLUSH) 0.9 %
10 SYRINGE (ML) INJECTION
Status: DISCONTINUED | OUTPATIENT
Start: 2023-12-27 | End: 2023-12-27 | Stop reason: HOSPADM

## 2023-12-27 RX ORDER — HEPARIN 100 UNIT/ML
500 SYRINGE INTRAVENOUS
Status: DISCONTINUED | OUTPATIENT
Start: 2023-12-27 | End: 2023-12-27 | Stop reason: HOSPADM

## 2023-12-27 RX ORDER — DIPHENHYDRAMINE HYDROCHLORIDE 50 MG/ML
25 INJECTION INTRAMUSCULAR; INTRAVENOUS
Status: COMPLETED | OUTPATIENT
Start: 2023-12-27 | End: 2023-12-27

## 2023-12-27 RX ORDER — DIPHENHYDRAMINE HYDROCHLORIDE 50 MG/ML
50 INJECTION INTRAMUSCULAR; INTRAVENOUS ONCE AS NEEDED
Status: DISCONTINUED | OUTPATIENT
Start: 2023-12-27 | End: 2023-12-27 | Stop reason: HOSPADM

## 2023-12-27 RX ORDER — EPINEPHRINE 0.3 MG/.3ML
0.3 INJECTION SUBCUTANEOUS ONCE AS NEEDED
Status: DISCONTINUED | OUTPATIENT
Start: 2023-12-27 | End: 2023-12-27 | Stop reason: HOSPADM

## 2023-12-27 RX ORDER — ACETAMINOPHEN 325 MG/1
650 TABLET ORAL
Status: DISCONTINUED | OUTPATIENT
Start: 2023-12-27 | End: 2023-12-27 | Stop reason: HOSPADM

## 2023-12-27 RX ADMIN — BEVACIZUMAB-AWWB 800 MG: 400 INJECTION, SOLUTION INTRAVENOUS at 03:12

## 2023-12-27 RX ADMIN — DIPHENHYDRAMINE HYDROCHLORIDE 25 MG: 50 INJECTION INTRAMUSCULAR; INTRAVENOUS at 02:12

## 2024-01-16 ENCOUNTER — INFUSION (OUTPATIENT)
Dept: INFUSION THERAPY | Facility: HOSPITAL | Age: 74
End: 2024-01-16
Attending: INTERNAL MEDICINE
Payer: MEDICARE

## 2024-01-16 VITALS
SYSTOLIC BLOOD PRESSURE: 154 MMHG | TEMPERATURE: 98 F | HEART RATE: 70 BPM | OXYGEN SATURATION: 96 % | DIASTOLIC BLOOD PRESSURE: 78 MMHG | RESPIRATION RATE: 16 BRPM

## 2024-01-16 DIAGNOSIS — C78.6 SECONDARY MALIGNANT NEOPLASM OF RETROPERITONEUM AND PERITONEUM: Primary | ICD-10-CM

## 2024-01-16 DIAGNOSIS — C57.00 CARCINOMA OF FALLOPIAN TUBE, UNSPECIFIED LATERALITY: ICD-10-CM

## 2024-01-16 LAB — CANCER AG125 SERPL-ACNC: 13.7 UNIT/ML (ref 0–35)

## 2024-01-16 PROCEDURE — 96413 CHEMO IV INFUSION 1 HR: CPT

## 2024-01-16 PROCEDURE — 63600175 PHARM REV CODE 636 W HCPCS: Performed by: INTERNAL MEDICINE

## 2024-01-16 PROCEDURE — 36415 COLL VENOUS BLD VENIPUNCTURE: CPT

## 2024-01-16 PROCEDURE — 86304 IMMUNOASSAY TUMOR CA 125: CPT

## 2024-01-16 PROCEDURE — 25000003 PHARM REV CODE 250: Performed by: INTERNAL MEDICINE

## 2024-01-16 PROCEDURE — 96375 TX/PRO/DX INJ NEW DRUG ADDON: CPT

## 2024-01-16 RX ORDER — DIPHENHYDRAMINE HYDROCHLORIDE 50 MG/ML
50 INJECTION INTRAMUSCULAR; INTRAVENOUS ONCE AS NEEDED
Status: CANCELLED | OUTPATIENT
Start: 2024-01-17

## 2024-01-16 RX ORDER — DIPHENHYDRAMINE HYDROCHLORIDE 50 MG/ML
50 INJECTION INTRAMUSCULAR; INTRAVENOUS ONCE AS NEEDED
Status: DISCONTINUED | OUTPATIENT
Start: 2024-01-16 | End: 2024-01-16 | Stop reason: HOSPADM

## 2024-01-16 RX ORDER — DIPHENHYDRAMINE HYDROCHLORIDE 50 MG/ML
25 INJECTION INTRAMUSCULAR; INTRAVENOUS
Status: CANCELLED | OUTPATIENT
Start: 2024-01-17

## 2024-01-16 RX ORDER — ACETAMINOPHEN 325 MG/1
650 TABLET ORAL
Status: CANCELLED | OUTPATIENT
Start: 2024-01-17

## 2024-01-16 RX ORDER — SODIUM CHLORIDE 0.9 % (FLUSH) 0.9 %
10 SYRINGE (ML) INJECTION
Status: DISCONTINUED | OUTPATIENT
Start: 2024-01-16 | End: 2024-01-16 | Stop reason: HOSPADM

## 2024-01-16 RX ORDER — HEPARIN 100 UNIT/ML
500 SYRINGE INTRAVENOUS
Status: CANCELLED | OUTPATIENT
Start: 2024-01-17

## 2024-01-16 RX ORDER — HEPARIN 100 UNIT/ML
500 SYRINGE INTRAVENOUS
Status: DISCONTINUED | OUTPATIENT
Start: 2024-01-16 | End: 2024-01-16 | Stop reason: HOSPADM

## 2024-01-16 RX ORDER — ACETAMINOPHEN 325 MG/1
650 TABLET ORAL
Status: DISCONTINUED | OUTPATIENT
Start: 2024-01-16 | End: 2024-01-16 | Stop reason: HOSPADM

## 2024-01-16 RX ORDER — EPINEPHRINE 0.3 MG/.3ML
0.3 INJECTION SUBCUTANEOUS ONCE AS NEEDED
Status: DISCONTINUED | OUTPATIENT
Start: 2024-01-16 | End: 2024-01-16 | Stop reason: HOSPADM

## 2024-01-16 RX ORDER — EPINEPHRINE 0.3 MG/.3ML
0.3 INJECTION SUBCUTANEOUS ONCE AS NEEDED
Status: CANCELLED | OUTPATIENT
Start: 2024-01-17

## 2024-01-16 RX ORDER — DIPHENHYDRAMINE HYDROCHLORIDE 50 MG/ML
25 INJECTION INTRAMUSCULAR; INTRAVENOUS
Status: COMPLETED | OUTPATIENT
Start: 2024-01-16 | End: 2024-01-16

## 2024-01-16 RX ORDER — SODIUM CHLORIDE 0.9 % (FLUSH) 0.9 %
10 SYRINGE (ML) INJECTION
Status: CANCELLED | OUTPATIENT
Start: 2024-01-17

## 2024-01-16 RX ADMIN — DIPHENHYDRAMINE HYDROCHLORIDE 25 MG: 50 INJECTION INTRAMUSCULAR; INTRAVENOUS at 03:01

## 2024-01-16 RX ADMIN — BEVACIZUMAB-AWWB 800 MG: 400 INJECTION, SOLUTION INTRAVENOUS at 03:01

## 2024-01-16 NOTE — PLAN OF CARE
Problem: Adult Inpatient Plan of Care  Goal: Absence of Hospital-Acquired Illness or Injury  Outcome: Met  Intervention: Identify and Manage Fall Risk  Flowsheets (Taken 1/16/2024 1620)  Safety Promotion/Fall Prevention:   assistive device/personal item within reach   Fall Risk reviewed with patient/family   in recliner, wheels locked     Pt tolerated infusion well. Next appt reviewed; pt denied questions or further needs at the time of discharge.

## 2024-01-18 ENCOUNTER — PATIENT MESSAGE (OUTPATIENT)
Dept: HEMATOLOGY/ONCOLOGY | Facility: CLINIC | Age: 74
End: 2024-01-18
Payer: MEDICARE

## 2024-01-18 ENCOUNTER — TELEPHONE (OUTPATIENT)
Dept: HEMATOLOGY/ONCOLOGY | Facility: CLINIC | Age: 74
End: 2024-01-18
Payer: MEDICARE

## 2024-01-18 DIAGNOSIS — Z13.0 SCREENING FOR ENDOCRINE, NUTRITIONAL, METABOLIC AND IMMUNITY DISORDER: ICD-10-CM

## 2024-01-18 DIAGNOSIS — Z13.228 SCREENING FOR ENDOCRINE, NUTRITIONAL, METABOLIC AND IMMUNITY DISORDER: ICD-10-CM

## 2024-01-18 DIAGNOSIS — I10 PRIMARY HYPERTENSION: Primary | ICD-10-CM

## 2024-01-18 DIAGNOSIS — Z13.6 SCREENING FOR CARDIOVASCULAR, RESPIRATORY, AND GENITOURINARY DISEASES: ICD-10-CM

## 2024-01-18 DIAGNOSIS — Z13.21 SCREENING FOR ENDOCRINE, NUTRITIONAL, METABOLIC AND IMMUNITY DISORDER: ICD-10-CM

## 2024-01-18 DIAGNOSIS — G45.9 TRANSIENT ISCHEMIC ATTACK: ICD-10-CM

## 2024-01-18 DIAGNOSIS — Z13.29 SCREENING FOR ENDOCRINE, NUTRITIONAL, METABOLIC AND IMMUNITY DISORDER: ICD-10-CM

## 2024-01-18 DIAGNOSIS — C57.4 MALIGNANT NEOPLASM OF UTERINE ADNEXA: ICD-10-CM

## 2024-01-18 DIAGNOSIS — Z13.83 SCREENING FOR CARDIOVASCULAR, RESPIRATORY, AND GENITOURINARY DISEASES: ICD-10-CM

## 2024-01-18 DIAGNOSIS — Z13.89 SCREENING FOR CARDIOVASCULAR, RESPIRATORY, AND GENITOURINARY DISEASES: ICD-10-CM

## 2024-01-18 DIAGNOSIS — C78.6 SECONDARY MALIGNANT NEOPLASM OF RETROPERITONEUM AND PERITONEUM: ICD-10-CM

## 2024-01-18 DIAGNOSIS — C57.00 CARCINOMA OF FALLOPIAN TUBE, UNSPECIFIED LATERALITY: Primary | ICD-10-CM

## 2024-01-18 NOTE — TELEPHONE ENCOUNTER
I called pt to see if she had an ENT and she does not. I am putting in the referral to Dr Guanaco Mcrae.

## 2024-01-18 NOTE — TELEPHONE ENCOUNTER
Needs to send referral to ENT as soon as possible. Dr. Asif or Dr. Guanaco Mcrae. When is her next Avastin? We may have to hold it.

## 2024-01-22 DIAGNOSIS — C78.6 PERITONEAL CARCINOMATOSIS: ICD-10-CM

## 2024-01-22 RX ORDER — LISINOPRIL 20 MG/1
20 TABLET ORAL
Qty: 30 TABLET | Refills: 3 | Status: SHIPPED | OUTPATIENT
Start: 2024-01-22 | End: 2024-05-20

## 2024-01-24 ENCOUNTER — TELEPHONE (OUTPATIENT)
Dept: HEMATOLOGY/ONCOLOGY | Facility: CLINIC | Age: 74
End: 2024-01-24
Payer: MEDICARE

## 2024-01-24 NOTE — TELEPHONE ENCOUNTER
Her next cycle is not until 2/7/24 and she has a visit with  prior on 2/5/24. Thank you for keeping us updated.

## 2024-01-24 NOTE — TELEPHONE ENCOUNTER
Pt reports that she saw Dr Guanaco Mcrae. He prescribed an ointment and a gel saline spray for additional hydration. She states that she saw his NP today for a follow up and her nostrils are much improved. Pt asked NP about Avastin and NP did not see a reason to withhold. NP will send notes to Dr Corral.

## 2024-01-29 NOTE — PROGRESS NOTES
Subjective:       Patient ID: Shayna Ledezma is a 73 y.o. female.  GI: Dr. Gamaliel Carlos  GYN ONC at [facility]: Dr. Mike Santana    1. 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 Pulmonary Embolism (Incidental on CT)--19    Procedure/pathology:   1. Paracentesis with removal of 5L of fluid done 18--serous carcinoma, high grade, c/w ovarian primary, PAX-8, CK7 and MOC-31 positive, CDX-2, CK-20 and Calretinin-negative.  2. S/p Exploratory Lap, radical tumor debulking including total abdominal hysterectomy, bilateral salpingo-oophorectomy, bilateral pelvic lymph node sampling, appendectomy, mobilization of the left ureter, complete gross resection of the disease with removal of mesenteric disease, as well as omental disese and parasplenic disease by Dr. Santana 3/18/19--Metastatic high grade serous carcinoma. Tissue/organ involvement: right ovary, appendectomy, omentum, mesentery (including small bowel mesentery) and multiple other sites designated: periumbilical, perisplenic, transverse colon, splenic flexure, perirectal, left periureteral. 2 lymph nodes negative. [id number]. FIGO stage IIIC. Myriad somatic instability testing positive for genomic instability, negative BRCA1 and BRCA2 tumor testing.  3. Splenectomy done 10/21/19--splenic involvement with metastatic high grade serous carcinoma, 5 benign lymph nodes. [facility]is testing showed LAM, NTRK1/2/3 negative, TMB low, BRCA1/2 negative, ER/IA negative, CATHY negative, SPEN pathogenic variant Exon 11, TP53 pathogenic variant Exon 5, PD-L1 positive CPS 15, Genomic ELODIA high.  Imagin. CT A/P w/ and w/o contrast done at [facility] 18--large volume ascites with multiple peritoneal implants, right pericolic gutter implant measures 2.5X3.3cm, LUQ omental/mesenteric implant measures 3X3.6cm, omental carcinomatosis, extensive enhancing soft tissue surrounding sigmoid colon vs. large sigmoid mass, left  ovarian cystic lesion appears to have some internal septations measures 3.5X3.8cm, enhancing periportal soft tissue density likely lymphadenopathy with some narrowing of the portal vein noted, but without internal filling defect, borderline splenomegaly, subtle solid lesion w/n central spleen measures 2.4X2.5cm, likely metastatic, with peripheral area of diminished enhancement suggesting splenic infarct, soft tissue implant abutting gallbladder measures 2X2.2cm, no right adnexal mass, trace bilateral layering pleural fluid, small moderate-sized hiatal hernia, few borderline enlarged lymph nodes w/n right epicardial fat pad.  2. CT of chest on 1/11/19--Some prominent right cardiophrenic lymph nodes are nonspecific and can be followed on future surveillance scans. Otherwise no CT evidence of metastatic disease to the chest. While exam was not tailored for evaluation of the pulmonary arteries I suspect there is pulmonary thromboembolic disease. Peritoneal carcinomatosis seen in the upper abdomen. Critical Result: Pulmonary Embolus.  3. CT C/A/P 3/4/19--Positive response to therapy. No new or progressive metastatic disease in the chest, abdomen or pelvis. Stable to improved findings include smaller pericardial lymph nodes, andrew hepatis adenopathy, peritoneal carcinomatosis, smaller left adnexal lesion; no evidence of PE within limitations of the study.  5. CT PE protocol chest/A/P 6/11/19--No PE, improved pericardial lymph node with minimal size on current examination, improved peritoneal carcinomatosis and left adnexal implant, splenic ischemia evolved into small infarct, size improvement of one of splenic hypodense lesion and mild size increase of second hypodense lesion, favored to be benign/cystic, no new findings.  6. PET/CT 9/25/19--s/p hysterectomy and bilateral oophorectomy, no evidence for locally recurrent/residual disease, intensely hypermetabolic hypodense lesion within the spleen 3.5X3.3cm concerning for  metastatic focus. No other abnormal focus of disease.  7. CT C/A/P 11/13/20--Right lower lobe 3.3 cm mass is presumed metastatic, otherwise no evident residual, recurrent or metastatic disease.   8. PET/CT 11/20/20--Hypermetabolic right lung base lesion and suspected metastatic periportal adenopathy, no additional sites of FDG-avid otherwise aggressive appearing pathology through the neck, chest, abdomen, or pelvis.  9. PET/CT 1/21/21--Interval decrease in size of the right lower lobe mass with decreased FDG uptake, now measures 1.7 x 2.2 cm (previous 3.3 x 3.1 cm), resolution of FDG uptake in the suspected periportal lymph node which is not identifiable on noncontrast CT. No evidence of new or progressive hypermetabolic metastatic disease.  10. PET/CT 3/30/21--Resolved right lower lung lobe hypermetabolic mass. Otherwise, no interval change or evidence of residual disease, recurrence or new metastatic lesion.    Procedures:   1. Paracentesis on 12/28/18 with removal of 5 Liters.  2. Paracentesis on 01/07/19 with removal of 3.5 Liters.  3. Paracentesis on 02/06/19 with removal of 2.5 Liters.  4. Mediport placed 12/2020 by Dr. Serge Gentile--removed 8/26/21 due to mediport fracture.    Germline genetic testing done by [facility] 1/29/19--negative for BRCA1/2, two (2) variants of uncertain significance (VUS): CATHY p.Z6396N/p.[id number] and MLH1 p.P603L.     :  12/19/18 1102  11/10/20  42.9  12/08/20 79.5  01/05/21 28.9  01/26/21 12.8  02/18/21 11.6  03/15/22--10.3  04/26/22--9.6  05/16/22--10.2  06/07/22--10.1  07/19/22--11.6  08/08/22--12.5  09/19/22--12.7  10/31/22--13.5  01/03/23--12.0  02/13/23--14.0  03/03/23--13.9  04/17/23--14.5  05/26/23--14.6  07/31/23--13.5  11/13/23--15.6  12/04/23--16.8  01/16/24--13.7    Treatment History:  1. Neoadjuvant Carboplatin/Taxol every 3 weeks x 3 cycles. 1/15/19 - 2/26/19. Neulasta added.   2. Surgery--tumor debulking KORI/BSO 3/18/19.  3. Adjuvant Carboplatin/Taxol x 3  additions cycles 4/9/19--5/21/19.  4. Splenectomy 10/21/19.  5. Niraparib maintenance (dose reduction to 100mg daily for cytopenias) 11/8/19--12/1/20 (stopped due to progression).  6. Carboplatin/Paclitaxel/Avastin X 6 cycles completed 12/8/21--3/24/21 (complete response).    Current Treatment:  1. Eliquis BID for incidental PE on imaging 1/2019  2. Avastin maintenance started on 4/13/21.   Cycle 50 due 2/6/24.      Chief Complaint   [facility] with    [facility]     Pt reports no concerns today.       HPI   Ms. Ledezma is here for scheduled on treatment follow-up visit.  She continues on Avastin every 3 weeks. Reports having nose bleeds and she was referred to Dr. Guanaco Mcrae. On examination she had likely an infection/multiple scabs etc and he could not see. She was put on Mupirocin and her nose bleeding is much better. Her BP also has been elevated and Dr. Giang added back HCTZ. She denies any abdominal pain or other issues.     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 180 tablet 3    aspirin (ECOTRIN) 81 MG EC tablet Take 81 mg by mouth once daily.      atorvastatin (LIPITOR) 40 MG tablet TAKE 1 TABLET(40 MG) BY MOUTH EVERY DAY 90 tablet 3    b complex vitamins capsule Take 1 capsule by mouth once daily.      GLUTAMINE ORAL Take 10 g by mouth Daily.       hydroCHLOROthiazide (HYDRODIURIL) 12.5 MG Tab Take 1 tablet (12.5 mg total) by mouth once daily. 90 tablet 3    lisinopriL (PRINIVIL,ZESTRIL) 20 MG tablet TAKE 1 TABLET(20 MG) BY MOUTH EVERY DAY 30 tablet 3    mupirocin (BACTROBAN) 2 % ointment by Nasal route 3 (three) times daily.      pyridoxine, vitamin B6, (B-6) 250 MG Tab Take 250 mg by mouth once daily.      RSV, preF A and preF B,PF, (ABRYSVO) 120 mcg/0.5 mL SolR vaccine Inject 0.5 mLs (120 mcg total) into the muscle once. for 1 dose 0.5 mL 0    sodium chloride 0.9% SolP 100 mL with bevacizumab 25 mg/mL Soln Inject into the vein every 21 days.      varicella-zoster gE-AS01B, PF, (SHINGRIX) 50 mcg/0.5 mL injection Inject 0.5 mLs into the muscle once. for 1 dose 1 each 0     No current facility-administered medications on file prior to visit.      Review of Systems   Constitutional:  Negative for appetite change and unexpected weight change.   HENT:  Positive for nosebleeds (occassional). Negative for mouth sores.    Eyes:  Negative for visual disturbance.   Respiratory:  Negative for cough and shortness of breath.    Cardiovascular:  Negative for chest pain.   Gastrointestinal:  Negative for abdominal pain and diarrhea.   Genitourinary:  Negative for frequency.   Musculoskeletal:  Negative for back pain.   Integumentary:  Negative for rash.   Neurological:  Negative for headaches.   Hematological:  Negative for adenopathy.   Psychiatric/Behavioral:  The patient is not nervous/anxious.         Vitals:    02/05/24 1036   BP: (!) 159/85   Pulse: 64   Resp: 14   Temp: 98.3 °F (36.8 °C)       Physical Exam  Vitals reviewed.   Constitutional:       Appearance: Normal appearance. She is normal weight.   HENT:      Head: Normocephalic.   Eyes:      General: Lids are normal. Vision grossly intact.      Extraocular Movements: Extraocular movements intact.      Conjunctiva/sclera: Conjunctivae normal.   Cardiovascular:      Rate and Rhythm: Normal rate and regular  rhythm.      Pulses: Normal pulses.      Heart sounds: Normal heart sounds, S1 normal and S2 normal.   Pulmonary:      Effort: Pulmonary effort is normal.      Breath sounds: Normal breath sounds.   Abdominal:      General: Abdomen is flat. Bowel sounds are normal. There is no distension.      Palpations: Abdomen is soft.      Tenderness: There is no abdominal tenderness.   Musculoskeletal:      Cervical back: Normal range of motion and neck supple.      Right lower leg: No edema.      Left lower leg: No edema.   Skin:     General: Skin is warm and moist.      Capillary Refill: Capillary refill takes less than 2 seconds.      Comments: Left CW mediport removed, site intact   Neurological:      General: No focal deficit present.      Mental Status: She is alert and oriented to person, place, and time.   Psychiatric:         Attention and Perception: Attention normal.         Mood and Affect: Mood normal.         Speech: Speech normal.         Behavior: Behavior normal. Behavior is cooperative.         Cognition and Memory: Cognition normal.         Judgment: Judgment normal.       Lab Visit on 02/05/2024   Component Date Value    Protein, UA 02/05/2024 2+ (A)     WBC 02/05/2024 7.70     RBC 02/05/2024 4.55     Hgb 02/05/2024 14.1     Hct 02/05/2024 43.7     MCV 02/05/2024 96.0 (H)     MCH 02/05/2024 31.0     MCHC 02/05/2024 32.3 (L)     RDW 02/05/2024 13.4     Platelet 02/05/2024 346     MPV 02/05/2024 8.2     Neut % 02/05/2024 39.9     Lymph % 02/05/2024 45.2     Mono % 02/05/2024 12.5     Eos % 02/05/2024 1.7     Basophil % 02/05/2024 0.6     Lymph # 02/05/2024 3.48     Neut # 02/05/2024 3.07     Mono # 02/05/2024 0.96     Eos # 02/05/2024 0.13     Baso # 02/05/2024 0.05     IG# 02/05/2024 0.01     IG% 02/05/2024 0.1    Lab Visit on 01/30/2024   Component Date Value    Cholesterol Total 01/30/2024 160     HDL Cholesterol 01/30/2024 68 (H)     Triglyceride 01/30/2024 57     Cholesterol/HDL Ratio 01/30/2024 2      Very Low Density Lipopro* 01/30/2024 11     LDL Cholesterol 01/30/2024 81.00     TSH 01/30/2024 1.976           Assessment:       1. Secondary malignant neoplasm of retroperitoneum and peritoneum    2. Malignant neoplasm of uterine adnexa    3. Secondary hypertension      Plan:     Patient with left ovarian cancer diagnosed 12/26/18, appears to be stage IIIC vs. IV with diffuse peritoneal disease, possible splenic involvement, epicardial lymph nodes and bilateral pleural effusions.  Patient seen and evaluated by Dr. Mike Santana at [facility] in [place].   Treatment recommended upfront with chemotherapy Carboplatin/Paclitaxel x 3 cycles.  Completed Cycle 3 on 2/26/19.   She underwent total abdominal hysterectomy, BSO and tumor debulking on 3/18/19 with Dr. Santana with complete gross resection of disease. FIGO Stage IIIC papillary serous fallopian tube cancer.   Patient resumed chemotherapy with cycle 4 on 4/9/19. Completed cycle 6 on 5/21/19.    Testing on tumor was positive for genomic instability but negative for BRCA mutation.  Per NCCN guidelines, maintenance can be considered for BRCA mutated germline category 1 and somatic category 2A. There is some evidence that PARP inhibitors have some activity as well in tumors with genomic instability. But the PARP maintenance is not approved for this indication. Offered treatment to patient and after discussion she declined and made decision to continue with observation only.    PET/CT done for rising CA-125 showed hypermetabolic splenic lesion which is not new, just enlarged from previous. Case discussed with Dr. Mike Santana.  Patient is now s/p splenectomy done 10/21/19. Consistent with splenic involvement with high grade serous carcinoma.   Dr. Santana recommended Niraparib maintenance based on new data showing activity in HRD positive ovarian cancer and patient started on 11/8/19, required a dose reduction due to cytopenias.  Rising CA-125 noted in 11/2020. CT  showed new RLL lung mass.   Follow-up PET done 11/20/20 showed RLL lung mass hypermetabolic and hypermetabolic periportal adenopathy.     Recommended 2nd line treatment with Carboplatin/Taxol/Avastin.   Started cycle 1 on 12/8/20. Neulasta added with cycle 2.   Completed Cycle 6 on 3/24/21.  PET from 3/30/21 showed complete resolution of lung mass and no other disease.    Avastin maintenance started on 4/13/21.   Hospitalized for TIA after her 4th cycle of Avastin. Work-up for stroke and cause otherwise negative. Patient started on baby ASA by neurology.  Discussed there are no clear guidelines for changing/discontinuing treatment for TIA/CVA related to Avastin.  Since her symptoms resolved and there were no residual effects, recommended to continue with Avastin since it is working well for her disease and since a baby ASA was added for prevention. She was also continued on Eliquis for h/o PE.  S/p mediport removal for fracture on 8/26/21.   Continue Avastin through peripheral veins for now.    Patient continues on Avastin maintenance without problems.  BP has been elevated on Lisinopril 20mg daily. PCP added HCTZ 12.5mg daily.      2+ protein on dipstick from 10/2023, 24-hour urine okay.       Urine protein has been up and down. Don't plan to repeat a 24-hour unless 3+.      Patient did have some epistaxis, seen by ENT and now better after treatment with Mupirocin. Did not have any cautery done.         Due for Avastin maintenance Cycle 50 tomorrow.           She has no symptoms or laboratory findings worrisome for disease progression.     CBC, CMP, UA/dipstick  every 3 weeks.      Labs on 2/26/24 and next treatment on 2/27/24 C51.    is being monitored every 6 weeks.  Next draw is 2/26/24.  No further scans until rise in CA-125.    F/u in 6 weeks prior to Cycle 52.    She is now >2 years from completing last chemotherapy, so still considered platinum-sensitive. Will need replacement of mediport when she  needs to resume chemotherapy.     Vaccines/Boosters post-splenectomy: Menactra/Menveo every 5 years, Bexsero every 2-3 years, annual flu shot.  Last Bexsero was given 8/1/23.     Plan for Menactra/Menveo in 9/2024 which will be 5 years after initial    Continue Eliquis 5mg BID and ASA.        All questions answered.     Renetta Corral MD

## 2024-01-30 ENCOUNTER — LAB VISIT (OUTPATIENT)
Dept: LAB | Facility: HOSPITAL | Age: 74
End: 2024-01-30
Attending: INTERNAL MEDICINE
Payer: MEDICARE

## 2024-01-30 DIAGNOSIS — Z13.89 SCREENING FOR CARDIOVASCULAR, RESPIRATORY, AND GENITOURINARY DISEASES: ICD-10-CM

## 2024-01-30 DIAGNOSIS — Z13.29 SCREENING FOR ENDOCRINE, NUTRITIONAL, METABOLIC AND IMMUNITY DISORDER: ICD-10-CM

## 2024-01-30 DIAGNOSIS — Z13.0 SCREENING FOR ENDOCRINE, NUTRITIONAL, METABOLIC AND IMMUNITY DISORDER: ICD-10-CM

## 2024-01-30 DIAGNOSIS — Z13.228 SCREENING FOR ENDOCRINE, NUTRITIONAL, METABOLIC AND IMMUNITY DISORDER: ICD-10-CM

## 2024-01-30 DIAGNOSIS — I10 PRIMARY HYPERTENSION: ICD-10-CM

## 2024-01-30 DIAGNOSIS — Z13.83 SCREENING FOR CARDIOVASCULAR, RESPIRATORY, AND GENITOURINARY DISEASES: ICD-10-CM

## 2024-01-30 DIAGNOSIS — Z13.21 SCREENING FOR ENDOCRINE, NUTRITIONAL, METABOLIC AND IMMUNITY DISORDER: ICD-10-CM

## 2024-01-30 DIAGNOSIS — G45.9 TRANSIENT ISCHEMIC ATTACK: ICD-10-CM

## 2024-01-30 DIAGNOSIS — Z13.6 SCREENING FOR CARDIOVASCULAR, RESPIRATORY, AND GENITOURINARY DISEASES: ICD-10-CM

## 2024-01-30 LAB
CHOLEST SERPL-MCNC: 160 MG/DL
CHOLEST/HDLC SERPL: 2 {RATIO} (ref 0–5)
HDLC SERPL-MCNC: 68 MG/DL (ref 35–60)
LDLC SERPL CALC-MCNC: 81 MG/DL (ref 50–140)
TRIGL SERPL-MCNC: 57 MG/DL (ref 37–140)
TSH SERPL-ACNC: 1.98 UIU/ML (ref 0.35–4.94)
VLDLC SERPL CALC-MCNC: 11 MG/DL

## 2024-01-30 PROCEDURE — 36415 COLL VENOUS BLD VENIPUNCTURE: CPT

## 2024-01-30 PROCEDURE — 84443 ASSAY THYROID STIM HORMONE: CPT

## 2024-01-30 PROCEDURE — 80061 LIPID PANEL: CPT

## 2024-02-05 ENCOUNTER — OFFICE VISIT (OUTPATIENT)
Dept: INTERNAL MEDICINE | Facility: CLINIC | Age: 74
End: 2024-02-05
Payer: MEDICARE

## 2024-02-05 ENCOUNTER — OFFICE VISIT (OUTPATIENT)
Dept: HEMATOLOGY/ONCOLOGY | Facility: CLINIC | Age: 74
End: 2024-02-05
Payer: MEDICARE

## 2024-02-05 ENCOUNTER — LAB VISIT (OUTPATIENT)
Dept: LAB | Facility: HOSPITAL | Age: 74
End: 2024-02-05
Attending: INTERNAL MEDICINE
Payer: MEDICARE

## 2024-02-05 VITALS
BODY MASS INDEX: 22.82 KG/M2 | OXYGEN SATURATION: 99 % | DIASTOLIC BLOOD PRESSURE: 86 MMHG | HEART RATE: 66 BPM | WEIGHT: 124 LBS | SYSTOLIC BLOOD PRESSURE: 138 MMHG | HEIGHT: 62 IN

## 2024-02-05 VITALS
OXYGEN SATURATION: 99 % | SYSTOLIC BLOOD PRESSURE: 159 MMHG | RESPIRATION RATE: 14 BRPM | HEIGHT: 62 IN | TEMPERATURE: 98 F | WEIGHT: 125.19 LBS | HEART RATE: 64 BPM | BODY MASS INDEX: 23.04 KG/M2 | DIASTOLIC BLOOD PRESSURE: 85 MMHG

## 2024-02-05 DIAGNOSIS — Z29.11 NEED FOR RSV VACCINATION: ICD-10-CM

## 2024-02-05 DIAGNOSIS — Z23 NEED FOR SHINGLES VACCINE: ICD-10-CM

## 2024-02-05 DIAGNOSIS — C57.4 MALIGNANT NEOPLASM OF UTERINE ADNEXA: ICD-10-CM

## 2024-02-05 DIAGNOSIS — Z92.29: ICD-10-CM

## 2024-02-05 DIAGNOSIS — I15.9 SECONDARY HYPERTENSION: ICD-10-CM

## 2024-02-05 DIAGNOSIS — Z00.00 MEDICARE ANNUAL WELLNESS VISIT, SUBSEQUENT: ICD-10-CM

## 2024-02-05 DIAGNOSIS — C78.6 SECONDARY MALIGNANT NEOPLASM OF RETROPERITONEUM AND PERITONEUM: Primary | ICD-10-CM

## 2024-02-05 DIAGNOSIS — I26.99 PULMONARY EMBOLISM, UNSPECIFIED CHRONICITY, UNSPECIFIED PULMONARY EMBOLISM TYPE, UNSPECIFIED WHETHER ACUTE COR PULMONALE PRESENT: ICD-10-CM

## 2024-02-05 DIAGNOSIS — C78.6 SECONDARY MALIGNANT NEOPLASM OF RETROPERITONEUM AND PERITONEUM: ICD-10-CM

## 2024-02-05 DIAGNOSIS — C78.6 PERITONEAL CARCINOMATOSIS: ICD-10-CM

## 2024-02-05 DIAGNOSIS — I10 PRIMARY HYPERTENSION: Primary | ICD-10-CM

## 2024-02-05 LAB
ALBUMIN SERPL-MCNC: 4 G/DL (ref 3.4–4.8)
ALBUMIN/GLOB SERPL: 1.5 RATIO (ref 1.1–2)
ALP SERPL-CCNC: 49 UNIT/L (ref 40–150)
ALT SERPL-CCNC: 20 UNIT/L (ref 0–55)
AST SERPL-CCNC: 32 UNIT/L (ref 5–34)
BASOPHILS # BLD AUTO: 0.05 X10(3)/MCL
BASOPHILS NFR BLD AUTO: 0.6 %
BILIRUB SERPL-MCNC: 1.7 MG/DL
BUN SERPL-MCNC: 16.1 MG/DL (ref 9.8–20.1)
CALCIUM SERPL-MCNC: 9.5 MG/DL (ref 8.4–10.2)
CHLORIDE SERPL-SCNC: 102 MMOL/L (ref 98–107)
CO2 SERPL-SCNC: 28 MMOL/L (ref 23–31)
CREAT SERPL-MCNC: 0.71 MG/DL (ref 0.55–1.02)
EOSINOPHIL # BLD AUTO: 0.13 X10(3)/MCL (ref 0–0.9)
EOSINOPHIL NFR BLD AUTO: 1.7 %
ERYTHROCYTE [DISTWIDTH] IN BLOOD BY AUTOMATED COUNT: 13.4 % (ref 11.5–17)
GFR SERPLBLD CREATININE-BSD FMLA CKD-EPI: >60 MLS/MIN/1.73/M2
GLOBULIN SER-MCNC: 2.7 GM/DL (ref 2.4–3.5)
GLUCOSE SERPL-MCNC: 85 MG/DL (ref 82–115)
HCT VFR BLD AUTO: 43.7 % (ref 37–47)
HGB BLD-MCNC: 14.1 G/DL (ref 12–16)
IMM GRANULOCYTES # BLD AUTO: 0.01 X10(3)/MCL (ref 0–0.04)
IMM GRANULOCYTES NFR BLD AUTO: 0.1 %
LYMPHOCYTES # BLD AUTO: 3.48 X10(3)/MCL (ref 0.6–4.6)
LYMPHOCYTES NFR BLD AUTO: 45.2 %
MCH RBC QN AUTO: 31 PG (ref 27–31)
MCHC RBC AUTO-ENTMCNC: 32.3 G/DL (ref 33–36)
MCV RBC AUTO: 96 FL (ref 80–94)
MONOCYTES # BLD AUTO: 0.96 X10(3)/MCL (ref 0.1–1.3)
MONOCYTES NFR BLD AUTO: 12.5 %
NEUTROPHILS # BLD AUTO: 3.07 X10(3)/MCL (ref 2.1–9.2)
NEUTROPHILS NFR BLD AUTO: 39.9 %
PLATELET # BLD AUTO: 346 X10(3)/MCL (ref 130–400)
PMV BLD AUTO: 8.2 FL (ref 7.4–10.4)
POTASSIUM SERPL-SCNC: 4.4 MMOL/L (ref 3.5–5.1)
PROT SERPL-MCNC: 6.7 GM/DL (ref 5.8–7.6)
PROT UR QL STRIP.AUTO: ABNORMAL
RBC # BLD AUTO: 4.55 X10(6)/MCL (ref 4.2–5.4)
SODIUM SERPL-SCNC: 139 MMOL/L (ref 136–145)
WBC # SPEC AUTO: 7.7 X10(3)/MCL (ref 4.5–11.5)

## 2024-02-05 PROCEDURE — 3008F BODY MASS INDEX DOCD: CPT | Mod: CPTII,S$GLB,, | Performed by: INTERNAL MEDICINE

## 2024-02-05 PROCEDURE — 3077F SYST BP >= 140 MM HG: CPT | Mod: CPTII,S$GLB,, | Performed by: INTERNAL MEDICINE

## 2024-02-05 PROCEDURE — 1101F PT FALLS ASSESS-DOCD LE1/YR: CPT | Mod: CPTII,S$GLB,, | Performed by: INTERNAL MEDICINE

## 2024-02-05 PROCEDURE — 85025 COMPLETE CBC W/AUTO DIFF WBC: CPT

## 2024-02-05 PROCEDURE — 3075F SYST BP GE 130 - 139MM HG: CPT | Mod: CPTII,,, | Performed by: INTERNAL MEDICINE

## 2024-02-05 PROCEDURE — 1159F MED LIST DOCD IN RCRD: CPT | Mod: CPTII,,, | Performed by: INTERNAL MEDICINE

## 2024-02-05 PROCEDURE — 36415 COLL VENOUS BLD VENIPUNCTURE: CPT

## 2024-02-05 PROCEDURE — 81005 URINALYSIS: CPT

## 2024-02-05 PROCEDURE — 4010F ACE/ARB THERAPY RXD/TAKEN: CPT | Mod: CPTII,S$GLB,, | Performed by: INTERNAL MEDICINE

## 2024-02-05 PROCEDURE — 99215 OFFICE O/P EST HI 40 MIN: CPT | Mod: S$GLB,,, | Performed by: INTERNAL MEDICINE

## 2024-02-05 PROCEDURE — 1101F PT FALLS ASSESS-DOCD LE1/YR: CPT | Mod: CPTII,,, | Performed by: INTERNAL MEDICINE

## 2024-02-05 PROCEDURE — 3288F FALL RISK ASSESSMENT DOCD: CPT | Mod: CPTII,,, | Performed by: INTERNAL MEDICINE

## 2024-02-05 PROCEDURE — 1126F AMNT PAIN NOTED NONE PRSNT: CPT | Mod: CPTII,S$GLB,, | Performed by: INTERNAL MEDICINE

## 2024-02-05 PROCEDURE — 1159F MED LIST DOCD IN RCRD: CPT | Mod: CPTII,S$GLB,, | Performed by: INTERNAL MEDICINE

## 2024-02-05 PROCEDURE — 1160F RVW MEDS BY RX/DR IN RCRD: CPT | Mod: CPTII,,, | Performed by: INTERNAL MEDICINE

## 2024-02-05 PROCEDURE — 3079F DIAST BP 80-89 MM HG: CPT | Mod: CPTII,S$GLB,, | Performed by: INTERNAL MEDICINE

## 2024-02-05 PROCEDURE — 99999 PR PBB SHADOW E&M-EST. PATIENT-LVL IV: CPT | Mod: PBBFAC,,, | Performed by: INTERNAL MEDICINE

## 2024-02-05 PROCEDURE — 3079F DIAST BP 80-89 MM HG: CPT | Mod: CPTII,,, | Performed by: INTERNAL MEDICINE

## 2024-02-05 PROCEDURE — 1126F AMNT PAIN NOTED NONE PRSNT: CPT | Mod: CPTII,,, | Performed by: INTERNAL MEDICINE

## 2024-02-05 PROCEDURE — 1124F ACP DISCUSS-NO DSCNMKR DOCD: CPT | Mod: CPTII,,, | Performed by: INTERNAL MEDICINE

## 2024-02-05 PROCEDURE — 4010F ACE/ARB THERAPY RXD/TAKEN: CPT | Mod: CPTII,,, | Performed by: INTERNAL MEDICINE

## 2024-02-05 PROCEDURE — 1160F RVW MEDS BY RX/DR IN RCRD: CPT | Mod: CPTII,S$GLB,, | Performed by: INTERNAL MEDICINE

## 2024-02-05 PROCEDURE — G0439 PPPS, SUBSEQ VISIT: HCPCS | Mod: ,,, | Performed by: INTERNAL MEDICINE

## 2024-02-05 PROCEDURE — 3288F FALL RISK ASSESSMENT DOCD: CPT | Mod: CPTII,S$GLB,, | Performed by: INTERNAL MEDICINE

## 2024-02-05 PROCEDURE — 80053 COMPREHEN METABOLIC PANEL: CPT

## 2024-02-05 PROCEDURE — 1124F ACP DISCUSS-NO DSCNMKR DOCD: CPT | Mod: CPTII,S$GLB,, | Performed by: INTERNAL MEDICINE

## 2024-02-05 RX ORDER — DIPHENHYDRAMINE HYDROCHLORIDE 50 MG/ML
50 INJECTION INTRAMUSCULAR; INTRAVENOUS ONCE AS NEEDED
Status: CANCELLED | OUTPATIENT
Start: 2024-02-06

## 2024-02-05 RX ORDER — HEPARIN 100 UNIT/ML
500 SYRINGE INTRAVENOUS
Status: CANCELLED | OUTPATIENT
Start: 2024-02-06

## 2024-02-05 RX ORDER — HYDROCHLOROTHIAZIDE 12.5 MG/1
12.5 TABLET ORAL DAILY
Qty: 90 TABLET | Refills: 3 | Status: SHIPPED | OUTPATIENT
Start: 2024-02-05 | End: 2025-02-04

## 2024-02-05 RX ORDER — SODIUM CHLORIDE 0.9 % (FLUSH) 0.9 %
10 SYRINGE (ML) INJECTION
Status: CANCELLED | OUTPATIENT
Start: 2024-02-06

## 2024-02-05 RX ORDER — DIPHENHYDRAMINE HYDROCHLORIDE 50 MG/ML
25 INJECTION INTRAMUSCULAR; INTRAVENOUS
Status: CANCELLED | OUTPATIENT
Start: 2024-02-06

## 2024-02-05 RX ORDER — EPINEPHRINE 0.3 MG/.3ML
0.3 INJECTION SUBCUTANEOUS ONCE AS NEEDED
Status: CANCELLED | OUTPATIENT
Start: 2024-02-06

## 2024-02-05 RX ORDER — ACETAMINOPHEN 325 MG/1
650 TABLET ORAL
Status: CANCELLED | OUTPATIENT
Start: 2024-02-06

## 2024-02-05 RX ORDER — MUPIROCIN 20 MG/G
OINTMENT TOPICAL 3 TIMES DAILY
COMMUNITY
Start: 2024-01-18 | End: 2024-03-08

## 2024-02-05 NOTE — PROGRESS NOTES
Patient ID: [id number]     Chief Complaint: Medicare AWV (Wellness/)      HPI:     Shayna Ledezma is a 73 y.o. female here today for a Medicare Wellness. No other complaints today.     Ms Corral is a 72-year-old female here today for her 6 month follow-up and Medicare wellness visit..  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 Currently on maintenance Avastin since 2021. Patient is s/p tumor debulking KORI/BSO in 2019 after completion of neoadjuvant carboplatin Taxol and then followed by adjuvant carboplatin Taxol. Being followed by oncology closely with CA-125's.   Patient also has had a splenectomy in 2019.  Also significant for TIA and incidental PE (Eliquis).  Overall patient is doing well and has no acute needs or complaints today.    Blood pressure is noted to be mildly elevated on the lisinopril 20 mg p.o. 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  Ovarian cancer  Peritoneal carcinomatosis  TIA (transient ischemic attack)     Past Surgical History:   Procedure Laterality Date    APPENDECTOMY      BREAST BIOPSY       SECTION      COLONOSCOPY  2023    TOM [facility]    Endometrial polyp removal      Spleen operation      TONSILLECTOMY      TOTAL ABDOMINAL HYSTERECTOMY         Review of patient's allergies indicates:   Allergen Reactions    Codeine Itching    Oxycodone-acetaminophen Other (See Comments)     Unknown    Oxycodone-aspirin Other (See Comments)     Unknown       Outpatient Medications Marked as Taking for the 24 encounter (Office Visit) with Sigrid Giang MD   Medication Sig Dispense Refill    apixaban (ELIQUIS) 5 mg Tab Take 1  tablet (5 mg total) by mouth 2 (two) times daily. 180 tablet 3    aspirin (ECOTRIN) 81 MG EC tablet Take 81 mg by mouth once daily.      atorvastatin (LIPITOR) 40 MG tablet TAKE 1 TABLET(40 MG) BY MOUTH EVERY DAY 90 tablet 3    b complex vitamins capsule Take 1 capsule by mouth once daily.      GLUTAMINE ORAL Take 10 g by mouth Daily.      lisinopriL (PRINIVIL,ZESTRIL) 20 MG tablet TAKE 1 TABLET(20 MG) BY MOUTH EVERY DAY 30 tablet 3    mupirocin (BACTROBAN) 2 % ointment by Nasal route 3 (three) times daily.      pyridoxine, vitamin B6, (B-6) 250 MG Tab Take 250 mg by mouth once daily.      sodium chloride 0.9% SolP 100 mL with bevacizumab 25 mg/mL Soln Inject into the vein every 21 days.         Social History     Socioeconomic History    Marital status:    Tobacco Use    Smoking status: Never    Smokeless tobacco: Never   Substance and Sexual Activity    Alcohol use: Never    Drug use: Never    Sexual activity: Not Currently     Birth control/protection: None     Social Determinants of Health     Financial Resource Strain: Low Risk  (8/2/2023)    Overall Financial Resource Strain (CARDIA)     Difficulty of Paying Living Expenses: Not hard at all   Food Insecurity: No Food Insecurity (8/2/2023)    Hunger Vital Sign     Worried About Running Out of Food in the Last Year: Never true     Ran Out of Food in the Last Year: Never true   Transportation Needs: No Transportation Needs (8/2/2023)    PRAPARE - Transportation     Lack of Transportation (Medical): No     Lack of Transportation (Non-Medical): No   Physical Activity: Sufficiently Active (8/2/2023)    Exercise Vital Sign     Days of Exercise per Week: 7 days     Minutes of Exercise per Session: 60 min   Stress: No Stress Concern Present (8/2/2023)    [employer] - Occupational Stress Questionnaire     Feeling of Stress : Not at all   Social Connections: Moderately Integrated (8/2/2023)    Social Connection and Isolation Panel  [NHANES]     Frequency of Communication with Friends and Family: More than three times a week     Frequency of Social Gatherings with Friends and Family: More than three times a week     Attends Congregational Services: More than 4 times per year     Active Member of Clubs or Organizations: Yes     Attends Club or Organization Meetings: More than 4 times per year     Marital Status: Never    Housing Stability: Low Risk  (8/2/2023)    Housing Stability Vital Sign     Unable to Pay for Housing in the Last Year: No     Number of Places Lived in the Last Year: 1     Unstable Housing in the Last Year: No        Family History   Problem Relation Age of Onset    No Known Problems Mother     No Known Problems Father     COPD Brother         Patient Care Team:  Sigrid Giang MD as PCP - General (Internal Medicine)  Dale Gentile MD as Consulting Physician (Gastroenterology)     Opioid Screening: Patient medication list reviewed, patient is not taking prescription opioids. Patient is not using additional opioids than prescribed. Patient is at low risk of substance abuse based on this opioid use history.       Subjective:     ROS    A comprehensive review of systems is obtained and is essentially negative except for that stated in the HPI     Patient Reported Health Risk Assessment  [age] 70-79  Are you male or female?: Female  During the past four weeks, how much have you been bothered by emotional problems such as feeling anxious, depressed, irritable, sad, or downhearted and blue?: Not at all  During the past five weeks, has your physical and/or emotional health limited your social activities with family, friends, neighbors, or groups?: Not at all  During the past four weeks, how much bodily pain have you generally had?: No pain  During the past four weeks, was someone available to help if you needed and wanted help?: Yes, as much as I wanted  During the past four weeks, what was the hardest  physical activity you could do for at least two minutes?: Moderate  Can you get to places out of walking distance without help?  (For example, can you travel alone on buses or taxis, or drive your own car?): Yes  Can you go shopping for groceries or clothes without someone's help?: Yes  Can you prepare your own meals?: Yes  Can you do your own housework without help?: Yes  Because of any health problems, do you need the help of another person with your personal care needs such as eating, bathing, dressing, or getting around the house?: No  Can you handle your own money without help?: Yes  During the past four weeks, how would you rate your health in general?: Excellent  How have things been going for you during the past four weeks?: Very well  Are you having difficulties driving your car?: No  Do you always fasten your seat belt when you are in a car?: Yes, usually  How often in the past four weeks have you been bothered by falling or dizzy when standing up?: Sometimes  How often in the past four weeks have you been bothered by sexual problems?: Never  How often in the past four weeks have you been bothered by trouble eating well?: Never  How often in the past four weeks have you been bothered by teeth or denture problems?: Never  How often in the past four weeks have you been bothered with problems using the telephone?: Never  How often in the past four weeks have you been bothered by tiredness or fatigue?: Never  Have you fallen two or more times in the past year?: No  Are you afraid of falling?: No  Are you a smoker?: No  During the past four weeks, how many drinks of wine, beer, or other alcoholic beverages did you have?: No alcohol at all  Do you exercise for about 20 minutes three or more days a week?: Yes, most of the time  Have you been given any information to help you with hazards in your house that might hurt you?: Yes  Have you been given any information to help you with keeping track of your  "medications?: Yes  How often do you have trouble taking medicines the way you've been told to take them?: I always take them as prescribed  How confident are you that you can control and manage most of your health problems?: Very confident  What is your race? (Check all that apply.):     Objective:     BP (!) 146/86 (BP Location: Left arm, Patient Position: Sitting, BP Method: Small (Manual))   Pulse 66   Ht 5' 2" (1.575 m)   Wt 56.2 kg (124 lb)   SpO2 99%   BMI 22.68 kg/m²     Physical Exam  Constitutional:       Appearance: Normal appearance.   HENT:      Head: Normocephalic and atraumatic.      Nose: Nose normal.      Mouth/Throat:      Mouth: Mucous membranes are moist.      Pharynx: Oropharynx is clear.   Eyes:      Extraocular Movements: Extraocular movements intact.      Pupils: Pupils are equal, round, and reactive to light.   Cardiovascular:      Rate and Rhythm: Normal rate and regular rhythm.      Pulses: Normal pulses.   Pulmonary:      Effort: Pulmonary effort is normal.      Breath sounds: Normal breath sounds.   Abdominal:      General: Bowel sounds are normal.      Palpations: Abdomen is soft.   Musculoskeletal:         General: Normal range of motion.      Cervical back: Normal range of motion and neck supple.   Skin:     General: Skin is warm.   Neurological:      General: No focal deficit present.      Mental Status: She is alert and oriented to person, place, and time. Mental status is at baseline.   Psychiatric:         Mood and Affect: Mood normal.                No data to display                  2/5/2024     9:00 AM 1/16/2024     3:00 PM 12/27/2023     3:00 PM 12/27/2023     2:30 PM 12/5/2023     3:00 PM 11/13/2023    11:20 AM 10/24/2023     3:00 PM   Fall Risk Assessment - Outpatient   Mobility Status Ambulatory Ambulatory Ambulatory Ambulatory Ambulatory Ambulatory Ambulatory   Number of falls 0 0 0 0 0 0 0   Identified as fall risk False True False False False False False "           Depression Screening  Over the past two weeks, has the patient felt down, depressed, or hopeless?: No  Over the past two weeks, has the patient felt little interest or pleasure in doing things?: No  Functional Ability/Safety Screening  Was the patient's timed Up & Go test unsteady or longer than 30 seconds?: No  Does the patient need help with phone, transportation, shopping, preparing meals, housework, laundry, meds, or managing money?: No  Does the patient's home have rugs in the hallway, lack grab bars in the bathroom, lack handrails on the stairs or have poor lighting?: No  Have you noticed any hearing difficulties?: No  Cognitive Function (Assessed through direct observation with due consideration of information obtained by way of patient reports and/or concerns raised by family, friends, caretakers, or others)    Does the patient repeat questions/statements in the same day?: No  Does the patient have trouble remembering the date, year, and time?: No  Does the patient have difficulty managing finances?: No  Does the patient have a decreased sense of direction?: No      Assessment:     Problem List Items Addressed This Visit          Cardiac/Vascular    Hypertension - Primary     -suboptimally controlled on lisinopril 20 mg p.o. daily   -adding hydrochlorothiazide 12.5 mg in a.m.   -low-sodium diet  -emphasized on importance of continuing her routine aerobic exercise, patient does ride the bicycle regularly            Hematology    Pulmonary embolism, unspecified chronicity, unspecified pulmonary embolism type, unspecified whether acute cor pulmonale present     -on Eliquis 5 mg p.o. b.i.d., continue            Oncology    Secondary malignant neoplasm of retroperitoneum and peritoneum       Other    Medicare annual wellness visit, subsequent     -labs are reviewed all essentially normal  -patient is advised on importance of watching her carbohydrate intake and saturated fat intake, making the right  nutritional choices and exercising on a regular basis  -up-to-date with the screening           Other Visit Diagnoses       History of respiratory syncytial virus (RSV) vaccination        Need for RSV vaccination        Relevant Medications    RSV, preF A and preF B,PF, (ABRYSVO) 120 mcg/0.5 mL SolR vaccine    Need for shingles vaccine        Relevant Medications    varicella-zoster [id number], PF, (SHINGRIX) 50 mcg/0.5 mL injection            Plan:     Medicare Annual Wellness and Personalized Prevention Plan:   Fall Risk + Home Safety + Hearing Impairment + Depression Screen + Cognitive Impairment Screen + Health Risk Assessment all reviewed.       Health Maintenance Topics with due status: Not Due       Topic Last Completion Date    Mammogram 10/05/2023    High Dose Statin 12/27/2023    Lipid Panel 01/30/2024      The patient's Health Maintenance was reviewed and the following appears to be due at this time:   Health Maintenance Due   Topic Date Due    Hepatitis C Screening  Never done    TETANUS VACCINE  Never done    Shingles Vaccine (1 of 2) Never done    RSV Vaccine (Age 60+ and Pregnant patients) (1 - 1-dose 60+ series) Never done    DEXA Scan  11/02/2012    COVID-19 Vaccine (10 - 2023-24 season) 09/01/2023         Advance Care Planning     Date: 02/05/2024  Patient did not wish or was not able to name a surrogate decision maker or provide an Advance Care Plan.           Medication List with Changes/Refills   New Medications    HYDROCHLOROTHIAZIDE (HYDRODIURIL) 12.5 MG TAB    Take 1 tablet (12.5 mg total) by mouth once daily.       Start Date: 2/5/2024  End Date: 2/4/2025    RSV, PREF A AND PREF B,PF, (ABRYSVO) 120 MCG/0.5 ML SOLR VACCINE    Inject 0.5 mLs (120 mcg total) into the muscle once. for 1 dose       Start Date: 2/5/2024  End Date: 2/5/2024    VARICELLA-ZOSTER [id number], PF, (SHINGRIX) 50 MCG/0.5 ML INJECTION    Inject 0.5 mLs into the muscle once. for 1 dose       Start Date: 2/5/2024  End Date:  2/5/2024   Current Medications    APIXABAN (ELIQUIS) 5 MG TAB    Take 1 tablet (5 mg total) by mouth 2 (two) times daily.       Start Date: 10/2/2023 End Date: --    ASPIRIN (ECOTRIN) 81 MG EC TABLET    Take 81 mg by mouth once daily.       Start Date: --        End Date: --    ATORVASTATIN (LIPITOR) 40 MG TABLET    TAKE 1 TABLET(40 MG) BY MOUTH EVERY DAY       Start Date: 5/30/2023 End Date: --    B COMPLEX VITAMINS CAPSULE    Take 1 capsule by mouth once daily.       Start Date: --        End Date: --    GLUTAMINE ORAL    Take 10 g by mouth Daily.       Start Date: 11/29/2021End Date: --    LISINOPRIL (PRINIVIL,ZESTRIL) 20 MG TABLET    TAKE 1 TABLET(20 MG) BY MOUTH EVERY DAY       Start Date: 1/22/2024 End Date: --    MUPIROCIN (BACTROBAN) 2 % OINTMENT    by Nasal route 3 (three) times daily.       Start Date: 1/18/2024 End Date: --    PYRIDOXINE, VITAMIN B6, (B-6) 250 MG TAB    Take 250 mg by mouth once daily.       Start Date: --        End Date: --    SODIUM CHLORIDE 0.9% SOLP 100 ML WITH BEVACIZUMAB 25 MG/ML SOLN    Inject into the vein every 21 days.       Start Date: --        End Date: --        Follow up in about 6 months (around 8/5/2024) for BP Check. In addition to their scheduled follow up, the patient has also been instructed to follow up on as needed basis.

## 2024-02-05 NOTE — ASSESSMENT & PLAN NOTE
-suboptimally controlled on lisinopril 20 mg p.o. daily   -adding hydrochlorothiazide 12.5 mg in a.m.   -low-sodium diet  -emphasized on importance of continuing her routine aerobic exercise, patient does ride the bicycle regularly

## 2024-02-06 ENCOUNTER — INFUSION (OUTPATIENT)
Dept: INFUSION THERAPY | Facility: HOSPITAL | Age: 74
End: 2024-02-06
Attending: INTERNAL MEDICINE
Payer: MEDICARE

## 2024-02-06 VITALS
HEART RATE: 68 BPM | DIASTOLIC BLOOD PRESSURE: 82 MMHG | SYSTOLIC BLOOD PRESSURE: 151 MMHG | TEMPERATURE: 98 F | OXYGEN SATURATION: 98 %

## 2024-02-06 DIAGNOSIS — C78.6 SECONDARY MALIGNANT NEOPLASM OF RETROPERITONEUM AND PERITONEUM: Primary | ICD-10-CM

## 2024-02-06 PROCEDURE — 96375 TX/PRO/DX INJ NEW DRUG ADDON: CPT

## 2024-02-06 PROCEDURE — 96413 CHEMO IV INFUSION 1 HR: CPT

## 2024-02-06 PROCEDURE — 25000003 PHARM REV CODE 250: Performed by: INTERNAL MEDICINE

## 2024-02-06 PROCEDURE — 63600175 PHARM REV CODE 636 W HCPCS: Performed by: INTERNAL MEDICINE

## 2024-02-06 RX ORDER — ACETAMINOPHEN 325 MG/1
650 TABLET ORAL
Status: DISCONTINUED | OUTPATIENT
Start: 2024-02-06 | End: 2024-02-06 | Stop reason: HOSPADM

## 2024-02-06 RX ORDER — DIPHENHYDRAMINE HYDROCHLORIDE 50 MG/ML
50 INJECTION INTRAMUSCULAR; INTRAVENOUS ONCE AS NEEDED
Status: DISCONTINUED | OUTPATIENT
Start: 2024-02-06 | End: 2024-02-06 | Stop reason: HOSPADM

## 2024-02-06 RX ORDER — EPINEPHRINE 0.3 MG/.3ML
0.3 INJECTION SUBCUTANEOUS ONCE AS NEEDED
Status: DISCONTINUED | OUTPATIENT
Start: 2024-02-06 | End: 2024-02-06 | Stop reason: HOSPADM

## 2024-02-06 RX ORDER — DIPHENHYDRAMINE HYDROCHLORIDE 50 MG/ML
25 INJECTION INTRAMUSCULAR; INTRAVENOUS
Status: COMPLETED | OUTPATIENT
Start: 2024-02-06 | End: 2024-02-06

## 2024-02-06 RX ORDER — SODIUM CHLORIDE 0.9 % (FLUSH) 0.9 %
10 SYRINGE (ML) INJECTION
Status: DISCONTINUED | OUTPATIENT
Start: 2024-02-06 | End: 2024-02-06 | Stop reason: HOSPADM

## 2024-02-06 RX ORDER — HEPARIN 100 UNIT/ML
500 SYRINGE INTRAVENOUS
Status: DISCONTINUED | OUTPATIENT
Start: 2024-02-06 | End: 2024-02-06 | Stop reason: HOSPADM

## 2024-02-06 RX ADMIN — SODIUM CHLORIDE: 9 INJECTION, SOLUTION INTRAVENOUS at 03:02

## 2024-02-06 RX ADMIN — DIPHENHYDRAMINE HYDROCHLORIDE 25 MG: 50 INJECTION INTRAMUSCULAR; INTRAVENOUS at 03:02

## 2024-02-06 RX ADMIN — BEVACIZUMAB-AWWB 800 MG: 400 INJECTION, SOLUTION INTRAVENOUS at 03:02

## 2024-02-19 DIAGNOSIS — E78.2 MIXED HYPERLIPIDEMIA: ICD-10-CM

## 2024-02-19 RX ORDER — ATORVASTATIN CALCIUM 40 MG/1
TABLET, FILM COATED ORAL
Qty: 90 TABLET | Refills: 3 | Status: SHIPPED | OUTPATIENT
Start: 2024-02-19

## 2024-02-24 RX ORDER — DIPHENHYDRAMINE HYDROCHLORIDE 50 MG/ML
50 INJECTION INTRAMUSCULAR; INTRAVENOUS ONCE AS NEEDED
Status: CANCELLED | OUTPATIENT
Start: 2024-02-27

## 2024-02-24 RX ORDER — HEPARIN 100 UNIT/ML
500 SYRINGE INTRAVENOUS
Status: CANCELLED | OUTPATIENT
Start: 2024-02-27

## 2024-02-24 RX ORDER — EPINEPHRINE 0.3 MG/.3ML
0.3 INJECTION SUBCUTANEOUS ONCE AS NEEDED
Status: CANCELLED | OUTPATIENT
Start: 2024-02-27

## 2024-02-24 RX ORDER — ACETAMINOPHEN 325 MG/1
650 TABLET ORAL
Status: CANCELLED | OUTPATIENT
Start: 2024-02-27

## 2024-02-24 RX ORDER — DIPHENHYDRAMINE HYDROCHLORIDE 50 MG/ML
25 INJECTION INTRAMUSCULAR; INTRAVENOUS
Status: CANCELLED | OUTPATIENT
Start: 2024-02-27

## 2024-02-24 RX ORDER — SODIUM CHLORIDE 0.9 % (FLUSH) 0.9 %
10 SYRINGE (ML) INJECTION
Status: CANCELLED | OUTPATIENT
Start: 2024-02-27

## 2024-02-26 ENCOUNTER — LAB VISIT (OUTPATIENT)
Dept: LAB | Facility: HOSPITAL | Age: 74
End: 2024-02-26
Attending: INTERNAL MEDICINE
Payer: MEDICARE

## 2024-02-26 DIAGNOSIS — C57.4 MALIGNANT NEOPLASM OF UTERINE ADNEXA: ICD-10-CM

## 2024-02-26 DIAGNOSIS — C78.6 SECONDARY MALIGNANT NEOPLASM OF RETROPERITONEUM AND PERITONEUM: ICD-10-CM

## 2024-02-26 LAB
ALBUMIN SERPL-MCNC: 3.8 G/DL (ref 3.4–4.8)
ALBUMIN/GLOB SERPL: 1.3 RATIO (ref 1.1–2)
ALP SERPL-CCNC: 46 UNIT/L (ref 40–150)
ALT SERPL-CCNC: 22 UNIT/L (ref 0–55)
AST SERPL-CCNC: 37 UNIT/L (ref 5–34)
BASOPHILS # BLD AUTO: 0.05 X10(3)/MCL
BASOPHILS NFR BLD AUTO: 0.6 %
BILIRUB SERPL-MCNC: 1.4 MG/DL
BUN SERPL-MCNC: 17.8 MG/DL (ref 9.8–20.1)
CALCIUM SERPL-MCNC: 9.7 MG/DL (ref 8.4–10.2)
CANCER AG125 SERPL-ACNC: 18.6 UNIT/ML (ref 0–35)
CHLORIDE SERPL-SCNC: 94 MMOL/L (ref 98–107)
CO2 SERPL-SCNC: 26 MMOL/L (ref 23–31)
CREAT SERPL-MCNC: 0.7 MG/DL (ref 0.55–1.02)
EOSINOPHIL # BLD AUTO: 0.18 X10(3)/MCL (ref 0–0.9)
EOSINOPHIL NFR BLD AUTO: 2 %
ERYTHROCYTE [DISTWIDTH] IN BLOOD BY AUTOMATED COUNT: 12.6 % (ref 11.5–17)
GFR SERPLBLD CREATININE-BSD FMLA CKD-EPI: >60 MLS/MIN/1.73/M2
GLOBULIN SER-MCNC: 2.9 GM/DL (ref 2.4–3.5)
GLUCOSE SERPL-MCNC: 109 MG/DL (ref 82–115)
HCT VFR BLD AUTO: 40.4 % (ref 37–47)
HGB BLD-MCNC: 13.4 G/DL (ref 12–16)
IMM GRANULOCYTES # BLD AUTO: 0.01 X10(3)/MCL (ref 0–0.04)
IMM GRANULOCYTES NFR BLD AUTO: 0.1 %
LYMPHOCYTES # BLD AUTO: 3.44 X10(3)/MCL (ref 0.6–4.6)
LYMPHOCYTES NFR BLD AUTO: 38.5 %
MCH RBC QN AUTO: 31.1 PG (ref 27–31)
MCHC RBC AUTO-ENTMCNC: 33.2 G/DL (ref 33–36)
MCV RBC AUTO: 93.7 FL (ref 80–94)
MONOCYTES # BLD AUTO: 1.13 X10(3)/MCL (ref 0.1–1.3)
MONOCYTES NFR BLD AUTO: 12.7 %
NEUTROPHILS # BLD AUTO: 4.12 X10(3)/MCL (ref 2.1–9.2)
NEUTROPHILS NFR BLD AUTO: 46.1 %
PLATELET # BLD AUTO: 370 X10(3)/MCL (ref 130–400)
PMV BLD AUTO: 7.8 FL (ref 7.4–10.4)
POTASSIUM SERPL-SCNC: 4.8 MMOL/L (ref 3.5–5.1)
PROT SERPL-MCNC: 6.7 GM/DL (ref 5.8–7.6)
PROT UR QL STRIP.AUTO: ABNORMAL
RBC # BLD AUTO: 4.31 X10(6)/MCL (ref 4.2–5.4)
SODIUM SERPL-SCNC: 130 MMOL/L (ref 136–145)
WBC # SPEC AUTO: 8.93 X10(3)/MCL (ref 4.5–11.5)

## 2024-02-26 PROCEDURE — 81005 URINALYSIS: CPT

## 2024-02-26 PROCEDURE — 36415 COLL VENOUS BLD VENIPUNCTURE: CPT

## 2024-02-26 PROCEDURE — 85025 COMPLETE CBC W/AUTO DIFF WBC: CPT

## 2024-02-26 PROCEDURE — 86304 IMMUNOASSAY TUMOR CA 125: CPT

## 2024-02-26 PROCEDURE — 80053 COMPREHEN METABOLIC PANEL: CPT

## 2024-02-27 ENCOUNTER — INFUSION (OUTPATIENT)
Dept: INFUSION THERAPY | Facility: HOSPITAL | Age: 74
End: 2024-02-27
Attending: INTERNAL MEDICINE
Payer: MEDICARE

## 2024-02-27 VITALS
SYSTOLIC BLOOD PRESSURE: 132 MMHG | OXYGEN SATURATION: 99 % | HEART RATE: 63 BPM | DIASTOLIC BLOOD PRESSURE: 69 MMHG | TEMPERATURE: 98 F

## 2024-02-27 DIAGNOSIS — C78.6 SECONDARY MALIGNANT NEOPLASM OF RETROPERITONEUM AND PERITONEUM: Primary | ICD-10-CM

## 2024-02-27 PROCEDURE — 25000003 PHARM REV CODE 250: Performed by: INTERNAL MEDICINE

## 2024-02-27 PROCEDURE — 63600175 PHARM REV CODE 636 W HCPCS: Performed by: INTERNAL MEDICINE

## 2024-02-27 PROCEDURE — 96413 CHEMO IV INFUSION 1 HR: CPT

## 2024-02-27 PROCEDURE — 96375 TX/PRO/DX INJ NEW DRUG ADDON: CPT

## 2024-02-27 RX ORDER — EPINEPHRINE 0.3 MG/.3ML
0.3 INJECTION SUBCUTANEOUS ONCE AS NEEDED
Status: DISCONTINUED | OUTPATIENT
Start: 2024-02-27 | End: 2024-02-27 | Stop reason: HOSPADM

## 2024-02-27 RX ORDER — ACETAMINOPHEN 325 MG/1
650 TABLET ORAL
Status: DISCONTINUED | OUTPATIENT
Start: 2024-02-27 | End: 2024-02-27 | Stop reason: HOSPADM

## 2024-02-27 RX ORDER — DIPHENHYDRAMINE HYDROCHLORIDE 50 MG/ML
25 INJECTION INTRAMUSCULAR; INTRAVENOUS
Status: COMPLETED | OUTPATIENT
Start: 2024-02-27 | End: 2024-02-27

## 2024-02-27 RX ORDER — SODIUM CHLORIDE 0.9 % (FLUSH) 0.9 %
10 SYRINGE (ML) INJECTION
Status: DISCONTINUED | OUTPATIENT
Start: 2024-02-27 | End: 2024-02-27 | Stop reason: HOSPADM

## 2024-02-27 RX ORDER — HEPARIN 100 UNIT/ML
500 SYRINGE INTRAVENOUS
Status: DISCONTINUED | OUTPATIENT
Start: 2024-02-27 | End: 2024-02-27 | Stop reason: HOSPADM

## 2024-02-27 RX ORDER — DIPHENHYDRAMINE HYDROCHLORIDE 50 MG/ML
50 INJECTION INTRAMUSCULAR; INTRAVENOUS ONCE AS NEEDED
Status: DISCONTINUED | OUTPATIENT
Start: 2024-02-27 | End: 2024-02-27 | Stop reason: HOSPADM

## 2024-02-27 RX ADMIN — DIPHENHYDRAMINE HYDROCHLORIDE 25 MG: 50 INJECTION INTRAMUSCULAR; INTRAVENOUS at 03:02

## 2024-02-27 RX ADMIN — BEVACIZUMAB-AWWB 800 MG: 400 INJECTION, SOLUTION INTRAVENOUS at 03:02

## 2024-03-08 PROBLEM — T45.1X5A AGRANULOCYTOSIS SECONDARY TO CANCER CHEMOTHERAPY: Status: ACTIVE | Noted: 2024-03-08

## 2024-03-08 PROBLEM — D70.1 AGRANULOCYTOSIS SECONDARY TO CANCER CHEMOTHERAPY: Status: ACTIVE | Noted: 2024-03-08

## 2024-03-08 PROBLEM — D84.821 IMMUNODEFICIENCY DUE TO DRUGS: Status: ACTIVE | Noted: 2024-03-08

## 2024-03-08 PROBLEM — J44.9 CHRONIC OBSTRUCTIVE PULMONARY DISEASE, UNSPECIFIED: Status: ACTIVE | Noted: 2024-03-08

## 2024-03-08 PROBLEM — D69.6 THROMBOCYTOPENIA, UNSPECIFIED: Status: ACTIVE | Noted: 2024-03-08

## 2024-03-08 PROBLEM — Z79.899 IMMUNODEFICIENCY DUE TO DRUGS: Status: ACTIVE | Noted: 2024-03-08

## 2024-03-08 PROBLEM — Z79.01 LONG TERM (CURRENT) USE OF ANTICOAGULANTS: Status: ACTIVE | Noted: 2024-03-08

## 2024-03-08 PROBLEM — C78.00 SECONDARY MALIGNANT NEOPLASM OF UNSPECIFIED LUNG: Status: ACTIVE | Noted: 2024-03-08

## 2024-03-08 NOTE — PROGRESS NOTES
Subjective:       Patient ID: Shayna Ledezma is a 73 y.o. female.  GI: Dr. Gamaliel Carlos  GYN ONC at [facility]: Dr. Mike Santana    1. 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 Pulmonary Embolism (Incidental on CT)--19    Procedure/pathology:   1. Paracentesis with removal of 5L of fluid done 18--serous carcinoma, high grade, c/w ovarian primary, PAX-8, CK7 and MOC-31 positive, CDX-2, CK-20 and Calretinin-negative.  2. S/p Exploratory Lap, radical tumor debulking including total abdominal hysterectomy, bilateral salpingo-oophorectomy, bilateral pelvic lymph node sampling, appendectomy, mobilization of the left ureter, complete gross resection of the disease with removal of mesenteric disease, as well as omental disese and parasplenic disease by Dr. Santana 3/18/19--Metastatic high grade serous carcinoma. Tissue/organ involvement: right ovary, appendectomy, omentum, mesentery (including small bowel mesentery) and multiple other sites designated: periumbilical, perisplenic, transverse colon, splenic flexure, perirectal, left periureteral. 2 lymph nodes negative. [id number]. FIGO stage IIIC. Myriad somatic instability testing positive for genomic instability, negative BRCA1 and BRCA2 tumor testing.  3. Splenectomy done 10/21/19--splenic involvement with metastatic high grade serous carcinoma, 5 benign lymph nodes. Caris testing showed LAM, NTRK1/2/3 negative, TMB low, BRCA1/2 negative, ER/MO negative, CATHY negative, SPEN pathogenic variant Exon 11, TP53 pathogenic variant Exon 5, PD-L1 positive CPS 15, Genomic ELODIA high.  Imagin. CT A/P w/ and w/o contrast done at [facility] 18--large volume ascites with multiple peritoneal implants, right pericolic gutter implant measures 2.5X3.3cm, LUQ omental/mesenteric implant measures 3X3.6cm, omental carcinomatosis, extensive enhancing soft tissue surrounding sigmoid colon vs. large sigmoid mass, left  ovarian cystic lesion appears to have some internal septations measures 3.5X3.8cm, enhancing periportal soft tissue density likely lymphadenopathy with some narrowing of the portal vein noted, but without internal filling defect, borderline splenomegaly, subtle solid lesion w/n central spleen measures 2.4X2.5cm, likely metastatic, with peripheral area of diminished enhancement suggesting splenic infarct, soft tissue implant abutting gallbladder measures 2X2.2cm, no right adnexal mass, trace bilateral layering pleural fluid, small moderate-sized hiatal hernia, few borderline enlarged lymph nodes w/n right epicardial fat pad.  2. CT of chest on 1/11/19--Some prominent right cardiophrenic lymph nodes are nonspecific and can be followed on future surveillance scans. Otherwise no CT evidence of metastatic disease to the chest. While exam was not tailored for evaluation of the pulmonary arteries I suspect there is pulmonary thromboembolic disease. Peritoneal carcinomatosis seen in the upper abdomen. Critical Result: Pulmonary Embolus.  3. CT C/A/P 3/4/19--Positive response to therapy. No new or progressive metastatic disease in the chest, abdomen or pelvis. Stable to improved findings include smaller pericardial lymph nodes, andrew hepatis adenopathy, peritoneal carcinomatosis, smaller left adnexal lesion; no evidence of PE within limitations of the study.  5. CT PE protocol chest/A/P 6/11/19--No PE, improved pericardial lymph node with minimal size on current examination, improved peritoneal carcinomatosis and left adnexal implant, splenic ischemia evolved into small infarct, size improvement of one of splenic hypodense lesion and mild size increase of second hypodense lesion, favored to be benign/cystic, no new findings.  6. PET/CT 9/25/19--s/p hysterectomy and bilateral oophorectomy, no evidence for locally recurrent/residual disease, intensely hypermetabolic hypodense lesion within the spleen 3.5X3.3cm concerning for  metastatic focus. No other abnormal focus of disease.  7. CT C/A/P 11/13/20--Right lower lobe 3.3 cm mass is presumed metastatic, otherwise no evident residual, recurrent or metastatic disease.   8. PET/CT 11/20/20--Hypermetabolic right lung base lesion and suspected metastatic periportal adenopathy, no additional sites of FDG-avid otherwise aggressive appearing pathology through the neck, chest, abdomen, or pelvis.  9. PET/CT 1/21/21--Interval decrease in size of the right lower lobe mass with decreased FDG uptake, now measures 1.7 x 2.2 cm (previous 3.3 x 3.1 cm), resolution of FDG uptake in the suspected periportal lymph node which is not identifiable on noncontrast CT. No evidence of new or progressive hypermetabolic metastatic disease.  10. PET/CT 3/30/21--Resolved right lower lung lobe hypermetabolic mass. Otherwise, no interval change or evidence of residual disease, recurrence or new metastatic lesion.    Procedures:   1. Paracentesis on 12/28/18 with removal of 5 Liters.  2. Paracentesis on 01/07/19 with removal of 3.5 Liters.  3. Paracentesis on 02/06/19 with removal of 2.5 Liters.  4. Mediport placed 12/2020 by Dr. Serge Gentile--removed 8/26/21 due to mediport fracture.    Germline genetic testing done by [employer] [facility] 1/29/19--negative for BRCA1/2, two (2) variants of uncertain significance (VUS): CATHY p.N5200Y/p.X8179R and MLH1 p.P603L.     :  12/19/18 1102  11/10/20  42.9  12/08/20 79.5  01/05/21 28.9  01/26/21 12.8  02/18/21 11.6  03/15/22--10.3  04/26/22--9.6  05/16/22--10.2  06/07/22--10.1  07/19/22--11.6  08/08/22--12.5  09/19/22--12.7  10/31/22--13.5  01/03/23--12.0  02/13/23--14.0  03/03/23--13.9  04/17/23--14.5  05/26/23--14.6  07/31/23--13.5  11/13/23--15.6  12/04/23--16.8  01/16/24--13.7  02/26/24--18.6    Treatment History:  1. Neoadjuvant Carboplatin/Taxol every 3 weeks x 3 cycles. 1/15/19 - 2/26/19. Neulasta added.   2. Surgery--tumor debulking KORI/BSO 3/18/19.  3. Adjuvant  Carboplatin/Taxol x 3 additions cycles 4/9/19--5/21/19.  4. Splenectomy 10/21/19.  5. Niraparib maintenance (dose reduction to 100mg daily for cytopenias) 11/8/19--12/1/20 (stopped due to progression).  6. Carboplatin/Paclitaxel/Avastin X 6 cycles completed 12/8/21--3/24/21 (complete response).    Current Treatment:  1. Eliquis BID for incidental PE on imaging 1/2019  2. Avastin maintenance started on 4/13/21.   Cycle 52 due 3/19/24.      Chief Complaint   [facility] with    [facility]     Pt reports no concerns today.     HPI   Ms. Ledezma is here for scheduled on treatment follow-up visit. She is doing very well. She continues on Avastin every 3 weeks. Reports having nose bleeds and she was referred to Dr. Guanaco Mcrae. On examination she had likely an infection/multiple scabs etc and he could not see. She was put on Mupirocin and nose spray her nose bleeding is much better. Her BP also has been elevated and Dr. Giang added back HCTZ. She denies any abdominal pain or bleeding. Mentions left hip pain for the past 6 months now and is walking with a limp today. I offered her an XR of her hip and she is in agreement. She plans to have this done one day this week.  also repeated today. No other problems reported.     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 180 tablet 3     aspirin (ECOTRIN) 81 MG EC tablet Take 81 mg by mouth once daily.      atorvastatin (LIPITOR) 40 MG tablet TAKE 1 TABLET(40 MG) BY MOUTH EVERY DAY 90 tablet 3    b complex vitamins capsule Take 1 capsule by mouth once daily.      GLUTAMINE ORAL Take 10 g by mouth Daily.      hydroCHLOROthiazide (HYDRODIURIL) 12.5 MG Tab Take 1 tablet (12.5 mg total) by mouth once daily. 90 tablet 3    lisinopriL (PRINIVIL,ZESTRIL) 20 MG tablet TAKE 1 TABLET(20 MG) BY MOUTH EVERY DAY 30 tablet 3    pyridoxine, vitamin B6, (B-6) 250 MG Tab Take 250 mg by mouth once daily.      sodium chloride 0.9% SolP 100 mL with bevacizumab 25 mg/mL Soln Inject into the vein every 21 days.       No current facility-administered medications on file prior to visit.      Review of Systems   Constitutional:  Negative for appetite change and unexpected weight change.   HENT:  Negative for mouth sores. Nosebleeds: occassional.   Eyes:  Negative for visual disturbance.   Respiratory:  Negative for cough and shortness of breath.    Cardiovascular:  Negative for chest pain.   Gastrointestinal:  Negative for abdominal pain and diarrhea.   Genitourinary:  Negative for frequency.   Musculoskeletal:  Negative for back pain.        Left hip pain and stiffness   Integumentary:  Negative for rash.   Neurological:  Negative for headaches.   Hematological:  Negative for adenopathy.   Psychiatric/Behavioral:  The patient is not nervous/anxious.         Vitals:    03/18/24 0915   BP: (!) 146/77   Pulse: (!) 57   Resp: 14   Temp: 97.6 °F (36.4 °C)     Physical Exam  Vitals reviewed.   Constitutional:       Appearance: Normal appearance. She is normal weight.   HENT:      Head: Normocephalic.   Eyes:      General: Lids are normal. Vision grossly intact.      Extraocular Movements: Extraocular movements intact.      Conjunctiva/sclera: Conjunctivae normal.   Cardiovascular:      Rate and Rhythm: Normal rate and regular rhythm.      Pulses: Normal pulses.      Heart  sounds: Normal heart sounds, S1 normal and S2 normal.   Pulmonary:      Effort: Pulmonary effort is normal.      Breath sounds: Normal breath sounds.   Abdominal:      General: Abdomen is flat. Bowel sounds are normal. There is no distension.      Palpations: Abdomen is soft.      Tenderness: There is no abdominal tenderness.   Musculoskeletal:      Cervical back: Normal range of motion and neck supple.      Right lower leg: No edema.      Left lower leg: No edema.      Comments: Walking with a limp today.   Skin:     General: Skin is warm and moist.      Capillary Refill: Capillary refill takes less than 2 seconds.      Comments: Left CW mediport removed, site intact   Neurological:      General: No focal deficit present.      Mental Status: She is alert and oriented to person, place, and time.   Psychiatric:         Attention and Perception: Attention normal.         Mood and Affect: Mood normal.         Speech: Speech normal.         Behavior: Behavior normal. Behavior is cooperative.         Cognition and Memory: Cognition normal.         Judgment: Judgment normal.       Lab Visit on 03/18/2024   Component Date Value    Protein, UA 03/18/2024 Trace (A)     WBC 03/18/2024 6.03     RBC 03/18/2024 4.16 (L)     Hgb 03/18/2024 12.8     Hct 03/18/2024 39.5     MCV 03/18/2024 95.0 (H)     MCH 03/18/2024 30.8     MCHC 03/18/2024 32.4 (L)     RDW 03/18/2024 13.0     Platelet 03/18/2024 352     MPV 03/18/2024 7.7     Neut % 03/18/2024 41.6     Lymph % 03/18/2024 36.3     Mono % 03/18/2024 13.8     Eos % 03/18/2024 7.3     Basophil % 03/18/2024 1.0     Lymph # 03/18/2024 2.19     Neut # 03/18/2024 2.51     Mono # 03/18/2024 0.83     Eos # 03/18/2024 0.44     Baso # 03/18/2024 0.06     IG# 03/18/2024 0.00     IG% 03/18/2024 0.0           Assessment:       1. Secondary malignant neoplasm of retroperitoneum and peritoneum    2. Malignant neoplasm metastatic to lung, unspecified laterality    3. Carcinoma of fallopian tube,  unspecified laterality    4. Pulmonary embolism, unspecified chronicity, unspecified pulmonary embolism type, unspecified whether acute cor pulmonale present    5. Immunodeficiency due to drugs    6. Malignant neoplasm of bilateral ovaries      Plan:     Patient with left ovarian cancer diagnosed 12/26/18, appears to be stage IIIC vs. IV with diffuse peritoneal disease, possible splenic involvement, epicardial lymph nodes and bilateral pleural effusions.  Patient seen and evaluated by Dr. Mike Santana at [facility] in [place].   Treatment recommended upfront with chemotherapy Carboplatin/Paclitaxel x 3 cycles.  Completed Cycle 3 on 2/26/19.   She underwent total abdominal hysterectomy, BSO and tumor debulking on 3/18/19 with Dr. Santana with complete gross resection of disease. FIGO Stage IIIC papillary serous fallopian tube cancer.   Patient resumed chemotherapy with cycle 4 on 4/9/19. Completed cycle 6 on 5/21/19.    Testing on tumor was positive for genomic instability but negative for BRCA mutation.  Per NCCN guidelines, maintenance can be considered for BRCA mutated germline category 1 and somatic category 2A. There is some evidence that PARP inhibitors have some activity as well in tumors with genomic instability. But the PARP maintenance is not approved for this indication. Offered treatment to patient and after discussion she declined and made decision to continue with observation only.    PET/CT done for rising CA-125 showed hypermetabolic splenic lesion which is not new, just enlarged from previous. Case discussed with Dr. Mike Santana.  Patient is now s/p splenectomy done 10/21/19. Consistent with splenic involvement with high grade serous carcinoma.   Dr. Santana recommended Niraparib maintenance based on new data showing activity in HRD positive ovarian cancer and patient started on 11/8/19, required a dose reduction due to cytopenias.  Rising CA-125 noted in 11/2020. CT showed new RLL lung mass.    Follow-up PET done 11/20/20 showed RLL lung mass hypermetabolic and hypermetabolic periportal adenopathy.     Recommended 2nd line treatment with Carboplatin/Taxol/Avastin.   Started cycle 1 on 12/8/20. Neulasta added with cycle 2.   Completed Cycle 6 on 3/24/21.  PET from 3/30/21 showed complete resolution of lung mass and no other disease.    Avastin maintenance started on 4/13/21.   Hospitalized for TIA after her 4th cycle of Avastin. Work-up for stroke and cause otherwise negative. Patient started on baby ASA by neurology.  Discussed there are no clear guidelines for changing/discontinuing treatment for TIA/CVA related to Avastin.  Since her symptoms resolved and there were no residual effects, recommended to continue with Avastin since it is working well for her disease and since a baby ASA was added for prevention. She was also continued on Eliquis for h/o PE.  S/p mediport removal for fracture on 8/26/21.   Continue Avastin through peripheral veins for now.    Patient continues on Avastin maintenance without problems.  BP has been elevated on Lisinopril 20mg daily. PCP added HCTZ 12.5mg daily.  2+ protein on dipstick from 10/2023, 24-hour urine okay.   Urine protein has been up and down. Don't plan to repeat a 24-hour unless 3+.  Patient did have some epistaxis, seen by ENT and now better after treatment with Mupirocin. Did not have any cautery done.         Due for Avastin maintenance Cycle 52 tomorrow.         She has no symptoms or laboratory findings worrisome for disease progression.   CBC, CMP, UA/dipstick  every 3 weeks.      Labs on 4/8/24 and next treatment on 4/9/24 C53.    is being monitored every 6 weeks.  Next draw is today.   No further scans until rise in CA-125.  Left hip pain for the past 6 months. Will obtain hip XR this week and follow-up the results.   F/u in 6 weeks prior to Cycle 54.    She is now >2 years from completing last chemotherapy, so still considered platinum-sensitive.  Will need replacement of mediport when she needs to resume chemotherapy.     Vaccines/Boosters post-splenectomy: Menactra/Menveo every 5 years, Bexsero every 2-3 years, annual flu shot.  Last Bexsero was given 8/1/23.   Plan for Menactra/Menveo in 9/2024 which will be 5 years after initial.  Continue Eliquis 5mg BID and ASA.  All questions answered.      [facility]

## 2024-03-18 ENCOUNTER — HOSPITAL ENCOUNTER (OUTPATIENT)
Dept: RADIOLOGY | Facility: HOSPITAL | Age: 74
Discharge: HOME | End: 2024-03-18
Attending: NURSE PRACTITIONER
Payer: MEDICARE

## 2024-03-18 ENCOUNTER — OFFICE VISIT (OUTPATIENT)
Dept: HEMATOLOGY/ONCOLOGY | Facility: CLINIC | Age: 74
End: 2024-03-18
Payer: MEDICARE

## 2024-03-18 VITALS
RESPIRATION RATE: 14 BRPM | SYSTOLIC BLOOD PRESSURE: 146 MMHG | TEMPERATURE: 98 F | HEIGHT: 63 IN | HEART RATE: 57 BPM | DIASTOLIC BLOOD PRESSURE: 77 MMHG | BODY MASS INDEX: 22.36 KG/M2 | WEIGHT: 126.19 LBS | OXYGEN SATURATION: 99 %

## 2024-03-18 DIAGNOSIS — I26.99 PULMONARY EMBOLISM, UNSPECIFIED CHRONICITY, UNSPECIFIED PULMONARY EMBOLISM TYPE, UNSPECIFIED WHETHER ACUTE COR PULMONALE PRESENT: ICD-10-CM

## 2024-03-18 DIAGNOSIS — M25.552 ACUTE HIP PAIN, LEFT: ICD-10-CM

## 2024-03-18 DIAGNOSIS — Z79.899 IMMUNODEFICIENCY DUE TO DRUGS: ICD-10-CM

## 2024-03-18 DIAGNOSIS — C57.00 CARCINOMA OF FALLOPIAN TUBE, UNSPECIFIED LATERALITY: ICD-10-CM

## 2024-03-18 DIAGNOSIS — C78.6 SECONDARY MALIGNANT NEOPLASM OF RETROPERITONEUM AND PERITONEUM: Primary | ICD-10-CM

## 2024-03-18 DIAGNOSIS — D84.821 IMMUNODEFICIENCY DUE TO DRUGS: ICD-10-CM

## 2024-03-18 DIAGNOSIS — C78.00 MALIGNANT NEOPLASM METASTATIC TO LUNG, UNSPECIFIED LATERALITY: ICD-10-CM

## 2024-03-18 DIAGNOSIS — C56.3 MALIGNANT NEOPLASM OF BILATERAL OVARIES: ICD-10-CM

## 2024-03-18 PROCEDURE — 1126F AMNT PAIN NOTED NONE PRSNT: CPT | Mod: CPTII,S$GLB,, | Performed by: NURSE PRACTITIONER

## 2024-03-18 PROCEDURE — 3008F BODY MASS INDEX DOCD: CPT | Mod: CPTII,S$GLB,, | Performed by: NURSE PRACTITIONER

## 2024-03-18 PROCEDURE — 1160F RVW MEDS BY RX/DR IN RCRD: CPT | Mod: CPTII,S$GLB,, | Performed by: NURSE PRACTITIONER

## 2024-03-18 PROCEDURE — 99999 PR PBB SHADOW E&M-EST. PATIENT-LVL IV: CPT | Mod: PBBFAC,,, | Performed by: NURSE PRACTITIONER

## 2024-03-18 PROCEDURE — 3077F SYST BP >= 140 MM HG: CPT | Mod: CPTII,S$GLB,, | Performed by: NURSE PRACTITIONER

## 2024-03-18 PROCEDURE — 1159F MED LIST DOCD IN RCRD: CPT | Mod: CPTII,S$GLB,, | Performed by: NURSE PRACTITIONER

## 2024-03-18 PROCEDURE — 3288F FALL RISK ASSESSMENT DOCD: CPT | Mod: CPTII,S$GLB,, | Performed by: NURSE PRACTITIONER

## 2024-03-18 PROCEDURE — 73502 X-RAY EXAM HIP UNI 2-3 VIEWS: CPT | Mod: TC,LT

## 2024-03-18 PROCEDURE — 99215 OFFICE O/P EST HI 40 MIN: CPT | Mod: S$GLB,,, | Performed by: NURSE PRACTITIONER

## 2024-03-18 PROCEDURE — 3078F DIAST BP <80 MM HG: CPT | Mod: CPTII,S$GLB,, | Performed by: NURSE PRACTITIONER

## 2024-03-18 PROCEDURE — 1101F PT FALLS ASSESS-DOCD LE1/YR: CPT | Mod: CPTII,S$GLB,, | Performed by: NURSE PRACTITIONER

## 2024-03-18 PROCEDURE — 4010F ACE/ARB THERAPY RXD/TAKEN: CPT | Mod: CPTII,S$GLB,, | Performed by: NURSE PRACTITIONER

## 2024-03-18 RX ORDER — DIPHENHYDRAMINE HYDROCHLORIDE 50 MG/ML
50 INJECTION INTRAMUSCULAR; INTRAVENOUS ONCE AS NEEDED
Status: CANCELLED | OUTPATIENT
Start: 2024-04-09

## 2024-03-18 RX ORDER — DIPHENHYDRAMINE HYDROCHLORIDE 50 MG/ML
25 INJECTION INTRAMUSCULAR; INTRAVENOUS
Status: CANCELLED | OUTPATIENT
Start: 2024-04-09

## 2024-03-18 RX ORDER — EPINEPHRINE 0.3 MG/.3ML
0.3 INJECTION SUBCUTANEOUS ONCE AS NEEDED
Status: CANCELLED | OUTPATIENT
Start: 2024-03-19

## 2024-03-18 RX ORDER — ACETAMINOPHEN 325 MG/1
650 TABLET ORAL
Status: CANCELLED | OUTPATIENT
Start: 2024-04-09

## 2024-03-18 RX ORDER — EPINEPHRINE 0.3 MG/.3ML
0.3 INJECTION SUBCUTANEOUS ONCE AS NEEDED
Status: CANCELLED | OUTPATIENT
Start: 2024-04-09

## 2024-03-18 RX ORDER — SODIUM CHLORIDE 0.9 % (FLUSH) 0.9 %
10 SYRINGE (ML) INJECTION
Status: CANCELLED | OUTPATIENT
Start: 2024-03-19

## 2024-03-18 RX ORDER — DIPHENHYDRAMINE HYDROCHLORIDE 50 MG/ML
50 INJECTION INTRAMUSCULAR; INTRAVENOUS ONCE AS NEEDED
Status: CANCELLED | OUTPATIENT
Start: 2024-03-19

## 2024-03-18 RX ORDER — HEPARIN 100 UNIT/ML
500 SYRINGE INTRAVENOUS
Status: CANCELLED | OUTPATIENT
Start: 2024-03-19

## 2024-03-18 RX ORDER — HEPARIN 100 UNIT/ML
500 SYRINGE INTRAVENOUS
Status: CANCELLED | OUTPATIENT
Start: 2024-04-09

## 2024-03-18 RX ORDER — DIPHENHYDRAMINE HYDROCHLORIDE 50 MG/ML
25 INJECTION INTRAMUSCULAR; INTRAVENOUS
Status: CANCELLED | OUTPATIENT
Start: 2024-03-19

## 2024-03-18 RX ORDER — SODIUM CHLORIDE 0.9 % (FLUSH) 0.9 %
10 SYRINGE (ML) INJECTION
Status: CANCELLED | OUTPATIENT
Start: 2024-04-09

## 2024-03-18 RX ORDER — ACETAMINOPHEN 325 MG/1
650 TABLET ORAL
Status: CANCELLED | OUTPATIENT
Start: 2024-03-19

## 2024-03-19 ENCOUNTER — PATIENT MESSAGE (OUTPATIENT)
Dept: HEMATOLOGY/ONCOLOGY | Facility: CLINIC | Age: 74
End: 2024-03-19
Payer: MEDICARE

## 2024-03-19 ENCOUNTER — INFUSION (OUTPATIENT)
Dept: INFUSION THERAPY | Facility: HOSPITAL | Age: 74
End: 2024-03-19
Attending: INTERNAL MEDICINE
Payer: MEDICARE

## 2024-03-19 VITALS
RESPIRATION RATE: 20 BRPM | DIASTOLIC BLOOD PRESSURE: 78 MMHG | HEART RATE: 71 BPM | BODY MASS INDEX: 23.22 KG/M2 | WEIGHT: 126.19 LBS | HEIGHT: 62 IN | SYSTOLIC BLOOD PRESSURE: 169 MMHG

## 2024-03-19 DIAGNOSIS — C78.6 SECONDARY MALIGNANT NEOPLASM OF RETROPERITONEUM AND PERITONEUM: Primary | ICD-10-CM

## 2024-03-19 PROCEDURE — 96413 CHEMO IV INFUSION 1 HR: CPT

## 2024-03-19 PROCEDURE — 25000003 PHARM REV CODE 250: Performed by: NURSE PRACTITIONER

## 2024-03-19 PROCEDURE — 63600175 PHARM REV CODE 636 W HCPCS: Performed by: NURSE PRACTITIONER

## 2024-03-19 RX ORDER — SODIUM CHLORIDE 0.9 % (FLUSH) 0.9 %
10 SYRINGE (ML) INJECTION
Status: DISCONTINUED | OUTPATIENT
Start: 2024-03-19 | End: 2024-03-19 | Stop reason: HOSPADM

## 2024-03-19 RX ORDER — DIPHENHYDRAMINE HYDROCHLORIDE 50 MG/ML
25 INJECTION INTRAMUSCULAR; INTRAVENOUS
Status: COMPLETED | OUTPATIENT
Start: 2024-03-19 | End: 2024-03-19

## 2024-03-19 RX ORDER — ACETAMINOPHEN 325 MG/1
650 TABLET ORAL
Status: DISCONTINUED | OUTPATIENT
Start: 2024-03-19 | End: 2024-03-19 | Stop reason: HOSPADM

## 2024-03-19 RX ORDER — DIPHENHYDRAMINE HYDROCHLORIDE 50 MG/ML
50 INJECTION INTRAMUSCULAR; INTRAVENOUS ONCE AS NEEDED
Status: DISCONTINUED | OUTPATIENT
Start: 2024-03-19 | End: 2024-03-19 | Stop reason: HOSPADM

## 2024-03-19 RX ORDER — EPINEPHRINE 0.3 MG/.3ML
0.3 INJECTION SUBCUTANEOUS ONCE AS NEEDED
Status: DISCONTINUED | OUTPATIENT
Start: 2024-03-19 | End: 2024-03-19 | Stop reason: HOSPADM

## 2024-03-19 RX ORDER — HEPARIN 100 UNIT/ML
500 SYRINGE INTRAVENOUS
Status: DISCONTINUED | OUTPATIENT
Start: 2024-03-19 | End: 2024-03-19 | Stop reason: HOSPADM

## 2024-03-19 RX ADMIN — DIPHENHYDRAMINE HYDROCHLORIDE 25 MG: 50 INJECTION INTRAMUSCULAR; INTRAVENOUS at 03:03

## 2024-03-19 RX ADMIN — BEVACIZUMAB-AWWB 800 MG: 400 INJECTION, SOLUTION INTRAVENOUS at 03:03

## 2024-03-19 NOTE — PROGRESS NOTES
Can you let her know the XR of her hip showed only degenerative changes (arthritic changes). Thank you! Also her  went down!

## 2024-04-08 ENCOUNTER — LAB VISIT (OUTPATIENT)
Dept: LAB | Facility: HOSPITAL | Age: 74
End: 2024-04-08
Attending: INTERNAL MEDICINE
Payer: MEDICARE

## 2024-04-08 DIAGNOSIS — C78.6 SECONDARY MALIGNANT NEOPLASM OF RETROPERITONEUM AND PERITONEUM: ICD-10-CM

## 2024-04-08 DIAGNOSIS — C57.00 CARCINOMA OF FALLOPIAN TUBE, UNSPECIFIED LATERALITY: ICD-10-CM

## 2024-04-08 LAB
ALBUMIN SERPL-MCNC: 3.8 G/DL (ref 3.4–4.8)
ALBUMIN/GLOB SERPL: 1.4 RATIO (ref 1.1–2)
ALP SERPL-CCNC: 51 UNIT/L (ref 40–150)
ALT SERPL-CCNC: 18 UNIT/L (ref 0–55)
AST SERPL-CCNC: 32 UNIT/L (ref 5–34)
BASOPHILS # BLD AUTO: 0.03 X10(3)/MCL
BASOPHILS NFR BLD AUTO: 0.3 %
BILIRUB SERPL-MCNC: 1.3 MG/DL
BUN SERPL-MCNC: 26.5 MG/DL (ref 9.8–20.1)
CALCIUM SERPL-MCNC: 10 MG/DL (ref 8.4–10.2)
CHLORIDE SERPL-SCNC: 97 MMOL/L (ref 98–107)
CO2 SERPL-SCNC: 27 MMOL/L (ref 23–31)
CREAT SERPL-MCNC: 0.72 MG/DL (ref 0.55–1.02)
EOSINOPHIL # BLD AUTO: 0.2 X10(3)/MCL (ref 0–0.9)
EOSINOPHIL NFR BLD AUTO: 1.9 %
ERYTHROCYTE [DISTWIDTH] IN BLOOD BY AUTOMATED COUNT: 13 % (ref 11.5–17)
GFR SERPLBLD CREATININE-BSD FMLA CKD-EPI: >60 MLS/MIN/1.73/M2
GLOBULIN SER-MCNC: 2.8 GM/DL (ref 2.4–3.5)
GLUCOSE SERPL-MCNC: 112 MG/DL (ref 82–115)
HCT VFR BLD AUTO: 39.7 % (ref 37–47)
HGB BLD-MCNC: 13 G/DL (ref 12–16)
IMM GRANULOCYTES # BLD AUTO: 0.01 X10(3)/MCL (ref 0–0.04)
IMM GRANULOCYTES NFR BLD AUTO: 0.1 %
LYMPHOCYTES # BLD AUTO: 2.97 X10(3)/MCL (ref 0.6–4.6)
LYMPHOCYTES NFR BLD AUTO: 28.8 %
MCH RBC QN AUTO: 31.2 PG (ref 27–31)
MCHC RBC AUTO-ENTMCNC: 32.7 G/DL (ref 33–36)
MCV RBC AUTO: 95.2 FL (ref 80–94)
MONOCYTES # BLD AUTO: 1.04 X10(3)/MCL (ref 0.1–1.3)
MONOCYTES NFR BLD AUTO: 10.1 %
NEUTROPHILS # BLD AUTO: 6.08 X10(3)/MCL (ref 2.1–9.2)
NEUTROPHILS NFR BLD AUTO: 58.8 %
PLATELET # BLD AUTO: 427 X10(3)/MCL (ref 130–400)
PMV BLD AUTO: 7.7 FL (ref 7.4–10.4)
POTASSIUM SERPL-SCNC: 4.6 MMOL/L (ref 3.5–5.1)
PROT SERPL-MCNC: 6.6 GM/DL (ref 5.8–7.6)
PROT UR QL STRIP.AUTO: ABNORMAL
RBC # BLD AUTO: 4.17 X10(6)/MCL (ref 4.2–5.4)
SODIUM SERPL-SCNC: 134 MMOL/L (ref 136–145)
WBC # SPEC AUTO: 10.33 X10(3)/MCL (ref 4.5–11.5)

## 2024-04-08 PROCEDURE — 85025 COMPLETE CBC W/AUTO DIFF WBC: CPT

## 2024-04-08 PROCEDURE — 80053 COMPREHEN METABOLIC PANEL: CPT

## 2024-04-08 PROCEDURE — 36415 COLL VENOUS BLD VENIPUNCTURE: CPT

## 2024-04-08 PROCEDURE — 81005 URINALYSIS: CPT

## 2024-04-09 ENCOUNTER — INFUSION (OUTPATIENT)
Dept: INFUSION THERAPY | Facility: HOSPITAL | Age: 74
End: 2024-04-09
Attending: INTERNAL MEDICINE
Payer: MEDICARE

## 2024-04-09 VITALS
DIASTOLIC BLOOD PRESSURE: 82 MMHG | RESPIRATION RATE: 18 BRPM | OXYGEN SATURATION: 96 % | TEMPERATURE: 98 F | SYSTOLIC BLOOD PRESSURE: 147 MMHG | HEIGHT: 63 IN | WEIGHT: 124.81 LBS | HEART RATE: 69 BPM | BODY MASS INDEX: 22.11 KG/M2

## 2024-04-09 DIAGNOSIS — C78.6 SECONDARY MALIGNANT NEOPLASM OF RETROPERITONEUM AND PERITONEUM: Primary | ICD-10-CM

## 2024-04-09 PROCEDURE — 25000003 PHARM REV CODE 250: Performed by: NURSE PRACTITIONER

## 2024-04-09 PROCEDURE — 96413 CHEMO IV INFUSION 1 HR: CPT

## 2024-04-09 PROCEDURE — 63600175 PHARM REV CODE 636 W HCPCS: Mod: JZ,JG | Performed by: NURSE PRACTITIONER

## 2024-04-09 RX ORDER — ACETAMINOPHEN 325 MG/1
650 TABLET ORAL
Status: DISCONTINUED | OUTPATIENT
Start: 2024-04-09 | End: 2024-04-09 | Stop reason: HOSPADM

## 2024-04-09 RX ORDER — DIPHENHYDRAMINE HYDROCHLORIDE 50 MG/ML
25 INJECTION INTRAMUSCULAR; INTRAVENOUS
Status: COMPLETED | OUTPATIENT
Start: 2024-04-09 | End: 2024-04-09

## 2024-04-09 RX ORDER — EPINEPHRINE 0.3 MG/.3ML
0.3 INJECTION SUBCUTANEOUS ONCE AS NEEDED
Status: DISCONTINUED | OUTPATIENT
Start: 2024-04-09 | End: 2024-04-09 | Stop reason: HOSPADM

## 2024-04-09 RX ORDER — HEPARIN 100 UNIT/ML
500 SYRINGE INTRAVENOUS
Status: DISCONTINUED | OUTPATIENT
Start: 2024-04-09 | End: 2024-04-09 | Stop reason: HOSPADM

## 2024-04-09 RX ORDER — DIPHENHYDRAMINE HYDROCHLORIDE 50 MG/ML
50 INJECTION INTRAMUSCULAR; INTRAVENOUS ONCE AS NEEDED
Status: DISCONTINUED | OUTPATIENT
Start: 2024-04-09 | End: 2024-04-09 | Stop reason: HOSPADM

## 2024-04-09 RX ORDER — SODIUM CHLORIDE 0.9 % (FLUSH) 0.9 %
10 SYRINGE (ML) INJECTION
Status: DISCONTINUED | OUTPATIENT
Start: 2024-04-09 | End: 2024-04-09 | Stop reason: HOSPADM

## 2024-04-09 RX ADMIN — BEVACIZUMAB-AWWB 800 MG: 400 INJECTION, SOLUTION INTRAVENOUS at 03:04

## 2024-04-09 RX ADMIN — SODIUM CHLORIDE: 9 INJECTION, SOLUTION INTRAVENOUS at 03:04

## 2024-04-09 RX ADMIN — DIPHENHYDRAMINE HYDROCHLORIDE 25 MG: 50 INJECTION INTRAMUSCULAR; INTRAVENOUS at 03:04

## 2024-04-09 NOTE — NURSING
Pt tolerated treatment with no complaints. IV catheter discontinued intact. Site without signs and symptoms of complications. Coban dressing applied.  Pt instructed to remove coban dressing in 30 minutes or sooner if it felt that it was getting tighter.  Pt stated understanding.

## 2024-04-23 NOTE — PROGRESS NOTES
Subjective:       Patient ID: Shayna Ledezma is a 73 y.o. female.  GI: Dr. Gamaliel Carlos  GYN ONC at [facility]: Dr. Mike Santana    1. 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 Pulmonary Embolism (Incidental on CT)--19    Procedure/pathology:   1. Paracentesis with removal of 5L of fluid done 18--serous carcinoma, high grade, c/w ovarian primary, PAX-8, CK7 and MOC-31 positive, CDX-2, CK-20 and Calretinin-negative.  2. S/p Exploratory Lap, radical tumor debulking including total abdominal hysterectomy, bilateral salpingo-oophorectomy, bilateral pelvic lymph node sampling, appendectomy, mobilization of the left ureter, complete gross resection of the disease with removal of mesenteric disease, as well as omental disese and parasplenic disease by Dr. Santana 3/18/19--Metastatic high grade serous carcinoma. Tissue/organ involvement: right ovary, appendectomy, omentum, mesentery (including small bowel mesentery) and multiple other sites designated: periumbilical, perisplenic, transverse colon, splenic flexure, perirectal, left periureteral. 2 lymph nodes negative. [id number]. FIGO stage IIIC. Myriad somatic instability testing positive for genomic instability, negative BRCA1 and BRCA2 tumor testing.  3. Splenectomy done 10/21/19--splenic involvement with metastatic high grade serous carcinoma, 5 benign lymph nodes. Caris testing showed LAM, NTRK1/2/3 negative, TMB low, BRCA1/2 negative, ER/MA negative, CATHY negative, SPEN pathogenic variant Exon 11, TP53 pathogenic variant Exon 5, PD-L1 positive CPS 15, Genomic ELODIA high.  Imagin. CT A/P w/ and w/o contrast done at [facility] 18--large volume ascites with multiple peritoneal implants, right pericolic gutter implant measures 2.5X3.3cm, LUQ omental/mesenteric implant measures 3X3.6cm, omental carcinomatosis, extensive enhancing soft tissue surrounding sigmoid colon vs. large sigmoid mass, left  ovarian cystic lesion appears to have some internal septations measures 3.5X3.8cm, enhancing periportal soft tissue density likely lymphadenopathy with some narrowing of the portal vein noted, but without internal filling defect, borderline splenomegaly, subtle solid lesion w/n central spleen measures 2.4X2.5cm, likely metastatic, with peripheral area of diminished enhancement suggesting splenic infarct, soft tissue implant abutting gallbladder measures 2X2.2cm, no right adnexal mass, trace bilateral layering pleural fluid, small moderate-sized hiatal hernia, few borderline enlarged lymph nodes w/n right epicardial fat pad.  2. CT of chest on 1/11/19--Some prominent right cardiophrenic lymph nodes are nonspecific and can be followed on future surveillance scans. Otherwise no CT evidence of metastatic disease to the chest. While exam was not tailored for evaluation of the pulmonary arteries I suspect there is pulmonary thromboembolic disease. Peritoneal carcinomatosis seen in the upper abdomen. Critical Result: Pulmonary Embolus.  3. CT C/A/P 3/4/19--Positive response to therapy. No new or progressive metastatic disease in the chest, abdomen or pelvis. Stable to improved findings include smaller pericardial lymph nodes, andrew hepatis adenopathy, peritoneal carcinomatosis, smaller left adnexal lesion; no evidence of PE within limitations of the study.  5. CT PE protocol chest/A/P 6/11/19--No PE, improved pericardial lymph node with minimal size on current examination, improved peritoneal carcinomatosis and left adnexal implant, splenic ischemia evolved into small infarct, size improvement of one of splenic hypodense lesion and mild size increase of second hypodense lesion, favored to be benign/cystic, no new findings.  6. PET/CT 9/25/19--s/p hysterectomy and bilateral oophorectomy, no evidence for locally recurrent/residual disease, intensely hypermetabolic hypodense lesion within the spleen 3.5X3.3cm concerning for  metastatic focus. No other abnormal focus of disease.  7. CT C/A/P 11/13/20--Right lower lobe 3.3 cm mass is presumed metastatic, otherwise no evident residual, recurrent or metastatic disease.   8. PET/CT 11/20/20--Hypermetabolic right lung base lesion and suspected metastatic periportal adenopathy, no additional sites of FDG-avid otherwise aggressive appearing pathology through the neck, chest, abdomen, or pelvis.  9. PET/CT 1/21/21--Interval decrease in size of the right lower lobe mass with decreased FDG uptake, now measures 1.7 x 2.2 cm (previous 3.3 x 3.1 cm), resolution of FDG uptake in the suspected periportal lymph node which is not identifiable on noncontrast CT. No evidence of new or progressive hypermetabolic metastatic disease.  10. PET/CT 3/30/21--Resolved right lower lung lobe hypermetabolic mass. Otherwise, no interval change or evidence of residual disease, recurrence or new metastatic lesion.    Procedures:   1. Paracentesis on 12/28/18 with removal of 5 Liters.  2. Paracentesis on 01/07/19 with removal of 3.5 Liters.  3. Paracentesis on 02/06/19 with removal of 2.5 Liters.  4. Mediport placed 12/2020 by Dr. Serge Gentile--removed 8/26/21 due to mediport fracture.    Germline genetic testing done by [facility] 1/29/19--negative for BRCA1/2, two (2) variants of uncertain significance (VUS): CATHY p.I9691G/p.I1586O and MLH1 p.P603L.     :  12/19/18 1102  11/10/20  42.9  12/08/20 79.5  01/05/21 28.9  01/26/21 12.8  02/18/21 11.6  03/15/22--10.3  04/26/22--9.6  05/16/22--10.2  06/07/22--10.1  07/19/22--11.6  08/08/22--12.5  09/19/22--12.7  10/31/22--13.5  01/03/23--12.0  02/13/23--14.0  03/03/23--13.9  04/17/23--14.5  05/26/23--14.6  07/31/23--13.5  11/13/23--15.6  12/04/23--16.8  01/16/24--13.7  02/26/24--18.6  03/18/24--16.3    Treatment History:  1. Neoadjuvant Carboplatin/Taxol every 3 weeks x 3 cycles. 1/15/19 - 2/26/19. Neulasta added.   2. Surgery--tumor debulking KORI/BSO 3/18/19.  3.  Adjuvant Carboplatin/Taxol x 3 additions cycles 4/9/19--5/21/19.  4. Splenectomy 10/21/19.  5. Niraparib maintenance (dose reduction to 100mg daily for cytopenias) 11/8/19--12/1/20 (stopped due to progression).  6. Carboplatin/Paclitaxel/Avastin X 6 cycles completed 12/8/21--3/24/21 (complete response).    Current Treatment:  1. Eliquis BID for incidental PE on imaging 1/2019  2. Avastin maintenance started on 4/13/21.   Cycle 54 due 4/30/24.      Chief Complaint   Patient presents with    Other Misc     Pt reports no concerns today.     HPI   Patient is here for follow-up of ovarian cancer on maintenance Avastin. She has no issues from treatment. She feels very well overall. No abdominal pain or other issues reported. We discussed again continuation of Avastin and she prefers to continue for now. She has been on it now for 3 years.     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 180 tablet 3    aspirin (ECOTRIN) 81 MG EC tablet Take 81 mg by mouth once daily.      atorvastatin (LIPITOR) 40 MG tablet TAKE 1 TABLET(40 MG) BY MOUTH EVERY DAY 90 tablet 3    b complex vitamins capsule Take 1 capsule by mouth once daily.      GLUTAMINE ORAL Take 10 g by mouth Daily.      hydroCHLOROthiazide (HYDRODIURIL) 12.5 MG Tab Take 1 tablet (12.5 mg total) by mouth once daily. 90 tablet 3     lisinopriL (PRINIVIL,ZESTRIL) 20 MG tablet TAKE 1 TABLET(20 MG) BY MOUTH EVERY DAY 30 tablet 3    pyridoxine, vitamin B6, (B-6) 250 MG Tab Take 250 mg by mouth once daily.      sodium chloride 0.9% SolP 100 mL with bevacizumab 25 mg/mL Soln Inject into the vein every 21 days.       No current facility-administered medications on file prior to visit.      Review of Systems   Constitutional:  Negative for appetite change and unexpected weight change.   HENT:  Negative for mouth sores and nosebleeds.    Eyes:  Negative for visual disturbance.   Respiratory:  Negative for cough and shortness of breath.    Cardiovascular:  Negative for chest pain.   Gastrointestinal:  Negative for abdominal pain and diarrhea.   Genitourinary:  Negative for frequency.   Musculoskeletal:  Negative for back pain.        Left hip pain and stiffness   Integumentary:  Negative for rash.   Neurological:  Negative for headaches.   Hematological:  Negative for adenopathy.   Psychiatric/Behavioral:  The patient is not nervous/anxious.         Vitals:    04/29/24 1003   BP: (!) 145/76   Pulse: (!) 54   Resp: 14   Temp: 98.4 °F (36.9 °C)       Physical Exam  Vitals reviewed.   Constitutional:       Appearance: Normal appearance. She is normal weight.   HENT:      Head: Normocephalic.   Eyes:      General: Lids are normal. Vision grossly intact.      Extraocular Movements: Extraocular movements intact.      Conjunctiva/sclera: Conjunctivae normal.   Cardiovascular:      Rate and Rhythm: Normal rate and regular rhythm.      Pulses: Normal pulses.      Heart sounds: Normal heart sounds, S1 normal and S2 normal.   Pulmonary:      Effort: Pulmonary effort is normal.      Breath sounds: Normal breath sounds.   Abdominal:      General: Abdomen is flat. Bowel sounds are normal. There is no distension.      Palpations: Abdomen is soft.      Tenderness: There is no abdominal tenderness.   Musculoskeletal:      Cervical back: Normal range of motion and neck  supple.      Right lower leg: No edema.      Left lower leg: No edema.   Skin:     General: Skin is warm and moist.      Capillary Refill: Capillary refill takes less than 2 seconds.      Comments: Left CW mediport removed, site intact   Neurological:      General: No focal deficit present.      Mental Status: She is alert and oriented to person, place, and time.   Psychiatric:         Attention and Perception: Attention normal.         Mood and Affect: Mood normal.         Speech: Speech normal.         Behavior: Behavior normal. Behavior is cooperative.         Cognition and Memory: Cognition normal.         Judgment: Judgment normal.       Lab Visit on 04/29/2024   Component Date Value    Protein, UA 04/29/2024 1+ (A)     WBC 04/29/2024 7.23     RBC 04/29/2024 4.23     Hgb 04/29/2024 13.3     Hct 04/29/2024 39.5     MCV 04/29/2024 93.4     MCH 04/29/2024 31.4 (H)     MCHC 04/29/2024 33.7     RDW 04/29/2024 13.0     Platelet 04/29/2024 404 (H)     MPV 04/29/2024 7.9     Neut % 04/29/2024 42.5     Lymph % 04/29/2024 35.5     Mono % 04/29/2024 14.9     Eos % 04/29/2024 6.8     Basophil % 04/29/2024 0.3     Lymph # 04/29/2024 2.57     Neut # 04/29/2024 3.07     Mono # 04/29/2024 1.08     Eos # 04/29/2024 0.49     Baso # 04/29/2024 0.02     IG# 04/29/2024 0.00     IG% 04/29/2024 0.0           Assessment:       1. Secondary malignant neoplasm of retroperitoneum and peritoneum    2. Malignant neoplasm of uterine adnexa    3. Malignant neoplasm metastatic to lung, unspecified laterality      Plan:     Patient with left ovarian cancer diagnosed 12/26/18, appears to be stage IIIC vs. IV with diffuse peritoneal disease, possible splenic involvement, epicardial lymph nodes and bilateral pleural effusions.  Patient seen and evaluated by Dr. Mike Santana at [facility] in [place].   Treatment recommended upfront with chemotherapy Carboplatin/Paclitaxel x 3 cycles.  Completed Cycle 3 on 2/26/19.   She underwent total  abdominal hysterectomy, BSO and tumor debulking on 3/18/19 with Dr. Santana with complete gross resection of disease. FIGO Stage IIIC papillary serous fallopian tube cancer.   Patient resumed chemotherapy with cycle 4 on 4/9/19. Completed cycle 6 on 5/21/19.    Testing on tumor was positive for genomic instability but negative for BRCA mutation.  Per NCCN guidelines, maintenance can be considered for BRCA mutated germline category 1 and somatic category 2A. There is some evidence that PARP inhibitors have some activity as well in tumors with genomic instability. But the PARP maintenance is not approved for this indication. Offered treatment to patient and after discussion she declined and made decision to continue with observation only.    PET/CT done for rising CA-125 showed hypermetabolic splenic lesion which is not new, just enlarged from previous. Case discussed with Dr. Mike Santana.  Patient is now s/p splenectomy done 10/21/19. Consistent with splenic involvement with high grade serous carcinoma.   Dr. Santana recommended Niraparib maintenance based on new data showing activity in HRD positive ovarian cancer and patient started on 11/8/19, required a dose reduction due to cytopenias.  Rising CA-125 noted in 11/2020. CT showed new RLL lung mass.   Follow-up PET done 11/20/20 showed RLL lung mass hypermetabolic and hypermetabolic periportal adenopathy.     Recommended 2nd line treatment with Carboplatin/Taxol/Avastin.   Started cycle 1 on 12/8/20. Neulasta added with cycle 2.   Completed Cycle 6 on 3/24/21.  PET from 3/30/21 showed complete resolution of lung mass and no other disease.    Avastin maintenance started on 4/13/21.   Hospitalized for TIA after her 4th cycle of Avastin. Work-up for stroke and cause otherwise negative. Patient started on baby ASA by neurology.  Discussed there are no clear guidelines for changing/discontinuing treatment for TIA/CVA related to Avastin.  Since her symptoms resolved and  there were no residual effects, recommended to continue with Avastin since it is working well for her disease and since a baby ASA was added for prevention. She was also continued on Eliquis for h/o PE.  S/p mediport removal for fracture on 8/26/21.   Continue Avastin through peripheral veins for now.    Patient continues on Avastin maintenance without problems.    BP has been elevated on Lisinopril 20mg daily. PCP added HCTZ 12.5mg daily.  2+ protein on dipstick from 10/2023, 24-hour urine okay.   Urine protein has been up and down. Don't plan to repeat a 24-hour unless 3+.    Patient did have some epistaxis, seen by ENT and now better after treatment with Mupirocin. Did not have any cautery done.         Due for Avastin maintenance Cycle 54 tomorrow.           She has no symptoms or laboratory findings worrisome for disease progression.   Continue with labs CBC, CMP, UA/dipstick  every 3 weeks.        Labs on 5/20/24 and next treatment on 5/21/24 C55.      is being monitored every 6 weeks.  Next draw is today.   No further scans until rise in CA-125.    F/u in 6 weeks prior to Cycle 56.      Per NCCN, Avastin can be continued as single agent maintenance until disease progression or unacceptable toxicity if the disease responds to the initial recurrence chemotherapy/avastin regimen.     She is now >3 years from completing last chemotherapy, so still considered platinum-sensitive. Will need replacement of mediport when she needs to resume chemotherapy.     Vaccines/Boosters post-splenectomy: Menactra/Menveo every 5 years, Bexsero every 2-3 years, annual flu shot.  Last Bexsero was given 8/1/23.   Plan for Menactra/Menveo in 9/2024 which will be 5 years after initial.  Continue Eliquis 5mg BID and ASA.  All questions answered.      Renetta Corral MD

## 2024-04-29 ENCOUNTER — LAB VISIT (OUTPATIENT)
Dept: LAB | Facility: HOSPITAL | Age: 74
End: 2024-04-29
Attending: INTERNAL MEDICINE
Payer: MEDICARE

## 2024-04-29 ENCOUNTER — OFFICE VISIT (OUTPATIENT)
Dept: HEMATOLOGY/ONCOLOGY | Facility: CLINIC | Age: 74
End: 2024-04-29
Payer: MEDICARE

## 2024-04-29 VITALS
SYSTOLIC BLOOD PRESSURE: 145 MMHG | RESPIRATION RATE: 14 BRPM | OXYGEN SATURATION: 99 % | DIASTOLIC BLOOD PRESSURE: 76 MMHG | HEIGHT: 63 IN | TEMPERATURE: 98 F | HEART RATE: 54 BPM | WEIGHT: 124.81 LBS | BODY MASS INDEX: 22.11 KG/M2

## 2024-04-29 DIAGNOSIS — C78.00 MALIGNANT NEOPLASM METASTATIC TO LUNG, UNSPECIFIED LATERALITY: ICD-10-CM

## 2024-04-29 DIAGNOSIS — C78.6 SECONDARY MALIGNANT NEOPLASM OF RETROPERITONEUM AND PERITONEUM: ICD-10-CM

## 2024-04-29 DIAGNOSIS — C78.6 SECONDARY MALIGNANT NEOPLASM OF RETROPERITONEUM AND PERITONEUM: Primary | ICD-10-CM

## 2024-04-29 DIAGNOSIS — C57.00 CARCINOMA OF FALLOPIAN TUBE, UNSPECIFIED LATERALITY: ICD-10-CM

## 2024-04-29 DIAGNOSIS — C57.4 MALIGNANT NEOPLASM OF UTERINE ADNEXA: ICD-10-CM

## 2024-04-29 DIAGNOSIS — C56.3 MALIGNANT NEOPLASM OF BILATERAL OVARIES: ICD-10-CM

## 2024-04-29 LAB
ALBUMIN SERPL-MCNC: 3.7 G/DL (ref 3.4–4.8)
ALBUMIN/GLOB SERPL: 1.3 RATIO (ref 1.1–2)
ALP SERPL-CCNC: 50 UNIT/L (ref 40–150)
ALT SERPL-CCNC: 20 UNIT/L (ref 0–55)
AST SERPL-CCNC: 39 UNIT/L (ref 5–34)
BASOPHILS # BLD AUTO: 0.02 X10(3)/MCL
BASOPHILS NFR BLD AUTO: 0.3 %
BILIRUB SERPL-MCNC: 1 MG/DL
BUN SERPL-MCNC: 20.7 MG/DL (ref 9.8–20.1)
CALCIUM SERPL-MCNC: 9.4 MG/DL (ref 8.4–10.2)
CANCER AG125 SERPL-ACNC: 17 UNIT/ML (ref 0–35)
CHLORIDE SERPL-SCNC: 96 MMOL/L (ref 98–107)
CO2 SERPL-SCNC: 27 MMOL/L (ref 23–31)
CREAT SERPL-MCNC: 0.79 MG/DL (ref 0.55–1.02)
EOSINOPHIL # BLD AUTO: 0.49 X10(3)/MCL (ref 0–0.9)
EOSINOPHIL NFR BLD AUTO: 6.8 %
ERYTHROCYTE [DISTWIDTH] IN BLOOD BY AUTOMATED COUNT: 13 % (ref 11.5–17)
GFR SERPLBLD CREATININE-BSD FMLA CKD-EPI: >60 MLS/MIN/1.73/M2
GLOBULIN SER-MCNC: 2.9 GM/DL (ref 2.4–3.5)
GLUCOSE SERPL-MCNC: 77 MG/DL (ref 82–115)
HCT VFR BLD AUTO: 39.5 % (ref 37–47)
HGB BLD-MCNC: 13.3 G/DL (ref 12–16)
IMM GRANULOCYTES # BLD AUTO: 0 X10(3)/MCL (ref 0–0.04)
IMM GRANULOCYTES NFR BLD AUTO: 0 %
LYMPHOCYTES # BLD AUTO: 2.57 X10(3)/MCL (ref 0.6–4.6)
LYMPHOCYTES NFR BLD AUTO: 35.5 %
MCH RBC QN AUTO: 31.4 PG (ref 27–31)
MCHC RBC AUTO-ENTMCNC: 33.7 G/DL (ref 33–36)
MCV RBC AUTO: 93.4 FL (ref 80–94)
MONOCYTES # BLD AUTO: 1.08 X10(3)/MCL (ref 0.1–1.3)
MONOCYTES NFR BLD AUTO: 14.9 %
NEUTROPHILS # BLD AUTO: 3.07 X10(3)/MCL (ref 2.1–9.2)
NEUTROPHILS NFR BLD AUTO: 42.5 %
PLATELET # BLD AUTO: 404 X10(3)/MCL (ref 130–400)
PMV BLD AUTO: 7.9 FL (ref 7.4–10.4)
POTASSIUM SERPL-SCNC: 4.6 MMOL/L (ref 3.5–5.1)
PROT SERPL-MCNC: 6.6 GM/DL (ref 5.8–7.6)
PROT UR QL STRIP.AUTO: ABNORMAL
RBC # BLD AUTO: 4.23 X10(6)/MCL (ref 4.2–5.4)
SODIUM SERPL-SCNC: 131 MMOL/L (ref 136–145)
WBC # SPEC AUTO: 7.23 X10(3)/MCL (ref 4.5–11.5)

## 2024-04-29 PROCEDURE — 3077F SYST BP >= 140 MM HG: CPT | Mod: CPTII,S$GLB,, | Performed by: INTERNAL MEDICINE

## 2024-04-29 PROCEDURE — 99999 PR PBB SHADOW E&M-EST. PATIENT-LVL IV: CPT | Mod: PBBFAC,,, | Performed by: INTERNAL MEDICINE

## 2024-04-29 PROCEDURE — 3288F FALL RISK ASSESSMENT DOCD: CPT | Mod: CPTII,S$GLB,, | Performed by: INTERNAL MEDICINE

## 2024-04-29 PROCEDURE — 1159F MED LIST DOCD IN RCRD: CPT | Mod: CPTII,S$GLB,, | Performed by: INTERNAL MEDICINE

## 2024-04-29 PROCEDURE — 3078F DIAST BP <80 MM HG: CPT | Mod: CPTII,S$GLB,, | Performed by: INTERNAL MEDICINE

## 2024-04-29 PROCEDURE — 85025 COMPLETE CBC W/AUTO DIFF WBC: CPT

## 2024-04-29 PROCEDURE — 1126F AMNT PAIN NOTED NONE PRSNT: CPT | Mod: CPTII,S$GLB,, | Performed by: INTERNAL MEDICINE

## 2024-04-29 PROCEDURE — 1101F PT FALLS ASSESS-DOCD LE1/YR: CPT | Mod: CPTII,S$GLB,, | Performed by: INTERNAL MEDICINE

## 2024-04-29 PROCEDURE — 36415 COLL VENOUS BLD VENIPUNCTURE: CPT

## 2024-04-29 PROCEDURE — 80053 COMPREHEN METABOLIC PANEL: CPT

## 2024-04-29 PROCEDURE — 86304 IMMUNOASSAY TUMOR CA 125: CPT

## 2024-04-29 PROCEDURE — 99215 OFFICE O/P EST HI 40 MIN: CPT | Mod: S$GLB,,, | Performed by: INTERNAL MEDICINE

## 2024-04-29 PROCEDURE — 81005 URINALYSIS: CPT

## 2024-04-29 PROCEDURE — 4010F ACE/ARB THERAPY RXD/TAKEN: CPT | Mod: CPTII,S$GLB,, | Performed by: INTERNAL MEDICINE

## 2024-04-29 RX ORDER — SODIUM CHLORIDE 0.9 % (FLUSH) 0.9 %
10 SYRINGE (ML) INJECTION
Status: CANCELLED | OUTPATIENT
Start: 2024-04-30

## 2024-04-29 RX ORDER — EPINEPHRINE 0.3 MG/.3ML
0.3 INJECTION SUBCUTANEOUS ONCE AS NEEDED
Status: CANCELLED | OUTPATIENT
Start: 2024-04-30

## 2024-04-29 RX ORDER — DIPHENHYDRAMINE HYDROCHLORIDE 50 MG/ML
25 INJECTION INTRAMUSCULAR; INTRAVENOUS
Status: CANCELLED | OUTPATIENT
Start: 2024-04-30

## 2024-04-29 RX ORDER — HEPARIN 100 UNIT/ML
500 SYRINGE INTRAVENOUS
Status: CANCELLED | OUTPATIENT
Start: 2024-04-30

## 2024-04-29 RX ORDER — ACETAMINOPHEN 325 MG/1
650 TABLET ORAL
Status: CANCELLED | OUTPATIENT
Start: 2024-04-30

## 2024-04-29 RX ORDER — DIPHENHYDRAMINE HYDROCHLORIDE 50 MG/ML
50 INJECTION INTRAMUSCULAR; INTRAVENOUS ONCE AS NEEDED
Status: CANCELLED | OUTPATIENT
Start: 2024-04-30

## 2024-04-30 ENCOUNTER — INFUSION (OUTPATIENT)
Dept: INFUSION THERAPY | Facility: HOSPITAL | Age: 74
End: 2024-04-30
Attending: INTERNAL MEDICINE
Payer: MEDICARE

## 2024-04-30 VITALS
RESPIRATION RATE: 16 BRPM | BODY MASS INDEX: 22.11 KG/M2 | HEIGHT: 63 IN | HEART RATE: 66 BPM | SYSTOLIC BLOOD PRESSURE: 129 MMHG | DIASTOLIC BLOOD PRESSURE: 76 MMHG | WEIGHT: 124.81 LBS | OXYGEN SATURATION: 98 % | TEMPERATURE: 98 F

## 2024-04-30 DIAGNOSIS — C78.6 SECONDARY MALIGNANT NEOPLASM OF RETROPERITONEUM AND PERITONEUM: Primary | ICD-10-CM

## 2024-04-30 PROCEDURE — 96413 CHEMO IV INFUSION 1 HR: CPT

## 2024-04-30 PROCEDURE — 63600175 PHARM REV CODE 636 W HCPCS: Performed by: INTERNAL MEDICINE

## 2024-04-30 PROCEDURE — 96375 TX/PRO/DX INJ NEW DRUG ADDON: CPT

## 2024-04-30 PROCEDURE — 25000003 PHARM REV CODE 250: Performed by: INTERNAL MEDICINE

## 2024-04-30 RX ORDER — ACETAMINOPHEN 325 MG/1
650 TABLET ORAL
Status: DISCONTINUED | OUTPATIENT
Start: 2024-04-30 | End: 2024-04-30 | Stop reason: HOSPADM

## 2024-04-30 RX ORDER — SODIUM CHLORIDE 0.9 % (FLUSH) 0.9 %
10 SYRINGE (ML) INJECTION
Status: DISCONTINUED | OUTPATIENT
Start: 2024-04-30 | End: 2024-04-30 | Stop reason: HOSPADM

## 2024-04-30 RX ORDER — DIPHENHYDRAMINE HYDROCHLORIDE 50 MG/ML
50 INJECTION INTRAMUSCULAR; INTRAVENOUS ONCE AS NEEDED
Status: DISCONTINUED | OUTPATIENT
Start: 2024-04-30 | End: 2024-04-30 | Stop reason: HOSPADM

## 2024-04-30 RX ORDER — EPINEPHRINE 0.3 MG/.3ML
0.3 INJECTION SUBCUTANEOUS ONCE AS NEEDED
Status: DISCONTINUED | OUTPATIENT
Start: 2024-04-30 | End: 2024-04-30 | Stop reason: HOSPADM

## 2024-04-30 RX ORDER — HEPARIN 100 UNIT/ML
500 SYRINGE INTRAVENOUS
Status: DISCONTINUED | OUTPATIENT
Start: 2024-04-30 | End: 2024-04-30 | Stop reason: HOSPADM

## 2024-04-30 RX ORDER — DIPHENHYDRAMINE HYDROCHLORIDE 50 MG/ML
25 INJECTION INTRAMUSCULAR; INTRAVENOUS
Status: COMPLETED | OUTPATIENT
Start: 2024-04-30 | End: 2024-04-30

## 2024-04-30 RX ADMIN — BEVACIZUMAB-AWWB 800 MG: 400 INJECTION, SOLUTION INTRAVENOUS at 03:04

## 2024-04-30 RX ADMIN — DIPHENHYDRAMINE HYDROCHLORIDE 25 MG: 50 INJECTION INTRAMUSCULAR; INTRAVENOUS at 03:04

## 2024-05-06 PROBLEM — Z00.00 MEDICARE ANNUAL WELLNESS VISIT, SUBSEQUENT: Status: RESOLVED | Noted: 2023-02-01 | Resolved: 2024-05-06

## 2024-05-06 PROBLEM — I26.99 PULMONARY EMBOLISM, UNSPECIFIED CHRONICITY, UNSPECIFIED PULMONARY EMBOLISM TYPE, UNSPECIFIED WHETHER ACUTE COR PULMONALE PRESENT: Status: RESOLVED | Noted: 2022-05-09 | Resolved: 2024-05-06

## 2024-05-18 RX ORDER — HEPARIN 100 UNIT/ML
500 SYRINGE INTRAVENOUS
Status: CANCELLED | OUTPATIENT
Start: 2024-05-21

## 2024-05-18 RX ORDER — DIPHENHYDRAMINE HYDROCHLORIDE 50 MG/ML
25 INJECTION INTRAMUSCULAR; INTRAVENOUS
Status: CANCELLED | OUTPATIENT
Start: 2024-05-21

## 2024-05-18 RX ORDER — SODIUM CHLORIDE 0.9 % (FLUSH) 0.9 %
10 SYRINGE (ML) INJECTION
Status: CANCELLED | OUTPATIENT
Start: 2024-05-21

## 2024-05-18 RX ORDER — DIPHENHYDRAMINE HYDROCHLORIDE 50 MG/ML
50 INJECTION INTRAMUSCULAR; INTRAVENOUS ONCE AS NEEDED
Status: CANCELLED | OUTPATIENT
Start: 2024-05-21

## 2024-05-18 RX ORDER — ACETAMINOPHEN 325 MG/1
650 TABLET ORAL
Status: CANCELLED | OUTPATIENT
Start: 2024-05-21

## 2024-05-18 RX ORDER — EPINEPHRINE 0.3 MG/.3ML
0.3 INJECTION SUBCUTANEOUS ONCE AS NEEDED
Status: CANCELLED | OUTPATIENT
Start: 2024-05-21

## 2024-05-20 ENCOUNTER — LAB VISIT (OUTPATIENT)
Dept: LAB | Facility: HOSPITAL | Age: 74
End: 2024-05-20
Attending: INTERNAL MEDICINE
Payer: MEDICARE

## 2024-05-20 DIAGNOSIS — C78.6 PERITONEAL CARCINOMATOSIS: ICD-10-CM

## 2024-05-20 DIAGNOSIS — C78.6 SECONDARY MALIGNANT NEOPLASM OF RETROPERITONEUM AND PERITONEUM: ICD-10-CM

## 2024-05-20 DIAGNOSIS — C57.00 CARCINOMA OF FALLOPIAN TUBE, UNSPECIFIED LATERALITY: ICD-10-CM

## 2024-05-20 LAB
ALBUMIN SERPL-MCNC: 3.6 G/DL (ref 3.4–4.8)
ALBUMIN/GLOB SERPL: 1.3 RATIO (ref 1.1–2)
ALP SERPL-CCNC: 45 UNIT/L (ref 40–150)
ALT SERPL-CCNC: 21 UNIT/L (ref 0–55)
ANION GAP SERPL CALC-SCNC: 9 MEQ/L
AST SERPL-CCNC: 37 UNIT/L (ref 5–34)
BASOPHILS # BLD AUTO: 0.03 X10(3)/MCL
BASOPHILS NFR BLD AUTO: 0.4 %
BILIRUB SERPL-MCNC: 1.3 MG/DL
BUN SERPL-MCNC: 21 MG/DL (ref 9.8–20.1)
CALCIUM SERPL-MCNC: 9.3 MG/DL (ref 8.4–10.2)
CHLORIDE SERPL-SCNC: 99 MMOL/L (ref 98–107)
CO2 SERPL-SCNC: 27 MMOL/L (ref 23–31)
CREAT SERPL-MCNC: 0.76 MG/DL (ref 0.55–1.02)
CREAT/UREA NIT SERPL: 28
EOSINOPHIL # BLD AUTO: 0.46 X10(3)/MCL (ref 0–0.9)
EOSINOPHIL NFR BLD AUTO: 5.8 %
ERYTHROCYTE [DISTWIDTH] IN BLOOD BY AUTOMATED COUNT: 13.2 % (ref 11.5–17)
GFR SERPLBLD CREATININE-BSD FMLA CKD-EPI: >60 ML/MIN/1.73/M2
GLOBULIN SER-MCNC: 2.7 GM/DL (ref 2.4–3.5)
GLUCOSE SERPL-MCNC: 106 MG/DL (ref 82–115)
HCT VFR BLD AUTO: 37.6 % (ref 37–47)
HGB BLD-MCNC: 12.5 G/DL (ref 12–16)
IMM GRANULOCYTES # BLD AUTO: 0.01 X10(3)/MCL (ref 0–0.04)
IMM GRANULOCYTES NFR BLD AUTO: 0.1 %
LYMPHOCYTES # BLD AUTO: 2.96 X10(3)/MCL (ref 0.6–4.6)
LYMPHOCYTES NFR BLD AUTO: 37 %
MCH RBC QN AUTO: 31.5 PG (ref 27–31)
MCHC RBC AUTO-ENTMCNC: 33.2 G/DL (ref 33–36)
MCV RBC AUTO: 94.7 FL (ref 80–94)
MONOCYTES # BLD AUTO: 0.84 X10(3)/MCL (ref 0.1–1.3)
MONOCYTES NFR BLD AUTO: 10.5 %
NEUTROPHILS # BLD AUTO: 3.69 X10(3)/MCL (ref 2.1–9.2)
NEUTROPHILS NFR BLD AUTO: 46.2 %
PLATELET # BLD AUTO: 333 X10(3)/MCL (ref 130–400)
PMV BLD AUTO: 7.9 FL (ref 7.4–10.4)
POTASSIUM SERPL-SCNC: 4.1 MMOL/L (ref 3.5–5.1)
PROT SERPL-MCNC: 6.3 GM/DL (ref 5.8–7.6)
PROT UR QL STRIP: ABNORMAL
RBC # BLD AUTO: 3.97 X10(6)/MCL (ref 4.2–5.4)
SODIUM SERPL-SCNC: 135 MMOL/L (ref 136–145)
WBC # SPEC AUTO: 7.99 X10(3)/MCL (ref 4.5–11.5)

## 2024-05-20 PROCEDURE — 36415 COLL VENOUS BLD VENIPUNCTURE: CPT

## 2024-05-20 PROCEDURE — 81005 URINALYSIS: CPT

## 2024-05-20 PROCEDURE — 85025 COMPLETE CBC W/AUTO DIFF WBC: CPT

## 2024-05-20 PROCEDURE — 80053 COMPREHEN METABOLIC PANEL: CPT

## 2024-05-20 RX ORDER — LISINOPRIL 20 MG/1
20 TABLET ORAL
Qty: 30 TABLET | Refills: 3 | Status: SHIPPED | OUTPATIENT
Start: 2024-05-20

## 2024-05-21 ENCOUNTER — INFUSION (OUTPATIENT)
Dept: INFUSION THERAPY | Facility: HOSPITAL | Age: 74
End: 2024-05-21
Attending: INTERNAL MEDICINE
Payer: MEDICARE

## 2024-05-21 VITALS
WEIGHT: 125.31 LBS | HEIGHT: 63 IN | SYSTOLIC BLOOD PRESSURE: 144 MMHG | BODY MASS INDEX: 22.2 KG/M2 | RESPIRATION RATE: 16 BRPM | OXYGEN SATURATION: 99 % | TEMPERATURE: 98 F | HEART RATE: 74 BPM | DIASTOLIC BLOOD PRESSURE: 75 MMHG

## 2024-05-21 DIAGNOSIS — C78.6 SECONDARY MALIGNANT NEOPLASM OF RETROPERITONEUM AND PERITONEUM: Primary | ICD-10-CM

## 2024-05-21 PROCEDURE — 63600175 PHARM REV CODE 636 W HCPCS: Mod: JZ,JG | Performed by: INTERNAL MEDICINE

## 2024-05-21 PROCEDURE — 96375 TX/PRO/DX INJ NEW DRUG ADDON: CPT

## 2024-05-21 PROCEDURE — 25000003 PHARM REV CODE 250: Performed by: INTERNAL MEDICINE

## 2024-05-21 PROCEDURE — 96413 CHEMO IV INFUSION 1 HR: CPT

## 2024-05-21 RX ORDER — HEPARIN 100 UNIT/ML
500 SYRINGE INTRAVENOUS
Status: DISCONTINUED | OUTPATIENT
Start: 2024-05-21 | End: 2024-05-21 | Stop reason: HOSPADM

## 2024-05-21 RX ORDER — DIPHENHYDRAMINE HYDROCHLORIDE 50 MG/ML
25 INJECTION INTRAMUSCULAR; INTRAVENOUS
Status: COMPLETED | OUTPATIENT
Start: 2024-05-21 | End: 2024-05-21

## 2024-05-21 RX ORDER — SODIUM CHLORIDE 0.9 % (FLUSH) 0.9 %
10 SYRINGE (ML) INJECTION
Status: DISCONTINUED | OUTPATIENT
Start: 2024-05-21 | End: 2024-05-21 | Stop reason: HOSPADM

## 2024-05-21 RX ORDER — DIPHENHYDRAMINE HYDROCHLORIDE 50 MG/ML
50 INJECTION INTRAMUSCULAR; INTRAVENOUS ONCE AS NEEDED
Status: DISCONTINUED | OUTPATIENT
Start: 2024-05-21 | End: 2024-05-21 | Stop reason: HOSPADM

## 2024-05-21 RX ORDER — EPINEPHRINE 0.3 MG/.3ML
0.3 INJECTION SUBCUTANEOUS ONCE AS NEEDED
Status: DISCONTINUED | OUTPATIENT
Start: 2024-05-21 | End: 2024-05-21 | Stop reason: HOSPADM

## 2024-05-21 RX ORDER — ACETAMINOPHEN 325 MG/1
650 TABLET ORAL
Status: DISCONTINUED | OUTPATIENT
Start: 2024-05-21 | End: 2024-05-21 | Stop reason: HOSPADM

## 2024-05-21 RX ADMIN — DIPHENHYDRAMINE HYDROCHLORIDE 25 MG: 50 INJECTION INTRAMUSCULAR; INTRAVENOUS at 03:05

## 2024-05-21 RX ADMIN — BEVACIZUMAB-AWWB 800 MG: 400 INJECTION, SOLUTION INTRAVENOUS at 03:05

## 2024-06-10 ENCOUNTER — LAB VISIT (OUTPATIENT)
Dept: LAB | Facility: HOSPITAL | Age: 74
End: 2024-06-10
Attending: INTERNAL MEDICINE
Payer: MEDICARE

## 2024-06-10 ENCOUNTER — OFFICE VISIT (OUTPATIENT)
Dept: HEMATOLOGY/ONCOLOGY | Facility: CLINIC | Age: 74
End: 2024-06-10
Payer: MEDICARE

## 2024-06-10 VITALS
OXYGEN SATURATION: 96 % | HEIGHT: 63 IN | SYSTOLIC BLOOD PRESSURE: 120 MMHG | TEMPERATURE: 98 F | DIASTOLIC BLOOD PRESSURE: 74 MMHG | HEART RATE: 63 BPM | RESPIRATION RATE: 14 BRPM | WEIGHT: 124.5 LBS | BODY MASS INDEX: 22.06 KG/M2

## 2024-06-10 DIAGNOSIS — C57.00 CARCINOMA OF FALLOPIAN TUBE, UNSPECIFIED LATERALITY: ICD-10-CM

## 2024-06-10 DIAGNOSIS — T45.1X5A AGRANULOCYTOSIS SECONDARY TO CANCER CHEMOTHERAPY: ICD-10-CM

## 2024-06-10 DIAGNOSIS — C57.4 MALIGNANT NEOPLASM OF UTERINE ADNEXA: ICD-10-CM

## 2024-06-10 DIAGNOSIS — C78.6 SECONDARY MALIGNANT NEOPLASM OF RETROPERITONEUM AND PERITONEUM: ICD-10-CM

## 2024-06-10 DIAGNOSIS — C78.6 SECONDARY MALIGNANT NEOPLASM OF RETROPERITONEUM AND PERITONEUM: Primary | ICD-10-CM

## 2024-06-10 DIAGNOSIS — D84.821 IMMUNODEFICIENCY DUE TO DRUGS: ICD-10-CM

## 2024-06-10 DIAGNOSIS — Z79.899 IMMUNODEFICIENCY DUE TO DRUGS: ICD-10-CM

## 2024-06-10 DIAGNOSIS — D70.1 AGRANULOCYTOSIS SECONDARY TO CANCER CHEMOTHERAPY: ICD-10-CM

## 2024-06-10 DIAGNOSIS — C78.00 MALIGNANT NEOPLASM METASTATIC TO LUNG, UNSPECIFIED LATERALITY: ICD-10-CM

## 2024-06-10 LAB
ALBUMIN SERPL-MCNC: 3.7 G/DL (ref 3.4–4.8)
ALBUMIN/GLOB SERPL: 1.4 RATIO (ref 1.1–2)
ALP SERPL-CCNC: 45 UNIT/L (ref 40–150)
ALT SERPL-CCNC: 19 UNIT/L (ref 0–55)
ANION GAP SERPL CALC-SCNC: 5 MEQ/L
AST SERPL-CCNC: 33 UNIT/L (ref 5–34)
BASOPHILS # BLD AUTO: 0.03 X10(3)/MCL
BASOPHILS NFR BLD AUTO: 0.4 %
BILIRUB SERPL-MCNC: 1 MG/DL
BUN SERPL-MCNC: 29.5 MG/DL (ref 9.8–20.1)
CALCIUM SERPL-MCNC: 9.4 MG/DL (ref 8.4–10.2)
CANCER AG125 SERPL-ACNC: 17 UNIT/ML (ref 0–35)
CHLORIDE SERPL-SCNC: 103 MMOL/L (ref 98–107)
CO2 SERPL-SCNC: 29 MMOL/L (ref 23–31)
CREAT SERPL-MCNC: 0.84 MG/DL (ref 0.55–1.02)
CREAT/UREA NIT SERPL: 35
EOSINOPHIL # BLD AUTO: 0.65 X10(3)/MCL (ref 0–0.9)
EOSINOPHIL NFR BLD AUTO: 8.6 %
ERYTHROCYTE [DISTWIDTH] IN BLOOD BY AUTOMATED COUNT: 13.2 % (ref 11.5–17)
GFR SERPLBLD CREATININE-BSD FMLA CKD-EPI: >60 ML/MIN/1.73/M2
GLOBULIN SER-MCNC: 2.6 GM/DL (ref 2.4–3.5)
GLUCOSE SERPL-MCNC: 111 MG/DL (ref 82–115)
HCT VFR BLD AUTO: 38.7 % (ref 37–47)
HGB BLD-MCNC: 12.8 G/DL (ref 12–16)
IMM GRANULOCYTES # BLD AUTO: 0.01 X10(3)/MCL (ref 0–0.04)
IMM GRANULOCYTES NFR BLD AUTO: 0.1 %
LYMPHOCYTES # BLD AUTO: 2.53 X10(3)/MCL (ref 0.6–4.6)
LYMPHOCYTES NFR BLD AUTO: 33.6 %
MCH RBC QN AUTO: 31.5 PG (ref 27–31)
MCHC RBC AUTO-ENTMCNC: 33.1 G/DL (ref 33–36)
MCV RBC AUTO: 95.3 FL (ref 80–94)
MONOCYTES # BLD AUTO: 1.06 X10(3)/MCL (ref 0.1–1.3)
MONOCYTES NFR BLD AUTO: 14.1 %
NEUTROPHILS # BLD AUTO: 3.25 X10(3)/MCL (ref 2.1–9.2)
NEUTROPHILS NFR BLD AUTO: 43.2 %
PLATELET # BLD AUTO: 341 X10(3)/MCL (ref 130–400)
PMV BLD AUTO: 8 FL (ref 7.4–10.4)
POTASSIUM SERPL-SCNC: 4.9 MMOL/L (ref 3.5–5.1)
PROT SERPL-MCNC: 6.3 GM/DL (ref 5.8–7.6)
PROT UR QL STRIP: ABNORMAL
RBC # BLD AUTO: 4.06 X10(6)/MCL (ref 4.2–5.4)
SODIUM SERPL-SCNC: 137 MMOL/L (ref 136–145)
WBC # SPEC AUTO: 7.53 X10(3)/MCL (ref 4.5–11.5)

## 2024-06-10 PROCEDURE — 36415 COLL VENOUS BLD VENIPUNCTURE: CPT

## 2024-06-10 PROCEDURE — 81005 URINALYSIS: CPT

## 2024-06-10 PROCEDURE — 86304 IMMUNOASSAY TUMOR CA 125: CPT

## 2024-06-10 PROCEDURE — 99999 PR PBB SHADOW E&M-EST. PATIENT-LVL IV: CPT | Mod: PBBFAC,,, | Performed by: NURSE PRACTITIONER

## 2024-06-10 PROCEDURE — 1159F MED LIST DOCD IN RCRD: CPT | Mod: CPTII,S$GLB,, | Performed by: NURSE PRACTITIONER

## 2024-06-10 PROCEDURE — 1126F AMNT PAIN NOTED NONE PRSNT: CPT | Mod: CPTII,S$GLB,, | Performed by: NURSE PRACTITIONER

## 2024-06-10 PROCEDURE — 1101F PT FALLS ASSESS-DOCD LE1/YR: CPT | Mod: CPTII,S$GLB,, | Performed by: NURSE PRACTITIONER

## 2024-06-10 PROCEDURE — 85025 COMPLETE CBC W/AUTO DIFF WBC: CPT

## 2024-06-10 PROCEDURE — 3288F FALL RISK ASSESSMENT DOCD: CPT | Mod: CPTII,S$GLB,, | Performed by: NURSE PRACTITIONER

## 2024-06-10 PROCEDURE — 3078F DIAST BP <80 MM HG: CPT | Mod: CPTII,S$GLB,, | Performed by: NURSE PRACTITIONER

## 2024-06-10 PROCEDURE — 80053 COMPREHEN METABOLIC PANEL: CPT

## 2024-06-10 PROCEDURE — 3074F SYST BP LT 130 MM HG: CPT | Mod: CPTII,S$GLB,, | Performed by: NURSE PRACTITIONER

## 2024-06-10 PROCEDURE — 99215 OFFICE O/P EST HI 40 MIN: CPT | Mod: S$GLB,,, | Performed by: NURSE PRACTITIONER

## 2024-06-10 PROCEDURE — 4010F ACE/ARB THERAPY RXD/TAKEN: CPT | Mod: CPTII,S$GLB,, | Performed by: NURSE PRACTITIONER

## 2024-06-10 PROCEDURE — 1160F RVW MEDS BY RX/DR IN RCRD: CPT | Mod: CPTII,S$GLB,, | Performed by: NURSE PRACTITIONER

## 2024-06-10 RX ORDER — DIPHENHYDRAMINE HYDROCHLORIDE 50 MG/ML
25 INJECTION INTRAMUSCULAR; INTRAVENOUS
Status: CANCELLED | OUTPATIENT
Start: 2024-06-11

## 2024-06-10 RX ORDER — SODIUM CHLORIDE 0.9 % (FLUSH) 0.9 %
10 SYRINGE (ML) INJECTION
Status: CANCELLED | OUTPATIENT
Start: 2024-06-11

## 2024-06-10 RX ORDER — ACETAMINOPHEN 325 MG/1
650 TABLET ORAL
Status: CANCELLED | OUTPATIENT
Start: 2024-06-11

## 2024-06-10 RX ORDER — DIPHENHYDRAMINE HYDROCHLORIDE 50 MG/ML
50 INJECTION INTRAMUSCULAR; INTRAVENOUS ONCE AS NEEDED
Status: CANCELLED | OUTPATIENT
Start: 2024-06-11

## 2024-06-10 RX ORDER — DIPHENHYDRAMINE HYDROCHLORIDE 50 MG/ML
25 INJECTION INTRAMUSCULAR; INTRAVENOUS
OUTPATIENT
Start: 2024-07-02

## 2024-06-10 RX ORDER — ACETAMINOPHEN 325 MG/1
650 TABLET ORAL
OUTPATIENT
Start: 2024-07-02

## 2024-06-10 RX ORDER — SODIUM CHLORIDE 0.9 % (FLUSH) 0.9 %
10 SYRINGE (ML) INJECTION
OUTPATIENT
Start: 2024-07-02

## 2024-06-10 RX ORDER — HEPARIN 100 UNIT/ML
500 SYRINGE INTRAVENOUS
Status: CANCELLED | OUTPATIENT
Start: 2024-06-11

## 2024-06-10 RX ORDER — DIPHENHYDRAMINE HYDROCHLORIDE 50 MG/ML
50 INJECTION INTRAMUSCULAR; INTRAVENOUS ONCE AS NEEDED
OUTPATIENT
Start: 2024-07-02

## 2024-06-10 RX ORDER — EPINEPHRINE 0.3 MG/.3ML
0.3 INJECTION SUBCUTANEOUS ONCE AS NEEDED
Status: CANCELLED | OUTPATIENT
Start: 2024-06-11

## 2024-06-10 RX ORDER — HEPARIN 100 UNIT/ML
500 SYRINGE INTRAVENOUS
OUTPATIENT
Start: 2024-07-02

## 2024-06-10 RX ORDER — EPINEPHRINE 0.3 MG/.3ML
0.3 INJECTION SUBCUTANEOUS ONCE AS NEEDED
OUTPATIENT
Start: 2024-07-02

## 2024-06-10 NOTE — PROGRESS NOTES
Subjective:       Patient ID: Shayna Ledezma is a 73 y.o. female.  GI: Dr. Gamaliel Carlos  GYN ONC at [facility]: Dr. Mike Santana    1. 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 Pulmonary Embolism (Incidental on CT)--19    Procedure/pathology:   1. Paracentesis with removal of 5L of fluid done 18--serous carcinoma, high grade, c/w ovarian primary, PAX-8, CK7 and MOC-31 positive, CDX-2, CK-20 and Calretinin-negative.  2. S/p Exploratory Lap, radical tumor debulking including total abdominal hysterectomy, bilateral salpingo-oophorectomy, bilateral pelvic lymph node sampling, appendectomy, mobilization of the left ureter, complete gross resection of the disease with removal of mesenteric disease, as well as omental disese and parasplenic disease by Dr. Santana 3/18/19--Metastatic high grade serous carcinoma. Tissue/organ involvement: right ovary, appendectomy, omentum, mesentery (including small bowel mesentery) and multiple other sites designated: periumbilical, perisplenic, transverse colon, splenic flexure, perirectal, left periureteral. 2 lymph nodes negative. [id number]. FIGO stage IIIC. Myriad somatic instability testing positive for genomic instability, negative BRCA1 and BRCA2 tumor testing.  3. Splenectomy done 10/21/19--splenic involvement with metastatic high grade serous carcinoma, 5 benign lymph nodes. Caris testing showed LAM, NTRK1/2/3 negative, TMB low, BRCA1/2 negative, ER/MS negative, CATHY negative, SPEN pathogenic variant Exon 11, TP53 pathogenic variant Exon 5, PD-L1 positive CPS 15, Genomic ELODIA high.  Imagin. CT A/P w/ and w/o contrast done at [facility] 18--large volume ascites with multiple peritoneal implants, right pericolic gutter implant measures 2.5X3.3cm, LUQ omental/mesenteric implant measures 3X3.6cm, omental carcinomatosis, extensive enhancing soft tissue surrounding sigmoid colon vs. large sigmoid mass, left  ovarian cystic lesion appears to have some internal septations measures 3.5X3.8cm, enhancing periportal soft tissue density likely lymphadenopathy with some narrowing of the portal vein noted, but without internal filling defect, borderline splenomegaly, subtle solid lesion w/n central spleen measures 2.4X2.5cm, likely metastatic, with peripheral area of diminished enhancement suggesting splenic infarct, soft tissue implant abutting gallbladder measures 2X2.2cm, no right adnexal mass, trace bilateral layering pleural fluid, small moderate-sized hiatal hernia, few borderline enlarged lymph nodes w/n right epicardial fat pad.  2. CT of chest on 1/11/19--Some prominent right cardiophrenic lymph nodes are nonspecific and can be followed on future surveillance scans. Otherwise no CT evidence of metastatic disease to the chest. While exam was not tailored for evaluation of the pulmonary arteries I suspect there is pulmonary thromboembolic disease. Peritoneal carcinomatosis seen in the upper abdomen. Critical Result: Pulmonary Embolus.  3. CT C/A/P 3/4/19--Positive response to therapy. No new or progressive metastatic disease in the chest, abdomen or pelvis. Stable to improved findings include smaller pericardial lymph nodes, andrew hepatis adenopathy, peritoneal carcinomatosis, smaller left adnexal lesion; no evidence of PE within limitations of the study.  5. CT PE protocol chest/A/P 6/11/19--No PE, improved pericardial lymph node with minimal size on current examination, improved peritoneal carcinomatosis and left adnexal implant, splenic ischemia evolved into small infarct, size improvement of one of splenic hypodense lesion and mild size increase of second hypodense lesion, favored to be benign/cystic, no new findings.  6. PET/CT 9/25/19--s/p hysterectomy and bilateral oophorectomy, no evidence for locally recurrent/residual disease, intensely hypermetabolic hypodense lesion within the spleen 3.5X3.3cm concerning for  metastatic focus. No other abnormal focus of disease.  7. CT C/A/P 11/13/20--Right lower lobe 3.3 cm mass is presumed metastatic, otherwise no evident residual, recurrent or metastatic disease.   8. PET/CT 11/20/20--Hypermetabolic right lung base lesion and suspected metastatic periportal adenopathy, no additional sites of FDG-avid otherwise aggressive appearing pathology through the neck, chest, abdomen, or pelvis.  9. PET/CT 1/21/21--Interval decrease in size of the right lower lobe mass with decreased FDG uptake, now measures 1.7 x 2.2 cm (previous 3.3 x 3.1 cm), resolution of FDG uptake in the suspected periportal lymph node which is not identifiable on noncontrast CT. No evidence of new or progressive hypermetabolic metastatic disease.  10. PET/CT 3/30/21--Resolved right lower lung lobe hypermetabolic mass. Otherwise, no interval change or evidence of residual disease, recurrence or new metastatic lesion.    Procedures:   1. Paracentesis on 12/28/18 with removal of 5 Liters.  2. Paracentesis on 01/07/19 with removal of 3.5 Liters.  3. Paracentesis on 02/06/19 with removal of 2.5 Liters.  4. Mediport placed 12/2020 by Dr. Serge Gentile--removed 8/26/21 due to mediport fracture.    Germline genetic testing done by [employer] 1/29/19--negative for BRCA1/2, two (2) variants of uncertain significance (VUS): CATHY p.I3290Q/p.[id number] and MLH1 p.P603L.     :  12/19/18 1102  11/10/20  42.9  12/08/20 79.5  01/05/21 28.9  01/26/21 12.8  02/18/21 11.6  03/15/22--10.3  04/26/22--9.6  05/16/22--10.2  06/07/22--10.1  07/19/22--11.6  08/08/22--12.5  09/19/22--12.7  10/31/22--13.5  01/03/23--12.0  02/13/23--14.0  03/03/23--13.9  04/17/23--14.5  05/26/23--14.6  07/31/23--13.5  11/13/23--15.6  12/04/23--16.8  01/16/24--13.7  02/26/24--18.6  03/18/24--16.3  04/29/24--17.0    Treatment History:  1. Neoadjuvant Carboplatin/Taxol every 3 weeks x 3 cycles. 1/15/19 - 2/26/19. Neulasta added.   2. Surgery--tumor debulking KORI/BSO  3/18/19.  3. Adjuvant Carboplatin/Taxol x 3 additions cycles 4/9/19--5/21/19.  4. Splenectomy 10/21/19.  5. Niraparib maintenance (dose reduction to 100mg daily for cytopenias) 11/8/19--12/1/20 (stopped due to progression).  6. Carboplatin/Paclitaxel/Avastin X 6 cycles completed 12/8/21--3/24/21 (complete response).    Current Treatment:  1. Eliquis BID for incidental PE on imaging 1/2019  2. Avastin maintenance started on 4/13/21.   Cycle 56 due 6/11/24.      Chief Complaint   Patient presents with    Other Misc     Pt reports no concerns today.     HPI   Patient is here for follow-up of ovarian cancer on maintenance Avastin. She has no issues from treatment. She feels very well overall. No abdominal pain or other issues reported. She prefers to continue on treatment. No new problems reported today.     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 180 tablet 3    aspirin (ECOTRIN) 81 MG EC tablet Take 81 mg by mouth once daily.      atorvastatin (LIPITOR) 40 MG tablet TAKE 1 TABLET(40 MG) BY MOUTH EVERY DAY 90 tablet 3    b complex vitamins capsule Take 1 capsule by mouth once daily.      GLUTAMINE ORAL Take 10 g by mouth Daily.      hydroCHLOROthiazide (HYDRODIURIL) 12.5 MG Tab Take 1 tablet (12.5 mg total) by mouth once daily. 90 tablet 3    lisinopriL (PRINIVIL,ZESTRIL) 20 MG tablet  TAKE 1 TABLET(20 MG) BY MOUTH EVERY DAY 30 tablet 3    pyridoxine, vitamin B6, (B-6) 250 MG Tab Take 250 mg by mouth once daily.      sodium chloride 0.9% SolP 100 mL with bevacizumab 25 mg/mL Soln Inject into the vein every 21 days.       No current facility-administered medications on file prior to visit.      Review of Systems   Constitutional:  Negative for appetite change and unexpected weight change.   HENT:  Negative for mouth sores and nosebleeds.    Eyes:  Negative for visual disturbance.   Respiratory:  Negative for cough and shortness of breath.    Cardiovascular:  Negative for chest pain.   Gastrointestinal:  Negative for abdominal pain and diarrhea.   Genitourinary:  Negative for frequency.   Musculoskeletal:  Negative for back pain.        Left hip pain and stiffness   Integumentary:  Negative for rash.   Neurological:  Negative for headaches.   Hematological:  Negative for adenopathy.   Psychiatric/Behavioral:  The patient is not nervous/anxious.         Vitals:    06/10/24 0851   BP: 120/74   Pulse: 63   Resp: 14   Temp: 98 °F (36.7 °C)     Physical Exam  Vitals reviewed.   Constitutional:       Appearance: Normal appearance. She is normal weight.   HENT:      Head: Normocephalic.   Eyes:      General: Lids are normal. Vision grossly intact.      Extraocular Movements: Extraocular movements intact.      Conjunctiva/sclera: Conjunctivae normal.   Cardiovascular:      Rate and Rhythm: Normal rate and regular rhythm.      Pulses: Normal pulses.      Heart sounds: Normal heart sounds, S1 normal and S2 normal.   Pulmonary:      Effort: Pulmonary effort is normal.      Breath sounds: Normal breath sounds.   Abdominal:      General: Abdomen is flat. Bowel sounds are normal. There is no distension.      Palpations: Abdomen is soft.      Tenderness: There is no abdominal tenderness.   Musculoskeletal:      Cervical back: Normal range of motion and neck supple.      Right lower leg: No edema.      Left lower  leg: No edema.   Skin:     General: Skin is warm and moist.      Capillary Refill: Capillary refill takes less than 2 seconds.      Comments: Left CW mediport removed, site intact   Neurological:      General: No focal deficit present.      Mental Status: She is alert and oriented to person, place, and time.   Psychiatric:         Attention and Perception: Attention normal.         Mood and Affect: Mood normal.         Speech: Speech normal.         Behavior: Behavior normal. Behavior is cooperative.         Cognition and Memory: Cognition normal.         Judgment: Judgment normal.       Lab Visit on 06/10/2024   Component Date Value    Protein, UA 06/10/2024 1+ (A)     WBC 06/10/2024 7.53     RBC 06/10/2024 4.06 (L)     Hgb 06/10/2024 12.8     Hct 06/10/2024 38.7     MCV 06/10/2024 95.3 (H)     MCH 06/10/2024 31.5 (H)     MCHC 06/10/2024 33.1     RDW 06/10/2024 13.2     Platelet 06/10/2024 341     MPV 06/10/2024 8.0     Neut % 06/10/2024 43.2     Lymph % 06/10/2024 33.6     Mono % 06/10/2024 14.1     Eos % 06/10/2024 8.6     Basophil % 06/10/2024 0.4     Lymph # 06/10/2024 2.53     Neut # 06/10/2024 3.25     Mono # 06/10/2024 1.06     Eos # 06/10/2024 0.65     Baso # 06/10/2024 0.03     IG# 06/10/2024 0.01     IG% 06/10/2024 0.1           Assessment:       1. Secondary malignant neoplasm of retroperitoneum and peritoneum    2. Immunodeficiency due to drugs    3. Malignant neoplasm of uterine adnexa    4. Carcinoma of fallopian tube, unspecified laterality    5. Malignant neoplasm metastatic to lung, unspecified laterality    6. 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 IV with diffuse peritoneal disease, possible splenic involvement, epicardial lymph nodes and bilateral pleural effusions.  Patient seen and evaluated by Dr. Mike Santana at [facility] in [place].   Treatment recommended upfront with  chemotherapy Carboplatin/Paclitaxel x 3 cycles.  Completed Cycle 3 on 2/26/19.   She underwent total abdominal hysterectomy, BSO and tumor debulking on 3/18/19 with Dr. Santana with complete gross resection of disease. FIGO Stage IIIC papillary serous fallopian tube cancer.   Patient resumed chemotherapy with cycle 4 on 4/9/19. Completed cycle 6 on 5/21/19.    Testing on tumor was positive for genomic instability but negative for BRCA mutation.  Per NCCN guidelines, maintenance can be considered for BRCA mutated germline category 1 and somatic category 2A. There is some evidence that PARP inhibitors have some activity as well in tumors with genomic instability. But the PARP maintenance is not approved for this indication. Offered treatment to patient and after discussion she declined and made decision to continue with observation only.    PET/CT done for rising CA-125 showed hypermetabolic splenic lesion which is not new, just enlarged from previous. Case discussed with Dr. Mike Santana.  Patient is now s/p splenectomy done 10/21/19. Consistent with splenic involvement with high grade serous carcinoma.   Dr. Santana recommended Niraparib maintenance based on new data showing activity in HRD positive ovarian cancer and patient started on 11/8/19, required a dose reduction due to cytopenias.  Rising CA-125 noted in 11/2020. CT showed new RLL lung mass.   Follow-up PET done 11/20/20 showed RLL lung mass hypermetabolic and hypermetabolic periportal adenopathy.     Recommended 2nd line treatment with Carboplatin/Taxol/Avastin.   Started cycle 1 on 12/8/20. Neulasta added with cycle 2.   Completed Cycle 6 on 3/24/21.  PET from 3/30/21 showed complete resolution of lung mass and no other disease.    Avastin maintenance started on 4/13/21.   Hospitalized for TIA after her 4th cycle of Avastin. Work-up for stroke and cause otherwise negative. Patient started on baby ASA by neurology.  Discussed there are no clear guidelines for  changing/discontinuing treatment for TIA/CVA related to Avastin.  Since her symptoms resolved and there were no residual effects, recommended to continue with Avastin since it is working well for her disease and since a baby ASA was added for prevention. She was also continued on Eliquis for h/o PE.  S/p mediport removal for fracture on 8/26/21.   Continue Avastin through peripheral veins for now.    Patient continues on Avastin maintenance without problems.  BP has been elevated on Lisinopril 20mg daily. PCP added HCTZ 12.5mg daily.  2+ protein on dipstick from 10/2023, 24-hour urine okay.   Urine protein has been up and down. Don't plan to repeat a 24-hour unless 3+.  Patient did have some epistaxis, seen by ENT and now better after treatment with Mupirocin. Did not have any cautery done.     Due for Avastin maintenance Cycle 56 tomorrow.     CBC today is good. CMP and  pending.   She has no symptoms or laboratory findings worrisome for disease progression.   Continue with labs CBC, CMP, UA/dipstick  every 3 weeks.      Labs on 7/1/24 and next treatment on 7/2/24 C57.    is being monitored every 6 weeks.    No further scans until rise in CA-125.  F/u in 6 weeks prior to Cycle 58.    Per NCCN, Avastin can be continued as single agent maintenance until disease progression or unacceptable toxicity if the disease responds to the initial recurrence chemotherapy/avastin regimen.     She is now >3 years from completing last chemotherapy, so still considered platinum-sensitive. Will need replacement of mediport when she needs to resume chemotherapy.     Vaccines/Boosters post-splenectomy: Menactra/Menveo every 5 years, Bexsero every 2-3 years, annual flu shot.  Last Bexsero was given 8/1/23.   Plan for Menactra/Menveo in 9/2024 which will be 5 years after initial.  Continue Eliquis 5mg BID and ASA.  All questions answered.      Tom Angeles [facility]

## 2024-06-11 ENCOUNTER — INFUSION (OUTPATIENT)
Dept: INFUSION THERAPY | Facility: HOSPITAL | Age: 74
End: 2024-06-11
Attending: INTERNAL MEDICINE
Payer: MEDICARE

## 2024-06-11 VITALS
HEART RATE: 66 BPM | OXYGEN SATURATION: 98 % | TEMPERATURE: 98 F | SYSTOLIC BLOOD PRESSURE: 134 MMHG | DIASTOLIC BLOOD PRESSURE: 76 MMHG | RESPIRATION RATE: 16 BRPM

## 2024-06-11 DIAGNOSIS — C78.6 SECONDARY MALIGNANT NEOPLASM OF RETROPERITONEUM AND PERITONEUM: Primary | ICD-10-CM

## 2024-06-11 PROCEDURE — 96413 CHEMO IV INFUSION 1 HR: CPT

## 2024-06-11 PROCEDURE — 63600175 PHARM REV CODE 636 W HCPCS: Performed by: NURSE PRACTITIONER

## 2024-06-11 PROCEDURE — 25000003 PHARM REV CODE 250: Performed by: NURSE PRACTITIONER

## 2024-06-11 PROCEDURE — 96375 TX/PRO/DX INJ NEW DRUG ADDON: CPT

## 2024-06-11 RX ORDER — HEPARIN 100 UNIT/ML
500 SYRINGE INTRAVENOUS
Status: DISCONTINUED | OUTPATIENT
Start: 2024-06-11 | End: 2024-06-11 | Stop reason: HOSPADM

## 2024-06-11 RX ORDER — SODIUM CHLORIDE 0.9 % (FLUSH) 0.9 %
10 SYRINGE (ML) INJECTION
Status: DISCONTINUED | OUTPATIENT
Start: 2024-06-11 | End: 2024-06-11 | Stop reason: HOSPADM

## 2024-06-11 RX ORDER — DIPHENHYDRAMINE HYDROCHLORIDE 50 MG/ML
25 INJECTION INTRAMUSCULAR; INTRAVENOUS
Status: COMPLETED | OUTPATIENT
Start: 2024-06-11 | End: 2024-06-11

## 2024-06-11 RX ORDER — DIPHENHYDRAMINE HYDROCHLORIDE 50 MG/ML
50 INJECTION INTRAMUSCULAR; INTRAVENOUS ONCE AS NEEDED
Status: DISCONTINUED | OUTPATIENT
Start: 2024-06-11 | End: 2024-06-11 | Stop reason: HOSPADM

## 2024-06-11 RX ORDER — ACETAMINOPHEN 325 MG/1
650 TABLET ORAL
Status: DISCONTINUED | OUTPATIENT
Start: 2024-06-11 | End: 2024-06-11 | Stop reason: HOSPADM

## 2024-06-11 RX ORDER — EPINEPHRINE 0.3 MG/.3ML
0.3 INJECTION SUBCUTANEOUS ONCE AS NEEDED
Status: DISCONTINUED | OUTPATIENT
Start: 2024-06-11 | End: 2024-06-11 | Stop reason: HOSPADM

## 2024-06-11 RX ADMIN — DIPHENHYDRAMINE HYDROCHLORIDE 25 MG: 50 INJECTION INTRAMUSCULAR; INTRAVENOUS at 03:06

## 2024-06-11 RX ADMIN — BEVACIZUMAB-AWWB 800 MG: 400 INJECTION, SOLUTION INTRAVENOUS at 03:06

## 2024-06-11 NOTE — PLAN OF CARE
Problem: Adult Inpatient Plan of Care  Goal: Plan of Care Review  Outcome: Met  Flowsheets (Taken 6/11/2024 1605)  Plan of Care Reviewed With: patient  Goal: Absence of Hospital-Acquired Illness or Injury  Outcome: Met  Intervention: Identify and Manage Fall Risk  Flowsheets (Taken 6/11/2024 1605)  Safety Promotion/Fall Prevention:   assistive device/personal item within reach   Fall Risk reviewed with patient/family   in recliner, wheels locked     Next appt reviewed; pt denied questions or further needs at the time of discharge.

## 2024-07-01 ENCOUNTER — LAB VISIT (OUTPATIENT)
Dept: LAB | Facility: HOSPITAL | Age: 74
End: 2024-07-01
Attending: INTERNAL MEDICINE
Payer: MEDICARE

## 2024-07-01 DIAGNOSIS — C78.6 SECONDARY MALIGNANT NEOPLASM OF RETROPERITONEUM AND PERITONEUM: ICD-10-CM

## 2024-07-01 DIAGNOSIS — C57.00 CARCINOMA OF FALLOPIAN TUBE, UNSPECIFIED LATERALITY: ICD-10-CM

## 2024-07-01 LAB
ALBUMIN SERPL-MCNC: 3.8 G/DL (ref 3.4–4.8)
ALBUMIN/GLOB SERPL: 1.5 RATIO (ref 1.1–2)
ALP SERPL-CCNC: 47 UNIT/L (ref 40–150)
ALT SERPL-CCNC: 21 UNIT/L (ref 0–55)
ANION GAP SERPL CALC-SCNC: 8 MEQ/L
AST SERPL-CCNC: 38 UNIT/L (ref 5–34)
BASOPHILS # BLD AUTO: 0.03 X10(3)/MCL
BASOPHILS NFR BLD AUTO: 0.3 %
BILIRUB SERPL-MCNC: 1.2 MG/DL
BUN SERPL-MCNC: 23.1 MG/DL (ref 9.8–20.1)
CALCIUM SERPL-MCNC: 9.7 MG/DL (ref 8.4–10.2)
CHLORIDE SERPL-SCNC: 104 MMOL/L (ref 98–107)
CO2 SERPL-SCNC: 27 MMOL/L (ref 23–31)
CREAT SERPL-MCNC: 0.94 MG/DL (ref 0.55–1.02)
CREAT/UREA NIT SERPL: 25
EOSINOPHIL # BLD AUTO: 0.13 X10(3)/MCL (ref 0–0.9)
EOSINOPHIL NFR BLD AUTO: 1.2 %
ERYTHROCYTE [DISTWIDTH] IN BLOOD BY AUTOMATED COUNT: 13.9 % (ref 11.5–17)
GFR SERPLBLD CREATININE-BSD FMLA CKD-EPI: >60 ML/MIN/1.73/M2
GLOBULIN SER-MCNC: 2.6 GM/DL (ref 2.4–3.5)
GLUCOSE SERPL-MCNC: 92 MG/DL (ref 82–115)
HCT VFR BLD AUTO: 39.8 % (ref 37–47)
HGB BLD-MCNC: 12.7 G/DL (ref 12–16)
IMM GRANULOCYTES # BLD AUTO: 0.02 X10(3)/MCL (ref 0–0.04)
IMM GRANULOCYTES NFR BLD AUTO: 0.2 %
LYMPHOCYTES # BLD AUTO: 2.98 X10(3)/MCL (ref 0.6–4.6)
LYMPHOCYTES NFR BLD AUTO: 27.7 %
MCH RBC QN AUTO: 31.1 PG (ref 27–31)
MCHC RBC AUTO-ENTMCNC: 31.9 G/DL (ref 33–36)
MCV RBC AUTO: 97.5 FL (ref 80–94)
MONOCYTES # BLD AUTO: 1 X10(3)/MCL (ref 0.1–1.3)
MONOCYTES NFR BLD AUTO: 9.3 %
NEUTROPHILS # BLD AUTO: 6.59 X10(3)/MCL (ref 2.1–9.2)
NEUTROPHILS NFR BLD AUTO: 61.3 %
PLATELET # BLD AUTO: 350 X10(3)/MCL (ref 130–400)
PMV BLD AUTO: 8.2 FL (ref 7.4–10.4)
POTASSIUM SERPL-SCNC: 4.8 MMOL/L (ref 3.5–5.1)
PROT SERPL-MCNC: 6.4 GM/DL (ref 5.8–7.6)
PROT UR QL STRIP: ABNORMAL
RBC # BLD AUTO: 4.08 X10(6)/MCL (ref 4.2–5.4)
SODIUM SERPL-SCNC: 139 MMOL/L (ref 136–145)
WBC # BLD AUTO: 10.75 X10(3)/MCL (ref 4.5–11.5)

## 2024-07-01 PROCEDURE — 85025 COMPLETE CBC W/AUTO DIFF WBC: CPT

## 2024-07-01 PROCEDURE — 36415 COLL VENOUS BLD VENIPUNCTURE: CPT

## 2024-07-01 PROCEDURE — 80053 COMPREHEN METABOLIC PANEL: CPT

## 2024-07-01 PROCEDURE — 81005 URINALYSIS: CPT

## 2024-07-02 ENCOUNTER — INFUSION (OUTPATIENT)
Dept: INFUSION THERAPY | Facility: HOSPITAL | Age: 74
End: 2024-07-02
Attending: INTERNAL MEDICINE
Payer: MEDICARE

## 2024-07-02 VITALS — HEART RATE: 77 BPM | DIASTOLIC BLOOD PRESSURE: 76 MMHG | SYSTOLIC BLOOD PRESSURE: 135 MMHG

## 2024-07-02 DIAGNOSIS — C78.6 SECONDARY MALIGNANT NEOPLASM OF RETROPERITONEUM AND PERITONEUM: Primary | ICD-10-CM

## 2024-07-02 PROCEDURE — 25000003 PHARM REV CODE 250: Performed by: NURSE PRACTITIONER

## 2024-07-02 PROCEDURE — 96375 TX/PRO/DX INJ NEW DRUG ADDON: CPT

## 2024-07-02 PROCEDURE — 96413 CHEMO IV INFUSION 1 HR: CPT

## 2024-07-02 PROCEDURE — 63600175 PHARM REV CODE 636 W HCPCS: Mod: JZ,JG | Performed by: NURSE PRACTITIONER

## 2024-07-02 RX ORDER — DIPHENHYDRAMINE HYDROCHLORIDE 50 MG/ML
50 INJECTION INTRAMUSCULAR; INTRAVENOUS ONCE AS NEEDED
Status: DISCONTINUED | OUTPATIENT
Start: 2024-07-02 | End: 2024-07-02 | Stop reason: HOSPADM

## 2024-07-02 RX ORDER — DIPHENHYDRAMINE HYDROCHLORIDE 50 MG/ML
25 INJECTION INTRAMUSCULAR; INTRAVENOUS
Status: COMPLETED | OUTPATIENT
Start: 2024-07-02 | End: 2024-07-02

## 2024-07-02 RX ORDER — HEPARIN 100 UNIT/ML
500 SYRINGE INTRAVENOUS
Status: DISCONTINUED | OUTPATIENT
Start: 2024-07-02 | End: 2024-07-02 | Stop reason: HOSPADM

## 2024-07-02 RX ORDER — SODIUM CHLORIDE 0.9 % (FLUSH) 0.9 %
10 SYRINGE (ML) INJECTION
Status: DISCONTINUED | OUTPATIENT
Start: 2024-07-02 | End: 2024-07-02 | Stop reason: HOSPADM

## 2024-07-02 RX ORDER — EPINEPHRINE 0.3 MG/.3ML
0.3 INJECTION SUBCUTANEOUS ONCE AS NEEDED
Status: DISCONTINUED | OUTPATIENT
Start: 2024-07-02 | End: 2024-07-02 | Stop reason: HOSPADM

## 2024-07-02 RX ORDER — ACETAMINOPHEN 325 MG/1
650 TABLET ORAL
Status: DISCONTINUED | OUTPATIENT
Start: 2024-07-02 | End: 2024-07-02 | Stop reason: HOSPADM

## 2024-07-02 RX ADMIN — BEVACIZUMAB-AWWB 800 MG: 400 INJECTION, SOLUTION INTRAVENOUS at 03:07

## 2024-07-02 RX ADMIN — DIPHENHYDRAMINE HYDROCHLORIDE 25 MG: 50 INJECTION INTRAMUSCULAR; INTRAVENOUS at 03:07

## 2024-07-16 NOTE — PROGRESS NOTES
Subjective:       Patient ID: Shayna Ledezma is a 73 y.o. female.  GI: Dr. Gamaliel Carlos  GYN ONC at [facility]: Dr. Mike Santana    1. 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 Pulmonary Embolism (Incidental on CT)--19    Procedure/pathology:   1. Paracentesis with removal of 5L of fluid done 18--serous carcinoma, high grade, c/w ovarian primary, PAX-8, CK7 and MOC-31 positive, CDX-2, CK-20 and Calretinin-negative.  2. S/p Exploratory Lap, radical tumor debulking including total abdominal hysterectomy, bilateral salpingo-oophorectomy, bilateral pelvic lymph node sampling, appendectomy, mobilization of the left ureter, complete gross resection of the disease with removal of mesenteric disease, as well as omental disese and parasplenic disease by Dr. Santana 3/18/19--Metastatic high grade serous carcinoma. Tissue/organ involvement: right ovary, appendectomy, omentum, mesentery (including small bowel mesentery) and multiple other sites designated: periumbilical, perisplenic, transverse colon, splenic flexure, perirectal, left periureteral. 2 lymph nodes negative. [id number]. FIGO stage IIIC. Myriad somatic instability testing positive for genomic instability, negative BRCA1 and BRCA2 tumor testing.  3. Splenectomy done 10/21/19--splenic involvement with metastatic high grade serous carcinoma, 5 benign lymph nodes. Caris testing showed LAM, NTRK1/2/3 negative, TMB low, BRCA1/2 negative, ER/WV negative, CATHY negative, SPEN pathogenic variant Exon 11, TP53 pathogenic variant Exon 5, PD-L1 positive CPS 15, Genomic ELODIA high.  Imagin. CT A/P w/ and w/o contrast done at [facility] 18--large volume ascites with multiple peritoneal implants, right pericolic gutter implant measures 2.5X3.3cm, LUQ omental/mesenteric implant measures 3X3.6cm, omental carcinomatosis, extensive enhancing soft tissue surrounding sigmoid colon vs. large sigmoid mass, left  ovarian cystic lesion appears to have some internal septations measures 3.5X3.8cm, enhancing periportal soft tissue density likely lymphadenopathy with some narrowing of the portal vein noted, but without internal filling defect, borderline splenomegaly, subtle solid lesion w/n central spleen measures 2.4X2.5cm, likely metastatic, with peripheral area of diminished enhancement suggesting splenic infarct, soft tissue implant abutting gallbladder measures 2X2.2cm, no right adnexal mass, trace bilateral layering pleural fluid, small moderate-sized hiatal hernia, few borderline enlarged lymph nodes w/n right epicardial fat pad.  2. CT of chest on 1/11/19--Some prominent right cardiophrenic lymph nodes are nonspecific and can be followed on future surveillance scans. Otherwise no CT evidence of metastatic disease to the chest. While exam was not tailored for evaluation of the pulmonary arteries I suspect there is pulmonary thromboembolic disease. Peritoneal carcinomatosis seen in the upper abdomen. Critical Result: Pulmonary Embolus.  3. CT C/A/P 3/4/19--Positive response to therapy. No new or progressive metastatic disease in the chest, abdomen or pelvis. Stable to improved findings include smaller pericardial lymph nodes, andrew hepatis adenopathy, peritoneal carcinomatosis, smaller left adnexal lesion; no evidence of PE within limitations of the study.  5. CT PE protocol chest/A/P 6/11/19--No PE, improved pericardial lymph node with minimal size on current examination, improved peritoneal carcinomatosis and left adnexal implant, splenic ischemia evolved into small infarct, size improvement of one of splenic hypodense lesion and mild size increase of second hypodense lesion, favored to be benign/cystic, no new findings.  6. PET/CT 9/25/19--s/p hysterectomy and bilateral oophorectomy, no evidence for locally recurrent/residual disease, intensely hypermetabolic hypodense lesion within the spleen 3.5X3.3cm concerning for  metastatic focus. No other abnormal focus of disease.  7. CT C/A/P 11/13/20--Right lower lobe 3.3 cm mass is presumed metastatic, otherwise no evident residual, recurrent or metastatic disease.   8. PET/CT 11/20/20--Hypermetabolic right lung base lesion and suspected metastatic periportal adenopathy, no additional sites of FDG-avid otherwise aggressive appearing pathology through the neck, chest, abdomen, or pelvis.  9. PET/CT 1/21/21--Interval decrease in size of the right lower lobe mass with decreased FDG uptake, now measures 1.7 x 2.2 cm (previous 3.3 x 3.1 cm), resolution of FDG uptake in the suspected periportal lymph node which is not identifiable on noncontrast CT. No evidence of new or progressive hypermetabolic metastatic disease.  10. PET/CT 3/30/21--Resolved right lower lung lobe hypermetabolic mass. Otherwise, no interval change or evidence of residual disease, recurrence or new metastatic lesion.    Procedures:   1. Paracentesis on 12/28/18 with removal of 5 Liters.  2. Paracentesis on 01/07/19 with removal of 3.5 Liters.  3. Paracentesis on 02/06/19 with removal of 2.5 Liters.  4. Mediport placed 12/2020 by Dr. Serge Gentile--removed 8/26/21 due to mediport fracture.    Germline genetic testing done by [employer] 1/29/19--negative for BRCA1/2, two (2) variants of uncertain significance (VUS): CATHY p.E6016V/p.D0470Z and MLH1 p.P603L.     :  12/19/18 1102  11/10/20  42.9  12/08/20 79.5  01/05/21 28.9  01/26/21 12.8  02/18/21 11.6  03/15/22--10.3  04/26/22--9.6  05/16/22--10.2  06/07/22--10.1  07/19/22--11.6  08/08/22--12.5  09/19/22--12.7  10/31/22--13.5  01/03/23--12.0  02/13/23--14.0  03/03/23--13.9  04/17/23--14.5  05/26/23--14.6  07/31/23--13.5  11/13/23--15.6  12/04/23--16.8  01/16/24--13.7  02/26/24--18.6  03/18/24--16.3  04/29/24--17.0  06/10/24--17.0    Treatment History:  1. Neoadjuvant Carboplatin/Taxol every 3 weeks x 3 cycles. 1/15/19 - 2/26/19. Neulasta added.   2. Surgery--tumor  debulking KORI/BSO 3/18/19.  3. Adjuvant Carboplatin/Taxol x 3 additions cycles 4/9/19--5/21/19.  4. Splenectomy 10/21/19.  5. Niraparib maintenance (dose reduction to 100mg daily for cytopenias) 11/8/19--12/1/20 (stopped due to progression).  6. Carboplatin/Paclitaxel/Avastin X 6 cycles completed 12/8/21--3/24/21 (complete response).    Current Treatment:  1. Eliquis BID for incidental PE on imaging 1/2019  2. Avastin maintenance started on 4/13/21.   Cycle 58 due 7/23/24.      Chief Complaint   Patient presents with    Other Misc     Pt reports no concerns today.     HPI   Patient is here for follow-up of ovarian cancer on maintenance Avastin. She has no issues from treatment. She feels good. No abdominal pain or other issues reported. No new problems reported today. BP has been good.     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 180 tablet 3    aspirin (ECOTRIN) 81 MG EC tablet Take 81 mg by mouth once daily.      atorvastatin (LIPITOR) 40 MG tablet TAKE 1 TABLET(40 MG) BY MOUTH EVERY DAY 90 tablet 3    b complex vitamins capsule Take 1 capsule by mouth once daily.      GLUTAMINE ORAL Take 10 g by mouth Daily.      hydroCHLOROthiazide (HYDRODIURIL) 12.5 MG Tab Take 1 tablet (12.5 mg total) by mouth once daily. 90 tablet 3    lisinopriL (PRINIVIL,ZESTRIL) 20 MG tablet TAKE 1  TABLET(20 MG) BY MOUTH EVERY DAY 30 tablet 3    pyridoxine, vitamin B6, (B-6) 250 MG Tab Take 250 mg by mouth once daily.      sodium chloride 0.9% SolP 100 mL with bevacizumab 25 mg/mL Soln Inject into the vein every 21 days.       No current facility-administered medications on file prior to visit.      Review of Systems   Constitutional:  Negative for appetite change and unexpected weight change.   HENT:  Negative for mouth sores and nosebleeds.    Eyes:  Negative for visual disturbance.   Respiratory:  Negative for cough and shortness of breath.    Cardiovascular:  Negative for chest pain.   Gastrointestinal:  Negative for abdominal pain and diarrhea.   Genitourinary:  Negative for frequency.   Musculoskeletal:  Negative for back pain.        Left hip pain and stiffness   Integumentary:  Negative for rash.   Neurological:  Negative for headaches.   Hematological:  Negative for adenopathy.   Psychiatric/Behavioral:  The patient is not nervous/anxious.         Vitals:    07/22/24 0906   BP: 118/78   Pulse: 68   Resp: 14   Temp: 98.3 °F (36.8 °C)     Wt Readings from Last 3 Encounters:   07/22/24 0906 56.1 kg (123 lb 9.6 oz)   06/10/24 0851 56.5 kg (124 lb 8 oz)   05/21/24 1500 56.8 kg (125 lb 4.8 oz)       Physical Exam  Vitals reviewed.   Constitutional:       Appearance: Normal appearance. She is normal weight.   HENT:      Head: Normocephalic.   Eyes:      General: Lids are normal. Vision grossly intact.      Extraocular Movements: Extraocular movements intact.      Conjunctiva/sclera: Conjunctivae normal.   Cardiovascular:      Rate and Rhythm: Normal rate and regular rhythm.      Pulses: Normal pulses.      Heart sounds: Normal heart sounds, S1 normal and S2 normal.   Pulmonary:      Effort: Pulmonary effort is normal.      Breath sounds: Normal breath sounds.   Abdominal:      General: Abdomen is flat. Bowel sounds are normal. There is no distension.      Palpations: Abdomen is soft.      Tenderness: There  is no abdominal tenderness.   Musculoskeletal:      Cervical back: Normal range of motion and neck supple.      Right lower leg: No edema.      Left lower leg: No edema.   Skin:     General: Skin is warm and moist.      Capillary Refill: Capillary refill takes less than 2 seconds.      Comments: Left CW mediport removed, site intact   Neurological:      General: No focal deficit present.      Mental Status: She is alert and oriented to person, place, and time.   Psychiatric:         Attention and Perception: Attention normal.         Mood and Affect: Mood normal.         Speech: Speech normal.         Behavior: Behavior normal. Behavior is cooperative.         Cognition and Memory: Cognition normal.         Judgment: Judgment normal.       Lab Visit on 07/22/2024   Component Date Value    Protein, UA 07/22/2024 1+ (A)     WBC 07/22/2024 6.81     RBC 07/22/2024 4.27     Hgb 07/22/2024 13.4     Hct 07/22/2024 40.9     MCV 07/22/2024 95.8 (H)     MCH 07/22/2024 31.4 (H)     MCHC 07/22/2024 32.8 (L)     RDW 07/22/2024 13.4     Platelet 07/22/2024 324     MPV 07/22/2024 7.9     Neut % 07/22/2024 47.4     Lymph % 07/22/2024 32.2     Mono % 07/22/2024 10.9     Eos % 07/22/2024 9.1     Basophil % 07/22/2024 0.4     Lymph # 07/22/2024 2.19     Neut # 07/22/2024 3.23     Mono # 07/22/2024 0.74     Eos # 07/22/2024 0.62     Baso # 07/22/2024 0.03     IG# 07/22/2024 0.00     IG% 07/22/2024 0.0           Assessment:       1. Secondary malignant neoplasm of retroperitoneum and peritoneum    2. Malignant neoplasm of uterine adnexa    3. Other chronic pulmonary embolism without acute cor pulmonale    4. Long term (current) use of anticoagulants      Plan:     Patient with left ovarian cancer diagnosed 12/26/18, appears to be stage IIIC vs. IV with diffuse peritoneal disease, possible splenic involvement, epicardial lymph nodes and bilateral pleural effusions.  Patient seen and evaluated by Dr. Mike Santana at [facility] in  [place].   Treatment recommended upfront with chemotherapy Carboplatin/Paclitaxel x 3 cycles.  Completed Cycle 3 on 2/26/19.   She underwent total abdominal hysterectomy, BSO and tumor debulking on 3/18/19 with Dr. Santana with complete gross resection of disease. FIGO Stage IIIC papillary serous fallopian tube cancer.   Patient resumed chemotherapy with cycle 4 on 4/9/19. Completed cycle 6 on 5/21/19.    Testing on tumor was positive for genomic instability but negative for BRCA mutation.  Per NCCN guidelines, maintenance can be considered for BRCA mutated germline category 1 and somatic category 2A. There is some evidence that PARP inhibitors have some activity as well in tumors with genomic instability. But the PARP maintenance is not approved for this indication. Offered treatment to patient and after discussion she declined and made decision to continue with observation only.    PET/CT done for rising CA-125 showed hypermetabolic splenic lesion which is not new, just enlarged from previous. Case discussed with Dr. Mike Santana.  Patient is now s/p splenectomy done 10/21/19. Consistent with splenic involvement with high grade serous carcinoma.   Dr. Santana recommended Niraparib maintenance based on new data showing activity in HRD positive ovarian cancer and patient started on 11/8/19, required a dose reduction due to cytopenias.  Rising CA-125 noted in 11/2020. CT showed new RLL lung mass.   Follow-up PET done 11/20/20 showed RLL lung mass hypermetabolic and hypermetabolic periportal adenopathy.     Recommended 2nd line treatment with Carboplatin/Taxol/Avastin.   Started cycle 1 on 12/8/20. Neulasta added with cycle 2.   Completed Cycle 6 on 3/24/21.  PET from 3/30/21 showed complete resolution of lung mass and no other disease.    Avastin maintenance started on 4/13/21.   Hospitalized for TIA after her 4th cycle of Avastin. Work-up for stroke and cause otherwise negative. Patient started on baby ASA by  neurology.  Discussed there are no clear guidelines for changing/discontinuing treatment for TIA/CVA related to Avastin.  Since her symptoms resolved and there were no residual effects, recommended to continue with Avastin since it is working well for her disease and since a baby ASA was added for prevention. She was also continued on Eliquis for h/o PE.  S/p mediport removal for fracture on 8/26/21.   Continue Avastin through peripheral veins for now.    Patient continues on Avastin maintenance without problems.  BP has been elevated on Lisinopril 20mg daily. PCP added HCTZ 12.5mg daily.  2+ protein on dipstick from 10/2023, 24-hour urine okay.   Urine protein has been up and down. Don't plan to repeat a 24-hour unless 3+.  Patient did have some epistaxis, seen by ENT and now better after treatment with Mupirocin. Did not have any cautery done.     Due for Avastin maintenance Cycle 58 tomorrow.     CBC today is good. CMP and  pending.     She has no symptoms or laboratory findings worrisome for disease progression.   Continue with labs CBC, CMP, UA/dipstick  every 3 weeks.      Labs on 8/12/24 and next treatment on 8/13/24 C59.      is being monitored every 6 weeks.    No further scans until rise in CA-125.  F/u in 6 weeks prior to Cycle 60.    Per NCCN, Avastin can be continued as single agent maintenance until disease progression or unacceptable toxicity if the disease responds to the initial recurrence chemotherapy/avastin regimen.     She is now >3 years from completing last chemotherapy, so still considered platinum-sensitive. Will need replacement of mediport when she needs to resume chemotherapy.     Vaccines/Boosters post-splenectomy: Menactra/Menveo every 5 years, Bexsero every 2-3 years, annual flu shot.  Last Bexsero was given 8/1/23.   Plan for Menactra/Menveo in 9/2024 which will be 5 years after initial. Can order at next visit.     Continue Eliquis 5mg BID and ASA.  All questions  answered.    Renetta Corral MD

## 2024-07-22 ENCOUNTER — OFFICE VISIT (OUTPATIENT)
Dept: HEMATOLOGY/ONCOLOGY | Facility: CLINIC | Age: 74
End: 2024-07-22
Payer: MEDICARE

## 2024-07-22 ENCOUNTER — LAB VISIT (OUTPATIENT)
Dept: LAB | Facility: HOSPITAL | Age: 74
End: 2024-07-22
Attending: INTERNAL MEDICINE
Payer: MEDICARE

## 2024-07-22 VITALS
BODY MASS INDEX: 21.11 KG/M2 | RESPIRATION RATE: 14 BRPM | TEMPERATURE: 98 F | SYSTOLIC BLOOD PRESSURE: 118 MMHG | OXYGEN SATURATION: 99 % | HEART RATE: 68 BPM | HEIGHT: 64 IN | WEIGHT: 123.63 LBS | DIASTOLIC BLOOD PRESSURE: 78 MMHG

## 2024-07-22 DIAGNOSIS — Z79.01 LONG TERM (CURRENT) USE OF ANTICOAGULANTS: ICD-10-CM

## 2024-07-22 DIAGNOSIS — C78.6 SECONDARY MALIGNANT NEOPLASM OF RETROPERITONEUM AND PERITONEUM: Primary | ICD-10-CM

## 2024-07-22 DIAGNOSIS — I27.82 OTHER CHRONIC PULMONARY EMBOLISM WITHOUT ACUTE COR PULMONALE: ICD-10-CM

## 2024-07-22 DIAGNOSIS — C78.6 SECONDARY MALIGNANT NEOPLASM OF RETROPERITONEUM AND PERITONEUM: ICD-10-CM

## 2024-07-22 DIAGNOSIS — C57.4 MALIGNANT NEOPLASM OF UTERINE ADNEXA: ICD-10-CM

## 2024-07-22 DIAGNOSIS — D84.821 IMMUNODEFICIENCY DUE TO DRUGS: ICD-10-CM

## 2024-07-22 DIAGNOSIS — Z79.899 IMMUNODEFICIENCY DUE TO DRUGS: ICD-10-CM

## 2024-07-22 DIAGNOSIS — C57.00 CARCINOMA OF FALLOPIAN TUBE, UNSPECIFIED LATERALITY: ICD-10-CM

## 2024-07-22 PROBLEM — D69.6 THROMBOCYTOPENIA, UNSPECIFIED: Status: RESOLVED | Noted: 2024-03-08 | Resolved: 2024-07-22

## 2024-07-22 LAB
ALBUMIN SERPL-MCNC: 3.7 G/DL (ref 3.4–4.8)
ALBUMIN/GLOB SERPL: 1.4 RATIO (ref 1.1–2)
ALP SERPL-CCNC: 43 UNIT/L (ref 40–150)
ALT SERPL-CCNC: 20 UNIT/L (ref 0–55)
ANION GAP SERPL CALC-SCNC: 7 MEQ/L
AST SERPL-CCNC: 34 UNIT/L (ref 5–34)
BASOPHILS # BLD AUTO: 0.03 X10(3)/MCL
BASOPHILS NFR BLD AUTO: 0.4 %
BILIRUB SERPL-MCNC: 1.1 MG/DL
BUN SERPL-MCNC: 19.9 MG/DL (ref 9.8–20.1)
CALCIUM SERPL-MCNC: 9.6 MG/DL (ref 8.4–10.2)
CANCER AG125 SERPL-ACNC: 17.2 UNIT/ML (ref 0–35)
CHLORIDE SERPL-SCNC: 98 MMOL/L (ref 98–107)
CO2 SERPL-SCNC: 30 MMOL/L (ref 23–31)
CREAT SERPL-MCNC: 0.81 MG/DL (ref 0.55–1.02)
CREAT/UREA NIT SERPL: 25
EOSINOPHIL # BLD AUTO: 0.62 X10(3)/MCL (ref 0–0.9)
EOSINOPHIL NFR BLD AUTO: 9.1 %
ERYTHROCYTE [DISTWIDTH] IN BLOOD BY AUTOMATED COUNT: 13.4 % (ref 11.5–17)
GFR SERPLBLD CREATININE-BSD FMLA CKD-EPI: >60 ML/MIN/1.73/M2
GLOBULIN SER-MCNC: 2.6 GM/DL (ref 2.4–3.5)
GLUCOSE SERPL-MCNC: 89 MG/DL (ref 82–115)
HCT VFR BLD AUTO: 40.9 % (ref 37–47)
HGB BLD-MCNC: 13.4 G/DL (ref 12–16)
IMM GRANULOCYTES # BLD AUTO: 0 X10(3)/MCL (ref 0–0.04)
IMM GRANULOCYTES NFR BLD AUTO: 0 %
LYMPHOCYTES # BLD AUTO: 2.19 X10(3)/MCL (ref 0.6–4.6)
LYMPHOCYTES NFR BLD AUTO: 32.2 %
MCH RBC QN AUTO: 31.4 PG (ref 27–31)
MCHC RBC AUTO-ENTMCNC: 32.8 G/DL (ref 33–36)
MCV RBC AUTO: 95.8 FL (ref 80–94)
MONOCYTES # BLD AUTO: 0.74 X10(3)/MCL (ref 0.1–1.3)
MONOCYTES NFR BLD AUTO: 10.9 %
NEUTROPHILS # BLD AUTO: 3.23 X10(3)/MCL (ref 2.1–9.2)
NEUTROPHILS NFR BLD AUTO: 47.4 %
PLATELET # BLD AUTO: 324 X10(3)/MCL (ref 130–400)
PMV BLD AUTO: 7.9 FL (ref 7.4–10.4)
POTASSIUM SERPL-SCNC: 4.3 MMOL/L (ref 3.5–5.1)
PROT SERPL-MCNC: 6.3 GM/DL (ref 5.8–7.6)
PROT UR QL STRIP: ABNORMAL
RBC # BLD AUTO: 4.27 X10(6)/MCL (ref 4.2–5.4)
SODIUM SERPL-SCNC: 135 MMOL/L (ref 136–145)
WBC # BLD AUTO: 6.81 X10(3)/MCL (ref 4.5–11.5)

## 2024-07-22 PROCEDURE — 80053 COMPREHEN METABOLIC PANEL: CPT

## 2024-07-22 PROCEDURE — 85025 COMPLETE CBC W/AUTO DIFF WBC: CPT

## 2024-07-22 PROCEDURE — 1125F AMNT PAIN NOTED PAIN PRSNT: CPT | Mod: CPTII,S$GLB,, | Performed by: INTERNAL MEDICINE

## 2024-07-22 PROCEDURE — 36415 COLL VENOUS BLD VENIPUNCTURE: CPT

## 2024-07-22 PROCEDURE — 3288F FALL RISK ASSESSMENT DOCD: CPT | Mod: CPTII,S$GLB,, | Performed by: INTERNAL MEDICINE

## 2024-07-22 PROCEDURE — 86304 IMMUNOASSAY TUMOR CA 125: CPT

## 2024-07-22 PROCEDURE — 1101F PT FALLS ASSESS-DOCD LE1/YR: CPT | Mod: CPTII,S$GLB,, | Performed by: INTERNAL MEDICINE

## 2024-07-22 PROCEDURE — 3074F SYST BP LT 130 MM HG: CPT | Mod: CPTII,S$GLB,, | Performed by: INTERNAL MEDICINE

## 2024-07-22 PROCEDURE — 99215 OFFICE O/P EST HI 40 MIN: CPT | Mod: S$GLB,,, | Performed by: INTERNAL MEDICINE

## 2024-07-22 PROCEDURE — 81005 URINALYSIS: CPT

## 2024-07-22 PROCEDURE — 4010F ACE/ARB THERAPY RXD/TAKEN: CPT | Mod: CPTII,S$GLB,, | Performed by: INTERNAL MEDICINE

## 2024-07-22 PROCEDURE — 1160F RVW MEDS BY RX/DR IN RCRD: CPT | Mod: CPTII,S$GLB,, | Performed by: INTERNAL MEDICINE

## 2024-07-22 PROCEDURE — 1159F MED LIST DOCD IN RCRD: CPT | Mod: CPTII,S$GLB,, | Performed by: INTERNAL MEDICINE

## 2024-07-22 PROCEDURE — 99999 PR PBB SHADOW E&M-EST. PATIENT-LVL IV: CPT | Mod: PBBFAC,,, | Performed by: INTERNAL MEDICINE

## 2024-07-22 PROCEDURE — 3008F BODY MASS INDEX DOCD: CPT | Mod: CPTII,S$GLB,, | Performed by: INTERNAL MEDICINE

## 2024-07-22 PROCEDURE — 3078F DIAST BP <80 MM HG: CPT | Mod: CPTII,S$GLB,, | Performed by: INTERNAL MEDICINE

## 2024-07-22 RX ORDER — DIPHENHYDRAMINE HYDROCHLORIDE 50 MG/ML
50 INJECTION INTRAMUSCULAR; INTRAVENOUS ONCE AS NEEDED
Status: CANCELLED | OUTPATIENT
Start: 2024-07-23

## 2024-07-22 RX ORDER — ACETAMINOPHEN 325 MG/1
650 TABLET ORAL
Status: CANCELLED | OUTPATIENT
Start: 2024-07-23

## 2024-07-22 RX ORDER — DIPHENHYDRAMINE HYDROCHLORIDE 50 MG/ML
25 INJECTION INTRAMUSCULAR; INTRAVENOUS
Status: CANCELLED | OUTPATIENT
Start: 2024-07-23

## 2024-07-22 RX ORDER — HEPARIN 100 UNIT/ML
500 SYRINGE INTRAVENOUS
Status: CANCELLED | OUTPATIENT
Start: 2024-07-23

## 2024-07-22 RX ORDER — SODIUM CHLORIDE 0.9 % (FLUSH) 0.9 %
10 SYRINGE (ML) INJECTION
Status: CANCELLED | OUTPATIENT
Start: 2024-07-23

## 2024-07-22 RX ORDER — EPINEPHRINE 0.3 MG/.3ML
0.3 INJECTION SUBCUTANEOUS ONCE AS NEEDED
Status: CANCELLED | OUTPATIENT
Start: 2024-07-23

## 2024-07-23 ENCOUNTER — INFUSION (OUTPATIENT)
Dept: INFUSION THERAPY | Facility: HOSPITAL | Age: 74
End: 2024-07-23
Attending: INTERNAL MEDICINE
Payer: MEDICARE

## 2024-07-23 VITALS — TEMPERATURE: 97 F | HEART RATE: 64 BPM | DIASTOLIC BLOOD PRESSURE: 71 MMHG | SYSTOLIC BLOOD PRESSURE: 149 MMHG

## 2024-07-23 DIAGNOSIS — C78.6 SECONDARY MALIGNANT NEOPLASM OF RETROPERITONEUM AND PERITONEUM: Primary | ICD-10-CM

## 2024-07-23 PROCEDURE — 25000003 PHARM REV CODE 250: Performed by: INTERNAL MEDICINE

## 2024-07-23 PROCEDURE — 96413 CHEMO IV INFUSION 1 HR: CPT

## 2024-07-23 PROCEDURE — 63600175 PHARM REV CODE 636 W HCPCS: Performed by: INTERNAL MEDICINE

## 2024-07-23 PROCEDURE — 96375 TX/PRO/DX INJ NEW DRUG ADDON: CPT

## 2024-07-23 RX ORDER — EPINEPHRINE 0.3 MG/.3ML
0.3 INJECTION SUBCUTANEOUS ONCE AS NEEDED
Status: DISCONTINUED | OUTPATIENT
Start: 2024-07-23 | End: 2024-07-23 | Stop reason: HOSPADM

## 2024-07-23 RX ORDER — ACETAMINOPHEN 325 MG/1
650 TABLET ORAL
Status: COMPLETED | OUTPATIENT
Start: 2024-07-23 | End: 2024-07-23

## 2024-07-23 RX ORDER — DIPHENHYDRAMINE HYDROCHLORIDE 50 MG/ML
50 INJECTION INTRAMUSCULAR; INTRAVENOUS ONCE AS NEEDED
Status: DISCONTINUED | OUTPATIENT
Start: 2024-07-23 | End: 2024-07-23 | Stop reason: HOSPADM

## 2024-07-23 RX ORDER — SODIUM CHLORIDE 0.9 % (FLUSH) 0.9 %
10 SYRINGE (ML) INJECTION
Status: DISCONTINUED | OUTPATIENT
Start: 2024-07-23 | End: 2024-07-23 | Stop reason: HOSPADM

## 2024-07-23 RX ORDER — DIPHENHYDRAMINE HYDROCHLORIDE 50 MG/ML
25 INJECTION INTRAMUSCULAR; INTRAVENOUS
Status: COMPLETED | OUTPATIENT
Start: 2024-07-23 | End: 2024-07-23

## 2024-07-23 RX ORDER — HEPARIN 100 UNIT/ML
500 SYRINGE INTRAVENOUS
Status: DISCONTINUED | OUTPATIENT
Start: 2024-07-23 | End: 2024-07-23 | Stop reason: HOSPADM

## 2024-07-23 RX ADMIN — DIPHENHYDRAMINE HYDROCHLORIDE 25 MG: 50 INJECTION INTRAMUSCULAR; INTRAVENOUS at 03:07

## 2024-07-23 RX ADMIN — ACETAMINOPHEN 650 MG: 325 TABLET ORAL at 03:07

## 2024-07-23 RX ADMIN — BEVACIZUMAB-AWWB 800 MG: 400 INJECTION, SOLUTION INTRAVENOUS at 03:07

## 2024-07-31 ENCOUNTER — TELEPHONE (OUTPATIENT)
Dept: INTERNAL MEDICINE | Facility: CLINIC | Age: 74
End: 2024-07-31
Payer: MEDICARE

## 2024-07-31 DIAGNOSIS — E78.2 MIXED HYPERLIPIDEMIA: ICD-10-CM

## 2024-07-31 RX ORDER — ATORVASTATIN CALCIUM 40 MG/1
40 TABLET, FILM COATED ORAL DAILY
Qty: 90 TABLET | Refills: 3 | Status: SHIPPED | OUTPATIENT
Start: 2024-07-31

## 2024-07-31 NOTE — TELEPHONE ENCOUNTER
----- Message from Nellie Braden sent at 2024  9:07 AM CDT -----  Regarding: Medication Refill  .MEDICATION REFILL REQUEST      PATIENT PHONE #: [phone]      :     PHARMACY: [facility]      PHARMACY PHONE #:      ALLERGIES:     MESSAGE Called pt for pre visit pt states she believes its almost time for a refill on her Atorvastatin asked if refill can be sent ..please advise

## 2024-08-01 ENCOUNTER — LAB VISIT (OUTPATIENT)
Dept: LAB | Facility: HOSPITAL | Age: 74
End: 2024-08-01
Attending: INTERNAL MEDICINE
Payer: MEDICARE

## 2024-08-01 DIAGNOSIS — I10 PRIMARY HYPERTENSION: Primary | ICD-10-CM

## 2024-08-01 DIAGNOSIS — I10 PRIMARY HYPERTENSION: ICD-10-CM

## 2024-08-01 LAB
ALBUMIN SERPL-MCNC: 3.9 G/DL (ref 3.4–4.8)
ALBUMIN/GLOB SERPL: 1.4 RATIO (ref 1.1–2)
ALP SERPL-CCNC: 45 UNIT/L (ref 40–150)
ALT SERPL-CCNC: 19 UNIT/L (ref 0–55)
ANION GAP SERPL CALC-SCNC: 6 MEQ/L
AST SERPL-CCNC: 35 UNIT/L (ref 5–34)
BASOPHILS # BLD AUTO: 0.07 X10(3)/MCL
BASOPHILS NFR BLD AUTO: 0.8 %
BILIRUB SERPL-MCNC: 1 MG/DL
BUN SERPL-MCNC: 19.2 MG/DL (ref 9.8–20.1)
CALCIUM SERPL-MCNC: 10 MG/DL (ref 8.4–10.2)
CHLORIDE SERPL-SCNC: 104 MMOL/L (ref 98–107)
CO2 SERPL-SCNC: 28 MMOL/L (ref 23–31)
CREAT SERPL-MCNC: 0.83 MG/DL (ref 0.55–1.02)
CREAT/UREA NIT SERPL: 23
EOSINOPHIL # BLD AUTO: 0.5 X10(3)/MCL (ref 0–0.9)
EOSINOPHIL NFR BLD AUTO: 5.9 %
ERYTHROCYTE [DISTWIDTH] IN BLOOD BY AUTOMATED COUNT: 13.9 % (ref 11.5–17)
GFR SERPLBLD CREATININE-BSD FMLA CKD-EPI: >60 ML/MIN/1.73/M2
GLOBULIN SER-MCNC: 2.8 GM/DL (ref 2.4–3.5)
GLUCOSE SERPL-MCNC: 96 MG/DL (ref 82–115)
HCT VFR BLD AUTO: 41.5 % (ref 37–47)
HGB BLD-MCNC: 13.4 G/DL (ref 12–16)
IMM GRANULOCYTES # BLD AUTO: 0.02 X10(3)/MCL (ref 0–0.04)
IMM GRANULOCYTES NFR BLD AUTO: 0.2 %
LYMPHOCYTES # BLD AUTO: 3.11 X10(3)/MCL (ref 0.6–4.6)
LYMPHOCYTES NFR BLD AUTO: 36.8 %
MCH RBC QN AUTO: 31.2 PG (ref 27–31)
MCHC RBC AUTO-ENTMCNC: 32.3 G/DL (ref 33–36)
MCV RBC AUTO: 96.5 FL (ref 80–94)
MONOCYTES # BLD AUTO: 0.92 X10(3)/MCL (ref 0.1–1.3)
MONOCYTES NFR BLD AUTO: 10.9 %
NEUTROPHILS # BLD AUTO: 3.84 X10(3)/MCL (ref 2.1–9.2)
NEUTROPHILS NFR BLD AUTO: 45.4 %
NRBC BLD AUTO-RTO: 0 %
PLATELET # BLD AUTO: 424 X10(3)/MCL (ref 130–400)
PLATELETS.RETICULATED NFR BLD AUTO: 0.9 % (ref 0.9–11.2)
PMV BLD AUTO: 8.2 FL (ref 7.4–10.4)
POTASSIUM SERPL-SCNC: 4.7 MMOL/L (ref 3.5–5.1)
PROT SERPL-MCNC: 6.7 GM/DL (ref 5.8–7.6)
RBC # BLD AUTO: 4.3 X10(6)/MCL (ref 4.2–5.4)
SODIUM SERPL-SCNC: 138 MMOL/L (ref 136–145)
WBC # BLD AUTO: 8.46 X10(3)/MCL (ref 4.5–11.5)

## 2024-08-01 PROCEDURE — 36415 COLL VENOUS BLD VENIPUNCTURE: CPT

## 2024-08-01 PROCEDURE — 85025 COMPLETE CBC W/AUTO DIFF WBC: CPT

## 2024-08-01 PROCEDURE — 80053 COMPREHEN METABOLIC PANEL: CPT

## 2024-08-07 ENCOUNTER — OFFICE VISIT (OUTPATIENT)
Dept: INTERNAL MEDICINE | Facility: CLINIC | Age: 74
End: 2024-08-07
Payer: MEDICARE

## 2024-08-07 VITALS
SYSTOLIC BLOOD PRESSURE: 112 MMHG | OXYGEN SATURATION: 98 % | HEIGHT: 64 IN | BODY MASS INDEX: 21.17 KG/M2 | HEART RATE: 70 BPM | WEIGHT: 124 LBS | DIASTOLIC BLOOD PRESSURE: 64 MMHG

## 2024-08-07 DIAGNOSIS — J41.1 MUCOPURULENT CHRONIC BRONCHITIS: ICD-10-CM

## 2024-08-07 DIAGNOSIS — Z78.0 POST-MENOPAUSAL: Primary | ICD-10-CM

## 2024-08-07 DIAGNOSIS — Z12.31 BREAST CANCER SCREENING BY MAMMOGRAM: ICD-10-CM

## 2024-08-07 DIAGNOSIS — I15.9 SECONDARY HYPERTENSION: ICD-10-CM

## 2024-08-07 PROCEDURE — 1159F MED LIST DOCD IN RCRD: CPT | Mod: CPTII,,, | Performed by: INTERNAL MEDICINE

## 2024-08-07 PROCEDURE — 3288F FALL RISK ASSESSMENT DOCD: CPT | Mod: CPTII,,, | Performed by: INTERNAL MEDICINE

## 2024-08-07 PROCEDURE — 1126F AMNT PAIN NOTED NONE PRSNT: CPT | Mod: CPTII,,, | Performed by: INTERNAL MEDICINE

## 2024-08-07 PROCEDURE — 99214 OFFICE O/P EST MOD 30 MIN: CPT | Mod: ,,, | Performed by: INTERNAL MEDICINE

## 2024-08-07 PROCEDURE — 1160F RVW MEDS BY RX/DR IN RCRD: CPT | Mod: CPTII,,, | Performed by: INTERNAL MEDICINE

## 2024-08-07 PROCEDURE — 3074F SYST BP LT 130 MM HG: CPT | Mod: CPTII,,, | Performed by: INTERNAL MEDICINE

## 2024-08-07 PROCEDURE — 1101F PT FALLS ASSESS-DOCD LE1/YR: CPT | Mod: CPTII,,, | Performed by: INTERNAL MEDICINE

## 2024-08-07 PROCEDURE — 3078F DIAST BP <80 MM HG: CPT | Mod: CPTII,,, | Performed by: INTERNAL MEDICINE

## 2024-08-07 PROCEDURE — 3008F BODY MASS INDEX DOCD: CPT | Mod: CPTII,,, | Performed by: INTERNAL MEDICINE

## 2024-08-07 PROCEDURE — 4010F ACE/ARB THERAPY RXD/TAKEN: CPT | Mod: CPTII,,, | Performed by: INTERNAL MEDICINE

## 2024-08-10 RX ORDER — ACETAMINOPHEN 325 MG/1
650 TABLET ORAL
OUTPATIENT
Start: 2024-08-13

## 2024-08-10 RX ORDER — DIPHENHYDRAMINE HYDROCHLORIDE 50 MG/ML
25 INJECTION INTRAMUSCULAR; INTRAVENOUS
OUTPATIENT
Start: 2024-08-13

## 2024-08-10 RX ORDER — SODIUM CHLORIDE 0.9 % (FLUSH) 0.9 %
10 SYRINGE (ML) INJECTION
OUTPATIENT
Start: 2024-08-13

## 2024-08-10 RX ORDER — EPINEPHRINE 0.3 MG/.3ML
0.3 INJECTION SUBCUTANEOUS ONCE AS NEEDED
OUTPATIENT
Start: 2024-08-13

## 2024-08-10 RX ORDER — DIPHENHYDRAMINE HYDROCHLORIDE 50 MG/ML
50 INJECTION INTRAMUSCULAR; INTRAVENOUS ONCE AS NEEDED
OUTPATIENT
Start: 2024-08-13

## 2024-08-10 RX ORDER — HEPARIN 100 UNIT/ML
500 SYRINGE INTRAVENOUS
OUTPATIENT
Start: 2024-08-13

## 2024-08-12 ENCOUNTER — LAB VISIT (OUTPATIENT)
Dept: LAB | Facility: HOSPITAL | Age: 74
End: 2024-08-12
Attending: INTERNAL MEDICINE
Payer: MEDICARE

## 2024-08-12 DIAGNOSIS — C57.4 MALIGNANT NEOPLASM OF UTERINE ADNEXA: ICD-10-CM

## 2024-08-12 DIAGNOSIS — C78.6 SECONDARY MALIGNANT NEOPLASM OF RETROPERITONEUM AND PERITONEUM: ICD-10-CM

## 2024-08-12 LAB
ALBUMIN SERPL-MCNC: 3.8 G/DL (ref 3.4–4.8)
ALBUMIN/GLOB SERPL: 1.3 RATIO (ref 1.1–2)
ALP SERPL-CCNC: 43 UNIT/L (ref 40–150)
ALT SERPL-CCNC: 22 UNIT/L (ref 0–55)
ANION GAP SERPL CALC-SCNC: 9 MEQ/L
AST SERPL-CCNC: 38 UNIT/L (ref 5–34)
BASOPHILS # BLD AUTO: 0.03 X10(3)/MCL
BASOPHILS NFR BLD AUTO: 0.4 %
BILIRUB SERPL-MCNC: 1.4 MG/DL
BUN SERPL-MCNC: 31.5 MG/DL (ref 9.8–20.1)
CALCIUM SERPL-MCNC: 9.8 MG/DL (ref 8.4–10.2)
CANCER AG125 SERPL-ACNC: 17.3 UNIT/ML (ref 0–35)
CHLORIDE SERPL-SCNC: 101 MMOL/L (ref 98–107)
CO2 SERPL-SCNC: 28 MMOL/L (ref 23–31)
CREAT SERPL-MCNC: 0.87 MG/DL (ref 0.55–1.02)
CREAT/UREA NIT SERPL: 36
EOSINOPHIL # BLD AUTO: 0.26 X10(3)/MCL (ref 0–0.9)
EOSINOPHIL NFR BLD AUTO: 3.2 %
ERYTHROCYTE [DISTWIDTH] IN BLOOD BY AUTOMATED COUNT: 13.6 % (ref 11.5–17)
GFR SERPLBLD CREATININE-BSD FMLA CKD-EPI: >60 ML/MIN/1.73/M2
GLOBULIN SER-MCNC: 2.9 GM/DL (ref 2.4–3.5)
GLUCOSE SERPL-MCNC: 96 MG/DL (ref 82–115)
HCT VFR BLD AUTO: 40 % (ref 37–47)
HGB BLD-MCNC: 13 G/DL (ref 12–16)
IMM GRANULOCYTES # BLD AUTO: 0.01 X10(3)/MCL (ref 0–0.04)
IMM GRANULOCYTES NFR BLD AUTO: 0.1 %
LYMPHOCYTES # BLD AUTO: 2.7 X10(3)/MCL (ref 0.6–4.6)
LYMPHOCYTES NFR BLD AUTO: 32.8 %
MCH RBC QN AUTO: 31.3 PG (ref 27–31)
MCHC RBC AUTO-ENTMCNC: 32.5 G/DL (ref 33–36)
MCV RBC AUTO: 96.4 FL (ref 80–94)
MONOCYTES # BLD AUTO: 0.89 X10(3)/MCL (ref 0.1–1.3)
MONOCYTES NFR BLD AUTO: 10.8 %
NEUTROPHILS # BLD AUTO: 4.33 X10(3)/MCL (ref 2.1–9.2)
NEUTROPHILS NFR BLD AUTO: 52.7 %
PLATELET # BLD AUTO: 343 X10(3)/MCL (ref 130–400)
PMV BLD AUTO: 8.1 FL (ref 7.4–10.4)
POTASSIUM SERPL-SCNC: 5 MMOL/L (ref 3.5–5.1)
PROT SERPL-MCNC: 6.7 GM/DL (ref 5.8–7.6)
PROT UR QL STRIP: ABNORMAL
RBC # BLD AUTO: 4.15 X10(6)/MCL (ref 4.2–5.4)
SODIUM SERPL-SCNC: 138 MMOL/L (ref 136–145)
WBC # BLD AUTO: 8.22 X10(3)/MCL (ref 4.5–11.5)

## 2024-08-12 PROCEDURE — 80053 COMPREHEN METABOLIC PANEL: CPT

## 2024-08-12 PROCEDURE — 81005 URINALYSIS: CPT

## 2024-08-12 PROCEDURE — 86304 IMMUNOASSAY TUMOR CA 125: CPT

## 2024-08-12 PROCEDURE — 36415 COLL VENOUS BLD VENIPUNCTURE: CPT

## 2024-08-12 PROCEDURE — 85025 COMPLETE CBC W/AUTO DIFF WBC: CPT

## 2024-08-13 ENCOUNTER — INFUSION (OUTPATIENT)
Dept: INFUSION THERAPY | Facility: HOSPITAL | Age: 74
End: 2024-08-13
Attending: INTERNAL MEDICINE
Payer: MEDICARE

## 2024-08-13 VITALS — DIASTOLIC BLOOD PRESSURE: 77 MMHG | SYSTOLIC BLOOD PRESSURE: 168 MMHG | HEART RATE: 59 BPM

## 2024-08-13 DIAGNOSIS — C78.6 SECONDARY MALIGNANT NEOPLASM OF RETROPERITONEUM AND PERITONEUM: Primary | ICD-10-CM

## 2024-08-13 PROCEDURE — 63600175 PHARM REV CODE 636 W HCPCS: Performed by: INTERNAL MEDICINE

## 2024-08-13 PROCEDURE — 96413 CHEMO IV INFUSION 1 HR: CPT

## 2024-08-13 PROCEDURE — 25000003 PHARM REV CODE 250: Performed by: INTERNAL MEDICINE

## 2024-08-13 PROCEDURE — 96375 TX/PRO/DX INJ NEW DRUG ADDON: CPT

## 2024-08-13 RX ORDER — HEPARIN 100 UNIT/ML
500 SYRINGE INTRAVENOUS
Status: DISCONTINUED | OUTPATIENT
Start: 2024-08-13 | End: 2024-08-13 | Stop reason: HOSPADM

## 2024-08-13 RX ORDER — ACETAMINOPHEN 325 MG/1
650 TABLET ORAL
Status: DISCONTINUED | OUTPATIENT
Start: 2024-08-13 | End: 2024-08-13 | Stop reason: HOSPADM

## 2024-08-13 RX ORDER — DIPHENHYDRAMINE HYDROCHLORIDE 50 MG/ML
25 INJECTION INTRAMUSCULAR; INTRAVENOUS
Status: COMPLETED | OUTPATIENT
Start: 2024-08-13 | End: 2024-08-13

## 2024-08-13 RX ORDER — EPINEPHRINE 0.3 MG/.3ML
0.3 INJECTION SUBCUTANEOUS ONCE AS NEEDED
Status: DISCONTINUED | OUTPATIENT
Start: 2024-08-13 | End: 2024-08-13 | Stop reason: HOSPADM

## 2024-08-13 RX ORDER — DIPHENHYDRAMINE HYDROCHLORIDE 50 MG/ML
50 INJECTION INTRAMUSCULAR; INTRAVENOUS ONCE AS NEEDED
Status: DISCONTINUED | OUTPATIENT
Start: 2024-08-13 | End: 2024-08-13 | Stop reason: HOSPADM

## 2024-08-13 RX ORDER — SODIUM CHLORIDE 0.9 % (FLUSH) 0.9 %
10 SYRINGE (ML) INJECTION
Status: DISCONTINUED | OUTPATIENT
Start: 2024-08-13 | End: 2024-08-13 | Stop reason: HOSPADM

## 2024-08-13 RX ADMIN — DIPHENHYDRAMINE HYDROCHLORIDE 25 MG: 50 INJECTION INTRAMUSCULAR; INTRAVENOUS at 03:08

## 2024-08-13 RX ADMIN — BEVACIZUMAB-AWWB 800 MG: 400 INJECTION, SOLUTION INTRAVENOUS at 03:08

## 2024-09-03 ENCOUNTER — LAB VISIT (OUTPATIENT)
Dept: LAB | Facility: HOSPITAL | Age: 74
End: 2024-09-03
Attending: INTERNAL MEDICINE
Payer: MEDICARE

## 2024-09-03 ENCOUNTER — OFFICE VISIT (OUTPATIENT)
Dept: HEMATOLOGY/ONCOLOGY | Facility: CLINIC | Age: 74
End: 2024-09-03
Payer: MEDICARE

## 2024-09-03 ENCOUNTER — INFUSION (OUTPATIENT)
Dept: INFUSION THERAPY | Facility: HOSPITAL | Age: 74
End: 2024-09-03
Attending: INTERNAL MEDICINE
Payer: MEDICARE

## 2024-09-03 VITALS
SYSTOLIC BLOOD PRESSURE: 143 MMHG | OXYGEN SATURATION: 98 % | HEART RATE: 66 BPM | TEMPERATURE: 98 F | HEIGHT: 64 IN | BODY MASS INDEX: 21.34 KG/M2 | DIASTOLIC BLOOD PRESSURE: 78 MMHG | WEIGHT: 125 LBS

## 2024-09-03 DIAGNOSIS — C57.4 MALIGNANT NEOPLASM OF UTERINE ADNEXA: ICD-10-CM

## 2024-09-03 DIAGNOSIS — C78.6 SECONDARY MALIGNANT NEOPLASM OF RETROPERITONEUM AND PERITONEUM: Primary | ICD-10-CM

## 2024-09-03 DIAGNOSIS — C57.02 CARCINOMA OF LEFT FALLOPIAN TUBE: ICD-10-CM

## 2024-09-03 DIAGNOSIS — T45.1X5A AGRANULOCYTOSIS SECONDARY TO CANCER CHEMOTHERAPY: ICD-10-CM

## 2024-09-03 DIAGNOSIS — D70.1 AGRANULOCYTOSIS SECONDARY TO CANCER CHEMOTHERAPY: ICD-10-CM

## 2024-09-03 DIAGNOSIS — D84.821 IMMUNODEFICIENCY DUE TO DRUGS: ICD-10-CM

## 2024-09-03 DIAGNOSIS — C78.6 PERITONEAL CARCINOMATOSIS: ICD-10-CM

## 2024-09-03 DIAGNOSIS — Z79.899 IMMUNODEFICIENCY DUE TO DRUGS: ICD-10-CM

## 2024-09-03 DIAGNOSIS — C78.6 SECONDARY MALIGNANT NEOPLASM OF RETROPERITONEUM AND PERITONEUM: ICD-10-CM

## 2024-09-03 LAB
ALBUMIN SERPL-MCNC: 3.6 G/DL (ref 3.4–4.8)
ALBUMIN/GLOB SERPL: 1.3 RATIO (ref 1.1–2)
ALP SERPL-CCNC: 45 UNIT/L (ref 40–150)
ALT SERPL-CCNC: 18 UNIT/L (ref 0–55)
ANION GAP SERPL CALC-SCNC: 9 MEQ/L
AST SERPL-CCNC: 32 UNIT/L (ref 5–34)
BASOPHILS # BLD AUTO: 0.03 X10(3)/MCL
BASOPHILS NFR BLD AUTO: 0.3 %
BILIRUB SERPL-MCNC: 0.9 MG/DL
BUN SERPL-MCNC: 25.9 MG/DL (ref 9.8–20.1)
CALCIUM SERPL-MCNC: 9.1 MG/DL (ref 8.4–10.2)
CHLORIDE SERPL-SCNC: 100 MMOL/L (ref 98–107)
CO2 SERPL-SCNC: 28 MMOL/L (ref 23–31)
CREAT SERPL-MCNC: 0.86 MG/DL (ref 0.55–1.02)
CREAT/UREA NIT SERPL: 30
EOSINOPHIL # BLD AUTO: 0.39 X10(3)/MCL (ref 0–0.9)
EOSINOPHIL NFR BLD AUTO: 4.4 %
ERYTHROCYTE [DISTWIDTH] IN BLOOD BY AUTOMATED COUNT: 13.4 % (ref 11.5–17)
GFR SERPLBLD CREATININE-BSD FMLA CKD-EPI: >60 ML/MIN/1.73/M2
GLOBULIN SER-MCNC: 2.7 GM/DL (ref 2.4–3.5)
GLUCOSE SERPL-MCNC: 57 MG/DL (ref 82–115)
HCT VFR BLD AUTO: 39.5 % (ref 37–47)
HGB BLD-MCNC: 12.7 G/DL (ref 12–16)
IMM GRANULOCYTES # BLD AUTO: 0.01 X10(3)/MCL (ref 0–0.04)
IMM GRANULOCYTES NFR BLD AUTO: 0.1 %
LYMPHOCYTES # BLD AUTO: 2.82 X10(3)/MCL (ref 0.6–4.6)
LYMPHOCYTES NFR BLD AUTO: 31.9 %
MCH RBC QN AUTO: 31.2 PG (ref 27–31)
MCHC RBC AUTO-ENTMCNC: 32.2 G/DL (ref 33–36)
MCV RBC AUTO: 97.1 FL (ref 80–94)
MONOCYTES # BLD AUTO: 1.08 X10(3)/MCL (ref 0.1–1.3)
MONOCYTES NFR BLD AUTO: 12.2 %
NEUTROPHILS # BLD AUTO: 4.5 X10(3)/MCL (ref 2.1–9.2)
NEUTROPHILS NFR BLD AUTO: 51.1 %
PLATELET # BLD AUTO: 338 X10(3)/MCL (ref 130–400)
PMV BLD AUTO: 8.2 FL (ref 7.4–10.4)
POTASSIUM SERPL-SCNC: 4.6 MMOL/L (ref 3.5–5.1)
PROT SERPL-MCNC: 6.3 GM/DL (ref 5.8–7.6)
PROT UR QL STRIP: ABNORMAL
RBC # BLD AUTO: 4.07 X10(6)/MCL (ref 4.2–5.4)
SODIUM SERPL-SCNC: 137 MMOL/L (ref 136–145)
WBC # BLD AUTO: 8.83 X10(3)/MCL (ref 4.5–11.5)

## 2024-09-03 PROCEDURE — 1160F RVW MEDS BY RX/DR IN RCRD: CPT | Mod: CPTII,S$GLB,, | Performed by: NURSE PRACTITIONER

## 2024-09-03 PROCEDURE — 81005 URINALYSIS: CPT

## 2024-09-03 PROCEDURE — 36415 COLL VENOUS BLD VENIPUNCTURE: CPT

## 2024-09-03 PROCEDURE — 63600175 PHARM REV CODE 636 W HCPCS: Mod: JZ,JG | Performed by: NURSE PRACTITIONER

## 2024-09-03 PROCEDURE — 3077F SYST BP >= 140 MM HG: CPT | Mod: CPTII,S$GLB,, | Performed by: NURSE PRACTITIONER

## 2024-09-03 PROCEDURE — 25000003 PHARM REV CODE 250: Performed by: NURSE PRACTITIONER

## 2024-09-03 PROCEDURE — 1126F AMNT PAIN NOTED NONE PRSNT: CPT | Mod: CPTII,S$GLB,, | Performed by: NURSE PRACTITIONER

## 2024-09-03 PROCEDURE — 1159F MED LIST DOCD IN RCRD: CPT | Mod: CPTII,S$GLB,, | Performed by: NURSE PRACTITIONER

## 2024-09-03 PROCEDURE — 99999 PR PBB SHADOW E&M-EST. PATIENT-LVL IV: CPT | Mod: PBBFAC,,, | Performed by: NURSE PRACTITIONER

## 2024-09-03 PROCEDURE — 80053 COMPREHEN METABOLIC PANEL: CPT

## 2024-09-03 PROCEDURE — 3078F DIAST BP <80 MM HG: CPT | Mod: CPTII,S$GLB,, | Performed by: NURSE PRACTITIONER

## 2024-09-03 PROCEDURE — 3008F BODY MASS INDEX DOCD: CPT | Mod: CPTII,S$GLB,, | Performed by: NURSE PRACTITIONER

## 2024-09-03 PROCEDURE — 4010F ACE/ARB THERAPY RXD/TAKEN: CPT | Mod: CPTII,S$GLB,, | Performed by: NURSE PRACTITIONER

## 2024-09-03 PROCEDURE — 96413 CHEMO IV INFUSION 1 HR: CPT

## 2024-09-03 PROCEDURE — 99215 OFFICE O/P EST HI 40 MIN: CPT | Mod: S$GLB,,, | Performed by: NURSE PRACTITIONER

## 2024-09-03 PROCEDURE — 96375 TX/PRO/DX INJ NEW DRUG ADDON: CPT

## 2024-09-03 PROCEDURE — 85025 COMPLETE CBC W/AUTO DIFF WBC: CPT

## 2024-09-03 RX ORDER — DIPHENHYDRAMINE HYDROCHLORIDE 50 MG/ML
25 INJECTION INTRAMUSCULAR; INTRAVENOUS
Status: COMPLETED | OUTPATIENT
Start: 2024-09-03 | End: 2024-09-03

## 2024-09-03 RX ORDER — DIPHENHYDRAMINE HYDROCHLORIDE 50 MG/ML
50 INJECTION INTRAMUSCULAR; INTRAVENOUS ONCE AS NEEDED
OUTPATIENT
Start: 2024-09-24

## 2024-09-03 RX ORDER — SODIUM CHLORIDE 0.9 % (FLUSH) 0.9 %
10 SYRINGE (ML) INJECTION
Status: DISCONTINUED | OUTPATIENT
Start: 2024-09-03 | End: 2024-09-03 | Stop reason: HOSPADM

## 2024-09-03 RX ORDER — DIPHENHYDRAMINE HYDROCHLORIDE 50 MG/ML
50 INJECTION INTRAMUSCULAR; INTRAVENOUS ONCE AS NEEDED
Status: DISCONTINUED | OUTPATIENT
Start: 2024-09-03 | End: 2024-09-03 | Stop reason: HOSPADM

## 2024-09-03 RX ORDER — HEPARIN 100 UNIT/ML
500 SYRINGE INTRAVENOUS
Status: DISCONTINUED | OUTPATIENT
Start: 2024-09-03 | End: 2024-09-03 | Stop reason: HOSPADM

## 2024-09-03 RX ORDER — EPINEPHRINE 0.3 MG/.3ML
0.3 INJECTION SUBCUTANEOUS ONCE AS NEEDED
OUTPATIENT
Start: 2024-09-24

## 2024-09-03 RX ORDER — DIPHENHYDRAMINE HYDROCHLORIDE 50 MG/ML
50 INJECTION INTRAMUSCULAR; INTRAVENOUS ONCE AS NEEDED
Status: CANCELLED | OUTPATIENT
Start: 2024-09-03

## 2024-09-03 RX ORDER — ACETAMINOPHEN 325 MG/1
650 TABLET ORAL
OUTPATIENT
Start: 2024-09-24

## 2024-09-03 RX ORDER — SODIUM CHLORIDE 0.9 % (FLUSH) 0.9 %
10 SYRINGE (ML) INJECTION
OUTPATIENT
Start: 2024-09-24

## 2024-09-03 RX ORDER — DIPHENHYDRAMINE HYDROCHLORIDE 50 MG/ML
25 INJECTION INTRAMUSCULAR; INTRAVENOUS
OUTPATIENT
Start: 2024-09-24

## 2024-09-03 RX ORDER — EPINEPHRINE 0.3 MG/.3ML
0.3 INJECTION SUBCUTANEOUS ONCE AS NEEDED
Status: DISCONTINUED | OUTPATIENT
Start: 2024-09-03 | End: 2024-09-03 | Stop reason: HOSPADM

## 2024-09-03 RX ORDER — HEPARIN 100 UNIT/ML
500 SYRINGE INTRAVENOUS
Status: CANCELLED | OUTPATIENT
Start: 2024-09-03

## 2024-09-03 RX ORDER — HEPARIN 100 UNIT/ML
500 SYRINGE INTRAVENOUS
OUTPATIENT
Start: 2024-09-24

## 2024-09-03 RX ORDER — DIPHENHYDRAMINE HYDROCHLORIDE 50 MG/ML
25 INJECTION INTRAMUSCULAR; INTRAVENOUS
Status: CANCELLED | OUTPATIENT
Start: 2024-09-03

## 2024-09-03 RX ORDER — SODIUM CHLORIDE 0.9 % (FLUSH) 0.9 %
10 SYRINGE (ML) INJECTION
Status: CANCELLED | OUTPATIENT
Start: 2024-09-03

## 2024-09-03 RX ORDER — EPINEPHRINE 0.3 MG/.3ML
0.3 INJECTION SUBCUTANEOUS ONCE AS NEEDED
Status: CANCELLED | OUTPATIENT
Start: 2024-09-03

## 2024-09-03 RX ORDER — ACETAMINOPHEN 325 MG/1
650 TABLET ORAL
Status: DISCONTINUED | OUTPATIENT
Start: 2024-09-03 | End: 2024-09-03 | Stop reason: HOSPADM

## 2024-09-03 RX ORDER — LISINOPRIL 20 MG/1
20 TABLET ORAL DAILY
Qty: 30 TABLET | Refills: 3 | Status: SHIPPED | OUTPATIENT
Start: 2024-09-03

## 2024-09-03 RX ORDER — ACETAMINOPHEN 325 MG/1
650 TABLET ORAL
Status: CANCELLED | OUTPATIENT
Start: 2024-09-03

## 2024-09-03 RX ADMIN — DIPHENHYDRAMINE HYDROCHLORIDE 25 MG: 50 INJECTION INTRAMUSCULAR; INTRAVENOUS at 03:09

## 2024-09-03 RX ADMIN — BEVACIZUMAB-AWWB 800 MG: 400 INJECTION, SOLUTION INTRAVENOUS at 03:09

## 2024-09-09 PROBLEM — I26.99 OTHER PULMONARY EMBOLISM WITHOUT ACUTE COR PULMONALE: Status: RESOLVED | Noted: 2022-05-09 | Resolved: 2024-09-09

## 2024-09-19 ENCOUNTER — TELEPHONE (OUTPATIENT)
Dept: HEMATOLOGY/ONCOLOGY | Facility: CLINIC | Age: 74
End: 2024-09-19
Payer: MEDICARE

## 2024-09-19 NOTE — TELEPHONE ENCOUNTER
Patient scheduled to have Avastin next Tuesday. She would like to know if it would be okay to receive the flu vaccine. Please advise.

## 2024-09-23 ENCOUNTER — LAB VISIT (OUTPATIENT)
Dept: LAB | Facility: HOSPITAL | Age: 74
End: 2024-09-23
Attending: INTERNAL MEDICINE
Payer: MEDICARE

## 2024-09-23 DIAGNOSIS — C57.4 MALIGNANT NEOPLASM OF UTERINE ADNEXA: ICD-10-CM

## 2024-09-23 DIAGNOSIS — C78.6 SECONDARY MALIGNANT NEOPLASM OF RETROPERITONEUM AND PERITONEUM: ICD-10-CM

## 2024-09-23 LAB
BASOPHILS # BLD AUTO: 0.04 X10(3)/MCL
BASOPHILS NFR BLD AUTO: 0.3 %
EOSINOPHIL # BLD AUTO: 0.21 X10(3)/MCL (ref 0–0.9)
EOSINOPHIL NFR BLD AUTO: 1.7 %
ERYTHROCYTE [DISTWIDTH] IN BLOOD BY AUTOMATED COUNT: 13 % (ref 11.5–17)
HCT VFR BLD AUTO: 38.9 % (ref 37–47)
HGB BLD-MCNC: 12.7 G/DL (ref 12–16)
IMM GRANULOCYTES # BLD AUTO: 0.02 X10(3)/MCL (ref 0–0.04)
IMM GRANULOCYTES NFR BLD AUTO: 0.2 %
LYMPHOCYTES # BLD AUTO: 2.96 X10(3)/MCL (ref 0.6–4.6)
LYMPHOCYTES NFR BLD AUTO: 23.5 %
MCH RBC QN AUTO: 31.3 PG (ref 27–31)
MCHC RBC AUTO-ENTMCNC: 32.6 G/DL (ref 33–36)
MCV RBC AUTO: 95.8 FL (ref 80–94)
MONOCYTES # BLD AUTO: 1.41 X10(3)/MCL (ref 0.1–1.3)
MONOCYTES NFR BLD AUTO: 11.2 %
NEUTROPHILS # BLD AUTO: 7.95 X10(3)/MCL (ref 2.1–9.2)
NEUTROPHILS NFR BLD AUTO: 63.1 %
PLATELET # BLD AUTO: 372 X10(3)/MCL (ref 130–400)
PMV BLD AUTO: 7.9 FL (ref 7.4–10.4)
PROT UR QL STRIP: ABNORMAL
RBC # BLD AUTO: 4.06 X10(6)/MCL (ref 4.2–5.4)
WBC # BLD AUTO: 12.59 X10(3)/MCL (ref 4.5–11.5)

## 2024-09-23 PROCEDURE — 80053 COMPREHEN METABOLIC PANEL: CPT

## 2024-09-23 PROCEDURE — 86304 IMMUNOASSAY TUMOR CA 125: CPT

## 2024-09-23 PROCEDURE — 81005 URINALYSIS: CPT

## 2024-09-23 PROCEDURE — 85025 COMPLETE CBC W/AUTO DIFF WBC: CPT

## 2024-09-23 PROCEDURE — 36415 COLL VENOUS BLD VENIPUNCTURE: CPT

## 2024-09-24 ENCOUNTER — INFUSION (OUTPATIENT)
Dept: INFUSION THERAPY | Facility: HOSPITAL | Age: 74
End: 2024-09-24
Attending: INTERNAL MEDICINE
Payer: MEDICARE

## 2024-09-24 VITALS
RESPIRATION RATE: 18 BRPM | BODY MASS INDEX: 20.85 KG/M2 | TEMPERATURE: 98 F | SYSTOLIC BLOOD PRESSURE: 117 MMHG | WEIGHT: 122.13 LBS | OXYGEN SATURATION: 98 % | HEIGHT: 64 IN | HEART RATE: 76 BPM | DIASTOLIC BLOOD PRESSURE: 67 MMHG

## 2024-09-24 DIAGNOSIS — C78.6 SECONDARY MALIGNANT NEOPLASM OF RETROPERITONEUM AND PERITONEUM: Primary | ICD-10-CM

## 2024-09-24 LAB
ALBUMIN SERPL-MCNC: 3.9 G/DL (ref 3.4–4.8)
ALBUMIN/GLOB SERPL: 1.5 RATIO (ref 1.1–2)
ALP SERPL-CCNC: 44 UNIT/L (ref 40–150)
ALT SERPL-CCNC: 19 UNIT/L (ref 0–55)
ANION GAP SERPL CALC-SCNC: 8 MEQ/L
AST SERPL-CCNC: 35 UNIT/L (ref 5–34)
BILIRUB SERPL-MCNC: 1.1 MG/DL
BUN SERPL-MCNC: 28.1 MG/DL (ref 9.8–20.1)
CALCIUM SERPL-MCNC: 9.7 MG/DL (ref 8.4–10.2)
CANCER AG125 SERPL-ACNC: 18.3 UNIT/ML (ref 0–35)
CHLORIDE SERPL-SCNC: 98 MMOL/L (ref 98–107)
CO2 SERPL-SCNC: 29 MMOL/L (ref 23–31)
CREAT SERPL-MCNC: 0.83 MG/DL (ref 0.55–1.02)
CREAT/UREA NIT SERPL: 34
GFR SERPLBLD CREATININE-BSD FMLA CKD-EPI: >60 ML/MIN/1.73/M2
GLOBULIN SER-MCNC: 2.6 GM/DL (ref 2.4–3.5)
GLUCOSE SERPL-MCNC: 129 MG/DL (ref 82–115)
POTASSIUM SERPL-SCNC: 4.4 MMOL/L (ref 3.5–5.1)
PROT SERPL-MCNC: 6.5 GM/DL (ref 5.8–7.6)
SODIUM SERPL-SCNC: 135 MMOL/L (ref 136–145)

## 2024-09-24 PROCEDURE — 96413 CHEMO IV INFUSION 1 HR: CPT

## 2024-09-24 PROCEDURE — 96372 THER/PROPH/DIAG INJ SC/IM: CPT | Mod: 59

## 2024-09-24 PROCEDURE — 90471 IMMUNIZATION ADMIN: CPT | Performed by: NURSE PRACTITIONER

## 2024-09-24 PROCEDURE — 63600175 PHARM REV CODE 636 W HCPCS: Mod: JZ,JG | Performed by: NURSE PRACTITIONER

## 2024-09-24 PROCEDURE — 90734 MENACWYD/MENACWYCRM VACC IM: CPT | Performed by: NURSE PRACTITIONER

## 2024-09-24 PROCEDURE — 25000003 PHARM REV CODE 250: Performed by: NURSE PRACTITIONER

## 2024-09-24 PROCEDURE — 63600175 PHARM REV CODE 636 W HCPCS: Performed by: NURSE PRACTITIONER

## 2024-09-24 PROCEDURE — 96375 TX/PRO/DX INJ NEW DRUG ADDON: CPT

## 2024-09-24 RX ORDER — SODIUM CHLORIDE 0.9 % (FLUSH) 0.9 %
10 SYRINGE (ML) INJECTION
Status: DISCONTINUED | OUTPATIENT
Start: 2024-09-24 | End: 2024-09-24 | Stop reason: HOSPADM

## 2024-09-24 RX ORDER — DIPHENHYDRAMINE HYDROCHLORIDE 50 MG/ML
50 INJECTION INTRAMUSCULAR; INTRAVENOUS ONCE AS NEEDED
Status: DISCONTINUED | OUTPATIENT
Start: 2024-09-24 | End: 2024-09-24 | Stop reason: HOSPADM

## 2024-09-24 RX ORDER — HEPARIN 100 UNIT/ML
500 SYRINGE INTRAVENOUS
Status: DISCONTINUED | OUTPATIENT
Start: 2024-09-24 | End: 2024-09-24 | Stop reason: HOSPADM

## 2024-09-24 RX ORDER — DIPHENHYDRAMINE HYDROCHLORIDE 50 MG/ML
25 INJECTION INTRAMUSCULAR; INTRAVENOUS
Status: COMPLETED | OUTPATIENT
Start: 2024-09-24 | End: 2024-09-24

## 2024-09-24 RX ORDER — EPINEPHRINE 0.3 MG/.3ML
0.3 INJECTION SUBCUTANEOUS ONCE AS NEEDED
Status: DISCONTINUED | OUTPATIENT
Start: 2024-09-24 | End: 2024-09-24 | Stop reason: HOSPADM

## 2024-09-24 RX ORDER — ACETAMINOPHEN 325 MG/1
650 TABLET ORAL
Status: DISCONTINUED | OUTPATIENT
Start: 2024-09-24 | End: 2024-09-24 | Stop reason: HOSPADM

## 2024-09-24 RX ADMIN — DIPHENHYDRAMINE HYDROCHLORIDE 25 MG: 50 INJECTION INTRAMUSCULAR; INTRAVENOUS at 03:09

## 2024-09-24 RX ADMIN — Medication 0.5 ML: at 03:09

## 2024-09-24 RX ADMIN — BEVACIZUMAB-AWWB 800 MG: 400 INJECTION, SOLUTION INTRAVENOUS at 03:09

## 2024-10-07 ENCOUNTER — HOSPITAL ENCOUNTER (OUTPATIENT)
Dept: RADIOLOGY | Facility: HOSPITAL | Age: 74
Discharge: HOME | End: 2024-10-07
Attending: INTERNAL MEDICINE
Payer: MEDICARE

## 2024-10-07 ENCOUNTER — TELEPHONE (OUTPATIENT)
Dept: INTERNAL MEDICINE | Facility: CLINIC | Age: 74
End: 2024-10-07
Payer: MEDICARE

## 2024-10-07 DIAGNOSIS — Z12.31 BREAST CANCER SCREENING BY MAMMOGRAM: ICD-10-CM

## 2024-10-07 DIAGNOSIS — Z78.0 POST-MENOPAUSAL: ICD-10-CM

## 2024-10-07 PROBLEM — M81.6 LOCALIZED OSTEOPOROSIS WITHOUT CURRENT PATHOLOGICAL FRACTURE: Chronic | Status: ACTIVE | Noted: 2024-10-07

## 2024-10-07 PROCEDURE — 77067 SCR MAMMO BI INCL CAD: CPT | Mod: 26,,, | Performed by: STUDENT IN AN ORGANIZED HEALTH CARE EDUCATION/TRAINING PROGRAM

## 2024-10-07 PROCEDURE — 77081 DXA BONE DENSITY APPENDICULR: CPT | Mod: TC

## 2024-10-07 PROCEDURE — 77067 SCR MAMMO BI INCL CAD: CPT | Mod: TC

## 2024-10-07 PROCEDURE — 77080 DXA BONE DENSITY AXIAL: CPT | Mod: XU,TC

## 2024-10-07 PROCEDURE — 77063 BREAST TOMOSYNTHESIS BI: CPT | Mod: 26,,, | Performed by: STUDENT IN AN ORGANIZED HEALTH CARE EDUCATION/TRAINING PROGRAM

## 2024-10-07 NOTE — TELEPHONE ENCOUNTER
Spoke with patient, she was given results and recommendations from Dexa Scan, Prolia orders entered and patient was notified.

## 2024-10-07 NOTE — TELEPHONE ENCOUNTER
----- Message from NIDHI Nava sent at 10/7/2024 12:52 PM CDT -----  Please inform patient I have reviewed her bone density, which indicates osteoporosis of her right hip. Recommendations are for pharmacologic treatment with Prolia injections every 6 months.  Also to incorporate, if not already on, over the counter supplementation with calcium 500 mg to 600 mg twice daily and vitamin D 800 units twice daily.  As well as incorporate some form of routine weightbearing exercise such as walking and cycling to stimulate bone health.

## 2024-10-07 NOTE — TELEPHONE ENCOUNTER
Spoke with patient and she was given results and recommendations of Dexa scan,Prolia orders entered.

## 2024-10-07 NOTE — PROGRESS NOTES
Please inform patient I have reviewed her bone density, which indicates osteoporosis of her right hip. Recommendations are for pharmacologic treatment with Prolia injections every 6 months.  Also to incorporate, if not already on, over the counter supplementation with calcium 500 mg to 600 mg twice daily and vitamin D 800 units twice daily.  As well as incorporate some form of routine weightbearing exercise such as walking and cycling to stimulate bone health.

## 2024-10-07 NOTE — PROGRESS NOTES
Subjective:       Patient ID: Shayna Ledezma is a 74 y.o. female.  GI: Dr. Gamaliel Carlos  GYN ONC at [facility]: Dr. Mike Santana    1. 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 Pulmonary Embolism (Incidental on CT)--19    Procedure/pathology:   1. Paracentesis with removal of 5L of fluid done 18--serous carcinoma, high grade, c/w ovarian primary, PAX-8, CK7 and MOC-31 positive, CDX-2, CK-20 and Calretinin-negative.  2. S/p Exploratory Lap, radical tumor debulking including total abdominal hysterectomy, bilateral salpingo-oophorectomy, bilateral pelvic lymph node sampling, appendectomy, mobilization of the left ureter, complete gross resection of the disease with removal of mesenteric disease, as well as omental disese and parasplenic disease by Dr. Santana 3/18/19--Metastatic high grade serous carcinoma. Tissue/organ involvement: right ovary, appendectomy, omentum, mesentery (including small bowel mesentery) and multiple other sites designated: periumbilical, perisplenic, transverse colon, splenic flexure, perirectal, left periureteral. 2 lymph nodes negative. [id number]. FIGO stage IIIC. Myriad somatic instability testing positive for genomic instability, negative BRCA1 and BRCA2 tumor testing.  3. Splenectomy done 10/21/19--splenic involvement with metastatic high grade serous carcinoma, 5 benign lymph nodes. Caris testing showed LAM, NTRK1/2/3 negative, TMB low, BRCA1/2 negative, ER/MT negative, CATHY negative, SPEN pathogenic variant Exon 11, TP53 pathogenic variant Exon 5, PD-L1 positive CPS 15, Genomic ELODIA high.  Imagin. CT A/P w/ and w/o contrast done at [facility] 18--large volume ascites with multiple peritoneal implants, right pericolic gutter implant measures 2.5X3.3cm, LUQ omental/mesenteric implant measures 3X3.6cm, omental carcinomatosis, extensive enhancing soft tissue surrounding sigmoid colon vs. large sigmoid mass, left  ovarian cystic lesion appears to have some internal septations measures 3.5X3.8cm, enhancing periportal soft tissue density likely lymphadenopathy with some narrowing of the portal vein noted, but without internal filling defect, borderline splenomegaly, subtle solid lesion w/n central spleen measures 2.4X2.5cm, likely metastatic, with peripheral area of diminished enhancement suggesting splenic infarct, soft tissue implant abutting gallbladder measures 2X2.2cm, no right adnexal mass, trace bilateral layering pleural fluid, small moderate-sized hiatal hernia, few borderline enlarged lymph nodes w/n right epicardial fat pad.  2. CT of chest on 1/11/19--Some prominent right cardiophrenic lymph nodes are nonspecific and can be followed on future surveillance scans. Otherwise no CT evidence of metastatic disease to the chest. While exam was not tailored for evaluation of the pulmonary arteries I suspect there is pulmonary thromboembolic disease. Peritoneal carcinomatosis seen in the upper abdomen. Critical Result: Pulmonary Embolus.  3. CT C/A/P 3/4/19--Positive response to therapy. No new or progressive metastatic disease in the chest, abdomen or pelvis. Stable to improved findings include smaller pericardial lymph nodes, andrew hepatis adenopathy, peritoneal carcinomatosis, smaller left adnexal lesion; no evidence of PE within limitations of the study.  5. CT PE protocol chest/A/P 6/11/19--No PE, improved pericardial lymph node with minimal size on current examination, improved peritoneal carcinomatosis and left adnexal implant, splenic ischemia evolved into small infarct, size improvement of one of splenic hypodense lesion and mild size increase of second hypodense lesion, favored to be benign/cystic, no new findings.  6. PET/CT 9/25/19--s/p hysterectomy and bilateral oophorectomy, no evidence for locally recurrent/residual disease, intensely hypermetabolic hypodense lesion within the spleen 3.5X3.3cm concerning for  metastatic focus. No other abnormal focus of disease.  7. CT C/A/P 11/13/20--Right lower lobe 3.3 cm mass is presumed metastatic, otherwise no evident residual, recurrent or metastatic disease.   8. PET/CT 11/20/20--Hypermetabolic right lung base lesion and suspected metastatic periportal adenopathy, no additional sites of FDG-avid otherwise aggressive appearing pathology through the neck, chest, abdomen, or pelvis.  9. PET/CT 1/21/21--Interval decrease in size of the right lower lobe mass with decreased FDG uptake, now measures 1.7 x 2.2 cm (previous 3.3 x 3.1 cm), resolution of FDG uptake in the suspected periportal lymph node which is not identifiable on noncontrast CT. No evidence of new or progressive hypermetabolic metastatic disease.  10. PET/CT 3/30/21--Resolved right lower lung lobe hypermetabolic mass. Otherwise, no interval change or evidence of residual disease, recurrence or new metastatic lesion.    Procedures:   1. Paracentesis on 12/28/18 with removal of 5 Liters.  2. Paracentesis on 01/07/19 with removal of 3.5 Liters.  3. Paracentesis on 02/06/19 with removal of 2.5 Liters.  4. Mediport placed 12/2020 by Dr. Serge Gentile--removed 8/26/21 due to mediport fracture.    Germline genetic testing done by [facility] 1/29/19--negative for BRCA1/2, two (2) variants of uncertain significance (VUS): CATHY p.P3214H/p.J6413X and MLH1 p.P603L.     :  12/19/18 1102  11/10/20  42.9  12/08/20 79.5  01/05/21 28.9  01/26/21 12.8  02/18/21 11.6  03/15/22--10.3  04/26/22--9.6  05/16/22--10.2  06/07/22--10.1  07/19/22--11.6  08/08/22--12.5  09/19/22--12.7  10/31/22--13.5  01/03/23--12.0  02/13/23--14.0  03/03/23--13.9  04/17/23--14.5  05/26/23--14.6  07/31/23--13.5  11/13/23--15.6  12/04/23--16.8  01/16/24--13.7  02/26/24--18.6  03/18/24--16.3  04/29/24--17.0  06/10/24--17.0  08/12/24--17.3  09/23/24--18.3    Treatment History:  1. Neoadjuvant Carboplatin/Taxol every 3 weeks x 3 cycles. 1/15/19 - 2/26/19.  Neulasta added.   2. Surgery--tumor debulking KORI/BSO 3/18/19.  3. Adjuvant Carboplatin/Taxol x 3 additions cycles 4/9/19--5/21/19.  4. Splenectomy 10/21/19.  5. Niraparib maintenance (dose reduction to 100mg daily for cytopenias) 11/8/19--12/1/20 (stopped due to progression).  6. Carboplatin/Paclitaxel/Avastin X 6 cycles completed 12/8/21--3/24/21 (complete response).    Current Treatment:  1. Eliquis BID for incidental PE on imaging 1/2019  2. Avastin maintenance started on 4/13/21.   Cycle 62 due 10/15/24.      CC:  Chief Complaint   Patient presents with    Other Misc     Pt reports no concerns today.     HPI   Patient is here for follow-up of ovarian cancer on maintenance Avastin. She has no issues from treatment. She feels good. She has a few skin issues that she will see dermatology for. She has no abdominal pain or problems with her bowels. BP a little high today, but has been good. She was diagnosed with osteoporosis so has started walking more.     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 4 tablet 11    apixaban (ELIQUIS) 5 mg Tab Take 1 tablet (5 mg total) by mouth 2 (two) times daily. 180 tablet 3    aspirin (ECOTRIN) 81 MG EC tablet Take 81 mg by mouth once daily.      atorvastatin (LIPITOR) 40  MG tablet Take 1 tablet (40 mg total) by mouth once daily. 90 tablet 3    b complex vitamins capsule Take 1 capsule by mouth once daily.      GLUTAMINE ORAL Take 10 g by mouth Daily.      hydroCHLOROthiazide (HYDRODIURIL) 12.5 MG Tab Take 1 tablet (12.5 mg total) by mouth once daily. 90 tablet 3    lisinopriL (PRINIVIL,ZESTRIL) 20 MG tablet Take 1 tablet (20 mg total) by mouth once daily. 30 tablet 3    pyridoxine, vitamin B6, (B-6) 250 MG Tab Take 250 mg by mouth once daily.      sodium chloride 0.9% SolP 100 mL with bevacizumab 25 mg/mL Soln Inject into the vein every 21 days.      AA/prot/lysine/methio/vit C/B6 (A/G PRO ORAL) Take by mouth Daily. (Patient not taking: Reported on 10/14/2024)      calcium-vitamin D3 (CALCIUM 500 + D) 500 mg-5 mcg (200 unit) per tablet Take 1 tablet by mouth 2 (two) times daily with meals.       No current facility-administered medications on file prior to visit.      Review of Systems   Constitutional:  Negative for appetite change and unexpected weight change.   HENT:  Negative for mouth sores and nosebleeds.    Eyes:  Negative for visual disturbance.   Respiratory:  Negative for cough and shortness of breath.    Cardiovascular:  Negative for chest pain.   Gastrointestinal:  Negative for abdominal pain and diarrhea.   Genitourinary:  Negative for frequency.   Musculoskeletal:  Negative for back pain.   Integumentary:  Negative for rash.   Neurological:  Negative for headaches.   Hematological:  Negative for adenopathy.   Psychiatric/Behavioral:  The patient is not nervous/anxious.         Vitals:    10/14/24 1045   BP: (!) 140/81   Pulse: 98   Resp: 14   Temp: 98.1 °F (36.7 °C)       Wt Readings from Last 3 Encounters:   10/14/24 1045 56.6 kg (124 lb 12.8 oz)   09/24/24 1453 55.4 kg (122 lb 1.6 oz)   09/03/24 1433 56.7 kg (125 lb)     Physical Exam  Vitals reviewed.   Constitutional:       Appearance: Normal appearance. She is normal weight.   HENT:      Head: Normocephalic.    Eyes:      General: Lids are normal. Vision grossly intact.      Extraocular Movements: Extraocular movements intact.      Conjunctiva/sclera: Conjunctivae normal.   Cardiovascular:      Rate and Rhythm: Normal rate and regular rhythm.      Pulses: Normal pulses.      Heart sounds: Normal heart sounds, S1 normal and S2 normal.   Pulmonary:      Effort: Pulmonary effort is normal.      Breath sounds: Normal breath sounds.   Abdominal:      General: Abdomen is flat. Bowel sounds are normal. There is no distension.      Palpations: Abdomen is soft.      Tenderness: There is no abdominal tenderness.   Musculoskeletal:      Cervical back: Normal range of motion and neck supple.      Right lower leg: No edema.      Left lower leg: No edema.   Skin:     General: Skin is warm and moist.      Capillary Refill: Capillary refill takes less than 2 seconds.      Comments: Left CW mediport removed, site intact   Neurological:      General: No focal deficit present.      Mental Status: She is alert and oriented to person, place, and time.   Psychiatric:         Attention and Perception: Attention normal.         Mood and Affect: Mood normal.         Speech: Speech normal.         Behavior: Behavior normal. Behavior is cooperative.         Cognition and Memory: Cognition normal.         Judgment: Judgment normal.       Lab Visit on 10/14/2024   Component Date Value    Protein, UA 10/14/2024 1+ (A)     WBC 10/14/2024 7.03     RBC 10/14/2024 4.02 (L)     Hgb 10/14/2024 12.9     Hct 10/14/2024 38.3     MCV 10/14/2024 95.3 (H)     MCH 10/14/2024 32.1 (H)     MCHC 10/14/2024 33.7     RDW 10/14/2024 12.9     Platelet 10/14/2024 339     MPV 10/14/2024 8.1     Neut % 10/14/2024 48.7     Lymph % 10/14/2024 33.1     Mono % 10/14/2024 15.1     Eos % 10/14/2024 2.7     Basophil % 10/14/2024 0.3     Lymph # 10/14/2024 2.33     Neut # 10/14/2024 3.42     Mono # 10/14/2024 1.06     Eos # 10/14/2024 0.19     Baso # 10/14/2024 0.02     IG#  10/14/2024 0.01     IG% 10/14/2024 0.1           Assessment:       1. Secondary malignant neoplasm of retroperitoneum and peritoneum    2. Malignant neoplasm of uterine adnexa    3. Malignant neoplasm metastatic to lung, unspecified laterality        Plan:     Patient with left ovarian cancer diagnosed 12/26/18, appears to be stage IIIC vs. IV with diffuse peritoneal disease, possible splenic involvement, epicardial lymph nodes and bilateral pleural effusions.  Patient seen and evaluated by Dr. Mike Santana at [facility] in [place].   Treatment recommended upfront with chemotherapy Carboplatin/Paclitaxel x 3 cycles.  Completed Cycle 3 on 2/26/19.   She underwent total abdominal hysterectomy, BSO and tumor debulking on 3/18/19 with Dr. Santana with complete gross resection of disease. FIGO Stage IIIC papillary serous fallopian tube cancer.   Patient resumed chemotherapy with cycle 4 on 4/9/19. Completed cycle 6 on 5/21/19.    Testing on tumor was positive for genomic instability but negative for BRCA mutation.  Per NCCN guidelines, maintenance can be considered for BRCA mutated germline category 1 and somatic category 2A. There is some evidence that PARP inhibitors have some activity as well in tumors with genomic instability. But the PARP maintenance is not approved for this indication. Offered treatment to patient and after discussion she declined and made decision to continue with observation only.    PET/CT done for rising CA-125 showed hypermetabolic splenic lesion which is not new, just enlarged from previous. Case discussed with Dr. Mike Santana.  Patient is now s/p splenectomy done 10/21/19. Consistent with splenic involvement with high grade serous carcinoma.   Dr. Santana recommended Niraparib maintenance based on new data showing activity in HRD positive ovarian cancer and patient started on 11/8/19, required a dose reduction due to cytopenias.  Rising CA-125 noted in 11/2020. CT showed new RLL lung  mass.   Follow-up PET done 11/20/20 showed RLL lung mass hypermetabolic and hypermetabolic periportal adenopathy.     Recommended 2nd line treatment with Carboplatin/Taxol/Avastin.   Started cycle 1 on 12/8/20. Neulasta added with cycle 2.   Completed Cycle 6 on 3/24/21.  PET from 3/30/21 showed complete resolution of lung mass and no other disease.    Avastin maintenance started on 4/13/21.   Hospitalized for TIA after her 4th cycle of Avastin. Work-up for stroke and cause otherwise negative. Patient started on baby ASA by neurology.  Discussed there are no clear guidelines for changing/discontinuing treatment for TIA/CVA related to Avastin.  Since her symptoms resolved and there were no residual effects, recommended to continue with Avastin since it is working well for her disease and since a baby ASA was added for prevention. She was also continued on Eliquis for h/o PE.  S/p mediport removal for fracture on 8/26/21.   Continue Avastin through peripheral veins for now.    Patient continues on Avastin maintenance without problems.  BP was elevated on Lisinopril 20mg daily. PCP added HCTZ 12.5mg daily.  2+ protein on dipstick from 10/2023, 24-hour urine okay.   Urine protein has been up and down. Don't plan to repeat a 24-hour unless 3+.  Patient did have some epistaxis, seen by ENT and now better after treatment with Mupirocin. Did not have any cautery done.     Due for Avastin maintenance Cycle 62 tomorrow.     Labs today are good.   Recent  remains normal.     She has no symptoms or laboratory findings worrisome for disease progression.   No scans in over 3 years, so will obtain PET to be sure she is still LALI. May plan to continue at least annually.     Continue with labs CBC, CMP, UA/dipstick  every 3 weeks.      Labs on 11/4/24 and next treatment on 11/5/24 C63.    is being monitored every 6 weeks.      F/u in 6 weeks prior to Cycle 64.  Will have PET done prior to next visit.     Per NCCN,  Avastin can be continued as single agent maintenance until disease progression or unacceptable toxicity if the disease responds to the initial recurrence chemotherapy/avastin regimen.     She is now >3 years from completing last chemotherapy, so still considered platinum-sensitive. Will need replacement of mediport when she needs to resume chemotherapy.     Vaccines/Boosters post-splenectomy: Menactra/Menveo every 5 years, Bexsero every 2-3 years, annual flu shot.  Last Bexsero was given 8/1/23.   Received Menactra/Menveo in 9/2024, 5 years after initial.       Continue Eliquis 5mg BID and ASA.  All questions answered.      Renetta Corral MD

## 2024-10-08 ENCOUNTER — TELEPHONE (OUTPATIENT)
Dept: INTERNAL MEDICINE | Facility: CLINIC | Age: 74
End: 2024-10-08
Payer: MEDICARE

## 2024-10-08 NOTE — TELEPHONE ENCOUNTER
----- Message from [employer] sent at 10/8/2024  8:36 AM CDT -----  .Type:  Needs Medical Advice    Who Called:  PT    Symptoms (please be specific):  no     How long has patient had these symptoms:   no    Pharmacy name and phone #:   [id number] - [place] - [address] AT [address]   Phone: [phone]  Fax: [phone]        Would the patient rather a call back or a response via [facility]?      Best Call Back Number:  [phone]    Additional Information:  pt states she was informed to take VITAMINS and she needs to clarify which ones this was decided after her bone scan results thanks

## 2024-10-10 ENCOUNTER — TELEPHONE (OUTPATIENT)
Dept: INTERNAL MEDICINE | Facility: CLINIC | Age: 74
End: 2024-10-10
Payer: MEDICARE

## 2024-10-10 DIAGNOSIS — M81.0 OSTEOPOROSIS, UNSPECIFIED OSTEOPOROSIS TYPE, UNSPECIFIED PATHOLOGICAL FRACTURE PRESENCE: Primary | ICD-10-CM

## 2024-10-10 RX ORDER — ALENDRONATE SODIUM 70 MG/1
70 TABLET ORAL
Qty: 4 TABLET | Refills: 11 | Status: SHIPPED | OUTPATIENT
Start: 2024-10-10 | End: 2025-10-10

## 2024-10-10 NOTE — TELEPHONE ENCOUNTER
Per Donovan   Please inform patient her insurance denied Prolia injections for osteoporosis.  Alternative Fosamax once weekly recommended.  If agreeable, send prescription to pharmacy.  Encouraged to take on an empty stomach, sit upright for 1 hour after administration, do not take at the same time with other medications/wait 1 hour.

## 2024-10-10 NOTE — TELEPHONE ENCOUNTER
Dr. Sigrid Giang,     Thank you for ordering  - NM DENOSUMAB INJ, 1 MG  36115 (CPT®) - NM INJECTION,THERAP/PROPH/XRFF1LI, IM OR SUBCUT for patient Shayna Ledezma, MRN [id number]. Unfortunately, the patient's insurance, [employer], has denied the service due to Not a Covered Benefit, N/A. This is an automated In Basket notification that does not require a reply. For a more detailed explanation, or for questions regarding this insurance denial, send an In Basket message to the Pre Service Intake pool or call the [facility] Pre[facility] department at [phone] and reference referral ID [id number].The Pre-Service department hours are M-F 8 a.m. to 5 p.m.       Thank you,     [facility]

## 2024-10-10 NOTE — TELEPHONE ENCOUNTER
Spoke with Pt, information given, Pt expressed understanding. Will try Fosamax, Pt understood if she had any issues with the medication to call, we have other options to try.

## 2024-10-14 ENCOUNTER — LAB VISIT (OUTPATIENT)
Dept: LAB | Facility: HOSPITAL | Age: 74
End: 2024-10-14
Attending: INTERNAL MEDICINE
Payer: MEDICARE

## 2024-10-14 ENCOUNTER — OFFICE VISIT (OUTPATIENT)
Dept: HEMATOLOGY/ONCOLOGY | Facility: CLINIC | Age: 74
End: 2024-10-14
Payer: MEDICARE

## 2024-10-14 VITALS
TEMPERATURE: 98 F | OXYGEN SATURATION: 99 % | BODY MASS INDEX: 21.31 KG/M2 | HEIGHT: 64 IN | SYSTOLIC BLOOD PRESSURE: 140 MMHG | WEIGHT: 124.81 LBS | DIASTOLIC BLOOD PRESSURE: 81 MMHG | RESPIRATION RATE: 14 BRPM | HEART RATE: 98 BPM

## 2024-10-14 DIAGNOSIS — C78.6 SECONDARY MALIGNANT NEOPLASM OF RETROPERITONEUM AND PERITONEUM: Primary | ICD-10-CM

## 2024-10-14 DIAGNOSIS — C78.6 SECONDARY MALIGNANT NEOPLASM OF RETROPERITONEUM AND PERITONEUM: ICD-10-CM

## 2024-10-14 DIAGNOSIS — C57.4 MALIGNANT NEOPLASM OF UTERINE ADNEXA: ICD-10-CM

## 2024-10-14 DIAGNOSIS — C78.00 MALIGNANT NEOPLASM METASTATIC TO LUNG, UNSPECIFIED LATERALITY: ICD-10-CM

## 2024-10-14 LAB
ALBUMIN SERPL-MCNC: 3.6 G/DL (ref 3.4–4.8)
ALBUMIN/GLOB SERPL: 1.4 RATIO (ref 1.1–2)
ALP SERPL-CCNC: 40 UNIT/L (ref 40–150)
ALT SERPL-CCNC: 18 UNIT/L (ref 0–55)
ANION GAP SERPL CALC-SCNC: 8 MEQ/L
AST SERPL-CCNC: 35 UNIT/L (ref 5–34)
BASOPHILS # BLD AUTO: 0.02 X10(3)/MCL
BASOPHILS NFR BLD AUTO: 0.3 %
BILIRUB SERPL-MCNC: 1.1 MG/DL
BUN SERPL-MCNC: 24.1 MG/DL (ref 9.8–20.1)
CALCIUM SERPL-MCNC: 9.8 MG/DL (ref 8.4–10.2)
CANCER AG125 SERPL-ACNC: 18.6 UNIT/ML (ref 0–35)
CHLORIDE SERPL-SCNC: 95 MMOL/L (ref 98–107)
CO2 SERPL-SCNC: 30 MMOL/L (ref 23–31)
CREAT SERPL-MCNC: 0.81 MG/DL (ref 0.55–1.02)
CREAT/UREA NIT SERPL: 30
EOSINOPHIL # BLD AUTO: 0.19 X10(3)/MCL (ref 0–0.9)
EOSINOPHIL NFR BLD AUTO: 2.7 %
ERYTHROCYTE [DISTWIDTH] IN BLOOD BY AUTOMATED COUNT: 12.9 % (ref 11.5–17)
GFR SERPLBLD CREATININE-BSD FMLA CKD-EPI: >60 ML/MIN/1.73/M2
GLOBULIN SER-MCNC: 2.6 GM/DL (ref 2.4–3.5)
GLUCOSE SERPL-MCNC: 95 MG/DL (ref 82–115)
HCT VFR BLD AUTO: 38.3 % (ref 37–47)
HGB BLD-MCNC: 12.9 G/DL (ref 12–16)
IMM GRANULOCYTES # BLD AUTO: 0.01 X10(3)/MCL (ref 0–0.04)
IMM GRANULOCYTES NFR BLD AUTO: 0.1 %
LYMPHOCYTES # BLD AUTO: 2.33 X10(3)/MCL (ref 0.6–4.6)
LYMPHOCYTES NFR BLD AUTO: 33.1 %
MCH RBC QN AUTO: 32.1 PG (ref 27–31)
MCHC RBC AUTO-ENTMCNC: 33.7 G/DL (ref 33–36)
MCV RBC AUTO: 95.3 FL (ref 80–94)
MONOCYTES # BLD AUTO: 1.06 X10(3)/MCL (ref 0.1–1.3)
MONOCYTES NFR BLD AUTO: 15.1 %
NEUTROPHILS # BLD AUTO: 3.42 X10(3)/MCL (ref 2.1–9.2)
NEUTROPHILS NFR BLD AUTO: 48.7 %
PLATELET # BLD AUTO: 339 X10(3)/MCL (ref 130–400)
PMV BLD AUTO: 8.1 FL (ref 7.4–10.4)
POTASSIUM SERPL-SCNC: 5 MMOL/L (ref 3.5–5.1)
PROT SERPL-MCNC: 6.2 GM/DL (ref 5.8–7.6)
PROT UR QL STRIP: ABNORMAL
RBC # BLD AUTO: 4.02 X10(6)/MCL (ref 4.2–5.4)
SODIUM SERPL-SCNC: 133 MMOL/L (ref 136–145)
WBC # BLD AUTO: 7.03 X10(3)/MCL (ref 4.5–11.5)

## 2024-10-14 PROCEDURE — 36415 COLL VENOUS BLD VENIPUNCTURE: CPT

## 2024-10-14 PROCEDURE — 3077F SYST BP >= 140 MM HG: CPT | Mod: CPTII,S$GLB,, | Performed by: INTERNAL MEDICINE

## 2024-10-14 PROCEDURE — 3288F FALL RISK ASSESSMENT DOCD: CPT | Mod: CPTII,S$GLB,, | Performed by: INTERNAL MEDICINE

## 2024-10-14 PROCEDURE — 80053 COMPREHEN METABOLIC PANEL: CPT

## 2024-10-14 PROCEDURE — 85025 COMPLETE CBC W/AUTO DIFF WBC: CPT

## 2024-10-14 PROCEDURE — 86304 IMMUNOASSAY TUMOR CA 125: CPT

## 2024-10-14 PROCEDURE — 99999 PR PBB SHADOW E&M-EST. PATIENT-LVL IV: CPT | Mod: PBBFAC,,, | Performed by: INTERNAL MEDICINE

## 2024-10-14 PROCEDURE — 1126F AMNT PAIN NOTED NONE PRSNT: CPT | Mod: CPTII,S$GLB,, | Performed by: INTERNAL MEDICINE

## 2024-10-14 PROCEDURE — 3079F DIAST BP 80-89 MM HG: CPT | Mod: CPTII,S$GLB,, | Performed by: INTERNAL MEDICINE

## 2024-10-14 PROCEDURE — 1101F PT FALLS ASSESS-DOCD LE1/YR: CPT | Mod: CPTII,S$GLB,, | Performed by: INTERNAL MEDICINE

## 2024-10-14 PROCEDURE — 81005 URINALYSIS: CPT

## 2024-10-14 PROCEDURE — 1160F RVW MEDS BY RX/DR IN RCRD: CPT | Mod: CPTII,S$GLB,, | Performed by: INTERNAL MEDICINE

## 2024-10-14 PROCEDURE — 4010F ACE/ARB THERAPY RXD/TAKEN: CPT | Mod: CPTII,S$GLB,, | Performed by: INTERNAL MEDICINE

## 2024-10-14 PROCEDURE — 1159F MED LIST DOCD IN RCRD: CPT | Mod: CPTII,S$GLB,, | Performed by: INTERNAL MEDICINE

## 2024-10-14 PROCEDURE — 99215 OFFICE O/P EST HI 40 MIN: CPT | Mod: S$GLB,,, | Performed by: INTERNAL MEDICINE

## 2024-10-14 RX ORDER — FERROUS SULFATE, DRIED 160(50) MG
1 TABLET, EXTENDED RELEASE ORAL 2 TIMES DAILY WITH MEALS
COMMUNITY

## 2024-10-15 ENCOUNTER — INFUSION (OUTPATIENT)
Dept: INFUSION THERAPY | Facility: HOSPITAL | Age: 74
End: 2024-10-15
Attending: INTERNAL MEDICINE
Payer: MEDICARE

## 2024-10-15 VITALS
HEIGHT: 64 IN | WEIGHT: 125.5 LBS | DIASTOLIC BLOOD PRESSURE: 78 MMHG | BODY MASS INDEX: 21.43 KG/M2 | HEART RATE: 61 BPM | SYSTOLIC BLOOD PRESSURE: 145 MMHG | OXYGEN SATURATION: 99 % | TEMPERATURE: 98 F | RESPIRATION RATE: 16 BRPM

## 2024-10-15 DIAGNOSIS — C78.6 SECONDARY MALIGNANT NEOPLASM OF RETROPERITONEUM AND PERITONEUM: Primary | ICD-10-CM

## 2024-10-15 PROCEDURE — 96375 TX/PRO/DX INJ NEW DRUG ADDON: CPT

## 2024-10-15 PROCEDURE — 63600175 PHARM REV CODE 636 W HCPCS: Mod: JZ,JG | Performed by: INTERNAL MEDICINE

## 2024-10-15 PROCEDURE — 25000003 PHARM REV CODE 250: Performed by: INTERNAL MEDICINE

## 2024-10-15 PROCEDURE — 96413 CHEMO IV INFUSION 1 HR: CPT

## 2024-10-15 RX ORDER — ACETAMINOPHEN 325 MG/1
650 TABLET ORAL
Status: CANCELLED | OUTPATIENT
Start: 2024-10-15

## 2024-10-15 RX ORDER — SODIUM CHLORIDE 0.9 % (FLUSH) 0.9 %
10 SYRINGE (ML) INJECTION
Status: CANCELLED | OUTPATIENT
Start: 2024-10-15

## 2024-10-15 RX ORDER — EPINEPHRINE 0.3 MG/.3ML
0.3 INJECTION SUBCUTANEOUS ONCE AS NEEDED
Status: DISCONTINUED | OUTPATIENT
Start: 2024-10-15 | End: 2024-10-15 | Stop reason: HOSPADM

## 2024-10-15 RX ORDER — SODIUM CHLORIDE 0.9 % (FLUSH) 0.9 %
10 SYRINGE (ML) INJECTION
Status: DISCONTINUED | OUTPATIENT
Start: 2024-10-15 | End: 2024-10-15 | Stop reason: HOSPADM

## 2024-10-15 RX ORDER — DIPHENHYDRAMINE HYDROCHLORIDE 50 MG/ML
25 INJECTION INTRAMUSCULAR; INTRAVENOUS
Status: COMPLETED | OUTPATIENT
Start: 2024-10-15 | End: 2024-10-15

## 2024-10-15 RX ORDER — EPINEPHRINE 0.3 MG/.3ML
0.3 INJECTION SUBCUTANEOUS ONCE AS NEEDED
Status: CANCELLED | OUTPATIENT
Start: 2024-10-15

## 2024-10-15 RX ORDER — ACETAMINOPHEN 325 MG/1
650 TABLET ORAL
Status: DISCONTINUED | OUTPATIENT
Start: 2024-10-15 | End: 2024-10-15 | Stop reason: HOSPADM

## 2024-10-15 RX ORDER — HEPARIN 100 UNIT/ML
500 SYRINGE INTRAVENOUS
Status: CANCELLED | OUTPATIENT
Start: 2024-10-15

## 2024-10-15 RX ORDER — DIPHENHYDRAMINE HYDROCHLORIDE 50 MG/ML
50 INJECTION INTRAMUSCULAR; INTRAVENOUS ONCE AS NEEDED
Status: DISCONTINUED | OUTPATIENT
Start: 2024-10-15 | End: 2024-10-15 | Stop reason: HOSPADM

## 2024-10-15 RX ORDER — DIPHENHYDRAMINE HYDROCHLORIDE 50 MG/ML
25 INJECTION INTRAMUSCULAR; INTRAVENOUS
Status: CANCELLED | OUTPATIENT
Start: 2024-10-15

## 2024-10-15 RX ORDER — DIPHENHYDRAMINE HYDROCHLORIDE 50 MG/ML
50 INJECTION INTRAMUSCULAR; INTRAVENOUS ONCE AS NEEDED
Status: CANCELLED | OUTPATIENT
Start: 2024-10-15

## 2024-10-15 RX ORDER — HEPARIN 100 UNIT/ML
500 SYRINGE INTRAVENOUS
Status: DISCONTINUED | OUTPATIENT
Start: 2024-10-15 | End: 2024-10-15 | Stop reason: HOSPADM

## 2024-10-15 RX ADMIN — BEVACIZUMAB-AWWB 800 MG: 400 INJECTION, SOLUTION INTRAVENOUS at 03:10

## 2024-10-15 RX ADMIN — DIPHENHYDRAMINE HYDROCHLORIDE 25 MG: 50 INJECTION INTRAMUSCULAR; INTRAVENOUS at 03:10

## 2024-11-04 ENCOUNTER — LAB VISIT (OUTPATIENT)
Dept: LAB | Facility: HOSPITAL | Age: 74
End: 2024-11-04
Attending: INTERNAL MEDICINE
Payer: MEDICARE

## 2024-11-04 DIAGNOSIS — C57.4 MALIGNANT NEOPLASM OF UTERINE ADNEXA: ICD-10-CM

## 2024-11-04 DIAGNOSIS — C78.6 SECONDARY MALIGNANT NEOPLASM OF RETROPERITONEUM AND PERITONEUM: ICD-10-CM

## 2024-11-04 LAB
ALBUMIN SERPL-MCNC: 3.4 G/DL (ref 3.4–4.8)
ALBUMIN/GLOB SERPL: 1.2 RATIO (ref 1.1–2)
ALP SERPL-CCNC: 41 UNIT/L (ref 40–150)
ALT SERPL-CCNC: 21 UNIT/L (ref 0–55)
ANION GAP SERPL CALC-SCNC: 7 MEQ/L
AST SERPL-CCNC: 36 UNIT/L (ref 5–34)
BASOPHILS # BLD AUTO: 0.02 X10(3)/MCL
BASOPHILS NFR BLD AUTO: 0.3 %
BILIRUB SERPL-MCNC: 1.1 MG/DL
BUN SERPL-MCNC: 17.7 MG/DL (ref 9.8–20.1)
CALCIUM SERPL-MCNC: 9.1 MG/DL (ref 8.4–10.2)
CHLORIDE SERPL-SCNC: 94 MMOL/L (ref 98–107)
CO2 SERPL-SCNC: 27 MMOL/L (ref 23–31)
CREAT SERPL-MCNC: 0.73 MG/DL (ref 0.55–1.02)
CREAT/UREA NIT SERPL: 24
EOSINOPHIL # BLD AUTO: 0.18 X10(3)/MCL (ref 0–0.9)
EOSINOPHIL NFR BLD AUTO: 2.4 %
ERYTHROCYTE [DISTWIDTH] IN BLOOD BY AUTOMATED COUNT: 12.9 % (ref 11.5–17)
GFR SERPLBLD CREATININE-BSD FMLA CKD-EPI: >60 ML/MIN/1.73/M2
GLOBULIN SER-MCNC: 2.8 GM/DL (ref 2.4–3.5)
GLUCOSE SERPL-MCNC: 138 MG/DL (ref 82–115)
HCT VFR BLD AUTO: 36.8 % (ref 37–47)
HGB BLD-MCNC: 12.3 G/DL (ref 12–16)
IMM GRANULOCYTES # BLD AUTO: 0.01 X10(3)/MCL (ref 0–0.04)
IMM GRANULOCYTES NFR BLD AUTO: 0.1 %
LYMPHOCYTES # BLD AUTO: 2.34 X10(3)/MCL (ref 0.6–4.6)
LYMPHOCYTES NFR BLD AUTO: 31.2 %
MCH RBC QN AUTO: 31.7 PG (ref 27–31)
MCHC RBC AUTO-ENTMCNC: 33.4 G/DL (ref 33–36)
MCV RBC AUTO: 94.8 FL (ref 80–94)
MONOCYTES # BLD AUTO: 0.85 X10(3)/MCL (ref 0.1–1.3)
MONOCYTES NFR BLD AUTO: 11.3 %
NEUTROPHILS # BLD AUTO: 4.11 X10(3)/MCL (ref 2.1–9.2)
NEUTROPHILS NFR BLD AUTO: 54.7 %
PLATELET # BLD AUTO: 307 X10(3)/MCL (ref 130–400)
PMV BLD AUTO: 7.7 FL (ref 7.4–10.4)
POTASSIUM SERPL-SCNC: 4.2 MMOL/L (ref 3.5–5.1)
PROT SERPL-MCNC: 6.2 GM/DL (ref 5.8–7.6)
PROT UR QL STRIP: ABNORMAL
RBC # BLD AUTO: 3.88 X10(6)/MCL (ref 4.2–5.4)
SODIUM SERPL-SCNC: 128 MMOL/L (ref 136–145)
WBC # BLD AUTO: 7.51 X10(3)/MCL (ref 4.5–11.5)

## 2024-11-04 PROCEDURE — 85025 COMPLETE CBC W/AUTO DIFF WBC: CPT

## 2024-11-04 PROCEDURE — 81005 URINALYSIS: CPT

## 2024-11-04 PROCEDURE — 36415 COLL VENOUS BLD VENIPUNCTURE: CPT

## 2024-11-04 PROCEDURE — 80053 COMPREHEN METABOLIC PANEL: CPT

## 2024-11-04 RX ORDER — EPINEPHRINE 0.3 MG/.3ML
0.3 INJECTION SUBCUTANEOUS ONCE AS NEEDED
Status: CANCELLED | OUTPATIENT
Start: 2024-11-05

## 2024-11-04 RX ORDER — SODIUM CHLORIDE 0.9 % (FLUSH) 0.9 %
10 SYRINGE (ML) INJECTION
Status: CANCELLED | OUTPATIENT
Start: 2024-11-05

## 2024-11-04 RX ORDER — DIPHENHYDRAMINE HYDROCHLORIDE 50 MG/ML
25 INJECTION INTRAMUSCULAR; INTRAVENOUS
Status: CANCELLED | OUTPATIENT
Start: 2024-11-05

## 2024-11-04 RX ORDER — DIPHENHYDRAMINE HYDROCHLORIDE 50 MG/ML
50 INJECTION INTRAMUSCULAR; INTRAVENOUS ONCE AS NEEDED
Status: CANCELLED | OUTPATIENT
Start: 2024-11-05

## 2024-11-04 RX ORDER — HEPARIN 100 UNIT/ML
500 SYRINGE INTRAVENOUS
Status: CANCELLED | OUTPATIENT
Start: 2024-11-05

## 2024-11-04 RX ORDER — ACETAMINOPHEN 325 MG/1
650 TABLET ORAL
Status: CANCELLED | OUTPATIENT
Start: 2024-11-05

## 2024-11-05 ENCOUNTER — INFUSION (OUTPATIENT)
Dept: INFUSION THERAPY | Facility: HOSPITAL | Age: 74
End: 2024-11-05
Attending: INTERNAL MEDICINE
Payer: MEDICARE

## 2024-11-05 VITALS
SYSTOLIC BLOOD PRESSURE: 162 MMHG | TEMPERATURE: 98 F | HEART RATE: 58 BPM | OXYGEN SATURATION: 99 % | RESPIRATION RATE: 16 BRPM | DIASTOLIC BLOOD PRESSURE: 70 MMHG

## 2024-11-05 DIAGNOSIS — M81.6 LOCALIZED OSTEOPOROSIS WITHOUT CURRENT PATHOLOGICAL FRACTURE: ICD-10-CM

## 2024-11-05 DIAGNOSIS — C78.6 SECONDARY MALIGNANT NEOPLASM OF RETROPERITONEUM AND PERITONEUM: ICD-10-CM

## 2024-11-05 DIAGNOSIS — Z90.81 H/O SPLENECTOMY: Primary | ICD-10-CM

## 2024-11-05 PROCEDURE — 96413 CHEMO IV INFUSION 1 HR: CPT

## 2024-11-05 PROCEDURE — 25000003 PHARM REV CODE 250: Performed by: INTERNAL MEDICINE

## 2024-11-05 PROCEDURE — 63600175 PHARM REV CODE 636 W HCPCS: Mod: JZ,JG | Performed by: INTERNAL MEDICINE

## 2024-11-05 PROCEDURE — 96375 TX/PRO/DX INJ NEW DRUG ADDON: CPT

## 2024-11-05 RX ORDER — DIPHENHYDRAMINE HYDROCHLORIDE 50 MG/ML
50 INJECTION INTRAMUSCULAR; INTRAVENOUS ONCE AS NEEDED
Status: DISCONTINUED | OUTPATIENT
Start: 2024-11-05 | End: 2024-11-05 | Stop reason: HOSPADM

## 2024-11-05 RX ORDER — EPINEPHRINE 0.3 MG/.3ML
0.3 INJECTION SUBCUTANEOUS ONCE AS NEEDED
Status: DISCONTINUED | OUTPATIENT
Start: 2024-11-05 | End: 2024-11-05 | Stop reason: HOSPADM

## 2024-11-05 RX ORDER — DIPHENHYDRAMINE HYDROCHLORIDE 50 MG/ML
25 INJECTION INTRAMUSCULAR; INTRAVENOUS
Status: COMPLETED | OUTPATIENT
Start: 2024-11-05 | End: 2024-11-05

## 2024-11-05 RX ORDER — SODIUM CHLORIDE 0.9 % (FLUSH) 0.9 %
10 SYRINGE (ML) INJECTION
Status: DISCONTINUED | OUTPATIENT
Start: 2024-11-05 | End: 2024-11-05 | Stop reason: HOSPADM

## 2024-11-05 RX ORDER — ACETAMINOPHEN 325 MG/1
650 TABLET ORAL
Status: DISCONTINUED | OUTPATIENT
Start: 2024-11-05 | End: 2024-11-05 | Stop reason: HOSPADM

## 2024-11-05 RX ORDER — HEPARIN 100 UNIT/ML
500 SYRINGE INTRAVENOUS
Status: DISCONTINUED | OUTPATIENT
Start: 2024-11-05 | End: 2024-11-05 | Stop reason: HOSPADM

## 2024-11-05 RX ADMIN — DIPHENHYDRAMINE HYDROCHLORIDE 25 MG: 50 INJECTION INTRAMUSCULAR; INTRAVENOUS at 03:11

## 2024-11-05 RX ADMIN — BEVACIZUMAB-AWWB 800 MG: 400 INJECTION, SOLUTION INTRAVENOUS at 03:11

## 2024-11-18 ENCOUNTER — TELEPHONE (OUTPATIENT)
Dept: INTERNAL MEDICINE | Facility: CLINIC | Age: 74
End: 2024-11-18
Payer: MEDICARE

## 2024-11-18 NOTE — TELEPHONE ENCOUNTER
Need to make adjustments to her blood pressure medications, do add on small visit for blood pressure medication adjustments since we might have to make 1 or 2 adjustments until we get her on the right dosage on new medicine.

## 2024-11-18 NOTE — TELEPHONE ENCOUNTER
----- Message from [employer] sent at 11/15/2024  1:21 PM CST -----  .Type:  Patient Returning Call    Who Called:pt  Who Left Message for Patient:pt  Does the patient know what this is regarding?:hydroCHLOROthiazide (HYDRODIURIL) 12.5 MG Tab  Would the patient rather a call back or a response via [facility]?   Best Call Back Number:[phone]  Additional Information: Please call back about hydroCHLOROthiazide (HYDRODIURIL) 12.5 MG Tab

## 2024-11-19 ENCOUNTER — OFFICE VISIT (OUTPATIENT)
Dept: INTERNAL MEDICINE | Facility: CLINIC | Age: 74
End: 2024-11-19
Payer: MEDICARE

## 2024-11-19 VITALS
HEART RATE: 70 BPM | SYSTOLIC BLOOD PRESSURE: 132 MMHG | DIASTOLIC BLOOD PRESSURE: 78 MMHG | HEIGHT: 64 IN | WEIGHT: 125 LBS | BODY MASS INDEX: 21.34 KG/M2 | OXYGEN SATURATION: 98 %

## 2024-11-19 DIAGNOSIS — I10 PRIMARY HYPERTENSION: Primary | ICD-10-CM

## 2024-11-19 DIAGNOSIS — E87.1 ACUTE HYPONATREMIA: ICD-10-CM

## 2024-11-19 DIAGNOSIS — G45.9 TRANSIENT ISCHEMIC ATTACK: Chronic | ICD-10-CM

## 2024-11-19 PROCEDURE — 1126F AMNT PAIN NOTED NONE PRSNT: CPT | Mod: CPTII,,,

## 2024-11-19 PROCEDURE — 1101F PT FALLS ASSESS-DOCD LE1/YR: CPT | Mod: CPTII,,,

## 2024-11-19 PROCEDURE — 3078F DIAST BP <80 MM HG: CPT | Mod: CPTII,,,

## 2024-11-19 PROCEDURE — 1160F RVW MEDS BY RX/DR IN RCRD: CPT | Mod: CPTII,,,

## 2024-11-19 PROCEDURE — 4010F ACE/ARB THERAPY RXD/TAKEN: CPT | Mod: CPTII,,,

## 2024-11-19 PROCEDURE — 99214 OFFICE O/P EST MOD 30 MIN: CPT | Mod: ,,,

## 2024-11-19 PROCEDURE — 3288F FALL RISK ASSESSMENT DOCD: CPT | Mod: CPTII,,,

## 2024-11-19 PROCEDURE — 1159F MED LIST DOCD IN RCRD: CPT | Mod: CPTII,,,

## 2024-11-19 PROCEDURE — 3075F SYST BP GE 130 - 139MM HG: CPT | Mod: CPTII,,,

## 2024-11-19 RX ORDER — BUTALB/ACETAMINOPHEN/CAFFEINE 50-325-40
1 TABLET ORAL 2 TIMES DAILY
COMMUNITY

## 2024-11-19 RX ORDER — AMLODIPINE BESYLATE 2.5 MG/1
2.5 TABLET ORAL DAILY
Qty: 30 TABLET | Refills: 11 | Status: SHIPPED | OUTPATIENT
Start: 2024-11-19 | End: 2025-11-19

## 2024-11-19 NOTE — ASSESSMENT & PLAN NOTE
Latest Reference 11/04/24 14:47   Sodium 136 - 145 mmol/L 128 (L)   Potassium 3.5 - 5.1 mmol/L 4.2   Chloride 98 - 107 mmol/L 94 (L)   BUN 9.8 - 20.1 mg/dL 17.7   Creatinine 0.55 - 1.02 mg/dL 0.73     -acute   -currently on diuretic, discontinue HCTZ 12.5 mg   -see hypertension diagnosis   -repeat CMP in a few weeks

## 2024-11-19 NOTE — ASSESSMENT & PLAN NOTE
-acute on chronic   -/78   -does have acute hyponatremia associated with use of diuretics   -currently on lisinopril 20 mg and HCTZ 12.5 mg, discontinue HCTZ 12.5 mg   -initiate trial amlodipine 2.5 mg daily   -BP log times 1-2 weeks, encouraged to drop off at the clinic   -encouraged to purchase new blood pressure cuff  -repeat CMP in a few weeks

## 2024-11-19 NOTE — H&P (VIEW-ONLY)
Subjective:       Patient ID: Shayna Ledezma is a 74 y.o. female.  GI: Dr. Gamaliel Carlos  GYN ONC at [facility]: Dr. Mike Santana    1. 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 Pulmonary Embolism (Incidental on CT)--19    Procedure/pathology:   1. Paracentesis with removal of 5L of fluid done 18--serous carcinoma, high grade, c/w ovarian primary, PAX-8, CK7 and MOC-31 positive, CDX-2, CK-20 and Calretinin-negative.  2. S/p Exploratory Lap, radical tumor debulking including total abdominal hysterectomy, bilateral salpingo-oophorectomy, bilateral pelvic lymph node sampling, appendectomy, mobilization of the left ureter, complete gross resection of the disease with removal of mesenteric disease, as well as omental disese and parasplenic disease by Dr. Santana 3/18/19--Metastatic high grade serous carcinoma. Tissue/organ involvement: right ovary, appendectomy, omentum, mesentery (including small bowel mesentery) and multiple other sites designated: periumbilical, perisplenic, transverse colon, splenic flexure, perirectal, left periureteral. 2 lymph nodes negative. [id number]. FIGO stage IIIC. Myriad somatic instability testing positive for genomic instability, negative BRCA1 and BRCA2 tumor testing.  3. Splenectomy done 10/21/19--splenic involvement with metastatic high grade serous carcinoma, 5 benign lymph nodes. Caris testing showed LAM, NTRK1/2/3 negative, TMB low, BRCA1/2 negative, ER/ND negative, CATHY negative, SPEN pathogenic variant Exon 11, TP53 pathogenic variant Exon 5, PD-L1 positive CPS 15, Genomic ELODIA high.  Imagin. CT A/P w/ and w/o contrast done at [facility] 18--large volume ascites with multiple peritoneal implants, right pericolic gutter implant measures 2.5X3.3cm, LUQ omental/mesenteric implant measures 3X3.6cm, omental carcinomatosis, extensive enhancing soft tissue surrounding sigmoid colon vs. large sigmoid mass, left  ovarian cystic lesion appears to have some internal septations measures 3.5X3.8cm, enhancing periportal soft tissue density likely lymphadenopathy with some narrowing of the portal vein noted, but without internal filling defect, borderline splenomegaly, subtle solid lesion w/n central spleen measures 2.4X2.5cm, likely metastatic, with peripheral area of diminished enhancement suggesting splenic infarct, soft tissue implant abutting gallbladder measures 2X2.2cm, no right adnexal mass, trace bilateral layering pleural fluid, small moderate-sized hiatal hernia, few borderline enlarged lymph nodes w/n right epicardial fat pad.  2. CT of chest on 1/11/19--Some prominent right cardiophrenic lymph nodes are nonspecific and can be followed on future surveillance scans. Otherwise no CT evidence of metastatic disease to the chest. While exam was not tailored for evaluation of the pulmonary arteries I suspect there is pulmonary thromboembolic disease. Peritoneal carcinomatosis seen in the upper abdomen. Critical Result: Pulmonary Embolus.  3. CT C/A/P 3/4/19--Positive response to therapy. No new or progressive metastatic disease in the chest, abdomen or pelvis. Stable to improved findings include smaller pericardial lymph nodes, andrew hepatis adenopathy, peritoneal carcinomatosis, smaller left adnexal lesion; no evidence of PE within limitations of the study.  5. CT PE protocol chest/A/P 6/11/19--No PE, improved pericardial lymph node with minimal size on current examination, improved peritoneal carcinomatosis and left adnexal implant, splenic ischemia evolved into small infarct, size improvement of one of splenic hypodense lesion and mild size increase of second hypodense lesion, favored to be benign/cystic, no new findings.  6. PET/CT 9/25/19--s/p hysterectomy and bilateral oophorectomy, no evidence for locally recurrent/residual disease, intensely hypermetabolic hypodense lesion within the spleen 3.5X3.3cm concerning for  metastatic focus. No other abnormal focus of disease.  7. CT C/A/P 11/13/20--Right lower lobe 3.3 cm mass is presumed metastatic, otherwise no evident residual, recurrent or metastatic disease.   8. PET/CT 11/20/20--Hypermetabolic right lung base lesion and suspected metastatic periportal adenopathy, no additional sites of FDG-avid otherwise aggressive appearing pathology through the neck, chest, abdomen, or pelvis.  9. PET/CT 1/21/21--Interval decrease in size of the right lower lobe mass with decreased FDG uptake, now measures 1.7 x 2.2 cm (previous 3.3 x 3.1 cm), resolution of FDG uptake in the suspected periportal lymph node which is not identifiable on noncontrast CT. No evidence of new or progressive hypermetabolic metastatic disease.  10. PET/CT 3/30/21--Resolved right lower lung lobe hypermetabolic mass. Otherwise, no interval change or evidence of residual disease, recurrence or new metastatic lesion.  11. PET/CT 11/21/24--new FDG uptake in right hepatic lobe concerning for malignancy, measures 3.5cm with SUV 9.5, could represent a peritoneal implant vs. Hepatic metastasis.     Procedures:   1. Paracentesis on 12/28/18 with removal of 5 Liters.  2. Paracentesis on 01/07/19 with removal of 3.5 Liters.  3. Paracentesis on 02/06/19 with removal of 2.5 Liters.  4. Mediport placed 12/2020 by Dr. Serge Gentile--removed 8/26/21 due to mediport fracture.    Germline genetic testing done by [employer] [facility] 1/29/19--negative for BRCA1/2, two (2) variants of uncertain significance (VUS): CATHY p.A7726T/p.M8512P and MLH1 p.P603L.     :  12/19/18 1102  11/10/20   42.9  12/08/20 79.5  01/05/21 28.9  01/26/21 12.8  02/18/21 11.6  03/15/22--10.3  04/26/22--9.6  05/16/22--10.2  06/07/22--10.1  07/19/22--11.6  08/08/22--12.5  09/19/22--12.7  10/31/22--13.5  01/03/23--12.0  02/13/23--14.0  03/03/23--13.9  04/17/23--14.5  05/26/23--14.6  07/31/23--13.5  11/13/23--15.6  12/04/23--16.8  01/16/24--13.7  02/26/24--18.6  03/18/24--16.3  04/29/24--17.0  06/10/24--17.0  08/12/24--17.3  09/23/24--18.3  10/14/24--18.6  11/25/24--19.1    Treatment History:  1. Neoadjuvant Carboplatin/Taxol every 3 weeks x 3 cycles. 1/15/19 - 2/26/19. Neulasta added.   2. Surgery--tumor debulking KORI/BSO 3/18/19.  3. Adjuvant Carboplatin/Taxol x 3 additions cycles 4/9/19--5/21/19.  4. Splenectomy 10/21/19.  5. Niraparib maintenance (dose reduction to 100mg daily for cytopenias) 11/8/19--12/1/20 (stopped due to progression).  6. Carboplatin/Paclitaxel/Avastin X 6 cycles completed 12/8/21--3/24/21 (complete response).    Current Treatment:  1. Eliquis BID for incidental PE on imaging 1/2019  2. Avastin maintenance started on 4/13/21.   Cycle 64 due 11/26/24.      CC:  Chief Complaint   Patient presents with    Other Misc     Pt reports no concerns today.     HPI   Patient is here for follow-up of ovarian cancer on maintenance Avastin. She has no issues from treatment. She feels good. She was taking off HCTZ for the low sodium levels and put on Amlodipine. PET shows a new area in liver, likely c/w recurrent disease. Discussed obtaining a biopsy and follow-up.     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      alendronate (FOSAMAX) 70 MG tablet Take 1 tablet (70 mg total) by mouth every 7 days. 4 tablet 11    amLODIPine (NORVASC) 2.5 MG tablet Take 1 tablet (2.5 mg total) by mouth once daily. 30 tablet 11    apixaban (ELIQUIS) 5 mg Tab Take 1 tablet (5 mg total) by mouth 2 (two) times daily. 180 tablet 3    aspirin (ECOTRIN) 81 MG EC tablet Take 81 mg by mouth once daily.      atorvastatin (LIPITOR) 40 MG tablet Take 1 tablet (40 mg total) by mouth once daily. 90 tablet 3    b complex vitamins capsule Take 1 capsule by mouth once daily.      calcium citrate-vitamin D3 315-200 mg (CITRACAL+D) 315 mg-5 mcg (200 unit) per tablet Take 1 tablet by mouth 2 (two) times daily.      calcium-vitamin D3 (CALCIUM 500 + D) 500 mg-5 mcg (200 unit) per tablet Take 1 tablet by mouth 2 (two) times daily with meals.      GLUTAMINE ORAL Take 10 g by mouth Daily.      lisinopriL (PRINIVIL,ZESTRIL) 20 MG tablet Take 1 tablet (20 mg total) by mouth once daily. 30 tablet 3    pyridoxine, vitamin B6, (B-6) 250 MG Tab Take 250 mg by mouth once daily.      sodium chloride 0.9% SolP 100 mL with bevacizumab 25 mg/mL Soln Inject into the vein every 21 days.       No current facility-administered medications on file prior to visit.      Review of Systems   Constitutional:  Negative for appetite change and unexpected weight change.   HENT:  Negative for mouth sores and nosebleeds.    Eyes:  Negative for visual disturbance.   Respiratory:  Negative for cough and shortness of breath.    Cardiovascular:  Negative for chest pain.   Gastrointestinal:  Negative for abdominal pain and diarrhea.   Genitourinary:  Negative for frequency.   Musculoskeletal:  Negative for  back pain.   Integumentary:  Negative for rash.   Neurological:  Negative for headaches.   Hematological:  Negative for adenopathy.   Psychiatric/Behavioral:  The patient is not nervous/anxious.         Vitals:    11/25/24 1014   BP: (!) 146/84   Pulse: (!) 53   Resp: 14   Temp: 97.6 °F (36.4 °C)       Wt Readings from Last 3 Encounters:   11/25/24 1014 56.3 kg (124 lb 3.2 oz)   11/19/24 0924 56.7 kg (125 lb)   10/15/24 1400 56.9 kg (125 lb 8 oz)     Physical Exam  Vitals reviewed.   Constitutional:       Appearance: Normal appearance. She is normal weight.   HENT:      Head: Normocephalic.   Eyes:      General: Lids are normal. Vision grossly intact.      Extraocular Movements: Extraocular movements intact.      Conjunctiva/sclera: Conjunctivae normal.   Cardiovascular:      Rate and Rhythm: Normal rate and regular rhythm.      Pulses: Normal pulses.      Heart sounds: Normal heart sounds, S1 normal and S2 normal.   Pulmonary:      Effort: Pulmonary effort is normal.      Breath sounds: Normal breath sounds.   Abdominal:      General: Abdomen is flat. Bowel sounds are normal. There is no distension.      Palpations: Abdomen is soft.      Tenderness: There is no abdominal tenderness.   Musculoskeletal:      Cervical back: Normal range of motion and neck supple.      Right lower leg: No edema.      Left lower leg: No edema.   Skin:     General: Skin is warm and moist.      Capillary Refill: Capillary refill takes less than 2 seconds.      Comments: Left CW mediport removed, site intact   Neurological:      General: No focal deficit present.      Mental Status: She is alert and oriented to person, place, and time.   Psychiatric:         Attention and Perception: Attention normal.         Mood and Affect: Mood normal.         Speech: Speech normal.         Behavior: Behavior normal. Behavior is cooperative.         Cognition and Memory: Cognition normal.         Judgment: Judgment normal.       Lab Visit on 11/25/2024    Component Date Value    WBC 11/25/2024 7.27     RBC 11/25/2024 4.08 (L)     Hgb 11/25/2024 12.8     Hct 11/25/2024 39.0     MCV 11/25/2024 95.6 (H)     MCH 11/25/2024 31.4 (H)     MCHC 11/25/2024 32.8 (L)     RDW 11/25/2024 12.9     Platelet 11/25/2024 355     MPV 11/25/2024 8.2     Neut % 11/25/2024 49.9     Lymph % 11/25/2024 29.8     Mono % 11/25/2024 15.3     Eos % 11/25/2024 4.5     Basophil % 11/25/2024 0.4     Lymph # 11/25/2024 2.17     Neut # 11/25/2024 3.62     Mono # 11/25/2024 1.11     Eos # 11/25/2024 0.33     Baso # 11/25/2024 0.03     IG# 11/25/2024 0.01     IG% 11/25/2024 0.1           Assessment:       1. Secondary malignant neoplasm of retroperitoneum and peritoneum    2. Malignant neoplasm of uterine adnexa          Plan:     Patient with left ovarian cancer diagnosed 12/26/18, appears to be stage IIIC vs. IV with diffuse peritoneal disease, possible splenic involvement, epicardial lymph nodes and bilateral pleural effusions.  Patient seen and evaluated by Dr. Mike Santana at [place] in [place].   Treatment recommended upfront with chemotherapy Carboplatin/Paclitaxel x 3 cycles.  Completed Cycle 3 on 2/26/19.   She underwent total abdominal hysterectomy, BSO and tumor debulking on 3/18/19 with Dr. Santana with complete gross resection of disease. FIGO Stage IIIC papillary serous fallopian tube cancer.   Patient resumed chemotherapy with cycle 4 on 4/9/19. Completed cycle 6 on 5/21/19.    Testing on tumor was positive for genomic instability but negative for BRCA mutation.  Per NCCN guidelines, maintenance can be considered for BRCA mutated germline category 1 and somatic category 2A. There is some evidence that PARP inhibitors have some activity as well in tumors with genomic instability. But the PARP maintenance is not approved for this indication. Offered treatment to patient and after discussion she declined and made decision to continue with observation only.    PET/CT done for  rising CA-125 showed hypermetabolic splenic lesion which is not new, just enlarged from previous. Case discussed with Dr. Mike Santana.  Patient is now s/p splenectomy done 10/21/19. Consistent with splenic involvement with high grade serous carcinoma.   Dr. Santana recommended Niraparib maintenance based on new data showing activity in HRD positive ovarian cancer and patient started on 11/8/19, required a dose reduction due to cytopenias.  Rising CA-125 noted in 11/2020. CT showed new RLL lung mass.   Follow-up PET done 11/20/20 showed RLL lung mass hypermetabolic and hypermetabolic periportal adenopathy.     Recommended 2nd line treatment with Carboplatin/Taxol/Avastin.   Started cycle 1 on 12/8/20. Neulasta added with cycle 2.   Completed Cycle 6 on 3/24/21.  PET from 3/30/21 showed complete resolution of lung mass and no other disease.    Avastin maintenance started on 4/13/21.   Hospitalized for TIA after her 4th cycle of Avastin. Work-up for stroke and cause otherwise negative. Patient started on baby ASA by neurology.  Discussed there are no clear guidelines for changing/discontinuing treatment for TIA/CVA related to Avastin.  Since her symptoms resolved and there were no residual effects, recommended to continue with Avastin since it is working well for her disease and since a baby ASA was added for prevention. She was also continued on Eliquis for h/o PE.  S/p mediport removal for fracture on 8/26/21.   Continue Avastin through peripheral veins for now.    Patient continues on Avastin maintenance without problems.  PET was done due to rising CA-125 although it remains still WNL. Scan shows lesion near superior portion of liver.  Will obtain CT-biopsy. Will discuss with IR, it is in a difficult location to biopsy.  F/u after biopsy is done.    If recurrence is confirmed, will discuss with GYN oncology Dr. Santana, likely will go back on Carbo/Taxol and consider maintenance with a different PARP, Lynparza.   HOLD  Avastin.     Labs today are good. Sodium now normal off HCTZ. CA-125 remains WNL but still slightly increased.     Vaccines/Boosters post-splenectomy: Menactra/Menveo every 5 years, Bexsero every 2-3 years, annual flu shot.  Last Bexsero was given 8/1/23.   Received Menactra/Menveo in 9/2024, 5 years after initial.     Continue Eliquis 5mg BID and ASA.  All questions answered.      Renetta Corral MD

## 2024-11-19 NOTE — PROGRESS NOTES
Subjective:       Patient ID: Shayna Ledezma is a 74 y.o. female.  GI: Dr. Gamaliel Carlos  GYN ONC at [facility]: Dr. Mike Santana    1. 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 Pulmonary Embolism (Incidental on CT)--19    Procedure/pathology:   1. Paracentesis with removal of 5L of fluid done 18--serous carcinoma, high grade, c/w ovarian primary, PAX-8, CK7 and MOC-31 positive, CDX-2, CK-20 and Calretinin-negative.  2. S/p Exploratory Lap, radical tumor debulking including total abdominal hysterectomy, bilateral salpingo-oophorectomy, bilateral pelvic lymph node sampling, appendectomy, mobilization of the left ureter, complete gross resection of the disease with removal of mesenteric disease, as well as omental disese and parasplenic disease by Dr. Santana 3/18/19--Metastatic high grade serous carcinoma. Tissue/organ involvement: right ovary, appendectomy, omentum, mesentery (including small bowel mesentery) and multiple other sites designated: periumbilical, perisplenic, transverse colon, splenic flexure, perirectal, left periureteral. 2 lymph nodes negative. [id number]. FIGO stage IIIC. Myriad somatic instability testing positive for genomic instability, negative BRCA1 and BRCA2 tumor testing.  3. Splenectomy done 10/21/19--splenic involvement with metastatic high grade serous carcinoma, 5 benign lymph nodes. Caris testing showed LAM, NTRK1/2/3 negative, TMB low, BRCA1/2 negative, ER/TX negative, CATHY negative, SPEN pathogenic variant Exon 11, TP53 pathogenic variant Exon 5, PD-L1 positive CPS 15, Genomic ELODIA high.  Imagin. CT A/P w/ and w/o contrast done at [facility] 18--large volume ascites with multiple peritoneal implants, right pericolic gutter implant measures 2.5X3.3cm, LUQ omental/mesenteric implant measures 3X3.6cm, omental carcinomatosis, extensive enhancing soft tissue surrounding sigmoid colon vs. large sigmoid mass, left  ovarian cystic lesion appears to have some internal septations measures 3.5X3.8cm, enhancing periportal soft tissue density likely lymphadenopathy with some narrowing of the portal vein noted, but without internal filling defect, borderline splenomegaly, subtle solid lesion w/n central spleen measures 2.4X2.5cm, likely metastatic, with peripheral area of diminished enhancement suggesting splenic infarct, soft tissue implant abutting gallbladder measures 2X2.2cm, no right adnexal mass, trace bilateral layering pleural fluid, small moderate-sized hiatal hernia, few borderline enlarged lymph nodes w/n right epicardial fat pad.  2. CT of chest on 1/11/19--Some prominent right cardiophrenic lymph nodes are nonspecific and can be followed on future surveillance scans. Otherwise no CT evidence of metastatic disease to the chest. While exam was not tailored for evaluation of the pulmonary arteries I suspect there is pulmonary thromboembolic disease. Peritoneal carcinomatosis seen in the upper abdomen. Critical Result: Pulmonary Embolus.  3. CT C/A/P 3/4/19--Positive response to therapy. No new or progressive metastatic disease in the chest, abdomen or pelvis. Stable to improved findings include smaller pericardial lymph nodes, andrew hepatis adenopathy, peritoneal carcinomatosis, smaller left adnexal lesion; no evidence of PE within limitations of the study.  5. CT PE protocol chest/A/P 6/11/19--No PE, improved pericardial lymph node with minimal size on current examination, improved peritoneal carcinomatosis and left adnexal implant, splenic ischemia evolved into small infarct, size improvement of one of splenic hypodense lesion and mild size increase of second hypodense lesion, favored to be benign/cystic, no new findings.  6. PET/CT 9/25/19--s/p hysterectomy and bilateral oophorectomy, no evidence for locally recurrent/residual disease, intensely hypermetabolic hypodense lesion within the spleen 3.5X3.3cm concerning for  metastatic focus. No other abnormal focus of disease.  7. CT C/A/P 11/13/20--Right lower lobe 3.3 cm mass is presumed metastatic, otherwise no evident residual, recurrent or metastatic disease.   8. PET/CT 11/20/20--Hypermetabolic right lung base lesion and suspected metastatic periportal adenopathy, no additional sites of FDG-avid otherwise aggressive appearing pathology through the neck, chest, abdomen, or pelvis.  9. PET/CT 1/21/21--Interval decrease in size of the right lower lobe mass with decreased FDG uptake, now measures 1.7 x 2.2 cm (previous 3.3 x 3.1 cm), resolution of FDG uptake in the suspected periportal lymph node which is not identifiable on noncontrast CT. No evidence of new or progressive hypermetabolic metastatic disease.  10. PET/CT 3/30/21--Resolved right lower lung lobe hypermetabolic mass. Otherwise, no interval change or evidence of residual disease, recurrence or new metastatic lesion.  11. PET/CT 11/21/24--new FDG uptake in right hepatic lobe concerning for malignancy, measures 3.5cm with SUV 9.5, could represent a peritoneal implant vs. Hepatic metastasis.     Procedures:   1. Paracentesis on 12/28/18 with removal of 5 Liters.  2. Paracentesis on 01/07/19 with removal of 3.5 Liters.  3. Paracentesis on 02/06/19 with removal of 2.5 Liters.  4. Mediport placed 12/2020 by Dr. Serge Gentile--removed 8/26/21 due to mediport fracture.    Germline genetic testing done by [facility] 1/29/19--negative for BRCA1/2, two (2) variants of uncertain significance (VUS): CATHY p.[id number]/p.[id number] and MLH1 p.[id number].     :  12/19/18 1102  11/10/20   42.9  12/08/20 79.5  01/05/21 28.9  01/26/21 12.8  02/18/21 11.6  03/15/22--10.3  04/26/22--9.6  05/16/22--10.2  06/07/22--10.1  07/19/22--11.6  08/08/22--12.5  09/19/22--12.7  10/31/22--13.5  01/03/23--12.0  02/13/23--14.0  03/03/23--13.9  04/17/23--14.5  05/26/23--14.6  07/31/23--13.5  11/13/23--15.6  12/04/23--16.8  01/16/24--13.7  02/26/24--18.6  03/18/24--16.3  04/29/24--17.0  06/10/24--17.0  08/12/24--17.3  09/23/24--18.3  10/14/24--18.6  11/25/24--19.1    Treatment History:  1. Neoadjuvant Carboplatin/Taxol every 3 weeks x 3 cycles. 1/15/19 - 2/26/19. Neulasta added.   2. Surgery--tumor debulking KORI/BSO 3/18/19.  3. Adjuvant Carboplatin/Taxol x 3 additions cycles 4/9/19--5/21/19.  4. Splenectomy 10/21/19.  5. Niraparib maintenance (dose reduction to 100mg daily for cytopenias) 11/8/19--12/1/20 (stopped due to progression).  6. Carboplatin/Paclitaxel/Avastin X 6 cycles completed 12/8/21--3/24/21 (complete response).    Current Treatment:  1. Eliquis BID for incidental PE on imaging 1/2019  2. Avastin maintenance started on 4/13/21.   Cycle 64 due 11/26/24.      CC:  Chief Complaint   Patient presents with    Other Misc     Pt reports no concerns today.     HPI   Patient is here for follow-up of ovarian cancer on maintenance Avastin. She has no issues from treatment. She feels good. She was taking off HCTZ for the low sodium levels and put on Amlodipine. PET shows a new area in liver, likely c/w recurrent disease. Discussed obtaining a biopsy and follow-up.     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      alendronate (FOSAMAX) 70 MG tablet Take 1 tablet (70 mg total) by mouth every 7 days. 4 tablet 11    amLODIPine (NORVASC) 2.5 MG tablet Take 1 tablet (2.5 mg total) by mouth once daily. 30 tablet 11    apixaban (ELIQUIS) 5 mg Tab Take 1 tablet (5 mg total) by mouth 2 (two) times daily. 180 tablet 3    aspirin (ECOTRIN) 81 MG EC tablet Take 81 mg by mouth once daily.      atorvastatin (LIPITOR) 40 MG tablet Take 1 tablet (40 mg total) by mouth once daily. 90 tablet 3    b complex vitamins capsule Take 1 capsule by mouth once daily.      calcium citrate-vitamin D3 315-200 mg (CITRACAL+D) 315 mg-5 mcg (200 unit) per tablet Take 1 tablet by mouth 2 (two) times daily.      calcium-vitamin D3 (CALCIUM 500 + D) 500 mg-5 mcg (200 unit) per tablet Take 1 tablet by mouth 2 (two) times daily with meals.      GLUTAMINE ORAL Take 10 g by mouth Daily.      lisinopriL (PRINIVIL,ZESTRIL) 20 MG tablet Take 1 tablet (20 mg total) by mouth once daily. 30 tablet 3    pyridoxine, vitamin B6, (B-6) 250 MG Tab Take 250 mg by mouth once daily.      sodium chloride 0.9% SolP 100 mL with bevacizumab 25 mg/mL Soln Inject into the vein every 21 days.       No current facility-administered medications on file prior to visit.      Review of Systems   Constitutional:  Negative for appetite change and unexpected weight change.   HENT:  Negative for mouth sores and nosebleeds.    Eyes:  Negative for visual disturbance.   Respiratory:  Negative for cough and shortness of breath.    Cardiovascular:  Negative for chest pain.   Gastrointestinal:  Negative for abdominal pain and diarrhea.   Genitourinary:  Negative for frequency.   Musculoskeletal:  Negative for  back pain.   Integumentary:  Negative for rash.   Neurological:  Negative for headaches.   Hematological:  Negative for adenopathy.   Psychiatric/Behavioral:  The patient is not nervous/anxious.         Vitals:    11/25/24 1014   BP: (!) 146/84   Pulse: (!) 53   Resp: 14   Temp: 97.6 °F (36.4 °C)       Wt Readings from Last 3 Encounters:   11/25/24 1014 56.3 kg (124 lb 3.2 oz)   11/19/24 0924 56.7 kg (125 lb)   10/15/24 1400 56.9 kg (125 lb 8 oz)     Physical Exam  Vitals reviewed.   Constitutional:       Appearance: Normal appearance. She is normal weight.   HENT:      Head: Normocephalic.   Eyes:      General: Lids are normal. Vision grossly intact.      Extraocular Movements: Extraocular movements intact.      Conjunctiva/sclera: Conjunctivae normal.   Cardiovascular:      Rate and Rhythm: Normal rate and regular rhythm.      Pulses: Normal pulses.      Heart sounds: Normal heart sounds, S1 normal and S2 normal.   Pulmonary:      Effort: Pulmonary effort is normal.      Breath sounds: Normal breath sounds.   Abdominal:      General: Abdomen is flat. Bowel sounds are normal. There is no distension.      Palpations: Abdomen is soft.      Tenderness: There is no abdominal tenderness.   Musculoskeletal:      Cervical back: Normal range of motion and neck supple.      Right lower leg: No edema.      Left lower leg: No edema.   Skin:     General: Skin is warm and moist.      Capillary Refill: Capillary refill takes less than 2 seconds.      Comments: Left CW mediport removed, site intact   Neurological:      General: No focal deficit present.      Mental Status: She is alert and oriented to person, place, and time.   Psychiatric:         Attention and Perception: Attention normal.         Mood and Affect: Mood normal.         Speech: Speech normal.         Behavior: Behavior normal. Behavior is cooperative.         Cognition and Memory: Cognition normal.         Judgment: Judgment normal.       Lab Visit on 11/25/2024    Component Date Value    WBC 11/25/2024 7.27     RBC 11/25/2024 4.08 (L)     Hgb 11/25/2024 12.8     Hct 11/25/2024 39.0     MCV 11/25/2024 95.6 (H)     MCH 11/25/2024 31.4 (H)     MCHC 11/25/2024 32.8 (L)     RDW 11/25/2024 12.9     Platelet 11/25/2024 355     MPV 11/25/2024 8.2     Neut % 11/25/2024 49.9     Lymph % 11/25/2024 29.8     Mono % 11/25/2024 15.3     Eos % 11/25/2024 4.5     Basophil % 11/25/2024 0.4     Lymph # 11/25/2024 2.17     Neut # 11/25/2024 3.62     Mono # 11/25/2024 1.11     Eos # 11/25/2024 0.33     Baso # 11/25/2024 0.03     IG# 11/25/2024 0.01     IG% 11/25/2024 0.1           Assessment:       1. Secondary malignant neoplasm of retroperitoneum and peritoneum    2. Malignant neoplasm of uterine adnexa          Plan:     Patient with left ovarian cancer diagnosed 12/26/18, appears to be stage IIIC vs. IV with diffuse peritoneal disease, possible splenic involvement, epicardial lymph nodes and bilateral pleural effusions.  Patient seen and evaluated by Dr. Mike Santana at [facility] in [place].   Treatment recommended upfront with chemotherapy Carboplatin/Paclitaxel x 3 cycles.  Completed Cycle 3 on 2/26/19.   She underwent total abdominal hysterectomy, BSO and tumor debulking on 3/18/19 with Dr. Santana with complete gross resection of disease. FIGO Stage IIIC papillary serous fallopian tube cancer.   Patient resumed chemotherapy with cycle 4 on 4/9/19. Completed cycle 6 on 5/21/19.    Testing on tumor was positive for genomic instability but negative for BRCA mutation.  Per NCCN guidelines, maintenance can be considered for BRCA mutated germline category 1 and somatic category 2A. There is some evidence that PARP inhibitors have some activity as well in tumors with genomic instability. But the PARP maintenance is not approved for this indication. Offered treatment to patient and after discussion she declined and made decision to continue with observation only.    PET/CT done for  rising CA-125 showed hypermetabolic splenic lesion which is not new, just enlarged from previous. Case discussed with Dr. Mike Santana.  Patient is now s/p splenectomy done 10/21/19. Consistent with splenic involvement with high grade serous carcinoma.   Dr. Santana recommended Niraparib maintenance based on new data showing activity in HRD positive ovarian cancer and patient started on 11/8/19, required a dose reduction due to cytopenias.  Rising CA-125 noted in 11/2020. CT showed new RLL lung mass.   Follow-up PET done 11/20/20 showed RLL lung mass hypermetabolic and hypermetabolic periportal adenopathy.     Recommended 2nd line treatment with Carboplatin/Taxol/Avastin.   Started cycle 1 on 12/8/20. Neulasta added with cycle 2.   Completed Cycle 6 on 3/24/21.  PET from 3/30/21 showed complete resolution of lung mass and no other disease.    Avastin maintenance started on 4/13/21.   Hospitalized for TIA after her 4th cycle of Avastin. Work-up for stroke and cause otherwise negative. Patient started on baby ASA by neurology.  Discussed there are no clear guidelines for changing/discontinuing treatment for TIA/CVA related to Avastin.  Since her symptoms resolved and there were no residual effects, recommended to continue with Avastin since it is working well for her disease and since a baby ASA was added for prevention. She was also continued on Eliquis for h/o PE.  S/p mediport removal for fracture on 8/26/21.   Continue Avastin through peripheral veins for now.    Patient continues on Avastin maintenance without problems.  PET was done due to rising CA-125 although it remains still WNL. Scan shows lesion near superior portion of liver.  Will obtain CT-biopsy. Will discuss with IR, it is in a difficult location to biopsy.  F/u after biopsy is done.    If recurrence is confirmed, will discuss with GYN oncology Dr. Santana, likely will go back on Carbo/Taxol and consider maintenance with a different PARP, Lynparza.   HOLD  Avastin.     Labs today are good. Sodium now normal off HCTZ. CA-125 remains WNL but still slightly increased.     Vaccines/Boosters post-splenectomy: Menactra/Menveo every 5 years, Bexsero every 2-3 years, annual flu shot.  Last Bexsero was given 8/1/23.   Received Menactra/Menveo in 9/2024, 5 years after initial.     Continue Eliquis 5mg BID and ASA.  All questions answered.      Renetta Corral MD

## 2024-11-19 NOTE — PROGRESS NOTES
Patient ID: Shayna Ledezma is a 74 y.o. female.    Chief Complaint: Follow-up (Patient had labwork with Dr Renetta Corral. Patient's sodium has repeatedly been low. Would like her to be evaluated by PCP. Patient has occasional dizziness but otherwise no complaints. )    Shayna Ledezma is a 74 y.o. female, known to Dr Giang, presents today for a requested visit.  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 Currently on maintenance Avastin since April 2021. Patient is s/p tumor debulking KORI/BSO in March 2019 after completion of neoadjuvant carboplatin Taxol and then followed by adjuvant carboplatin Taxol. Being followed by oncology closely with CA-125's. Patient also has had a splenectomy in October 2019.  Also significant for TIA and incidental PE (Eliquis).  Since last visit patient reports she has been well without major illness.  Did have blood work with oncology noting acute hyponatremia with sodium level of 128.  Last visit patient had HCTZ 12.5 mg added to her lisinopril 20 mg due to mildly elevated blood pressures.  Considering abnormal labs, patient was recommended to follow up with her primary care team by her oncologist.  Today reports asymptomatic.  No chest pain, palpitations, shortness a breath, weakness, or near-syncope.  Denies restriction of sodium in her diet.  Does not check her blood pressure routinely.  Report her current blood pressure machine at home is > 5 years old, considering buying a new one.  Otherwise stable for today's visit       MCW: 02/05/24        Reference Range 11/04/24 14:47   Sodium 136 - 145 mmol/L 128 (L)   Potassium 3.5 - 5.1 mmol/L 4.2   Chloride 98 - 107 mmol/L 94 (L)   CO2 23 - 31 mmol/L 27   Anion Gap mEq/L 7.0   BUN 9.8 - 20.1 mg/dL 17.7   Creatinine 0.55 - 1.02 mg/dL 0.73   BUN/CREA  24   eGFR mL/min/1.73/m2 >60       MEDICAL HISTORY:    Past Medical History:   Diagnosis Date    Brain TIA     Cancer of uterine adnexa      HTN (hypertension)     Leukopenia due to antineoplastic chemotherapy     Localized osteoporosis without current pathological fracture 10/07/2024    Lung metastases     Malignant ascites     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     Ovarian cancer     Peritoneal carcinomatosis     TIA (transient ischemic attack)       Past Surgical History:   Procedure Laterality Date    APPENDECTOMY      BREAST BIOPSY      BREAST SURGERY  Circa     Exploratory /diagnostic     SECTION      COLONOSCOPY  2023    TOM CHACON    Endometrial polyp removal      Spleen operation      TONSILLECTOMY      TOTAL ABDOMINAL HYSTERECTOMY        Social History     Tobacco Use    Smoking status: Never    Smokeless tobacco: Never   Substance Use Topics    Alcohol use: Never    Drug use: Never          Health Maintenance Due   Topic Date Due    Hepatitis C Screening  Never done    TETANUS VACCINE  Never done    Shingles Vaccine (2 of 2) 2024    COVID-19 Vaccine ( season) 2024          Patient Care Team:  Sigrid Giang MD as PCP - General (Internal Medicine)  Tom Chacon MD as Consulting Physician (Gastroenterology)      Review of Systems   Constitutional:  Negative for fatigue and fever.   HENT:  Negative for congestion, rhinorrhea, sore throat and trouble swallowing.    Eyes:  Negative for redness and visual disturbance.   Respiratory:  Negative for cough, chest tightness and shortness of breath.    Cardiovascular:  Negative for chest pain and palpitations.   Gastrointestinal:  Negative for abdominal pain, constipation, diarrhea, nausea and vomiting.   Genitourinary:  Negative for dysuria, flank pain, frequency and urgency.   Musculoskeletal:  Negative for arthralgias, gait problem and myalgias.   Skin:  Negative for rash and wound.   Neurological:  Negative for facial asymmetry, speech difficulty, weakness and headaches.   All other systems reviewed and are negative.      Objective:   /78 (BP Location:  "Right arm, Patient Position: Sitting)   Pulse 70   Ht 5' 4" (1.626 m)   Wt 56.7 kg (125 lb)   SpO2 98%   BMI 21.46 kg/m²      Physical Exam  Constitutional:       General: She is not in acute distress.     Appearance: Normal appearance.   HENT:      Right Ear: Tympanic membrane, ear canal and external ear normal.      Left Ear: Tympanic membrane, ear canal and external ear normal.      Nose: Nose normal.      Mouth/Throat:      Mouth: Mucous membranes are moist.      Pharynx: Oropharynx is clear.   Eyes:      Extraocular Movements: Extraocular movements intact.      Conjunctiva/sclera: Conjunctivae normal.      Pupils: Pupils are equal, round, and reactive to light.   Cardiovascular:      Rate and Rhythm: Normal rate and regular rhythm.      Pulses: Normal pulses.      Heart sounds: Normal heart sounds. No murmur heard.     No gallop.   Pulmonary:      Effort: Pulmonary effort is normal.      Breath sounds: Normal breath sounds. No wheezing.   Abdominal:      General: Bowel sounds are normal. There is no distension.      Palpations: Abdomen is soft. There is no mass.      Tenderness: There is no abdominal tenderness. There is no guarding.   Musculoskeletal:         General: Normal range of motion.   Skin:     General: Skin is warm and dry.   Neurological:      Mental Status: She is alert. Mental status is at baseline.      Sensory: No sensory deficit.      Motor: No weakness.           Assessment:       ICD-10-CM ICD-9-CM   1. Primary hypertension  I10 401.9   2. Acute hyponatremia  E87.1 276.1   3. Transient ischemic attack  G45.9 435.9        Plan:     Problem List Items Addressed This Visit          Neuro    Transient ischemic attack (Chronic)     -stable   -currently on ASA, Eliquis, statin            Cardiac/Vascular    Primary hypertension - Primary (Chronic)     -acute on chronic   -/78   -does have acute hyponatremia associated with use of diuretics   -currently on lisinopril 20 mg and HCTZ 12.5 " mg, discontinue HCTZ 12.5 mg   -initiate trial amlodipine 2.5 mg daily   -BP log times 1-2 weeks, encouraged to drop off at the clinic   -encouraged to purchase new blood pressure cuff  -repeat CMP in a few weeks         Relevant Medications    amLODIPine (NORVASC) 2.5 MG tablet       Renal/    Acute hyponatremia      Latest Reference 11/04/24 14:47   Sodium 136 - 145 mmol/L 128 (L)   Potassium 3.5 - 5.1 mmol/L 4.2   Chloride 98 - 107 mmol/L 94 (L)   BUN 9.8 - 20.1 mg/dL 17.7   Creatinine 0.55 - 1.02 mg/dL 0.73     -acute   -currently on diuretic, discontinue HCTZ 12.5 mg   -see hypertension diagnosis   -repeat CMP in a few weeks               Follow up for Previously scheduled and PRN if need.   -plan specifics discussed above    Orders Placed This Encounter    amLODIPine (NORVASC) 2.5 MG tablet        Medication List with Changes/Refills   New Medications    AMLODIPINE (NORVASC) 2.5 MG TABLET    Take 1 tablet (2.5 mg total) by mouth once daily.   Current Medications    AA/PROT/LYSINE/METHIO/VIT C/B6 (A/G PRO ORAL)    Take by mouth Daily.    ALENDRONATE (FOSAMAX) 70 MG TABLET    Take 1 tablet (70 mg total) by mouth every 7 days.    APIXABAN (ELIQUIS) 5 MG TAB    Take 1 tablet (5 mg total) by mouth 2 (two) times daily.    ASPIRIN (ECOTRIN) 81 MG EC TABLET    Take 81 mg by mouth once daily.    ATORVASTATIN (LIPITOR) 40 MG TABLET    Take 1 tablet (40 mg total) by mouth once daily.    B COMPLEX VITAMINS CAPSULE    Take 1 capsule by mouth once daily.    CALCIUM CITRATE-VITAMIN D3 315-200 MG (CITRACAL+D) 315 MG-5 MCG (200 UNIT) PER TABLET    Take 1 tablet by mouth 2 (two) times daily.    CALCIUM-VITAMIN D3 (CALCIUM 500 + D) 500 MG-5 MCG (200 UNIT) PER TABLET    Take 1 tablet by mouth 2 (two) times daily with meals.    GLUTAMINE ORAL    Take 10 g by mouth Daily.    LISINOPRIL (PRINIVIL,ZESTRIL) 20 MG TABLET    Take 1 tablet (20 mg total) by mouth once daily.    PYRIDOXINE, VITAMIN B6, (B-6) 250 MG TAB    Take 250 mg by  mouth once daily.    SODIUM CHLORIDE 0.9% SOLP 100 ML WITH BEVACIZUMAB 25 MG/ML SOLN    Inject into the vein every 21 days.   Discontinued Medications    HYDROCHLOROTHIAZIDE (HYDRODIURIL) 12.5 MG TAB    Take 1 tablet (12.5 mg total) by mouth once daily.

## 2024-11-21 ENCOUNTER — HOSPITAL ENCOUNTER (OUTPATIENT)
Dept: RADIOLOGY | Facility: HOSPITAL | Age: 74
Discharge: HOME | End: 2024-11-21
Attending: INTERNAL MEDICINE
Payer: MEDICARE

## 2024-11-21 DIAGNOSIS — C57.4 MALIGNANT NEOPLASM OF UTERINE ADNEXA: ICD-10-CM

## 2024-11-21 DIAGNOSIS — C78.00 MALIGNANT NEOPLASM METASTATIC TO LUNG, UNSPECIFIED LATERALITY: ICD-10-CM

## 2024-11-21 DIAGNOSIS — C78.6 SECONDARY MALIGNANT NEOPLASM OF RETROPERITONEUM AND PERITONEUM: ICD-10-CM

## 2024-11-21 PROCEDURE — A9552 F18 FDG: HCPCS | Performed by: INTERNAL MEDICINE

## 2024-11-21 PROCEDURE — 78815 PET IMAGE W/CT SKULL-THIGH: CPT | Mod: TC

## 2024-11-21 RX ORDER — FLUDEOXYGLUCOSE F18 500 MCI/ML
10 INJECTION INTRAVENOUS
Status: COMPLETED | OUTPATIENT
Start: 2024-11-21 | End: 2024-11-21

## 2024-11-21 RX ADMIN — FLUDEOXYGLUCOSE F-18 10.4 MILLICURIE: 500 INJECTION INTRAVENOUS at 01:11

## 2024-11-25 ENCOUNTER — TELEPHONE (OUTPATIENT)
Dept: INTERNAL MEDICINE | Facility: CLINIC | Age: 74
End: 2024-11-25
Payer: MEDICARE

## 2024-11-25 ENCOUNTER — OFFICE VISIT (OUTPATIENT)
Dept: HEMATOLOGY/ONCOLOGY | Facility: CLINIC | Age: 74
End: 2024-11-25
Payer: MEDICARE

## 2024-11-25 ENCOUNTER — LAB VISIT (OUTPATIENT)
Dept: LAB | Facility: HOSPITAL | Age: 74
End: 2024-11-25
Attending: INTERNAL MEDICINE
Payer: MEDICARE

## 2024-11-25 VITALS
OXYGEN SATURATION: 99 % | BODY MASS INDEX: 21.2 KG/M2 | HEART RATE: 53 BPM | WEIGHT: 124.19 LBS | SYSTOLIC BLOOD PRESSURE: 146 MMHG | DIASTOLIC BLOOD PRESSURE: 84 MMHG | RESPIRATION RATE: 14 BRPM | TEMPERATURE: 98 F | HEIGHT: 64 IN

## 2024-11-25 DIAGNOSIS — C57.4 MALIGNANT NEOPLASM OF UTERINE ADNEXA: ICD-10-CM

## 2024-11-25 DIAGNOSIS — C78.6 SECONDARY MALIGNANT NEOPLASM OF RETROPERITONEUM AND PERITONEUM: ICD-10-CM

## 2024-11-25 DIAGNOSIS — C78.6 SECONDARY MALIGNANT NEOPLASM OF RETROPERITONEUM AND PERITONEUM: Primary | ICD-10-CM

## 2024-11-25 LAB
ALBUMIN SERPL-MCNC: 3.7 G/DL (ref 3.4–4.8)
ALBUMIN/GLOB SERPL: 1.3 RATIO (ref 1.1–2)
ALP SERPL-CCNC: 41 UNIT/L (ref 40–150)
ALT SERPL-CCNC: 21 UNIT/L (ref 0–55)
ANION GAP SERPL CALC-SCNC: 3 MEQ/L
AST SERPL-CCNC: 41 UNIT/L (ref 5–34)
BASOPHILS # BLD AUTO: 0.03 X10(3)/MCL
BASOPHILS NFR BLD AUTO: 0.4 %
BILIRUB SERPL-MCNC: 1.4 MG/DL
BUN SERPL-MCNC: 16.7 MG/DL (ref 9.8–20.1)
CALCIUM SERPL-MCNC: 9.9 MG/DL (ref 8.4–10.2)
CANCER AG125 SERPL-ACNC: 19.1 UNIT/ML (ref 0–35)
CHLORIDE SERPL-SCNC: 103 MMOL/L (ref 98–107)
CO2 SERPL-SCNC: 30 MMOL/L (ref 23–31)
CREAT SERPL-MCNC: 0.75 MG/DL (ref 0.55–1.02)
CREAT/UREA NIT SERPL: 22
EOSINOPHIL # BLD AUTO: 0.33 X10(3)/MCL (ref 0–0.9)
EOSINOPHIL NFR BLD AUTO: 4.5 %
ERYTHROCYTE [DISTWIDTH] IN BLOOD BY AUTOMATED COUNT: 12.9 % (ref 11.5–17)
GFR SERPLBLD CREATININE-BSD FMLA CKD-EPI: >60 ML/MIN/1.73/M2
GLOBULIN SER-MCNC: 2.8 GM/DL (ref 2.4–3.5)
GLUCOSE SERPL-MCNC: 79 MG/DL (ref 82–115)
HCT VFR BLD AUTO: 39 % (ref 37–47)
HGB BLD-MCNC: 12.8 G/DL (ref 12–16)
IMM GRANULOCYTES # BLD AUTO: 0.01 X10(3)/MCL (ref 0–0.04)
IMM GRANULOCYTES NFR BLD AUTO: 0.1 %
LYMPHOCYTES # BLD AUTO: 2.17 X10(3)/MCL (ref 0.6–4.6)
LYMPHOCYTES NFR BLD AUTO: 29.8 %
MCH RBC QN AUTO: 31.4 PG (ref 27–31)
MCHC RBC AUTO-ENTMCNC: 32.8 G/DL (ref 33–36)
MCV RBC AUTO: 95.6 FL (ref 80–94)
MONOCYTES # BLD AUTO: 1.11 X10(3)/MCL (ref 0.1–1.3)
MONOCYTES NFR BLD AUTO: 15.3 %
NEUTROPHILS # BLD AUTO: 3.62 X10(3)/MCL (ref 2.1–9.2)
NEUTROPHILS NFR BLD AUTO: 49.9 %
PLATELET # BLD AUTO: 355 X10(3)/MCL (ref 130–400)
PMV BLD AUTO: 8.2 FL (ref 7.4–10.4)
POTASSIUM SERPL-SCNC: 4.6 MMOL/L (ref 3.5–5.1)
PROT SERPL-MCNC: 6.5 GM/DL (ref 5.8–7.6)
PROT UR QL STRIP: ABNORMAL
RBC # BLD AUTO: 4.08 X10(6)/MCL (ref 4.2–5.4)
SODIUM SERPL-SCNC: 136 MMOL/L (ref 136–145)
WBC # BLD AUTO: 7.27 X10(3)/MCL (ref 4.5–11.5)

## 2024-11-25 PROCEDURE — 86304 IMMUNOASSAY TUMOR CA 125: CPT

## 2024-11-25 PROCEDURE — 85025 COMPLETE CBC W/AUTO DIFF WBC: CPT

## 2024-11-25 PROCEDURE — 3079F DIAST BP 80-89 MM HG: CPT | Mod: CPTII,S$GLB,, | Performed by: INTERNAL MEDICINE

## 2024-11-25 PROCEDURE — 80053 COMPREHEN METABOLIC PANEL: CPT

## 2024-11-25 PROCEDURE — 3288F FALL RISK ASSESSMENT DOCD: CPT | Mod: CPTII,S$GLB,, | Performed by: INTERNAL MEDICINE

## 2024-11-25 PROCEDURE — 99215 OFFICE O/P EST HI 40 MIN: CPT | Mod: S$GLB,,, | Performed by: INTERNAL MEDICINE

## 2024-11-25 PROCEDURE — 36415 COLL VENOUS BLD VENIPUNCTURE: CPT

## 2024-11-25 PROCEDURE — 3077F SYST BP >= 140 MM HG: CPT | Mod: CPTII,S$GLB,, | Performed by: INTERNAL MEDICINE

## 2024-11-25 PROCEDURE — 1160F RVW MEDS BY RX/DR IN RCRD: CPT | Mod: CPTII,S$GLB,, | Performed by: INTERNAL MEDICINE

## 2024-11-25 PROCEDURE — 1126F AMNT PAIN NOTED NONE PRSNT: CPT | Mod: CPTII,S$GLB,, | Performed by: INTERNAL MEDICINE

## 2024-11-25 PROCEDURE — 81005 URINALYSIS: CPT

## 2024-11-25 PROCEDURE — 1159F MED LIST DOCD IN RCRD: CPT | Mod: CPTII,S$GLB,, | Performed by: INTERNAL MEDICINE

## 2024-11-25 PROCEDURE — 99999 PR PBB SHADOW E&M-EST. PATIENT-LVL V: CPT | Mod: PBBFAC,,, | Performed by: INTERNAL MEDICINE

## 2024-11-25 PROCEDURE — 1101F PT FALLS ASSESS-DOCD LE1/YR: CPT | Mod: CPTII,S$GLB,, | Performed by: INTERNAL MEDICINE

## 2024-11-25 PROCEDURE — 4010F ACE/ARB THERAPY RXD/TAKEN: CPT | Mod: CPTII,S$GLB,, | Performed by: INTERNAL MEDICINE

## 2024-11-25 NOTE — TELEPHONE ENCOUNTER
External blood pressure log reviewed with last 3 days ranging from 01/16 to 132 systolic, 73-78 diastolic BP.  No longer on HCTZ due to hyponatremia, now on amlodipine 2.5 mg and lisinopril 20 mg daily.  Considering last 3 days are stable we will continue with 2.5 mg of amlodipine.  Should blood pressures have mild elevations, we will increase amlodipine at that time.

## 2024-12-09 ENCOUNTER — HOSPITAL ENCOUNTER (OUTPATIENT)
Dept: INTERVENTIONAL RADIOLOGY/VASCULAR | Facility: HOSPITAL | Age: 74
Discharge: HOME | End: 2024-12-09
Attending: INTERNAL MEDICINE
Payer: MEDICARE

## 2024-12-09 VITALS
DIASTOLIC BLOOD PRESSURE: 80 MMHG | TEMPERATURE: 98 F | BODY MASS INDEX: 21.04 KG/M2 | OXYGEN SATURATION: 99 % | WEIGHT: 123.25 LBS | RESPIRATION RATE: 21 BRPM | HEART RATE: 63 BPM | HEIGHT: 64 IN | SYSTOLIC BLOOD PRESSURE: 143 MMHG

## 2024-12-09 DIAGNOSIS — C57.4 MALIGNANT NEOPLASM OF UTERINE ADNEXA: ICD-10-CM

## 2024-12-09 DIAGNOSIS — C78.6 SECONDARY MALIGNANT NEOPLASM OF RETROPERITONEUM AND PERITONEUM: ICD-10-CM

## 2024-12-09 LAB
ALBUMIN SERPL-MCNC: 3.7 G/DL (ref 3.4–4.8)
ALBUMIN/GLOB SERPL: 1.2 RATIO (ref 1.1–2)
ALP SERPL-CCNC: 34 UNIT/L (ref 40–150)
ALT SERPL-CCNC: 24 UNIT/L (ref 0–55)
ANION GAP SERPL CALC-SCNC: 7 MEQ/L
AST SERPL-CCNC: 49 UNIT/L (ref 5–34)
BASOPHILS # BLD AUTO: 0.07 X10(3)/MCL
BASOPHILS NFR BLD AUTO: 1 %
BILIRUB SERPL-MCNC: 1.3 MG/DL
BUN SERPL-MCNC: 14.3 MG/DL (ref 9.8–20.1)
CALCIUM SERPL-MCNC: 8.9 MG/DL (ref 8.4–10.2)
CHLORIDE SERPL-SCNC: 101 MMOL/L (ref 98–107)
CO2 SERPL-SCNC: 28 MMOL/L (ref 23–31)
CREAT SERPL-MCNC: 0.62 MG/DL (ref 0.55–1.02)
CREAT/UREA NIT SERPL: 23
EOSINOPHIL # BLD AUTO: 0.25 X10(3)/MCL (ref 0–0.9)
EOSINOPHIL NFR BLD AUTO: 3.5 %
ERYTHROCYTE [DISTWIDTH] IN BLOOD BY AUTOMATED COUNT: 13.3 % (ref 11.5–17)
GFR SERPLBLD CREATININE-BSD FMLA CKD-EPI: >60 ML/MIN/1.73/M2
GLOBULIN SER-MCNC: 3.1 GM/DL (ref 2.4–3.5)
GLUCOSE SERPL-MCNC: 85 MG/DL (ref 82–115)
HCT VFR BLD AUTO: 38.9 % (ref 37–47)
HGB BLD-MCNC: 13 G/DL (ref 12–16)
IMM GRANULOCYTES # BLD AUTO: 0.01 X10(3)/MCL (ref 0–0.04)
IMM GRANULOCYTES NFR BLD AUTO: 0.1 %
LYMPHOCYTES # BLD AUTO: 2.25 X10(3)/MCL (ref 0.6–4.6)
LYMPHOCYTES NFR BLD AUTO: 31.9 %
MCH RBC QN AUTO: 31.3 PG (ref 27–31)
MCHC RBC AUTO-ENTMCNC: 33.4 G/DL (ref 33–36)
MCV RBC AUTO: 93.7 FL (ref 80–94)
MONOCYTES # BLD AUTO: 1.13 X10(3)/MCL (ref 0.1–1.3)
MONOCYTES NFR BLD AUTO: 16 %
NEUTROPHILS # BLD AUTO: 3.35 X10(3)/MCL (ref 2.1–9.2)
NEUTROPHILS NFR BLD AUTO: 47.5 %
NRBC BLD AUTO-RTO: 0 %
PLATELET # BLD AUTO: 384 X10(3)/MCL (ref 130–400)
PMV BLD AUTO: 8.9 FL (ref 7.4–10.4)
POC PTINR: 0.9 (ref 0.9–1.2)
POTASSIUM SERPL-SCNC: 4.7 MMOL/L (ref 3.5–5.1)
PROT SERPL-MCNC: 6.8 GM/DL (ref 5.8–7.6)
RBC # BLD AUTO: 4.15 X10(6)/MCL (ref 4.2–5.4)
SAMPLE: NORMAL
SODIUM SERPL-SCNC: 136 MMOL/L (ref 136–145)
WBC # BLD AUTO: 7.06 X10(3)/MCL (ref 4.5–11.5)

## 2024-12-09 PROCEDURE — 88161 CYTOPATH SMEAR OTHER SOURCE: CPT

## 2024-12-09 PROCEDURE — 80053 COMPREHEN METABOLIC PANEL: CPT | Performed by: RADIOLOGY

## 2024-12-09 PROCEDURE — 63600175 PHARM REV CODE 636 W HCPCS: Performed by: RADIOLOGY

## 2024-12-09 PROCEDURE — 36415 COLL VENOUS BLD VENIPUNCTURE: CPT | Mod: 91 | Performed by: RADIOLOGY

## 2024-12-09 PROCEDURE — 88307 TISSUE EXAM BY PATHOLOGIST: CPT | Performed by: INTERNAL MEDICINE

## 2024-12-09 PROCEDURE — 47000 NEEDLE BIOPSY OF LIVER PERQ: CPT

## 2024-12-09 PROCEDURE — 85025 COMPLETE CBC W/AUTO DIFF WBC: CPT | Performed by: RADIOLOGY

## 2024-12-09 RX ORDER — LIDOCAINE HYDROCHLORIDE 20 MG/ML
INJECTION, SOLUTION INFILTRATION; PERINEURAL
Status: COMPLETED | OUTPATIENT
Start: 2024-12-09 | End: 2024-12-09

## 2024-12-09 RX ORDER — FENTANYL CITRATE 50 UG/ML
INJECTION, SOLUTION INTRAMUSCULAR; INTRAVENOUS
Status: DISCONTINUED
Start: 2024-12-09 | End: 2024-12-09 | Stop reason: WASHOUT

## 2024-12-09 RX ORDER — MIDAZOLAM HYDROCHLORIDE 2 MG/2ML
INJECTION, SOLUTION INTRAMUSCULAR; INTRAVENOUS
Status: DISCONTINUED
Start: 2024-12-09 | End: 2024-12-09 | Stop reason: WASHOUT

## 2024-12-09 RX ADMIN — LIDOCAINE HYDROCHLORIDE 5 ML: 20 INJECTION, SOLUTION INFILTRATION; PERINEURAL at 11:12

## 2024-12-09 NOTE — DISCHARGE SUMMARY
Radiology Post-Procedure Note    Pre Op Diagnosis: Liver Mass with h/o Ovarian Cancer    Post Op Diagnosis: Same    Secondary Diagnoses:   Problem List Items Addressed This Visit       Secondary malignant neoplasm of retroperitoneum and peritoneum    Relevant Orders    IR Biopsy Liver    Malignant neoplasm of uterine adnexa    Relevant Orders    IR Biopsy Liver        Procedure: CT Guided Liver Mass Biopsy    Procedure performed by: Eduardo Zapien MD    Assistant: None    Written Informed Consent Obtained: Yes    Specimen Removed: 18g core x 10    Estimated Blood Loss: <10 cc    Condition: Stable    Outcome: The patient tolerated the procedure well and was without complications.    For further details please see the imaging report associated.    Disposition: Home or Self Care    Follow Up: With primary care provider    Discharge Instructions:  No discharge procedures on file.       Time Spent On Discharge: 2 minutes

## 2024-12-09 NOTE — PLAN OF CARE
Bed rest completed patient ambulating without difficulty eating and voided . Pt wheeled out of CVSS via wheelchair per staff member.

## 2024-12-09 NOTE — INTERVAL H&P NOTE
The patient has been examined and the H&P has been reviewed:    I concur with the findings and no changes have occurred since H&P was written. 75 yo F with h/o ovarian cancer and liver mass presents for liver mass biopsy.        There are no hospital problems to display for this patient.

## 2024-12-10 PROBLEM — C78.01 MALIGNANT NEOPLASM METASTATIC TO RIGHT LUNG: Status: ACTIVE | Noted: 2024-03-08

## 2024-12-10 PROBLEM — C78.7 SECONDARY MALIGNANT NEOPLASM OF LIVER: Status: ACTIVE | Noted: 2024-12-10

## 2024-12-10 NOTE — PROGRESS NOTES
Subjective:       Patient ID: Shayna Ledezma is a 74 y.o. female.  GI: Dr. Gamaliel Carlos  GYN ONC at [facility]: Dr. Mike Santana    1. 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 Pulmonary Embolism (Incidental on CT)--19    Procedure/pathology:   1. Paracentesis with removal of 5L of fluid done 18--serous carcinoma, high grade, c/w ovarian primary, PAX-8, CK7 and MOC-31 positive, CDX-2, CK-20 and Calretinin-negative.  2. S/p Exploratory Lap, radical tumor debulking including total abdominal hysterectomy, bilateral salpingo-oophorectomy, bilateral pelvic lymph node sampling, appendectomy, mobilization of the left ureter, complete gross resection of the disease with removal of mesenteric disease, as well as omental disese and parasplenic disease by Dr. Santana 3/18/19--Metastatic high grade serous carcinoma. Tissue/organ involvement: right ovary, appendectomy, omentum, mesentery (including small bowel mesentery) and multiple other sites designated: periumbilical, perisplenic, transverse colon, splenic flexure, perirectal, left periureteral. 2 lymph nodes negative. [id number]. FIGO stage IIIC. Myriad somatic instability testing positive for genomic instability, negative BRCA1 and BRCA2 tumor testing.  3. Splenectomy done 10/21/19--splenic involvement with metastatic high grade serous carcinoma, 5 benign lymph nodes. Caris testing showed LAM, NTRK1/2/3 negative, TMB low, BRCA1/2 negative, ER/OR negative, CATHY negative, SPEN pathogenic variant Exon 11, TP53 pathogenic variant Exon 5, PD-L1 positive CPS 15, Genomic ELODIA high.  4. CT-biopsy done 24--non-diagnostic, no malignancy cells present.   Imagin. CT A/P w/ and w/o contrast done at [facility] 18--large volume ascites with multiple peritoneal implants, right pericolic gutter implant measures 2.5X3.3cm, LUQ omental/mesenteric implant measures 3X3.6cm, omental carcinomatosis, extensive  enhancing soft tissue surrounding sigmoid colon vs. large sigmoid mass, left ovarian cystic lesion appears to have some internal septations measures 3.5X3.8cm, enhancing periportal soft tissue density likely lymphadenopathy with some narrowing of the portal vein noted, but without internal filling defect, borderline splenomegaly, subtle solid lesion w/n central spleen measures 2.4X2.5cm, likely metastatic, with peripheral area of diminished enhancement suggesting splenic infarct, soft tissue implant abutting gallbladder measures 2X2.2cm, no right adnexal mass, trace bilateral layering pleural fluid, small moderate-sized hiatal hernia, few borderline enlarged lymph nodes w/n right epicardial fat pad.  2. CT of chest on 1/11/19--Some prominent right cardiophrenic lymph nodes are nonspecific and can be followed on future surveillance scans. Otherwise no CT evidence of metastatic disease to the chest. While exam was not tailored for evaluation of the pulmonary arteries I suspect there is pulmonary thromboembolic disease. Peritoneal carcinomatosis seen in the upper abdomen. Critical Result: Pulmonary Embolus.  3. CT C/A/P 3/4/19--Positive response to therapy. No new or progressive metastatic disease in the chest, abdomen or pelvis. Stable to improved findings include smaller pericardial lymph nodes, andrew hepatis adenopathy, peritoneal carcinomatosis, smaller left adnexal lesion; no evidence of PE within limitations of the study.  5. CT PE protocol chest/A/P 6/11/19--No PE, improved pericardial lymph node with minimal size on current examination, improved peritoneal carcinomatosis and left adnexal implant, splenic ischemia evolved into small infarct, size improvement of one of splenic hypodense lesion and mild size increase of second hypodense lesion, favored to be benign/cystic, no new findings.  6. PET/CT 9/25/19--s/p hysterectomy and bilateral oophorectomy, no evidence for locally recurrent/residual disease, intensely  hypermetabolic hypodense lesion within the spleen 3.5X3.3cm concerning for metastatic focus. No other abnormal focus of disease.  7. CT C/A/P 11/13/20--Right lower lobe 3.3 cm mass is presumed metastatic, otherwise no evident residual, recurrent or metastatic disease.   8. PET/CT 11/20/20--Hypermetabolic right lung base lesion and suspected metastatic periportal adenopathy, no additional sites of FDG-avid otherwise aggressive appearing pathology through the neck, chest, abdomen, or pelvis.  9. PET/CT 1/21/21--Interval decrease in size of the right lower lobe mass with decreased FDG uptake, now measures 1.7 x 2.2 cm (previous 3.3 x 3.1 cm), resolution of FDG uptake in the suspected periportal lymph node which is not identifiable on noncontrast CT. No evidence of new or progressive hypermetabolic metastatic disease.  10. PET/CT 3/30/21--Resolved right lower lung lobe hypermetabolic mass. Otherwise, no interval change or evidence of residual disease, recurrence or new metastatic lesion.  11. PET/CT 11/21/24--new FDG uptake in right hepatic lobe concerning for malignancy, measures 3.5cm with SUV 9.5, could represent a peritoneal implant vs. Hepatic metastasis.     Procedures:   1. Paracentesis on 12/28/18 with removal of 5 Liters.  2. Paracentesis on 01/07/19 with removal of 3.5 Liters.  3. Paracentesis on 02/06/19 with removal of 2.5 Liters.  4. Mediport placed 12/2020 by Dr. Serge Gentile--removed 8/26/21 due to mediport fracture.    Germline genetic testing done by [facility] 1/29/19--negative for BRCA1/2, two (2) variants of uncertain significance (VUS): CATHY p.Q9554Y/p.N9719B and MLH1 p.P603L.     :  12/19/18 1102  11/10/20   42.9  12/08/20 79.5  01/05/21 28.9  01/26/21 12.8  02/18/21 11.6  03/15/22--10.3  04/26/22--9.6  05/16/22--10.2  06/07/22--10.1  07/19/22--11.6  08/08/22--12.5  09/19/22--12.7  10/31/22--13.5  01/03/23--12.0  02/13/23--14.0  03/03/23--13.9  04/17/23--14.5  05/26/23--14.6  07/31/23--13.5  11/13/23--15.6  12/04/23--16.8  01/16/24--13.7  02/26/24--18.6  03/18/24--16.3  04/29/24--17.0  06/10/24--17.0  08/12/24--17.3  09/23/24--18.3  10/14/24--18.6  11/25/24--19.1    Treatment History:  1. Neoadjuvant Carboplatin/Taxol every 3 weeks x 3 cycles. 1/15/19 - 2/26/19. Neulasta added.   2. Surgery--tumor debulking KORI/BSO 3/18/19.  3. Adjuvant Carboplatin/Taxol x 3 additions cycles 4/9/19--5/21/19.  4. Splenectomy 10/21/19.  5. Niraparib maintenance (dose reduction to 100mg daily for cytopenias) 11/8/19--12/1/20 (stopped due to progression).  6. Carboplatin/Paclitaxel/Avastin X 6 cycles completed 12/8/21--3/24/21 (complete response).    Current Treatment:  1. Eliquis BID for incidental PE on imaging 1/2019  2. Avastin maintenance started on 4/13/21.   Cycle 64 due 11/26/24, delayed will given 12/17/24    CC:  Chief Complaint   Patient presents with    Results     HPI   Patient is here for follow-up of ovarian cancer on maintenance Avastin. Recent PET showed a new liver lesion. Biopsy was attempted but was non-diagnostic. CA-125 is not elevated. I discussed with patient close observation and re-biopsy. She is in agreement. She has no symptoms.       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      alendronate (FOSAMAX) 70 MG tablet Take 1 tablet (70 mg total) by mouth every 7 days. 4 tablet 11    amLODIPine (NORVASC) 5 MG tablet Take 1 tablet (5 mg total) by mouth once daily. 30 tablet 11    apixaban (ELIQUIS) 5 mg Tab Take 1 tablet (5 mg total) by mouth 2 (two) times daily. 180 tablet 3    aspirin (ECOTRIN) 81 MG EC tablet Take 81 mg by mouth once daily.      atorvastatin (LIPITOR) 40 MG tablet Take 1 tablet (40 mg total) by mouth once daily. 90 tablet 3    b complex vitamins capsule Take 1 capsule by mouth once daily.      calcium citrate-vitamin D3 315-200 mg (CITRACAL+D) 315 mg-5 mcg (200 unit) per tablet Take 1 tablet by mouth 2 (two) times daily.      calcium-vitamin D3 (CALCIUM 500 + D) 500 mg-5 mcg (200 unit) per tablet Take 1 tablet by mouth 2 (two) times daily with meals.      GLUTAMINE ORAL Take 10 g by mouth Daily.      lisinopriL (PRINIVIL,ZESTRIL) 20 MG tablet Take 1 tablet (20 mg total) by mouth once daily. 30 tablet 3    pyridoxine, vitamin B6, (B-6) 250 MG Tab Take 250 mg by mouth once daily.      sodium chloride 0.9% SolP 100 mL with bevacizumab 25 mg/mL Soln Inject into the vein every 21 days.       No current facility-administered medications on file prior to visit.      Review of Systems   Constitutional:  Negative for appetite change and unexpected weight change.   HENT:  Negative for mouth sores and nosebleeds.    Eyes:  Negative for visual disturbance.   Respiratory:  Negative for cough and shortness of breath.    Cardiovascular:  Negative for chest pain.   Gastrointestinal:  Negative for abdominal pain and diarrhea.   Genitourinary:  Negative for frequency.   Musculoskeletal:  Negative for back pain.    Integumentary:  Negative for rash.   Neurological:  Negative for headaches.   Hematological:  Negative for adenopathy.   Psychiatric/Behavioral:  The patient is not nervous/anxious.         Vitals:    12/16/24 1011   BP: 135/77   Pulse: 64   Resp: 18   Temp: 98.2 °F (36.8 °C)         Wt Readings from Last 3 Encounters:   12/16/24 1011 55.8 kg (123 lb)   12/09/24 0838 55.9 kg (123 lb 3.8 oz)   11/25/24 1014 56.3 kg (124 lb 3.2 oz)     Physical Exam  Vitals reviewed.   Constitutional:       Appearance: Normal appearance. She is normal weight.   HENT:      Head: Normocephalic.   Eyes:      General: Lids are normal. Vision grossly intact.      Extraocular Movements: Extraocular movements intact.      Conjunctiva/sclera: Conjunctivae normal.   Cardiovascular:      Rate and Rhythm: Normal rate and regular rhythm.      Pulses: Normal pulses.      Heart sounds: Normal heart sounds, S1 normal and S2 normal.   Pulmonary:      Effort: Pulmonary effort is normal.      Breath sounds: Normal breath sounds.   Abdominal:      General: Abdomen is flat. Bowel sounds are normal. There is no distension.      Palpations: Abdomen is soft.      Tenderness: There is no abdominal tenderness.   Musculoskeletal:      Cervical back: Normal range of motion and neck supple.      Right lower leg: No edema.      Left lower leg: No edema.   Skin:     General: Skin is warm and moist.      Capillary Refill: Capillary refill takes less than 2 seconds.      Comments: Left CW mediport removed, site intact   Neurological:      General: No focal deficit present.      Mental Status: She is alert and oriented to person, place, and time.   Psychiatric:         Attention and Perception: Attention normal.         Mood and Affect: Mood normal.         Speech: Speech normal.         Behavior: Behavior normal. Behavior is cooperative.         Cognition and Memory: Cognition normal.         Judgment: Judgment normal.       No visits with results within 1 Week(s)  from this visit.   Latest known visit with results is:   Hospital Outpatient Visit on 12/09/2024   Component Date Value    Sodium 12/09/2024 136     Potassium 12/09/2024 4.7     Chloride 12/09/2024 101     CO2 12/09/2024 28     Glucose 12/09/2024 85     Blood Urea Nitrogen 12/09/2024 14.3     Creatinine 12/09/2024 0.62     Calcium 12/09/2024 8.9     Protein Total 12/09/2024 6.8     Albumin 12/09/2024 3.7     Globulin 12/09/2024 3.1     Albumin/Globulin Ratio 12/09/2024 1.2     Bilirubin Total 12/09/2024 1.3     ALP 12/09/2024 34 (L)     ALT 12/09/2024 24     AST 12/09/2024 49 (H)     eGFR 12/09/2024 >60     Anion Gap 12/09/2024 7.0     BUN/Creatinine Ratio 12/09/2024 23     WBC 12/09/2024 7.06     RBC 12/09/2024 4.15 (L)     Hgb 12/09/2024 13.0     Hct 12/09/2024 38.9     MCV 12/09/2024 93.7     MCH 12/09/2024 31.3 (H)     MCHC 12/09/2024 33.4     RDW 12/09/2024 13.3     Platelet 12/09/2024 384     MPV 12/09/2024 8.9     Neut % 12/09/2024 47.5     Lymph % 12/09/2024 31.9     Mono % 12/09/2024 16.0     Eos % 12/09/2024 3.5     Basophil % 12/09/2024 1.0     Lymph # 12/09/2024 2.25     Neut # 12/09/2024 3.35     Mono # 12/09/2024 1.13     Eos # 12/09/2024 0.25     Baso # 12/09/2024 0.07     IG# 12/09/2024 0.01     IG% 12/09/2024 0.1     NRBC% 12/09/2024 0.0     Pathology Result 12/09/2024      POC PTINR 12/09/2024 0.9     Sample 12/09/2024 unknown           Assessment:       1. Secondary malignant neoplasm of liver    2. Malignant neoplasm of uterine adnexa    3. Carcinoma of fallopian tube, unspecified laterality      Plan:     Patient with left ovarian cancer diagnosed 12/26/18, appears to be stage IIIC vs. IV with diffuse peritoneal disease, possible splenic involvement, epicardial lymph nodes and bilateral pleural effusions.  Patient seen and evaluated by Dr. Mike Santana at [facility] in [place].   Treatment recommended upfront with chemotherapy Carboplatin/Paclitaxel x 3 cycles.  Completed Cycle 3 on  2/26/19.   She underwent total abdominal hysterectomy, BSO and tumor debulking on 3/18/19 with Dr. Santana with complete gross resection of disease. FIGO Stage IIIC papillary serous fallopian tube cancer.   Patient resumed chemotherapy with cycle 4 on 4/9/19. Completed cycle 6 on 5/21/19.    Testing on tumor was positive for genomic instability but negative for BRCA mutation.  Per NCCN guidelines, maintenance can be considered for BRCA mutated germline category 1 and somatic category 2A. There is some evidence that PARP inhibitors have some activity as well in tumors with genomic instability. But the PARP maintenance is not approved for this indication. Offered treatment to patient and after discussion she declined and made decision to continue with observation only.    PET/CT done for rising CA-125 showed hypermetabolic splenic lesion which is not new, just enlarged from previous. Case discussed with Dr. Mike Santana.  Patient is now s/p splenectomy done 10/21/19. Consistent with splenic involvement with high grade serous carcinoma.   Dr. Santana recommended Niraparib maintenance based on new data showing activity in HRD positive ovarian cancer and patient started on 11/8/19, required a dose reduction due to cytopenias.  Rising CA-125 noted in 11/2020. CT showed new RLL lung mass.   Follow-up PET done 11/20/20 showed RLL lung mass hypermetabolic and hypermetabolic periportal adenopathy.     Recommended 2nd line treatment with Carboplatin/Taxol/Avastin.   Started cycle 1 on 12/8/20. Neulasta added with cycle 2.   Completed Cycle 6 on 3/24/21.  PET from 3/30/21 showed complete resolution of lung mass and no other disease.    Avastin maintenance started on 4/13/21.   Hospitalized for TIA after her 4th cycle of Avastin. Work-up for stroke and cause otherwise negative. Patient started on baby ASA by neurology.  Discussed there are no clear guidelines for changing/discontinuing treatment for TIA/CVA related to  Avastin.  Since her symptoms resolved and there were no residual effects, recommended to continue with Avastin since it is working well for her disease and since a baby ASA was added for prevention. She was also continued on Eliquis for h/o PE.  S/p mediport removal for fracture on 8/26/21.   Continue Avastin through peripheral veins for now.  PET was done due to rising CA-125 although it had remained WNL. Scan showed lesion near superior portion of liver.  CT-biopsy attempted but was non-diagnostic.     Since CA-125 is not outside of normal limits.   There is no way to tell how long the liver lesion has been present since she has not had any imaging in awhile.  I have recommended to monitor closely and re-biopsy. Plan for short-term follow-up PET/CT in 2 months and continue with Avastin for now.    Proceed with Avastin C64 tomorrow.   Schedule labs in 3 weeks and treatment on 1/7/25.  F/u in 6 weeks with repeat labs.   Will repeat PET/CT before RTC.     If recurrence is confirmed, will discuss with GYN oncology Dr. Santana, likely will go back on Carbo/Taxol and consider maintenance with a different PARP, Lynparza.     Vaccines/Boosters post-splenectomy: Menactra/Menveo every 5 years, Bexsero every 2-3 years, annual flu shot.  Last Bexsero was given 8/1/23.   Received Menactra/Menveo in 9/2024, 5 years after initial.     Continue Eliquis 5mg BID and ASA.  All questions answered.      Renetta Corral MD

## 2024-12-12 ENCOUNTER — TELEPHONE (OUTPATIENT)
Dept: INTERNAL MEDICINE | Facility: CLINIC | Age: 74
End: 2024-12-12
Payer: MEDICARE

## 2024-12-12 DIAGNOSIS — I10 PRIMARY HYPERTENSION: ICD-10-CM

## 2024-12-12 RX ORDER — AMLODIPINE BESYLATE 5 MG/1
5 TABLET ORAL DAILY
Qty: 30 TABLET | Refills: 11 | Status: SHIPPED | OUTPATIENT
Start: 2024-12-12 | End: 2025-12-12

## 2024-12-12 NOTE — TELEPHONE ENCOUNTER
Most recent BP log reviewed, noted some elevations in the 150s systolic range.  Currently on amlodipine 2.5 mg daily, increase to 5 mg daily.  Okay to utilize two 2.5 mg tablets to equal out 5 mg total until prior prescription is completed.

## 2024-12-13 LAB — PSYCHE PATHOLOGY RESULT: NORMAL

## 2024-12-16 ENCOUNTER — OFFICE VISIT (OUTPATIENT)
Dept: HEMATOLOGY/ONCOLOGY | Facility: CLINIC | Age: 74
End: 2024-12-16
Payer: MEDICARE

## 2024-12-16 VITALS
TEMPERATURE: 98 F | WEIGHT: 123 LBS | SYSTOLIC BLOOD PRESSURE: 135 MMHG | HEIGHT: 64 IN | DIASTOLIC BLOOD PRESSURE: 77 MMHG | OXYGEN SATURATION: 97 % | BODY MASS INDEX: 21 KG/M2 | HEART RATE: 64 BPM | RESPIRATION RATE: 18 BRPM

## 2024-12-16 DIAGNOSIS — C57.00 CARCINOMA OF FALLOPIAN TUBE, UNSPECIFIED LATERALITY: ICD-10-CM

## 2024-12-16 DIAGNOSIS — C78.7 SECONDARY MALIGNANT NEOPLASM OF LIVER: Primary | ICD-10-CM

## 2024-12-16 DIAGNOSIS — C78.6 SECONDARY MALIGNANT NEOPLASM OF RETROPERITONEUM AND PERITONEUM: ICD-10-CM

## 2024-12-16 DIAGNOSIS — C57.4 MALIGNANT NEOPLASM OF UTERINE ADNEXA: ICD-10-CM

## 2024-12-16 LAB — PROT UR QL STRIP: ABNORMAL

## 2024-12-16 PROCEDURE — 1160F RVW MEDS BY RX/DR IN RCRD: CPT | Mod: CPTII,S$GLB,, | Performed by: INTERNAL MEDICINE

## 2024-12-16 PROCEDURE — 4010F ACE/ARB THERAPY RXD/TAKEN: CPT | Mod: CPTII,S$GLB,, | Performed by: INTERNAL MEDICINE

## 2024-12-16 PROCEDURE — 99215 OFFICE O/P EST HI 40 MIN: CPT | Mod: S$GLB,,, | Performed by: INTERNAL MEDICINE

## 2024-12-16 PROCEDURE — 1159F MED LIST DOCD IN RCRD: CPT | Mod: CPTII,S$GLB,, | Performed by: INTERNAL MEDICINE

## 2024-12-16 PROCEDURE — 3075F SYST BP GE 130 - 139MM HG: CPT | Mod: CPTII,S$GLB,, | Performed by: INTERNAL MEDICINE

## 2024-12-16 PROCEDURE — 99999 PR PBB SHADOW E&M-EST. PATIENT-LVL IV: CPT | Mod: PBBFAC,,, | Performed by: INTERNAL MEDICINE

## 2024-12-16 PROCEDURE — 1101F PT FALLS ASSESS-DOCD LE1/YR: CPT | Mod: CPTII,S$GLB,, | Performed by: INTERNAL MEDICINE

## 2024-12-16 PROCEDURE — 3288F FALL RISK ASSESSMENT DOCD: CPT | Mod: CPTII,S$GLB,, | Performed by: INTERNAL MEDICINE

## 2024-12-16 PROCEDURE — 81005 URINALYSIS: CPT | Performed by: INTERNAL MEDICINE

## 2024-12-16 PROCEDURE — 1126F AMNT PAIN NOTED NONE PRSNT: CPT | Mod: CPTII,S$GLB,, | Performed by: INTERNAL MEDICINE

## 2024-12-16 PROCEDURE — 3078F DIAST BP <80 MM HG: CPT | Mod: CPTII,S$GLB,, | Performed by: INTERNAL MEDICINE

## 2024-12-16 RX ORDER — DIPHENHYDRAMINE HYDROCHLORIDE 50 MG/ML
25 INJECTION INTRAMUSCULAR; INTRAVENOUS
Status: CANCELLED | OUTPATIENT
Start: 2024-12-17

## 2024-12-16 RX ORDER — SODIUM CHLORIDE 0.9 % (FLUSH) 0.9 %
10 SYRINGE (ML) INJECTION
Status: CANCELLED | OUTPATIENT
Start: 2024-12-17

## 2024-12-16 RX ORDER — DIPHENHYDRAMINE HYDROCHLORIDE 50 MG/ML
50 INJECTION INTRAMUSCULAR; INTRAVENOUS ONCE AS NEEDED
Status: CANCELLED | OUTPATIENT
Start: 2024-12-17

## 2024-12-16 RX ORDER — HEPARIN 100 UNIT/ML
500 SYRINGE INTRAVENOUS
Status: CANCELLED | OUTPATIENT
Start: 2024-12-17

## 2024-12-16 RX ORDER — ACETAMINOPHEN 325 MG/1
650 TABLET ORAL
Status: CANCELLED | OUTPATIENT
Start: 2024-12-17

## 2024-12-16 RX ORDER — EPINEPHRINE 0.3 MG/.3ML
0.3 INJECTION SUBCUTANEOUS ONCE AS NEEDED
Status: CANCELLED | OUTPATIENT
Start: 2024-12-17

## 2024-12-17 ENCOUNTER — INFUSION (OUTPATIENT)
Dept: INFUSION THERAPY | Facility: HOSPITAL | Age: 74
End: 2024-12-17
Attending: INTERNAL MEDICINE
Payer: MEDICARE

## 2024-12-17 VITALS — HEART RATE: 71 BPM | SYSTOLIC BLOOD PRESSURE: 151 MMHG | DIASTOLIC BLOOD PRESSURE: 80 MMHG

## 2024-12-17 DIAGNOSIS — C78.6 SECONDARY MALIGNANT NEOPLASM OF RETROPERITONEUM AND PERITONEUM: Primary | ICD-10-CM

## 2024-12-17 PROCEDURE — 25000003 PHARM REV CODE 250: Performed by: INTERNAL MEDICINE

## 2024-12-17 PROCEDURE — 63600175 PHARM REV CODE 636 W HCPCS: Performed by: INTERNAL MEDICINE

## 2024-12-17 PROCEDURE — 96413 CHEMO IV INFUSION 1 HR: CPT

## 2024-12-17 PROCEDURE — 96375 TX/PRO/DX INJ NEW DRUG ADDON: CPT

## 2024-12-17 RX ORDER — EPINEPHRINE 0.3 MG/.3ML
0.3 INJECTION SUBCUTANEOUS ONCE AS NEEDED
Status: DISCONTINUED | OUTPATIENT
Start: 2024-12-17 | End: 2024-12-17 | Stop reason: HOSPADM

## 2024-12-17 RX ORDER — DIPHENHYDRAMINE HYDROCHLORIDE 50 MG/ML
50 INJECTION INTRAMUSCULAR; INTRAVENOUS ONCE AS NEEDED
Status: DISCONTINUED | OUTPATIENT
Start: 2024-12-17 | End: 2024-12-17 | Stop reason: HOSPADM

## 2024-12-17 RX ORDER — ACETAMINOPHEN 325 MG/1
650 TABLET ORAL
Status: DISCONTINUED | OUTPATIENT
Start: 2024-12-17 | End: 2024-12-17 | Stop reason: HOSPADM

## 2024-12-17 RX ORDER — HEPARIN 100 UNIT/ML
500 SYRINGE INTRAVENOUS
Status: DISCONTINUED | OUTPATIENT
Start: 2024-12-17 | End: 2024-12-17 | Stop reason: HOSPADM

## 2024-12-17 RX ORDER — SODIUM CHLORIDE 0.9 % (FLUSH) 0.9 %
10 SYRINGE (ML) INJECTION
Status: DISCONTINUED | OUTPATIENT
Start: 2024-12-17 | End: 2024-12-17 | Stop reason: HOSPADM

## 2024-12-17 RX ORDER — DIPHENHYDRAMINE HYDROCHLORIDE 50 MG/ML
25 INJECTION INTRAMUSCULAR; INTRAVENOUS
Status: COMPLETED | OUTPATIENT
Start: 2024-12-17 | End: 2024-12-17

## 2024-12-17 RX ADMIN — DIPHENHYDRAMINE HYDROCHLORIDE 25 MG: 50 INJECTION INTRAMUSCULAR; INTRAVENOUS at 03:12

## 2024-12-17 RX ADMIN — SODIUM CHLORIDE 800 MG: 9 INJECTION, SOLUTION INTRAVENOUS at 03:12

## 2024-12-17 RX ADMIN — SODIUM CHLORIDE: 9 INJECTION, SOLUTION INTRAVENOUS at 03:12

## 2024-12-23 DIAGNOSIS — I26.99 PULMONARY EMBOLISM, UNSPECIFIED CHRONICITY, UNSPECIFIED PULMONARY EMBOLISM TYPE, UNSPECIFIED WHETHER ACUTE COR PULMONALE PRESENT: ICD-10-CM

## 2024-12-23 RX ORDER — APIXABAN 5 MG/1
5 TABLET, FILM COATED ORAL 2 TIMES DAILY
Qty: 180 TABLET | Refills: 0 | Status: SHIPPED | OUTPATIENT
Start: 2024-12-23

## 2025-01-06 ENCOUNTER — LAB VISIT (OUTPATIENT)
Dept: LAB | Facility: HOSPITAL | Age: 75
End: 2025-01-06
Attending: INTERNAL MEDICINE
Payer: MEDICARE

## 2025-01-06 DIAGNOSIS — C57.4 MALIGNANT NEOPLASM OF UTERINE ADNEXA: ICD-10-CM

## 2025-01-06 DIAGNOSIS — C78.6 SECONDARY MALIGNANT NEOPLASM OF RETROPERITONEUM AND PERITONEUM: ICD-10-CM

## 2025-01-06 DIAGNOSIS — C57.4 MALIGNANT NEOPLASM OF UTERINE ADNEXA: Primary | ICD-10-CM

## 2025-01-06 DIAGNOSIS — C57.00 CARCINOMA OF FALLOPIAN TUBE, UNSPECIFIED LATERALITY: ICD-10-CM

## 2025-01-06 DIAGNOSIS — C78.7 SECONDARY MALIGNANT NEOPLASM OF LIVER: ICD-10-CM

## 2025-01-06 LAB
ALBUMIN SERPL-MCNC: 3.8 G/DL (ref 3.4–4.8)
ALBUMIN/GLOB SERPL: 1.3 RATIO (ref 1.1–2)
ALP SERPL-CCNC: 46 UNIT/L (ref 40–150)
ALT SERPL-CCNC: 21 UNIT/L (ref 0–55)
ANION GAP SERPL CALC-SCNC: 9 MEQ/L
AST SERPL-CCNC: 37 UNIT/L (ref 5–34)
BASOPHILS # BLD AUTO: 0.04 X10(3)/MCL
BASOPHILS NFR BLD AUTO: 0.4 %
BILIRUB SERPL-MCNC: 1.2 MG/DL
BUN SERPL-MCNC: 18.9 MG/DL (ref 9.8–20.1)
CALCIUM SERPL-MCNC: 9.8 MG/DL (ref 8.4–10.2)
CANCER AG125 SERPL-ACNC: 24.3 UNIT/ML (ref 0–35)
CHLORIDE SERPL-SCNC: 101 MMOL/L (ref 98–107)
CO2 SERPL-SCNC: 29 MMOL/L (ref 23–31)
CREAT SERPL-MCNC: 0.81 MG/DL (ref 0.55–1.02)
CREAT/UREA NIT SERPL: 23
EOSINOPHIL # BLD AUTO: 0.13 X10(3)/MCL (ref 0–0.9)
EOSINOPHIL NFR BLD AUTO: 1.5 %
ERYTHROCYTE [DISTWIDTH] IN BLOOD BY AUTOMATED COUNT: 13.2 % (ref 11.5–17)
GFR SERPLBLD CREATININE-BSD FMLA CKD-EPI: >60 ML/MIN/1.73/M2
GLOBULIN SER-MCNC: 2.9 GM/DL (ref 2.4–3.5)
GLUCOSE SERPL-MCNC: 101 MG/DL (ref 82–115)
HCT VFR BLD AUTO: 38.8 % (ref 37–47)
HGB BLD-MCNC: 12.7 G/DL (ref 12–16)
IMM GRANULOCYTES # BLD AUTO: 0.01 X10(3)/MCL (ref 0–0.04)
IMM GRANULOCYTES NFR BLD AUTO: 0.1 %
LYMPHOCYTES # BLD AUTO: 2.96 X10(3)/MCL (ref 0.6–4.6)
LYMPHOCYTES NFR BLD AUTO: 33 %
MCH RBC QN AUTO: 31.2 PG (ref 27–31)
MCHC RBC AUTO-ENTMCNC: 32.7 G/DL (ref 33–36)
MCV RBC AUTO: 95.3 FL (ref 80–94)
MONOCYTES # BLD AUTO: 1.18 X10(3)/MCL (ref 0.1–1.3)
MONOCYTES NFR BLD AUTO: 13.2 %
NEUTROPHILS # BLD AUTO: 4.64 X10(3)/MCL (ref 2.1–9.2)
NEUTROPHILS NFR BLD AUTO: 51.8 %
PLATELET # BLD AUTO: 407 X10(3)/MCL (ref 130–400)
PMV BLD AUTO: 7.8 FL (ref 7.4–10.4)
POTASSIUM SERPL-SCNC: 4.8 MMOL/L (ref 3.5–5.1)
PROT SERPL-MCNC: 6.7 GM/DL (ref 5.8–7.6)
PROT UR QL STRIP: ABNORMAL
RBC # BLD AUTO: 4.07 X10(6)/MCL (ref 4.2–5.4)
SODIUM SERPL-SCNC: 139 MMOL/L (ref 136–145)
WBC # BLD AUTO: 8.96 X10(3)/MCL (ref 4.5–11.5)

## 2025-01-06 PROCEDURE — 80053 COMPREHEN METABOLIC PANEL: CPT

## 2025-01-06 PROCEDURE — 81005 URINALYSIS: CPT

## 2025-01-06 PROCEDURE — 86304 IMMUNOASSAY TUMOR CA 125: CPT

## 2025-01-06 PROCEDURE — 36415 COLL VENOUS BLD VENIPUNCTURE: CPT

## 2025-01-06 PROCEDURE — 85025 COMPLETE CBC W/AUTO DIFF WBC: CPT

## 2025-01-06 RX ORDER — EPINEPHRINE 0.3 MG/.3ML
0.3 INJECTION SUBCUTANEOUS ONCE AS NEEDED
Status: CANCELLED | OUTPATIENT
Start: 2025-01-07

## 2025-01-06 RX ORDER — ACETAMINOPHEN 325 MG/1
650 TABLET ORAL
Status: CANCELLED | OUTPATIENT
Start: 2025-01-07

## 2025-01-06 RX ORDER — DIPHENHYDRAMINE HYDROCHLORIDE 50 MG/ML
50 INJECTION INTRAMUSCULAR; INTRAVENOUS ONCE AS NEEDED
Status: CANCELLED | OUTPATIENT
Start: 2025-01-07

## 2025-01-06 RX ORDER — SODIUM CHLORIDE 0.9 % (FLUSH) 0.9 %
10 SYRINGE (ML) INJECTION
Status: CANCELLED | OUTPATIENT
Start: 2025-01-07

## 2025-01-06 RX ORDER — HEPARIN 100 UNIT/ML
500 SYRINGE INTRAVENOUS
Status: CANCELLED | OUTPATIENT
Start: 2025-01-07

## 2025-01-06 RX ORDER — DIPHENHYDRAMINE HYDROCHLORIDE 50 MG/ML
25 INJECTION INTRAMUSCULAR; INTRAVENOUS
Status: CANCELLED | OUTPATIENT
Start: 2025-01-07

## 2025-01-07 ENCOUNTER — INFUSION (OUTPATIENT)
Dept: INFUSION THERAPY | Facility: HOSPITAL | Age: 75
End: 2025-01-07
Attending: INTERNAL MEDICINE
Payer: MEDICARE

## 2025-01-07 VITALS
BODY MASS INDEX: 21.05 KG/M2 | HEIGHT: 64 IN | HEART RATE: 71 BPM | DIASTOLIC BLOOD PRESSURE: 58 MMHG | RESPIRATION RATE: 18 BRPM | SYSTOLIC BLOOD PRESSURE: 132 MMHG | WEIGHT: 123.31 LBS

## 2025-01-07 DIAGNOSIS — C78.6 SECONDARY MALIGNANT NEOPLASM OF RETROPERITONEUM AND PERITONEUM: Primary | ICD-10-CM

## 2025-01-07 PROCEDURE — 96413 CHEMO IV INFUSION 1 HR: CPT

## 2025-01-07 PROCEDURE — 63600175 PHARM REV CODE 636 W HCPCS: Mod: JZ,TB | Performed by: INTERNAL MEDICINE

## 2025-01-07 PROCEDURE — 25000003 PHARM REV CODE 250: Performed by: INTERNAL MEDICINE

## 2025-01-07 PROCEDURE — 96375 TX/PRO/DX INJ NEW DRUG ADDON: CPT

## 2025-01-07 RX ORDER — ACETAMINOPHEN 325 MG/1
650 TABLET ORAL
Status: COMPLETED | OUTPATIENT
Start: 2025-01-07 | End: 2025-01-07

## 2025-01-07 RX ORDER — HEPARIN 100 UNIT/ML
500 SYRINGE INTRAVENOUS
Status: DISCONTINUED | OUTPATIENT
Start: 2025-01-07 | End: 2025-01-07 | Stop reason: HOSPADM

## 2025-01-07 RX ORDER — DIPHENHYDRAMINE HYDROCHLORIDE 50 MG/ML
25 INJECTION INTRAMUSCULAR; INTRAVENOUS
Status: COMPLETED | OUTPATIENT
Start: 2025-01-07 | End: 2025-01-07

## 2025-01-07 RX ORDER — SODIUM CHLORIDE 0.9 % (FLUSH) 0.9 %
10 SYRINGE (ML) INJECTION
Status: DISCONTINUED | OUTPATIENT
Start: 2025-01-07 | End: 2025-01-07 | Stop reason: HOSPADM

## 2025-01-07 RX ORDER — EPINEPHRINE 0.3 MG/.3ML
0.3 INJECTION SUBCUTANEOUS ONCE AS NEEDED
Status: DISCONTINUED | OUTPATIENT
Start: 2025-01-07 | End: 2025-01-07 | Stop reason: HOSPADM

## 2025-01-07 RX ORDER — DIPHENHYDRAMINE HYDROCHLORIDE 50 MG/ML
50 INJECTION INTRAMUSCULAR; INTRAVENOUS ONCE AS NEEDED
Status: DISCONTINUED | OUTPATIENT
Start: 2025-01-07 | End: 2025-01-07 | Stop reason: HOSPADM

## 2025-01-07 RX ADMIN — ACETAMINOPHEN 650 MG: 325 TABLET ORAL at 03:01

## 2025-01-07 RX ADMIN — DIPHENHYDRAMINE HYDROCHLORIDE 25 MG: 50 INJECTION INTRAMUSCULAR; INTRAVENOUS at 03:01

## 2025-01-07 RX ADMIN — SODIUM CHLORIDE 800 MG: 9 INJECTION, SOLUTION INTRAVENOUS at 03:01

## 2025-01-22 NOTE — PROGRESS NOTES
Subjective:       Patient ID: Shayna Ledezma is a 74 y.o. female.  GI: Dr. Gamaliel Carlos  GYN ONC at [facility]: Dr. Mike Santana    1. 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 Pulmonary Embolism (Incidental on CT)--19    Procedure/pathology:   1. Paracentesis with removal of 5L of fluid done 18--serous carcinoma, high grade, c/w ovarian primary, PAX-8, CK7 and MOC-31 positive, CDX-2, CK-20 and Calretinin-negative.  2. S/p Exploratory Lap, radical tumor debulking including total abdominal hysterectomy, bilateral salpingo-oophorectomy, bilateral pelvic lymph node sampling, appendectomy, mobilization of the left ureter, complete gross resection of the disease with removal of mesenteric disease, as well as omental disese and parasplenic disease by Dr. Santana 3/18/19--Metastatic high grade serous carcinoma. Tissue/organ involvement: right ovary, appendectomy, omentum, mesentery (including small bowel mesentery) and multiple other sites designated: periumbilical, perisplenic, transverse colon, splenic flexure, perirectal, left periureteral. 2 lymph nodes negative. [id number]. FIGO stage IIIC. Myriad somatic instability testing positive for genomic instability, negative BRCA1 and BRCA2 tumor testing.  3. Splenectomy done 10/21/19--splenic involvement with metastatic high grade serous carcinoma, 5 benign lymph nodes. Caris testing showed LAM, NTRK1/2/3 negative, TMB low, BRCA1/2 negative, ER/KY negative, CATHY negative, SPEN pathogenic variant Exon 11, TP53 pathogenic variant Exon 5, PD-L1 positive CPS 15, Genomic ELODIA high.  4. CT-biopsy done 24--non-diagnostic, no malignancy cells present.   Imagin. CT A/P w/ and w/o contrast done at [facility] 18--large volume ascites with multiple peritoneal implants, right pericolic gutter implant measures 2.5X3.3cm, LUQ omental/mesenteric implant measures 3X3.6cm, omental carcinomatosis, extensive  enhancing soft tissue surrounding sigmoid colon vs. large sigmoid mass, left ovarian cystic lesion appears to have some internal septations measures 3.5X3.8cm, enhancing periportal soft tissue density likely lymphadenopathy with some narrowing of the portal vein noted, but without internal filling defect, borderline splenomegaly, subtle solid lesion w/n central spleen measures 2.4X2.5cm, likely metastatic, with peripheral area of diminished enhancement suggesting splenic infarct, soft tissue implant abutting gallbladder measures 2X2.2cm, no right adnexal mass, trace bilateral layering pleural fluid, small moderate-sized hiatal hernia, few borderline enlarged lymph nodes w/n right epicardial fat pad.  2. CT of chest on 1/11/19--Some prominent right cardiophrenic lymph nodes are nonspecific and can be followed on future surveillance scans. Otherwise no CT evidence of metastatic disease to the chest. While exam was not tailored for evaluation of the pulmonary arteries I suspect there is pulmonary thromboembolic disease. Peritoneal carcinomatosis seen in the upper abdomen. Critical Result: Pulmonary Embolus.  3. CT C/A/P 3/4/19--Positive response to therapy. No new or progressive metastatic disease in the chest, abdomen or pelvis. Stable to improved findings include smaller pericardial lymph nodes, andrew hepatis adenopathy, peritoneal carcinomatosis, smaller left adnexal lesion; no evidence of PE within limitations of the study.  5. CT PE protocol chest/A/P 6/11/19--No PE, improved pericardial lymph node with minimal size on current examination, improved peritoneal carcinomatosis and left adnexal implant, splenic ischemia evolved into small infarct, size improvement of one of splenic hypodense lesion and mild size increase of second hypodense lesion, favored to be benign/cystic, no new findings.  6. PET/CT 9/25/19--s/p hysterectomy and bilateral oophorectomy, no evidence for locally recurrent/residual disease, intensely  hypermetabolic hypodense lesion within the spleen 3.5X3.3cm concerning for metastatic focus. No other abnormal focus of disease.  7. CT C/A/P 11/13/20--Right lower lobe 3.3 cm mass is presumed metastatic, otherwise no evident residual, recurrent or metastatic disease.   8. PET/CT 11/20/20--Hypermetabolic right lung base lesion and suspected metastatic periportal adenopathy, no additional sites of FDG-avid otherwise aggressive appearing pathology through the neck, chest, abdomen, or pelvis.  9. PET/CT 1/21/21--Interval decrease in size of the right lower lobe mass with decreased FDG uptake, now measures 1.7 x 2.2 cm (previous 3.3 x 3.1 cm), resolution of FDG uptake in the suspected periportal lymph node which is not identifiable on noncontrast CT. No evidence of new or progressive hypermetabolic metastatic disease.  10. PET/CT 3/30/21--Resolved right lower lung lobe hypermetabolic mass. Otherwise, no interval change or evidence of residual disease, recurrence or new metastatic lesion.  11. PET/CT 11/21/24--new FDG uptake in right hepatic lobe concerning for malignancy, measures 3.5cm with SUV 9.5, could represent a peritoneal implant vs. Hepatic metastasis.   12. PET/CT 1/24/25--persistent right hepatic lobe lesion measures 3.6cm, stable, with increasing FDG uptake SUV 14.6, previous 9.5.    Procedures:   1. Paracentesis on 12/28/18 with removal of 5 Liters.  2. Paracentesis on 01/07/19 with removal of 3.5 Liters.  3. Paracentesis on 02/06/19 with removal of 2.5 Liters.  4. Mediport placed 12/2020 by Dr. Serge Gentile--removed 8/26/21 due to mediport fracture.    Germline genetic testing done by "[employer] 1/29/19--negative for BRCA1/2, two (2) variants of uncertain significance (VUS): CATHY p.[id number]/p.[id number] and MLH1 p.P603L.     :  12/19/18 1102  11/10/20   42.9  12/08/20 79.5  01/05/21 28.9  01/26/21 12.8  02/18/21 11.6  03/15/22--10.3  04/26/22--9.6  05/16/22--10.2  06/07/22--10.1  07/19/22--11.6  08/08/22--12.5  09/19/22--12.7  10/31/22--13.5  01/03/23--12.0  02/13/23--14.0  03/03/23--13.9  04/17/23--14.5  05/26/23--14.6  07/31/23--13.5  11/13/23--15.6  12/04/23--16.8  01/16/24--13.7  02/26/24--18.6  03/18/24--16.3  04/29/24--17.0  06/10/24--17.0  08/12/24--17.3  09/23/24--18.3  10/14/24--18.6  11/25/24--19.1  01/06/25--24.3    Treatment History:  1. Neoadjuvant Carboplatin/Taxol every 3 weeks x 3 cycles. 1/15/19 - 2/26/19. Neulasta added.   2. Surgery--tumor debulking KORI/BSO 3/18/19.  3. Adjuvant Carboplatin/Taxol x 3 additions cycles 4/9/19--5/21/19.  4. Splenectomy 10/21/19.  5. Niraparib maintenance (dose reduction to 100mg daily for cytopenias) 11/8/19--12/1/20 (stopped due to progression).  6. Carboplatin/Paclitaxel/Avastin X 6 cycles completed 12/8/21--3/24/21 (complete response).    Current Treatment:  1. Eliquis BID for incidental PE on imaging 1/2019  2. Avastin maintenance started on 4/13/21.   Cycle 66 due 1/28/25    CC:  Chief Complaint   Patient presents with    6 wk f/u     HPI   Patient is here for follow-up of ovarian cancer on maintenance Avastin. Recent PET in 11/2024 showed a new liver lesion. Biopsy was attempted but was non-diagnostic. CA-125 remains WNL. Repeat PET done last week with stable liver lesion, increased SUV. Patient has no symptoms. Discussed continued close monitoring.        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      alendronate (FOSAMAX) 70 MG tablet Take 1 tablet (70 mg total) by mouth every 7 days. 4 tablet 11    amLODIPine (NORVASC) 5 MG tablet Take 1 tablet (5 mg total) by mouth once daily. 30 tablet 11    apixaban (ELIQUIS) 5 mg Tab TAKE 1 TABLET BY MOUTH TWICE DAILY 180 tablet 0    aspirin (ECOTRIN) 81 MG EC tablet Take 81 mg by mouth once daily.      atorvastatin (LIPITOR) 40 MG tablet Take 1 tablet (40 mg total) by mouth once daily. 90 tablet 3    b complex vitamins capsule Take 1 capsule by mouth once daily.      calcium citrate-vitamin D3 315-200 mg (CITRACAL+D) 315 mg-5 mcg (200 unit) per tablet Take 1 tablet by mouth 2 (two) times daily.      calcium-vitamin D3 (CALCIUM 500 + D) 500 mg-5 mcg (200 unit) per tablet Take 1 tablet by mouth 2 (two) times daily with meals.      GLUTAMINE ORAL Take 10 g by mouth Daily.      lisinopriL (PRINIVIL,ZESTRIL) 20 MG tablet Take 1 tablet (20 mg total) by mouth once daily. 30 tablet 3    pyridoxine, vitamin B6, (B-6) 250 MG Tab Take 250 mg by mouth once daily.      sodium chloride 0.9% SolP 100 mL with bevacizumab 25 mg/mL Soln Inject into the vein every 21 days.       No current facility-administered medications on file prior to visit.      Review of Systems   Constitutional:  Negative for appetite change and unexpected weight change.   HENT:  Negative for mouth sores and nosebleeds.    Eyes:  Negative for visual disturbance.   Respiratory:  Negative for cough and shortness of breath.    Cardiovascular:  Negative for chest pain.   Gastrointestinal:  Negative for abdominal pain and diarrhea.   Genitourinary:  Negative for frequency.   Musculoskeletal:  Negative for back pain.   Integumentary:   Negative for rash.   Neurological:  Negative for headaches.   Hematological:  Negative for adenopathy.   Psychiatric/Behavioral:  The patient is not nervous/anxious.         Vitals:    01/27/25 0931   BP: 133/74   Pulse: 66   Resp: 18   Temp: 98 °F (36.7 °C)       Wt Readings from Last 3 Encounters:   01/27/25 0931 55.6 kg (122 lb 8 oz)   01/07/25 1505 55.9 kg (123 lb 4.5 oz)   12/16/24 1011 55.8 kg (123 lb)     Physical Exam  Vitals reviewed.   Constitutional:       Appearance: Normal appearance. She is normal weight.   HENT:      Head: Normocephalic.   Eyes:      General: Lids are normal. Vision grossly intact.      Extraocular Movements: Extraocular movements intact.      Conjunctiva/sclera: Conjunctivae normal.   Cardiovascular:      Rate and Rhythm: Normal rate and regular rhythm.      Pulses: Normal pulses.      Heart sounds: Normal heart sounds, S1 normal and S2 normal.   Pulmonary:      Effort: Pulmonary effort is normal.      Breath sounds: Normal breath sounds.   Abdominal:      General: Abdomen is flat. Bowel sounds are normal. There is no distension.      Palpations: Abdomen is soft.      Tenderness: There is no abdominal tenderness.   Musculoskeletal:      Cervical back: Normal range of motion and neck supple.      Right lower leg: No edema.      Left lower leg: No edema.   Skin:     General: Skin is warm and moist.      Capillary Refill: Capillary refill takes less than 2 seconds.      Comments: Left CW mediport removed, site intact   Neurological:      General: No focal deficit present.      Mental Status: She is alert and oriented to person, place, and time.   Psychiatric:         Attention and Perception: Attention normal.         Mood and Affect: Mood normal.         Speech: Speech normal.         Behavior: Behavior normal. Behavior is cooperative.         Cognition and Memory: Cognition normal.         Judgment: Judgment normal.       Lab Visit on 01/27/2025   Component Date Value    WBC  01/27/2025 7.73     RBC 01/27/2025 4.01 (L)     Hgb 01/27/2025 12.4     Hct 01/27/2025 38.7     MCV 01/27/2025 96.5 (H)     MCH 01/27/2025 30.9     MCHC 01/27/2025 32.0 (L)     RDW 01/27/2025 13.5     Platelet 01/27/2025 369     MPV 01/27/2025 8.2     Neut % 01/27/2025 55.1     Lymph % 01/27/2025 27.8     Mono % 01/27/2025 13.8     Eos % 01/27/2025 2.7     Basophil % 01/27/2025 0.5     Imm Grans % 01/27/2025 0.1     Neut # 01/27/2025 4.25     Lymph # 01/27/2025 2.15     Mono # 01/27/2025 1.07     Eos # 01/27/2025 0.21     Baso # 01/27/2025 0.04     Imm Gran # 01/27/2025 0.01     Protein, UA 01/27/2025 2+ (A)           Assessment:       1. Secondary malignant neoplasm of retroperitoneum and peritoneum    2. Malignant neoplasm of uterine adnexa    3. Malignant neoplasm metastatic to right lung    4. Secondary malignant neoplasm of liver    5. Other chronic pulmonary embolism without acute cor pulmonale    6. Carcinoma of right fallopian tube        Plan:     Patient with left ovarian cancer diagnosed 12/26/18, appears to be stage IIIC vs. IV with diffuse peritoneal disease, possible splenic involvement, epicardial lymph nodes and bilateral pleural effusions.  Patient seen and evaluated by Dr. Mike Santana at [facility] in [place].   Treatment recommended upfront with chemotherapy Carboplatin/Paclitaxel x 3 cycles.  Completed Cycle 3 on 2/26/19.   She underwent total abdominal hysterectomy, BSO and tumor debulking on 3/18/19 with Dr. Santana with complete gross resection of disease. FIGO Stage IIIC papillary serous fallopian tube cancer.   Patient resumed chemotherapy with cycle 4 on 4/9/19. Completed cycle 6 on 5/21/19.    Testing on tumor was positive for genomic instability but negative for BRCA mutation.  Per NCCN guidelines, maintenance can be considered for BRCA mutated germline category 1 and somatic category 2A. There is some evidence that PARP inhibitors have some activity as well in tumors with genomic  instability. But the PARP maintenance is not approved for this indication. Offered treatment to patient and after discussion she declined and made decision to continue with observation only.    PET/CT done for rising CA-125 showed hypermetabolic splenic lesion which is not new, just enlarged from previous. Case discussed with Dr. Mike Santana.  Patient is now s/p splenectomy done 10/21/19. Consistent with splenic involvement with high grade serous carcinoma.   Dr. Santana recommended Niraparib maintenance based on new data showing activity in HRD positive ovarian cancer and patient started on 11/8/19, required a dose reduction due to cytopenias.  Rising CA-125 noted in 11/2020. CT showed new RLL lung mass.   Follow-up PET done 11/20/20 showed RLL lung mass hypermetabolic and hypermetabolic periportal adenopathy.     Recommended 2nd line treatment with Carboplatin/Taxol/Avastin.   Started cycle 1 on 12/8/20. Neulasta added with cycle 2.   Completed Cycle 6 on 3/24/21.  PET from 3/30/21 showed complete resolution of lung mass and no other disease.    Avastin maintenance started on 4/13/21.   Hospitalized for TIA after her 4th cycle of Avastin. Work-up for stroke and cause otherwise negative. Patient started on baby ASA by neurology.  Discussed there are no clear guidelines for changing/discontinuing treatment for TIA/CVA related to Avastin.  Since her symptoms resolved and there were no residual effects, recommended to continue with Avastin since it is working well for her disease and since a baby ASA was added for prevention. She was also continued on Eliquis for h/o PE.  S/p mediport removal for fracture on 8/26/21.   Continue Avastin through peripheral veins for now.  PET was done in 11/2024 due to rising CA-125 although it had remained WNL. Scan showed lesion near superior portion of liver.  CT-biopsy attempted but was non-diagnostic.     Since CA-125 is not outside of normal limits, and no way to tell how long  there liver lesion has been present, since no scan done since 2021.  Recommended to monitor closely.  Repeat PET/CT done 1/24/25 with stable liver lesion in size, increased SUV, no other disease.  Again recommended to continue with current treatment since stable and monitor very closely.   I will have Dr. Gentile review for possible resection, though likely would be difficult given location.      Proceed with Avastin C66 tomorrow.   Schedule labs in 3 weeks and treatment on 2/18/25.  Continue monitoring CA-125 every 3 weeks.     F/u in 6 weeks with repeat labs.   Plan to repeat PET/CT after C68 or C69 depending on CA-125.  If biopsy is not possible, would have to treat empirically.     If recurrence is confirmed, will discuss with GYN oncology Dr. Santana, likely will go back on Carboplatin with Taxol or Doxil, and consider maintenance with a different PARP, Lynparza.     Vaccines/Boosters post-splenectomy: Menactra/Menveo every 5 years, Bexsero every 2-3 years, annual flu shot.  Last Bexsero was given 8/1/23. Next due in 2026.  Received Menactra/Menveo in 9/2024, 5 years after initial.     Continue Eliquis 5mg BID and ASA.  All questions answered.      Renetta Corral MD        Visit today included increased complexity associated with the care of the episodic problem fallopian tube cancer, addressing and managing the longitudinal care of the patient's fallopian tube cancer.

## 2025-01-24 ENCOUNTER — HOSPITAL ENCOUNTER (OUTPATIENT)
Dept: RADIOLOGY | Facility: HOSPITAL | Age: 75
Discharge: HOME | End: 2025-01-24
Attending: INTERNAL MEDICINE
Payer: MEDICARE

## 2025-01-24 DIAGNOSIS — C57.00 CARCINOMA OF FALLOPIAN TUBE, UNSPECIFIED LATERALITY: ICD-10-CM

## 2025-01-24 DIAGNOSIS — C57.4 MALIGNANT NEOPLASM OF UTERINE ADNEXA: ICD-10-CM

## 2025-01-24 PROCEDURE — 78815 PET IMAGE W/CT SKULL-THIGH: CPT | Mod: TC

## 2025-01-24 PROCEDURE — A9552 F18 FDG: HCPCS | Performed by: INTERNAL MEDICINE

## 2025-01-24 RX ORDER — FLUDEOXYGLUCOSE F18 500 MCI/ML
10 INJECTION INTRAVENOUS
Status: COMPLETED | OUTPATIENT
Start: 2025-01-24 | End: 2025-01-24

## 2025-01-24 RX ADMIN — FLUDEOXYGLUCOSE F-18 10.8 MILLICURIE: 500 INJECTION INTRAVENOUS at 12:01

## 2025-01-27 ENCOUNTER — LAB VISIT (OUTPATIENT)
Dept: LAB | Facility: HOSPITAL | Age: 75
End: 2025-01-27
Attending: INTERNAL MEDICINE
Payer: MEDICARE

## 2025-01-27 ENCOUNTER — OFFICE VISIT (OUTPATIENT)
Dept: HEMATOLOGY/ONCOLOGY | Facility: CLINIC | Age: 75
End: 2025-01-27
Payer: MEDICARE

## 2025-01-27 VITALS
DIASTOLIC BLOOD PRESSURE: 74 MMHG | BODY MASS INDEX: 20.92 KG/M2 | OXYGEN SATURATION: 99 % | SYSTOLIC BLOOD PRESSURE: 133 MMHG | HEART RATE: 66 BPM | HEIGHT: 64 IN | RESPIRATION RATE: 18 BRPM | TEMPERATURE: 98 F | WEIGHT: 122.5 LBS

## 2025-01-27 DIAGNOSIS — C78.6 SECONDARY MALIGNANT NEOPLASM OF RETROPERITONEUM AND PERITONEUM: Primary | ICD-10-CM

## 2025-01-27 DIAGNOSIS — C57.01 CARCINOMA OF RIGHT FALLOPIAN TUBE: ICD-10-CM

## 2025-01-27 DIAGNOSIS — C78.7 SECONDARY MALIGNANT NEOPLASM OF LIVER: ICD-10-CM

## 2025-01-27 DIAGNOSIS — I27.82 OTHER CHRONIC PULMONARY EMBOLISM WITHOUT ACUTE COR PULMONALE: ICD-10-CM

## 2025-01-27 DIAGNOSIS — C57.4 MALIGNANT NEOPLASM OF UTERINE ADNEXA: ICD-10-CM

## 2025-01-27 DIAGNOSIS — C78.6 SECONDARY MALIGNANT NEOPLASM OF RETROPERITONEUM AND PERITONEUM: ICD-10-CM

## 2025-01-27 DIAGNOSIS — C78.01 MALIGNANT NEOPLASM METASTATIC TO RIGHT LUNG: ICD-10-CM

## 2025-01-27 DIAGNOSIS — C57.00 CARCINOMA OF FALLOPIAN TUBE, UNSPECIFIED LATERALITY: ICD-10-CM

## 2025-01-27 PROBLEM — C57.02 CARCINOMA OF LEFT FALLOPIAN TUBE: Status: ACTIVE | Noted: 2022-03-18

## 2025-01-27 LAB
ALBUMIN SERPL-MCNC: 3.5 G/DL (ref 3.4–4.8)
ALBUMIN/GLOB SERPL: 1.2 RATIO (ref 1.1–2)
ALP SERPL-CCNC: 39 UNIT/L (ref 40–150)
ALT SERPL-CCNC: 16 UNIT/L (ref 0–55)
ANION GAP SERPL CALC-SCNC: 6 MEQ/L
AST SERPL-CCNC: 38 UNIT/L (ref 5–34)
BASOPHILS # BLD AUTO: 0.04 X10(3)/MCL
BASOPHILS NFR BLD AUTO: 0.5 %
BILIRUB SERPL-MCNC: 1.3 MG/DL
BUN SERPL-MCNC: 16.5 MG/DL (ref 9.8–20.1)
CALCIUM SERPL-MCNC: 10 MG/DL (ref 8.4–10.2)
CANCER AG125 SERPL-ACNC: 20.6 UNIT/ML (ref 0–35)
CHLORIDE SERPL-SCNC: 104 MMOL/L (ref 98–107)
CO2 SERPL-SCNC: 26 MMOL/L (ref 23–31)
CREAT SERPL-MCNC: 0.72 MG/DL (ref 0.55–1.02)
CREAT/UREA NIT SERPL: 23
EOSINOPHIL # BLD AUTO: 0.21 X10(3)/MCL (ref 0–0.9)
EOSINOPHIL NFR BLD AUTO: 2.7 %
ERYTHROCYTE [DISTWIDTH] IN BLOOD BY AUTOMATED COUNT: 13.5 % (ref 11.5–17)
GFR SERPLBLD CREATININE-BSD FMLA CKD-EPI: >60 ML/MIN/1.73/M2
GLOBULIN SER-MCNC: 3 GM/DL (ref 2.4–3.5)
GLUCOSE SERPL-MCNC: 87 MG/DL (ref 82–115)
HCT VFR BLD AUTO: 38.7 % (ref 37–47)
HGB BLD-MCNC: 12.4 G/DL (ref 12–16)
IMM GRANULOCYTES # BLD AUTO: 0.01 X10(3)/MCL (ref 0–0.04)
IMM GRANULOCYTES NFR BLD AUTO: 0.1 %
LYMPHOCYTES # BLD AUTO: 2.15 X10(3)/MCL (ref 0.6–4.6)
LYMPHOCYTES NFR BLD AUTO: 27.8 %
MCH RBC QN AUTO: 30.9 PG (ref 27–31)
MCHC RBC AUTO-ENTMCNC: 32 G/DL (ref 33–36)
MCV RBC AUTO: 96.5 FL (ref 80–94)
MONOCYTES # BLD AUTO: 1.07 X10(3)/MCL (ref 0.1–1.3)
MONOCYTES NFR BLD AUTO: 13.8 %
NEUTROPHILS # BLD AUTO: 4.25 X10(3)/MCL (ref 2.1–9.2)
NEUTROPHILS NFR BLD AUTO: 55.1 %
PLATELET # BLD AUTO: 369 X10(3)/MCL (ref 130–400)
PMV BLD AUTO: 8.2 FL (ref 7.4–10.4)
POTASSIUM SERPL-SCNC: 4.1 MMOL/L (ref 3.5–5.1)
PROT SERPL-MCNC: 6.5 GM/DL (ref 5.8–7.6)
PROT UR QL STRIP: ABNORMAL
RBC # BLD AUTO: 4.01 X10(6)/MCL (ref 4.2–5.4)
SODIUM SERPL-SCNC: 136 MMOL/L (ref 136–145)
WBC # BLD AUTO: 7.73 X10(3)/MCL (ref 4.5–11.5)

## 2025-01-27 PROCEDURE — 81005 URINALYSIS: CPT

## 2025-01-27 PROCEDURE — 3075F SYST BP GE 130 - 139MM HG: CPT | Mod: CPTII,S$GLB,, | Performed by: INTERNAL MEDICINE

## 2025-01-27 PROCEDURE — 1160F RVW MEDS BY RX/DR IN RCRD: CPT | Mod: CPTII,S$GLB,, | Performed by: INTERNAL MEDICINE

## 2025-01-27 PROCEDURE — 80053 COMPREHEN METABOLIC PANEL: CPT

## 2025-01-27 PROCEDURE — 85025 COMPLETE CBC W/AUTO DIFF WBC: CPT

## 2025-01-27 PROCEDURE — G2211 COMPLEX E/M VISIT ADD ON: HCPCS | Mod: S$GLB,,, | Performed by: INTERNAL MEDICINE

## 2025-01-27 PROCEDURE — 1159F MED LIST DOCD IN RCRD: CPT | Mod: CPTII,S$GLB,, | Performed by: INTERNAL MEDICINE

## 2025-01-27 PROCEDURE — 99215 OFFICE O/P EST HI 40 MIN: CPT | Mod: S$GLB,,, | Performed by: INTERNAL MEDICINE

## 2025-01-27 PROCEDURE — 3288F FALL RISK ASSESSMENT DOCD: CPT | Mod: CPTII,S$GLB,, | Performed by: INTERNAL MEDICINE

## 2025-01-27 PROCEDURE — 1126F AMNT PAIN NOTED NONE PRSNT: CPT | Mod: CPTII,S$GLB,, | Performed by: INTERNAL MEDICINE

## 2025-01-27 PROCEDURE — 3078F DIAST BP <80 MM HG: CPT | Mod: CPTII,S$GLB,, | Performed by: INTERNAL MEDICINE

## 2025-01-27 PROCEDURE — 36415 COLL VENOUS BLD VENIPUNCTURE: CPT

## 2025-01-27 PROCEDURE — 99999 PR PBB SHADOW E&M-EST. PATIENT-LVL IV: CPT | Mod: PBBFAC,,, | Performed by: INTERNAL MEDICINE

## 2025-01-27 PROCEDURE — 86304 IMMUNOASSAY TUMOR CA 125: CPT

## 2025-01-27 PROCEDURE — 1101F PT FALLS ASSESS-DOCD LE1/YR: CPT | Mod: CPTII,S$GLB,, | Performed by: INTERNAL MEDICINE

## 2025-01-27 RX ORDER — HEPARIN 100 UNIT/ML
500 SYRINGE INTRAVENOUS
Status: CANCELLED | OUTPATIENT
Start: 2025-01-28

## 2025-01-27 RX ORDER — DIPHENHYDRAMINE HYDROCHLORIDE 50 MG/ML
25 INJECTION INTRAMUSCULAR; INTRAVENOUS
Status: CANCELLED | OUTPATIENT
Start: 2025-01-28

## 2025-01-27 RX ORDER — SODIUM CHLORIDE 0.9 % (FLUSH) 0.9 %
10 SYRINGE (ML) INJECTION
Status: CANCELLED | OUTPATIENT
Start: 2025-01-28

## 2025-01-27 RX ORDER — ACETAMINOPHEN 325 MG/1
650 TABLET ORAL
Status: CANCELLED | OUTPATIENT
Start: 2025-01-28

## 2025-01-27 RX ORDER — DIPHENHYDRAMINE HYDROCHLORIDE 50 MG/ML
50 INJECTION INTRAMUSCULAR; INTRAVENOUS ONCE AS NEEDED
Status: CANCELLED | OUTPATIENT
Start: 2025-01-28

## 2025-01-27 RX ORDER — EPINEPHRINE 0.3 MG/.3ML
0.3 INJECTION SUBCUTANEOUS ONCE AS NEEDED
Status: CANCELLED | OUTPATIENT
Start: 2025-01-28

## 2025-01-28 ENCOUNTER — INFUSION (OUTPATIENT)
Dept: INFUSION THERAPY | Facility: HOSPITAL | Age: 75
End: 2025-01-28
Attending: INTERNAL MEDICINE
Payer: MEDICARE

## 2025-01-28 VITALS
BODY MASS INDEX: 20.7 KG/M2 | RESPIRATION RATE: 18 BRPM | HEIGHT: 64 IN | DIASTOLIC BLOOD PRESSURE: 79 MMHG | WEIGHT: 121.25 LBS | TEMPERATURE: 98 F | SYSTOLIC BLOOD PRESSURE: 165 MMHG | HEART RATE: 74 BPM

## 2025-01-28 DIAGNOSIS — C57.01 CARCINOMA OF RIGHT FALLOPIAN TUBE: Primary | ICD-10-CM

## 2025-01-28 PROCEDURE — 25000003 PHARM REV CODE 250: Performed by: INTERNAL MEDICINE

## 2025-01-28 PROCEDURE — 63600175 PHARM REV CODE 636 W HCPCS: Performed by: INTERNAL MEDICINE

## 2025-01-28 PROCEDURE — 96413 CHEMO IV INFUSION 1 HR: CPT

## 2025-01-28 PROCEDURE — 96375 TX/PRO/DX INJ NEW DRUG ADDON: CPT

## 2025-01-28 RX ORDER — HEPARIN 100 UNIT/ML
500 SYRINGE INTRAVENOUS
Status: DISCONTINUED | OUTPATIENT
Start: 2025-01-28 | End: 2025-01-28 | Stop reason: HOSPADM

## 2025-01-28 RX ORDER — SODIUM CHLORIDE 0.9 % (FLUSH) 0.9 %
10 SYRINGE (ML) INJECTION
Status: DISCONTINUED | OUTPATIENT
Start: 2025-01-28 | End: 2025-01-28 | Stop reason: HOSPADM

## 2025-01-28 RX ORDER — DIPHENHYDRAMINE HYDROCHLORIDE 50 MG/ML
25 INJECTION INTRAMUSCULAR; INTRAVENOUS
Status: COMPLETED | OUTPATIENT
Start: 2025-01-28 | End: 2025-01-28

## 2025-01-28 RX ORDER — EPINEPHRINE 0.3 MG/.3ML
0.3 INJECTION SUBCUTANEOUS ONCE AS NEEDED
Status: DISCONTINUED | OUTPATIENT
Start: 2025-01-28 | End: 2025-01-28 | Stop reason: HOSPADM

## 2025-01-28 RX ORDER — DIPHENHYDRAMINE HYDROCHLORIDE 50 MG/ML
50 INJECTION INTRAMUSCULAR; INTRAVENOUS ONCE AS NEEDED
Status: DISCONTINUED | OUTPATIENT
Start: 2025-01-28 | End: 2025-01-28 | Stop reason: HOSPADM

## 2025-01-28 RX ORDER — ACETAMINOPHEN 325 MG/1
650 TABLET ORAL
Status: DISCONTINUED | OUTPATIENT
Start: 2025-01-28 | End: 2025-01-28 | Stop reason: HOSPADM

## 2025-01-28 RX ADMIN — DIPHENHYDRAMINE HYDROCHLORIDE 25 MG: 50 INJECTION INTRAMUSCULAR; INTRAVENOUS at 03:01

## 2025-01-28 RX ADMIN — BEVACIZUMAB-AWWB 800 MG: 400 INJECTION, SOLUTION INTRAVENOUS at 03:01

## 2025-01-29 DIAGNOSIS — Z13.89 SCREENING FOR CARDIOVASCULAR, RESPIRATORY, AND GENITOURINARY DISEASES: ICD-10-CM

## 2025-01-29 DIAGNOSIS — I26.99 PULMONARY EMBOLISM, UNSPECIFIED CHRONICITY, UNSPECIFIED PULMONARY EMBOLISM TYPE, UNSPECIFIED WHETHER ACUTE COR PULMONALE PRESENT: ICD-10-CM

## 2025-01-29 DIAGNOSIS — Z13.21 SCREENING FOR ENDOCRINE, NUTRITIONAL, METABOLIC AND IMMUNITY DISORDER: ICD-10-CM

## 2025-01-29 DIAGNOSIS — E87.1 ACUTE HYPONATREMIA: ICD-10-CM

## 2025-01-29 DIAGNOSIS — E78.2 MIXED HYPERLIPIDEMIA: ICD-10-CM

## 2025-01-29 DIAGNOSIS — Z13.0 SCREENING FOR ENDOCRINE, NUTRITIONAL, METABOLIC AND IMMUNITY DISORDER: ICD-10-CM

## 2025-01-29 DIAGNOSIS — M81.0 OSTEOPOROSIS, UNSPECIFIED OSTEOPOROSIS TYPE, UNSPECIFIED PATHOLOGICAL FRACTURE PRESENCE: ICD-10-CM

## 2025-01-29 DIAGNOSIS — Z13.228 SCREENING FOR ENDOCRINE, NUTRITIONAL, METABOLIC AND IMMUNITY DISORDER: ICD-10-CM

## 2025-01-29 DIAGNOSIS — Z00.00 MEDICARE ANNUAL WELLNESS VISIT, SUBSEQUENT: ICD-10-CM

## 2025-01-29 DIAGNOSIS — I10 PRIMARY HYPERTENSION: Primary | ICD-10-CM

## 2025-01-29 DIAGNOSIS — Z13.29 SCREENING FOR ENDOCRINE, NUTRITIONAL, METABOLIC AND IMMUNITY DISORDER: ICD-10-CM

## 2025-01-29 DIAGNOSIS — G45.9 TRANSIENT ISCHEMIC ATTACK: ICD-10-CM

## 2025-01-29 DIAGNOSIS — Z13.6 SCREENING FOR CARDIOVASCULAR, RESPIRATORY, AND GENITOURINARY DISEASES: ICD-10-CM

## 2025-01-29 DIAGNOSIS — Z13.83 SCREENING FOR CARDIOVASCULAR, RESPIRATORY, AND GENITOURINARY DISEASES: ICD-10-CM

## 2025-01-30 RX ORDER — HYDROCHLOROTHIAZIDE 12.5 MG/1
12.5 TABLET ORAL
Qty: 90 TABLET | Refills: 3 | OUTPATIENT
Start: 2025-01-30

## 2025-01-30 NOTE — TELEPHONE ENCOUNTER
----- Message from Med Assistant Saavedra sent at 1/29/2025  3:31 PM CST -----  Regarding: PV Thursday 2-6-25  Wellness Appointment    Fasting wellness labs ordered and ready to do.     Last Wellness 2-5-24

## 2025-02-04 DIAGNOSIS — C78.6 PERITONEAL CARCINOMATOSIS: ICD-10-CM

## 2025-02-04 RX ORDER — LISINOPRIL 20 MG/1
TABLET ORAL
Qty: 30 TABLET | Refills: 3 | Status: SHIPPED | OUTPATIENT
Start: 2025-02-04

## 2025-02-06 ENCOUNTER — TELEPHONE (OUTPATIENT)
Dept: INTERNAL MEDICINE | Facility: CLINIC | Age: 75
End: 2025-02-06

## 2025-02-06 ENCOUNTER — OFFICE VISIT (OUTPATIENT)
Dept: INTERNAL MEDICINE | Facility: CLINIC | Age: 75
End: 2025-02-06
Payer: MEDICARE

## 2025-02-06 VITALS
WEIGHT: 121 LBS | SYSTOLIC BLOOD PRESSURE: 116 MMHG | HEIGHT: 64 IN | HEART RATE: 74 BPM | DIASTOLIC BLOOD PRESSURE: 66 MMHG | OXYGEN SATURATION: 99 % | BODY MASS INDEX: 20.66 KG/M2

## 2025-02-06 DIAGNOSIS — I10 PRIMARY HYPERTENSION: Chronic | ICD-10-CM

## 2025-02-06 DIAGNOSIS — Z90.81 H/O SPLENECTOMY: ICD-10-CM

## 2025-02-06 DIAGNOSIS — Z00.00 MEDICARE ANNUAL WELLNESS VISIT, SUBSEQUENT: Primary | ICD-10-CM

## 2025-02-06 DIAGNOSIS — Z23 NEED FOR SHINGLES VACCINE: ICD-10-CM

## 2025-02-06 DIAGNOSIS — J41.1 MUCOPURULENT CHRONIC BRONCHITIS: ICD-10-CM

## 2025-02-06 DIAGNOSIS — R60.0 EDEMA OF RIGHT LOWER EXTREMITY: ICD-10-CM

## 2025-02-06 DIAGNOSIS — Z23 NEED FOR PNEUMOCOCCAL VACCINATION: ICD-10-CM

## 2025-02-06 DIAGNOSIS — M81.6 LOCALIZED OSTEOPOROSIS WITHOUT CURRENT PATHOLOGICAL FRACTURE: Chronic | ICD-10-CM

## 2025-02-06 DIAGNOSIS — L29.9 ITCHING: ICD-10-CM

## 2025-02-06 DIAGNOSIS — I27.82 OTHER CHRONIC PULMONARY EMBOLISM WITHOUT ACUTE COR PULMONALE: ICD-10-CM

## 2025-02-06 DIAGNOSIS — I27.82 OTHER CHRONIC PULMONARY EMBOLISM WITHOUT ACUTE COR PULMONALE: Primary | ICD-10-CM

## 2025-02-06 PROCEDURE — G0439 PPPS, SUBSEQ VISIT: HCPCS | Mod: ,,, | Performed by: INTERNAL MEDICINE

## 2025-02-06 PROCEDURE — 3288F FALL RISK ASSESSMENT DOCD: CPT | Mod: CPTII,,, | Performed by: INTERNAL MEDICINE

## 2025-02-06 PROCEDURE — 3074F SYST BP LT 130 MM HG: CPT | Mod: CPTII,,, | Performed by: INTERNAL MEDICINE

## 2025-02-06 PROCEDURE — 99214 OFFICE O/P EST MOD 30 MIN: CPT | Mod: 25,,, | Performed by: INTERNAL MEDICINE

## 2025-02-06 PROCEDURE — G0009 ADMIN PNEUMOCOCCAL VACCINE: HCPCS | Mod: ,,, | Performed by: INTERNAL MEDICINE

## 2025-02-06 PROCEDURE — 1101F PT FALLS ASSESS-DOCD LE1/YR: CPT | Mod: CPTII,,, | Performed by: INTERNAL MEDICINE

## 2025-02-06 PROCEDURE — 90677 PCV20 VACCINE IM: CPT | Mod: ,,, | Performed by: INTERNAL MEDICINE

## 2025-02-06 PROCEDURE — 1124F ACP DISCUSS-NO DSCNMKR DOCD: CPT | Mod: CPTII,,, | Performed by: INTERNAL MEDICINE

## 2025-02-06 PROCEDURE — 1160F RVW MEDS BY RX/DR IN RCRD: CPT | Mod: CPTII,,, | Performed by: INTERNAL MEDICINE

## 2025-02-06 PROCEDURE — 1158F ADVNC CARE PLAN TLK DOCD: CPT | Mod: CPTII,,, | Performed by: INTERNAL MEDICINE

## 2025-02-06 PROCEDURE — 1159F MED LIST DOCD IN RCRD: CPT | Mod: CPTII,,, | Performed by: INTERNAL MEDICINE

## 2025-02-06 PROCEDURE — 1126F AMNT PAIN NOTED NONE PRSNT: CPT | Mod: CPTII,,, | Performed by: INTERNAL MEDICINE

## 2025-02-06 PROCEDURE — 3078F DIAST BP <80 MM HG: CPT | Mod: CPTII,,, | Performed by: INTERNAL MEDICINE

## 2025-02-06 RX ORDER — TRIAMCINOLONE ACETONIDE 1 MG/G
CREAM TOPICAL 2 TIMES DAILY
Qty: 80 G | Refills: 0 | Status: SHIPPED | OUTPATIENT
Start: 2025-02-06

## 2025-02-06 RX ORDER — TRIAMCINOLONE ACETONIDE 1 MG/G
CREAM TOPICAL 2 TIMES DAILY
Qty: 80 G | Refills: 0 | Status: SHIPPED | OUTPATIENT
Start: 2025-02-06 | End: 2025-02-06 | Stop reason: SDUPTHER

## 2025-02-06 NOTE — TELEPHONE ENCOUNTER
----- Message from Best sent at 2/6/2025  2:05 PM CST -----  Who Called: Shayna Ledezma    Caller is requesting assistance/information from provider's office.    Symptoms (please be specific):    How long has patient had these symptoms:    List of preferred pharmacies on file (remove unneeded): [unfilled]  If different, enter pharmacy into here including location and phone number:       Preferred Method of Contact: Phone Call  Patient's Preferred Phone Number on File: [phone]   Best Call Back Number, if different:  Additional Information: [facility]olone acetonide 0.1% (KENALOG) 0.1 % cream in stock, would like a cb to discuss getting it sent elsewhere.

## 2025-02-06 NOTE — TELEPHONE ENCOUNTER
Medication unavailable at pharmacy. [facility]

## 2025-02-06 NOTE — ASSESSMENT & PLAN NOTE
-well controlled   -now with some ankle edema, discontinue amlodipine   -continue lisinopril 20 mg p.o. daily, unable to tolerate hydrochlorothiazide because of hyponatremia   -maintain a blood pressure log, if elevated, may consider beta blocker

## 2025-02-06 NOTE — ASSESSMENT & PLAN NOTE
-edema is more on the right than on the left   -doubtful this could be amlodipine related however we are discontinuing the medication   -getting a venous ultrasound for completion sake to rule out any insufficiency

## 2025-02-06 NOTE — TELEPHONE ENCOUNTER
Spoke with pt regarding scheduling US for lower extremity, informed pt she can schedule appt to get US completed

## 2025-02-06 NOTE — PROGRESS NOTES
Internal Medicine    Shayna Ledezma is a 74 y.o. female here today for a Medicare Annual Wellness visit and comprehensive Health Risk Assessment.     Subjective     Ms Corral is a 74 y.o. female, here today for her Medicare wellness visit.    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 . Patient is s/p tumor debulking KORI/BSO in March 2019 after completion of neoadjuvant carboplatin Taxol and then followed by adjuvant carboplatin Taxol.  Currently on maintenance Avastin infusions every 3 weeks.  Getting regular PET-CT these since there is some spots on the liver that are being followed closely.   Being followed by oncology closely with CA-125's. Patient also has had a splenectomy in October 2019.  Also significant for TIA and incidental PE (Eliquis).  Since last visit patient reports she has been well without major illness.         A separate E/M visit was performed for hypertension next Patient's hyponatremia is since resolved after patient was taken off of the hydrochlorothiazide.  Blood pressure remains well controlled on current regimen.  However now with ankle edema which is probably from the amlodipine which will be discontinued.  Patient will keep a blood pressure log since readings are on the lower side of normal and we may be able to hold off on adding more medication.  Also since the lower extremity edema is on the right more than the left, will get a venous ultrasound to rule out any insufficiency.        Labs are reviewed and noted to be essentially normal except for very mild elevation in AST.  Patient doing well overall with no acute needs or complaints.     MCW: 02/06/25    The following components were reviewed and updated:  Medical history  Family History  Social history  Allergies  Current Medications  Immunizations  Health Maintenance  Patient Care Team    Review of Systems  A comprehensive review of systems was conducted and is negative except as noted  "above.     Objective   Visit Vitals  /66 (BP Location: Right arm, Patient Position: Sitting)   Pulse 74   Ht 5' 4" (1.626 m)   Wt 54.9 kg (121 lb)   SpO2 99%   BMI 20.77 kg/m²        Physical Exam  Constitutional:       Appearance: Normal appearance.   HENT:      Head: Normocephalic and atraumatic.      Nose: Nose normal.      Mouth/Throat:      Mouth: Mucous membranes are moist.      Pharynx: Oropharynx is clear.   Eyes:      Extraocular Movements: Extraocular movements intact.      Pupils: Pupils are equal, round, and reactive to light.   Cardiovascular:      Rate and Rhythm: Normal rate and regular rhythm.      Pulses: Normal pulses.   Pulmonary:      Effort: Pulmonary effort is normal.      Breath sounds: Normal breath sounds.   Abdominal:      General: Bowel sounds are normal.      Palpations: Abdomen is soft.   Musculoskeletal:         General: Normal range of motion.      Cervical back: Normal range of motion and neck supple.   Skin:     General: Skin is warm.   Neurological:      General: No focal deficit present.      Mental Status: She is alert and oriented to person, place, and time. Mental status is at baseline.   Psychiatric:         Mood and Affect: Mood normal.          Assessment/Plan:  1. Medicare annual wellness visit, subsequent  Assessment & Plan:  -labs are reviewed all essentially normal  -patient is advised on importance of watching her carbohydrate intake and saturated fat intake, making the right nutritional choices and exercising on a regular basis  -up-to-date with the screening       2. Need for shingles vaccine  -     varicella-zoster [id number], PF, (SHINGRIX) 50 mcg/0.5 mL injection; Inject 0.5 mLs into the muscle once. for 1 dose  Dispense: 1 each; Refill: 0    3. Primary hypertension  Assessment & Plan:  -well controlled   -now with some ankle edema, discontinue amlodipine   -continue lisinopril 20 mg p.o. daily, unable to tolerate hydrochlorothiazide because of hyponatremia "   -maintain a blood pressure log, if elevated, may consider beta blocker      4. Mucopurulent chronic bronchitis  Overview:  Documented on 06/29/2021      5. H/O splenectomy  Assessment & Plan:  -patient active needs a pneumonia vaccine, will be administered today    Orders:  -     pneumoc 20-austin conj-dip cr(PF) (PREVNAR-20 (PF)) injection Syrg 0.5 mL    6. Other chronic pulmonary embolism without acute cor pulmonale  Overview:  Documented on 07/25/2022 by DENITA ARAUZ    Assessment & Plan:  -on Eliquis 5 mg p.o. b.i.d., continue      7. Localized osteoporosis without current pathological fracture  Assessment & Plan:  -patient is on Prolia infusions every 6 months, continue      8. Edema of right lower extremity  Assessment & Plan:  -edema is more on the right than on the left   -doubtful this could be amlodipine related however we are discontinuing the medication   -getting a venous ultrasound for completion sake to rule out any insufficiency    Orders:  -     US Lower Extremity Veins Bilateral; Future; Expected date: 02/06/2025    9. Need for pneumococcal vaccination  -     pneumoc 20-austin conj-dip cr(PF) (PREVNAR-20 (PF)) injection Syrg 0.5 mL    Other orders  -     triamcinolone acetonide 0.1% (KENALOG) 0.1 % cream; Apply topically 2 (two) times daily.  Dispense: 80 g; Refill: 0      A comprehensive HEALTH RISK ASSESSMENT was completed today. Results are summarized below:    There are NO EMOTIONAL/SOCIAL CONCERNS identified on today's screening for Social Isolation, Depression and Anxiety.    There are NO COGNITIVE FUNCTION CONCERNS identified on today's screening.  There are NO FUNCTIONAL OR SAFETY CONCERNS were identified on today's screening for Physical Symptoms, Nutritional, Cognitive Function, Home Safety/Living Situation, Fall Risk, Activities of Daily Living, Independent Activities of Daily Living, Physical Activity, Timed Up and Go test and Whisper test.   The patient reports NO OPIOID  PRESCRIPTIONS. This was confirmed through medication reconciliation and the [facility] website.    The patient is NOT A TOBACCO USER.  The patient reports NO SIGNIFICANT ALCOHOL USE.     All Questions regarding food, transportation or housing were not answered today.      I provided Shayna Ledezma with a 5-10 year written Screening Schedule per USPSTF age appropriate recommendations and a Personal Prevention Plan based on the results of today's Health Risk Assessment. Education, counseling, and referrals were provided as documented above and can be viewed in the After Visit Summary.    Follow up in about 6 months (around 8/6/2025) for BP Check. In addition to this scheduled follow up, the patient has also been instructed to follow up on as needed basis.     none  The patient was asked and declined the use of a free .  Advance Care Planning   Today we discussed advance care planning. She is interested in learning more about how to make Advance Directives. Information was provided and I offered to discuss more at her discretion.       Advance Care Planning     Date: 02/06/2025  Patient did not wish or was not able to name a surrogate decision maker or provide an Advance Care Plan.

## 2025-02-06 NOTE — PATIENT INSTRUCTIONS
Medicare Annual Wellness Visit      Patient Name: Shayna Ledezma  Today's Date: 2/6/2025    Below you will find your 5-10 year Screening Plan Recommendations:  Health Maintenance       Date Due Completion Date    Hepatitis C Screening Never done ---    TETANUS VACCINE Never done ---    Shingles Vaccine (2 of 2) 04/11/2024 2/15/2024    COVID-19 Vaccine (9 - 2024-25 season) 11/11/2024 9/16/2024    High Dose Statin 01/27/2026 1/27/2025    DEXA Scan 10/07/2027 10/7/2024    Lipid Panel 01/30/2029 1/30/2024          Below is your summarized Personalized Prevention Plan that addresses any concerns we discussed today at your visit. Please see attached detailed information specific to your Health Concerns.  No orders of the defined types were placed in this encounter.        The following information is provided to all patients.  This information is to help you find additional resources for any problems that may be affecting your health: Living healthy guide: www.[facility].[place].[facility]      Understanding Diabetes: www.diabetes.org      Eating healthy: www.cdc.gov/healthyweight      CDC home safety checklist: www.cdc.gov/steadi/patient.html      Agency on Aging: www.goea.[place].[facility]      Alcoholics anonymous (AA): www.aa.org      Physical Activity: www.jae.nih.gov/jn0lfon      Tobacco use: www.quitwithusla.org

## 2025-02-17 ENCOUNTER — TELEPHONE (OUTPATIENT)
Dept: INTERNAL MEDICINE | Facility: CLINIC | Age: 75
End: 2025-02-17
Payer: MEDICARE

## 2025-02-17 ENCOUNTER — HOSPITAL ENCOUNTER (OUTPATIENT)
Dept: CARDIOLOGY | Facility: HOSPITAL | Age: 75
Discharge: HOME | End: 2025-02-17
Attending: INTERNAL MEDICINE
Payer: MEDICARE

## 2025-02-17 DIAGNOSIS — R60.0 EDEMA OF RIGHT LOWER EXTREMITY: ICD-10-CM

## 2025-02-17 DIAGNOSIS — C57.01 CARCINOMA OF RIGHT FALLOPIAN TUBE: Primary | ICD-10-CM

## 2025-02-17 PROCEDURE — 93970 EXTREMITY STUDY: CPT

## 2025-02-17 RX ORDER — HEPARIN 100 UNIT/ML
500 SYRINGE INTRAVENOUS
OUTPATIENT
Start: 2025-02-18

## 2025-02-17 RX ORDER — EPINEPHRINE 0.3 MG/.3ML
0.3 INJECTION SUBCUTANEOUS ONCE AS NEEDED
OUTPATIENT
Start: 2025-02-18

## 2025-02-17 RX ORDER — DIPHENHYDRAMINE HYDROCHLORIDE 50 MG/ML
25 INJECTION INTRAMUSCULAR; INTRAVENOUS
OUTPATIENT
Start: 2025-02-18

## 2025-02-17 RX ORDER — DIPHENHYDRAMINE HYDROCHLORIDE 50 MG/ML
50 INJECTION INTRAMUSCULAR; INTRAVENOUS ONCE AS NEEDED
OUTPATIENT
Start: 2025-02-18

## 2025-02-17 RX ORDER — ACETAMINOPHEN 325 MG/1
650 TABLET ORAL
OUTPATIENT
Start: 2025-02-18

## 2025-02-17 RX ORDER — SODIUM CHLORIDE 0.9 % (FLUSH) 0.9 %
10 SYRINGE (ML) INJECTION
OUTPATIENT
Start: 2025-02-18

## 2025-02-17 NOTE — TELEPHONE ENCOUNTER
Spoke with pt regarding if she should continue taking amlodipine 5mg, pt stated in her last visit she couldn't remember if she was instructed to discontinue taking amlodipine or not. Viewed last office visit and was unable to find where or not pt was instructed to discontinue amlodipine

## 2025-02-17 NOTE — TELEPHONE ENCOUNTER
----- Message from [facility] sent at 2/17/2025  9:40 AM CST -----  Who Called: Shayna Garza is requesting assistance/information from provider's office.Symptoms (please be specific):  How long has patient had these symptoms:  List of preferred pharmacies on file (remove unneeded): [unfilled]If different, enter pharmacy into here including location and phone number: Preferred Method of Contact: Phone CallPatient's Preferred Phone Number on File: [phone] [facility] Call Back Number, if different:Additional Information: Pt is requesting a cb regarding if she should be taking amLODIPine (NORVASC) 5 MG tablet or not.

## 2025-02-17 NOTE — TELEPHONE ENCOUNTER
Spoke with pt regarding medication discontinuation, informed pt per LOV note she is to discontinue amlodipine and keep log of BP readings

## 2025-02-18 ENCOUNTER — RESULTS FOLLOW-UP (OUTPATIENT)
Dept: INTERNAL MEDICINE | Facility: CLINIC | Age: 75
End: 2025-02-18
Payer: MEDICARE

## 2025-02-19 PROCEDURE — 84166 PROTEIN E-PHORESIS/URINE/CSF: CPT | Performed by: INTERNAL MEDICINE

## 2025-02-27 NOTE — PROGRESS NOTES
Avastin held for 3+ protein.  24 hour urine still <2g.   Will resume on next dose per patient preference, scheduled for 3/11/25.    Renetta Corral MD

## 2025-03-05 NOTE — PROGRESS NOTES
Subjective:       Patient ID: Shayna Ledezma is a 74 y.o. female.  GI: Dr. Gamaliel Carlos  GYN ONC at [facility]: Dr. Mike Santana    1. 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 Pulmonary Embolism (Incidental on CT)--19    Procedure/pathology:   1. Paracentesis with removal of 5L of fluid done 18--serous carcinoma, high grade, c/w ovarian primary, PAX-8, CK7 and MOC-31 positive, CDX-2, CK-20 and Calretinin-negative.  2. S/p Exploratory Lap, radical tumor debulking including total abdominal hysterectomy, bilateral salpingo-oophorectomy, bilateral pelvic lymph node sampling, appendectomy, mobilization of the left ureter, complete gross resection of the disease with removal of mesenteric disease, as well as omental disese and parasplenic disease by Dr. Santana 3/18/19--Metastatic high grade serous carcinoma. Tissue/organ involvement: right ovary, appendectomy, omentum, mesentery (including small bowel mesentery) and multiple other sites designated: periumbilical, perisplenic, transverse colon, splenic flexure, perirectal, left periureteral. 2 lymph nodes negative. [id number]. FIGO stage IIIC. Myriad somatic instability testing positive for genomic instability, negative BRCA1 and BRCA2 tumor testing.  3. Splenectomy done 10/21/19--splenic involvement with metastatic high grade serous carcinoma, 5 benign lymph nodes. Caris testing showed LAM, NTRK1/2/3 negative, TMB low, BRCA1/2 negative, ER/NH negative, CATHY negative, SPEN pathogenic variant Exon 11, TP53 pathogenic variant Exon 5, PD-L1 positive CPS 15, Genomic ELODIA high.  4. CT-biopsy done 24--non-diagnostic, no malignancy cells present.   Imagin. CT A/P w/ and w/o contrast done at [facility] 18--large volume ascites with multiple peritoneal implants, right pericolic gutter implant measures 2.5X3.3cm, LUQ omental/mesenteric implant measures 3X3.6cm, omental carcinomatosis, extensive  enhancing soft tissue surrounding sigmoid colon vs. large sigmoid mass, left ovarian cystic lesion appears to have some internal septations measures 3.5X3.8cm, enhancing periportal soft tissue density likely lymphadenopathy with some narrowing of the portal vein noted, but without internal filling defect, borderline splenomegaly, subtle solid lesion w/n central spleen measures 2.4X2.5cm, likely metastatic, with peripheral area of diminished enhancement suggesting splenic infarct, soft tissue implant abutting gallbladder measures 2X2.2cm, no right adnexal mass, trace bilateral layering pleural fluid, small moderate-sized hiatal hernia, few borderline enlarged lymph nodes w/n right epicardial fat pad.  2. CT of chest on 1/11/19--Some prominent right cardiophrenic lymph nodes are nonspecific and can be followed on future surveillance scans. Otherwise no CT evidence of metastatic disease to the chest. While exam was not tailored for evaluation of the pulmonary arteries I suspect there is pulmonary thromboembolic disease. Peritoneal carcinomatosis seen in the upper abdomen. Critical Result: Pulmonary Embolus.  3. CT C/A/P 3/4/19--Positive response to therapy. No new or progressive metastatic disease in the chest, abdomen or pelvis. Stable to improved findings include smaller pericardial lymph nodes, andrew hepatis adenopathy, peritoneal carcinomatosis, smaller left adnexal lesion; no evidence of PE within limitations of the study.  5. CT PE protocol chest/A/P 6/11/19--No PE, improved pericardial lymph node with minimal size on current examination, improved peritoneal carcinomatosis and left adnexal implant, splenic ischemia evolved into small infarct, size improvement of one of splenic hypodense lesion and mild size increase of second hypodense lesion, favored to be benign/cystic, no new findings.  6. PET/CT 9/25/19--s/p hysterectomy and bilateral oophorectomy, no evidence for locally recurrent/residual disease, intensely  hypermetabolic hypodense lesion within the spleen 3.5X3.3cm concerning for metastatic focus. No other abnormal focus of disease.  7. CT C/A/P 11/13/20--Right lower lobe 3.3 cm mass is presumed metastatic, otherwise no evident residual, recurrent or metastatic disease.   8. PET/CT 11/20/20--Hypermetabolic right lung base lesion and suspected metastatic periportal adenopathy, no additional sites of FDG-avid otherwise aggressive appearing pathology through the neck, chest, abdomen, or pelvis.  9. PET/CT 1/21/21--Interval decrease in size of the right lower lobe mass with decreased FDG uptake, now measures 1.7 x 2.2 cm (previous 3.3 x 3.1 cm), resolution of FDG uptake in the suspected periportal lymph node which is not identifiable on noncontrast CT. No evidence of new or progressive hypermetabolic metastatic disease.  10. PET/CT 3/30/21--Resolved right lower lung lobe hypermetabolic mass. Otherwise, no interval change or evidence of residual disease, recurrence or new metastatic lesion.  11. PET/CT 11/21/24--new FDG uptake in right hepatic lobe concerning for malignancy, measures 3.5cm with SUV 9.5, could represent a peritoneal implant vs. Hepatic metastasis.   12. PET/CT 1/24/25--persistent right hepatic lobe lesion measures 3.6cm, stable, with increasing FDG uptake SUV 14.6, previous 9.5.    Procedures:   1. Paracentesis on 12/28/18 with removal of 5 Liters.  2. Paracentesis on 01/07/19 with removal of 3.5 Liters.  3. Paracentesis on 02/06/19 with removal of 2.5 Liters.  4. Mediport placed 12/2020 by Dr. Serge Gentile--removed 8/26/21 due to mediport fracture.    Germline genetic testing done by [employer] 1/29/19--negative for BRCA1/2, two (2) variants of uncertain significance (VUS): CATHY p.P7043G/p.J2719G and MLH1 p.P603L.     :  12/19/18 1102  11/10/20   42.9  12/08/20 79.5  01/05/21 28.9  01/26/21 12.8  02/18/21 11.6  03/15/22--10.3  04/26/22--9.6  05/16/22--10.2  06/07/22--10.1  07/19/22--11.6  08/08/22--12.5  09/19/22--12.7  10/31/22--13.5  01/03/23--12.0  02/13/23--14.0  03/03/23--13.9  04/17/23--14.5  05/26/23--14.6  07/31/23--13.5  11/13/23--15.6  12/04/23--16.8  01/16/24--13.7  02/26/24--18.6  03/18/24--16.3  04/29/24--17.0  06/10/24--17.0  08/12/24--17.3  09/23/24--18.3  10/14/24--18.6  11/25/24--19.1  01/06/25--24.3  02/17/25--23.0    Treatment History:  1. Neoadjuvant Carboplatin/Taxol every 3 weeks x 3 cycles. 1/15/19 - 2/26/19. Neulasta added.   2. Surgery--tumor debulking KORI/BSO 3/18/19.  3. Adjuvant Carboplatin/Taxol x 3 additions cycles 4/9/19--5/21/19.  4. Splenectomy 10/21/19.  5. Niraparib maintenance (dose reduction to 100mg daily for cytopenias) 11/8/19--12/1/20 (stopped due to progression).  6. Carboplatin/Paclitaxel/Avastin X 6 cycles completed 12/8/21--3/24/21 (complete response).    Current Treatment:  1. Eliquis BID for incidental PE on imaging 1/2019  2. Avastin maintenance started on 4/13/21.   Cycle 67 due 3/11/25    CC:  Chief Complaint   Patient presents with    Other Misc     Pt reports no concerns today.       HPI   Patient is here for follow-up of ovarian cancer on maintenance Avastin. She is doing well. Continues on Avastin maintenance. PET in 11/2024 showed a new liver lesion. Biopsy was attempted but was non-diagnostic. CA-125 remains WNL. Repeat PET in January with stable liver lesion, increased SUV. Patient has no symptoms. They discussed close monitoring for now. Her last Avastin was held due to 3+ urine protein. She did have a 24 hour urine that was <2gm. She is due for Cycle 67 tomorrow. She does have mild peripheral edema and her PCP ordered bilateral venous NIVA that was negative. She stopped the Norvasc. No other problems reported.     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      alendronate (FOSAMAX) 70 MG tablet Take 1 tablet (70 mg total) by mouth every 7 days. 4 tablet 11    apixaban (ELIQUIS) 5 mg Tab TAKE 1 TABLET BY MOUTH TWICE DAILY 180 tablet 0    aspirin (ECOTRIN) 81 MG EC tablet Take 81 mg by mouth once daily.      atorvastatin (LIPITOR) 40 MG tablet Take 1 tablet (40 mg total) by mouth once daily. 90 tablet 3    b complex vitamins capsule Take 1 capsule by mouth once daily.      calcium citrate-vitamin D3 315-200 mg (CITRACAL+D) 315 mg-5 mcg (200 unit) per tablet Take 1 tablet by mouth 2 (two) times daily.      GLUTAMINE ORAL Take 10 g by mouth Daily.      lisinopriL (PRINIVIL,ZESTRIL) 20 MG tablet TAKE 1 TABLET(20 MG) BY MOUTH DAILY 30 tablet 3    pyridoxine, vitamin B6, (B-6) 250 MG Tab Take 250 mg by mouth once daily.      sodium chloride 0.9% SolP 100 mL with bevacizumab 25 mg/mL Soln Inject into the vein every 21 days.       No current facility-administered medications on file prior to visit.      Review of Systems   Constitutional:  Negative for appetite change and unexpected weight change.   HENT:  Negative for mouth sores.    Eyes:  Negative for visual disturbance.   Respiratory:  Negative for cough and shortness of breath.    Cardiovascular:  Negative for chest pain.   Gastrointestinal:  Negative for abdominal pain.    Genitourinary:  Negative for frequency.   Musculoskeletal:  Negative for back pain.   Integumentary:  Negative for rash.   Neurological:  Negative for headaches.   Hematological:  Negative for adenopathy.   Psychiatric/Behavioral:  The patient is not nervous/anxious.         Vitals:    03/10/25 1359   BP: 121/78   Pulse: 74   Resp: 14   Temp: 98.1 °F (36.7 °C)     Wt Readings from Last 3 Encounters:   03/10/25 1359 54.3 kg (119 lb 12.8 oz)   02/06/25 0839 54.9 kg (121 lb)   01/28/25 1400 55 kg (121 lb 4.1 oz)     Physical Exam  Vitals reviewed.   Constitutional:       Appearance: Normal appearance. She is normal weight.   HENT:      Head: Normocephalic.   Eyes:      General: Lids are normal. Vision grossly intact.      Extraocular Movements: Extraocular movements intact.      Conjunctiva/sclera: Conjunctivae normal.   Cardiovascular:      Rate and Rhythm: Normal rate and regular rhythm.      Pulses: Normal pulses.      Heart sounds: Normal heart sounds, S1 normal and S2 normal.      Comments: Mild peripheral edema, R>L  Pulmonary:      Effort: Pulmonary effort is normal.      Breath sounds: Normal breath sounds.   Abdominal:      General: Abdomen is flat. Bowel sounds are normal. There is no distension.      Palpations: Abdomen is soft.      Tenderness: There is no abdominal tenderness.   Musculoskeletal:      Cervical back: Normal range of motion and neck supple.      Right lower leg: Edema present.      Left lower leg: Edema present.   Skin:     General: Skin is warm and moist.      Capillary Refill: Capillary refill takes less than 2 seconds.      Comments: Left CW mediport removed, site intact   Neurological:      General: No focal deficit present.      Mental Status: She is alert and oriented to person, place, and time.   Psychiatric:         Attention and Perception: Attention normal.         Mood and Affect: Mood normal.         Speech: Speech normal.         Behavior: Behavior normal. Behavior is  cooperative.         Cognition and Memory: Cognition normal.         Judgment: Judgment normal.       Lab Visit on 03/10/2025   Component Date Value    Protein, UA 03/10/2025 2+ (A)     WBC 03/10/2025 10.94     RBC 03/10/2025 4.14 (L)     Hgb 03/10/2025 13.0     Hct 03/10/2025 40.5     MCV 03/10/2025 97.8 (H)     MCH 03/10/2025 31.4 (H)     MCHC 03/10/2025 32.1 (L)     RDW 03/10/2025 14.2     Platelet 03/10/2025 380     MPV 03/10/2025 8.4     Neut % 03/10/2025 72.0     Lymph % 03/10/2025 18.5     Mono % 03/10/2025 8.5     Eos % 03/10/2025 0.7     Basophil % 03/10/2025 0.2     Imm Grans % 03/10/2025 0.1     Neut # 03/10/2025 7.88     Lymph # 03/10/2025 2.02     Mono # 03/10/2025 0.93     Eos # 03/10/2025 0.08     Baso # 03/10/2025 0.02     Imm Gran # 03/10/2025 0.01           Assessment:       1. Carcinoma of right fallopian tube    2. Malignant neoplasm metastatic to right lung    3. Secondary malignant neoplasm of liver    4. 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 Immunodeficiency due to drugs    6. Other chronic pulmonary embolism without acute cor pulmonale        Plan:     Patient with left ovarian cancer diagnosed 12/26/18, appears to be stage IIIC vs. IV with diffuse peritoneal disease, possible splenic involvement, epicardial lymph nodes and bilateral pleural effusions.  Patient seen and evaluated by Dr. Mike Santana at [facility] in [place].   Treatment recommended upfront with chemotherapy Carboplatin/Paclitaxel x 3 cycles.  Completed Cycle 3 on 2/26/19.   She underwent total abdominal hysterectomy, BSO and tumor debulking on 3/18/19 with Dr. Santana with complete gross resection of disease. FIGO Stage IIIC papillary serous fallopian tube cancer.   Patient resumed chemotherapy with cycle 4 on 4/9/19. Completed cycle 6 on 5/21/19.    Testing on tumor was positive for genomic instability but negative for BRCA mutation.  Per NCCN guidelines, maintenance can be considered for BRCA mutated  germline category 1 and somatic category 2A. There is some evidence that PARP inhibitors have some activity as well in tumors with genomic instability. But the PARP maintenance is not approved for this indication. Offered treatment to patient and after discussion she declined and made decision to continue with observation only.    PET/CT done for rising CA-125 showed hypermetabolic splenic lesion which is not new, just enlarged from previous. Case discussed with Dr. Mike Santana.  Patient is now s/p splenectomy done 10/21/19. Consistent with splenic involvement with high grade serous carcinoma.   Dr. Santana recommended Niraparib maintenance based on new data showing activity in HRD positive ovarian cancer and patient started on 11/8/19, required a dose reduction due to cytopenias.  Rising CA-125 noted in 11/2020. CT showed new RLL lung mass.   Follow-up PET done 11/20/20 showed RLL lung mass hypermetabolic and hypermetabolic periportal adenopathy.     Recommended 2nd line treatment with Carboplatin/Taxol/Avastin.   Started cycle 1 on 12/8/20. Neulasta added with cycle 2.   Completed Cycle 6 on 3/24/21.  PET from 3/30/21 showed complete resolution of lung mass and no other disease.    Avastin maintenance started on 4/13/21.   Hospitalized for TIA after her 4th cycle of Avastin. Work-up for stroke and cause otherwise negative. Patient started on baby ASA by neurology.  Discussed there are no clear guidelines for changing/discontinuing treatment for TIA/CVA related to Avastin.  Since her symptoms resolved and there were no residual effects, recommended to continue with Avastin since it is working well for her disease and since a baby ASA was added for prevention. She was also continued on Eliquis for h/o PE.  S/p mediport removal for fracture on 8/26/21.   Continue Avastin through peripheral veins for now.    PET was done in 11/2024 due to rising CA-125 although it had remained WNL. Scan showed lesion near superior  portion of liver.  CT-biopsy attempted but was non-diagnostic.   Repeat PET/CT done 1/24/25 with stable liver lesion in size, increased SUV, no other disease.  Since CA-125 is not outside of normal limits, and no way to tell how long the liver lesion has been present, since no scan done since 2021.  Recommended to monitor closely and to continue with current treatment since stable and monitor very closely.   Dr. Gentile reviewed for possible resection, though likely would be difficult given location.      Proceed with Avastin C67 tomorrow.   Schedule labs in 3 weeks and treatment on 4/1/25 - Cycle 68.  Continue monitoring CA-125 every 3 weeks.   Plan to repeat PET/CT in 5 weeks. Will schedule today.   Follow-up in 6 weeks after scan and labs completed.   If biopsy is not possible, would have to treat empirically.     If recurrence is confirmed, will discuss with GYN oncology Dr. Santana, likely will go back on Carboplatin with Taxol or Doxil, and consider maintenance with a different PARP, Lynparza.     Vaccines/Boosters post-splenectomy: Menactra/Menveo every 5 years, Bexsero every 2-3 years, annual flu shot.  Last Bexsero was given 8/1/23. Next due in 2026.  Received Menactra/Menveo in 9/2024, 5 years after initial.  Continue Eliquis 5mg BID and ASA.  All questions answered.      Tom Angeles, [facility]        Visit today included increased complexity associated with the care of the episodic problem fallopian tube cancer, addressing and managing the longitudinal care of the patient's fallopian tube cancer.   Prior to the patient's arrival on the same day, I spent (5) minutes reviewing chart. Once in the exam room with the patient, I spent (10) minutes in the room with the member performing a history and exam as well as reviewing the test results and recommendations with the patient. After leaving the exam room, I spent an additional (5) minutes completing the electronic health record. The total time spent that day  making medical decisions for the patient is (20) minutes, and this time - including the breakdown - is documented in the medical record.

## 2025-03-10 ENCOUNTER — OFFICE VISIT (OUTPATIENT)
Dept: HEMATOLOGY/ONCOLOGY | Facility: CLINIC | Age: 75
End: 2025-03-10
Payer: MEDICARE

## 2025-03-10 ENCOUNTER — LAB VISIT (OUTPATIENT)
Dept: LAB | Facility: HOSPITAL | Age: 75
End: 2025-03-10
Attending: INTERNAL MEDICINE
Payer: MEDICARE

## 2025-03-10 VITALS
OXYGEN SATURATION: 98 % | WEIGHT: 119.81 LBS | HEART RATE: 74 BPM | TEMPERATURE: 98 F | DIASTOLIC BLOOD PRESSURE: 78 MMHG | RESPIRATION RATE: 14 BRPM | BODY MASS INDEX: 20.45 KG/M2 | HEIGHT: 64 IN | SYSTOLIC BLOOD PRESSURE: 121 MMHG

## 2025-03-10 DIAGNOSIS — C57.4 MALIGNANT NEOPLASM OF UTERINE ADNEXA: ICD-10-CM

## 2025-03-10 DIAGNOSIS — Z79.899 IMMUNODEFICIENCY DUE TO DRUGS: ICD-10-CM

## 2025-03-10 DIAGNOSIS — C78.6 SECONDARY MALIGNANT NEOPLASM OF RETROPERITONEUM AND PERITONEUM: ICD-10-CM

## 2025-03-10 DIAGNOSIS — C57.01 CARCINOMA OF RIGHT FALLOPIAN TUBE: Primary | ICD-10-CM

## 2025-03-10 DIAGNOSIS — C78.01 MALIGNANT NEOPLASM METASTATIC TO RIGHT LUNG: ICD-10-CM

## 2025-03-10 DIAGNOSIS — D84.821 IMMUNODEFICIENCY DUE TO DRUGS: ICD-10-CM

## 2025-03-10 DIAGNOSIS — C78.7 SECONDARY MALIGNANT NEOPLASM OF LIVER: ICD-10-CM

## 2025-03-10 DIAGNOSIS — I27.82 OTHER CHRONIC PULMONARY EMBOLISM WITHOUT ACUTE COR PULMONALE: ICD-10-CM

## 2025-03-10 DIAGNOSIS — D70.1 AGRANULOCYTOSIS SECONDARY TO CANCER CHEMOTHERAPY: ICD-10-CM

## 2025-03-10 DIAGNOSIS — T45.1X5A AGRANULOCYTOSIS SECONDARY TO CANCER CHEMOTHERAPY: ICD-10-CM

## 2025-03-10 LAB
ALBUMIN SERPL-MCNC: 3.5 G/DL (ref 3.4–4.8)
ALBUMIN/GLOB SERPL: 1.2 RATIO (ref 1.1–2)
ALP SERPL-CCNC: 48 UNIT/L (ref 40–150)
ALT SERPL-CCNC: 16 UNIT/L (ref 0–55)
ANION GAP SERPL CALC-SCNC: 6 MEQ/L
AST SERPL-CCNC: 33 UNIT/L (ref 5–34)
BASOPHILS # BLD AUTO: 0.02 X10(3)/MCL
BASOPHILS NFR BLD AUTO: 0.2 %
BILIRUB SERPL-MCNC: 1.5 MG/DL
BUN SERPL-MCNC: 17.1 MG/DL (ref 9.8–20.1)
CALCIUM SERPL-MCNC: 9 MG/DL (ref 8.4–10.2)
CANCER AG125 SERPL-ACNC: 29.4 UNIT/ML (ref 0–35)
CHLORIDE SERPL-SCNC: 103 MMOL/L (ref 98–107)
CO2 SERPL-SCNC: 30 MMOL/L (ref 23–31)
CREAT SERPL-MCNC: 0.86 MG/DL (ref 0.55–1.02)
CREAT/UREA NIT SERPL: 20
EOSINOPHIL # BLD AUTO: 0.08 X10(3)/MCL (ref 0–0.9)
EOSINOPHIL NFR BLD AUTO: 0.7 %
ERYTHROCYTE [DISTWIDTH] IN BLOOD BY AUTOMATED COUNT: 14.2 % (ref 11.5–17)
GFR SERPLBLD CREATININE-BSD FMLA CKD-EPI: >60 ML/MIN/1.73/M2
GLOBULIN SER-MCNC: 3 GM/DL (ref 2.4–3.5)
GLUCOSE SERPL-MCNC: 103 MG/DL (ref 82–115)
HCT VFR BLD AUTO: 40.5 % (ref 37–47)
HGB BLD-MCNC: 13 G/DL (ref 12–16)
IMM GRANULOCYTES # BLD AUTO: 0.01 X10(3)/MCL (ref 0–0.04)
IMM GRANULOCYTES NFR BLD AUTO: 0.1 %
LYMPHOCYTES # BLD AUTO: 2.02 X10(3)/MCL (ref 0.6–4.6)
LYMPHOCYTES NFR BLD AUTO: 18.5 %
MCH RBC QN AUTO: 31.4 PG (ref 27–31)
MCHC RBC AUTO-ENTMCNC: 32.1 G/DL (ref 33–36)
MCV RBC AUTO: 97.8 FL (ref 80–94)
MONOCYTES # BLD AUTO: 0.93 X10(3)/MCL (ref 0.1–1.3)
MONOCYTES NFR BLD AUTO: 8.5 %
NEUTROPHILS # BLD AUTO: 7.88 X10(3)/MCL (ref 2.1–9.2)
NEUTROPHILS NFR BLD AUTO: 72 %
PLATELET # BLD AUTO: 380 X10(3)/MCL (ref 130–400)
PMV BLD AUTO: 8.4 FL (ref 7.4–10.4)
POTASSIUM SERPL-SCNC: 4 MMOL/L (ref 3.5–5.1)
PROT SERPL-MCNC: 6.5 GM/DL (ref 5.8–7.6)
PROT UR QL STRIP: ABNORMAL
RBC # BLD AUTO: 4.14 X10(6)/MCL (ref 4.2–5.4)
SODIUM SERPL-SCNC: 139 MMOL/L (ref 136–145)
WBC # BLD AUTO: 10.94 X10(3)/MCL (ref 4.5–11.5)

## 2025-03-10 PROCEDURE — 3078F DIAST BP <80 MM HG: CPT | Mod: CPTII,S$GLB,, | Performed by: NURSE PRACTITIONER

## 2025-03-10 PROCEDURE — 1159F MED LIST DOCD IN RCRD: CPT | Mod: CPTII,S$GLB,, | Performed by: NURSE PRACTITIONER

## 2025-03-10 PROCEDURE — 85025 COMPLETE CBC W/AUTO DIFF WBC: CPT

## 2025-03-10 PROCEDURE — 1126F AMNT PAIN NOTED NONE PRSNT: CPT | Mod: CPTII,S$GLB,, | Performed by: NURSE PRACTITIONER

## 2025-03-10 PROCEDURE — 36415 COLL VENOUS BLD VENIPUNCTURE: CPT

## 2025-03-10 PROCEDURE — G2211 COMPLEX E/M VISIT ADD ON: HCPCS | Mod: S$GLB,,, | Performed by: NURSE PRACTITIONER

## 2025-03-10 PROCEDURE — 86304 IMMUNOASSAY TUMOR CA 125: CPT

## 2025-03-10 PROCEDURE — 80053 COMPREHEN METABOLIC PANEL: CPT

## 2025-03-10 PROCEDURE — 81005 URINALYSIS: CPT

## 2025-03-10 PROCEDURE — 99999 PR PBB SHADOW E&M-EST. PATIENT-LVL IV: CPT | Mod: PBBFAC,,, | Performed by: NURSE PRACTITIONER

## 2025-03-10 PROCEDURE — 99215 OFFICE O/P EST HI 40 MIN: CPT | Mod: S$GLB,,, | Performed by: NURSE PRACTITIONER

## 2025-03-10 PROCEDURE — 1101F PT FALLS ASSESS-DOCD LE1/YR: CPT | Mod: CPTII,S$GLB,, | Performed by: NURSE PRACTITIONER

## 2025-03-10 PROCEDURE — 3074F SYST BP LT 130 MM HG: CPT | Mod: CPTII,S$GLB,, | Performed by: NURSE PRACTITIONER

## 2025-03-10 PROCEDURE — 1160F RVW MEDS BY RX/DR IN RCRD: CPT | Mod: CPTII,S$GLB,, | Performed by: NURSE PRACTITIONER

## 2025-03-10 PROCEDURE — 3288F FALL RISK ASSESSMENT DOCD: CPT | Mod: CPTII,S$GLB,, | Performed by: NURSE PRACTITIONER

## 2025-03-10 RX ORDER — HEPARIN 100 UNIT/ML
500 SYRINGE INTRAVENOUS
OUTPATIENT
Start: 2025-04-01

## 2025-03-10 RX ORDER — DIPHENHYDRAMINE HYDROCHLORIDE 50 MG/ML
50 INJECTION, SOLUTION INTRAMUSCULAR; INTRAVENOUS ONCE AS NEEDED
OUTPATIENT
Start: 2025-04-01

## 2025-03-10 RX ORDER — DIPHENHYDRAMINE HYDROCHLORIDE 50 MG/ML
25 INJECTION, SOLUTION INTRAMUSCULAR; INTRAVENOUS
OUTPATIENT
Start: 2025-04-01

## 2025-03-10 RX ORDER — ACETAMINOPHEN 325 MG/1
650 TABLET ORAL
OUTPATIENT
Start: 2025-04-01

## 2025-03-10 RX ORDER — SODIUM CHLORIDE 0.9 % (FLUSH) 0.9 %
10 SYRINGE (ML) INJECTION
OUTPATIENT
Start: 2025-04-01

## 2025-03-10 RX ORDER — EPINEPHRINE 0.3 MG/.3ML
0.3 INJECTION SUBCUTANEOUS ONCE AS NEEDED
OUTPATIENT
Start: 2025-04-01

## 2025-03-11 ENCOUNTER — INFUSION (OUTPATIENT)
Dept: INFUSION THERAPY | Facility: HOSPITAL | Age: 75
End: 2025-03-11
Attending: INTERNAL MEDICINE
Payer: MEDICARE

## 2025-03-11 VITALS — HEART RATE: 79 BPM | DIASTOLIC BLOOD PRESSURE: 74 MMHG | SYSTOLIC BLOOD PRESSURE: 150 MMHG

## 2025-03-11 DIAGNOSIS — C57.01 CARCINOMA OF RIGHT FALLOPIAN TUBE: Primary | ICD-10-CM

## 2025-03-11 PROCEDURE — 96375 TX/PRO/DX INJ NEW DRUG ADDON: CPT

## 2025-03-11 PROCEDURE — 25000003 PHARM REV CODE 250: Performed by: INTERNAL MEDICINE

## 2025-03-11 PROCEDURE — 96413 CHEMO IV INFUSION 1 HR: CPT

## 2025-03-11 PROCEDURE — 63600175 PHARM REV CODE 636 W HCPCS: Mod: JZ,TB | Performed by: INTERNAL MEDICINE

## 2025-03-11 RX ORDER — DIPHENHYDRAMINE HYDROCHLORIDE 50 MG/ML
50 INJECTION, SOLUTION INTRAMUSCULAR; INTRAVENOUS ONCE AS NEEDED
Status: DISCONTINUED | OUTPATIENT
Start: 2025-03-11 | End: 2025-03-11 | Stop reason: HOSPADM

## 2025-03-11 RX ORDER — ACETAMINOPHEN 325 MG/1
650 TABLET ORAL
Status: DISCONTINUED | OUTPATIENT
Start: 2025-03-11 | End: 2025-03-11 | Stop reason: HOSPADM

## 2025-03-11 RX ORDER — HEPARIN 100 UNIT/ML
500 SYRINGE INTRAVENOUS
Status: DISCONTINUED | OUTPATIENT
Start: 2025-03-11 | End: 2025-03-11 | Stop reason: HOSPADM

## 2025-03-11 RX ORDER — EPINEPHRINE 0.3 MG/.3ML
0.3 INJECTION SUBCUTANEOUS ONCE AS NEEDED
Status: DISCONTINUED | OUTPATIENT
Start: 2025-03-11 | End: 2025-03-11 | Stop reason: HOSPADM

## 2025-03-11 RX ORDER — DIPHENHYDRAMINE HYDROCHLORIDE 50 MG/ML
25 INJECTION, SOLUTION INTRAMUSCULAR; INTRAVENOUS
Status: COMPLETED | OUTPATIENT
Start: 2025-03-11 | End: 2025-03-11

## 2025-03-11 RX ORDER — SODIUM CHLORIDE 0.9 % (FLUSH) 0.9 %
10 SYRINGE (ML) INJECTION
Status: DISCONTINUED | OUTPATIENT
Start: 2025-03-11 | End: 2025-03-11 | Stop reason: HOSPADM

## 2025-03-11 RX ADMIN — SODIUM CHLORIDE 800 MG: 9 INJECTION, SOLUTION INTRAVENOUS at 03:03

## 2025-03-11 RX ADMIN — DIPHENHYDRAMINE HYDROCHLORIDE 25 MG: 50 INJECTION INTRAMUSCULAR; INTRAVENOUS at 03:03

## 2025-03-20 DIAGNOSIS — I26.99 PULMONARY EMBOLISM, UNSPECIFIED CHRONICITY, UNSPECIFIED PULMONARY EMBOLISM TYPE, UNSPECIFIED WHETHER ACUTE COR PULMONALE PRESENT: ICD-10-CM

## 2025-03-20 RX ORDER — APIXABAN 5 MG/1
5 TABLET, FILM COATED ORAL 2 TIMES DAILY
Qty: 180 TABLET | Refills: 0 | Status: SHIPPED | OUTPATIENT
Start: 2025-03-20

## 2025-03-31 ENCOUNTER — LAB VISIT (OUTPATIENT)
Dept: LAB | Facility: HOSPITAL | Age: 75
End: 2025-03-31
Attending: NURSE PRACTITIONER
Payer: MEDICARE

## 2025-03-31 DIAGNOSIS — I27.82 OTHER CHRONIC PULMONARY EMBOLISM WITHOUT ACUTE COR PULMONALE: ICD-10-CM

## 2025-03-31 DIAGNOSIS — T45.1X5A AGRANULOCYTOSIS SECONDARY TO CANCER CHEMOTHERAPY: ICD-10-CM

## 2025-03-31 DIAGNOSIS — C57.01 CARCINOMA OF RIGHT FALLOPIAN TUBE: ICD-10-CM

## 2025-03-31 DIAGNOSIS — D70.1 AGRANULOCYTOSIS SECONDARY TO CANCER CHEMOTHERAPY: ICD-10-CM

## 2025-03-31 DIAGNOSIS — D84.821 IMMUNODEFICIENCY DUE TO DRUGS: ICD-10-CM

## 2025-03-31 DIAGNOSIS — C78.7 SECONDARY MALIGNANT NEOPLASM OF LIVER: ICD-10-CM

## 2025-03-31 DIAGNOSIS — C78.01 MALIGNANT NEOPLASM METASTATIC TO RIGHT LUNG: ICD-10-CM

## 2025-03-31 DIAGNOSIS — Z79.899 IMMUNODEFICIENCY DUE TO DRUGS: ICD-10-CM

## 2025-03-31 LAB
ALBUMIN SERPL-MCNC: 3.6 G/DL (ref 3.4–4.8)
ALBUMIN/GLOB SERPL: 1.2 RATIO (ref 1.1–2)
ALP SERPL-CCNC: 42 UNIT/L (ref 40–150)
ALT SERPL-CCNC: 21 UNIT/L (ref 0–55)
ANION GAP SERPL CALC-SCNC: 5 MEQ/L
AST SERPL-CCNC: 37 UNIT/L (ref 11–45)
BASOPHILS # BLD AUTO: 0.03 X10(3)/MCL
BASOPHILS NFR BLD AUTO: 0.4 %
BILIRUB SERPL-MCNC: 1.1 MG/DL
BUN SERPL-MCNC: 20.3 MG/DL (ref 9.8–20.1)
CALCIUM SERPL-MCNC: 8.8 MG/DL (ref 8.4–10.2)
CANCER AG125 SERPL-ACNC: 38.7 UNIT/ML (ref 0–35)
CHLORIDE SERPL-SCNC: 99 MMOL/L (ref 98–107)
CO2 SERPL-SCNC: 30 MMOL/L (ref 23–31)
CREAT SERPL-MCNC: 0.79 MG/DL (ref 0.55–1.02)
CREAT/UREA NIT SERPL: 26
EOSINOPHIL # BLD AUTO: 0.2 X10(3)/MCL (ref 0–0.9)
EOSINOPHIL NFR BLD AUTO: 2.7 %
ERYTHROCYTE [DISTWIDTH] IN BLOOD BY AUTOMATED COUNT: 14.1 % (ref 11.5–17)
GFR SERPLBLD CREATININE-BSD FMLA CKD-EPI: >60 ML/MIN/1.73/M2
GLOBULIN SER-MCNC: 3 GM/DL (ref 2.4–3.5)
GLUCOSE SERPL-MCNC: 95 MG/DL (ref 82–115)
HCT VFR BLD AUTO: 40.2 % (ref 37–47)
HGB BLD-MCNC: 13.1 G/DL (ref 12–16)
IMM GRANULOCYTES # BLD AUTO: 0.01 X10(3)/MCL (ref 0–0.04)
IMM GRANULOCYTES NFR BLD AUTO: 0.1 %
LYMPHOCYTES # BLD AUTO: 2.42 X10(3)/MCL (ref 0.6–4.6)
LYMPHOCYTES NFR BLD AUTO: 32.2 %
MCH RBC QN AUTO: 31.6 PG (ref 27–31)
MCHC RBC AUTO-ENTMCNC: 32.6 G/DL (ref 33–36)
MCV RBC AUTO: 96.9 FL (ref 80–94)
MONOCYTES # BLD AUTO: 0.78 X10(3)/MCL (ref 0.1–1.3)
MONOCYTES NFR BLD AUTO: 10.4 %
NEUTROPHILS # BLD AUTO: 4.08 X10(3)/MCL (ref 2.1–9.2)
NEUTROPHILS NFR BLD AUTO: 54.2 %
PLATELET # BLD AUTO: 342 X10(3)/MCL (ref 130–400)
PMV BLD AUTO: 8.8 FL (ref 7.4–10.4)
POTASSIUM SERPL-SCNC: 4.7 MMOL/L (ref 3.5–5.1)
PROT SERPL-MCNC: 6.6 GM/DL (ref 5.8–7.6)
PROT UR QL STRIP: ABNORMAL
RBC # BLD AUTO: 4.15 X10(6)/MCL (ref 4.2–5.4)
SODIUM SERPL-SCNC: 134 MMOL/L (ref 136–145)
WBC # BLD AUTO: 7.52 X10(3)/MCL (ref 4.5–11.5)

## 2025-03-31 PROCEDURE — 81005 URINALYSIS: CPT

## 2025-03-31 PROCEDURE — 85025 COMPLETE CBC W/AUTO DIFF WBC: CPT

## 2025-03-31 PROCEDURE — 36415 COLL VENOUS BLD VENIPUNCTURE: CPT

## 2025-03-31 PROCEDURE — 86304 IMMUNOASSAY TUMOR CA 125: CPT

## 2025-03-31 PROCEDURE — 80053 COMPREHEN METABOLIC PANEL: CPT

## 2025-04-01 ENCOUNTER — RESULTS FOLLOW-UP (OUTPATIENT)
Dept: HEMATOLOGY/ONCOLOGY | Facility: CLINIC | Age: 75
End: 2025-04-01

## 2025-04-01 ENCOUNTER — INFUSION (OUTPATIENT)
Dept: INFUSION THERAPY | Facility: HOSPITAL | Age: 75
End: 2025-04-01
Attending: INTERNAL MEDICINE
Payer: MEDICARE

## 2025-04-01 VITALS
TEMPERATURE: 98 F | HEIGHT: 64 IN | SYSTOLIC BLOOD PRESSURE: 165 MMHG | OXYGEN SATURATION: 98 % | WEIGHT: 118.31 LBS | HEART RATE: 59 BPM | BODY MASS INDEX: 20.2 KG/M2 | RESPIRATION RATE: 16 BRPM | DIASTOLIC BLOOD PRESSURE: 80 MMHG

## 2025-04-01 DIAGNOSIS — C57.01 CARCINOMA OF RIGHT FALLOPIAN TUBE: Primary | ICD-10-CM

## 2025-04-01 PROCEDURE — 63600175 PHARM REV CODE 636 W HCPCS: Mod: JZ,TB | Performed by: NURSE PRACTITIONER

## 2025-04-01 PROCEDURE — 25000003 PHARM REV CODE 250: Performed by: NURSE PRACTITIONER

## 2025-04-01 PROCEDURE — 96413 CHEMO IV INFUSION 1 HR: CPT

## 2025-04-01 PROCEDURE — 96375 TX/PRO/DX INJ NEW DRUG ADDON: CPT

## 2025-04-01 RX ORDER — DIPHENHYDRAMINE HYDROCHLORIDE 50 MG/ML
25 INJECTION, SOLUTION INTRAMUSCULAR; INTRAVENOUS
Status: COMPLETED | OUTPATIENT
Start: 2025-04-01 | End: 2025-04-01

## 2025-04-01 RX ORDER — ACETAMINOPHEN 325 MG/1
650 TABLET ORAL
Status: DISCONTINUED | OUTPATIENT
Start: 2025-04-01 | End: 2025-04-01 | Stop reason: HOSPADM

## 2025-04-01 RX ORDER — HEPARIN 100 UNIT/ML
500 SYRINGE INTRAVENOUS
Status: DISCONTINUED | OUTPATIENT
Start: 2025-04-01 | End: 2025-04-01 | Stop reason: HOSPADM

## 2025-04-01 RX ORDER — EPINEPHRINE 0.3 MG/.3ML
0.3 INJECTION SUBCUTANEOUS ONCE AS NEEDED
Status: DISCONTINUED | OUTPATIENT
Start: 2025-04-01 | End: 2025-04-01 | Stop reason: HOSPADM

## 2025-04-01 RX ORDER — DIPHENHYDRAMINE HYDROCHLORIDE 50 MG/ML
50 INJECTION, SOLUTION INTRAMUSCULAR; INTRAVENOUS ONCE AS NEEDED
Status: DISCONTINUED | OUTPATIENT
Start: 2025-04-01 | End: 2025-04-01 | Stop reason: HOSPADM

## 2025-04-01 RX ORDER — SODIUM CHLORIDE 0.9 % (FLUSH) 0.9 %
10 SYRINGE (ML) INJECTION
Status: DISCONTINUED | OUTPATIENT
Start: 2025-04-01 | End: 2025-04-01 | Stop reason: HOSPADM

## 2025-04-01 RX ADMIN — DIPHENHYDRAMINE HYDROCHLORIDE 25 MG: 50 INJECTION INTRAMUSCULAR; INTRAVENOUS at 03:04

## 2025-04-01 RX ADMIN — SODIUM CHLORIDE 800 MG: 9 INJECTION, SOLUTION INTRAVENOUS at 03:04

## 2025-04-01 NOTE — PLAN OF CARE
Patient tolerated C68. Urine protein +3 okay to treat per Dr. Corral see ZAK Santos note on lab result. Discharged home.

## 2025-04-08 DIAGNOSIS — C78.01 MALIGNANT NEOPLASM METASTATIC TO RIGHT LUNG: ICD-10-CM

## 2025-04-08 DIAGNOSIS — C78.7 SECONDARY MALIGNANT NEOPLASM OF LIVER: ICD-10-CM

## 2025-04-08 DIAGNOSIS — R18.0 MALIGNANT ASCITES: ICD-10-CM

## 2025-04-08 DIAGNOSIS — C57.01 CARCINOMA OF RIGHT FALLOPIAN TUBE: Primary | ICD-10-CM

## 2025-04-15 ENCOUNTER — HOSPITAL ENCOUNTER (OUTPATIENT)
Dept: RADIOLOGY | Facility: HOSPITAL | Age: 75
Discharge: HOME | End: 2025-04-15
Attending: NURSE PRACTITIONER
Payer: MEDICARE

## 2025-04-15 ENCOUNTER — RESULTS FOLLOW-UP (OUTPATIENT)
Dept: HEMATOLOGY/ONCOLOGY | Facility: CLINIC | Age: 75
End: 2025-04-15

## 2025-04-15 DIAGNOSIS — C78.01 MALIGNANT NEOPLASM METASTATIC TO RIGHT LUNG: ICD-10-CM

## 2025-04-15 DIAGNOSIS — C57.01 CARCINOMA OF RIGHT FALLOPIAN TUBE: ICD-10-CM

## 2025-04-15 DIAGNOSIS — R18.0 MALIGNANT ASCITES: ICD-10-CM

## 2025-04-15 DIAGNOSIS — C78.7 SECONDARY MALIGNANT NEOPLASM OF LIVER: ICD-10-CM

## 2025-04-15 PROCEDURE — 74177 CT ABD & PELVIS W/CONTRAST: CPT | Mod: TC

## 2025-04-15 PROCEDURE — 25500020 PHARM REV CODE 255: Performed by: NURSE PRACTITIONER

## 2025-04-15 RX ORDER — DIATRIZOATE MEGLUMINE AND DIATRIZOATE SODIUM 660; 100 MG/ML; MG/ML
30 SOLUTION ORAL; RECTAL
Status: COMPLETED | OUTPATIENT
Start: 2025-04-15 | End: 2025-04-15

## 2025-04-15 RX ADMIN — IOHEXOL 75 ML: 350 INJECTION, SOLUTION INTRAVENOUS at 08:04

## 2025-04-15 RX ADMIN — DIATRIZOATE MEGLUMINE AND DIATRIZOATE SODIUM 30 ML: 660; 100 LIQUID ORAL; RECTAL at 08:04

## 2025-04-15 NOTE — PROGRESS NOTES
Subjective:       Patient ID: Shayna Ldeezma is a 74 y.o. female.  GI: Dr. Gamaliel Carlos  GYN ONC at [facility]: Dr. Mike Santana    1. 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 Pulmonary Embolism (Incidental on CT)--19    Procedure/pathology:   1. Paracentesis with removal of 5L of fluid done 18--serous carcinoma, high grade, c/w ovarian primary, PAX-8, CK7 and MOC-31 positive, CDX-2, CK-20 and Calretinin-negative.  2. S/p Exploratory Lap, radical tumor debulking including total abdominal hysterectomy, bilateral salpingo-oophorectomy, bilateral pelvic lymph node sampling, appendectomy, mobilization of the left ureter, complete gross resection of the disease with removal of mesenteric disease, as well as omental disese and parasplenic disease by Dr. Santana 3/18/19--Metastatic high grade serous carcinoma. Tissue/organ involvement: right ovary, appendectomy, omentum, mesentery (including small bowel mesentery) and multiple other sites designated: periumbilical, perisplenic, transverse colon, splenic flexure, perirectal, left periureteral. 2 lymph nodes negative. [id number]. FIGO stage IIIC. Myriad somatic instability testing positive for genomic instability, negative BRCA1 and BRCA2 tumor testing.  3. Splenectomy done 10/21/19--splenic involvement with metastatic high grade serous carcinoma, 5 benign lymph nodes. Caris testing showed LAM, NTRK1/2/3 negative, TMB low, BRCA1/2 negative, ER/IA negative, CATHY negative, SPEN pathogenic variant Exon 11, TP53 pathogenic variant Exon 5, PD-L1 positive CPS 15, Genomic ELODIA high.  4. CT-biopsy done 24--non-diagnostic, no malignancy cells present.   Imagin. CT A/P w/ and w/o contrast done at [facility] 18--large volume ascites with multiple peritoneal implants, right pericolic gutter implant measures 2.5X3.3cm, LUQ omental/mesenteric implant measures 3X3.6cm, omental carcinomatosis, extensive  enhancing soft tissue surrounding sigmoid colon vs. large sigmoid mass, left ovarian cystic lesion appears to have some internal septations measures 3.5X3.8cm, enhancing periportal soft tissue density likely lymphadenopathy with some narrowing of the portal vein noted, but without internal filling defect, borderline splenomegaly, subtle solid lesion w/n central spleen measures 2.4X2.5cm, likely metastatic, with peripheral area of diminished enhancement suggesting splenic infarct, soft tissue implant abutting gallbladder measures 2X2.2cm, no right adnexal mass, trace bilateral layering pleural fluid, small moderate-sized hiatal hernia, few borderline enlarged lymph nodes w/n right epicardial fat pad.  2. CT of chest on 1/11/19--Some prominent right cardiophrenic lymph nodes are nonspecific and can be followed on future surveillance scans. Otherwise no CT evidence of metastatic disease to the chest. While exam was not tailored for evaluation of the pulmonary arteries I suspect there is pulmonary thromboembolic disease. Peritoneal carcinomatosis seen in the upper abdomen. Critical Result: Pulmonary Embolus.  3. CT C/A/P 3/4/19--Positive response to therapy. No new or progressive metastatic disease in the chest, abdomen or pelvis. Stable to improved findings include smaller pericardial lymph nodes, andrew hepatis adenopathy, peritoneal carcinomatosis, smaller left adnexal lesion; no evidence of PE within limitations of the study.  5. CT PE protocol chest/A/P 6/11/19--No PE, improved pericardial lymph node with minimal size on current examination, improved peritoneal carcinomatosis and left adnexal implant, splenic ischemia evolved into small infarct, size improvement of one of splenic hypodense lesion and mild size increase of second hypodense lesion, favored to be benign/cystic, no new findings.  6. PET/CT 9/25/19--s/p hysterectomy and bilateral oophorectomy, no evidence for locally recurrent/residual disease, intensely  hypermetabolic hypodense lesion within the spleen 3.5X3.3cm concerning for metastatic focus. No other abnormal focus of disease.  7. CT C/A/P 11/13/20--Right lower lobe 3.3 cm mass is presumed metastatic, otherwise no evident residual, recurrent or metastatic disease.   8. PET/CT 11/20/20--Hypermetabolic right lung base lesion and suspected metastatic periportal adenopathy, no additional sites of FDG-avid otherwise aggressive appearing pathology through the neck, chest, abdomen, or pelvis.  9. PET/CT 1/21/21--Interval decrease in size of the right lower lobe mass with decreased FDG uptake, now measures 1.7 x 2.2 cm (previous 3.3 x 3.1 cm), resolution of FDG uptake in the suspected periportal lymph node which is not identifiable on noncontrast CT. No evidence of new or progressive hypermetabolic metastatic disease.  10. PET/CT 3/30/21--Resolved right lower lung lobe hypermetabolic mass. Otherwise, no interval change or evidence of residual disease, recurrence or new metastatic lesion.  11. PET/CT 11/21/24--new FDG uptake in right hepatic lobe concerning for malignancy, measures 3.5cm with SUV 9.5, could represent a peritoneal implant vs. Hepatic metastasis.   12. PET/CT 1/24/25--persistent right hepatic lobe lesion measures 3.6cm, stable, with increasing FDG uptake SUV 14.6, previous 9.5.  13. CT C/A/P done 4/15/25--along periphery of right hepatic lobe, 2 lesions are present, larger of 2 at the padded dome measures 2.6X3.3cm (previous 2.7X2.4cm) is larger, more cystic appearing lesion along contour of right hepatic lobe measures 1.9X1cm, unchanged, no new lesion, chronic compression deformity of L1, similar since 1/24/25. No new suspicious bone lesion.     Procedures:   1. Paracentesis on 12/28/18 with removal of 5 Liters.  2. Paracentesis on 01/07/19 with removal of 3.5 Liters.  3. Paracentesis on 02/06/19 with removal of 2.5 Liters.  4. Mediport placed 12/2020 by Dr. Serge Gentile--removed 8/26/21 due to mediport  fracture.    Germline genetic testing done by [email or web address] 1/29/19--negative for BRCA1/2, two (2) variants of uncertain significance (VUS): CATHY p.[id number]/p.[id number] and MLH1 p.P603L.     :  12/19/18 1102  11/10/20  42.9  12/08/20 79.5  01/05/21 28.9  01/26/21 12.8  02/18/21 11.6  03/15/22--10.3  04/26/22--9.6  05/16/22--10.2  06/07/22--10.1  07/19/22--11.6  08/08/22--12.5  09/19/22--12.7  10/31/22--13.5  01/03/23--12.0  02/13/23--14.0  03/03/23--13.9  04/17/23--14.5  05/26/23--14.6  07/31/23--13.5  11/13/23--15.6  12/04/23--16.8  01/16/24--13.7  02/26/24--18.6  03/18/24--16.3  04/29/24--17.0  06/10/24--17.0  08/12/24--17.3  09/23/24--18.3  10/14/24--18.6  11/25/24--19.1  01/06/25--24.3  03/10/25--29.4  03/31/25--38.7    Treatment History:  1. Neoadjuvant Carboplatin/Taxol every 3 weeks x 3 cycles. 1/15/19 - 2/26/19. Neulasta added.   2. Surgery--tumor debulking KORI/BSO 3/18/19.  3. Adjuvant Carboplatin/Taxol x 3 additions cycles 4/9/19--5/21/19.  4. Splenectomy 10/21/19.  5. Niraparib maintenance (dose reduction to 100mg daily for cytopenias) 11/8/19--12/1/20 (stopped due to progression).  6. Carboplatin/Paclitaxel/Avastin X 6 cycles completed 12/8/21--3/24/21 (complete response).  7. Avastin maintenance 4/13/21--4/21/25 (stopped due to progression).    Current Treatment:  1. Eliquis BID for incidental PE on imaging 1/2019  2. Carboplatin/Doxil X 6 cycles to start 5/6/25  3. Lynparza maintenance planned    CC:  Chief Complaint   Patient presents with    Other Misc     Pt reports no concerns today.       HPI   Patient is here for follow-up of ovarian cancer on maintenance Avastin. CT C/A/P shows disease progression with mild enlargement of 1 of the lesions near the liver. CA-125 is also more elevated now outside the normal range. Discussed stopping Avastin and going back on chemotherapy and she is in agreement.         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      alendronate (FOSAMAX) 70 MG tablet Take 1 tablet (70 mg total) by mouth every 7 days. 4 tablet 11    aspirin (ECOTRIN) 81 MG EC tablet Take 81 mg by mouth once daily.      atorvastatin (LIPITOR) 40 MG tablet Take 1 tablet (40 mg total) by mouth once daily. 90 tablet 3    b complex vitamins capsule Take 1 capsule by mouth once daily.      calcium citrate-vitamin D3 315-200 mg (CITRACAL+D) 315 mg-5 mcg (200 unit) per tablet Take 1 tablet by mouth 2 (two) times daily.      ELIQUIS 5 mg Tab TAKE 1 TABLET BY MOUTH TWICE DAILY 180 tablet 0    GLUTAMINE ORAL Take 10 g by mouth Daily.      lisinopriL (PRINIVIL,ZESTRIL) 20 MG tablet TAKE 1 TABLET(20 MG) BY MOUTH DAILY 30 tablet 3    pyridoxine, vitamin B6, (B-6) 250 MG Tab Take 250 mg by mouth once daily.      sodium chloride 0.9% SolP 100 mL with bevacizumab 25 mg/mL Soln Inject into the vein every 21 days.       No current facility-administered medications on file prior to visit.      Review of Systems   Constitutional:  Negative for appetite change and unexpected weight change.   HENT:  Negative for mouth sores and nosebleeds.    Eyes:  Negative for visual disturbance.   Respiratory:  Negative for cough and shortness of breath.    Cardiovascular:  Negative for chest pain.   Gastrointestinal:  Negative for abdominal pain and  diarrhea.   Genitourinary:  Negative for frequency.   Musculoskeletal:  Negative for back pain.   Integumentary:  Negative for rash.   Neurological:  Negative for headaches.   Hematological:  Negative for adenopathy.   Psychiatric/Behavioral:  The patient is not nervous/anxious.         Vitals:    04/21/25 1308   BP: (!) 174/91   Pulse: 63   Resp: 14   Temp: 97.6 °F (36.4 °C)         Wt Readings from Last 3 Encounters:   04/21/25 1308 53.2 kg (117 lb 4.8 oz)   04/01/25 1500 53.7 kg (118 lb 4.8 oz)   03/10/25 1359 54.3 kg (119 lb 12.8 oz)     Physical Exam  Vitals reviewed.   Constitutional:       Appearance: Normal appearance. She is normal weight.   HENT:      Head: Normocephalic.   Eyes:      General: Lids are normal. Vision grossly intact.      Extraocular Movements: Extraocular movements intact.      Conjunctiva/sclera: Conjunctivae normal.   Cardiovascular:      Rate and Rhythm: Normal rate and regular rhythm.      Pulses: Normal pulses.      Heart sounds: Normal heart sounds, S1 normal and S2 normal.   Pulmonary:      Effort: Pulmonary effort is normal.      Breath sounds: Normal breath sounds.   Abdominal:      General: Abdomen is flat. Bowel sounds are normal. There is no distension.      Palpations: Abdomen is soft.      Tenderness: There is no abdominal tenderness.   Musculoskeletal:      Cervical back: Normal range of motion and neck supple.      Right lower leg: No edema.      Left lower leg: No edema.   Skin:     General: Skin is warm and moist.      Capillary Refill: Capillary refill takes less than 2 seconds.      Comments: Left CW mediport removed, site intact   Neurological:      General: No focal deficit present.      Mental Status: She is alert and oriented to person, place, and time.   Psychiatric:         Attention and Perception: Attention normal.         Mood and Affect: Mood normal.         Speech: Speech normal.         Behavior: Behavior normal. Behavior is cooperative.         Cognition  and Memory: Cognition normal.         Judgment: Judgment normal.       Lab Visit on 04/21/2025   Component Date Value    Protein, UA 04/21/2025 3+ (A)     WBC 04/21/2025 7.84     RBC 04/21/2025 4.17 (L)     Hgb 04/21/2025 13.3     Hct 04/21/2025 40.6     MCV 04/21/2025 97.4 (H)     MCH 04/21/2025 31.9 (H)     MCHC 04/21/2025 32.8 (L)     RDW 04/21/2025 13.8     Platelet 04/21/2025 322     MPV 04/21/2025 8.7     Neut % 04/21/2025 52.1     Lymph % 04/21/2025 35.8     Mono % 04/21/2025 10.2     Eos % 04/21/2025 1.4     Basophil % 04/21/2025 0.4     Imm Grans % 04/21/2025 0.1     Neut # 04/21/2025 4.08     Lymph # 04/21/2025 2.81     Mono # 04/21/2025 0.80     Eos # 04/21/2025 0.11     Baso # 04/21/2025 0.03     Imm Gran # 04/21/2025 0.01           Assessment:       1. Carcinoma of right fallopian tube    2. Malignant neoplasm metastatic to right lung    3. Secondary malignant neoplasm of liver          Plan:     Patient with left ovarian cancer diagnosed 12/26/18, appears to be stage IIIC vs. IV with diffuse peritoneal disease, possible splenic involvement, epicardial lymph nodes and bilateral pleural effusions.  Patient seen and evaluated by Dr. Mike Santana at [facility] in [place].   Treatment recommended upfront with chemotherapy Carboplatin/Paclitaxel x 3 cycles.  Completed Cycle 3 on 2/26/19.   She underwent total abdominal hysterectomy, BSO and tumor debulking on 3/18/19 with Dr. Santana with complete gross resection of disease. FIGO Stage IIIC papillary serous fallopian tube cancer.   Patient resumed chemotherapy with cycle 4 on 4/9/19. Completed cycle 6 on 5/21/19.    Testing on tumor was positive for genomic instability but negative for BRCA mutation.  Per NCCN guidelines, maintenance can be considered for BRCA mutated germline category 1 and somatic category 2A. There is some evidence that PARP inhibitors have some activity as well in tumors with genomic instability. But the PARP maintenance is not  approved for this indication. Offered treatment to patient and after discussion she declined and made decision to continue with observation only.    PET/CT done for rising CA-125 showed hypermetabolic splenic lesion which is not new, just enlarged from previous. Case discussed with Dr. Mike Santana.  Patient is now s/p splenectomy done 10/21/19. Consistent with splenic involvement with high grade serous carcinoma.   Dr. Santana recommended Niraparib maintenance based on new data showing activity in HRD positive ovarian cancer and patient started on 11/8/19, required a dose reduction due to cytopenias.  Rising CA-125 noted in 11/2020. CT showed new RLL lung mass.   Follow-up PET done 11/20/20 showed RLL lung mass hypermetabolic and hypermetabolic periportal adenopathy.     Recommended 2nd line treatment with Carboplatin/Taxol/Avastin.   Started cycle 1 on 12/8/20. Neulasta added with cycle 2.   Completed Cycle 6 on 3/24/21.  PET from 3/30/21 showed complete resolution of lung mass and no other disease.    Avastin maintenance started on 4/13/21.   Hospitalized for TIA after her 4th cycle of Avastin. Work-up for stroke and cause otherwise negative. Patient started on baby ASA by neurology.  Discussed there are no clear guidelines for changing/discontinuing treatment for TIA/CVA related to Avastin.  Since her symptoms resolved and there were no residual effects, recommended to continue with Avastin since it is working well for her disease and since a baby ASA was added for prevention. She was also continued on Eliquis for h/o PE.  S/p mediport removal for fracture on 8/26/21.   Continue Avastin through peripheral veins for now.  PET was done in 11/2024 due to rising CA-125 although it had remained WNL. Scan showed lesion near superior portion of liver.  CT-biopsy attempted but was non-diagnostic.     Since CA-125 is not outside of normal limits, and no way to tell how long there liver lesion has been present, since no  scan done since 2021.  Recommended to monitor closely.  Repeat PET/CT done 1/24/25 with stable liver lesion in size, increased SUV, no other disease.  Again recommended to continue with current treatment since stable and monitor very closely.   Repeat CT C/A/P from 4/15/25 with slight enlargement of one of the liver lesions. Insurance denied PET and says she has reached her lifetime PET limit.  CA-125 also rising. These lesions remain in a difficult location for biopsy. Will forgo biopsy and proceed with treatment.     Recommended to start on chemotherapy.  Recommend treatment with Carboplatin and Doxil X 6 cycles.     Refer to Dr. Hwang for mediport placement.  Discussed most common side effects and patient given information from [employer].  Obtain ECHO.    Plan for labs on 5/5/25 and C1 on 5/6/25.  Plan to repeat CT C/A/P after 3 cycles and if good, continue up to 6 cycles.  Also if good response, plan for maintenance PARP, switch to Lynparza.     Vaccines/Boosters post-splenectomy: Menactra/Menveo every 5 years, Bexsero every 2-3 years, annual flu shot.  Last Bexsero was given 8/1/23. Next due in 2026.  Received Menactra/Menveo in 9/2024, 5 years after initial.     Continue Eliquis 5mg BID(h/o PE in 2019) and ASA.  All questions answered.      Renetta Corral MD        Visit today included increased complexity associated with the care of the episodic problem fallopian tube cancer, addressing and managing the longitudinal care of the patient's fallopian tube cancer.

## 2025-04-21 ENCOUNTER — OFFICE VISIT (OUTPATIENT)
Dept: HEMATOLOGY/ONCOLOGY | Facility: CLINIC | Age: 75
End: 2025-04-21
Payer: MEDICARE

## 2025-04-21 ENCOUNTER — LAB VISIT (OUTPATIENT)
Dept: LAB | Facility: HOSPITAL | Age: 75
End: 2025-04-21
Attending: INTERNAL MEDICINE
Payer: MEDICARE

## 2025-04-21 VITALS
OXYGEN SATURATION: 99 % | HEART RATE: 63 BPM | DIASTOLIC BLOOD PRESSURE: 91 MMHG | HEIGHT: 64 IN | SYSTOLIC BLOOD PRESSURE: 174 MMHG | RESPIRATION RATE: 14 BRPM | WEIGHT: 117.31 LBS | BODY MASS INDEX: 20.03 KG/M2 | TEMPERATURE: 98 F

## 2025-04-21 DIAGNOSIS — T45.1X5A AGRANULOCYTOSIS SECONDARY TO CANCER CHEMOTHERAPY: ICD-10-CM

## 2025-04-21 DIAGNOSIS — C78.01 MALIGNANT NEOPLASM METASTATIC TO RIGHT LUNG: ICD-10-CM

## 2025-04-21 DIAGNOSIS — C57.01 CARCINOMA OF RIGHT FALLOPIAN TUBE: ICD-10-CM

## 2025-04-21 DIAGNOSIS — I27.82 OTHER CHRONIC PULMONARY EMBOLISM WITHOUT ACUTE COR PULMONALE: ICD-10-CM

## 2025-04-21 DIAGNOSIS — C57.01 CARCINOMA OF RIGHT FALLOPIAN TUBE: Primary | ICD-10-CM

## 2025-04-21 DIAGNOSIS — C78.7 SECONDARY MALIGNANT NEOPLASM OF LIVER: ICD-10-CM

## 2025-04-21 DIAGNOSIS — Z79.899 IMMUNODEFICIENCY DUE TO DRUGS: ICD-10-CM

## 2025-04-21 DIAGNOSIS — D84.821 IMMUNODEFICIENCY DUE TO DRUGS: ICD-10-CM

## 2025-04-21 DIAGNOSIS — D70.1 AGRANULOCYTOSIS SECONDARY TO CANCER CHEMOTHERAPY: ICD-10-CM

## 2025-04-21 LAB
ALBUMIN SERPL-MCNC: 3.5 G/DL (ref 3.4–4.8)
ALBUMIN/GLOB SERPL: 1.2 RATIO (ref 1.1–2)
ALP SERPL-CCNC: 41 UNIT/L (ref 40–150)
ALT SERPL-CCNC: 22 UNIT/L (ref 0–55)
ANION GAP SERPL CALC-SCNC: 4 MEQ/L
AST SERPL-CCNC: 42 UNIT/L (ref 11–45)
BASOPHILS # BLD AUTO: 0.03 X10(3)/MCL
BASOPHILS NFR BLD AUTO: 0.4 %
BILIRUB SERPL-MCNC: 1.1 MG/DL
BUN SERPL-MCNC: 13 MG/DL (ref 9.8–20.1)
CALCIUM SERPL-MCNC: 9 MG/DL (ref 8.4–10.2)
CANCER AG125 SERPL-ACNC: 48.3 UNIT/ML (ref 0–35)
CHLORIDE SERPL-SCNC: 102 MMOL/L (ref 98–107)
CO2 SERPL-SCNC: 30 MMOL/L (ref 23–31)
CREAT SERPL-MCNC: 0.7 MG/DL (ref 0.55–1.02)
CREAT/UREA NIT SERPL: 19
EOSINOPHIL # BLD AUTO: 0.11 X10(3)/MCL (ref 0–0.9)
EOSINOPHIL NFR BLD AUTO: 1.4 %
ERYTHROCYTE [DISTWIDTH] IN BLOOD BY AUTOMATED COUNT: 13.8 % (ref 11.5–17)
GFR SERPLBLD CREATININE-BSD FMLA CKD-EPI: >60 ML/MIN/1.73/M2
GLOBULIN SER-MCNC: 3 GM/DL (ref 2.4–3.5)
GLUCOSE SERPL-MCNC: 108 MG/DL (ref 82–115)
HCT VFR BLD AUTO: 40.6 % (ref 37–47)
HGB BLD-MCNC: 13.3 G/DL (ref 12–16)
IMM GRANULOCYTES # BLD AUTO: 0.01 X10(3)/MCL (ref 0–0.04)
IMM GRANULOCYTES NFR BLD AUTO: 0.1 %
LYMPHOCYTES # BLD AUTO: 2.81 X10(3)/MCL (ref 0.6–4.6)
LYMPHOCYTES NFR BLD AUTO: 35.8 %
MCH RBC QN AUTO: 31.9 PG (ref 27–31)
MCHC RBC AUTO-ENTMCNC: 32.8 G/DL (ref 33–36)
MCV RBC AUTO: 97.4 FL (ref 80–94)
MONOCYTES # BLD AUTO: 0.8 X10(3)/MCL (ref 0.1–1.3)
MONOCYTES NFR BLD AUTO: 10.2 %
NEUTROPHILS # BLD AUTO: 4.08 X10(3)/MCL (ref 2.1–9.2)
NEUTROPHILS NFR BLD AUTO: 52.1 %
PLATELET # BLD AUTO: 322 X10(3)/MCL (ref 130–400)
PMV BLD AUTO: 8.7 FL (ref 7.4–10.4)
POTASSIUM SERPL-SCNC: 3.7 MMOL/L (ref 3.5–5.1)
PROT SERPL-MCNC: 6.5 GM/DL (ref 5.8–7.6)
PROT UR QL STRIP: ABNORMAL
RBC # BLD AUTO: 4.17 X10(6)/MCL (ref 4.2–5.4)
SODIUM SERPL-SCNC: 136 MMOL/L (ref 136–145)
WBC # BLD AUTO: 7.84 X10(3)/MCL (ref 4.5–11.5)

## 2025-04-21 PROCEDURE — 36415 COLL VENOUS BLD VENIPUNCTURE: CPT

## 2025-04-21 PROCEDURE — 80053 COMPREHEN METABOLIC PANEL: CPT

## 2025-04-21 PROCEDURE — 1101F PT FALLS ASSESS-DOCD LE1/YR: CPT | Mod: CPTII,S$GLB,, | Performed by: INTERNAL MEDICINE

## 2025-04-21 PROCEDURE — G2211 COMPLEX E/M VISIT ADD ON: HCPCS | Mod: S$GLB,,, | Performed by: INTERNAL MEDICINE

## 2025-04-21 PROCEDURE — 3080F DIAST BP >= 90 MM HG: CPT | Mod: CPTII,S$GLB,, | Performed by: INTERNAL MEDICINE

## 2025-04-21 PROCEDURE — 1126F AMNT PAIN NOTED NONE PRSNT: CPT | Mod: CPTII,S$GLB,, | Performed by: INTERNAL MEDICINE

## 2025-04-21 PROCEDURE — 1159F MED LIST DOCD IN RCRD: CPT | Mod: CPTII,S$GLB,, | Performed by: INTERNAL MEDICINE

## 2025-04-21 PROCEDURE — 3077F SYST BP >= 140 MM HG: CPT | Mod: CPTII,S$GLB,, | Performed by: INTERNAL MEDICINE

## 2025-04-21 PROCEDURE — 99215 OFFICE O/P EST HI 40 MIN: CPT | Mod: S$GLB,,, | Performed by: INTERNAL MEDICINE

## 2025-04-21 PROCEDURE — 99999 PR PBB SHADOW E&M-EST. PATIENT-LVL V: CPT | Mod: PBBFAC,,, | Performed by: INTERNAL MEDICINE

## 2025-04-21 PROCEDURE — 3288F FALL RISK ASSESSMENT DOCD: CPT | Mod: CPTII,S$GLB,, | Performed by: INTERNAL MEDICINE

## 2025-04-21 PROCEDURE — 86304 IMMUNOASSAY TUMOR CA 125: CPT

## 2025-04-21 PROCEDURE — 85025 COMPLETE CBC W/AUTO DIFF WBC: CPT

## 2025-04-21 PROCEDURE — 81005 URINALYSIS: CPT

## 2025-04-21 RX ORDER — OLANZAPINE 5 MG/1
5 TABLET, FILM COATED ORAL NIGHTLY
Qty: 4 TABLET | Refills: 5 | Status: SHIPPED | OUTPATIENT
Start: 2025-05-05

## 2025-04-23 DIAGNOSIS — Z01.818 PREOP EXAMINATION: ICD-10-CM

## 2025-04-23 DIAGNOSIS — C57.01 CARCINOMA OF RIGHT FALLOPIAN TUBE: Primary | ICD-10-CM

## 2025-04-23 DIAGNOSIS — C57.01 MALIGNANT NEOPLASM OF RIGHT FALLOPIAN TUBE: ICD-10-CM

## 2025-04-23 RX ORDER — SODIUM CHLORIDE 9 MG/ML
INJECTION, SOLUTION INTRAVENOUS CONTINUOUS
OUTPATIENT
Start: 2025-04-23

## 2025-04-25 ENCOUNTER — HOSPITAL ENCOUNTER (OUTPATIENT)
Dept: CARDIOLOGY | Facility: HOSPITAL | Age: 75
Discharge: HOME | End: 2025-04-25
Attending: INTERNAL MEDICINE
Payer: MEDICARE

## 2025-04-25 DIAGNOSIS — C57.01 CARCINOMA OF RIGHT FALLOPIAN TUBE: ICD-10-CM

## 2025-04-25 LAB
APICAL FOUR CHAMBER EJECTION FRACTION: 65 %
APICAL TWO CHAMBER EJECTION FRACTION: 66 %
AV INDEX (PROSTH): 0.76
AV MEAN GRADIENT: 5 MMHG
AV PEAK GRADIENT: 9 MMHG
AV VALVE AREA BY VELOCITY RATIO: 2.5 CM²
AV VALVE AREA: 2.4 CM²
AV VELOCITY RATIO: 0.8
CV ECHO LV RWT: 0.56 CM
DOP CALC AO PEAK VEL: 1.5 M/S
DOP CALC AO VTI: 36.8 CM
DOP CALC LVOT AREA: 3.1 CM2
DOP CALC LVOT DIAMETER: 2 CM
DOP CALC LVOT PEAK VEL: 1.2 M/S
DOP CALC LVOT STROKE VOLUME: 87.3 CM3
DOP CALC MV VTI: 28.3 CM
DOP CALCLVOT PEAK VEL VTI: 27.8 CM
E WAVE DECELERATION TIME: 240 MSEC
E/A RATIO: 0.99
E/E' RATIO: 9 M/S
ECHO LV POSTERIOR WALL: 0.9 CM (ref 0.6–1.1)
FRACTIONAL SHORTENING: 31.3 % (ref 28–44)
GLOBAL LONGITUIDAL STRAIN: 20.9 %
HR MV ECHO: 64 BPM
INTERVENTRICULAR SEPTUM: 1 CM (ref 0.6–1.1)
LEFT ATRIUM AREA SYSTOLIC (APICAL 2 CHAMBER): 12.4 CM2
LEFT ATRIUM AREA SYSTOLIC (APICAL 4 CHAMBER): 15.6 CM2
LEFT ATRIUM SIZE: 3.4 CM
LEFT ATRIUM VOLUME MOD: 38 ML
LEFT INTERNAL DIMENSION IN SYSTOLE: 2.2 CM (ref 2.1–4)
LEFT VENTRICLE DIASTOLIC VOLUME: 41 ML
LEFT VENTRICLE END DIASTOLIC VOLUME APICAL 2 CHAMBER: 75.4 ML
LEFT VENTRICLE END DIASTOLIC VOLUME APICAL 4 CHAMBER: 73.8 ML
LEFT VENTRICLE END SYSTOLIC VOLUME APICAL 2 CHAMBER: 30.1 ML
LEFT VENTRICLE END SYSTOLIC VOLUME APICAL 4 CHAMBER: 43.3 ML
LEFT VENTRICLE SYSTOLIC VOLUME: 16 ML
LEFT VENTRICULAR INTERNAL DIMENSION IN DIASTOLE: 3.2 CM (ref 3.5–6)
LEFT VENTRICULAR MASS: 83.7 G
LV LATERAL E/E' RATIO: 7.3 M/S
LV SEPTAL E/E' RATIO: 11.4 M/S
LVED V (TEICH): 41 ML
LVES V (TEICH): 16.2 ML
LVOT MG: 3 MMHG
LVOT MV: 0.78 CM/S
MV MEAN GRADIENT: 2 MMHG
MV PEAK A VEL: 0.81 M/S
MV PEAK E VEL: 0.8 M/S
MV PEAK GRADIENT: 5 MMHG
MV VALVE AREA BY CONTINUITY EQUATION: 3.08 CM2
OHS CV RV/LV RATIO: 1 CM
OHS LV EJECTION FRACTION SIMPSONS BIPLANE MOD: 66 %
PISA TR MAX VEL: 1.8 M/S
RA MAJOR: 4.7 CM
RA PRESSURE ESTIMATED: 3 MMHG
RA WIDTH: 3 CM
RIGHT VENTRICLE DIASTOLIC BASEL DIMENSION: 3.2 CM
RIGHT VENTRICULAR END-DIASTOLIC DIMENSION: 3.2 CM
RV TB RVSP: 5 MMHG
SINUS: 3.1 CM
TDI LATERAL: 0.11 M/S
TDI SEPTAL: 0.07 M/S
TDI: 0.09 M/S
TR MAX PG: 13 MMHG
TRICUSPID ANNULAR PLANE SYSTOLIC EXCURSION: 2.5 CM
TV REST PULMONARY ARTERY PRESSURE: 16 MMHG

## 2025-04-25 PROCEDURE — 93306 TTE W/DOPPLER COMPLETE: CPT | Mod: 26,,, | Performed by: INTERNAL MEDICINE

## 2025-04-25 PROCEDURE — 93356 MYOCRD STRAIN IMG SPCKL TRCK: CPT

## 2025-04-25 PROCEDURE — 93356 MYOCRD STRAIN IMG SPCKL TRCK: CPT | Mod: ,,, | Performed by: INTERNAL MEDICINE

## 2025-04-28 ENCOUNTER — RESULTS FOLLOW-UP (OUTPATIENT)
Dept: VASCULAR SURGERY | Facility: CLINIC | Age: 75
End: 2025-04-28

## 2025-04-28 ENCOUNTER — HOSPITAL ENCOUNTER (OUTPATIENT)
Dept: RADIOLOGY | Facility: HOSPITAL | Age: 75
Discharge: HOME | End: 2025-04-28
Attending: SURGERY
Payer: MEDICARE

## 2025-04-28 DIAGNOSIS — C57.01 CARCINOMA OF RIGHT FALLOPIAN TUBE: ICD-10-CM

## 2025-04-28 DIAGNOSIS — Z01.818 PREOP EXAMINATION: ICD-10-CM

## 2025-04-28 PROCEDURE — 71046 X-RAY EXAM CHEST 2 VIEWS: CPT | Mod: TC

## 2025-04-28 NOTE — PROGRESS NOTES
Labs reviewed. All within acceptable ranges for age and chronic conditions. No further action required.

## 2025-04-29 ENCOUNTER — ANESTHESIA EVENT (OUTPATIENT)
Dept: SURGERY | Facility: HOSPITAL | Age: 75
End: 2025-04-29
Payer: MEDICARE

## 2025-04-30 ENCOUNTER — TELEPHONE (OUTPATIENT)
Dept: VASCULAR SURGERY | Facility: CLINIC | Age: 75
End: 2025-04-30
Payer: MEDICARE

## 2025-05-01 ENCOUNTER — HOSPITAL ENCOUNTER (OUTPATIENT)
Facility: HOSPITAL | Age: 75
Discharge: HOME | End: 2025-05-01
Attending: SURGERY | Admitting: SURGERY
Payer: MEDICARE

## 2025-05-01 ENCOUNTER — ANESTHESIA (OUTPATIENT)
Dept: SURGERY | Facility: HOSPITAL | Age: 75
End: 2025-05-01
Payer: MEDICARE

## 2025-05-01 VITALS
DIASTOLIC BLOOD PRESSURE: 73 MMHG | OXYGEN SATURATION: 98 % | HEIGHT: 63 IN | SYSTOLIC BLOOD PRESSURE: 132 MMHG | BODY MASS INDEX: 20.12 KG/M2 | RESPIRATION RATE: 16 BRPM | HEART RATE: 66 BPM | TEMPERATURE: 97 F | WEIGHT: 113.56 LBS

## 2025-05-01 DIAGNOSIS — C57.01 CARCINOMA OF RIGHT FALLOPIAN TUBE: ICD-10-CM

## 2025-05-01 DIAGNOSIS — C57.01 MALIGNANT NEOPLASM OF RIGHT FALLOPIAN TUBE: ICD-10-CM

## 2025-05-01 PROCEDURE — 71000016 HC POSTOP RECOV ADDL HR: Performed by: SURGERY

## 2025-05-01 PROCEDURE — 63600175 PHARM REV CODE 636 W HCPCS: Performed by: SURGERY

## 2025-05-01 PROCEDURE — 37000009 HC ANESTHESIA EA ADD 15 MINS: Performed by: SURGERY

## 2025-05-01 PROCEDURE — 37000008 HC ANESTHESIA 1ST 15 MINUTES: Performed by: SURGERY

## 2025-05-01 PROCEDURE — 36000706: Performed by: SURGERY

## 2025-05-01 PROCEDURE — C1788 PORT, INDWELLING, IMP: HCPCS | Performed by: SURGERY

## 2025-05-01 PROCEDURE — 25000003 PHARM REV CODE 250: Performed by: STUDENT IN AN ORGANIZED HEALTH CARE EDUCATION/TRAINING PROGRAM

## 2025-05-01 PROCEDURE — 63600175 PHARM REV CODE 636 W HCPCS: Performed by: ANESTHESIOLOGY

## 2025-05-01 PROCEDURE — 36000707: Performed by: SURGERY

## 2025-05-01 PROCEDURE — 71000015 HC POSTOP RECOV 1ST HR: Performed by: SURGERY

## 2025-05-01 PROCEDURE — 63600175 PHARM REV CODE 636 W HCPCS: Performed by: STUDENT IN AN ORGANIZED HEALTH CARE EDUCATION/TRAINING PROGRAM

## 2025-05-01 PROCEDURE — 71000033 HC RECOVERY, INTIAL HOUR: Performed by: SURGERY

## 2025-05-01 DEVICE — POWERPORT CLEARVUE IMPLANTABLE PORT WITH ATTACHABLE 8F POLYURETHANE OPEN-ENDED SINGLE-LUMEN VENOUS CATHETER INTERMEDIATE KIT
Type: IMPLANTABLE DEVICE | Site: CHEST  WALL | Status: FUNCTIONAL
Brand: POWERPORT CLEARVUE

## 2025-05-01 RX ORDER — SODIUM CHLORIDE 9 MG/ML
INJECTION, SOLUTION INTRAVENOUS CONTINUOUS
Status: DISCONTINUED | OUTPATIENT
Start: 2025-05-01 | End: 2025-05-01 | Stop reason: HOSPADM

## 2025-05-01 RX ORDER — HYDRALAZINE HYDROCHLORIDE 20 MG/ML
5 INJECTION INTRAMUSCULAR; INTRAVENOUS
Status: COMPLETED | OUTPATIENT
Start: 2025-05-01 | End: 2025-05-01

## 2025-05-01 RX ORDER — LIDOCAINE HYDROCHLORIDE 10 MG/ML
INJECTION, SOLUTION INFILTRATION; PERINEURAL
Status: DISCONTINUED | OUTPATIENT
Start: 2025-05-01 | End: 2025-05-01 | Stop reason: HOSPADM

## 2025-05-01 RX ORDER — HEPARIN SOD,PORCINE/0.9 % NACL 1000/500ML
INTRAVENOUS SOLUTION INTRAVENOUS
Status: DISCONTINUED | OUTPATIENT
Start: 2025-05-01 | End: 2025-05-01 | Stop reason: HOSPADM

## 2025-05-01 RX ORDER — PROPOFOL 10 MG/ML
VIAL (ML) INTRAVENOUS
Status: DISCONTINUED | OUTPATIENT
Start: 2025-05-01 | End: 2025-05-01

## 2025-05-01 RX ORDER — FENTANYL CITRATE 50 UG/ML
INJECTION, SOLUTION INTRAMUSCULAR; INTRAVENOUS
Status: DISCONTINUED | OUTPATIENT
Start: 2025-05-01 | End: 2025-05-01

## 2025-05-01 RX ORDER — DEXAMETHASONE SODIUM PHOSPHATE 4 MG/ML
INJECTION, SOLUTION INTRA-ARTICULAR; INTRALESIONAL; INTRAMUSCULAR; INTRAVENOUS; SOFT TISSUE
Status: DISCONTINUED | OUTPATIENT
Start: 2025-05-01 | End: 2025-05-01

## 2025-05-01 RX ORDER — PHENYLEPHRINE HCL IN 0.9% NACL 1 MG/10 ML
SYRINGE (ML) INTRAVENOUS
Status: DISCONTINUED | OUTPATIENT
Start: 2025-05-01 | End: 2025-05-01

## 2025-05-01 RX ORDER — CEFAZOLIN SODIUM 1 G/3ML
2 INJECTION, POWDER, FOR SOLUTION INTRAMUSCULAR; INTRAVENOUS
Status: COMPLETED | OUTPATIENT
Start: 2025-05-01 | End: 2025-05-01

## 2025-05-01 RX ORDER — LIDOCAINE HYDROCHLORIDE 20 MG/ML
INJECTION INTRAVENOUS
Status: DISCONTINUED | OUTPATIENT
Start: 2025-05-01 | End: 2025-05-01

## 2025-05-01 RX ORDER — ONDANSETRON HYDROCHLORIDE 2 MG/ML
INJECTION, SOLUTION INTRAVENOUS
Status: DISCONTINUED | OUTPATIENT
Start: 2025-05-01 | End: 2025-05-01

## 2025-05-01 RX ORDER — BUPIVACAINE HYDROCHLORIDE 2.5 MG/ML
INJECTION, SOLUTION EPIDURAL; INFILTRATION; INTRACAUDAL; PERINEURAL
Status: DISCONTINUED | OUTPATIENT
Start: 2025-05-01 | End: 2025-05-01 | Stop reason: HOSPADM

## 2025-05-01 RX ADMIN — CEFAZOLIN 2 G: 330 INJECTION, POWDER, FOR SOLUTION INTRAMUSCULAR; INTRAVENOUS at 07:05

## 2025-05-01 RX ADMIN — DEXAMETHASONE SODIUM PHOSPHATE 4 MG: 4 INJECTION, SOLUTION INTRA-ARTICULAR; INTRALESIONAL; INTRAMUSCULAR; INTRAVENOUS; SOFT TISSUE at 07:05

## 2025-05-01 RX ADMIN — ONDANSETRON 4 MG: 2 INJECTION INTRAMUSCULAR; INTRAVENOUS at 07:05

## 2025-05-01 RX ADMIN — HYDRALAZINE HYDROCHLORIDE 5 MG: 20 INJECTION INTRAMUSCULAR; INTRAVENOUS at 08:05

## 2025-05-01 RX ADMIN — LIDOCAINE HYDROCHLORIDE 80 MG: 20 INJECTION INTRAVENOUS at 07:05

## 2025-05-01 RX ADMIN — PROPOFOL 100 MG: 10 INJECTION, EMULSION INTRAVENOUS at 07:05

## 2025-05-01 RX ADMIN — Medication 50 MCG: at 08:05

## 2025-05-01 RX ADMIN — SODIUM CHLORIDE, SODIUM GLUCONATE, SODIUM ACETATE, POTASSIUM CHLORIDE AND MAGNESIUM CHLORIDE: 526; 502; 368; 37; 30 INJECTION, SOLUTION INTRAVENOUS at 07:05

## 2025-05-01 RX ADMIN — FENTANYL CITRATE 25 MCG: 50 INJECTION, SOLUTION INTRAMUSCULAR; INTRAVENOUS at 07:05

## 2025-05-01 NOTE — DISCHARGE SUMMARY
[facility] - Periop Services  Discharge Note  Short Stay    Procedure(s) (LRB):  [id number] (N/A)      OUTCOME: Patient tolerated treatment/procedure well without complication and is now ready for discharge.    DISPOSITION: Home or Self Care    FINAL DIAGNOSIS:  Carcinoma of right fallopian tube    FOLLOWUP: None    DISCHARGE INSTRUCTIONS:  No discharge procedures on file.     TIME SPENT ON DISCHARGE: 5 minutes

## 2025-05-01 NOTE — OP NOTE
[facility]  Operative Note      Surgery Date:  05/01/2025     Pre-op Diagnosis:    Carcinoma of right fallopian tube [C57.01]     Post-op Diagnosis:  Same     Procedure(s): Right IJ MediPort placement using ultrasound guidance and fluoroscopy    Indication for procedure: Shayna Ledezma is a 74 y.o. female who has a diagnosis of Carcinoma of right fallopian tube [C57.01] and needs access for chemotherapy    Surgeon:  Anibal Hwang MD    Assistant:  Circulator: Corina Steele RN  Radiology Technologist: Antoinette Landeros,   Scrub Person: Tree Reed; Symone Rosas ST     Anesthesia:  Local MAC     Findings: Ultrasound evaluation of the right IJ noted it to be patent.  Fluoroscopy showed the catheter to be in place at the atriocaval junction with no kinking    Complications: None     Estimated Blood Loss: 5 mL         Specimens: None    Implants:  Implant Name Type Inv. Item Serial No.  Lot No. LRB No. Used Action   PORT POWER CLEAR VIEW - [id number]  PORT POWER CLEAR VIEW  [employer] [id number] N/A 1 Implanted       Drains: None    Procedure in detail: After informed consent was obtained, and risks and benefits discussed with the patient, she was brought to the operating room and placed supine.  After adequate sedation was obtained, she was prepped and draped in a standard sterile fashion.  Local anesthetic was infused in the skin overlying the right IJ and it was accessed with a micropuncture needle under ultrasound guidance.  I advanced the wire down into the superior vena cava.  A tunnel was then marked on the skin and a pocket marked as well for the port.  Local anesthetic was infused in this area.  An incision was made and a pocket dissected free for the port.  I then tunneled between the port site and the vein access site and passed the catheter.  A microcatheter was then advanced over the wire.  The wire and dilator removed and an 035 wire advanced down into the SVC.  The  dilator and peel-away sheath were advanced over the wire under fluoroscopy.  The wire and dilator were then removed.  The catheter was advanced down into the right ventricle.  The peel-away sheath was cracked and removed.  The catheter was then withdrawn under fluoroscopy until it was at the atriocaval junction.  The catheter was cut to length and connected to the port mechanism.  The port was then sutured to the clavipectoral fascia using 3-0 Vicryl sutures in 2 locations.  The port was able to be aspirated and flushed well.  The incision was then closed using interrupted 3-0 Vicryl sutures for the deep layer and 4-0 Monocryl subcuticular for the skin.  The vein access site was closed with a 4-0 Monocryl subcuticular stitch as well.  Heparinized saline was infused into the port and catheter.  Dermabond was placed over the incisions.  The patient was brought to recovery in stable condition.    Condition: Good

## 2025-05-01 NOTE — H&P
[facility] - Periop Services  History & Physical  Vascular Surgery    SUBJECTIVE:     Chief Complaint/Reason for Visit: No chief complaint on file.       History of Present Illness:  Shayna Ledezma is a 74 y.o. female who presents for MediPort placement.  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  She has a history of a previous MediPort on the left with fractured in the past and had to be removed..    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 R. Lewis      Social History[1]     Prior to Admission medications    Medication Sig Start Date End Date Taking? Authorizing Provider   AA/prot/lysine/methio/vit C/B6 (A/G PRO ORAL) Take by mouth Daily.   Yes Provider, Historical   aspirin (ECOTRIN) 81 MG EC tablet Take 81 mg by mouth once daily.   Yes Provider, Historical   atorvastatin (LIPITOR) 40 MG tablet Take 1 tablet (40 mg total) by mouth once daily. 24  Yes Padmaja  Sigrid LORENZO MD   b complex vitamins capsule Take 1 capsule by mouth once daily.   Yes Provider, Historical   calcium citrate-vitamin D3 315-200 mg (CITRACAL+D) 315 mg-5 mcg (200 unit) per tablet Take 1 tablet by mouth 2 (two) times daily.   Yes Provider, Historical   GLUTAMINE ORAL Take 10 g by mouth Daily. 11/29/21  Yes Provider, Historical   lisinopriL (PRINIVIL,ZESTRIL) 20 MG tablet TAKE 1 TABLET(20 MG) BY MOUTH DAILY 2/4/25  Yes Tom Angeles, NIDHI   pyridoxine, vitamin B6, (B-6) 250 MG Tab Take 250 mg by mouth once daily.   Yes Provider, Historical   alendronate (FOSAMAX) 70 MG tablet Take 1 tablet (70 mg total) by mouth every 7 days. 10/10/24 10/10/25  Sigrid Giang MD   ELIQUIS 5 mg Tab TAKE 1 TABLET BY MOUTH TWICE DAILY 3/20/25   Renetta Corral MD   OLANZapine (ZYPREXA) 5 MG tablet Take 1 tablet (5 mg total) by mouth every evening. on days 1-4 of each chemotherapy cycle. 5/5/25   Renetta Corral MD   sodium chloride 0.9% SolP 100 mL with bevacizumab 25 mg/mL Soln Inject into the vein every 21 days.    Provider, Historical       Review of Systems:  Negative except as in HPI    OBJECTIVE:     Vital Signs (Most Recent):  Temp: 97.6 °F (36.4 °C) (05/01/25 0616)  Pulse: 62 (05/01/25 0616)  Resp: 16 (05/01/25 0616)  BP: (!) 160/74 (05/01/25 0616)  SpO2: 96 % (05/01/25 0616)    Admission: Weight: 51.5 kg (113 lb 8.6 oz) (05/01/25 0712)   Most Recent: Weight: 51.5 kg (113 lb 8.6 oz) (05/01/25 0712)    Physical Exam:  Vascular Physical Exam  Vitals and nursing note reviewed.   Cardiovascular:      Rate and Rhythm: Normal rate and regular rhythm.      Pulses:           Radial pulses are 2+ on the right side and 2+ on the left side.        Brachial pulses are 2+ on the right side and 2+ on the left side.  Pulmonary:      Effort: Pulmonary effort is normal.      Breath sounds: Normal air entry. No stridor.   Neurological:      Mental Status: She is alert and oriented to person, place, and time.             ASSESSMENT/PLAN:     Shayna Ledezma is a 74 y.o. 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  Discussed the risks and benefits of the procedure including infection, bleeding, scar, port thrombosis, and port rotation.  She does agree that she would like to proceed.           [1]   Social History  Tobacco Use    Smoking status: Never    Smokeless tobacco: Never   Substance Use Topics    Alcohol use: Never    Drug use: Never

## 2025-05-01 NOTE — ANESTHESIA POSTPROCEDURE EVALUATION
Anesthesia Post Evaluation    Patient: Shayna Ledezma    Procedure(s) Performed: Procedure(s) (LRB):  [id number] (N/A)    Final Anesthesia Type: general      Patient location during evaluation: PACU  Patient participation: Yes- Able to Participate  Level of consciousness: awake and alert  Post-procedure vital signs: reviewed and stable  Pain management: adequate  Airway patency: patent    PONV status at discharge: No PONV  Anesthetic complications: no      Cardiovascular status: hemodynamically stable  Respiratory status: spontaneous ventilation and room air  Hydration status: euvolemic  Follow-up not needed.              Vitals Value Taken Time   /103 05/01/25 08:52   Temp 36.4 °C (97.5 °F) 05/01/25 08:45   Pulse 58 05/01/25 08:51   Resp 13 05/01/25 08:50   SpO2 100 % 05/01/25 08:51   Vitals shown include unfiled device data.      Event Time   Out of Recovery 08:48:00         Pain/Yady Score: Yady Score: 10 (5/1/2025  8:57 AM)

## 2025-05-01 NOTE — DISCHARGE INSTRUCTIONS
Do not drive or drink alcohol for 24 hours or while taking pain medications.    A chest Xray will be taken prior to discharge to confirm mediport placement.     Follow up with your oncologist or the physician overseeing your infusions as scheduled.     Report these Signs and Symptoms to your Surgeon or your oncologist if they occur.     Signs of infection. These include a fever of 100.4°F (38°C) or higher, chills.  Signs of wound infection. These include swelling, redness, warmth around the wound; too much pain when touched; yellowish, greenish, or bloody discharge; foul smell coming from the cut site; cut site opens up.     Report to ER with any excessive uncontrollable bleeding, heavy increase bruising at site (growing in size), or SEVERE/SUDDEN chest pain and/or shortness of breath.     Resume regular diet. Okay to shower, no tub baths or submerging in water for at least 7 days (about the time incision is fully healed).  Do not scrub the incision, pat dry after bathing. Dermabond glue will start to fall off naturally over the next 7 days. Do not peel it off as this may reopen the incision and delay healing.

## 2025-05-01 NOTE — ANESTHESIA PROCEDURE NOTES
Intubation    Date/Time: 5/1/2025 7:37 AM    Performed by: Yazmin Phillips CRNA  Authorized by: Terrell Boothe MD    Intubation:     Induction:  Intravenous    Intubated:  Postinduction    Mask Ventilation:  Easy mask    Attempts:  1    Attempted By:  CRNA    Difficult Airway Encountered?: No      Complications:  None    Airway Device:  Supraglottic airway/LMA    Airway Device Size:  3.0    Style/Cuff Inflation:  Cuffed (inflated to minimal occlusive pressure)    Secured at:  The lips    Placement Verified By:  Capnometry    Complicating Factors:  None    Findings Post-Intubation:  BS equal bilateral and atraumatic/condition of teeth unchanged

## 2025-05-01 NOTE — TRANSFER OF CARE
"Anesthesia Transfer of Care Note    Patient: Shayna Ledezma    Procedure(s) Performed: Procedure(s) (LRB):  [id number] (N/A)    Patient location: PACU    Anesthesia Type: general    Transport from OR: Transported from OR on room air with adequate spontaneous ventilation    Post pain: adequate analgesia    Post assessment: no apparent anesthetic complications    Post vital signs: stable    Level of consciousness: sedated    Nausea/Vomiting: no nausea/vomiting    Complications: none    Transfer of care protocol was followed      Last vitals: Visit Vitals  BP (!) 160/74   Pulse 62   Temp 36.4 °C (97.6 °F) (Oral)   Resp 16   Ht 5' 3" (1.6 m)   Wt 51.5 kg (113 lb 8.6 oz)   SpO2 96%   Breastfeeding No   BMI 20.11 kg/m²     "

## 2025-05-05 ENCOUNTER — LAB VISIT (OUTPATIENT)
Dept: LAB | Facility: HOSPITAL | Age: 75
End: 2025-05-05
Attending: INTERNAL MEDICINE
Payer: MEDICARE

## 2025-05-05 ENCOUNTER — OFFICE VISIT (OUTPATIENT)
Dept: HEMATOLOGY/ONCOLOGY | Facility: CLINIC | Age: 75
End: 2025-05-05
Payer: MEDICARE

## 2025-05-05 ENCOUNTER — TELEPHONE (OUTPATIENT)
Dept: VASCULAR SURGERY | Facility: CLINIC | Age: 75
End: 2025-05-05
Payer: MEDICARE

## 2025-05-05 ENCOUNTER — CLINICAL SUPPORT (OUTPATIENT)
Dept: HEMATOLOGY/ONCOLOGY | Facility: CLINIC | Age: 75
End: 2025-05-05
Payer: MEDICARE

## 2025-05-05 VITALS — BODY MASS INDEX: 20.55 KG/M2 | HEIGHT: 63 IN | WEIGHT: 116 LBS

## 2025-05-05 DIAGNOSIS — D70.1 AGRANULOCYTOSIS SECONDARY TO CANCER CHEMOTHERAPY: ICD-10-CM

## 2025-05-05 DIAGNOSIS — T45.1X5A AGRANULOCYTOSIS SECONDARY TO CANCER CHEMOTHERAPY: ICD-10-CM

## 2025-05-05 DIAGNOSIS — C78.01 MALIGNANT NEOPLASM METASTATIC TO RIGHT LUNG: ICD-10-CM

## 2025-05-05 DIAGNOSIS — C57.01 CARCINOMA OF RIGHT FALLOPIAN TUBE: Primary | ICD-10-CM

## 2025-05-05 DIAGNOSIS — I27.82 OTHER CHRONIC PULMONARY EMBOLISM WITHOUT ACUTE COR PULMONALE: ICD-10-CM

## 2025-05-05 DIAGNOSIS — Z79.899 IMMUNODEFICIENCY DUE TO DRUGS: ICD-10-CM

## 2025-05-05 DIAGNOSIS — C57.01 CARCINOMA OF RIGHT FALLOPIAN TUBE: ICD-10-CM

## 2025-05-05 DIAGNOSIS — C78.7 SECONDARY MALIGNANT NEOPLASM OF LIVER: ICD-10-CM

## 2025-05-05 DIAGNOSIS — D84.821 IMMUNODEFICIENCY DUE TO DRUGS: ICD-10-CM

## 2025-05-05 LAB
ALBUMIN SERPL-MCNC: 3.6 G/DL (ref 3.4–4.8)
ALBUMIN/GLOB SERPL: 1.1 RATIO (ref 1.1–2)
ALP SERPL-CCNC: 53 UNIT/L (ref 40–150)
ALT SERPL-CCNC: 20 UNIT/L (ref 0–55)
ANION GAP SERPL CALC-SCNC: 9 MEQ/L
AST SERPL-CCNC: 37 UNIT/L (ref 11–45)
BASOPHILS # BLD AUTO: 0.03 X10(3)/MCL
BASOPHILS NFR BLD AUTO: 0.3 %
BILIRUB SERPL-MCNC: 1.4 MG/DL
BUN SERPL-MCNC: 15.7 MG/DL (ref 9.8–20.1)
CALCIUM SERPL-MCNC: 9.1 MG/DL (ref 8.4–10.2)
CANCER AG125 SERPL-ACNC: 63.2 UNIT/ML (ref 0–35)
CHLORIDE SERPL-SCNC: 99 MMOL/L (ref 98–107)
CO2 SERPL-SCNC: 26 MMOL/L (ref 23–31)
CREAT SERPL-MCNC: 0.7 MG/DL (ref 0.55–1.02)
CREAT/UREA NIT SERPL: 22
EOSINOPHIL # BLD AUTO: 0.05 X10(3)/MCL (ref 0–0.9)
EOSINOPHIL NFR BLD AUTO: 0.5 %
ERYTHROCYTE [DISTWIDTH] IN BLOOD BY AUTOMATED COUNT: 13.5 % (ref 11.5–17)
GFR SERPLBLD CREATININE-BSD FMLA CKD-EPI: >60 ML/MIN/1.73/M2
GLOBULIN SER-MCNC: 3.4 GM/DL (ref 2.4–3.5)
GLUCOSE SERPL-MCNC: 98 MG/DL (ref 82–115)
HCT VFR BLD AUTO: 39.8 % (ref 37–47)
HGB BLD-MCNC: 13.2 G/DL (ref 12–16)
IMM GRANULOCYTES # BLD AUTO: 0 X10(3)/MCL (ref 0–0.04)
IMM GRANULOCYTES NFR BLD AUTO: 0 %
LYMPHOCYTES # BLD AUTO: 2.05 X10(3)/MCL (ref 0.6–4.6)
LYMPHOCYTES NFR BLD AUTO: 20.7 %
MCH RBC QN AUTO: 31.7 PG (ref 27–31)
MCHC RBC AUTO-ENTMCNC: 33.2 G/DL (ref 33–36)
MCV RBC AUTO: 95.4 FL (ref 80–94)
MONOCYTES # BLD AUTO: 1.05 X10(3)/MCL (ref 0.1–1.3)
MONOCYTES NFR BLD AUTO: 10.6 %
NEUTROPHILS # BLD AUTO: 6.73 X10(3)/MCL (ref 2.1–9.2)
NEUTROPHILS NFR BLD AUTO: 67.9 %
PLATELET # BLD AUTO: 326 X10(3)/MCL (ref 130–400)
PMV BLD AUTO: 8.7 FL (ref 7.4–10.4)
POTASSIUM SERPL-SCNC: 4.4 MMOL/L (ref 3.5–5.1)
PROT SERPL-MCNC: 7 GM/DL (ref 5.8–7.6)
PROT UR QL STRIP: ABNORMAL
RBC # BLD AUTO: 4.17 X10(6)/MCL (ref 4.2–5.4)
SODIUM SERPL-SCNC: 134 MMOL/L (ref 136–145)
WBC # BLD AUTO: 9.91 X10(3)/MCL (ref 4.5–11.5)

## 2025-05-05 PROCEDURE — 80053 COMPREHEN METABOLIC PANEL: CPT

## 2025-05-05 PROCEDURE — 99215 OFFICE O/P EST HI 40 MIN: CPT | Mod: S$GLB,,,

## 2025-05-05 PROCEDURE — 86304 IMMUNOASSAY TUMOR CA 125: CPT

## 2025-05-05 PROCEDURE — 3288F FALL RISK ASSESSMENT DOCD: CPT | Mod: CPTII,S$GLB,,

## 2025-05-05 PROCEDURE — 99999 PR PBB SHADOW E&M-EST. PATIENT-LVL III: CPT | Mod: PBBFAC,,,

## 2025-05-05 PROCEDURE — 1101F PT FALLS ASSESS-DOCD LE1/YR: CPT | Mod: CPTII,S$GLB,,

## 2025-05-05 PROCEDURE — 81005 URINALYSIS: CPT

## 2025-05-05 PROCEDURE — 36415 COLL VENOUS BLD VENIPUNCTURE: CPT

## 2025-05-05 PROCEDURE — 85025 COMPLETE CBC W/AUTO DIFF WBC: CPT

## 2025-05-05 PROCEDURE — 1159F MED LIST DOCD IN RCRD: CPT | Mod: CPTII,S$GLB,,

## 2025-05-05 RX ORDER — DEXAMETHASONE 4 MG/1
8 TABLET ORAL DAILY
Qty: 6 TABLET | Refills: 5 | Status: SHIPPED | OUTPATIENT
Start: 2025-05-05

## 2025-05-05 RX ORDER — PROCHLORPERAZINE MALEATE 5 MG
5 TABLET ORAL EVERY 6 HOURS PRN
Qty: 20 TABLET | Refills: 5 | Status: SHIPPED | OUTPATIENT
Start: 2025-05-05

## 2025-05-05 NOTE — PROGRESS NOTES
THERAPY EDUCATION: CARBOPLATIN + LIPOSOMAL   Subjective:      Patient ID: Shayna Ledezma is a 74 y.o. female.    1. 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 Pulmonary Embolism (Incidental on CT)--19    Procedure/pathology:   1. Paracentesis with removal of 5L of fluid done 18--serous carcinoma, high grade, c/w ovarian primary, PAX-8, CK7 and MOC-31 positive, CDX-2, CK-20 and Calretinin-negative.  2. S/p Exploratory Lap, radical tumor debulking including total abdominal hysterectomy, bilateral salpingo-oophorectomy, bilateral pelvic lymph node sampling, appendectomy, mobilization of the left ureter, complete gross resection of the disease with removal of mesenteric disease, as well as omental disese and parasplenic disease by Dr. Santana 3/18/19--Metastatic high grade serous carcinoma. Tissue/organ involvement: right ovary, appendectomy, omentum, mesentery (including small bowel mesentery) and multiple other sites designated: periumbilical, perisplenic, transverse colon, splenic flexure, perirectal, left periureteral. 2 lymph nodes negative. [id number]. FIGO stage IIIC. Nidmi somatic instability testing positive for genomic instability, negative BRCA1 and BRCA2 tumor testing.  3. Splenectomy done 10/21/19--splenic involvement with metastatic high grade serous carcinoma, 5 benign lymph nodes. Caris testing showed LAM, NTRK1/2/3 negative, TMB low, BRCA1/2 negative, ER/UT negative, CATHY negative, SPEN pathogenic variant Exon 11, TP53 pathogenic variant Exon 5, PD-L1 positive CPS 15, Genomic ELODIA high.  4. CT-biopsy done 24--non-diagnostic, no malignancy cells present.   Imagin. CT A/P w/ and w/o contrast done at [facility] 18--large volume ascites with multiple peritoneal implants, right pericolic gutter implant measures 2.5X3.3cm, LUQ omental/mesenteric implant measures 3X3.6cm, omental carcinomatosis, extensive enhancing soft tissue surrounding  sigmoid colon vs. large sigmoid mass, left ovarian cystic lesion appears to have some internal septations measures 3.5X3.8cm, enhancing periportal soft tissue density likely lymphadenopathy with some narrowing of the portal vein noted, but without internal filling defect, borderline splenomegaly, subtle solid lesion w/n central spleen measures 2.4X2.5cm, likely metastatic, with peripheral area of diminished enhancement suggesting splenic infarct, soft tissue implant abutting gallbladder measures 2X2.2cm, no right adnexal mass, trace bilateral layering pleural fluid, small moderate-sized hiatal hernia, few borderline enlarged lymph nodes w/n right epicardial fat pad.  2. CT of chest on 1/11/19--Some prominent right cardiophrenic lymph nodes are nonspecific and can be followed on future surveillance scans. Otherwise no CT evidence of metastatic disease to the chest. While exam was not tailored for evaluation of the pulmonary arteries I suspect there is pulmonary thromboembolic disease. Peritoneal carcinomatosis seen in the upper abdomen. Critical Result: Pulmonary Embolus.  3. CT C/A/P 3/4/19--Positive response to therapy. No new or progressive metastatic disease in the chest, abdomen or pelvis. Stable to improved findings include smaller pericardial lymph nodes, andrew hepatis adenopathy, peritoneal carcinomatosis, smaller left adnexal lesion; no evidence of PE within limitations of the study.  5. CT PE protocol chest/A/P 6/11/19--No PE, improved pericardial lymph node with minimal size on current examination, improved peritoneal carcinomatosis and left adnexal implant, splenic ischemia evolved into small infarct, size improvement of one of splenic hypodense lesion and mild size increase of second hypodense lesion, favored to be benign/cystic, no new findings.  6. PET/CT 9/25/19--s/p hysterectomy and bilateral oophorectomy, no evidence for locally recurrent/residual disease, intensely hypermetabolic hypodense lesion  within the spleen 3.5X3.3cm concerning for metastatic focus. No other abnormal focus of disease.  7. CT C/A/P 11/13/20--Right lower lobe 3.3 cm mass is presumed metastatic, otherwise no evident residual, recurrent or metastatic disease.   8. PET/CT 11/20/20--Hypermetabolic right lung base lesion and suspected metastatic periportal adenopathy, no additional sites of FDG-avid otherwise aggressive appearing pathology through the neck, chest, abdomen, or pelvis.  9. PET/CT 1/21/21--Interval decrease in size of the right lower lobe mass with decreased FDG uptake, now measures 1.7 x 2.2 cm (previous 3.3 x 3.1 cm), resolution of FDG uptake in the suspected periportal lymph node which is not identifiable on noncontrast CT. No evidence of new or progressive hypermetabolic metastatic disease.  10. PET/CT 3/30/21--Resolved right lower lung lobe hypermetabolic mass. Otherwise, no interval change or evidence of residual disease, recurrence or new metastatic lesion.  11. PET/CT 11/21/24--new FDG uptake in right hepatic lobe concerning for malignancy, measures 3.5cm with SUV 9.5, could represent a peritoneal implant vs. Hepatic metastasis.   12. PET/CT 1/24/25--persistent right hepatic lobe lesion measures 3.6cm, stable, with increasing FDG uptake SUV 14.6, previous 9.5.  13. CT C/A/P done 4/15/25--along periphery of right hepatic lobe, 2 lesions are present, larger of 2 at the padded dome measures 2.6X3.3cm (previous 2.7X2.4cm) is larger, more cystic appearing lesion along contour of right hepatic lobe measures 1.9X1cm, unchanged, no new lesion, chronic compression deformity of L1, similar since 1/24/25. No new suspicious bone lesion.     Procedures:   1. Paracentesis on 12/28/18 with removal of 5 Liters.  2. Paracentesis on 01/07/19 with removal of 3.5 Liters.  3. Paracentesis on 02/06/19 with removal of 2.5 Liters.  4. Mediport placed 12/2020 by Dr. Serge Gentile--removed 8/26/21 due to mediport fracture.    Germline genetic  testing done by [facility] 1/29/19--negative for BRCA1/2, two (2) variants of uncertain significance (VUS): CATHY p.[id number]/p.G1944J and MLH1 p.P603L.     :  12/19/18 1102  11/10/20  42.9  12/08/20 79.5  01/05/21 28.9  01/26/21 12.8  02/18/21 11.6  03/15/22--10.3  04/26/22--9.6  05/16/22--10.2  06/07/22--10.1  07/19/22--11.6  08/08/22--12.5  09/19/22--12.7  10/31/22--13.5  01/03/23--12.0  02/13/23--14.0  03/03/23--13.9  04/17/23--14.5  05/26/23--14.6  07/31/23--13.5  11/13/23--15.6  12/04/23--16.8  01/16/24--13.7  02/26/24--18.6  03/18/24--16.3  04/29/24--17.0  06/10/24--17.0  08/12/24--17.3  09/23/24--18.3  10/14/24--18.6  11/25/24--19.1  01/06/25--24.3  03/10/25--29.4  03/31/25--38.7    Treatment History:  1. Neoadjuvant Carboplatin/Taxol every 3 weeks x 3 cycles. 1/15/19 - 2/26/19. Neulasta added.   2. Surgery--tumor debulking KORI/BSO 3/18/19.  3. Adjuvant Carboplatin/Taxol x 3 additions cycles 4/9/19--5/21/19.  4. Splenectomy 10/21/19.  5. Niraparib maintenance (dose reduction to 100mg daily for cytopenias) 11/8/19--12/1/20 (stopped due to progression).  6. Carboplatin/Paclitaxel/Avastin X 6 cycles completed 12/8/21--3/24/21 (complete response).  7. Avastin maintenance 4/13/21--4/21/25 (stopped due to progression).    Current Treatment:  1. Eliquis BID for incidental PE on imaging 1/2019  2. Carboplatin/Doxil X 6 cycles to start 5/6/25  3. Lynparza maintenance planned    CC:  Therapy Education     Interval History     5/5/25: Ms. Ledezma presents to the clinic for therapy education     4/21/25:Patient is here for follow-up of ovarian cancer on maintenance Avastin. CT C/A/P shows disease progression with mild enlargement of 1 of the lesions near the liver. CA-125 is also more elevated now outside the normal range. Discussed stopping Avastin and going back on chemotherapy and she is in agreement.         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      alendronate (FOSAMAX) 70 MG tablet Take 1 tablet (70 mg total) by mouth every 7 days. 4 tablet 11    aspirin (ECOTRIN) 81 MG EC tablet Take 81 mg by mouth once daily.      atorvastatin (LIPITOR) 40 MG tablet Take 1 tablet (40 mg total) by mouth once daily. 90 tablet 3    b complex vitamins capsule Take 1 capsule by mouth once daily.      calcium citrate-vitamin D3 315-200 mg (CITRACAL+D) 315 mg-5 mcg (200 unit) per tablet Take 1 tablet by mouth 2 (two) times daily.      dexAMETHasone (DECADRON) 4 MG Tab Take 2 tablets (8 mg total) by mouth once daily. on days 2-4 of each chemotherapy cycle. 6 tablet 5    ELIQUIS 5 mg Tab TAKE 1 TABLET BY MOUTH TWICE DAILY 180 tablet 0    GLUTAMINE ORAL Take 10 g by mouth Daily.      lisinopriL (PRINIVIL,ZESTRIL) 20 MG tablet TAKE 1 TABLET(20 MG) BY MOUTH DAILY 30 tablet 3    OLANZapine (ZYPREXA) 5 MG tablet Take 1 tablet (5 mg total) by mouth every evening. on days 1-4 of each chemotherapy cycle. 4 tablet 5    prochlorperazine (COMPAZINE) 5 MG tablet Take 1 tablet (5 mg total) by mouth every 6 (six) hours as needed for Nausea. 20 tablet 5    pyridoxine, vitamin B6, (B-6) 250 MG Tab Take 250 mg by mouth once daily.      sodium chloride 0.9% SolP 100 mL with bevacizumab 25 mg/mL Soln Inject into the vein every 21  days.       No current facility-administered medications on file prior to visit.      Review of Systems   Constitutional:  Negative for appetite change and unexpected weight change.   HENT:  Negative for mouth sores and nosebleeds.    Eyes:  Negative for visual disturbance.   Respiratory:  Negative for cough and shortness of breath.    Cardiovascular:  Negative for chest pain.   Gastrointestinal:  Negative for abdominal pain and diarrhea.   Genitourinary:  Negative for frequency.   Musculoskeletal:  Negative for back pain.   Integumentary:  Negative for rash.   Neurological:  Negative for headaches.   Hematological:  Negative for adenopathy.   Psychiatric/Behavioral:  The patient is not nervous/anxious.        There were no vitals filed for this visit.        Wt Readings from Last 3 Encounters:   05/05/25 1532 52.6 kg (116 lb)   05/01/25 0712 51.5 kg (113 lb 8.6 oz)   04/21/25 1308 53.2 kg (117 lb 4.8 oz)       No visits with results within 1 Week(s) from this visit.   Latest known visit with results is:   Lab Visit on 05/05/2025   Component Date Value    Sodium 05/05/2025 134 (L)     Potassium 05/05/2025 4.4     Chloride 05/05/2025 99     CO2 05/05/2025 26     Glucose 05/05/2025 98     Blood Urea Nitrogen 05/05/2025 15.7     Creatinine 05/05/2025 0.70     Calcium 05/05/2025 9.1     Protein Total 05/05/2025 7.0     Albumin 05/05/2025 3.6     Globulin 05/05/2025 3.4     Albumin/Globulin Ratio 05/05/2025 1.1     Bilirubin Total 05/05/2025 1.4     ALP 05/05/2025 53     ALT 05/05/2025 20     AST 05/05/2025 37     eGFR 05/05/2025 >60     Anion Gap 05/05/2025 9.0     BUN/Creatinine Ratio 05/05/2025 22     Protein, UA 05/05/2025 3+ (A)     Cancer Antigen 125 05/05/2025 63.2 (H)     WBC 05/05/2025 9.91     RBC 05/05/2025 4.17 (L)     Hgb 05/05/2025 13.2     Hct 05/05/2025 39.8     MCV 05/05/2025 95.4 (H)     MCH 05/05/2025 31.7 (H)     MCHC 05/05/2025 33.2     RDW 05/05/2025 13.5     Platelet 05/05/2025 326     MPV  05/05/2025 8.7     Neut % 05/05/2025 67.9     Lymph % 05/05/2025 20.7     Mono % 05/05/2025 10.6     Eos % 05/05/2025 0.5     Basophil % 05/05/2025 0.3     Imm Grans % 05/05/2025 0.0     Neut # 05/05/2025 6.73     Lymph # 05/05/2025 2.05     Mono # 05/05/2025 1.05     Eos # 05/05/2025 0.05     Baso # 05/05/2025 0.03     Imm Gran # 05/05/2025 0.00         Assessment:   1. 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  -Mediport placement completed on 5/1/25. Lidocaine cream given with instructions to apply on port 30 minutes before treatment.   -Therapy education completed on 5/5/25.  -Plans to start Carboplatin + Doxil Q4W on 5/6/25.  -Antiemetic regimen schedule given and calendar made for guidance    Dexamethasone- Take 2 tablets by mouth daily on Days 2-4 of each treatment.    Olanzapine- Take 1 tablet by mouth daily in the evening on Days 1-4 of each treatment   -Zofran PRN prescribed for at home with instructions to be taken by mouth every 8 hours as needed for nausea. If not working, Compazine can be prescribed as 2nd choice.    -OTC Imodium AD recommended for diarrhea (4-5 BMs a day). Take 2 tablets after the first loose bowel movement, and 1 tablet after each loose bowel movement after the first dose has been taken. No more than 4 tablets should be taken in any 24-hour period. If not working, Lomotil can be prescribed as 2nd choice.    -Mouth sore prevention with 1-quart warm water with 1 tsp of baking soda and salt and alcohol-free mouthwash.   -Emphasized adequate hand-hygiene and limited contact with people who are sick.   -Monitor and notify any bleeding in urine, stool, or sputum.  As well as unusual bleeding or bruising and stomach pain.   - Emphasized hydration with 4 16 oz bottles of water a day.    -Importance of moisturizing with fragrance free lotion to prevent skin rash and/or hand-foot syndrome.  -Continue Eliquis  5mg BID(h/o PE in 2019) and ASA.  -Call clinic if fever >100.4, shakes or chills, shortness of breath, chest pain, uncontrolled vomiting or diarrhea, pain and tingling in the chest or arm, or just not feeling well.    - RTC in _____ for follow up labs and toxicity check.                         Patient with left ovarian cancer diagnosed 12/26/18, appears to be stage IIIC vs. IV with diffuse peritoneal disease, possible splenic involvement, epicardial lymph nodes and bilateral pleural effusions.  Patient seen and evaluated by Dr. Mike Santana at [facility] in [place].   Treatment recommended upfront with chemotherapy Carboplatin/Paclitaxel x 3 cycles.  Completed Cycle 3 on 2/26/19.   She underwent total abdominal hysterectomy, BSO and tumor debulking on 3/18/19 with Dr. Santana with complete gross resection of disease. FIGO Stage IIIC papillary serous fallopian tube cancer.   Patient resumed chemotherapy with cycle 4 on 4/9/19. Completed cycle 6 on 5/21/19.    Testing on tumor was positive for genomic instability but negative for BRCA mutation.  Per NCCN guidelines, maintenance can be considered for BRCA mutated germline category 1 and somatic category 2A. There is some evidence that PARP inhibitors have some activity as well in tumors with genomic instability. But the PARP maintenance is not approved for this indication. Offered treatment to patient and after discussion she declined and made decision to continue with observation only.    PET/CT done for rising CA-125 showed hypermetabolic splenic lesion which is not new, just enlarged from previous. Case discussed with Dr. Mike Santana.  Patient is now s/p splenectomy done 10/21/19. Consistent with splenic involvement with high grade serous carcinoma.   Dr. Santana recommended Niraparib maintenance based on new data showing activity in HRD positive ovarian cancer and patient started on 11/8/19, required a dose reduction due to cytopenias.  Rising CA-125 noted in  11/2020. CT showed new RLL lung mass.   Follow-up PET done 11/20/20 showed RLL lung mass hypermetabolic and hypermetabolic periportal adenopathy.     Recommended 2nd line treatment with Carboplatin/Taxol/Avastin.   Started cycle 1 on 12/8/20. Neulasta added with cycle 2.   Completed Cycle 6 on 3/24/21.  PET from 3/30/21 showed complete resolution of lung mass and no other disease.    Avastin maintenance started on 4/13/21.   Hospitalized for TIA after her 4th cycle of Avastin. Work-up for stroke and cause otherwise negative. Patient started on baby ASA by neurology.  Discussed there are no clear guidelines for changing/discontinuing treatment for TIA/CVA related to Avastin.  Since her symptoms resolved and there were no residual effects, recommended to continue with Avastin since it is working well for her disease and since a baby ASA was added for prevention. She was also continued on Eliquis for h/o PE.  S/p mediport removal for fracture on 8/26/21.   Continue Avastin through peripheral veins for now.  PET was done in 11/2024 due to rising CA-125 although it had remained WNL. Scan showed lesion near superior portion of liver.  CT-biopsy attempted but was non-diagnostic.     Since CA-125 is not outside of normal limits, and no way to tell how long there liver lesion has been present, since no scan done since 2021.  Recommended to monitor closely.  Repeat PET/CT done 1/24/25 with stable liver lesion in size, increased SUV, no other disease.  Again recommended to continue with current treatment since stable and monitor very closely.   Repeat CT C/A/P from 4/15/25 with slight enlargement of one of the liver lesions. Insurance denied PET and says she has reached her lifetime PET limit.  CA-125 also rising. These lesions remain in a difficult location for biopsy. Will forgo biopsy and proceed with treatment.     Recommended to start on chemotherapy.  Recommend treatment with Carboplatin and Doxil X 6 cycles.     Refer  to Dr. Hwang for mediport placement.  Discussed most common side effects and patient given information from [email or web address].  Obtain ECHO.    Plan for labs on 5/5/25 and C1 on 5/6/25.  Plan to repeat CT C/A/P after 3 cycles and if good, continue up to 6 cycles.  Also if good response, plan for maintenance PARP, switch to Lynparza.     Vaccines/Boosters post-splenectomy: Menactra/Menveo every 5 years, Bexsero every 2-3 years, annual flu shot.  Last Bexsero was given 8/1/23. Next due in 2026.  Received Menactra/Menveo in 9/2024, 5 years after initial.     Continue Eliquis 5mg BID(h/o PE in 2019) and ASA.  All questions answered.      Renetta Corral MD        Visit today included increased complexity associated with the care of the episodic problem fallopian tube cancer, addressing and managing the longitudinal care of the patient's fallopian tube cancer.

## 2025-05-05 NOTE — PROGRESS NOTES
THERAPY EDUCATION: CARBOPLATIN + LIPOSOMAL DOXORUBICIN   Subjective:      Patient ID: Shayna Ledezma is a 74 y.o. female.    1. 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 Pulmonary Embolism (Incidental on CT)--19    Procedure/pathology:   1. Paracentesis with removal of 5L of fluid done 18--serous carcinoma, high grade, c/w ovarian primary, PAX-8, CK7 and MOC-31 positive, CDX-2, CK-20 and Calretinin-negative.  2. S/p Exploratory Lap, radical tumor debulking including total abdominal hysterectomy, bilateral salpingo-oophorectomy, bilateral pelvic lymph node sampling, appendectomy, mobilization of the left ureter, complete gross resection of the disease with removal of mesenteric disease, as well as omental disese and parasplenic disease by Dr. Santana 3/18/19--Metastatic high grade serous carcinoma. Tissue/organ involvement: right ovary, appendectomy, omentum, mesentery (including small bowel mesentery) and multiple other sites designated: periumbilical, perisplenic, transverse colon, splenic flexure, perirectal, left periureteral. 2 lymph nodes negative. [id number]. FIGO stage IIIC. [facility] somatic instability testing positive for genomic instability, negative BRCA1 and BRCA2 tumor testing.  3. Splenectomy done 10/21/19--splenic involvement with metastatic high grade serous carcinoma, 5 benign lymph nodes. [facility] testing showed LAM, NTRK1/2/3 negative, TMB low, BRCA1/2 negative, ER/AR negative, CATHY negative, SPEN pathogenic variant Exon 11, TP53 pathogenic variant Exon 5, PD-L1 positive CPS 15, Genomic ELODIA high.  4. CT-biopsy done 24--non-diagnostic, no malignancy cells present.   Imagin. CT A/P w/ and w/o contrast done at [facility]--large volume ascites with multiple peritoneal implants, right pericolic gutter implant measures 2.5X3.3cm, LUQ omental/mesenteric implant measures 3X3.6cm, omental carcinomatosis, extensive enhancing soft tissue  surrounding sigmoid colon vs. large sigmoid mass, left ovarian cystic lesion appears to have some internal septations measures 3.5X3.8cm, enhancing periportal soft tissue density likely lymphadenopathy with some narrowing of the portal vein noted, but without internal filling defect, borderline splenomegaly, subtle solid lesion w/n central spleen measures 2.4X2.5cm, likely metastatic, with peripheral area of diminished enhancement suggesting splenic infarct, soft tissue implant abutting gallbladder measures 2X2.2cm, no right adnexal mass, trace bilateral layering pleural fluid, small moderate-sized hiatal hernia, few borderline enlarged lymph nodes w/n right epicardial fat pad.  2. CT of chest on 1/11/19--Some prominent right cardiophrenic lymph nodes are nonspecific and can be followed on future surveillance scans. Otherwise no CT evidence of metastatic disease to the chest. While exam was not tailored for evaluation of the pulmonary arteries I suspect there is pulmonary thromboembolic disease. Peritoneal carcinomatosis seen in the upper abdomen. Critical Result: Pulmonary Embolus.  3. CT C/A/P 3/4/19--Positive response to therapy. No new or progressive metastatic disease in the chest, abdomen or pelvis. Stable to improved findings include smaller pericardial lymph nodes, andrew hepatis adenopathy, peritoneal carcinomatosis, smaller left adnexal lesion; no evidence of PE within limitations of the study.  5. CT PE protocol chest/A/P 6/11/19--No PE, improved pericardial lymph node with minimal size on current examination, improved peritoneal carcinomatosis and left adnexal implant, splenic ischemia evolved into small infarct, size improvement of one of splenic hypodense lesion and mild size increase of second hypodense lesion, favored to be benign/cystic, no new findings.  6. PET/CT 9/25/19--s/p hysterectomy and bilateral oophorectomy, no evidence for locally recurrent/residual disease, intensely hypermetabolic  hypodense lesion within the spleen 3.5X3.3cm concerning for metastatic focus. No other abnormal focus of disease.  7. CT C/A/P 11/13/20--Right lower lobe 3.3 cm mass is presumed metastatic, otherwise no evident residual, recurrent or metastatic disease.   8. PET/CT 11/20/20--Hypermetabolic right lung base lesion and suspected metastatic periportal adenopathy, no additional sites of FDG-avid otherwise aggressive appearing pathology through the neck, chest, abdomen, or pelvis.  9. PET/CT 1/21/21--Interval decrease in size of the right lower lobe mass with decreased FDG uptake, now measures 1.7 x 2.2 cm (previous 3.3 x 3.1 cm), resolution of FDG uptake in the suspected periportal lymph node which is not identifiable on noncontrast CT. No evidence of new or progressive hypermetabolic metastatic disease.  10. PET/CT 3/30/21--Resolved right lower lung lobe hypermetabolic mass. Otherwise, no interval change or evidence of residual disease, recurrence or new metastatic lesion.  11. PET/CT 11/21/24--new FDG uptake in right hepatic lobe concerning for malignancy, measures 3.5cm with SUV 9.5, could represent a peritoneal implant vs. Hepatic metastasis.   12. PET/CT 1/24/25--persistent right hepatic lobe lesion measures 3.6cm, stable, with increasing FDG uptake SUV 14.6, previous 9.5.  13. CT C/A/P done 4/15/25--along periphery of right hepatic lobe, 2 lesions are present, larger of 2 at the padded dome measures 2.6X3.3cm (previous 2.7X2.4cm) is larger, more cystic appearing lesion along contour of right hepatic lobe measures 1.9X1cm, unchanged, no new lesion, chronic compression deformity of L1, similar since 1/24/25. No new suspicious bone lesion.     Procedures:   1. Paracentesis on 12/28/18 with removal of 5 Liters.  2. Paracentesis on 01/07/19 with removal of 3.5 Liters.  3. Paracentesis on 02/06/19 with removal of 2.5 Liters.  4. Mediport placed 12/2020 by Dr. Serge Gentile--removed 8/26/21 due to mediport  fracture.    Germline genetic testing done by [facility] 1/29/19--negative for BRCA1/2, two (2) variants of uncertain significance (VUS): CATHY p.L2965I/p.S4644C and MLH1 p.P603L.     :  12/19/18 1102  11/10/20  42.9  12/08/20 79.5  01/05/21 28.9  01/26/21 12.8  02/18/21 11.6  03/15/22--10.3  04/26/22--9.6  05/16/22--10.2  06/07/22--10.1  07/19/22--11.6  08/08/22--12.5  09/19/22--12.7  10/31/22--13.5  01/03/23--12.0  02/13/23--14.0  03/03/23--13.9  04/17/23--14.5  05/26/23--14.6  07/31/23--13.5  11/13/23--15.6  12/04/23--16.8  01/16/24--13.7  02/26/24--18.6  03/18/24--16.3  04/29/24--17.0  06/10/24--17.0  08/12/24--17.3  09/23/24--18.3  10/14/24--18.6  11/25/24--19.1  01/06/25--24.3  03/10/25--29.4  03/31/25--38.7    Treatment History:  1. Neoadjuvant Carboplatin/Taxol every 3 weeks x 3 cycles. 1/15/19 - 2/26/19. Neulasta added.   2. Surgery--tumor debulking KORI/BSO 3/18/19.  3. Adjuvant Carboplatin/Taxol x 3 additions cycles 4/9/19--5/21/19.  4. Splenectomy 10/21/19.  5. Niraparib maintenance (dose reduction to 100mg daily for cytopenias) 11/8/19--12/1/20 (stopped due to progression).  6. Carboplatin/Paclitaxel/Avastin X 6 cycles completed 12/8/21--3/24/21 (complete response).  7. Avastin maintenance 4/13/21--4/21/25 (stopped due to progression).    Current Treatment:  1. Eliquis BID for incidental PE on imaging 1/2019  2. Carboplatin/Doxil X 6 cycles to start 5/6/25  3. Lynparza maintenance planned    CC:  Therapy Education     Interval History     5/5/25: Ms. Ledezma presents to the clinic by herself for therapy education. Patient complains of swelling and bruising to the Mediport site that was placed on 5/1/25. Denies pain. Denies fever, chills, SOB, N/V/D, constipation, recent infection, chest pain or unexplained bleeding or bruising.      4/21/25:Patient is here for follow-up of ovarian cancer on maintenance Avastin. CT C/A/P shows disease progression with mild enlargement of 1 of the lesions near the  liver. CA-125 is also more elevated now outside the normal range. Discussed stopping Avastin and going back on chemotherapy and she is in agreement.         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      alendronate (FOSAMAX) 70 MG tablet Take 1 tablet (70 mg total) by mouth every 7 days. 4 tablet 11    aspirin (ECOTRIN) 81 MG EC tablet Take 81 mg by mouth once daily.      atorvastatin (LIPITOR) 40 MG tablet Take 1 tablet (40 mg total) by mouth once daily. 90 tablet 3    b complex vitamins capsule Take 1 capsule by mouth once daily.      calcium citrate-vitamin D3 315-200 mg (CITRACAL+D) 315 mg-5 mcg (200 unit) per tablet Take 1 tablet by mouth 2 (two) times daily.      ELIQUIS 5 mg Tab TAKE 1 TABLET BY MOUTH TWICE DAILY 180 tablet 0    GLUTAMINE ORAL Take 10 g by mouth Daily.      lisinopriL (PRINIVIL,ZESTRIL) 20 MG tablet TAKE 1 TABLET(20 MG) BY MOUTH DAILY 30 tablet 3    OLANZapine (ZYPREXA) 5 MG tablet Take 1 tablet (5 mg total) by mouth every evening. on days 1-4 of each chemotherapy cycle. 4 tablet 5    pyridoxine, vitamin B6, (B-6) 250 MG Tab Take 250 mg by mouth once daily.      sodium chloride 0.9% SolP 100 mL with bevacizumab 25 mg/mL Soln Inject into the vein every 21 days.       No current facility-administered  medications on file prior to visit.      Review of Systems   Constitutional:  Negative for appetite change and unexpected weight change.   HENT:  Negative for mouth sores and nosebleeds.    Eyes:  Negative for visual disturbance.   Respiratory:  Negative for cough and shortness of breath.    Cardiovascular:  Negative for chest pain.   Gastrointestinal:  Negative for abdominal pain and diarrhea.   Genitourinary:  Negative for frequency.   Musculoskeletal:  Negative for back pain.   Integumentary:  Negative for rash.   Neurological:  Negative for headaches.   Hematological:  Negative for adenopathy.   Psychiatric/Behavioral:  The patient is not nervous/anxious.        There were no vitals filed for this visit.        Wt Readings from Last 3 Encounters:   05/01/25 0712 51.5 kg (113 lb 8.6 oz)   04/21/25 1308 53.2 kg (117 lb 4.8 oz)   04/01/25 1500 53.7 kg (118 lb 4.8 oz)       No visits with results within 1 Week(s) from this visit.   Latest known visit with results is:   Lab Visit on 04/28/2025   Component Date Value    QRS Duration 04/28/2025 78     [facility] QTC Calculation 04/28/2025 408         Assessment:   1. 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  -Mediport placement completed on 5/1/25. Lidocaine cream given with instructions to apply on port 30 minutes before treatment.   -Therapy education completed on 5/5/25.  -Plans to start Carboplatin + Doxil Q4W on 5/6/25. ON HOLD UNTIL AFTER ASSESSMENT OF MEDIPORT SCHEDULED FOR 5/12/25  -Antiemetic regimen schedule given and calendar made for guidance    Dexamethasone- Take 2 tablets by mouth daily on Days 2-4 of each treatment.    Olanzapine- Take 1 tablet by mouth daily in the evening on Days 1-4 of each treatment   -Compazine PRN prescribed for at home with instructions to be taken by mouth every 6 hours as needed for nausea.   -OTC Imodium AD recommended for diarrhea (4-5 BMs a day).  Take 2 tablets after the first loose bowel movement, and 1 tablet after each loose bowel movement after the first dose has been taken. No more than 4 tablets should be taken in any 24-hour period. If not working, Lomotil can be prescribed as 2nd choice.    -Mouth sore prevention with 1-quart warm water with 1 tsp of baking soda and salt and alcohol-free mouthwash.   -Emphasized adequate hand-hygiene and limited contact with people who are sick.   -Monitor and notify any bleeding in urine, stool, or sputum.  As well as unusual bleeding or bruising and stomach pain.   - Emphasized hydration with 4 16 oz bottles of water a day.    -Importance of moisturizing with fragrance free lotion to prevent skin rash and/or hand-foot syndrome.  -Continue Eliquis 5mg BID(h/o PE in 2019) and ASA.  -Call clinic if fever >100.4, shakes or chills, shortness of breath, chest pain, uncontrolled vomiting or diarrhea, pain and tingling in the chest or arm, or just not feeling well.    -Plans to RTC on 5/12/25 for same day labs and TD with MD.      Dr. Hwang's nurse notified about patient's Mediport and encouraged patient to come in the office to assess once MD returned to office on Wednesday. Dicussed with Dr. Corral about situation and agreed to hold C1 Carbo/Doxil for 5/6/25 until further recommendations made by surgeon. Patient notified and verbalized understanding. Infusion notified as well.    DISCUSSION:    1.  A total of 60 minutes were spent in counseling today, in which 100% were face-to-face.  At today's therapy teaching session, we discussed the patient's cancer diagnosis as well as planned therapy regimen, protocol, side effects and toxicities.  A handout of each therapeutic agent in the regimen was provided and reviewed in detail.    2.  The following side effects were discussed but not limited to:    Carboplatin Side Effects:  Allergic Reactions  Breathing Problems  Bruising  Chest Pain  Chills  Cough  Fever  Hair  Loss  High Blood Pressure  Infection  Itching  Low Blood Counts  Metallic Taste  Nausea  Nosebleeds  Numbness  Pain  Rash  Swelling  Tingling  Vomiting    Doxorubicin Liposomal Side Effects:  Allergic Reactions  Breathing Problems  Bruising  Chest Pain  Chills  Cough  Diarrhea  Fever  Flushing  Hair Loss  Hand-Foot Syndrome  Headache  Infection  Itching  Low Blood Counts  Nausea  Numbness  Pain  Rash  Stomach Upset  Swelling  Tingling  Vomiting  Watery Eyes                a.  Discussed the risk of infection while on therapy related to pancytopenia, specifically a decrease in their white blood cell count.  Instructed to contact our office for temperature >100.4 F, chills, sudden onset cough or shortness of breath, symptoms of a urinary tract infection.                b.  Discussed the risk of anemia. Instructed to contact our office for dizziness, heart palpitations, or extreme or sudden changes in weakness.                c.  Discussed the risk of thrombocytopenia, which increases the risk of bruising or bleeding.  Instructed the patient to contact our office for spontaneous signs of bleeding, including nose bleeds, bleeding from the gums or mouth, blood in sputum, urine or stool and unusual or excessive bruising or rash.                d.  Discussed GI side effects including weight changes, changes in appetite, altered sense of taste, stomatitis, nausea, vomiting, diarrhea, constipation, and heartburn.                e.  Discussed  side effects including painful urination, changes in the amount of urination, possible urine color changes.  Discussed fertility issues and to prevent  pregnancy if of child bearing age.                f.  Discussed neurological side effects including the risk of peripheral neuropathy, either temporary or permanent.                g.  Discussed the potential for skin, hair, and nail changes.       3.  Instructed to contact our office for discussion of medication changes, the addition  of vitamin and/or herbal supplementation as they may interact with some chemotherapy agents.    4.  Discussed dietary modifications and the need to maintain adequate caloric intake and proper oral hydration.  Recommended 64 ounces of fluid per day.    5.  Discussed anti-emetic protocol and bowel regimen protocol.    6.  Office contact information given including after hours number.  Discussed there is an oncologist on call 24/7, 365 days including weekends.  Provided primary nurse's information .    7.  In summary, the patient is in agreement with the plan of care.  Questions appeared to be answered to their satisfaction. Consented the patient to the treatment plan and the patient was educated on the planned duration of the treatment and schedule of the treatment administration. Copy to be scanned into the chart.    All questions answered to the satisfaction of the patient and family.     Follow up appointments given to patient.       HANNAH TERRELL-NIRAV  PATIENT EDUCATOR  [facility]

## 2025-05-06 NOTE — NURSING
"Oncology Navigation   Intake  Cancer Type: Gynecologic     Treatment  Current Status: Active       Medical Oncologist: Dr. Renetta Corral  Chemotherapy: Planned  Chemotherapy Regimen: OP GYN CARBOplatin liposomal DOXOrubicin Q4W       Procedures: Port / PICC  Port / PICC Schedule Date: 25    General Referrals: Ector Argueta          Support Systems: Family members  Barriers of Care: Barriers to Care "Assessment completed-no barriers noted"     Acuity  Stage: 2  Systemic Treatment - predicted or initiated: Chemotherapy Regimen with Multiple drugs (+1)  Treatment Tolerability: Has not started treatment yet/treatment fully completed and side effects resolved  ECO  Comorbidities in Medical History: 2  Hospitalization Within the Past Month: 0   Needed: 0  Support: 0  Verbalizes Financial Concerns: 0  Transportation: 0  Mental Health: PHQ Score: 0 (Distress 4/10)  History of noncompliance/frequent no shows and cancellations: 0  Verbalizes the need for more education: 0  Other Factors (+1 for Each): 1  Navigation Acuity: 6     Follow Up  1 week     Met with patient today following education visit. Introduced self and role of navigation. Assessed for barriers to care. She denies anxiety and depression. Her distress score was 4/10. She states that she is concerned about how she will do with treatment. Provided emotional support. She denies transportation or financial concerns. Does not feel the need for home health or any medical equipment. Of note, her port today was swollen and very bruised. Dr. Hwang's office notified and Dr. Corral aware. Treatment delayed until port can be evaluated. Patient has no further questions or concerns today. They are aware of how to reach navigation should they need to.       "

## 2025-05-07 NOTE — PROGRESS NOTES
"    [facility] Vascular - Clinic Note  Anibal Hwang MD      Patient Name: Shayna Ledezma                   : 1950      MRN: [id number]   Visit Date: 2025       History Present Illness     Reason for Visit: Mediport Evaluation    Ms. Ledezma presents to the clinic for evaluation of mediport. She was due to start treatment last Tuesday, however infusion center noted port to be too swollen and bruised for access. She reports that this has subsided a little. She denies any pain to the area. She did resume her Eliquis the day after her procedure.  She denies any drainage from the incision.    REVIEW OF SYSTEMS:  12 point review of systems conducted, negative except as stated in the history of present illness. See HPI for details.        Physical Exam      Vitals:    25 1125 25 1128   BP: (!) 171/95 (!) 179/94   BP Location: Left arm Right arm   Patient Position: Sitting Sitting   Pulse: 68 65   Weight: 52.2 kg (115 lb)    Height: 5' 3" (1.6 m)           General: well-nourished, no acute distress, and healthy appearing, alert, pleasant, conversant, and oriented  Respiratory: breathing easily and without respiratory distress  Cardiology: regular rate and rhythm      Musculoskeletal:   Upper Extremity: normal bilateral hand function    Post Operative Incision:   Location: right chest wall  The right chest port has a large hematoma around it with surrounding ecchymoses.  Incision remains intact.  There are no signs of infection.  She does have bruising throughout the right chest as well.                     Assessment and Plan     Ms. Ledezma is a 74 y.o. female status post MediPort placement now the large periportal hematoma.  At this point the skin appears intact, there is no sign of infection.  I have discussed this with Dr. Corral her oncologist.  I do not think the hematoma resolve enough to use the port over the next 2 weeks so she will plan to place a PICC line.  Overall I think the port is " salvageable and that she will be able to use it if given enough time to rest.  I would like to have her follow up in about 2 weeks so we can re-evaluate the port at that time.        1. 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     Ovarian cancer     Peritoneal carcinomatosis     TIA (transient ischemic attack)      Past Surgical History:   Procedure Laterality Date    APPENDECTOMY      BREAST BIOPSY      BREAST SURGERY  Circa     Exploratory /diagnostic     SECTION      COLONOSCOPY  2023    TOM CHACON    Endometrial polyp removal      INSERTION OF TUNNELED CENTRAL VENOUS CATHETER (CVC) WITH SUBCUTANEOUS PORT N/A 2025    Procedure: [id number];  Surgeon: Anibal Hwang MD;  Location: [facility] OR;  Service: Peripheral Vascular;  Laterality: N/A;    Spleen operation      TONSILLECTOMY      TOTAL ABDOMINAL HYSTERECTOMY       Family History   Problem Relation Name Age of Onset    No Known Problems Mother      No Known Problems Father      COPD Brother O. R. Lewis      Social History[1]  Current Outpatient Medications   Medication Instructions    AA/prot/lysine/methio/vit C/B6 (A/G PRO ORAL) Daily    alendronate (FOSAMAX) 70 mg, Oral, Every 7 days    aspirin (ECOTRIN) 81 mg, Daily    atorvastatin (LIPITOR) 40 mg, Oral, Daily    b complex vitamins capsule 1 capsule, Daily    calcium citrate-vitamin D3 315-200 mg (CITRACAL+D) 315 mg-5 mcg (200 unit) per tablet 1 tablet, 2 times daily    dexAMETHasone (DECADRON) 8 mg, Oral, Daily, on days 2-4 of each chemotherapy cycle.    ELIQUIS 5 mg, Oral, 2  times daily    GLUTAMINE ORAL 10 g, Daily    lisinopriL (PRINIVIL,ZESTRIL) 20 MG tablet TAKE 1 TABLET(20 MG) BY MOUTH DAILY    OLANZapine (ZYPREXA) 5 mg, Oral, Nightly, on days 1-4 of each chemotherapy cycle.    prochlorperazine (COMPAZINE) 5 mg, Oral, Every 6 hours PRN    pyridoxine (vitamin B6) (B-6) 250 mg, Daily    sodium chloride 0.9% SolP 100 mL with bevacizumab 25 mg/mL Soln Every 21 days     Review of patient's allergies indicates:   Allergen Reactions    Codeine Itching    Oxycodone-acetaminophen Other (See Comments)     Unknown    Oxycodone-aspirin Other (See Comments)     Unknown       Patient Care Team:  Sigrid Giang MD as PCP - General (Internal Medicine)  Dale Gentile MD as Consulting Physician (Gastroenterology)  Anibal Hwang MD as Vascular Surgery (Vascular Surgery)  Renetta Corral MD as Consulting Physician (Oncology)        No follow-ups on file. In addition to their scheduled follow up, the patient has also been instructed to follow up on as needed basis.     Future Appointments   Date Time Provider Department Center   6/3/2025  1:00 PM CHAIR 06 [facility] CHEMOTHERAPY INFUSION [facility] CHEMO [facility]   8/11/2025  8:20 [facility] [place] [id number]   2/9/2026  8:20 AM [facility] [place] [id number]             [1]   Social History  Socioeconomic History    Marital status:    Tobacco Use    Smoking status: Never    Smokeless tobacco: Never   Substance and Sexual Activity    Alcohol use: Never    Drug use: Never    Sexual activity: Not Currently     Birth control/protection: None     Social Drivers of Health     Financial Resource Strain: Low Risk  (11/18/2024)    Overall Financial Resource Strain (CARDIA)     Difficulty of Paying Living Expenses: Not hard at all   Food Insecurity: No Food Insecurity (11/18/2024)    Hunger Vital Sign     Worried About Running Out of Food in the Last Year: Never true     Ran Out of Food in the Last  Year: Never true   Transportation Needs: No Transportation Needs (8/2/2023)    PRAPARE - Transportation     Lack of Transportation (Medical): No     Lack of Transportation (Non-Medical): No   Physical Activity: Sufficiently Active (11/18/2024)    Exercise Vital Sign     Days of Exercise per Week: 7 days     Minutes of Exercise per Session: 90 min   Stress: No Stress Concern Present (11/18/2024)    [employer] - Occupational Stress Questionnaire     Feeling of Stress : Not at all   Housing Stability: Low Risk  (8/2/2023)    Housing Stability Vital Sign     Unable to Pay for Housing in the Last Year: No     Number of Places Lived in the Last Year: 1     Unstable Housing in the Last Year: No

## 2025-05-12 ENCOUNTER — LAB VISIT (OUTPATIENT)
Dept: LAB | Facility: HOSPITAL | Age: 75
End: 2025-05-12
Attending: INTERNAL MEDICINE
Payer: MEDICARE

## 2025-05-12 ENCOUNTER — OFFICE VISIT (OUTPATIENT)
Dept: VASCULAR SURGERY | Facility: CLINIC | Age: 75
End: 2025-05-12
Payer: MEDICARE

## 2025-05-12 ENCOUNTER — OFFICE VISIT (OUTPATIENT)
Dept: HEMATOLOGY/ONCOLOGY | Facility: CLINIC | Age: 75
End: 2025-05-12
Payer: MEDICARE

## 2025-05-12 VITALS
DIASTOLIC BLOOD PRESSURE: 94 MMHG | HEART RATE: 65 BPM | BODY MASS INDEX: 20.38 KG/M2 | HEIGHT: 63 IN | SYSTOLIC BLOOD PRESSURE: 179 MMHG | WEIGHT: 115 LBS

## 2025-05-12 VITALS
TEMPERATURE: 98 F | BODY MASS INDEX: 20.38 KG/M2 | OXYGEN SATURATION: 99 % | DIASTOLIC BLOOD PRESSURE: 102 MMHG | HEART RATE: 65 BPM | WEIGHT: 115 LBS | SYSTOLIC BLOOD PRESSURE: 179 MMHG | HEIGHT: 63 IN | RESPIRATION RATE: 14 BRPM

## 2025-05-12 DIAGNOSIS — C57.01 CARCINOMA OF RIGHT FALLOPIAN TUBE: Primary | ICD-10-CM

## 2025-05-12 DIAGNOSIS — Z95.828 PORT-A-CATH IN PLACE: Primary | ICD-10-CM

## 2025-05-12 DIAGNOSIS — I27.82 OTHER CHRONIC PULMONARY EMBOLISM WITHOUT ACUTE COR PULMONALE: ICD-10-CM

## 2025-05-12 DIAGNOSIS — C78.01 MALIGNANT NEOPLASM METASTATIC TO RIGHT LUNG: ICD-10-CM

## 2025-05-12 DIAGNOSIS — T45.1X5A AGRANULOCYTOSIS SECONDARY TO CANCER CHEMOTHERAPY: ICD-10-CM

## 2025-05-12 DIAGNOSIS — C78.7 SECONDARY MALIGNANT NEOPLASM OF LIVER: ICD-10-CM

## 2025-05-12 DIAGNOSIS — Z79.899 IMMUNODEFICIENCY DUE TO DRUGS: ICD-10-CM

## 2025-05-12 DIAGNOSIS — C57.01 CARCINOMA OF RIGHT FALLOPIAN TUBE: ICD-10-CM

## 2025-05-12 DIAGNOSIS — D70.1 AGRANULOCYTOSIS SECONDARY TO CANCER CHEMOTHERAPY: ICD-10-CM

## 2025-05-12 DIAGNOSIS — D84.821 IMMUNODEFICIENCY DUE TO DRUGS: ICD-10-CM

## 2025-05-12 LAB
ALBUMIN SERPL-MCNC: 3.2 G/DL (ref 3.4–4.8)
ALBUMIN/GLOB SERPL: 1 RATIO (ref 1.1–2)
ALP SERPL-CCNC: 51 UNIT/L (ref 40–150)
ALT SERPL-CCNC: 21 UNIT/L (ref 0–55)
ANION GAP SERPL CALC-SCNC: 8 MEQ/L
AST SERPL-CCNC: 39 UNIT/L (ref 11–45)
BASOPHILS # BLD AUTO: 0.02 X10(3)/MCL
BASOPHILS NFR BLD AUTO: 0.3 %
BILIRUB SERPL-MCNC: 0.9 MG/DL
BUN SERPL-MCNC: 19.1 MG/DL (ref 9.8–20.1)
CALCIUM SERPL-MCNC: 9.3 MG/DL (ref 8.4–10.2)
CANCER AG125 SERPL-ACNC: 47.6 UNIT/ML (ref 0–35)
CHLORIDE SERPL-SCNC: 102 MMOL/L (ref 98–107)
CO2 SERPL-SCNC: 29 MMOL/L (ref 23–31)
CREAT SERPL-MCNC: 0.73 MG/DL (ref 0.55–1.02)
CREAT/UREA NIT SERPL: 26
EOSINOPHIL # BLD AUTO: 0.14 X10(3)/MCL (ref 0–0.9)
EOSINOPHIL NFR BLD AUTO: 2 %
ERYTHROCYTE [DISTWIDTH] IN BLOOD BY AUTOMATED COUNT: 13.6 % (ref 11.5–17)
GFR SERPLBLD CREATININE-BSD FMLA CKD-EPI: >60 ML/MIN/1.73/M2
GLOBULIN SER-MCNC: 3.3 GM/DL (ref 2.4–3.5)
GLUCOSE SERPL-MCNC: 75 MG/DL (ref 82–115)
HCT VFR BLD AUTO: 38.2 % (ref 37–47)
HGB BLD-MCNC: 12.5 G/DL (ref 12–16)
IMM GRANULOCYTES # BLD AUTO: 0 X10(3)/MCL (ref 0–0.04)
IMM GRANULOCYTES NFR BLD AUTO: 0 %
LYMPHOCYTES # BLD AUTO: 2.34 X10(3)/MCL (ref 0.6–4.6)
LYMPHOCYTES NFR BLD AUTO: 32.7 %
MCH RBC QN AUTO: 31.6 PG (ref 27–31)
MCHC RBC AUTO-ENTMCNC: 32.7 G/DL (ref 33–36)
MCV RBC AUTO: 96.5 FL (ref 80–94)
MONOCYTES # BLD AUTO: 0.85 X10(3)/MCL (ref 0.1–1.3)
MONOCYTES NFR BLD AUTO: 11.9 %
NEUTROPHILS # BLD AUTO: 3.81 X10(3)/MCL (ref 2.1–9.2)
NEUTROPHILS NFR BLD AUTO: 53.1 %
PLATELET # BLD AUTO: 363 X10(3)/MCL (ref 130–400)
PMV BLD AUTO: 8.1 FL (ref 7.4–10.4)
POTASSIUM SERPL-SCNC: 4.1 MMOL/L (ref 3.5–5.1)
PROT SERPL-MCNC: 6.5 GM/DL (ref 5.8–7.6)
PROT UR QL STRIP: ABNORMAL
RBC # BLD AUTO: 3.96 X10(6)/MCL (ref 4.2–5.4)
SODIUM SERPL-SCNC: 139 MMOL/L (ref 136–145)
WBC # BLD AUTO: 7.16 X10(3)/MCL (ref 4.5–11.5)

## 2025-05-12 PROCEDURE — 3077F SYST BP >= 140 MM HG: CPT | Mod: CPTII,,, | Performed by: SURGERY

## 2025-05-12 PROCEDURE — 99999 PR PBB SHADOW E&M-EST. PATIENT-LVL IV: CPT | Mod: PBBFAC,,, | Performed by: INTERNAL MEDICINE

## 2025-05-12 PROCEDURE — 81005 URINALYSIS: CPT

## 2025-05-12 PROCEDURE — 1126F AMNT PAIN NOTED NONE PRSNT: CPT | Mod: CPTII,S$GLB,, | Performed by: INTERNAL MEDICINE

## 2025-05-12 PROCEDURE — 3288F FALL RISK ASSESSMENT DOCD: CPT | Mod: CPTII,,, | Performed by: SURGERY

## 2025-05-12 PROCEDURE — 36415 COLL VENOUS BLD VENIPUNCTURE: CPT

## 2025-05-12 PROCEDURE — 1160F RVW MEDS BY RX/DR IN RCRD: CPT | Mod: CPTII,,, | Performed by: SURGERY

## 2025-05-12 PROCEDURE — 1101F PT FALLS ASSESS-DOCD LE1/YR: CPT | Mod: CPTII,,, | Performed by: SURGERY

## 2025-05-12 PROCEDURE — 3077F SYST BP >= 140 MM HG: CPT | Mod: CPTII,S$GLB,, | Performed by: INTERNAL MEDICINE

## 2025-05-12 PROCEDURE — 3080F DIAST BP >= 90 MM HG: CPT | Mod: CPTII,S$GLB,, | Performed by: INTERNAL MEDICINE

## 2025-05-12 PROCEDURE — 80053 COMPREHEN METABOLIC PANEL: CPT

## 2025-05-12 PROCEDURE — 86304 IMMUNOASSAY TUMOR CA 125: CPT

## 2025-05-12 PROCEDURE — 3080F DIAST BP >= 90 MM HG: CPT | Mod: CPTII,,, | Performed by: SURGERY

## 2025-05-12 PROCEDURE — G2211 COMPLEX E/M VISIT ADD ON: HCPCS | Mod: S$GLB,,, | Performed by: INTERNAL MEDICINE

## 2025-05-12 PROCEDURE — 85025 COMPLETE CBC W/AUTO DIFF WBC: CPT

## 2025-05-12 PROCEDURE — 1159F MED LIST DOCD IN RCRD: CPT | Mod: CPTII,,, | Performed by: SURGERY

## 2025-05-12 PROCEDURE — 99215 OFFICE O/P EST HI 40 MIN: CPT | Mod: S$GLB,,, | Performed by: INTERNAL MEDICINE

## 2025-05-12 PROCEDURE — 1101F PT FALLS ASSESS-DOCD LE1/YR: CPT | Mod: CPTII,S$GLB,, | Performed by: INTERNAL MEDICINE

## 2025-05-12 PROCEDURE — 3288F FALL RISK ASSESSMENT DOCD: CPT | Mod: CPTII,S$GLB,, | Performed by: INTERNAL MEDICINE

## 2025-05-12 PROCEDURE — 99024 POSTOP FOLLOW-UP VISIT: CPT | Mod: ,,, | Performed by: SURGERY

## 2025-05-12 NOTE — PROGRESS NOTES
Subjective:       Patient ID: Shayna Ledezma is a 74 y.o. female.  GI: Dr. Gamaliel Carlos  GYN ONC at [facility]: Dr. Mike Santana    1. 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 Pulmonary Embolism (Incidental on CT)--19    Procedure/pathology:   1. Paracentesis with removal of 5L of fluid done 18--serous carcinoma, high grade, c/w ovarian primary, PAX-8, CK7 and MOC-31 positive, CDX-2, CK-20 and Calretinin-negative.  2. S/p Exploratory Lap, radical tumor debulking including total abdominal hysterectomy, bilateral salpingo-oophorectomy, bilateral pelvic lymph node sampling, appendectomy, mobilization of the left ureter, complete gross resection of the disease with removal of mesenteric disease, as well as omental disese and parasplenic disease by Dr. Santana 3/18/19--Metastatic high grade serous carcinoma. Tissue/organ involvement: right ovary, appendectomy, omentum, mesentery (including small bowel mesentery) and multiple other sites designated: periumbilical, perisplenic, transverse colon, splenic flexure, perirectal, left periureteral. 2 lymph nodes negative. [id number]. FIGO stage IIIC. Myriad somatic instability testing positive for genomic instability, negative BRCA1 and BRCA2 tumor testing.  3. Splenectomy done 10/21/19--splenic involvement with metastatic high grade serous carcinoma, 5 benign lymph nodes. Caris testing showed LAM, NTRK1/2/3 negative, TMB low, BRCA1/2 negative, ER/MD negative, CATHY negative, SPEN pathogenic variant Exon 11, TP53 pathogenic variant Exon 5, PD-L1 positive CPS 15, Genomic ELODIA high.  4. CT-biopsy done 24--non-diagnostic, no malignancy cells present.   Imagin. CT A/P w/ and w/o contrast done at [facility] 18--large volume ascites with multiple peritoneal implants, right pericolic gutter implant measures 2.5X3.3cm, LUQ omental/mesenteric implant measures 3X3.6cm, omental carcinomatosis, extensive  enhancing soft tissue surrounding sigmoid colon vs. large sigmoid mass, left ovarian cystic lesion appears to have some internal septations measures 3.5X3.8cm, enhancing periportal soft tissue density likely lymphadenopathy with some narrowing of the portal vein noted, but without internal filling defect, borderline splenomegaly, subtle solid lesion w/n central spleen measures 2.4X2.5cm, likely metastatic, with peripheral area of diminished enhancement suggesting splenic infarct, soft tissue implant abutting gallbladder measures 2X2.2cm, no right adnexal mass, trace bilateral layering pleural fluid, small moderate-sized hiatal hernia, few borderline enlarged lymph nodes w/n right epicardial fat pad.  2. CT of chest on 1/11/19--Some prominent right cardiophrenic lymph nodes are nonspecific and can be followed on future surveillance scans. Otherwise no CT evidence of metastatic disease to the chest. While exam was not tailored for evaluation of the pulmonary arteries I suspect there is pulmonary thromboembolic disease. Peritoneal carcinomatosis seen in the upper abdomen. Critical Result: Pulmonary Embolus.  3. CT C/A/P 3/4/19--Positive response to therapy. No new or progressive metastatic disease in the chest, abdomen or pelvis. Stable to improved findings include smaller pericardial lymph nodes, andrew hepatis adenopathy, peritoneal carcinomatosis, smaller left adnexal lesion; no evidence of PE within limitations of the study.  5. CT PE protocol chest/A/P 6/11/19--No PE, improved pericardial lymph node with minimal size on current examination, improved peritoneal carcinomatosis and left adnexal implant, splenic ischemia evolved into small infarct, size improvement of one of splenic hypodense lesion and mild size increase of second hypodense lesion, favored to be benign/cystic, no new findings.  6. PET/CT 9/25/19--s/p hysterectomy and bilateral oophorectomy, no evidence for locally recurrent/residual disease, intensely  hypermetabolic hypodense lesion within the spleen 3.5X3.3cm concerning for metastatic focus. No other abnormal focus of disease.  7. CT C/A/P 11/13/20--Right lower lobe 3.3 cm mass is presumed metastatic, otherwise no evident residual, recurrent or metastatic disease.   8. PET/CT 11/20/20--Hypermetabolic right lung base lesion and suspected metastatic periportal adenopathy, no additional sites of FDG-avid otherwise aggressive appearing pathology through the neck, chest, abdomen, or pelvis.  9. PET/CT 1/21/21--Interval decrease in size of the right lower lobe mass with decreased FDG uptake, now measures 1.7 x 2.2 cm (previous 3.3 x 3.1 cm), resolution of FDG uptake in the suspected periportal lymph node which is not identifiable on noncontrast CT. No evidence of new or progressive hypermetabolic metastatic disease.  10. PET/CT 3/30/21--Resolved right lower lung lobe hypermetabolic mass. Otherwise, no interval change or evidence of residual disease, recurrence or new metastatic lesion.  11. PET/CT 11/21/24--new FDG uptake in right hepatic lobe concerning for malignancy, measures 3.5cm with SUV 9.5, could represent a peritoneal implant vs. Hepatic metastasis.   12. PET/CT 1/24/25--persistent right hepatic lobe lesion measures 3.6cm, stable, with increasing FDG uptake SUV 14.6, previous 9.5.  13. CT C/A/P done 4/15/25--along periphery of right hepatic lobe, 2 lesions are present, larger of 2 at the padded dome measures 2.6X3.3cm (previous 2.7X2.4cm) is larger, more cystic appearing lesion along contour of right hepatic lobe measures 1.9X1cm, unchanged, no new lesion, chronic compression deformity of L1, similar since 1/24/25. No new suspicious bone lesion.     Procedures:   1. Paracentesis on 12/28/18 with removal of 5 Liters.  2. Paracentesis on 01/07/19 with removal of 3.5 Liters.  3. Paracentesis on 02/06/19 with removal of 2.5 Liters.  4. Mediport placed 12/2020 by Dr. Serge Gentile--removed 8/26/21 due to mediport  fracture.  5. IJ mediport placed 5/1/25 by Dr. Hwang.     Germline genetic testing done by [facility] [employer] 1/29/19--negative for BRCA1/2, two (2) variants of uncertain significance (VUS): CATHY p.D6301C/p.W0589G and MLH1 p.P603L.     :  12/19/18 1102  11/10/20  42.9  12/08/20 79.5  01/05/21 28.9  01/26/21 12.8  02/18/21 11.6  03/15/22--10.3  04/26/22--9.6  05/16/22--10.2  06/07/22--10.1  07/19/22--11.6  08/08/22--12.5  09/19/22--12.7  10/31/22--13.5  01/03/23--12.0  02/13/23--14.0  03/03/23--13.9  04/17/23--14.5  05/26/23--14.6  07/31/23--13.5  11/13/23--15.6  12/04/23--16.8  01/16/24--13.7  02/26/24--18.6  03/18/24--16.3  04/29/24--17.0  06/10/24--17.0  08/12/24--17.3  09/23/24--18.3  10/14/24--18.6  11/25/24--19.1  01/06/25--24.3  03/10/25--29.4  03/31/25--38.7  05/05/25--63.2    ECHO:  4/25/25 EF 65%.    Treatment History:  1. Neoadjuvant Carboplatin/Taxol every 3 weeks x 3 cycles. 1/15/19 - 2/26/19. Neulasta added.   2. Surgery--tumor debulking KORI/BSO 3/18/19.  3. Adjuvant Carboplatin/Taxol x 3 additions cycles 4/9/19--5/21/19.  4. Splenectomy 10/21/19.  5. Niraparib maintenance (dose reduction to 100mg daily for cytopenias) 11/8/19--12/1/20 (stopped due to progression).  6. Carboplatin/Paclitaxel/Avastin X 6 cycles completed 12/8/21--3/24/21 (complete response).  7. Avastin maintenance 4/13/21--4/21/25 (stopped due to progression).    Current Treatment:  1. Eliquis BID for incidental PE on imaging 1/2019  2. Carboplatin/Doxil X 6 cycles to start 5/6/25  3. Lynparza maintenance planned    CC:  Chief Complaint   Patient presents with    Other Misc     Pt reports no concerns today.       HPI   Patient is here for follow-up of ovarian cancer. She has not yet started on treatment. It was delayed after mediport placement, as she developed a hematoma. It is better this week but still swollen. She has no other complaints. She did see Dr. Hwang after our visit today and he thinks we may still be able to salvage  the [facility], but will take a lot more time. Recommended to begin treatment with PICC.       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      alendronate (FOSAMAX) 70 MG tablet Take 1 tablet (70 mg total) by mouth every 7 days. 4 tablet 11    aspirin (ECOTRIN) 81 MG EC tablet Take 81 mg by mouth once daily.      atorvastatin (LIPITOR) 40 MG tablet Take 1 tablet (40 mg total) by mouth once daily. 90 tablet 3    b complex vitamins capsule Take 1 capsule by mouth once daily.      calcium citrate-vitamin D3 315-200 mg (CITRACAL+D) 315 mg-5 mcg (200 unit) per tablet Take 1 tablet by mouth 2 (two) times daily.      ELIQUIS 5 mg Tab TAKE 1 TABLET BY MOUTH TWICE DAILY 180 tablet 0    GLUTAMINE ORAL Take 10 g by mouth Daily.      lisinopriL (PRINIVIL,ZESTRIL) 20 MG tablet TAKE 1 TABLET(20 MG) BY MOUTH DAILY 30 tablet 3    pyridoxine, vitamin B6, (B-6) 250 MG Tab Take 250 mg by mouth once daily.      dexAMETHasone (DECADRON) 4 MG Tab Take 2 tablets (8 mg total) by mouth once daily. on days 2-4 of each chemotherapy cycle. (Patient not taking: Reported on 5/12/2025) 6 tablet 5    OLANZapine (ZYPREXA) 5 MG tablet Take 1 tablet (5 mg total) by mouth every evening. on days 1-4 of each chemotherapy cycle.  (Patient not taking: Reported on 5/12/2025) 4 tablet 5    prochlorperazine (COMPAZINE) 5 MG tablet Take 1 tablet (5 mg total) by mouth every 6 (six) hours as needed for Nausea. (Patient not taking: Reported on 5/12/2025) 20 tablet 5    sodium chloride 0.9% SolP 100 mL with bevacizumab 25 mg/mL Soln Inject into the vein every 21 days. (Patient not taking: Reported on 5/12/2025)       No current facility-administered medications on file prior to visit.      Review of Systems   Constitutional:  Negative for appetite change and unexpected weight change.   HENT:  Negative for mouth sores and nosebleeds.    Eyes:  Negative for visual disturbance.   Respiratory:  Negative for cough and shortness of breath.    Cardiovascular:  Negative for chest pain.   Gastrointestinal:  Negative for abdominal pain and diarrhea.   Genitourinary:  Negative for frequency.   Musculoskeletal:  Negative for back pain.   Integumentary:  Negative for rash.   Neurological:  Negative for headaches.   Hematological:  Negative for adenopathy.   Psychiatric/Behavioral:  The patient is not nervous/anxious.         Vitals:    05/12/25 0923   BP: (!) 179/102   Pulse: 65   Resp: 14   Temp: 97.9 °F (36.6 °C)     Wt Readings from Last 3 Encounters:   05/12/25 0923 52.2 kg (115 lb)   05/05/25 1532 52.6 kg (116 lb)   05/01/25 0712 51.5 kg (113 lb 8.6 oz)     Physical Exam  Vitals reviewed.   Constitutional:       Appearance: Normal appearance. She is normal weight.   HENT:      Head: Normocephalic.   Eyes:      General: Lids are normal. Vision grossly intact.      Extraocular Movements: Extraocular movements intact.      Conjunctiva/sclera: Conjunctivae normal.   Cardiovascular:      Rate and Rhythm: Normal rate and regular rhythm.      Pulses: Normal pulses.      Heart sounds: Normal heart sounds, S1 normal and S2 normal.   Pulmonary:      Effort: Pulmonary effort is normal.      Breath sounds: Normal breath sounds.   Chest:      Comments: Right chest wall  mediport in place with hematoma, slightly improved, but still with significant swelling, does not appear to be infected; bruising noted all the way down to breast  Abdominal:      General: Abdomen is flat. Bowel sounds are normal. There is no distension.      Palpations: Abdomen is soft.      Tenderness: There is no abdominal tenderness.   Musculoskeletal:      Cervical back: Normal range of motion and neck supple.      Right lower leg: No edema.      Left lower leg: No edema.   Skin:     General: Skin is warm and moist.      Capillary Refill: Capillary refill takes less than 2 seconds.      Comments: Left CW mediport removed, site intact   Neurological:      General: No focal deficit present.      Mental Status: She is alert and oriented to person, place, and time.   Psychiatric:         Attention and Perception: Attention normal.         Mood and Affect: Mood normal.         Speech: Speech normal.         Behavior: Behavior normal. Behavior is cooperative.         Cognition and Memory: Cognition normal.         Judgment: Judgment normal.       Lab Visit on 05/12/2025   Component Date Value    Protein, UA 05/12/2025 3+ (A)     WBC 05/12/2025 7.16     RBC 05/12/2025 3.96 (L)     Hgb 05/12/2025 12.5     Hct 05/12/2025 38.2     MCV 05/12/2025 96.5 (H)     MCH 05/12/2025 31.6 (H)     MCHC 05/12/2025 32.7 (L)     RDW 05/12/2025 13.6     Platelet 05/12/2025 363     MPV 05/12/2025 8.1     Neut % 05/12/2025 53.1     Lymph % 05/12/2025 32.7     Mono % 05/12/2025 11.9     Eos % 05/12/2025 2.0     Basophil % 05/12/2025 0.3     Imm Grans % 05/12/2025 0.0     Neut # 05/12/2025 3.81     Lymph # 05/12/2025 2.34     Mono # 05/12/2025 0.85     Eos # 05/12/2025 0.14     Baso # 05/12/2025 0.02     Imm Gran # 05/12/2025 0.00           Assessment:       1. Carcinoma of right fallopian tube    2. Malignant neoplasm metastatic to right lung    3. Secondary malignant neoplasm of liver      Plan:     Patient with left ovarian cancer  diagnosed 12/26/18, appears to be stage IIIC vs. IV with diffuse peritoneal disease, possible splenic involvement, epicardial lymph nodes and bilateral pleural effusions.  Patient seen and evaluated by Dr. Mike Santana at [facility] in [place].   Treatment recommended upfront with chemotherapy Carboplatin/Paclitaxel x 3 cycles.  Completed Cycle 3 on 2/26/19.   She underwent total abdominal hysterectomy, BSO and tumor debulking on 3/18/19 with Dr. Santana with complete gross resection of disease. FIGO Stage IIIC papillary serous fallopian tube cancer.   Patient resumed chemotherapy with cycle 4 on 4/9/19. Completed cycle 6 on 5/21/19.    Testing on tumor was positive for genomic instability but negative for BRCA mutation.  Per NCCN guidelines, maintenance can be considered for BRCA mutated germline category 1 and somatic category 2A. There is some evidence that PARP inhibitors have some activity as well in tumors with genomic instability. But the PARP maintenance is not approved for this indication. Offered treatment to patient and after discussion she declined and made decision to continue with observation only.    PET/CT done for rising CA-125 showed hypermetabolic splenic lesion which is not new, just enlarged from previous. Case discussed with Dr. Mike Santana.  Patient is now s/p splenectomy done 10/21/19. Consistent with splenic involvement with high grade serous carcinoma.   Dr. Santana recommended Niraparib maintenance based on new data showing activity in HRD positive ovarian cancer and patient started on 11/8/19, required a dose reduction due to cytopenias.  Rising CA-125 noted in 11/2020. CT showed new RLL lung mass.   Follow-up PET done 11/20/20 showed RLL lung mass hypermetabolic and hypermetabolic periportal adenopathy.     Recommended 2nd line treatment with Carboplatin/Taxol/Avastin.   Started cycle 1 on 12/8/20. Neulasta added with cycle 2.   Completed Cycle 6 on 3/24/21.  PET from 3/30/21 showed  complete resolution of lung mass and no other disease.    Avastin maintenance started on 4/13/21.   Hospitalized for TIA after her 4th cycle of Avastin. Work-up for stroke and cause otherwise negative. Patient started on baby ASA by neurology.  Discussed there are no clear guidelines for changing/discontinuing treatment for TIA/CVA related to Avastin.  Since her symptoms resolved and there were no residual effects, recommended to continue with Avastin since it is working well for her disease and since a baby ASA was added for prevention. She was also continued on Eliquis for h/o PE.  S/p mediport removal for fracture on 8/26/21.   Continue Avastin through peripheral veins for now.  PET was done in 11/2024 due to rising CA-125 although it had remained WNL. Scan showed lesion near superior portion of liver.  CT-biopsy attempted but was non-diagnostic.     Since CA-125 is not outside of normal limits, and no way to tell how long there liver lesion has been present, since no scan done since 2021.  Recommended to monitor closely.  Repeat PET/CT done 1/24/25 with stable liver lesion in size, increased SUV, no other disease.  Again recommended to continue with current treatment since stable and monitor very closely.   Repeat CT C/A/P from 4/15/25 with slight enlargement of one of the liver lesions. Insurance denied PET and says she has reached her lifetime PET limit.  CA-125 also rising. These lesions remain in a difficult location for biopsy. Will forgo biopsy and proceed with treatment.     Recommended to start on chemotherapy.  Recommend treatment with Carboplatin and Doxil X 6 cycles.  S/p mediport placement with complication of hematoma. May be able to salvage, but need a lot more time.  Recommend PICC placement to go ahead and begin treatment.     ECHO done 4/25/25--EF 65%.  Will order PICC placement.     Plan for labs on 5/19/25 and C1 on 5/20/25.  F/u in 2 weeks labs and toxicity check.      Plan to repeat CT C/A/P  after 3 cycles and if good, continue up to 6 cycles.  Also if good response, plan for maintenance PARP, switch to Lynparza.     Vaccines/Boosters post-splenectomy: Menactra/Menveo every 5 years, Bexsero every 2-3 years, annual flu shot.  Last Bexsero was given 8/1/23. Next due in 2026.  Received Menactra/Menveo in 9/2024, 5 years after initial.     Continue Eliquis 5mg BID(h/o PE in 2019) and ASA.  All questions answered.      Renetta Corral MD        - code applied: patient requires or will require a continuous, longitudinal, and active collaborative plan of care related to this patient's health condition,  metastatic ovarian cancer--the management of which requires the direction of a practitioner with specialized clinical knowledge, skill, and expertise, including monitoring of side effects of high risk medications (chemotherapy).

## 2025-05-16 ENCOUNTER — HOSPITAL ENCOUNTER (OUTPATIENT)
Facility: HOSPITAL | Age: 75
Discharge: HOME | End: 2025-05-16
Attending: INTERNAL MEDICINE | Admitting: INTERNAL MEDICINE
Payer: MEDICARE

## 2025-05-16 VITALS
TEMPERATURE: 98 F | WEIGHT: 114.44 LBS | HEART RATE: 63 BPM | DIASTOLIC BLOOD PRESSURE: 83 MMHG | OXYGEN SATURATION: 98 % | BODY MASS INDEX: 20.27 KG/M2 | RESPIRATION RATE: 18 BRPM | SYSTOLIC BLOOD PRESSURE: 154 MMHG

## 2025-05-16 DIAGNOSIS — C57.01 CARCINOMA OF RIGHT FALLOPIAN TUBE: ICD-10-CM

## 2025-05-16 DIAGNOSIS — C78.01 MALIGNANT NEOPLASM METASTATIC TO RIGHT LUNG: ICD-10-CM

## 2025-05-16 PROCEDURE — 36573 INSJ PICC RS&I 5 YR+: CPT | Mod: LT

## 2025-05-16 PROCEDURE — C1751 CATH, INF, PER/CENT/MIDLINE: HCPCS

## 2025-05-16 PROCEDURE — 36569 INSJ PICC 5 YR+ W/O IMAGING: CPT

## 2025-05-16 RX ORDER — SODIUM CHLORIDE 0.9 % (FLUSH) 0.9 %
10 SYRINGE (ML) INJECTION EVERY 12 HOURS PRN
Status: DISCONTINUED | OUTPATIENT
Start: 2025-05-16 | End: 2025-05-16 | Stop reason: HOSPADM

## 2025-05-16 NOTE — PROCEDURES
Shayna Ledezma is a 74 y.o. female patient.         PICC  Date/Time: 5/16/2025 9:07 AM  Performed by: Sunita Ritter RN  Consent Done: Yes  Time out: Immediately prior to procedure a time out was called to verify the correct patient, procedure, equipment, support staff and site/side marked as required  Indications: vascular access  Anesthesia: local infiltration  Local anesthetic: lidocaine 1% without epinephrine  Anesthetic Total (mL): 5  Preparation: skin prepped with ChloraPrep  Skin prep agent dried: skin prep agent completely dried prior to procedure  Sterile barriers: all five maximum sterile barriers used - cap, mask, sterile gown, sterile gloves, and large sterile sheet  Hand hygiene: hand hygiene performed prior to central venous catheter insertion  Location details: left basilic  Catheter type: double lumen  Catheter size: 5 Fr  Catheter Length: 39cm    Ultrasound guidance: yes  Vessel Caliber: medium and patent, compressibility normal  Needle advanced into vessel with real time Ultrasound guidance.  Guidewire confirmed in vessel.  Sterile sheath used.  Number of attempts: 1  Post-procedure: blood return through all ports, sterile dressing applied and chlorhexidine patch    Assessment: placement verified by x-ray  Complications: none  Comments: Arm circumference 28cm. Patient with hx of left mediport removed. Right chest meiport with hematoma noted. Some difficult threading into SVC. Chest xray pending          Name A Riya CARSON  5/16/2025

## 2025-05-19 ENCOUNTER — TELEPHONE (OUTPATIENT)
Dept: HEMATOLOGY/ONCOLOGY | Facility: CLINIC | Age: 75
End: 2025-05-19
Payer: MEDICARE

## 2025-05-19 ENCOUNTER — LAB VISIT (OUTPATIENT)
Dept: LAB | Facility: HOSPITAL | Age: 75
End: 2025-05-19
Attending: INTERNAL MEDICINE
Payer: MEDICARE

## 2025-05-19 DIAGNOSIS — D70.1 AGRANULOCYTOSIS SECONDARY TO CANCER CHEMOTHERAPY: ICD-10-CM

## 2025-05-19 DIAGNOSIS — T45.1X5A AGRANULOCYTOSIS SECONDARY TO CANCER CHEMOTHERAPY: ICD-10-CM

## 2025-05-19 DIAGNOSIS — I27.82 OTHER CHRONIC PULMONARY EMBOLISM WITHOUT ACUTE COR PULMONALE: ICD-10-CM

## 2025-05-19 DIAGNOSIS — Z79.899 IMMUNODEFICIENCY DUE TO DRUGS: ICD-10-CM

## 2025-05-19 DIAGNOSIS — C57.01 CARCINOMA OF RIGHT FALLOPIAN TUBE: ICD-10-CM

## 2025-05-19 DIAGNOSIS — D84.821 IMMUNODEFICIENCY DUE TO DRUGS: ICD-10-CM

## 2025-05-19 DIAGNOSIS — C78.01 MALIGNANT NEOPLASM METASTATIC TO RIGHT LUNG: ICD-10-CM

## 2025-05-19 DIAGNOSIS — C78.7 SECONDARY MALIGNANT NEOPLASM OF LIVER: ICD-10-CM

## 2025-05-19 LAB
ALBUMIN SERPL-MCNC: 3.3 G/DL (ref 3.4–4.8)
ALBUMIN/GLOB SERPL: 1.1 RATIO (ref 1.1–2)
ALP SERPL-CCNC: 43 UNIT/L (ref 40–150)
ALT SERPL-CCNC: 18 UNIT/L (ref 0–55)
ANION GAP SERPL CALC-SCNC: 7 MEQ/L
AST SERPL-CCNC: 37 UNIT/L (ref 11–45)
BASOPHILS # BLD AUTO: 0.04 X10(3)/MCL
BASOPHILS NFR BLD AUTO: 0.6 %
BILIRUB SERPL-MCNC: 1 MG/DL
BUN SERPL-MCNC: 18 MG/DL (ref 9.8–20.1)
CALCIUM SERPL-MCNC: 9.3 MG/DL (ref 8.4–10.2)
CANCER AG125 SERPL-ACNC: 43.2 UNIT/ML (ref 0–35)
CHLORIDE SERPL-SCNC: 101 MMOL/L (ref 98–107)
CO2 SERPL-SCNC: 27 MMOL/L (ref 23–31)
CREAT SERPL-MCNC: 0.66 MG/DL (ref 0.55–1.02)
CREAT/UREA NIT SERPL: 27
EOSINOPHIL # BLD AUTO: 0.22 X10(3)/MCL (ref 0–0.9)
EOSINOPHIL NFR BLD AUTO: 3.4 %
ERYTHROCYTE [DISTWIDTH] IN BLOOD BY AUTOMATED COUNT: 13.5 % (ref 11.5–17)
GFR SERPLBLD CREATININE-BSD FMLA CKD-EPI: >60 ML/MIN/1.73/M2
GLOBULIN SER-MCNC: 3.1 GM/DL (ref 2.4–3.5)
GLUCOSE SERPL-MCNC: 67 MG/DL (ref 82–115)
HCT VFR BLD AUTO: 39.8 % (ref 37–47)
HGB BLD-MCNC: 12.9 G/DL (ref 12–16)
IMM GRANULOCYTES # BLD AUTO: 0 X10(3)/MCL (ref 0–0.04)
IMM GRANULOCYTES NFR BLD AUTO: 0 %
LYMPHOCYTES # BLD AUTO: 2.04 X10(3)/MCL (ref 0.6–4.6)
LYMPHOCYTES NFR BLD AUTO: 31.8 %
MCH RBC QN AUTO: 31.2 PG (ref 27–31)
MCHC RBC AUTO-ENTMCNC: 32.4 G/DL (ref 33–36)
MCV RBC AUTO: 96.4 FL (ref 80–94)
MONOCYTES # BLD AUTO: 0.98 X10(3)/MCL (ref 0.1–1.3)
MONOCYTES NFR BLD AUTO: 15.3 %
NEUTROPHILS # BLD AUTO: 3.14 X10(3)/MCL (ref 2.1–9.2)
NEUTROPHILS NFR BLD AUTO: 48.9 %
PLATELET # BLD AUTO: 325 X10(3)/MCL (ref 130–400)
PMV BLD AUTO: 8.2 FL (ref 7.4–10.4)
POTASSIUM SERPL-SCNC: 4.6 MMOL/L (ref 3.5–5.1)
PROT SERPL-MCNC: 6.4 GM/DL (ref 5.8–7.6)
PROT UR QL STRIP: ABNORMAL
RBC # BLD AUTO: 4.13 X10(6)/MCL (ref 4.2–5.4)
SODIUM SERPL-SCNC: 135 MMOL/L (ref 136–145)
WBC # BLD AUTO: 6.42 X10(3)/MCL (ref 4.5–11.5)

## 2025-05-19 PROCEDURE — 86304 IMMUNOASSAY TUMOR CA 125: CPT

## 2025-05-19 PROCEDURE — 85025 COMPLETE CBC W/AUTO DIFF WBC: CPT

## 2025-05-19 PROCEDURE — 36415 COLL VENOUS BLD VENIPUNCTURE: CPT

## 2025-05-19 PROCEDURE — 81005 URINALYSIS: CPT

## 2025-05-19 PROCEDURE — 80053 COMPREHEN METABOLIC PANEL: CPT

## 2025-05-19 NOTE — PROGRESS NOTES
Subjective:       Patient ID: Shayna Ledezma is a 74 y.o. female.  GI: Dr. Gamaliel Carlos  GYN ONC at [facility]: Dr. Mike Santana    1. 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 Pulmonary Embolism (Incidental on CT)--19    Procedure/pathology:   1. Paracentesis with removal of 5L of fluid done 18--serous carcinoma, high grade, c/w ovarian primary, PAX-8, CK7 and MOC-31 positive, CDX-2, CK-20 and Calretinin-negative.  2. S/p Exploratory Lap, radical tumor debulking including total abdominal hysterectomy, bilateral salpingo-oophorectomy, bilateral pelvic lymph node sampling, appendectomy, mobilization of the left ureter, complete gross resection of the disease with removal of mesenteric disease, as well as omental disese and parasplenic disease by Dr. Santana 3/18/19--Metastatic high grade serous carcinoma. Tissue/organ involvement: right ovary, appendectomy, omentum, mesentery (including small bowel mesentery) and multiple other sites designated: periumbilical, perisplenic, transverse colon, splenic flexure, perirectal, left periureteral. 2 lymph nodes negative. [id number]. FIGO stage IIIC. Myriad somatic instability testing positive for genomic instability, negative BRCA1 and BRCA2 tumor testing.  3. Splenectomy done 10/21/19--splenic involvement with metastatic high grade serous carcinoma, 5 benign lymph nodes. Caris testing showed LAM, NTRK1/2/3 negative, TMB low, BRCA1/2 negative, ER/NC negative, CATHY negative, SPEN pathogenic variant Exon 11, TP53 pathogenic variant Exon 5, PD-L1 positive CPS 15, Genomic ELODIA high.  4. CT-biopsy done 24--non-diagnostic, no malignancy cells present.   Imagin. CT A/P w/ and w/o contrast done at [facility] 18--large volume ascites with multiple peritoneal implants, right pericolic gutter implant measures 2.5X3.3cm, LUQ omental/mesenteric implant measures 3X3.6cm, omental carcinomatosis, extensive  enhancing soft tissue surrounding sigmoid colon vs. large sigmoid mass, left ovarian cystic lesion appears to have some internal septations measures 3.5X3.8cm, enhancing periportal soft tissue density likely lymphadenopathy with some narrowing of the portal vein noted, but without internal filling defect, borderline splenomegaly, subtle solid lesion w/n central spleen measures 2.4X2.5cm, likely metastatic, with peripheral area of diminished enhancement suggesting splenic infarct, soft tissue implant abutting gallbladder measures 2X2.2cm, no right adnexal mass, trace bilateral layering pleural fluid, small moderate-sized hiatal hernia, few borderline enlarged lymph nodes w/n right epicardial fat pad.  2. CT of chest on 1/11/19--Some prominent right cardiophrenic lymph nodes are nonspecific and can be followed on future surveillance scans. Otherwise no CT evidence of metastatic disease to the chest. While exam was not tailored for evaluation of the pulmonary arteries I suspect there is pulmonary thromboembolic disease. Peritoneal carcinomatosis seen in the upper abdomen. Critical Result: Pulmonary Embolus.  3. CT C/A/P 3/4/19--Positive response to therapy. No new or progressive metastatic disease in the chest, abdomen or pelvis. Stable to improved findings include smaller pericardial lymph nodes, andrew hepatis adenopathy, peritoneal carcinomatosis, smaller left adnexal lesion; no evidence of PE within limitations of the study.  5. CT PE protocol chest/A/P 6/11/19--No PE, improved pericardial lymph node with minimal size on current examination, improved peritoneal carcinomatosis and left adnexal implant, splenic ischemia evolved into small infarct, size improvement of one of splenic hypodense lesion and mild size increase of second hypodense lesion, favored to be benign/cystic, no new findings.  6. PET/CT 9/25/19--s/p hysterectomy and bilateral oophorectomy, no evidence for locally recurrent/residual disease, intensely  hypermetabolic hypodense lesion within the spleen 3.5X3.3cm concerning for metastatic focus. No other abnormal focus of disease.  7. CT C/A/P 11/13/20--Right lower lobe 3.3 cm mass is presumed metastatic, otherwise no evident residual, recurrent or metastatic disease.   8. PET/CT 11/20/20--Hypermetabolic right lung base lesion and suspected metastatic periportal adenopathy, no additional sites of FDG-avid otherwise aggressive appearing pathology through the neck, chest, abdomen, or pelvis.  9. PET/CT 1/21/21--Interval decrease in size of the right lower lobe mass with decreased FDG uptake, now measures 1.7 x 2.2 cm (previous 3.3 x 3.1 cm), resolution of FDG uptake in the suspected periportal lymph node which is not identifiable on noncontrast CT. No evidence of new or progressive hypermetabolic metastatic disease.  10. PET/CT 3/30/21--Resolved right lower lung lobe hypermetabolic mass. Otherwise, no interval change or evidence of residual disease, recurrence or new metastatic lesion.  11. PET/CT 11/21/24--new FDG uptake in right hepatic lobe concerning for malignancy, measures 3.5cm with SUV 9.5, could represent a peritoneal implant vs. Hepatic metastasis.   12. PET/CT 1/24/25--persistent right hepatic lobe lesion measures 3.6cm, stable, with increasing FDG uptake SUV 14.6, previous 9.5.  13. CT C/A/P done 4/15/25--along periphery of right hepatic lobe, 2 lesions are present, larger of 2 at the padded dome measures 2.6X3.3cm (previous 2.7X2.4cm) is larger, more cystic appearing lesion along contour of right hepatic lobe measures 1.9X1cm, unchanged, no new lesion, chronic compression deformity of L1, similar since 1/24/25. No new suspicious bone lesion.     Procedures:   1. Paracentesis on 12/28/18 with removal of 5 Liters.  2. Paracentesis on 01/07/19 with removal of 3.5 Liters.  3. Paracentesis on 02/06/19 with removal of 2.5 Liters.  4. Mediport placed 12/2020 by Dr. Serge Gentile--removed 8/26/21 due to mediport  fracture.  5. IJ mediport placed 5/1/25 by Dr. Hwang.     Germline genetic testing done by [employer] 1/29/19--negative for BRCA1/2, two (2) variants of uncertain significance (VUS): CATHY p.[id number]/p.[id number] and MLH1 p.P603L.     :  12/19/18 1102  11/10/20  42.9  12/08/20 79.5  01/05/21 28.9  01/26/21 12.8  02/18/21 11.6  03/15/22--10.3  04/26/22--9.6  05/16/22--10.2  06/07/22--10.1  07/19/22--11.6  08/08/22--12.5  09/19/22--12.7  10/31/22--13.5  01/03/23--12.0  02/13/23--14.0  03/03/23--13.9  04/17/23--14.5  05/26/23--14.6  07/31/23--13.5  11/13/23--15.6  12/04/23--16.8  01/16/24--13.7  02/26/24--18.6  03/18/24--16.3  04/29/24--17.0  06/10/24--17.0  08/12/24--17.3  09/23/24--18.3  10/14/24--18.6  11/25/24--19.1  01/06/25--24.3  03/10/25--29.4  03/31/25--38.7  05/05/25--63.2    ECHO:  4/25/25 EF 65%.    Treatment History:  1. Neoadjuvant Carboplatin/Taxol every 3 weeks x 3 cycles. 1/15/19 - 2/26/19. Neulasta added.   2. Surgery--tumor debulking KORI/BSO 3/18/19.  3. Adjuvant Carboplatin/Taxol x 3 additions cycles 4/9/19--5/21/19.  4. Splenectomy 10/21/19.  5. Niraparib maintenance (dose reduction to 100mg daily for cytopenias) 11/8/19--12/1/20 (stopped due to progression).  6. Carboplatin/Paclitaxel/Avastin X 6 cycles completed 12/8/21--3/24/21 (complete response).  7. Avastin maintenance 4/13/21--4/21/25 (stopped due to progression).    Current Treatment:  1. Eliquis BID for incidental PE on imaging 1/2019.   2. Carboplatin/Doxil X 6 cycles. Started on 5/21/25. Delay due to mediport hematoma.   Cycle 2 due on 6/17/25.   3. Lynparza maintenance planned    CC:  Chief Complaint   Patient presents with    Fatigue     HPI   Patient is here for treatment visit for her ovarian cancer. Treatment was delayed due to complications with her mediport, she developed a hematoma. She did see Dr. Hwang and he thinks we may still be able to salvage the mediport, but will take a lot more time. PICC line inserted on 5/16/25  in the interim. Started her 1st cycle of treatment last week. She tolerated very well. Her only complaint is fatigue. She will see Dr. Hwang again later this week to evaluate. She is requesting to change treatment to Tuesdays due to her work schedule. No other problems reported.      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      alendronate (FOSAMAX) 70 MG tablet Take 1 tablet (70 mg total) by mouth every 7 days. 4 tablet 11    aspirin (ECOTRIN) 81 MG EC tablet Take 81 mg by mouth once daily.      atorvastatin (LIPITOR) 40 MG tablet Take 1 tablet (40 mg total) by mouth once daily. 90 tablet 3    b complex vitamins capsule Take 1 capsule by mouth once daily.      calcium citrate-vitamin D3 315-200 mg (CITRACAL+D) 315 mg-5 mcg (200 unit) per tablet Take 1 tablet by mouth 2 (two) times daily.      dexAMETHasone (DECADRON) 4 MG Tab Take 2 tablets (8 mg total) by mouth once daily. on days 2-4 of each chemotherapy cycle. 6 tablet 5    ELIQUIS 5 mg Tab TAKE 1 TABLET BY MOUTH TWICE DAILY 180 tablet 0    GLUTAMINE ORAL Take 10 g by mouth Daily.      lisinopriL (PRINIVIL,ZESTRIL) 20 MG tablet TAKE 1 TABLET(20 MG) BY MOUTH DAILY 30 tablet 3    prochlorperazine (COMPAZINE) 5 MG tablet Take 1 tablet  (5 mg total) by mouth every 6 (six) hours as needed for Nausea. 20 tablet 5    pyridoxine, vitamin B6, (B-6) 250 MG Tab Take 250 mg by mouth once daily.      OLANZapine (ZYPREXA) 5 MG tablet Take 1 tablet (5 mg total) by mouth every evening. on days 1-4 of each chemotherapy cycle. (Patient not taking: Reported on 5/12/2025) 4 tablet 5    sodium chloride 0.9% SolP 100 mL with bevacizumab 25 mg/mL Soln Inject into the vein every 21 days. (Patient not taking: Reported on 5/12/2025)       No current facility-administered medications on file prior to visit.      Review of Systems   Constitutional:  Positive for fatigue. Negative for appetite change and unexpected weight change.   HENT:  Negative for mouth sores.    Eyes:  Negative for visual disturbance.   Respiratory:  Negative for cough and shortness of breath.    Cardiovascular:  Negative for chest pain.   Gastrointestinal:  Negative for abdominal pain and diarrhea.   Genitourinary:  Negative for frequency.   Musculoskeletal:  Negative for back pain.   Integumentary:  Negative for rash.   Neurological:  Negative for headaches.   Hematological:  Negative for adenopathy.   Psychiatric/Behavioral:  The patient is not nervous/anxious.         Vitals:    05/26/25 0847   BP: (!) 163/89   Pulse: 61   Resp: 14   Temp: 97.8 °F (36.6 °C)     Wt Readings from Last 3 Encounters:   05/26/25 0847 52.5 kg (115 lb 12.8 oz)   05/21/25 0800 51.9 kg (114 lb 6.7 oz)   05/16/25 1000 51.9 kg (114 lb 6.7 oz)     Physical Exam  Vitals reviewed.   Constitutional:       Appearance: Normal appearance. She is normal weight.   HENT:      Head: Normocephalic.   Eyes:      General: Lids are normal. Vision grossly intact.      Extraocular Movements: Extraocular movements intact.      Conjunctiva/sclera: Conjunctivae normal.   Cardiovascular:      Rate and Rhythm: Normal rate and regular rhythm.      Pulses: Normal pulses.      Heart sounds: Normal heart sounds, S1 normal and S2 normal.   Pulmonary:       Effort: Pulmonary effort is normal.      Breath sounds: Normal breath sounds.   Chest:      Comments: Right chest wall mediport in place with hematoma, improved, but still with significant swelling, does not appear to be infected; bruising has improved. Stitches in place, superior.   Abdominal:      General: Abdomen is flat. Bowel sounds are normal. There is no distension.      Palpations: Abdomen is soft.      Tenderness: There is no abdominal tenderness.   Musculoskeletal:      Cervical back: Normal range of motion and neck supple.      Right lower leg: No edema.      Left lower leg: No edema.   Skin:     General: Skin is warm and moist.      Capillary Refill: Capillary refill takes less than 2 seconds.      Comments: Left arm PICC line present, site intact.    Neurological:      General: No focal deficit present.      Mental Status: She is alert and oriented to person, place, and time.   Psychiatric:         Attention and Perception: Attention normal.         Mood and Affect: Mood normal.         Speech: Speech normal.         Behavior: Behavior normal. Behavior is cooperative.         Cognition and Memory: Cognition normal.         Judgment: Judgment normal.       Lab Visit on 05/26/2025   Component Date Value    WBC 05/26/2025 8.24     RBC 05/26/2025 4.05 (L)     Hgb 05/26/2025 12.7     Hct 05/26/2025 38.9     MCV 05/26/2025 96.0 (H)     MCH 05/26/2025 31.4 (H)     MCHC 05/26/2025 32.6 (L)     RDW 05/26/2025 13.5     Platelet 05/26/2025 335     MPV 05/26/2025 8.4     Neut % 05/26/2025 63.5     Lymph % 05/26/2025 26.2     Mono % 05/26/2025 10.1     Eos % 05/26/2025 0.0     Basophil % 05/26/2025 0.1     Imm Grans % 05/26/2025 0.1     Neut # 05/26/2025 5.23     Lymph # 05/26/2025 2.16     Mono # 05/26/2025 0.83     Eos # 05/26/2025 0.00     Baso # 05/26/2025 0.01     Imm Gran # 05/26/2025 0.01           Assessment:       1. Carcinoma of right fallopian tube    2. Immunodeficiency due to drugs    3. Malignant  neoplasm metastatic to right lung    4. 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 Secondary malignant neoplasm of liver      Plan:     Patient with left ovarian cancer diagnosed 12/26/18, appears to be stage IIIC vs. IV with diffuse peritoneal disease, possible splenic involvement, epicardial lymph nodes and bilateral pleural effusions.  Patient seen and evaluated by Dr. Mike Santana at [facility] in [place].   Treatment recommended upfront with chemotherapy Carboplatin/Paclitaxel x 3 cycles.  Completed Cycle 3 on 2/26/19.   She underwent total abdominal hysterectomy, BSO and tumor debulking on 3/18/19 with Dr. Santana with complete gross resection of disease. FIGO Stage IIIC papillary serous fallopian tube cancer.   Patient resumed chemotherapy with cycle 4 on 4/9/19. Completed cycle 6 on 5/21/19.    Testing on tumor was positive for genomic instability but negative for BRCA mutation.  Per NCCN guidelines, maintenance can be considered for BRCA mutated germline category 1 and somatic category 2A. There is some evidence that PARP inhibitors have some activity as well in tumors with genomic instability. But the PARP maintenance is not approved for this indication. Offered treatment to patient and after discussion she declined and made decision to continue with observation only.    PET/CT done for rising CA-125 showed hypermetabolic splenic lesion which is not new, just enlarged from previous. Case discussed with Dr. Mike Santana.  Patient is now s/p splenectomy done 10/21/19. Consistent with splenic involvement with high grade serous carcinoma.   Dr. Santana recommended Niraparib maintenance based on new data showing activity in HRD positive ovarian cancer and patient started on 11/8/19, required a dose reduction due to cytopenias.  Rising CA-125 noted in 11/2020. CT showed new RLL lung mass.   Follow-up PET done 11/20/20 showed RLL lung mass hypermetabolic and hypermetabolic periportal adenopathy.      Recommended 2nd line treatment with Carboplatin/Taxol/Avastin.   Started cycle 1 on 12/8/20. Neulasta added with cycle 2.   Completed Cycle 6 on 3/24/21.  PET from 3/30/21 showed complete resolution of lung mass and no other disease.    Avastin maintenance started on 4/13/21.   Hospitalized for TIA after her 4th cycle of Avastin. Work-up for stroke and cause otherwise negative. Patient started on baby ASA by neurology.  Discussed there are no clear guidelines for changing/discontinuing treatment for TIA/CVA related to Avastin.  Since her symptoms resolved and there were no residual effects, recommended to continue with Avastin since it is working well for her disease and since a baby ASA was added for prevention. She was also continued on Eliquis for h/o PE.  S/p mediport removal for fracture on 8/26/21.   Continue Avastin through peripheral veins for now.  PET was done in 11/2024 due to rising CA-125 although it had remained WNL. Scan showed lesion near superior portion of liver.  CT-biopsy attempted but was non-diagnostic.     Since CA-125 is not outside of normal limits, and no way to tell how long there liver lesion has been present, since no scan done since 2021.  Recommended to monitor closely.  Repeat PET/CT done 1/24/25 with stable liver lesion in size, increased SUV, no other disease.  Again recommended to continue with current treatment since stable and monitor very closely.     Repeat CT C/A/P from 4/15/25 with slight enlargement of one of the liver lesions. Insurance denied PET and says she has reached her lifetime PET limit.  CA-125 also rising. These lesions remain in a difficult location for biopsy. Will forgo biopsy and proceed with treatment.   Recommend treatment with Carboplatin and Doxil X 6 cycles.  S/p mediport placement with complication of hematoma. May be able to salvage, but need a lot more time.  Recommend PICC placement to go ahead and begin treatment. PICC placed on 5/16/25.    Started Cycle 1 on 5/21/25.   Tolerated treatment well.   CBC today good. CMP pending.   Continue weekly labs and PICC line care.   Cycle 2 due on 6/17/25 - requesting to move to Tuesdays which is fine.   Follow-up in 4 weeks with labs.   Plan to repeat CT C/A/P after 3 cycles and if good, continue up to 6 cycles.  Also if good response, plan for maintenance PARP, switch to Lynparza.    ECHO done 4/25/25--EF 65%.  Vaccines/Boosters post-splenectomy: Menactra/Menveo every 5 years, Bexsero every 2-3 years, annual flu shot.  Last Bexsero was given 8/1/23. Next due in 2026.  Received Menactra/Menveo in 9/2024, 5 years after initial.   Continue Eliquis 5mg BID(h/o PE in 2019) and ASA.  All questions answered.      [facility]        - code applied: patient requires or will require a continuous, longitudinal, and active collaborative plan of care related to this patient's health condition, metastatic ovarian cancer--the management of which requires the direction of a practitioner with specialized clinical knowledge, skill, and expertise, including monitoring of side effects of high risk medications (chemotherapy).

## 2025-05-19 NOTE — TELEPHONE ENCOUNTER
Contacted patient today for navigation follow up. She has had her PICC line placed and starts treatment Wednesday. She has follow up with Dr. Hwang to have her port rechecked. She has no questions, concerns, or needs at this time. She is aware of how to reach navigation should she need to.

## 2025-05-20 RX ORDER — PROCHLORPERAZINE EDISYLATE 5 MG/ML
5 INJECTION INTRAMUSCULAR; INTRAVENOUS ONCE AS NEEDED
Status: CANCELLED | OUTPATIENT
Start: 2025-05-21

## 2025-05-20 RX ORDER — HEPARIN 100 UNIT/ML
500 SYRINGE INTRAVENOUS
Status: CANCELLED | OUTPATIENT
Start: 2025-05-21

## 2025-05-20 RX ORDER — EPINEPHRINE 0.3 MG/.3ML
0.3 INJECTION SUBCUTANEOUS ONCE AS NEEDED
Status: CANCELLED | OUTPATIENT
Start: 2025-05-21

## 2025-05-20 RX ORDER — DIPHENHYDRAMINE HYDROCHLORIDE 50 MG/ML
50 INJECTION, SOLUTION INTRAMUSCULAR; INTRAVENOUS ONCE AS NEEDED
Status: CANCELLED | OUTPATIENT
Start: 2025-05-21

## 2025-05-20 RX ORDER — SODIUM CHLORIDE 0.9 % (FLUSH) 0.9 %
10 SYRINGE (ML) INJECTION
Status: CANCELLED | OUTPATIENT
Start: 2025-05-21

## 2025-05-21 ENCOUNTER — INFUSION (OUTPATIENT)
Dept: INFUSION THERAPY | Facility: HOSPITAL | Age: 75
End: 2025-05-21
Attending: INTERNAL MEDICINE
Payer: MEDICARE

## 2025-05-21 VITALS
DIASTOLIC BLOOD PRESSURE: 83 MMHG | HEIGHT: 63 IN | WEIGHT: 114.44 LBS | RESPIRATION RATE: 18 BRPM | SYSTOLIC BLOOD PRESSURE: 175 MMHG | TEMPERATURE: 98 F | HEART RATE: 65 BPM | BODY MASS INDEX: 20.28 KG/M2

## 2025-05-21 DIAGNOSIS — C57.01 CARCINOMA OF RIGHT FALLOPIAN TUBE: Primary | ICD-10-CM

## 2025-05-21 PROCEDURE — 63600175 PHARM REV CODE 636 W HCPCS: Mod: JZ,TB | Performed by: INTERNAL MEDICINE

## 2025-05-21 PROCEDURE — 96413 CHEMO IV INFUSION 1 HR: CPT

## 2025-05-21 PROCEDURE — 25000003 PHARM REV CODE 250: Performed by: INTERNAL MEDICINE

## 2025-05-21 PROCEDURE — 96375 TX/PRO/DX INJ NEW DRUG ADDON: CPT

## 2025-05-21 PROCEDURE — 96367 TX/PROPH/DG ADDL SEQ IV INF: CPT

## 2025-05-21 PROCEDURE — 96417 CHEMO IV INFUS EACH ADDL SEQ: CPT

## 2025-05-21 RX ORDER — EPINEPHRINE 0.3 MG/.3ML
0.3 INJECTION SUBCUTANEOUS ONCE AS NEEDED
Status: DISCONTINUED | OUTPATIENT
Start: 2025-05-21 | End: 2025-05-21 | Stop reason: HOSPADM

## 2025-05-21 RX ORDER — SODIUM CHLORIDE 0.9 % (FLUSH) 0.9 %
10 SYRINGE (ML) INJECTION
Status: DISCONTINUED | OUTPATIENT
Start: 2025-05-21 | End: 2025-05-21 | Stop reason: HOSPADM

## 2025-05-21 RX ORDER — DIPHENHYDRAMINE HYDROCHLORIDE 50 MG/ML
50 INJECTION, SOLUTION INTRAMUSCULAR; INTRAVENOUS ONCE AS NEEDED
Status: DISCONTINUED | OUTPATIENT
Start: 2025-05-21 | End: 2025-05-21 | Stop reason: HOSPADM

## 2025-05-21 RX ORDER — PROCHLORPERAZINE EDISYLATE 5 MG/ML
5 INJECTION INTRAMUSCULAR; INTRAVENOUS ONCE AS NEEDED
Status: DISCONTINUED | OUTPATIENT
Start: 2025-05-21 | End: 2025-05-21 | Stop reason: HOSPADM

## 2025-05-21 RX ORDER — HEPARIN 100 UNIT/ML
500 SYRINGE INTRAVENOUS
Status: DISCONTINUED | OUTPATIENT
Start: 2025-05-21 | End: 2025-05-21 | Stop reason: HOSPADM

## 2025-05-21 RX ADMIN — SODIUM CHLORIDE 0.25 MG: 9 INJECTION, SOLUTION INTRAVENOUS at 08:05

## 2025-05-21 RX ADMIN — CARBOPLATIN 350 MG: 10 INJECTION, SOLUTION INTRAVENOUS at 10:05

## 2025-05-21 RX ADMIN — DOXORUBICIN HYDROCHLORIDE 46 MG: 2 INJECTION, SUSPENSION, LIPOSOMAL INTRAVENOUS at 09:05

## 2025-05-21 RX ADMIN — SODIUM CHLORIDE 150 MG: 900 INJECTION, SOLUTION INTRAVENOUS at 08:05

## 2025-05-26 ENCOUNTER — OFFICE VISIT (OUTPATIENT)
Dept: HEMATOLOGY/ONCOLOGY | Facility: CLINIC | Age: 75
End: 2025-05-26
Payer: MEDICARE

## 2025-05-26 ENCOUNTER — LAB VISIT (OUTPATIENT)
Dept: LAB | Facility: HOSPITAL | Age: 75
End: 2025-05-26
Attending: NURSE PRACTITIONER
Payer: MEDICARE

## 2025-05-26 VITALS
HEIGHT: 63 IN | RESPIRATION RATE: 14 BRPM | WEIGHT: 115.81 LBS | SYSTOLIC BLOOD PRESSURE: 163 MMHG | DIASTOLIC BLOOD PRESSURE: 89 MMHG | HEART RATE: 61 BPM | OXYGEN SATURATION: 99 % | BODY MASS INDEX: 20.52 KG/M2 | TEMPERATURE: 98 F

## 2025-05-26 DIAGNOSIS — C78.01 MALIGNANT NEOPLASM METASTATIC TO RIGHT LUNG: ICD-10-CM

## 2025-05-26 DIAGNOSIS — C57.01 CARCINOMA OF RIGHT FALLOPIAN TUBE: Primary | ICD-10-CM

## 2025-05-26 DIAGNOSIS — C78.7 SECONDARY MALIGNANT NEOPLASM OF LIVER: ICD-10-CM

## 2025-05-26 DIAGNOSIS — T45.1X5A AGRANULOCYTOSIS SECONDARY TO CANCER CHEMOTHERAPY: ICD-10-CM

## 2025-05-26 DIAGNOSIS — I27.82 OTHER CHRONIC PULMONARY EMBOLISM WITHOUT ACUTE COR PULMONALE: ICD-10-CM

## 2025-05-26 DIAGNOSIS — D70.1 AGRANULOCYTOSIS SECONDARY TO CANCER CHEMOTHERAPY: ICD-10-CM

## 2025-05-26 DIAGNOSIS — Z79.899 IMMUNODEFICIENCY DUE TO DRUGS: ICD-10-CM

## 2025-05-26 DIAGNOSIS — C57.01 CARCINOMA OF RIGHT FALLOPIAN TUBE: ICD-10-CM

## 2025-05-26 DIAGNOSIS — D84.821 IMMUNODEFICIENCY DUE TO DRUGS: ICD-10-CM

## 2025-05-26 LAB
ALBUMIN SERPL-MCNC: 3.4 G/DL (ref 3.4–4.8)
ALBUMIN/GLOB SERPL: 1.1 RATIO (ref 1.1–2)
ALP SERPL-CCNC: 46 UNIT/L (ref 40–150)
ALT SERPL-CCNC: 23 UNIT/L (ref 0–55)
ANION GAP SERPL CALC-SCNC: 7 MEQ/L
AST SERPL-CCNC: 34 UNIT/L (ref 11–45)
BASOPHILS # BLD AUTO: 0.01 X10(3)/MCL
BASOPHILS NFR BLD AUTO: 0.1 %
BILIRUB SERPL-MCNC: 1.2 MG/DL
BUN SERPL-MCNC: 28.5 MG/DL (ref 9.8–20.1)
CALCIUM SERPL-MCNC: 8.4 MG/DL (ref 8.4–10.2)
CHLORIDE SERPL-SCNC: 101 MMOL/L (ref 98–107)
CO2 SERPL-SCNC: 25 MMOL/L (ref 23–31)
CREAT SERPL-MCNC: 0.72 MG/DL (ref 0.55–1.02)
CREAT/UREA NIT SERPL: 40
EOSINOPHIL # BLD AUTO: 0 X10(3)/MCL (ref 0–0.9)
EOSINOPHIL NFR BLD AUTO: 0 %
ERYTHROCYTE [DISTWIDTH] IN BLOOD BY AUTOMATED COUNT: 13.5 % (ref 11.5–17)
GFR SERPLBLD CREATININE-BSD FMLA CKD-EPI: >60 ML/MIN/1.73/M2
GLOBULIN SER-MCNC: 3 GM/DL (ref 2.4–3.5)
GLUCOSE SERPL-MCNC: 90 MG/DL (ref 82–115)
HCT VFR BLD AUTO: 38.9 % (ref 37–47)
HGB BLD-MCNC: 12.7 G/DL (ref 12–16)
IMM GRANULOCYTES # BLD AUTO: 0.01 X10(3)/MCL (ref 0–0.04)
IMM GRANULOCYTES NFR BLD AUTO: 0.1 %
LYMPHOCYTES # BLD AUTO: 2.16 X10(3)/MCL (ref 0.6–4.6)
LYMPHOCYTES NFR BLD AUTO: 26.2 %
MCH RBC QN AUTO: 31.4 PG (ref 27–31)
MCHC RBC AUTO-ENTMCNC: 32.6 G/DL (ref 33–36)
MCV RBC AUTO: 96 FL (ref 80–94)
MONOCYTES # BLD AUTO: 0.83 X10(3)/MCL (ref 0.1–1.3)
MONOCYTES NFR BLD AUTO: 10.1 %
NEUTROPHILS # BLD AUTO: 5.23 X10(3)/MCL (ref 2.1–9.2)
NEUTROPHILS NFR BLD AUTO: 63.5 %
PLATELET # BLD AUTO: 335 X10(3)/MCL (ref 130–400)
PMV BLD AUTO: 8.4 FL (ref 7.4–10.4)
POTASSIUM SERPL-SCNC: 4 MMOL/L (ref 3.5–5.1)
PROT SERPL-MCNC: 6.4 GM/DL (ref 5.8–7.6)
RBC # BLD AUTO: 4.05 X10(6)/MCL (ref 4.2–5.4)
SODIUM SERPL-SCNC: 133 MMOL/L (ref 136–145)
WBC # BLD AUTO: 8.24 X10(3)/MCL (ref 4.5–11.5)

## 2025-05-26 PROCEDURE — 99215 OFFICE O/P EST HI 40 MIN: CPT | Mod: S$GLB,,, | Performed by: NURSE PRACTITIONER

## 2025-05-26 PROCEDURE — 36415 COLL VENOUS BLD VENIPUNCTURE: CPT

## 2025-05-26 PROCEDURE — 1159F MED LIST DOCD IN RCRD: CPT | Mod: CPTII,S$GLB,, | Performed by: NURSE PRACTITIONER

## 2025-05-26 PROCEDURE — 85025 COMPLETE CBC W/AUTO DIFF WBC: CPT

## 2025-05-26 PROCEDURE — 3077F SYST BP >= 140 MM HG: CPT | Mod: CPTII,S$GLB,, | Performed by: NURSE PRACTITIONER

## 2025-05-26 PROCEDURE — G2211 COMPLEX E/M VISIT ADD ON: HCPCS | Mod: S$GLB,,, | Performed by: NURSE PRACTITIONER

## 2025-05-26 PROCEDURE — 1160F RVW MEDS BY RX/DR IN RCRD: CPT | Mod: CPTII,S$GLB,, | Performed by: NURSE PRACTITIONER

## 2025-05-26 PROCEDURE — 1101F PT FALLS ASSESS-DOCD LE1/YR: CPT | Mod: CPTII,S$GLB,, | Performed by: NURSE PRACTITIONER

## 2025-05-26 PROCEDURE — 3288F FALL RISK ASSESSMENT DOCD: CPT | Mod: CPTII,S$GLB,, | Performed by: NURSE PRACTITIONER

## 2025-05-26 PROCEDURE — 3079F DIAST BP 80-89 MM HG: CPT | Mod: CPTII,S$GLB,, | Performed by: NURSE PRACTITIONER

## 2025-05-26 PROCEDURE — 1126F AMNT PAIN NOTED NONE PRSNT: CPT | Mod: CPTII,S$GLB,, | Performed by: NURSE PRACTITIONER

## 2025-05-26 PROCEDURE — 99999 PR PBB SHADOW E&M-EST. PATIENT-LVL IV: CPT | Mod: PBBFAC,,, | Performed by: NURSE PRACTITIONER

## 2025-05-26 PROCEDURE — 80053 COMPREHEN METABOLIC PANEL: CPT

## 2025-05-28 ENCOUNTER — INFUSION (OUTPATIENT)
Dept: INFUSION THERAPY | Facility: HOSPITAL | Age: 75
End: 2025-05-28
Attending: INTERNAL MEDICINE
Payer: MEDICARE

## 2025-05-28 ENCOUNTER — OFFICE VISIT (OUTPATIENT)
Dept: VASCULAR SURGERY | Facility: CLINIC | Age: 75
End: 2025-05-28
Payer: MEDICARE

## 2025-05-28 VITALS
BODY MASS INDEX: 20.02 KG/M2 | WEIGHT: 113 LBS | HEIGHT: 63 IN | DIASTOLIC BLOOD PRESSURE: 77 MMHG | HEART RATE: 77 BPM | SYSTOLIC BLOOD PRESSURE: 130 MMHG

## 2025-05-28 VITALS
TEMPERATURE: 98 F | RESPIRATION RATE: 16 BRPM | HEART RATE: 64 BPM | SYSTOLIC BLOOD PRESSURE: 173 MMHG | OXYGEN SATURATION: 98 % | DIASTOLIC BLOOD PRESSURE: 74 MMHG

## 2025-05-28 DIAGNOSIS — C78.01 MALIGNANT NEOPLASM METASTATIC TO RIGHT LUNG: Primary | ICD-10-CM

## 2025-05-28 DIAGNOSIS — Z95.828 PORT-A-CATH IN PLACE: Primary | ICD-10-CM

## 2025-05-28 PROCEDURE — 3078F DIAST BP <80 MM HG: CPT | Mod: CPTII,,, | Performed by: SURGERY

## 2025-05-28 PROCEDURE — 1101F PT FALLS ASSESS-DOCD LE1/YR: CPT | Mod: CPTII,,, | Performed by: SURGERY

## 2025-05-28 PROCEDURE — 3075F SYST BP GE 130 - 139MM HG: CPT | Mod: CPTII,,, | Performed by: SURGERY

## 2025-05-28 PROCEDURE — 99024 POSTOP FOLLOW-UP VISIT: CPT | Mod: ,,, | Performed by: SURGERY

## 2025-05-28 PROCEDURE — A4216 STERILE WATER/SALINE, 10 ML: HCPCS | Performed by: INTERNAL MEDICINE

## 2025-05-28 PROCEDURE — 1159F MED LIST DOCD IN RCRD: CPT | Mod: CPTII,,, | Performed by: SURGERY

## 2025-05-28 PROCEDURE — 3288F FALL RISK ASSESSMENT DOCD: CPT | Mod: CPTII,,, | Performed by: SURGERY

## 2025-05-28 PROCEDURE — 1160F RVW MEDS BY RX/DR IN RCRD: CPT | Mod: CPTII,,, | Performed by: SURGERY

## 2025-05-28 PROCEDURE — 63600175 PHARM REV CODE 636 W HCPCS: Performed by: INTERNAL MEDICINE

## 2025-05-28 PROCEDURE — 25000003 PHARM REV CODE 250: Performed by: INTERNAL MEDICINE

## 2025-05-28 PROCEDURE — 96523 IRRIG DRUG DELIVERY DEVICE: CPT

## 2025-05-28 RX ORDER — SODIUM CHLORIDE 0.9 % (FLUSH) 0.9 %
10 SYRINGE (ML) INJECTION
OUTPATIENT
Start: 2025-05-28

## 2025-05-28 RX ORDER — HEPARIN 100 UNIT/ML
500 SYRINGE INTRAVENOUS
Status: COMPLETED | OUTPATIENT
Start: 2025-05-28 | End: 2025-05-28

## 2025-05-28 RX ORDER — SODIUM CHLORIDE 0.9 % (FLUSH) 0.9 %
10 SYRINGE (ML) INJECTION
Status: COMPLETED | OUTPATIENT
Start: 2025-05-28 | End: 2025-05-28

## 2025-05-28 RX ORDER — HEPARIN 100 UNIT/ML
500 SYRINGE INTRAVENOUS
OUTPATIENT
Start: 2025-05-28

## 2025-05-28 RX ADMIN — HEPARIN 500 UNITS: 100 SYRINGE at 10:05

## 2025-05-28 RX ADMIN — Medication 10 ML: at 10:05

## 2025-05-28 NOTE — PLAN OF CARE
PICC care performed per sterile technique (QW); tolerated well; next appointments discussed with patient; discharged home in stable condition.

## 2025-06-01 DIAGNOSIS — C78.6 PERITONEAL CARCINOMATOSIS: ICD-10-CM

## 2025-06-02 RX ORDER — LISINOPRIL 20 MG/1
TABLET ORAL
Qty: 30 TABLET | Refills: 3 | Status: SHIPPED | OUTPATIENT
Start: 2025-06-02

## 2025-06-03 ENCOUNTER — LAB VISIT (OUTPATIENT)
Dept: LAB | Facility: HOSPITAL | Age: 75
End: 2025-06-03
Attending: INTERNAL MEDICINE
Payer: MEDICARE

## 2025-06-03 ENCOUNTER — INFUSION (OUTPATIENT)
Dept: INFUSION THERAPY | Facility: HOSPITAL | Age: 75
End: 2025-06-03
Attending: INTERNAL MEDICINE
Payer: MEDICARE

## 2025-06-03 VITALS
HEART RATE: 56 BPM | DIASTOLIC BLOOD PRESSURE: 69 MMHG | TEMPERATURE: 98 F | OXYGEN SATURATION: 99 % | RESPIRATION RATE: 16 BRPM | SYSTOLIC BLOOD PRESSURE: 136 MMHG

## 2025-06-03 DIAGNOSIS — D70.1 AGRANULOCYTOSIS SECONDARY TO CANCER CHEMOTHERAPY: ICD-10-CM

## 2025-06-03 DIAGNOSIS — T45.1X5A AGRANULOCYTOSIS SECONDARY TO CANCER CHEMOTHERAPY: ICD-10-CM

## 2025-06-03 DIAGNOSIS — Z79.899 IMMUNODEFICIENCY DUE TO DRUGS: ICD-10-CM

## 2025-06-03 DIAGNOSIS — C78.01 MALIGNANT NEOPLASM METASTATIC TO RIGHT LUNG: Primary | ICD-10-CM

## 2025-06-03 DIAGNOSIS — D84.821 IMMUNODEFICIENCY DUE TO DRUGS: ICD-10-CM

## 2025-06-03 DIAGNOSIS — C78.7 SECONDARY MALIGNANT NEOPLASM OF LIVER: ICD-10-CM

## 2025-06-03 DIAGNOSIS — C78.01 MALIGNANT NEOPLASM METASTATIC TO RIGHT LUNG: ICD-10-CM

## 2025-06-03 DIAGNOSIS — C57.01 CARCINOMA OF RIGHT FALLOPIAN TUBE: ICD-10-CM

## 2025-06-03 LAB
ALBUMIN SERPL-MCNC: 3 G/DL (ref 3.4–4.8)
ALBUMIN/GLOB SERPL: 1.1 RATIO (ref 1.1–2)
ALP SERPL-CCNC: 37 UNIT/L (ref 40–150)
ALT SERPL-CCNC: 23 UNIT/L (ref 0–55)
ANION GAP SERPL CALC-SCNC: 6 MEQ/L
AST SERPL-CCNC: 37 UNIT/L (ref 11–45)
BASOPHILS # BLD AUTO: 0.02 X10(3)/MCL
BASOPHILS NFR BLD AUTO: 0.4 %
BILIRUB SERPL-MCNC: 0.7 MG/DL
BUN SERPL-MCNC: 17.8 MG/DL (ref 9.8–20.1)
CALCIUM SERPL-MCNC: 8.3 MG/DL (ref 8.4–10.2)
CHLORIDE SERPL-SCNC: 102 MMOL/L (ref 98–107)
CO2 SERPL-SCNC: 27 MMOL/L (ref 23–31)
CREAT SERPL-MCNC: 0.64 MG/DL (ref 0.55–1.02)
CREAT/UREA NIT SERPL: 28
EOSINOPHIL # BLD AUTO: 0.04 X10(3)/MCL (ref 0–0.9)
EOSINOPHIL NFR BLD AUTO: 0.8 %
ERYTHROCYTE [DISTWIDTH] IN BLOOD BY AUTOMATED COUNT: 13.1 % (ref 11.5–17)
GFR SERPLBLD CREATININE-BSD FMLA CKD-EPI: >60 ML/MIN/1.73/M2
GLOBULIN SER-MCNC: 2.8 GM/DL (ref 2.4–3.5)
GLUCOSE SERPL-MCNC: 84 MG/DL (ref 82–115)
HCT VFR BLD AUTO: 35.4 % (ref 37–47)
HGB BLD-MCNC: 11.6 G/DL (ref 12–16)
IMM GRANULOCYTES # BLD AUTO: 0.01 X10(3)/MCL (ref 0–0.04)
IMM GRANULOCYTES NFR BLD AUTO: 0.2 %
LYMPHOCYTES # BLD AUTO: 1.51 X10(3)/MCL (ref 0.6–4.6)
LYMPHOCYTES NFR BLD AUTO: 29 %
MCH RBC QN AUTO: 31.6 PG (ref 27–31)
MCHC RBC AUTO-ENTMCNC: 32.8 G/DL (ref 33–36)
MCV RBC AUTO: 96.5 FL (ref 80–94)
MONOCYTES # BLD AUTO: 0.37 X10(3)/MCL (ref 0.1–1.3)
MONOCYTES NFR BLD AUTO: 7.1 %
NEUTROPHILS # BLD AUTO: 3.25 X10(3)/MCL (ref 2.1–9.2)
NEUTROPHILS NFR BLD AUTO: 62.5 %
PLATELET # BLD AUTO: 223 X10(3)/MCL (ref 130–400)
PMV BLD AUTO: 8.2 FL (ref 7.4–10.4)
POTASSIUM SERPL-SCNC: 4.4 MMOL/L (ref 3.5–5.1)
PROT SERPL-MCNC: 5.8 GM/DL (ref 5.8–7.6)
RBC # BLD AUTO: 3.67 X10(6)/MCL (ref 4.2–5.4)
SODIUM SERPL-SCNC: 135 MMOL/L (ref 136–145)
WBC # BLD AUTO: 5.2 X10(3)/MCL (ref 4.5–11.5)

## 2025-06-03 PROCEDURE — 25000003 PHARM REV CODE 250: Performed by: INTERNAL MEDICINE

## 2025-06-03 PROCEDURE — 96523 IRRIG DRUG DELIVERY DEVICE: CPT

## 2025-06-03 PROCEDURE — 80053 COMPREHEN METABOLIC PANEL: CPT

## 2025-06-03 PROCEDURE — 63600175 PHARM REV CODE 636 W HCPCS: Performed by: INTERNAL MEDICINE

## 2025-06-03 PROCEDURE — A4216 STERILE WATER/SALINE, 10 ML: HCPCS | Performed by: INTERNAL MEDICINE

## 2025-06-03 PROCEDURE — 36415 COLL VENOUS BLD VENIPUNCTURE: CPT

## 2025-06-03 PROCEDURE — 85025 COMPLETE CBC W/AUTO DIFF WBC: CPT

## 2025-06-03 RX ORDER — HEPARIN 100 UNIT/ML
500 SYRINGE INTRAVENOUS
Status: COMPLETED | OUTPATIENT
Start: 2025-06-03 | End: 2025-06-03

## 2025-06-03 RX ORDER — SODIUM CHLORIDE 0.9 % (FLUSH) 0.9 %
10 SYRINGE (ML) INJECTION
OUTPATIENT
Start: 2025-06-03

## 2025-06-03 RX ORDER — HEPARIN 100 UNIT/ML
500 SYRINGE INTRAVENOUS
OUTPATIENT
Start: 2025-06-03

## 2025-06-03 RX ORDER — SODIUM CHLORIDE 0.9 % (FLUSH) 0.9 %
10 SYRINGE (ML) INJECTION
Status: COMPLETED | OUTPATIENT
Start: 2025-06-03 | End: 2025-06-03

## 2025-06-03 RX ADMIN — Medication 10 ML: at 08:06

## 2025-06-03 RX ADMIN — HEPARIN 500 UNITS: 100 SYRINGE at 08:06

## 2025-06-04 NOTE — PROGRESS NOTES
"    [facility] Vascular - Clinic Note  Anibal Hwang MD      Patient Name: Shayna Ledezma                   : 1950      MRN: [id number]   Visit Date: 2025       History Present Illness     Reason for Visit: Incision Check    Ms. Ledezma presents to the clinic for a 2 week incision check and suture removal. She is s/p mediport placement on 2025 with subsequent hematoma. She reports continued improvement to incision site swelling and bruising. She is scheduled for an infusion on the .      REVIEW OF SYSTEMS:  12 point review of systems conducted, negative except as stated in the history of present illness. See HPI for details.        Physical Exam      Vitals:    25 1007 25 1008 25 1009   BP: 133/85 (!) 170/85 (!) 171/91   BP Location: Right arm Left arm Left arm   Patient Position: Sitting Sitting Sitting   Pulse: (!) 57 (!) 59 (!) 59   Weight: 52.2 kg (115 lb)     Height: 5' 3" (1.6 m)            General: well-nourished, no acute distress, and healthy appearing, alert, pleasant, conversant, and oriented  Respiratory: breathing easily and without respiratory distress  Cardiology: regular rate and rhythm    Post Operative Incision:   Location: right chest wall  no drainage and healing appropriately, suture removed                    Assessment and Plan     Ms. Ledezma is a 74 y.o.  female with a MediPort with subsequent hematoma development. The incision is healed.  I removed the nylon suture.  Hematoma has significantly improved.  I think it is worth attempting to access at her next infusion.  If successful, I think it is okay to remove her PICC line.  I discussed with her if there are any problems or would like to see her back in a few weeks before her next infusion.  She is unable to use it next week, more likely the hematoma will resolve enough to use it for the following treatment.          1. 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     Ovarian cancer     Peritoneal carcinomatosis     TIA (transient ischemic attack)      Past Surgical History:   Procedure Laterality Date    APPENDECTOMY      BREAST BIOPSY      BREAST SURGERY  Circa     Exploratory /diagnostic     SECTION      COLONOSCOPY  2023    TOM CHACON    Endometrial polyp removal      INSERTION OF TUNNELED CENTRAL VENOUS CATHETER (CVC) WITH SUBCUTANEOUS PORT N/A 2025    Procedure: [id number];  Surgeon: Anibal Hwang MD;  Location: [facility] OR;  Service: Peripheral Vascular;  Laterality: N/A;    PERIPHERALLY INSERTED CENTRAL CATHETER INSERTION N/A 2025    Procedure: Insertion-picc;  Surgeon: Renetta Corral MD;  Location: [facility] OR;  Service: Oncology;  Laterality: N/A;    Spleen operation      TONSILLECTOMY      TOTAL ABDOMINAL HYSTERECTOMY       Family History   Problem Relation Name Age of Onset    No Known Problems Mother      No Known Problems Father      COPD Brother O. R. Lewis      Social History[1]  Current Outpatient Medications   Medication Instructions    AA/prot/lysine/methio/vit C/B6 (A/G PRO ORAL) Daily    alendronate (FOSAMAX) 70 mg, Oral, Every 7 days    aspirin (ECOTRIN) 81 mg, Daily    atorvastatin (LIPITOR) 40 mg, Oral, Daily    b complex vitamins capsule 1 capsule, Daily    calcium citrate-vitamin D3 315-200 mg (CITRACAL+D) 315 mg-5 mcg (200 unit) per tablet 1 tablet, 2 times daily    dexAMETHasone (DECADRON) 8 mg, Oral, Daily, on days 2-4 of each chemotherapy cycle.    ELIQUIS 5 mg, Oral, 2 times daily    GLUTAMINE ORAL 10 g, Daily    lisinopriL (PRINIVIL,ZESTRIL) 20 MG tablet  TAKE 1 TABLET(20 MG) BY MOUTH DAILY    OLANZapine (ZYPREXA) 5 mg, Oral, Nightly, on days 1-4 of each chemotherapy cycle.    prochlorperazine (COMPAZINE) 5 mg, Oral, Every 6 hours PRN    pyridoxine (vitamin B6) (B-6) 250 mg, Daily     Review of patient's allergies indicates:   Allergen Reactions    Codeine Itching    Oxycodone-acetaminophen Other (See Comments)     Unknown    Oxycodone-aspirin Other (See Comments)     Unknown       Patient Care Team:  Sigrid Giang MD as PCP - General (Internal Medicine)  Dale Gentile MD as Consulting Physician (Gastroenterology)  Anibal Hwang MD as Vascular Surgery (Vascular Surgery)  Renetta Corral MD as Consulting Physician (Oncology)        No follow-ups on file. In addition to their scheduled follow up, the patient has also been instructed to follow up on as needed basis.     Future Appointments   Date Time Provider Department Center   6/16/2025 11:40 AM LAB, [facility] OLGHB LAB [facility]   6/17/2025  1:00 PM CHAIR 40, [facility]   6/24/2025 12:50 PM LAB, [facility] OLB LAB [facility]   6/24/2025  1:20 PM Renetta Corral MD [facility] HEMONC [facility]   6/24/2025  2:00 PM INJECTION CHAIR 02, [facility]   7/1/2025  1:00 PM CHAIR 29, [facility]   8/11/2025  8:20 AM Donovan Regan FNP [id number]   2/9/2026  8:20 AM Sigrid Giang MD OL[facility]459             [1]   Social History  Socioeconomic History    Marital status:    Tobacco Use    Smoking status: Never    Smokeless tobacco: Never   Substance and Sexual Activity    Alcohol use: Never    Drug use: Never    Sexual activity: Not Currently     Birth control/protection: None     Social Drivers of Health     Financial Resource Strain: Low Risk  (11/18/2024)    Overall Financial Resource Strain (CARDIA)     Difficulty of Paying Living Expenses: Not hard at all    Food Insecurity: No Food Insecurity (11/18/2024)    Hunger Vital Sign     Worried About Running Out of Food in the Last Year: Never true     Ran Out of Food in the Last Year: Never true   Transportation Needs: No Transportation Needs (8/2/2023)    PRAPARE - Transportation     Lack of Transportation (Medical): No     Lack of Transportation (Non-Medical): No   Physical Activity: Sufficiently Active (11/18/2024)    Exercise Vital Sign     Days of Exercise per Week: 7 days     Minutes of Exercise per Session: 90 min   Stress: No Stress Concern Present (11/18/2024)    [employer] - Occupational Stress Questionnaire     Feeling of Stress : Not at all   Housing Stability: Low Risk  (8/2/2023)    Housing Stability Vital Sign     Unable to Pay for Housing in the Last Year: No     Number of Places Lived in the Last Year: 1     Unstable Housing in the Last Year: No

## 2025-06-10 ENCOUNTER — LAB VISIT (OUTPATIENT)
Dept: LAB | Facility: HOSPITAL | Age: 75
End: 2025-06-10
Attending: INTERNAL MEDICINE
Payer: MEDICARE

## 2025-06-10 ENCOUNTER — INFUSION (OUTPATIENT)
Dept: INFUSION THERAPY | Facility: HOSPITAL | Age: 75
End: 2025-06-10
Attending: INTERNAL MEDICINE
Payer: MEDICARE

## 2025-06-10 VITALS
HEART RATE: 67 BPM | RESPIRATION RATE: 16 BRPM | SYSTOLIC BLOOD PRESSURE: 176 MMHG | TEMPERATURE: 98 F | DIASTOLIC BLOOD PRESSURE: 80 MMHG

## 2025-06-10 DIAGNOSIS — Z79.899 IMMUNODEFICIENCY DUE TO DRUGS: ICD-10-CM

## 2025-06-10 DIAGNOSIS — C78.7 SECONDARY MALIGNANT NEOPLASM OF LIVER: ICD-10-CM

## 2025-06-10 DIAGNOSIS — D70.1 AGRANULOCYTOSIS SECONDARY TO CANCER CHEMOTHERAPY: ICD-10-CM

## 2025-06-10 DIAGNOSIS — T45.1X5A AGRANULOCYTOSIS SECONDARY TO CANCER CHEMOTHERAPY: ICD-10-CM

## 2025-06-10 DIAGNOSIS — D84.821 IMMUNODEFICIENCY DUE TO DRUGS: ICD-10-CM

## 2025-06-10 DIAGNOSIS — C78.01 MALIGNANT NEOPLASM METASTATIC TO RIGHT LUNG: Primary | ICD-10-CM

## 2025-06-10 DIAGNOSIS — C57.01 CARCINOMA OF RIGHT FALLOPIAN TUBE: ICD-10-CM

## 2025-06-10 DIAGNOSIS — C78.01 MALIGNANT NEOPLASM METASTATIC TO RIGHT LUNG: ICD-10-CM

## 2025-06-10 LAB
ALBUMIN SERPL-MCNC: 3.2 G/DL (ref 3.4–4.8)
ALBUMIN/GLOB SERPL: 1.1 RATIO (ref 1.1–2)
ALP SERPL-CCNC: 37 UNIT/L (ref 40–150)
ALT SERPL-CCNC: 23 UNIT/L (ref 0–55)
ANION GAP SERPL CALC-SCNC: 7 MEQ/L
AST SERPL-CCNC: 33 UNIT/L (ref 11–45)
BASOPHILS # BLD AUTO: 0.01 X10(3)/MCL
BASOPHILS NFR BLD AUTO: 0.3 %
BILIRUB SERPL-MCNC: 0.9 MG/DL
BUN SERPL-MCNC: 14.5 MG/DL (ref 9.8–20.1)
CALCIUM SERPL-MCNC: 8.6 MG/DL (ref 8.4–10.2)
CHLORIDE SERPL-SCNC: 98 MMOL/L (ref 98–107)
CO2 SERPL-SCNC: 27 MMOL/L (ref 23–31)
CREAT SERPL-MCNC: 0.69 MG/DL (ref 0.55–1.02)
CREAT/UREA NIT SERPL: 21
EOSINOPHIL # BLD AUTO: 0.01 X10(3)/MCL (ref 0–0.9)
EOSINOPHIL NFR BLD AUTO: 0.3 %
ERYTHROCYTE [DISTWIDTH] IN BLOOD BY AUTOMATED COUNT: 13.5 % (ref 11.5–17)
GFR SERPLBLD CREATININE-BSD FMLA CKD-EPI: >60 ML/MIN/1.73/M2
GLOBULIN SER-MCNC: 2.9 GM/DL (ref 2.4–3.5)
GLUCOSE SERPL-MCNC: 102 MG/DL (ref 82–115)
HCT VFR BLD AUTO: 33.9 % (ref 37–47)
HGB BLD-MCNC: 11.3 G/DL (ref 12–16)
IMM GRANULOCYTES # BLD AUTO: 0 X10(3)/MCL (ref 0–0.04)
IMM GRANULOCYTES NFR BLD AUTO: 0 %
LYMPHOCYTES # BLD AUTO: 1.54 X10(3)/MCL (ref 0.6–4.6)
LYMPHOCYTES NFR BLD AUTO: 42.4 %
MCH RBC QN AUTO: 31.5 PG (ref 27–31)
MCHC RBC AUTO-ENTMCNC: 33.3 G/DL (ref 33–36)
MCV RBC AUTO: 94.4 FL (ref 80–94)
MONOCYTES # BLD AUTO: 0.47 X10(3)/MCL (ref 0.1–1.3)
MONOCYTES NFR BLD AUTO: 12.9 %
NEUTROPHILS # BLD AUTO: 1.6 X10(3)/MCL (ref 2.1–9.2)
NEUTROPHILS NFR BLD AUTO: 44.1 %
PLATELET # BLD AUTO: 191 X10(3)/MCL (ref 130–400)
PMV BLD AUTO: 8.2 FL (ref 7.4–10.4)
POTASSIUM SERPL-SCNC: 3.7 MMOL/L (ref 3.5–5.1)
PROT SERPL-MCNC: 6.1 GM/DL (ref 5.8–7.6)
RBC # BLD AUTO: 3.59 X10(6)/MCL (ref 4.2–5.4)
SODIUM SERPL-SCNC: 132 MMOL/L (ref 136–145)
WBC # BLD AUTO: 3.63 X10(3)/MCL (ref 4.5–11.5)

## 2025-06-10 PROCEDURE — 85025 COMPLETE CBC W/AUTO DIFF WBC: CPT

## 2025-06-10 PROCEDURE — 96523 IRRIG DRUG DELIVERY DEVICE: CPT

## 2025-06-10 PROCEDURE — 80053 COMPREHEN METABOLIC PANEL: CPT

## 2025-06-10 PROCEDURE — 36415 COLL VENOUS BLD VENIPUNCTURE: CPT

## 2025-06-10 RX ORDER — HEPARIN 100 UNIT/ML
500 SYRINGE INTRAVENOUS
Status: DISCONTINUED | OUTPATIENT
Start: 2025-06-10 | End: 2025-06-10 | Stop reason: HOSPADM

## 2025-06-10 RX ORDER — HEPARIN 100 UNIT/ML
500 SYRINGE INTRAVENOUS
OUTPATIENT
Start: 2025-06-10

## 2025-06-10 RX ORDER — SODIUM CHLORIDE 0.9 % (FLUSH) 0.9 %
10 SYRINGE (ML) INJECTION
Status: DISCONTINUED | OUTPATIENT
Start: 2025-06-10 | End: 2025-06-10 | Stop reason: HOSPADM

## 2025-06-10 RX ORDER — SODIUM CHLORIDE 0.9 % (FLUSH) 0.9 %
10 SYRINGE (ML) INJECTION
OUTPATIENT
Start: 2025-06-10

## 2025-06-11 ENCOUNTER — OFFICE VISIT (OUTPATIENT)
Dept: VASCULAR SURGERY | Facility: CLINIC | Age: 75
End: 2025-06-11
Payer: MEDICARE

## 2025-06-11 VITALS
SYSTOLIC BLOOD PRESSURE: 171 MMHG | BODY MASS INDEX: 20.38 KG/M2 | DIASTOLIC BLOOD PRESSURE: 91 MMHG | HEART RATE: 59 BPM | WEIGHT: 115 LBS | HEIGHT: 63 IN

## 2025-06-11 DIAGNOSIS — Z95.828 PORT-A-CATH IN PLACE: Primary | ICD-10-CM

## 2025-06-11 PROCEDURE — 3288F FALL RISK ASSESSMENT DOCD: CPT | Mod: CPTII,,, | Performed by: SURGERY

## 2025-06-11 PROCEDURE — 3079F DIAST BP 80-89 MM HG: CPT | Mod: CPTII,,, | Performed by: SURGERY

## 2025-06-11 PROCEDURE — 1160F RVW MEDS BY RX/DR IN RCRD: CPT | Mod: CPTII,,, | Performed by: SURGERY

## 2025-06-11 PROCEDURE — 99213 OFFICE O/P EST LOW 20 MIN: CPT | Mod: ,,, | Performed by: SURGERY

## 2025-06-11 PROCEDURE — 1101F PT FALLS ASSESS-DOCD LE1/YR: CPT | Mod: CPTII,,, | Performed by: SURGERY

## 2025-06-11 PROCEDURE — 3075F SYST BP GE 130 - 139MM HG: CPT | Mod: CPTII,,, | Performed by: SURGERY

## 2025-06-11 PROCEDURE — 1159F MED LIST DOCD IN RCRD: CPT | Mod: CPTII,,, | Performed by: SURGERY

## 2025-06-16 ENCOUNTER — LAB VISIT (OUTPATIENT)
Dept: LAB | Facility: HOSPITAL | Age: 75
End: 2025-06-16
Attending: INTERNAL MEDICINE
Payer: MEDICARE

## 2025-06-16 DIAGNOSIS — C78.01 MALIGNANT NEOPLASM METASTATIC TO RIGHT LUNG: ICD-10-CM

## 2025-06-16 DIAGNOSIS — T45.1X5A AGRANULOCYTOSIS SECONDARY TO CANCER CHEMOTHERAPY: ICD-10-CM

## 2025-06-16 DIAGNOSIS — Z79.899 IMMUNODEFICIENCY DUE TO DRUGS: ICD-10-CM

## 2025-06-16 DIAGNOSIS — C57.01 CARCINOMA OF RIGHT FALLOPIAN TUBE: ICD-10-CM

## 2025-06-16 DIAGNOSIS — D84.821 IMMUNODEFICIENCY DUE TO DRUGS: ICD-10-CM

## 2025-06-16 DIAGNOSIS — D70.1 AGRANULOCYTOSIS SECONDARY TO CANCER CHEMOTHERAPY: ICD-10-CM

## 2025-06-16 DIAGNOSIS — C78.7 SECONDARY MALIGNANT NEOPLASM OF LIVER: ICD-10-CM

## 2025-06-16 LAB
ALBUMIN SERPL-MCNC: 3.3 G/DL (ref 3.4–4.8)
ALBUMIN/GLOB SERPL: 1 RATIO (ref 1.1–2)
ALP SERPL-CCNC: 38 UNIT/L (ref 40–150)
ALT SERPL-CCNC: 21 UNIT/L (ref 0–55)
ANION GAP SERPL CALC-SCNC: 5 MEQ/L
AST SERPL-CCNC: 36 UNIT/L (ref 11–45)
BASOPHILS # BLD AUTO: 0.02 X10(3)/MCL
BASOPHILS NFR BLD AUTO: 0.4 %
BILIRUB SERPL-MCNC: 0.7 MG/DL
BUN SERPL-MCNC: 21.5 MG/DL (ref 9.8–20.1)
CALCIUM SERPL-MCNC: 9.4 MG/DL (ref 8.4–10.2)
CHLORIDE SERPL-SCNC: 102 MMOL/L (ref 98–107)
CO2 SERPL-SCNC: 29 MMOL/L (ref 23–31)
CREAT SERPL-MCNC: 0.64 MG/DL (ref 0.55–1.02)
CREAT/UREA NIT SERPL: 34
EOSINOPHIL # BLD AUTO: 0.03 X10(3)/MCL (ref 0–0.9)
EOSINOPHIL NFR BLD AUTO: 0.6 %
ERYTHROCYTE [DISTWIDTH] IN BLOOD BY AUTOMATED COUNT: 14.7 % (ref 11.5–17)
GFR SERPLBLD CREATININE-BSD FMLA CKD-EPI: >60 ML/MIN/1.73/M2
GLOBULIN SER-MCNC: 3.2 GM/DL (ref 2.4–3.5)
GLUCOSE SERPL-MCNC: 87 MG/DL (ref 82–115)
HCT VFR BLD AUTO: 34.8 % (ref 37–47)
HGB BLD-MCNC: 11.9 G/DL (ref 12–16)
IMM GRANULOCYTES # BLD AUTO: 0.01 X10(3)/MCL (ref 0–0.04)
IMM GRANULOCYTES NFR BLD AUTO: 0.2 %
LYMPHOCYTES # BLD AUTO: 2.43 X10(3)/MCL (ref 0.6–4.6)
LYMPHOCYTES NFR BLD AUTO: 46.1 %
MCH RBC QN AUTO: 32.9 PG (ref 27–31)
MCHC RBC AUTO-ENTMCNC: 34.2 G/DL (ref 33–36)
MCV RBC AUTO: 96.1 FL (ref 80–94)
MONOCYTES # BLD AUTO: 1.26 X10(3)/MCL (ref 0.1–1.3)
MONOCYTES NFR BLD AUTO: 23.9 %
NEUTROPHILS # BLD AUTO: 1.52 X10(3)/MCL (ref 2.1–9.2)
NEUTROPHILS NFR BLD AUTO: 28.8 %
PLATELET # BLD AUTO: 294 X10(3)/MCL (ref 130–400)
PMV BLD AUTO: 8 FL (ref 7.4–10.4)
POTASSIUM SERPL-SCNC: 5.1 MMOL/L (ref 3.5–5.1)
PROT SERPL-MCNC: 6.5 GM/DL (ref 5.8–7.6)
RBC # BLD AUTO: 3.62 X10(6)/MCL (ref 4.2–5.4)
SODIUM SERPL-SCNC: 136 MMOL/L (ref 136–145)
WBC # BLD AUTO: 5.27 X10(3)/MCL (ref 4.5–11.5)

## 2025-06-16 PROCEDURE — 36415 COLL VENOUS BLD VENIPUNCTURE: CPT

## 2025-06-16 PROCEDURE — 85025 COMPLETE CBC W/AUTO DIFF WBC: CPT

## 2025-06-16 PROCEDURE — 80053 COMPREHEN METABOLIC PANEL: CPT

## 2025-06-17 ENCOUNTER — INFUSION (OUTPATIENT)
Dept: INFUSION THERAPY | Facility: HOSPITAL | Age: 75
End: 2025-06-17
Attending: INTERNAL MEDICINE
Payer: MEDICARE

## 2025-06-17 VITALS
RESPIRATION RATE: 16 BRPM | DIASTOLIC BLOOD PRESSURE: 81 MMHG | SYSTOLIC BLOOD PRESSURE: 148 MMHG | OXYGEN SATURATION: 98 % | TEMPERATURE: 98 F | HEART RATE: 67 BPM

## 2025-06-17 DIAGNOSIS — C57.01 CARCINOMA OF RIGHT FALLOPIAN TUBE: Primary | ICD-10-CM

## 2025-06-17 PROCEDURE — 63600175 PHARM REV CODE 636 W HCPCS: Performed by: NURSE PRACTITIONER

## 2025-06-17 PROCEDURE — 96367 TX/PROPH/DG ADDL SEQ IV INF: CPT

## 2025-06-17 PROCEDURE — 25000003 PHARM REV CODE 250: Performed by: NURSE PRACTITIONER

## 2025-06-17 PROCEDURE — 96413 CHEMO IV INFUSION 1 HR: CPT

## 2025-06-17 PROCEDURE — 96417 CHEMO IV INFUS EACH ADDL SEQ: CPT

## 2025-06-17 RX ORDER — EPINEPHRINE 0.3 MG/.3ML
0.3 INJECTION SUBCUTANEOUS ONCE AS NEEDED
Status: DISCONTINUED | OUTPATIENT
Start: 2025-06-17 | End: 2025-06-17 | Stop reason: HOSPADM

## 2025-06-17 RX ORDER — HEPARIN 100 UNIT/ML
500 SYRINGE INTRAVENOUS
Status: CANCELLED | OUTPATIENT
Start: 2025-06-17

## 2025-06-17 RX ORDER — SODIUM CHLORIDE 0.9 % (FLUSH) 0.9 %
10 SYRINGE (ML) INJECTION
Status: CANCELLED | OUTPATIENT
Start: 2025-06-17

## 2025-06-17 RX ORDER — HEPARIN 100 UNIT/ML
500 SYRINGE INTRAVENOUS
Status: DISCONTINUED | OUTPATIENT
Start: 2025-06-17 | End: 2025-06-17 | Stop reason: HOSPADM

## 2025-06-17 RX ORDER — PROCHLORPERAZINE EDISYLATE 5 MG/ML
5 INJECTION INTRAMUSCULAR; INTRAVENOUS ONCE AS NEEDED
Status: CANCELLED | OUTPATIENT
Start: 2025-06-17

## 2025-06-17 RX ORDER — PROCHLORPERAZINE EDISYLATE 5 MG/ML
5 INJECTION INTRAMUSCULAR; INTRAVENOUS ONCE AS NEEDED
Status: DISCONTINUED | OUTPATIENT
Start: 2025-06-17 | End: 2025-06-17 | Stop reason: HOSPADM

## 2025-06-17 RX ORDER — SODIUM CHLORIDE 0.9 % (FLUSH) 0.9 %
10 SYRINGE (ML) INJECTION
Status: DISCONTINUED | OUTPATIENT
Start: 2025-06-17 | End: 2025-06-17 | Stop reason: HOSPADM

## 2025-06-17 RX ORDER — DIPHENHYDRAMINE HYDROCHLORIDE 50 MG/ML
50 INJECTION, SOLUTION INTRAMUSCULAR; INTRAVENOUS ONCE AS NEEDED
Status: CANCELLED | OUTPATIENT
Start: 2025-06-17

## 2025-06-17 RX ORDER — DIPHENHYDRAMINE HYDROCHLORIDE 50 MG/ML
50 INJECTION, SOLUTION INTRAMUSCULAR; INTRAVENOUS ONCE AS NEEDED
Status: DISCONTINUED | OUTPATIENT
Start: 2025-06-17 | End: 2025-06-17 | Stop reason: HOSPADM

## 2025-06-17 RX ORDER — EPINEPHRINE 0.3 MG/.3ML
0.3 INJECTION SUBCUTANEOUS ONCE AS NEEDED
Status: CANCELLED | OUTPATIENT
Start: 2025-06-17

## 2025-06-17 RX ADMIN — SODIUM CHLORIDE 150 MG: 900 INJECTION, SOLUTION INTRAVENOUS at 01:06

## 2025-06-17 RX ADMIN — SODIUM CHLORIDE 0.25 MG: 9 INJECTION, SOLUTION INTRAVENOUS at 02:06

## 2025-06-17 RX ADMIN — DEXTROSE MONOHYDRATE 46 MG: 5 INJECTION, SOLUTION INTRAVENOUS at 02:06

## 2025-06-17 RX ADMIN — SODIUM CHLORIDE: 9 INJECTION, SOLUTION INTRAVENOUS at 04:06

## 2025-06-17 RX ADMIN — HEPARIN 500 UNITS: 100 SYRINGE at 04:06

## 2025-06-17 RX ADMIN — DEXTROSE MONOHYDRATE: 50 INJECTION, SOLUTION INTRAVENOUS at 02:06

## 2025-06-17 RX ADMIN — CARBOPLATIN 350 MG: 10 INJECTION, SOLUTION INTRAVENOUS at 04:06

## 2025-06-17 NOTE — PROGRESS NOTES
Subjective:       Patient ID: Shayna Ledezma is a 74 y.o. female.  GI: Dr. Gamaliel Carlos  GYN ONC at [facility]: Dr. Mike Santana    1. 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 Pulmonary Embolism (Incidental on CT)--19    Procedure/pathology:   1. Paracentesis with removal of 5L of fluid done 18--serous carcinoma, high grade, c/w ovarian primary, PAX-8, CK7 and MOC-31 positive, CDX-2, CK-20 and Calretinin-negative.  2. S/p Exploratory Lap, radical tumor debulking including total abdominal hysterectomy, bilateral salpingo-oophorectomy, bilateral pelvic lymph node sampling, appendectomy, mobilization of the left ureter, complete gross resection of the disease with removal of mesenteric disease, as well as omental disese and parasplenic disease by Dr. Santana 3/18/19--Metastatic high grade serous carcinoma. Tissue/organ involvement: right ovary, appendectomy, omentum, mesentery (including small bowel mesentery) and multiple other sites designated: periumbilical, perisplenic, transverse colon, splenic flexure, perirectal, left periureteral. 2 lymph nodes negative. [id number]. FIGO stage IIIC. Myriad somatic instability testing positive for genomic instability, negative BRCA1 and BRCA2 tumor testing.  3. Splenectomy done 10/21/19--splenic involvement with metastatic high grade serous carcinoma, 5 benign lymph nodes. Caris testing showed LAM, NTRK1/2/3 negative, TMB low, BRCA1/2 negative, ER/NC negative, CATHY negative, SPEN pathogenic variant Exon 11, TP53 pathogenic variant Exon 5, PD-L1 positive CPS 15, Genomic ELODIA high.  4. CT-biopsy done 24--non-diagnostic, no malignancy cells present.   Imagin. CT A/P w/ and w/o contrast done at [facility] 18--large volume ascites with multiple peritoneal implants, right pericolic gutter implant measures 2.5X3.3cm, LUQ omental/mesenteric implant measures 3X3.6cm, omental carcinomatosis, extensive  enhancing soft tissue surrounding sigmoid colon vs. large sigmoid mass, left ovarian cystic lesion appears to have some internal septations measures 3.5X3.8cm, enhancing periportal soft tissue density likely lymphadenopathy with some narrowing of the portal vein noted, but without internal filling defect, borderline splenomegaly, subtle solid lesion w/n central spleen measures 2.4X2.5cm, likely metastatic, with peripheral area of diminished enhancement suggesting splenic infarct, soft tissue implant abutting gallbladder measures 2X2.2cm, no right adnexal mass, trace bilateral layering pleural fluid, small moderate-sized hiatal hernia, few borderline enlarged lymph nodes w/n right epicardial fat pad.  2. CT of chest on 1/11/19--Some prominent right cardiophrenic lymph nodes are nonspecific and can be followed on future surveillance scans. Otherwise no CT evidence of metastatic disease to the chest. While exam was not tailored for evaluation of the pulmonary arteries I suspect there is pulmonary thromboembolic disease. Peritoneal carcinomatosis seen in the upper abdomen. Critical Result: Pulmonary Embolus.  3. CT C/A/P 3/4/19--Positive response to therapy. No new or progressive metastatic disease in the chest, abdomen or pelvis. Stable to improved findings include smaller pericardial lymph nodes, andrew hepatis adenopathy, peritoneal carcinomatosis, smaller left adnexal lesion; no evidence of PE within limitations of the study.  5. CT PE protocol chest/A/P 6/11/19--No PE, improved pericardial lymph node with minimal size on current examination, improved peritoneal carcinomatosis and left adnexal implant, splenic ischemia evolved into small infarct, size improvement of one of splenic hypodense lesion and mild size increase of second hypodense lesion, favored to be benign/cystic, no new findings.  6. PET/CT 9/25/19--s/p hysterectomy and bilateral oophorectomy, no evidence for locally recurrent/residual disease, intensely  hypermetabolic hypodense lesion within the spleen 3.5X3.3cm concerning for metastatic focus. No other abnormal focus of disease.  7. CT C/A/P 11/13/20--Right lower lobe 3.3 cm mass is presumed metastatic, otherwise no evident residual, recurrent or metastatic disease.   8. PET/CT 11/20/20--Hypermetabolic right lung base lesion and suspected metastatic periportal adenopathy, no additional sites of FDG-avid otherwise aggressive appearing pathology through the neck, chest, abdomen, or pelvis.  9. PET/CT 1/21/21--Interval decrease in size of the right lower lobe mass with decreased FDG uptake, now measures 1.7 x 2.2 cm (previous 3.3 x 3.1 cm), resolution of FDG uptake in the suspected periportal lymph node which is not identifiable on noncontrast CT. No evidence of new or progressive hypermetabolic metastatic disease.  10. PET/CT 3/30/21--Resolved right lower lung lobe hypermetabolic mass. Otherwise, no interval change or evidence of residual disease, recurrence or new metastatic lesion.  11. PET/CT 11/21/24--new FDG uptake in right hepatic lobe concerning for malignancy, measures 3.5cm with SUV 9.5, could represent a peritoneal implant vs. Hepatic metastasis.   12. PET/CT 1/24/25--persistent right hepatic lobe lesion measures 3.6cm, stable, with increasing FDG uptake SUV 14.6, previous 9.5.  13. CT C/A/P done 4/15/25--along periphery of right hepatic lobe, 2 lesions are present, larger of 2 at the padded dome measures 2.6X3.3cm (previous 2.7X2.4cm) is larger, more cystic appearing lesion along contour of right hepatic lobe measures 1.9X1cm, unchanged, no new lesion, chronic compression deformity of L1, similar since 1/24/25. No new suspicious bone lesion.     Procedures:   1. Paracentesis on 12/28/18 with removal of 5 Liters.  2. Paracentesis on 01/07/19 with removal of 3.5 Liters.  3. Paracentesis on 02/06/19 with removal of 2.5 Liters.  4. Mediport placed 12/2020 by Dr. Serge Gentile--removed 8/26/21 due to mediport  fracture.  5. IJ mediport placed 5/1/25 by Dr. Hwang.     Germline genetic testing done by [facility] 1/29/19--negative for BRCA1/2, two (2) variants of uncertain significance (VUS): CATHY p.T4199E/p.F9612X and MLH1 p.P603L.     :  12/19/18 1102  11/10/20  42.9  12/08/20 79.5  01/05/21 28.9  01/26/21 12.8  02/18/21 11.6  03/15/22--10.3  04/26/22--9.6  05/16/22--10.2  06/07/22--10.1  07/19/22--11.6  08/08/22--12.5  09/19/22--12.7  10/31/22--13.5  01/03/23--12.0  02/13/23--14.0  03/03/23--13.9  04/17/23--14.5  05/26/23--14.6  07/31/23--13.5  11/13/23--15.6  12/04/23--16.8  01/16/24--13.7  02/26/24--18.6  03/18/24--16.3  04/29/24--17.0  06/10/24--17.0  08/12/24--17.3  09/23/24--18.3  10/14/24--18.6  11/25/24--19.1  01/06/25--24.3  03/10/25--29.4  03/31/25--38.7  05/05/25--63.2  05/19/25--43.2  06/24/25--19.9    ECHO:  4/25/25 EF 65%.    Treatment History:  1. Neoadjuvant Carboplatin/Taxol every 3 weeks x 3 cycles. 1/15/19 - 2/26/19. Neulasta added.   2. Surgery--tumor debulking KORI/BSO 3/18/19.  3. Adjuvant Carboplatin/Taxol x 3 additions cycles 4/9/19--5/21/19.  4. Splenectomy 10/21/19.  5. Niraparib maintenance (dose reduction to 100mg daily for cytopenias) 11/8/19--12/1/20 (stopped due to progression).  6. Carboplatin/Paclitaxel/Avastin X 6 cycles completed 12/8/21--3/24/21 (complete response).  7. Avastin maintenance 4/13/21--4/21/25 (stopped due to progression).    Current Treatment:  1. Eliquis BID for incidental PE on imaging 1/2019.   2. Carboplatin/Doxil X 6 cycles. Started on 5/21/25. Delay due to mediport hematoma.   Neulasta added with C2  Cycle 3 due on 7/15/25.   3. Lynparza maintenance planned    CC:  Chief Complaint   Patient presents with    Other Misc     Pt reports no concerns today.       HPI   Patient is here for treatment visit for her ovarian cancer. She is doing well. She underwent treatment with C2 Carboplatin and Doxil. She is tolerating well. Only has some mild fatigue. No other  side effects reported. Her mediport is improved and Dr. Hwang gave the okay to use it.     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      alendronate (FOSAMAX) 70 MG tablet Take 1 tablet (70 mg total) by mouth every 7 days. 4 tablet 11    aspirin (ECOTRIN) 81 MG EC tablet Take 81 mg by mouth once daily.      atorvastatin (LIPITOR) 40 MG tablet Take 1 tablet (40 mg total) by mouth once daily. 90 tablet 3    b complex vitamins capsule Take 1 capsule by mouth once daily.      calcium citrate-vitamin D3 315-200 mg (CITRACAL+D) 315 mg-5 mcg (200 unit) per tablet Take 1 tablet by mouth 2 (two) times daily.      dexAMETHasone (DECADRON) 4 MG Tab Take 2 tablets (8 mg total) by mouth once daily. on days 2-4 of each chemotherapy cycle. 6 tablet 5    ELIQUIS 5 mg Tab TAKE 1 TABLET BY MOUTH TWICE DAILY 180 tablet 0    GLUTAMINE ORAL Take 10 g by mouth Daily.      lisinopriL (PRINIVIL,ZESTRIL) 20 MG tablet TAKE 1 TABLET(20 MG) BY MOUTH DAILY 30 tablet 3    OLANZapine (ZYPREXA) 5 MG tablet Take 1 tablet (5 mg total) by mouth every evening. on days 1-4 of each chemotherapy cycle. 4 tablet 5    prochlorperazine (COMPAZINE) 5 MG tablet Take 1 tablet (5 mg total) by mouth every 6 (six) hours as needed for  Nausea. 20 tablet 5    pyridoxine, vitamin B6, (B-6) 250 MG Tab Take 250 mg by mouth once daily.       No current facility-administered medications on file prior to visit.      Review of Systems   Constitutional:  Positive for fatigue. Negative for appetite change and unexpected weight change.   HENT:  Negative for mouth sores.    Eyes:  Negative for visual disturbance.   Respiratory:  Negative for cough and shortness of breath.    Cardiovascular:  Negative for chest pain.   Gastrointestinal:  Negative for abdominal pain and diarrhea.   Genitourinary:  Negative for frequency.   Musculoskeletal:  Negative for back pain.   Integumentary:  Negative for rash.   Neurological:  Negative for headaches.   Hematological:  Negative for adenopathy.   Psychiatric/Behavioral:  The patient is not nervous/anxious.         Vitals:    06/24/25 1308   BP: 114/75   Pulse: 75   Resp: 14   Temp: 97.4 °F (36.3 °C)       Wt Readings from Last 3 Encounters:   06/24/25 1308 53.2 kg (117 lb 4.8 oz)   06/18/25 1422 52.5 kg (115 lb 11.9 oz)   06/11/25 1007 52.2 kg (115 lb)     Physical Exam  Vitals reviewed.   Constitutional:       Appearance: Normal appearance. She is normal weight.   HENT:      Head: Normocephalic.   Eyes:      General: Lids are normal. Vision grossly intact.      Extraocular Movements: Extraocular movements intact.      Conjunctiva/sclera: Conjunctivae normal.   Cardiovascular:      Rate and Rhythm: Normal rate and regular rhythm.      Pulses: Normal pulses.      Heart sounds: Normal heart sounds, S1 normal and S2 normal.   Pulmonary:      Effort: Pulmonary effort is normal.      Breath sounds: Normal breath sounds.   Chest:      Comments: Right chest wall mediport in place with bruising resolved, swelling much improved  Abdominal:      General: Abdomen is flat. Bowel sounds are normal. There is no distension.      Palpations: Abdomen is soft.      Tenderness: There is no abdominal tenderness.   Musculoskeletal:       Cervical back: Normal range of motion and neck supple.      Right lower leg: No edema.      Left lower leg: No edema.   Skin:     General: Skin is warm and moist.      Capillary Refill: Capillary refill takes less than 2 seconds.      Comments: Left arm PICC line present, site intact.    Neurological:      General: No focal deficit present.      Mental Status: She is alert and oriented to person, place, and time.   Psychiatric:         Attention and Perception: Attention normal.         Mood and Affect: Mood normal.         Speech: Speech normal.         Behavior: Behavior normal. Behavior is cooperative.         Cognition and Memory: Cognition normal.         Judgment: Judgment normal.       No visits with results within 1 Week(s) from this visit.   Latest known visit with results is:   Lab Visit on 06/16/2025   Component Date Value    Sodium 06/16/2025 136     Potassium 06/16/2025 5.1     Chloride 06/16/2025 102     CO2 06/16/2025 29     Glucose 06/16/2025 87     Blood Urea Nitrogen 06/16/2025 21.5 (H)     Creatinine 06/16/2025 0.64     Calcium 06/16/2025 9.4     Protein Total 06/16/2025 6.5     Albumin 06/16/2025 3.3 (L)     Globulin 06/16/2025 3.2     Albumin/Globulin Ratio 06/16/2025 1.0 (L)     Bilirubin Total 06/16/2025 0.7     ALP 06/16/2025 38 (L)     ALT 06/16/2025 21     AST 06/16/2025 36     eGFR 06/16/2025 >60     Anion Gap 06/16/2025 5.0     BUN/Creatinine Ratio 06/16/2025 34     WBC 06/16/2025 5.27     RBC 06/16/2025 3.62 (L)     Hgb 06/16/2025 11.9 (L)     Hct 06/16/2025 34.8 (L)     MCV 06/16/2025 96.1 (H)     MCH 06/16/2025 32.9 (H)     MCHC 06/16/2025 34.2     RDW 06/16/2025 14.7     Platelet 06/16/2025 294     MPV 06/16/2025 8.0     Neut % 06/16/2025 28.8     Lymph % 06/16/2025 46.1     Mono % 06/16/2025 23.9     Eos % 06/16/2025 0.6     Basophil % 06/16/2025 0.4     Imm Grans % 06/16/2025 0.2     Neut # 06/16/2025 1.52 (L)     Lymph # 06/16/2025 2.43     Mono # 06/16/2025 1.26     Eos #  06/16/2025 0.03     Baso # 06/16/2025 0.02     Imm Gran # 06/16/2025 0.01           Assessment:       1. Carcinoma of right fallopian tube    2. Malignant neoplasm metastatic to right lung    3. Secondary malignant neoplasm of liver        Plan:     Patient with left ovarian cancer diagnosed 12/26/18, appears to be stage IIIC vs. IV with diffuse peritoneal disease, possible splenic involvement, epicardial lymph nodes and bilateral pleural effusions.  Patient seen and evaluated by Dr. Mike Santana at [facility] in [place].   Treatment recommended upfront with chemotherapy Carboplatin/Paclitaxel x 3 cycles.  Completed Cycle 3 on 2/26/19.   She underwent total abdominal hysterectomy, BSO and tumor debulking on 3/18/19 with Dr. Santana with complete gross resection of disease. FIGO Stage IIIC papillary serous fallopian tube cancer.   Patient resumed chemotherapy with cycle 4 on 4/9/19. Completed cycle 6 on 5/21/19.    Testing on tumor was positive for genomic instability but negative for BRCA mutation.  Per NCCN guidelines, maintenance can be considered for BRCA mutated germline category 1 and somatic category 2A. There is some evidence that PARP inhibitors have some activity as well in tumors with genomic instability. But the PARP maintenance is not approved for this indication. Offered treatment to patient and after discussion she declined and made decision to continue with observation only.    PET/CT done for rising CA-125 showed hypermetabolic splenic lesion which is not new, just enlarged from previous. Case discussed with Dr. Mike Santana.  Patient is now s/p splenectomy done 10/21/19. Consistent with splenic involvement with high grade serous carcinoma.   Dr. Santana recommended Niraparib maintenance based on new data showing activity in HRD positive ovarian cancer and patient started on 11/8/19, required a dose reduction due to cytopenias.  Rising CA-125 noted in 11/2020. CT showed new RLL lung mass.    Follow-up PET done 11/20/20 showed RLL lung mass hypermetabolic and hypermetabolic periportal adenopathy.     Recommended 2nd line treatment with Carboplatin/Taxol/Avastin.   Started cycle 1 on 12/8/20. Neulasta added with cycle 2.   Completed Cycle 6 on 3/24/21.  PET from 3/30/21 showed complete resolution of lung mass and no other disease.    Avastin maintenance started on 4/13/21.   Hospitalized for TIA after her 4th cycle of Avastin. Work-up for stroke and cause otherwise negative. Patient started on baby ASA by neurology.  Discussed there are no clear guidelines for changing/discontinuing treatment for TIA/CVA related to Avastin.  Since her symptoms resolved and there were no residual effects, recommended to continue with Avastin since it is working well for her disease and since a baby ASA was added for prevention. She was also continued on Eliquis for h/o PE.  S/p mediport removal for fracture on 8/26/21.   Continue Avastin through peripheral veins for now.  PET was done in 11/2024 due to rising CA-125 although it had remained WNL. Scan showed lesion near superior portion of liver.  CT-biopsy attempted but was non-diagnostic.     Since CA-125 is not outside of normal limits, and no way to tell how long there liver lesion has been present, since no scan done since 2021.  Recommended to monitor closely.  Repeat PET/CT done 1/24/25 with stable liver lesion in size, increased SUV, no other disease.  Again recommended to continue with current treatment since stable and monitor very closely.     Repeat CT C/A/P from 4/15/25 with slight enlargement of one of the liver lesions. Insurance denied PET and says she has reached her lifetime PET limit.  CA-125 also rising. These lesions remain in a difficult location for biopsy. Will forgo biopsy and proceed with treatment.   Recommend treatment with Carboplatin and Doxil X 6 cycles.  S/p mediport placement with complication of hematoma. PICC placed then removed and  mediport okay to use.    Started Cycle 1 on 5/21/25.   Now s/p C2, neulasta added with C2. Patient tolerating treatment very well aside from fatigue.  CBC today with neulasta induced leukocytosis, mild anemia, otherwise good. CMP good, mildly elevated AST. CA-125 now WNL.     Weekly labs.  F/u visit in 3 weeks on 7/14/25.   Repeat CT C/A/P before next visit since it has been almost 3 months since last scan.     If CT scan good, plan to continue treatment for up to 6 cycles then, plan for maintenance PARP, switch to Lynparza.    ECHO done 4/25/25--EF 65%.  Vaccines/Boosters post-splenectomy: Menactra/Menveo every 5 years, Bexsero every 2-3 years, annual flu shot.  Last Bexsero was given 8/1/23. Next due in 2026.  Received Menactra/Menveo in 9/2024, 5 years after initial.   Continue Eliquis 5mg BID(h/o PE in 2019) and ASA.    All questions answered.      Renetta Corral MD        - code applied: patient requires or will require a continuous, longitudinal, and active collaborative plan of care related to this patient's health condition, metastatic ovarian cancer--the management of which requires the direction of a practitioner with specialized clinical knowledge, skill, and expertise, including monitoring of side effects of high risk medications (chemotherapy).

## 2025-06-18 ENCOUNTER — INFUSION (OUTPATIENT)
Dept: INFUSION THERAPY | Facility: HOSPITAL | Age: 75
End: 2025-06-18
Attending: INTERNAL MEDICINE
Payer: MEDICARE

## 2025-06-18 VITALS
BODY MASS INDEX: 20.5 KG/M2 | WEIGHT: 115.75 LBS | HEART RATE: 61 BPM | DIASTOLIC BLOOD PRESSURE: 75 MMHG | SYSTOLIC BLOOD PRESSURE: 149 MMHG | OXYGEN SATURATION: 99 % | TEMPERATURE: 97 F

## 2025-06-18 DIAGNOSIS — C57.01 CARCINOMA OF RIGHT FALLOPIAN TUBE: Primary | ICD-10-CM

## 2025-06-18 DIAGNOSIS — I26.99 PULMONARY EMBOLISM, UNSPECIFIED CHRONICITY, UNSPECIFIED PULMONARY EMBOLISM TYPE, UNSPECIFIED WHETHER ACUTE COR PULMONALE PRESENT: ICD-10-CM

## 2025-06-18 PROCEDURE — 96372 THER/PROPH/DIAG INJ SC/IM: CPT

## 2025-06-18 PROCEDURE — 63600175 PHARM REV CODE 636 W HCPCS: Mod: JZ,TB | Performed by: NURSE PRACTITIONER

## 2025-06-18 RX ORDER — APIXABAN 5 MG/1
5 TABLET, FILM COATED ORAL 2 TIMES DAILY
Qty: 180 TABLET | Refills: 0 | Status: SHIPPED | OUTPATIENT
Start: 2025-06-18

## 2025-06-18 RX ADMIN — PEGFILGRASTIM-CBQV 6 MG: 6 INJECTION, SOLUTION SUBCUTANEOUS at 02:06

## 2025-06-24 ENCOUNTER — OFFICE VISIT (OUTPATIENT)
Dept: HEMATOLOGY/ONCOLOGY | Facility: CLINIC | Age: 75
End: 2025-06-24
Payer: MEDICARE

## 2025-06-24 ENCOUNTER — LAB VISIT (OUTPATIENT)
Dept: LAB | Facility: HOSPITAL | Age: 75
End: 2025-06-24
Attending: INTERNAL MEDICINE
Payer: MEDICARE

## 2025-06-24 VITALS
DIASTOLIC BLOOD PRESSURE: 75 MMHG | WEIGHT: 117.31 LBS | HEART RATE: 75 BPM | HEIGHT: 63 IN | TEMPERATURE: 97 F | BODY MASS INDEX: 20.79 KG/M2 | SYSTOLIC BLOOD PRESSURE: 114 MMHG | RESPIRATION RATE: 14 BRPM | OXYGEN SATURATION: 98 %

## 2025-06-24 DIAGNOSIS — C78.01 MALIGNANT NEOPLASM METASTATIC TO RIGHT LUNG: ICD-10-CM

## 2025-06-24 DIAGNOSIS — D70.1 AGRANULOCYTOSIS SECONDARY TO CANCER CHEMOTHERAPY: ICD-10-CM

## 2025-06-24 DIAGNOSIS — C57.01 CARCINOMA OF RIGHT FALLOPIAN TUBE: Primary | ICD-10-CM

## 2025-06-24 DIAGNOSIS — C78.7 SECONDARY MALIGNANT NEOPLASM OF LIVER: ICD-10-CM

## 2025-06-24 DIAGNOSIS — T45.1X5A AGRANULOCYTOSIS SECONDARY TO CANCER CHEMOTHERAPY: ICD-10-CM

## 2025-06-24 DIAGNOSIS — Z79.899 IMMUNODEFICIENCY DUE TO DRUGS: ICD-10-CM

## 2025-06-24 DIAGNOSIS — C57.01 CARCINOMA OF RIGHT FALLOPIAN TUBE: ICD-10-CM

## 2025-06-24 DIAGNOSIS — D84.821 IMMUNODEFICIENCY DUE TO DRUGS: ICD-10-CM

## 2025-06-24 LAB
ALBUMIN SERPL-MCNC: 3.2 G/DL (ref 3.4–4.8)
ALBUMIN/GLOB SERPL: 1.2 RATIO (ref 1.1–2)
ALP SERPL-CCNC: 113 UNIT/L (ref 40–150)
ALT SERPL-CCNC: 27 UNIT/L (ref 0–55)
ANION GAP SERPL CALC-SCNC: 10 MEQ/L
AST SERPL-CCNC: 47 UNIT/L (ref 11–45)
BASOPHILS # BLD AUTO: 0.01 X10(3)/MCL
BASOPHILS NFR BLD AUTO: 0 %
BILIRUB SERPL-MCNC: 1 MG/DL
BUN SERPL-MCNC: 20.6 MG/DL (ref 9.8–20.1)
CALCIUM SERPL-MCNC: 8.6 MG/DL (ref 8.4–10.2)
CANCER AG125 SERPL-ACNC: 19.9 UNIT/ML (ref 0–35)
CHLORIDE SERPL-SCNC: 101 MMOL/L (ref 98–107)
CO2 SERPL-SCNC: 26 MMOL/L (ref 23–31)
CREAT SERPL-MCNC: 0.75 MG/DL (ref 0.55–1.02)
CREAT/UREA NIT SERPL: 27
EOSINOPHIL # BLD AUTO: 0.01 X10(3)/MCL (ref 0–0.9)
EOSINOPHIL NFR BLD AUTO: 0 %
ERYTHROCYTE [DISTWIDTH] IN BLOOD BY AUTOMATED COUNT: 15.9 % (ref 11.5–17)
GFR SERPLBLD CREATININE-BSD FMLA CKD-EPI: >60 ML/MIN/1.73/M2
GLOBULIN SER-MCNC: 2.7 GM/DL (ref 2.4–3.5)
GLUCOSE SERPL-MCNC: 112 MG/DL (ref 82–115)
HCT VFR BLD AUTO: 32.5 % (ref 37–47)
HGB BLD-MCNC: 10.7 G/DL (ref 12–16)
IMM GRANULOCYTES # BLD AUTO: 1.43 X10(3)/MCL (ref 0–0.04)
IMM GRANULOCYTES NFR BLD AUTO: 4.6 %
LYMPHOCYTES # BLD AUTO: 2.13 X10(3)/MCL (ref 0.6–4.6)
LYMPHOCYTES NFR BLD AUTO: 6.8 %
MCH RBC QN AUTO: 32.1 PG (ref 27–31)
MCHC RBC AUTO-ENTMCNC: 32.9 G/DL (ref 33–36)
MCV RBC AUTO: 97.6 FL (ref 80–94)
MONOCYTES # BLD AUTO: 2.42 X10(3)/MCL (ref 0.1–1.3)
MONOCYTES NFR BLD AUTO: 7.8 %
NEUTROPHILS # BLD AUTO: 25.2 X10(3)/MCL (ref 2.1–9.2)
NEUTROPHILS NFR BLD AUTO: 80.8 %
PLATELET # BLD AUTO: 342 X10(3)/MCL (ref 130–400)
PMV BLD AUTO: 8.8 FL (ref 7.4–10.4)
POTASSIUM SERPL-SCNC: 4.3 MMOL/L (ref 3.5–5.1)
PROT SERPL-MCNC: 5.9 GM/DL (ref 5.8–7.6)
RBC # BLD AUTO: 3.33 X10(6)/MCL (ref 4.2–5.4)
SODIUM SERPL-SCNC: 137 MMOL/L (ref 136–145)
WBC # BLD AUTO: 31.2 X10(3)/MCL (ref 4.5–11.5)

## 2025-06-24 PROCEDURE — 36415 COLL VENOUS BLD VENIPUNCTURE: CPT

## 2025-06-24 PROCEDURE — 1101F PT FALLS ASSESS-DOCD LE1/YR: CPT | Mod: CPTII,S$GLB,, | Performed by: INTERNAL MEDICINE

## 2025-06-24 PROCEDURE — 3288F FALL RISK ASSESSMENT DOCD: CPT | Mod: CPTII,S$GLB,, | Performed by: INTERNAL MEDICINE

## 2025-06-24 PROCEDURE — 1126F AMNT PAIN NOTED NONE PRSNT: CPT | Mod: CPTII,S$GLB,, | Performed by: INTERNAL MEDICINE

## 2025-06-24 PROCEDURE — 1160F RVW MEDS BY RX/DR IN RCRD: CPT | Mod: CPTII,S$GLB,, | Performed by: INTERNAL MEDICINE

## 2025-06-24 PROCEDURE — 3074F SYST BP LT 130 MM HG: CPT | Mod: CPTII,S$GLB,, | Performed by: INTERNAL MEDICINE

## 2025-06-24 PROCEDURE — 1159F MED LIST DOCD IN RCRD: CPT | Mod: CPTII,S$GLB,, | Performed by: INTERNAL MEDICINE

## 2025-06-24 PROCEDURE — 85025 COMPLETE CBC W/AUTO DIFF WBC: CPT

## 2025-06-24 PROCEDURE — 80053 COMPREHEN METABOLIC PANEL: CPT

## 2025-06-24 PROCEDURE — G2211 COMPLEX E/M VISIT ADD ON: HCPCS | Mod: S$GLB,,, | Performed by: INTERNAL MEDICINE

## 2025-06-24 PROCEDURE — 99215 OFFICE O/P EST HI 40 MIN: CPT | Mod: S$GLB,,, | Performed by: INTERNAL MEDICINE

## 2025-06-24 PROCEDURE — 99999 PR PBB SHADOW E&M-EST. PATIENT-LVL V: CPT | Mod: PBBFAC,,, | Performed by: INTERNAL MEDICINE

## 2025-06-24 PROCEDURE — 3078F DIAST BP <80 MM HG: CPT | Mod: CPTII,S$GLB,, | Performed by: INTERNAL MEDICINE

## 2025-06-24 PROCEDURE — 86304 IMMUNOASSAY TUMOR CA 125: CPT

## 2025-07-01 ENCOUNTER — LAB VISIT (OUTPATIENT)
Dept: LAB | Facility: HOSPITAL | Age: 75
End: 2025-07-01
Attending: INTERNAL MEDICINE
Payer: MEDICARE

## 2025-07-01 DIAGNOSIS — Z79.899 IMMUNODEFICIENCY DUE TO DRUGS: ICD-10-CM

## 2025-07-01 DIAGNOSIS — T45.1X5A AGRANULOCYTOSIS SECONDARY TO CANCER CHEMOTHERAPY: ICD-10-CM

## 2025-07-01 DIAGNOSIS — C78.7 SECONDARY MALIGNANT NEOPLASM OF LIVER: ICD-10-CM

## 2025-07-01 DIAGNOSIS — C57.01 CARCINOMA OF RIGHT FALLOPIAN TUBE: ICD-10-CM

## 2025-07-01 DIAGNOSIS — C78.01 MALIGNANT NEOPLASM METASTATIC TO RIGHT LUNG: ICD-10-CM

## 2025-07-01 DIAGNOSIS — D84.821 IMMUNODEFICIENCY DUE TO DRUGS: ICD-10-CM

## 2025-07-01 DIAGNOSIS — D70.1 AGRANULOCYTOSIS SECONDARY TO CANCER CHEMOTHERAPY: ICD-10-CM

## 2025-07-01 LAB
ALBUMIN SERPL-MCNC: 3.3 G/DL (ref 3.4–4.8)
ALBUMIN/GLOB SERPL: 1 RATIO (ref 1.1–2)
ALP SERPL-CCNC: 78 UNIT/L (ref 40–150)
ALT SERPL-CCNC: 22 UNIT/L (ref 0–55)
ANION GAP SERPL CALC-SCNC: 6 MEQ/L
AST SERPL-CCNC: 39 UNIT/L (ref 11–45)
BASOPHILS # BLD AUTO: 0.04 X10(3)/MCL
BASOPHILS NFR BLD AUTO: 0.5 %
BILIRUB SERPL-MCNC: 0.5 MG/DL
BUN SERPL-MCNC: 18.8 MG/DL (ref 9.8–20.1)
CALCIUM SERPL-MCNC: 9.3 MG/DL (ref 8.4–10.2)
CHLORIDE SERPL-SCNC: 104 MMOL/L (ref 98–107)
CO2 SERPL-SCNC: 29 MMOL/L (ref 23–31)
CREAT SERPL-MCNC: 0.7 MG/DL (ref 0.55–1.02)
CREAT/UREA NIT SERPL: 27
EOSINOPHIL # BLD AUTO: 0.01 X10(3)/MCL (ref 0–0.9)
EOSINOPHIL NFR BLD AUTO: 0.1 %
ERYTHROCYTE [DISTWIDTH] IN BLOOD BY AUTOMATED COUNT: 15.8 % (ref 11.5–17)
GFR SERPLBLD CREATININE-BSD FMLA CKD-EPI: >60 ML/MIN/1.73/M2
GLOBULIN SER-MCNC: 3.2 GM/DL (ref 2.4–3.5)
GLUCOSE SERPL-MCNC: 87 MG/DL (ref 82–115)
HCT VFR BLD AUTO: 30.8 % (ref 37–47)
HGB BLD-MCNC: 10.3 G/DL (ref 12–16)
IMM GRANULOCYTES # BLD AUTO: 0.05 X10(3)/MCL (ref 0–0.04)
IMM GRANULOCYTES NFR BLD AUTO: 0.6 %
LYMPHOCYTES # BLD AUTO: 2.1 X10(3)/MCL (ref 0.6–4.6)
LYMPHOCYTES NFR BLD AUTO: 25.8 %
MCH RBC QN AUTO: 33.1 PG (ref 27–31)
MCHC RBC AUTO-ENTMCNC: 33.4 G/DL (ref 33–36)
MCV RBC AUTO: 99 FL (ref 80–94)
MONOCYTES # BLD AUTO: 0.73 X10(3)/MCL (ref 0.1–1.3)
MONOCYTES NFR BLD AUTO: 9 %
NEUTROPHILS # BLD AUTO: 5.2 X10(3)/MCL (ref 2.1–9.2)
NEUTROPHILS NFR BLD AUTO: 64 %
PLATELET # BLD AUTO: 229 X10(3)/MCL (ref 130–400)
PMV BLD AUTO: 8.4 FL (ref 7.4–10.4)
POTASSIUM SERPL-SCNC: 4.6 MMOL/L (ref 3.5–5.1)
PROT SERPL-MCNC: 6.5 GM/DL (ref 5.8–7.6)
RBC # BLD AUTO: 3.11 X10(6)/MCL (ref 4.2–5.4)
SODIUM SERPL-SCNC: 139 MMOL/L (ref 136–145)
WBC # BLD AUTO: 8.13 X10(3)/MCL (ref 4.5–11.5)

## 2025-07-01 PROCEDURE — 80053 COMPREHEN METABOLIC PANEL: CPT

## 2025-07-01 PROCEDURE — 36415 COLL VENOUS BLD VENIPUNCTURE: CPT

## 2025-07-01 PROCEDURE — 85025 COMPLETE CBC W/AUTO DIFF WBC: CPT

## 2025-07-08 NOTE — PROGRESS NOTES
Subjective:       Patient ID: Shayna Ledezma is a 74 y.o. female.  GI: Dr. Gamaliel Carlos  GYN ONC at [facility]: Dr. Mike Santana    1. 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 Pulmonary Embolism (Incidental on CT)--19    Procedure/pathology:   1. Paracentesis with removal of 5L of fluid done 18--serous carcinoma, high grade, c/w ovarian primary, PAX-8, CK7 and MOC-31 positive, CDX-2, CK-20 and Calretinin-negative.  2. S/p Exploratory Lap, radical tumor debulking including total abdominal hysterectomy, bilateral salpingo-oophorectomy, bilateral pelvic lymph node sampling, appendectomy, mobilization of the left ureter, complete gross resection of the disease with removal of mesenteric disease, as well as omental disese and parasplenic disease by Dr. Santana 3/18/19--Metastatic high grade serous carcinoma. Tissue/organ involvement: right ovary, appendectomy, omentum, mesentery (including small bowel mesentery) and multiple other sites designated: periumbilical, perisplenic, transverse colon, splenic flexure, perirectal, left periureteral. 2 lymph nodes negative. [id number]. FIGO stage IIIC. Myriad somatic instability testing positive for genomic instability, negative BRCA1 and BRCA2 tumor testing.  3. Splenectomy done 10/21/19--splenic involvement with metastatic high grade serous carcinoma, 5 benign lymph nodes. Caris testing showed LAM, NTRK1/2/3 negative, TMB low, BRCA1/2 negative, ER/KS negative, CATHY negative, SPEN pathogenic variant Exon 11, TP53 pathogenic variant Exon 5, PD-L1 positive CPS 15, Genomic ELODIA high.  4. CT-biopsy done 24--non-diagnostic, no malignancy cells present.   Imagin. CT A/P w/ and w/o contrast done at [facility] 18--large volume ascites with multiple peritoneal implants, right pericolic gutter implant measures 2.5X3.3cm, LUQ omental/mesenteric implant measures 3X3.6cm, omental carcinomatosis, extensive  enhancing soft tissue surrounding sigmoid colon vs. large sigmoid mass, left ovarian cystic lesion appears to have some internal septations measures 3.5X3.8cm, enhancing periportal soft tissue density likely lymphadenopathy with some narrowing of the portal vein noted, but without internal filling defect, borderline splenomegaly, subtle solid lesion w/n central spleen measures 2.4X2.5cm, likely metastatic, with peripheral area of diminished enhancement suggesting splenic infarct, soft tissue implant abutting gallbladder measures 2X2.2cm, no right adnexal mass, trace bilateral layering pleural fluid, small moderate-sized hiatal hernia, few borderline enlarged lymph nodes w/n right epicardial fat pad.  2. CT of chest on 1/11/19--Some prominent right cardiophrenic lymph nodes are nonspecific and can be followed on future surveillance scans. Otherwise no CT evidence of metastatic disease to the chest. While exam was not tailored for evaluation of the pulmonary arteries I suspect there is pulmonary thromboembolic disease. Peritoneal carcinomatosis seen in the upper abdomen. Critical Result: Pulmonary Embolus.  3. CT C/A/P 3/4/19--Positive response to therapy. No new or progressive metastatic disease in the chest, abdomen or pelvis. Stable to improved findings include smaller pericardial lymph nodes, andrew hepatis adenopathy, peritoneal carcinomatosis, smaller left adnexal lesion; no evidence of PE within limitations of the study.  5. CT PE protocol chest/A/P 6/11/19--No PE, improved pericardial lymph node with minimal size on current examination, improved peritoneal carcinomatosis and left adnexal implant, splenic ischemia evolved into small infarct, size improvement of one of splenic hypodense lesion and mild size increase of second hypodense lesion, favored to be benign/cystic, no new findings.  6. PET/CT 9/25/19--s/p hysterectomy and bilateral oophorectomy, no evidence for locally recurrent/residual disease, intensely  hypermetabolic hypodense lesion within the spleen 3.5X3.3cm concerning for metastatic focus. No other abnormal focus of disease.  7. CT C/A/P 11/13/20--Right lower lobe 3.3 cm mass is presumed metastatic, otherwise no evident residual, recurrent or metastatic disease.   8. PET/CT 11/20/20--Hypermetabolic right lung base lesion and suspected metastatic periportal adenopathy, no additional sites of FDG-avid otherwise aggressive appearing pathology through the neck, chest, abdomen, or pelvis.  9. PET/CT 1/21/21--Interval decrease in size of the right lower lobe mass with decreased FDG uptake, now measures 1.7 x 2.2 cm (previous 3.3 x 3.1 cm), resolution of FDG uptake in the suspected periportal lymph node which is not identifiable on noncontrast CT. No evidence of new or progressive hypermetabolic metastatic disease.  10. PET/CT 3/30/21--Resolved right lower lung lobe hypermetabolic mass. Otherwise, no interval change or evidence of residual disease, recurrence or new metastatic lesion.  11. PET/CT 11/21/24--new FDG uptake in right hepatic lobe concerning for malignancy, measures 3.5cm with SUV 9.5, could represent a peritoneal implant vs. Hepatic metastasis.   12. PET/CT 1/24/25--persistent right hepatic lobe lesion measures 3.6cm, stable, with increasing FDG uptake SUV 14.6, previous 9.5.  13. CT C/A/P done 4/15/25--along periphery of right hepatic lobe, 2 lesions are present, larger of 2 at the padded dome measures 2.6X3.3cm (previous 2.7X2.4cm) is larger, more cystic appearing lesion along contour of right hepatic lobe measures 1.9X1cm, unchanged, no new lesion, chronic compression deformity of L1, similar since 1/24/25. No new suspicious bone lesion.   14. CT C/A/P done 7/11/25--Hepatic dome lesion measures 32 x 26 mm (series 12, image 12), previously 33 x 26 mm, subcapsular lesion along the lateral right liver measures 19 x 11 mm (series 12, image 18), previously 19 x 10 mm, no significant change, no new or  worsening findings.     Procedures:   1. Paracentesis on 12/28/18 with removal of 5 Liters.  2. Paracentesis on 01/07/19 with removal of 3.5 Liters.  3. Paracentesis on 02/06/19 with removal of 2.5 Liters.  4. Mediport placed 12/2020 by Dr. Serge Gentile--removed 8/26/21 due to mediport fracture.  5. IJ mediport placed 5/1/25 by Dr. Hwang.     Germline genetic testing done by [facility] 1/29/19--negative for BRCA1/2, two (2) variants of uncertain significance (VUS): CATHY p.[id number]/p.[id number] and MLH1 p.P603L.     :  12/19/18 1102  11/10/20  42.9  12/08/20 79.5  01/05/21 28.9  01/26/21 12.8  02/18/21 11.6  03/15/22--10.3  04/26/22--9.6  05/16/22--10.2  06/07/22--10.1  07/19/22--11.6  08/08/22--12.5  09/19/22--12.7  10/31/22--13.5  01/03/23--12.0  02/13/23--14.0  03/03/23--13.9  04/17/23--14.5  05/26/23--14.6  07/31/23--13.5  11/13/23--15.6  12/04/23--16.8  01/16/24--13.7  02/26/24--18.6  03/18/24--16.3  04/29/24--17.0  06/10/24--17.0  08/12/24--17.3  09/23/24--18.3  10/14/24--18.6  11/25/24--19.1  01/06/25--24.3  03/10/25--29.4  03/31/25--38.7  05/05/25--63.2  05/19/25--43.2  06/24/25--19.9    ECHO:  4/25/25 EF 65%.    Treatment History:  1. Neoadjuvant Carboplatin/Taxol every 3 weeks x 3 cycles. 1/15/19 - 2/26/19. Neulasta added.   2. Surgery--tumor debulking KORI/BSO 3/18/19.  3. Adjuvant Carboplatin/Taxol x 3 additions cycles 4/9/19--5/21/19.  4. Splenectomy 10/21/19.  5. Niraparib maintenance (dose reduction to 100mg daily for cytopenias) 11/8/19--12/1/20 (stopped due to progression).  6. Carboplatin/Paclitaxel/Avastin X 6 cycles completed 12/8/21--3/24/21 (complete response).  7. Avastin maintenance 4/13/21--4/21/25 (stopped due to progression).    Current Treatment:  1. Eliquis BID for incidental PE on imaging 1/2019.   2. Carboplatin/Doxil X 6 cycles. Started on 5/21/25. Delay due to mediport hematoma.   Neulasta added with C2  Cycle 3 due on 7/15/25.   3. Lynparza maintenance planned    CC:  Chief  Complaint   Patient presents with    Other Misc     Pt reports no concerns today.       HPI   Patient is here for treatment visit for her ovarian cancer. She is doing well. No complaints. Recent CT shows stable findings, no new disease. There was some lag time in between when her last CT was done and when her treatment was started. CA-125 has come down. Plan to continue with treatment and repeat imaging after 4 more chemotherapy cycles.     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      alendronate (FOSAMAX) 70 MG tablet Take 1 tablet (70 mg total) by mouth every 7 days. 4 tablet 11    aspirin (ECOTRIN) 81 MG EC tablet Take 81 mg by mouth once daily.      atorvastatin (LIPITOR) 40 MG tablet Take 1 tablet (40 mg total) by mouth once daily. 90 tablet 3    b complex vitamins capsule Take 1 capsule by mouth once daily.      calcium citrate-vitamin D3 315-200 mg (CITRACAL+D) 315 mg-5 mcg (200 unit) per tablet Take 1 tablet by mouth 2 (two) times daily.      dexAMETHasone (DECADRON) 4 MG Tab Take 2 tablets (8 mg total) by mouth once daily. on days 2-4 of each chemotherapy cycle. 6 tablet 5    ELIQUIS 5 mg Tab TAKE 1 TABLET BY MOUTH TWICE DAILY 180 tablet 0    GLUTAMINE ORAL Take 10 g by mouth  Daily.      lisinopriL (PRINIVIL,ZESTRIL) 20 MG tablet TAKE 1 TABLET(20 MG) BY MOUTH DAILY 30 tablet 3    OLANZapine (ZYPREXA) 5 MG tablet Take 1 tablet (5 mg total) by mouth every evening. on days 1-4 of each chemotherapy cycle. 4 tablet 5    prochlorperazine (COMPAZINE) 5 MG tablet Take 1 tablet (5 mg total) by mouth every 6 (six) hours as needed for Nausea. 20 tablet 5    pyridoxine, vitamin B6, (B-6) 250 MG Tab Take 250 mg by mouth once daily.       No current facility-administered medications on file prior to visit.      Review of Systems   Constitutional:  Positive for fatigue. Negative for appetite change and unexpected weight change.   HENT:  Negative for mouth sores.    Eyes:  Negative for visual disturbance.   Respiratory:  Negative for cough and shortness of breath.    Cardiovascular:  Negative for chest pain.   Gastrointestinal:  Negative for abdominal pain and diarrhea.   Genitourinary:  Negative for frequency.   Musculoskeletal:  Negative for back pain.   Integumentary:  Negative for rash.   Neurological:  Negative for headaches.   Hematological:  Negative for adenopathy.   Psychiatric/Behavioral:  The patient is not nervous/anxious.         Vitals:    07/14/25 1108   BP: (!) 147/79   Pulse: 63   Resp: 14   Temp: 98.4 °F (36.9 °C)         Wt Readings from Last 3 Encounters:   07/14/25 1108 52.7 kg (116 lb 1.6 oz)   06/24/25 1308 53.2 kg (117 lb 4.8 oz)   06/18/25 1422 52.5 kg (115 lb 11.9 oz)     Physical Exam  Vitals reviewed.   Constitutional:       Appearance: Normal appearance. She is normal weight.   HENT:      Head: Normocephalic.   Eyes:      General: Lids are normal. Vision grossly intact.      Extraocular Movements: Extraocular movements intact.      Conjunctiva/sclera: Conjunctivae normal.   Cardiovascular:      Rate and Rhythm: Normal rate and regular rhythm.      Pulses: Normal pulses.      Heart sounds: Normal heart sounds, S1 normal and S2 normal.   Pulmonary:      Effort: Pulmonary  effort is normal.      Breath sounds: Normal breath sounds.   Chest:      Comments: Right chest wall mediport in place with bruising resolving, swelling much improved  Abdominal:      General: Abdomen is flat. Bowel sounds are normal. There is no distension.      Palpations: Abdomen is soft.      Tenderness: There is no abdominal tenderness.   Musculoskeletal:      Cervical back: Normal range of motion and neck supple.      Right lower leg: No edema.      Left lower leg: No edema.   Skin:     General: Skin is warm and moist.      Capillary Refill: Capillary refill takes less than 2 seconds.      Comments: Left arm PICC line present, site intact.    Neurological:      General: No focal deficit present.      Mental Status: She is alert and oriented to person, place, and time.   Psychiatric:         Attention and Perception: Attention normal.         Mood and Affect: Mood normal.         Speech: Speech normal.         Behavior: Behavior normal. Behavior is cooperative.         Cognition and Memory: Cognition normal.         Judgment: Judgment normal.       Lab Visit on 07/14/2025   Component Date Value    WBC 07/14/2025 6.13     RBC 07/14/2025 3.14 (L)     Hgb 07/14/2025 10.5 (L)     Hct 07/14/2025 32.1 (L)     MCV 07/14/2025 102.2 (H)     MCH 07/14/2025 33.4 (H)     MCHC 07/14/2025 32.7 (L)     RDW 07/14/2025 18.5 (H)     Platelet 07/14/2025 462 (H)     MPV 07/14/2025 8.0     Neut % 07/14/2025 43.0     Lymph % 07/14/2025 34.7     Mono % 07/14/2025 20.6     Eos % 07/14/2025 0.8     Basophil % 07/14/2025 0.7     Imm Grans % 07/14/2025 0.2     Neut # 07/14/2025 2.64     Lymph # 07/14/2025 2.13     Mono # 07/14/2025 1.26     Eos # 07/14/2025 0.05     Baso # 07/14/2025 0.04     Imm Gran # 07/14/2025 0.01           Assessment:       1. Carcinoma of right fallopian tube    2. Malignant neoplasm metastatic to right lung    3. Secondary malignant neoplasm of liver    4. Anemia associated with chemotherapy        Plan:      Patient with left ovarian cancer diagnosed 12/26/18, appears to be stage IIIC vs. IV with diffuse peritoneal disease, possible splenic involvement, epicardial lymph nodes and bilateral pleural effusions.  Patient seen and evaluated by Dr. Mike Santana at [facility] in [place].   Treatment recommended upfront with chemotherapy Carboplatin/Paclitaxel x 3 cycles.  Completed Cycle 3 on 2/26/19.   She underwent total abdominal hysterectomy, BSO and tumor debulking on 3/18/19 with Dr. Santana with complete gross resection of disease. FIGO Stage IIIC papillary serous fallopian tube cancer.   Patient resumed chemotherapy with cycle 4 on 4/9/19. Completed cycle 6 on 5/21/19.    Testing on tumor was positive for genomic instability but negative for BRCA mutation.  Per NCCN guidelines, maintenance can be considered for BRCA mutated germline category 1 and somatic category 2A. There is some evidence that PARP inhibitors have some activity as well in tumors with genomic instability. But the PARP maintenance is not approved for this indication. Offered treatment to patient and after discussion she declined and made decision to continue with observation only.    PET/CT done for rising CA-125 showed hypermetabolic splenic lesion which is not new, just enlarged from previous. Case discussed with Dr. Mike Santana.  Patient is now s/p splenectomy done 10/21/19. Consistent with splenic involvement with high grade serous carcinoma.   Dr. Santana recommended Niraparib maintenance based on new data showing activity in HRD positive ovarian cancer and patient started on 11/8/19, required a dose reduction due to cytopenias.  Rising CA-125 noted in 11/2020. CT showed new RLL lung mass.   Follow-up PET done 11/20/20 showed RLL lung mass hypermetabolic and hypermetabolic periportal adenopathy.     Recommended 2nd line treatment with Carboplatin/Taxol/Avastin.   Started cycle 1 on 12/8/20. Neulasta added with cycle 2.   Completed Cycle 6 on  3/24/21.  PET from 3/30/21 showed complete resolution of lung mass and no other disease.    Avastin maintenance started on 4/13/21.   Hospitalized for TIA after her 4th cycle of Avastin. Work-up for stroke and cause otherwise negative. Patient started on baby ASA by neurology.  Discussed there are no clear guidelines for changing/discontinuing treatment for TIA/CVA related to Avastin.  Since her symptoms resolved and there were no residual effects, recommended to continue with Avastin since it is working well for her disease and since a baby ASA was added for prevention. She was also continued on Eliquis for h/o PE.  S/p mediport removal for fracture on 8/26/21.   Continue Avastin through peripheral veins for now.  PET was done in 11/2024 due to rising CA-125 although it had remained WNL. Scan showed lesion near superior portion of liver.  CT-biopsy attempted but was non-diagnostic.     Since CA-125 is not outside of normal limits, and no way to tell how long there liver lesion has been present, since no scan done since 2021.  Recommended to monitor closely.    Repeat PET/CT done 1/24/25 with stable liver lesion in size, increased SUV, no other disease.  Again recommended to continue with current treatment since stable and monitor very closely.     Repeat CT C/A/P from 4/15/25 with slight enlargement of one of the liver lesions. Insurance denied PET and says she had reached her lifetime PET limit.  CA-125 also continued rising. These lesions remain in a difficult location for biopsy. Will forgo biopsy and proceed with treatment.   Recommend treatment with Carboplatin and Doxil X 6 cycles.    S/p mediport placement with complication of hematoma. PICC placed then removed and mediport okay to use.    Started Cycle 1 on 5/21/25. Delayed due to mediport issues.  Now s/p C2, neulasta added with C2. Patient tolerating treatment very well aside from fatigue.  Repeat CT C/A/P done 7/11/25 with stable findings. Suspect this  is due to treatment being slightly delayed. Hope to see response after next scan.   CA-125 has been coming down.    CBC today with mild anemia, otherwise good. CMP pending. Last CA-125 now WNL.   Proceed with C3 tomorrow.  F/u visit in 1 week labs and toxicity check.    Plan to repeat CT C/A/P after C6 and then, plan for maintenance PARP, switch to Lynparza.    ECHO done 4/25/25--EF 65%.  Vaccines/Boosters post-splenectomy: Menactra/Menveo every 5 years, Bexsero every 2-3 years, annual flu shot.  Last Bexsero was given 8/1/23. Next due in 2026.  Received Menactra/Menveo in 9/2024, 5 years after initial.   Continue Eliquis 5mg BID(h/o PE in 2019) and ASA.    All questions answered.      Renetta Corral MD        - code applied: patient requires or will require a continuous, longitudinal, and active collaborative plan of care related to this patient's health condition, metastatic ovarian cancer--the management of which requires the direction of a practitioner with specialized clinical knowledge, skill, and expertise, including monitoring of side effects of high risk medications (chemotherapy).

## 2025-07-11 ENCOUNTER — HOSPITAL ENCOUNTER (OUTPATIENT)
Dept: RADIOLOGY | Facility: HOSPITAL | Age: 75
Discharge: HOME | End: 2025-07-11
Attending: INTERNAL MEDICINE
Payer: MEDICARE

## 2025-07-11 DIAGNOSIS — C78.7 SECONDARY MALIGNANT NEOPLASM OF LIVER: ICD-10-CM

## 2025-07-11 DIAGNOSIS — C57.01 CARCINOMA OF RIGHT FALLOPIAN TUBE: ICD-10-CM

## 2025-07-11 DIAGNOSIS — C78.01 MALIGNANT NEOPLASM METASTATIC TO RIGHT LUNG: ICD-10-CM

## 2025-07-11 PROCEDURE — 71260 CT THORAX DX C+: CPT | Mod: TC

## 2025-07-11 PROCEDURE — 25500020 PHARM REV CODE 255: Performed by: INTERNAL MEDICINE

## 2025-07-11 RX ORDER — DIATRIZOATE MEGLUMINE AND DIATRIZOATE SODIUM 660; 100 MG/ML; MG/ML
30 SOLUTION ORAL; RECTAL
Status: COMPLETED | OUTPATIENT
Start: 2025-07-11 | End: 2025-07-11

## 2025-07-11 RX ADMIN — DIATRIZOATE MEGLUMINE AND DIATRIZOATE SODIUM 30 ML: 660; 100 LIQUID ORAL; RECTAL at 07:07

## 2025-07-11 RX ADMIN — IOHEXOL 100 ML: 350 INJECTION, SOLUTION INTRAVENOUS at 08:07

## 2025-07-14 ENCOUNTER — OFFICE VISIT (OUTPATIENT)
Dept: HEMATOLOGY/ONCOLOGY | Facility: CLINIC | Age: 75
End: 2025-07-14
Payer: MEDICARE

## 2025-07-14 ENCOUNTER — LAB VISIT (OUTPATIENT)
Dept: LAB | Facility: HOSPITAL | Age: 75
End: 2025-07-14
Attending: INTERNAL MEDICINE
Payer: MEDICARE

## 2025-07-14 VITALS
DIASTOLIC BLOOD PRESSURE: 79 MMHG | WEIGHT: 116.13 LBS | RESPIRATION RATE: 14 BRPM | OXYGEN SATURATION: 99 % | HEART RATE: 63 BPM | HEIGHT: 63 IN | BODY MASS INDEX: 20.58 KG/M2 | TEMPERATURE: 98 F | SYSTOLIC BLOOD PRESSURE: 147 MMHG

## 2025-07-14 DIAGNOSIS — C78.01 MALIGNANT NEOPLASM METASTATIC TO RIGHT LUNG: ICD-10-CM

## 2025-07-14 DIAGNOSIS — D64.81 ANEMIA ASSOCIATED WITH CHEMOTHERAPY: ICD-10-CM

## 2025-07-14 DIAGNOSIS — T45.1X5A ANEMIA ASSOCIATED WITH CHEMOTHERAPY: ICD-10-CM

## 2025-07-14 DIAGNOSIS — C78.7 SECONDARY MALIGNANT NEOPLASM OF LIVER: ICD-10-CM

## 2025-07-14 DIAGNOSIS — T45.1X5A AGRANULOCYTOSIS SECONDARY TO CANCER CHEMOTHERAPY: ICD-10-CM

## 2025-07-14 DIAGNOSIS — D84.821 IMMUNODEFICIENCY DUE TO DRUGS: ICD-10-CM

## 2025-07-14 DIAGNOSIS — D70.1 AGRANULOCYTOSIS SECONDARY TO CANCER CHEMOTHERAPY: ICD-10-CM

## 2025-07-14 DIAGNOSIS — C57.01 CARCINOMA OF RIGHT FALLOPIAN TUBE: ICD-10-CM

## 2025-07-14 DIAGNOSIS — Z79.899 IMMUNODEFICIENCY DUE TO DRUGS: ICD-10-CM

## 2025-07-14 DIAGNOSIS — C57.01 CARCINOMA OF RIGHT FALLOPIAN TUBE: Primary | ICD-10-CM

## 2025-07-14 LAB
ALBUMIN SERPL-MCNC: 3.5 G/DL (ref 3.4–4.8)
ALBUMIN/GLOB SERPL: 1.3 RATIO (ref 1.1–2)
ALP SERPL-CCNC: 52 UNIT/L (ref 40–150)
ALT SERPL-CCNC: 23 UNIT/L (ref 0–55)
ANION GAP SERPL CALC-SCNC: 7 MEQ/L
AST SERPL-CCNC: 41 UNIT/L (ref 11–45)
BASOPHILS # BLD AUTO: 0.04 X10(3)/MCL
BASOPHILS NFR BLD AUTO: 0.7 %
BILIRUB SERPL-MCNC: 0.6 MG/DL
BUN SERPL-MCNC: 11.4 MG/DL (ref 9.8–20.1)
CALCIUM SERPL-MCNC: 9 MG/DL (ref 8.4–10.2)
CHLORIDE SERPL-SCNC: 104 MMOL/L (ref 98–107)
CO2 SERPL-SCNC: 23 MMOL/L (ref 23–31)
CREAT SERPL-MCNC: 0.65 MG/DL (ref 0.55–1.02)
CREAT/UREA NIT SERPL: 18
EOSINOPHIL # BLD AUTO: 0.05 X10(3)/MCL (ref 0–0.9)
EOSINOPHIL NFR BLD AUTO: 0.8 %
ERYTHROCYTE [DISTWIDTH] IN BLOOD BY AUTOMATED COUNT: 18.5 % (ref 11.5–17)
GFR SERPLBLD CREATININE-BSD FMLA CKD-EPI: >60 ML/MIN/1.73/M2
GLOBULIN SER-MCNC: 2.8 GM/DL (ref 2.4–3.5)
GLUCOSE SERPL-MCNC: 94 MG/DL (ref 82–115)
HCT VFR BLD AUTO: 32.1 % (ref 37–47)
HGB BLD-MCNC: 10.5 G/DL (ref 12–16)
IMM GRANULOCYTES # BLD AUTO: 0.01 X10(3)/MCL (ref 0–0.04)
IMM GRANULOCYTES NFR BLD AUTO: 0.2 %
LYMPHOCYTES # BLD AUTO: 2.13 X10(3)/MCL (ref 0.6–4.6)
LYMPHOCYTES NFR BLD AUTO: 34.7 %
MCH RBC QN AUTO: 33.4 PG (ref 27–31)
MCHC RBC AUTO-ENTMCNC: 32.7 G/DL (ref 33–36)
MCV RBC AUTO: 102.2 FL (ref 80–94)
MONOCYTES # BLD AUTO: 1.26 X10(3)/MCL (ref 0.1–1.3)
MONOCYTES NFR BLD AUTO: 20.6 %
NEUTROPHILS # BLD AUTO: 2.64 X10(3)/MCL (ref 2.1–9.2)
NEUTROPHILS NFR BLD AUTO: 43 %
PLATELET # BLD AUTO: 462 X10(3)/MCL (ref 130–400)
PMV BLD AUTO: 8 FL (ref 7.4–10.4)
POTASSIUM SERPL-SCNC: 5 MMOL/L (ref 3.5–5.1)
PROT SERPL-MCNC: 6.3 GM/DL (ref 5.8–7.6)
RBC # BLD AUTO: 3.14 X10(6)/MCL (ref 4.2–5.4)
SODIUM SERPL-SCNC: 134 MMOL/L (ref 136–145)
WBC # BLD AUTO: 6.13 X10(3)/MCL (ref 4.5–11.5)

## 2025-07-14 PROCEDURE — 1159F MED LIST DOCD IN RCRD: CPT | Mod: CPTII,S$GLB,, | Performed by: INTERNAL MEDICINE

## 2025-07-14 PROCEDURE — 1126F AMNT PAIN NOTED NONE PRSNT: CPT | Mod: CPTII,S$GLB,, | Performed by: INTERNAL MEDICINE

## 2025-07-14 PROCEDURE — 3077F SYST BP >= 140 MM HG: CPT | Mod: CPTII,S$GLB,, | Performed by: INTERNAL MEDICINE

## 2025-07-14 PROCEDURE — 99999 PR PBB SHADOW E&M-EST. PATIENT-LVL IV: CPT | Mod: PBBFAC,,, | Performed by: INTERNAL MEDICINE

## 2025-07-14 PROCEDURE — 1101F PT FALLS ASSESS-DOCD LE1/YR: CPT | Mod: CPTII,S$GLB,, | Performed by: INTERNAL MEDICINE

## 2025-07-14 PROCEDURE — 85025 COMPLETE CBC W/AUTO DIFF WBC: CPT

## 2025-07-14 PROCEDURE — 80053 COMPREHEN METABOLIC PANEL: CPT

## 2025-07-14 PROCEDURE — 3078F DIAST BP <80 MM HG: CPT | Mod: CPTII,S$GLB,, | Performed by: INTERNAL MEDICINE

## 2025-07-14 PROCEDURE — 99215 OFFICE O/P EST HI 40 MIN: CPT | Mod: S$GLB,,, | Performed by: INTERNAL MEDICINE

## 2025-07-14 PROCEDURE — 3288F FALL RISK ASSESSMENT DOCD: CPT | Mod: CPTII,S$GLB,, | Performed by: INTERNAL MEDICINE

## 2025-07-14 PROCEDURE — G2211 COMPLEX E/M VISIT ADD ON: HCPCS | Mod: S$GLB,,, | Performed by: INTERNAL MEDICINE

## 2025-07-14 PROCEDURE — 1160F RVW MEDS BY RX/DR IN RCRD: CPT | Mod: CPTII,S$GLB,, | Performed by: INTERNAL MEDICINE

## 2025-07-14 PROCEDURE — 36415 COLL VENOUS BLD VENIPUNCTURE: CPT

## 2025-07-14 RX ORDER — SODIUM CHLORIDE 0.9 % (FLUSH) 0.9 %
10 SYRINGE (ML) INJECTION
Status: CANCELLED | OUTPATIENT
Start: 2025-07-15

## 2025-07-14 RX ORDER — HEPARIN 100 UNIT/ML
500 SYRINGE INTRAVENOUS
Status: CANCELLED | OUTPATIENT
Start: 2025-07-15

## 2025-07-14 RX ORDER — PROCHLORPERAZINE EDISYLATE 5 MG/ML
5 INJECTION, SOLUTION INTRAMUSCULAR; INTRAVENOUS ONCE AS NEEDED
Status: CANCELLED | OUTPATIENT
Start: 2025-07-15

## 2025-07-14 RX ORDER — EPINEPHRINE 0.3 MG/.3ML
0.3 INJECTION SUBCUTANEOUS ONCE AS NEEDED
Status: CANCELLED | OUTPATIENT
Start: 2025-07-15

## 2025-07-14 RX ORDER — DIPHENHYDRAMINE HYDROCHLORIDE 50 MG/ML
50 INJECTION, SOLUTION INTRAMUSCULAR; INTRAVENOUS ONCE AS NEEDED
Status: CANCELLED | OUTPATIENT
Start: 2025-07-15

## 2025-07-15 ENCOUNTER — INFUSION (OUTPATIENT)
Dept: INFUSION THERAPY | Facility: HOSPITAL | Age: 75
End: 2025-07-15
Attending: INTERNAL MEDICINE
Payer: MEDICARE

## 2025-07-15 VITALS
OXYGEN SATURATION: 100 % | HEIGHT: 63 IN | BODY MASS INDEX: 20.58 KG/M2 | TEMPERATURE: 98 F | SYSTOLIC BLOOD PRESSURE: 158 MMHG | RESPIRATION RATE: 18 BRPM | WEIGHT: 116.13 LBS | DIASTOLIC BLOOD PRESSURE: 87 MMHG | HEART RATE: 80 BPM

## 2025-07-15 DIAGNOSIS — C57.01 CARCINOMA OF RIGHT FALLOPIAN TUBE: Primary | ICD-10-CM

## 2025-07-15 PROCEDURE — 96417 CHEMO IV INFUS EACH ADDL SEQ: CPT

## 2025-07-15 PROCEDURE — 96367 TX/PROPH/DG ADDL SEQ IV INF: CPT

## 2025-07-15 PROCEDURE — 25000003 PHARM REV CODE 250: Performed by: INTERNAL MEDICINE

## 2025-07-15 PROCEDURE — 96413 CHEMO IV INFUSION 1 HR: CPT

## 2025-07-15 PROCEDURE — A4216 STERILE WATER/SALINE, 10 ML: HCPCS | Performed by: INTERNAL MEDICINE

## 2025-07-15 PROCEDURE — 63600175 PHARM REV CODE 636 W HCPCS: Performed by: INTERNAL MEDICINE

## 2025-07-15 RX ORDER — PROCHLORPERAZINE EDISYLATE 5 MG/ML
5 INJECTION INTRAMUSCULAR; INTRAVENOUS ONCE AS NEEDED
Status: DISCONTINUED | OUTPATIENT
Start: 2025-07-15 | End: 2025-07-15 | Stop reason: HOSPADM

## 2025-07-15 RX ORDER — DIPHENHYDRAMINE HYDROCHLORIDE 50 MG/ML
50 INJECTION, SOLUTION INTRAMUSCULAR; INTRAVENOUS ONCE AS NEEDED
Status: DISCONTINUED | OUTPATIENT
Start: 2025-07-15 | End: 2025-07-15 | Stop reason: HOSPADM

## 2025-07-15 RX ORDER — HEPARIN 100 UNIT/ML
500 SYRINGE INTRAVENOUS
Status: DISCONTINUED | OUTPATIENT
Start: 2025-07-15 | End: 2025-07-15 | Stop reason: HOSPADM

## 2025-07-15 RX ORDER — EPINEPHRINE 0.3 MG/.3ML
0.3 INJECTION SUBCUTANEOUS ONCE AS NEEDED
Status: DISCONTINUED | OUTPATIENT
Start: 2025-07-15 | End: 2025-07-15 | Stop reason: HOSPADM

## 2025-07-15 RX ORDER — SODIUM CHLORIDE 0.9 % (FLUSH) 0.9 %
10 SYRINGE (ML) INJECTION
Status: DISCONTINUED | OUTPATIENT
Start: 2025-07-15 | End: 2025-07-15 | Stop reason: HOSPADM

## 2025-07-15 RX ADMIN — DEXTROSE MONOHYDRATE 46 MG: 5 INJECTION, SOLUTION INTRAVENOUS at 02:07

## 2025-07-15 RX ADMIN — HEPARIN 500 UNITS: 100 SYRINGE at 03:07

## 2025-07-15 RX ADMIN — Medication 10 ML: at 02:07

## 2025-07-15 RX ADMIN — DEXTROSE MONOHYDRATE: 50 INJECTION, SOLUTION INTRAVENOUS at 01:07

## 2025-07-15 RX ADMIN — CARBOPLATIN 350 MG: 10 INJECTION, SOLUTION INTRAVENOUS at 03:07

## 2025-07-15 RX ADMIN — Medication 10 ML: at 01:07

## 2025-07-15 RX ADMIN — DEXAMETHASONE SODIUM PHOSPHATE 0.25 MG: 4 INJECTION, SOLUTION INTRA-ARTICULAR; INTRALESIONAL; INTRAMUSCULAR; INTRAVENOUS; SOFT TISSUE at 01:07

## 2025-07-15 RX ADMIN — Medication 10 ML: at 03:07

## 2025-07-15 RX ADMIN — SODIUM CHLORIDE 150 MG: 900 INJECTION, SOLUTION INTRAVENOUS at 01:07

## 2025-07-15 NOTE — NURSING
C3 D1 Carbo/Doxil given, tolerated well. Pt discharged home in stable condition, aware of future appts.

## 2025-07-15 NOTE — PROGRESS NOTES
Subjective:       Patient ID: Shayna Ledezma is a 74 y.o. female.  GI: Dr. Gamaliel Carlos  GYN ONC at [facility]: Dr. Mike Santana    1. 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 Pulmonary Embolism (Incidental on CT)--19    Procedure/pathology:   1. Paracentesis with removal of 5L of fluid done 18--serous carcinoma, high grade, c/w ovarian primary, PAX-8, CK7 and MOC-31 positive, CDX-2, CK-20 and Calretinin-negative.  2. S/p Exploratory Lap, radical tumor debulking including total abdominal hysterectomy, bilateral salpingo-oophorectomy, bilateral pelvic lymph node sampling, appendectomy, mobilization of the left ureter, complete gross resection of the disease with removal of mesenteric disease, as well as omental disese and parasplenic disease by Dr. Santana 3/18/19--Metastatic high grade serous carcinoma. Tissue/organ involvement: right ovary, appendectomy, omentum, mesentery (including small bowel mesentery) and multiple other sites designated: periumbilical, perisplenic, transverse colon, splenic flexure, perirectal, left periureteral. 2 lymph nodes negative. [id number]. FIGO stage IIIC. Myriad somatic instability testing positive for genomic instability, negative BRCA1 and BRCA2 tumor testing.  3. Splenectomy done 10/21/19--splenic involvement with metastatic high grade serous carcinoma, 5 benign lymph nodes. Caris testing showed LAM, NTRK1/2/3 negative, TMB low, BRCA1/2 negative, ER/IL negative, CATHY negative, SPEN pathogenic variant Exon 11, TP53 pathogenic variant Exon 5, PD-L1 positive CPS 15, Genomic ELODIA high.  4. CT-biopsy done 24--non-diagnostic, no malignancy cells present.   Imagin. CT A/P w/ and w/o contrast done at [facility] 18--large volume ascites with multiple peritoneal implants, right pericolic gutter implant measures 2.5X3.3cm, LUQ omental/mesenteric implant measures 3X3.6cm, omental carcinomatosis, extensive  enhancing soft tissue surrounding sigmoid colon vs. large sigmoid mass, left ovarian cystic lesion appears to have some internal septations measures 3.5X3.8cm, enhancing periportal soft tissue density likely lymphadenopathy with some narrowing of the portal vein noted, but without internal filling defect, borderline splenomegaly, subtle solid lesion w/n central spleen measures 2.4X2.5cm, likely metastatic, with peripheral area of diminished enhancement suggesting splenic infarct, soft tissue implant abutting gallbladder measures 2X2.2cm, no right adnexal mass, trace bilateral layering pleural fluid, small moderate-sized hiatal hernia, few borderline enlarged lymph nodes w/n right epicardial fat pad.  2. CT of chest on 1/11/19--Some prominent right cardiophrenic lymph nodes are nonspecific and can be followed on future surveillance scans. Otherwise no CT evidence of metastatic disease to the chest. While exam was not tailored for evaluation of the pulmonary arteries I suspect there is pulmonary thromboembolic disease. Peritoneal carcinomatosis seen in the upper abdomen. Critical Result: Pulmonary Embolus.  3. CT C/A/P 3/4/19--Positive response to therapy. No new or progressive metastatic disease in the chest, abdomen or pelvis. Stable to improved findings include smaller pericardial lymph nodes, andrew hepatis adenopathy, peritoneal carcinomatosis, smaller left adnexal lesion; no evidence of PE within limitations of the study.  5. CT PE protocol chest/A/P 6/11/19--No PE, improved pericardial lymph node with minimal size on current examination, improved peritoneal carcinomatosis and left adnexal implant, splenic ischemia evolved into small infarct, size improvement of one of splenic hypodense lesion and mild size increase of second hypodense lesion, favored to be benign/cystic, no new findings.  6. PET/CT 9/25/19--s/p hysterectomy and bilateral oophorectomy, no evidence for locally recurrent/residual disease, intensely  hypermetabolic hypodense lesion within the spleen 3.5X3.3cm concerning for metastatic focus. No other abnormal focus of disease.  7. CT C/A/P 11/13/20--Right lower lobe 3.3 cm mass is presumed metastatic, otherwise no evident residual, recurrent or metastatic disease.   8. PET/CT 11/20/20--Hypermetabolic right lung base lesion and suspected metastatic periportal adenopathy, no additional sites of FDG-avid otherwise aggressive appearing pathology through the neck, chest, abdomen, or pelvis.  9. PET/CT 1/21/21--Interval decrease in size of the right lower lobe mass with decreased FDG uptake, now measures 1.7 x 2.2 cm (previous 3.3 x 3.1 cm), resolution of FDG uptake in the suspected periportal lymph node which is not identifiable on noncontrast CT. No evidence of new or progressive hypermetabolic metastatic disease.  10. PET/CT 3/30/21--Resolved right lower lung lobe hypermetabolic mass. Otherwise, no interval change or evidence of residual disease, recurrence or new metastatic lesion.  11. PET/CT 11/21/24--new FDG uptake in right hepatic lobe concerning for malignancy, measures 3.5cm with SUV 9.5, could represent a peritoneal implant vs. Hepatic metastasis.   12. PET/CT 1/24/25--persistent right hepatic lobe lesion measures 3.6cm, stable, with increasing FDG uptake SUV 14.6, previous 9.5.  13. CT C/A/P done 4/15/25--along periphery of right hepatic lobe, 2 lesions are present, larger of 2 at the padded dome measures 2.6X3.3cm (previous 2.7X2.4cm) is larger, more cystic appearing lesion along contour of right hepatic lobe measures 1.9X1cm, unchanged, no new lesion, chronic compression deformity of L1, similar since 1/24/25. No new suspicious bone lesion.   14. CT C/A/P done 7/11/25--Hepatic dome lesion measures 32 x 26 mm (series 12, image 12), previously 33 x 26 mm, subcapsular lesion along the lateral right liver measures 19 x 11 mm (series 12, image 18), previously 19 x 10 mm, no significant change, no new or  worsening findings.     Procedures:   1. Paracentesis on 12/28/18 with removal of 5 Liters.  2. Paracentesis on 01/07/19 with removal of 3.5 Liters.  3. Paracentesis on 02/06/19 with removal of 2.5 Liters.  4. Mediport placed 12/2020 by Dr. Serge Gentile--removed 8/26/21 due to mediport fracture.  5. IJ mediport placed 5/1/25 by Dr. Hwang.     Germline genetic testing done by [facility] 1/29/19--negative for BRCA1/2, two (2) variants of uncertain significance (VUS): CATHY p.[id number]/p.[id number] and MLH1 p.P603L.     :  12/19/18 1102  11/10/20  42.9  12/08/20 79.5  01/05/21 28.9  01/26/21 12.8  02/18/21 11.6  03/15/22--10.3  04/26/22--9.6  05/16/22--10.2  06/07/22--10.1  07/19/22--11.6  08/08/22--12.5  09/19/22--12.7  10/31/22--13.5  01/03/23--12.0  02/13/23--14.0  03/03/23--13.9  04/17/23--14.5  05/26/23--14.6  07/31/23--13.5  11/13/23--15.6  12/04/23--16.8  01/16/24--13.7  02/26/24--18.6  03/18/24--16.3  04/29/24--17.0  06/10/24--17.0  08/12/24--17.3  09/23/24--18.3  10/14/24--18.6  11/25/24--19.1  01/06/25--24.3  03/10/25--29.4  03/31/25--38.7  05/05/25--63.2  05/19/25--43.2  06/24/25--19.9    ECHO:  4/25/25 EF 65%.    Treatment History:  1. Neoadjuvant Carboplatin/Taxol every 3 weeks x 3 cycles. 1/15/19 - 2/26/19. Neulasta added.   2. Surgery--tumor debulking KORI/BSO 3/18/19.  3. Adjuvant Carboplatin/Taxol x 3 additions cycles 4/9/19--5/21/19.  4. Splenectomy 10/21/19.  5. Niraparib maintenance (dose reduction to 100mg daily for cytopenias) 11/8/19--12/1/20 (stopped due to progression).  6. Carboplatin/Paclitaxel/Avastin X 6 cycles completed 12/8/21--3/24/21 (complete response).  7. Avastin maintenance 4/13/21--4/21/25 (stopped due to progression).    Current Treatment:  1. Eliquis BID for incidental PE on imaging 1/2019.   2. Carboplatin/Doxil X 6 cycles. Started on 5/21/25. Delay due to mediport hematoma.   Neulasta added with C2  Cycle 4 due on 8/12/25.   3. Lynparza maintenance planned    CC:  Chief  Complaint   Patient presents with    Other Misc     Pt reports no concerns today.       HPI   Patient is here for follow-up visit for her ovarian cancer, on treatment with Carboplatin and Doxil. She is doing well, received cycle 3 last week. Reports having a little nausea 1X relieved with medication, but otherwise she did very well. No other new complaints.     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      alendronate (FOSAMAX) 70 MG tablet Take 1 tablet (70 mg total) by mouth every 7 days. 4 tablet 11    aspirin (ECOTRIN) 81 MG EC tablet Take 81 mg by mouth once daily.      atorvastatin (LIPITOR) 40 MG tablet Take 1 tablet (40 mg total) by mouth once daily. 90 tablet 3    b complex vitamins capsule Take 1 capsule by mouth once daily.      calcium citrate-vitamin D3 315-200 mg (CITRACAL+D) 315 mg-5 mcg (200 unit) per tablet Take 1 tablet by mouth 2 (two) times daily.      ELIQUIS 5 mg Tab TAKE 1 TABLET BY MOUTH TWICE DAILY 180 tablet 0    GLUTAMINE ORAL Take 10 g by mouth Daily.      lisinopriL (PRINIVIL,ZESTRIL) 20 MG tablet TAKE 1 TABLET(20 MG) BY MOUTH DAILY 30 tablet 3    prochlorperazine (COMPAZINE) 5 MG tablet Take 1 tablet (5 mg total) by mouth every 6 (six) hours as needed for  Nausea. 20 tablet 5    pyridoxine, vitamin B6, (B-6) 250 MG Tab Take 250 mg by mouth once daily.      dexAMETHasone (DECADRON) 4 MG Tab Take 2 tablets (8 mg total) by mouth once daily. on days 2-4 of each chemotherapy cycle. (Patient not taking: Reported on 7/22/2025) 6 tablet 5    OLANZapine (ZYPREXA) 5 MG tablet Take 1 tablet (5 mg total) by mouth every evening. on days 1-4 of each chemotherapy cycle. (Patient not taking: Reported on 7/22/2025) 4 tablet 5     No current facility-administered medications on file prior to visit.      Review of Systems   Constitutional:  Positive for fatigue. Negative for appetite change and unexpected weight change.   HENT:  Negative for mouth sores.    Eyes:  Negative for visual disturbance.   Respiratory:  Negative for cough and shortness of breath.    Cardiovascular:  Negative for chest pain.   Gastrointestinal:  Positive for nausea. Negative for abdominal pain and diarrhea.   Genitourinary:  Negative for frequency.   Musculoskeletal:  Negative for back pain.   Integumentary:  Negative for rash.   Neurological:  Negative for headaches.   Hematological:  Negative for adenopathy.   Psychiatric/Behavioral:  The patient is not nervous/anxious.         Vitals:    07/22/25 0907   BP: 137/81   Pulse: 62   Resp: 14   Temp: 97.7 °F (36.5 °C)       Wt Readings from Last 3 Encounters:   07/22/25 0907 53.3 kg (117 lb 8 oz)   07/15/25 1258 52.7 kg (116 lb 1.6 oz)   07/14/25 1108 52.7 kg (116 lb 1.6 oz)     Physical Exam  Vitals reviewed.   Constitutional:       Appearance: Normal appearance. She is normal weight.   HENT:      Head: Normocephalic.   Eyes:      General: Lids are normal. Vision grossly intact.      Extraocular Movements: Extraocular movements intact.      Conjunctiva/sclera: Conjunctivae normal.   Cardiovascular:      Rate and Rhythm: Normal rate and regular rhythm.      Pulses: Normal pulses.      Heart sounds: Normal heart sounds, S1 normal and S2 normal.   Pulmonary:       Effort: Pulmonary effort is normal.      Breath sounds: Normal breath sounds.   Chest:      Comments: Right chest wall mediport in place with bruising resolved, swelling resolved  Abdominal:      General: Abdomen is flat. Bowel sounds are normal. There is no distension.      Palpations: Abdomen is soft.      Tenderness: There is no abdominal tenderness.   Musculoskeletal:      Cervical back: Normal range of motion and neck supple.      Right lower leg: No edema.      Left lower leg: No edema.   Skin:     General: Skin is warm and moist.      Capillary Refill: Capillary refill takes less than 2 seconds.      Comments: Left arm PICC line present, site intact.    Neurological:      General: No focal deficit present.      Mental Status: She is alert and oriented to person, place, and time.   Psychiatric:         Attention and Perception: Attention normal.         Mood and Affect: Mood normal.         Speech: Speech normal.         Behavior: Behavior normal. Behavior is cooperative.         Cognition and Memory: Cognition normal.         Judgment: Judgment normal.       Lab Visit on 07/22/2025   Component Date Value    WBC 07/22/2025 35.21 (H)     RBC 07/22/2025 3.12 (L)     Hgb 07/22/2025 10.5 (L)     Hct 07/22/2025 32.3 (L)     MCV 07/22/2025 103.5 (H)     MCH 07/22/2025 33.7 (H)     MCHC 07/22/2025 32.5 (L)     RDW 07/22/2025 18.7 (H)     Platelet 07/22/2025 410 (H)     MPV 07/22/2025 8.8     Neut % 07/22/2025 79.9     Lymph % 07/22/2025 6.5     Mono % 07/22/2025 9.8     Eos % 07/22/2025 0.2     Basophil % 07/22/2025 0.0     Imm Grans % 07/22/2025 3.6     Neut # 07/22/2025 28.11 (H)     Lymph # 07/22/2025 2.30     Mono # 07/22/2025 3.45 (H)     Eos # 07/22/2025 0.07     Baso # 07/22/2025 0.01     Imm Gran # 07/22/2025 1.27 (H)           Assessment:       1. Carcinoma of right fallopian tube    2. Malignant neoplasm metastatic to right lung    3. Secondary malignant neoplasm of liver    4. Anemia associated with  chemotherapy        Plan:     Patient with left ovarian cancer diagnosed 12/26/18, appears to be stage IIIC vs. IV with diffuse peritoneal disease, possible splenic involvement, epicardial lymph nodes and bilateral pleural effusions.  Patient seen and evaluated by Dr. Mike Santana at [facility] in [place].   Treatment recommended upfront with chemotherapy Carboplatin/Paclitaxel x 3 cycles.  Completed Cycle 3 on 2/26/19.   She underwent total abdominal hysterectomy, BSO and tumor debulking on 3/18/19 with Dr. Santana with complete gross resection of disease. FIGO Stage IIIC papillary serous fallopian tube cancer.   Patient resumed chemotherapy with cycle 4 on 4/9/19. Completed cycle 6 on 5/21/19.    Testing on tumor was positive for genomic instability but negative for BRCA mutation.  Per NCCN guidelines, maintenance can be considered for BRCA mutated germline category 1 and somatic category 2A. There is some evidence that PARP inhibitors have some activity as well in tumors with genomic instability. But the PARP maintenance is not approved for this indication. Offered treatment to patient and after discussion she declined and made decision to continue with observation only.    PET/CT done for rising CA-125 showed hypermetabolic splenic lesion which is not new, just enlarged from previous. Case discussed with Dr. Mike Santana.  Patient is now s/p splenectomy done 10/21/19. Consistent with splenic involvement with high grade serous carcinoma.   Dr. Santana recommended Niraparib maintenance based on new data showing activity in HRD positive ovarian cancer and patient started on 11/8/19, required a dose reduction due to cytopenias.  Rising CA-125 noted in 11/2020. CT showed new RLL lung mass.   Follow-up PET done 11/20/20 showed RLL lung mass hypermetabolic and hypermetabolic periportal adenopathy.     Recommended 2nd line treatment with Carboplatin/Taxol/Avastin.   Started cycle 1 on 12/8/20. Neulasta added with  cycle 2.   Completed Cycle 6 on 3/24/21.  PET from 3/30/21 showed complete resolution of lung mass and no other disease.    Avastin maintenance started on 4/13/21.   Hospitalized for TIA after her 4th cycle of Avastin. Work-up for stroke and cause otherwise negative. Patient started on baby ASA by neurology.  Discussed there are no clear guidelines for changing/discontinuing treatment for TIA/CVA related to Avastin.  Since her symptoms resolved and there were no residual effects, recommended to continue with Avastin since it is working well for her disease and since a baby ASA was added for prevention. She was also continued on Eliquis for h/o PE.  S/p mediport removal for fracture on 8/26/21.   Continue Avastin through peripheral veins for now.  PET was done in 11/2024 due to rising CA-125 although it had remained WNL. Scan showed lesion near superior portion of liver.  CT-biopsy attempted but was non-diagnostic.     Since CA-125 is not outside of normal limits, and no way to tell how long there liver lesion has been present, since no scan done since 2021.  Recommended to monitor closely.    Repeat PET/CT done 1/24/25 with stable liver lesion in size, increased SUV, no other disease.  Again recommended to continue with current treatment since stable and monitor very closely.     Repeat CT C/A/P from 4/15/25 with slight enlargement of one of the liver lesions. Insurance denied PET and says she had reached her lifetime PET limit.  CA-125 also continued rising. These lesions remain in a difficult location for biopsy. Will forgo biopsy and proceed with treatment.   Recommend treatment with Carboplatin and Doxil X 6 cycles.    S/p mediport placement with complication of hematoma. PICC placed then removed and mediport okay to use.    Started Cycle 1 on 5/21/25. Delayed due to mediport issues.  Repeat CT C/A/P done 7/11/25 with stable findings. Suspect this is due to treatment being slightly delayed. Hope to see response  after next scan.   Now s/p C3, neulasta added with C2. Patient tolerating treatment very well aside from a little nausea.    CBC today with mild anemia, neulasta-induced leukocytosis, otherwise good. CMP pending. Last CA-125 now WNL. CA-125 pending from today.   Labs only next week.  Labs and treatment in 2 weeks 8/12/25 C4.  F/u visit in 4 weeks, labs and toxicity check.     Plan to repeat CT C/A/P after C6, early October, plan for maintenance PARP, switch to Lynparza if scan is good.     ECHO done 4/25/25--EF 65%.  Vaccines/Boosters post-splenectomy: Menactra/Menveo every 5 years, Bexsero every 2-3 years, annual flu shot.  Last Bexsero was given 8/1/23. Next due in 2026.  Received Menactra/Menveo in 9/2024, 5 years after initial.   Continue Eliquis 5mg BID(h/o PE in 2019) and ASA.    All questions answered.      Renetta Corral MD        - code applied: patient requires or will require a continuous, longitudinal, and active collaborative plan of care related to this patient's health condition, metastatic ovarian cancer--the management of which requires the direction of a practitioner with specialized clinical knowledge, skill, and expertise, including monitoring of side effects of high risk medications (chemotherapy).

## 2025-07-16 ENCOUNTER — INFUSION (OUTPATIENT)
Dept: INFUSION THERAPY | Facility: HOSPITAL | Age: 75
End: 2025-07-16
Attending: INTERNAL MEDICINE
Payer: MEDICARE

## 2025-07-16 VITALS
OXYGEN SATURATION: 98 % | SYSTOLIC BLOOD PRESSURE: 128 MMHG | TEMPERATURE: 98 F | HEART RATE: 69 BPM | RESPIRATION RATE: 18 BRPM | DIASTOLIC BLOOD PRESSURE: 78 MMHG

## 2025-07-16 DIAGNOSIS — C57.01 CARCINOMA OF RIGHT FALLOPIAN TUBE: Primary | ICD-10-CM

## 2025-07-16 PROCEDURE — 63600175 PHARM REV CODE 636 W HCPCS: Mod: JZ,TB | Performed by: INTERNAL MEDICINE

## 2025-07-16 PROCEDURE — 96372 THER/PROPH/DIAG INJ SC/IM: CPT

## 2025-07-16 RX ADMIN — PEGFILGRASTIM-CBQV 6 MG: 6 INJECTION, SOLUTION SUBCUTANEOUS at 02:07

## 2025-07-16 NOTE — PLAN OF CARE
C3D2 Udenyca injection given; tolerated well; next appointments discussed with patient; discharged home in stable condition.

## 2025-07-22 ENCOUNTER — LAB VISIT (OUTPATIENT)
Dept: LAB | Facility: HOSPITAL | Age: 75
End: 2025-07-22
Attending: INTERNAL MEDICINE
Payer: MEDICARE

## 2025-07-22 ENCOUNTER — OFFICE VISIT (OUTPATIENT)
Dept: HEMATOLOGY/ONCOLOGY | Facility: CLINIC | Age: 75
End: 2025-07-22
Payer: MEDICARE

## 2025-07-22 VITALS
BODY MASS INDEX: 20.82 KG/M2 | RESPIRATION RATE: 14 BRPM | DIASTOLIC BLOOD PRESSURE: 81 MMHG | TEMPERATURE: 98 F | WEIGHT: 117.5 LBS | HEIGHT: 63 IN | OXYGEN SATURATION: 99 % | HEART RATE: 62 BPM | SYSTOLIC BLOOD PRESSURE: 137 MMHG

## 2025-07-22 DIAGNOSIS — D84.821 IMMUNODEFICIENCY DUE TO DRUGS: ICD-10-CM

## 2025-07-22 DIAGNOSIS — D70.1 AGRANULOCYTOSIS SECONDARY TO CANCER CHEMOTHERAPY: ICD-10-CM

## 2025-07-22 DIAGNOSIS — C78.01 MALIGNANT NEOPLASM METASTATIC TO RIGHT LUNG: ICD-10-CM

## 2025-07-22 DIAGNOSIS — C57.01 CARCINOMA OF RIGHT FALLOPIAN TUBE: Primary | ICD-10-CM

## 2025-07-22 DIAGNOSIS — Z79.899 IMMUNODEFICIENCY DUE TO DRUGS: ICD-10-CM

## 2025-07-22 DIAGNOSIS — D64.81 ANEMIA ASSOCIATED WITH CHEMOTHERAPY: ICD-10-CM

## 2025-07-22 DIAGNOSIS — T45.1X5A ANEMIA ASSOCIATED WITH CHEMOTHERAPY: ICD-10-CM

## 2025-07-22 DIAGNOSIS — T45.1X5A AGRANULOCYTOSIS SECONDARY TO CANCER CHEMOTHERAPY: ICD-10-CM

## 2025-07-22 DIAGNOSIS — C78.7 SECONDARY MALIGNANT NEOPLASM OF LIVER: ICD-10-CM

## 2025-07-22 DIAGNOSIS — C57.01 CARCINOMA OF RIGHT FALLOPIAN TUBE: ICD-10-CM

## 2025-07-22 DIAGNOSIS — I15.9 SECONDARY HYPERTENSION: Primary | ICD-10-CM

## 2025-07-22 LAB
ALBUMIN SERPL-MCNC: 3.2 G/DL (ref 3.4–4.8)
ALBUMIN/GLOB SERPL: 1.1 RATIO (ref 1.1–2)
ALP SERPL-CCNC: 184 UNIT/L (ref 40–150)
ALT SERPL-CCNC: 22 UNIT/L (ref 0–55)
ANION GAP SERPL CALC-SCNC: 6 MEQ/L
AST SERPL-CCNC: 41 UNIT/L (ref 11–45)
BASOPHILS # BLD AUTO: 0.01 X10(3)/MCL
BASOPHILS NFR BLD AUTO: 0 %
BILIRUB SERPL-MCNC: 0.6 MG/DL
BUN SERPL-MCNC: 16.4 MG/DL (ref 9.8–20.1)
CALCIUM SERPL-MCNC: 8.8 MG/DL (ref 8.4–10.2)
CANCER AG125 SERPL-ACNC: 19.3 UNIT/ML (ref 0–35)
CHLORIDE SERPL-SCNC: 106 MMOL/L (ref 98–107)
CO2 SERPL-SCNC: 28 MMOL/L (ref 23–31)
CREAT SERPL-MCNC: 0.65 MG/DL (ref 0.55–1.02)
CREAT/UREA NIT SERPL: 25
EOSINOPHIL # BLD AUTO: 0.07 X10(3)/MCL (ref 0–0.9)
EOSINOPHIL NFR BLD AUTO: 0.2 %
ERYTHROCYTE [DISTWIDTH] IN BLOOD BY AUTOMATED COUNT: 18.7 % (ref 11.5–17)
GFR SERPLBLD CREATININE-BSD FMLA CKD-EPI: >60 ML/MIN/1.73/M2
GLOBULIN SER-MCNC: 2.9 GM/DL (ref 2.4–3.5)
GLUCOSE SERPL-MCNC: 67 MG/DL (ref 82–115)
HCT VFR BLD AUTO: 32.3 % (ref 37–47)
HGB BLD-MCNC: 10.5 G/DL (ref 12–16)
IMM GRANULOCYTES # BLD AUTO: 1.27 X10(3)/MCL (ref 0–0.04)
IMM GRANULOCYTES NFR BLD AUTO: 3.6 %
LYMPHOCYTES # BLD AUTO: 2.3 X10(3)/MCL (ref 0.6–4.6)
LYMPHOCYTES NFR BLD AUTO: 6.5 %
MCH RBC QN AUTO: 33.7 PG (ref 27–31)
MCHC RBC AUTO-ENTMCNC: 32.5 G/DL (ref 33–36)
MCV RBC AUTO: 103.5 FL (ref 80–94)
MONOCYTES # BLD AUTO: 3.45 X10(3)/MCL (ref 0.1–1.3)
MONOCYTES NFR BLD AUTO: 9.8 %
NEUTROPHILS # BLD AUTO: 28.11 X10(3)/MCL (ref 2.1–9.2)
NEUTROPHILS NFR BLD AUTO: 79.9 %
PLATELET # BLD AUTO: 410 X10(3)/MCL (ref 130–400)
PMV BLD AUTO: 8.8 FL (ref 7.4–10.4)
POTASSIUM SERPL-SCNC: 4.4 MMOL/L (ref 3.5–5.1)
PROT SERPL-MCNC: 6.1 GM/DL (ref 5.8–7.6)
RBC # BLD AUTO: 3.12 X10(6)/MCL (ref 4.2–5.4)
SODIUM SERPL-SCNC: 140 MMOL/L (ref 136–145)
WBC # BLD AUTO: 35.21 X10(3)/MCL (ref 4.5–11.5)

## 2025-07-22 PROCEDURE — 3079F DIAST BP 80-89 MM HG: CPT | Mod: CPTII,S$GLB,, | Performed by: INTERNAL MEDICINE

## 2025-07-22 PROCEDURE — 1126F AMNT PAIN NOTED NONE PRSNT: CPT | Mod: CPTII,S$GLB,, | Performed by: INTERNAL MEDICINE

## 2025-07-22 PROCEDURE — 1160F RVW MEDS BY RX/DR IN RCRD: CPT | Mod: CPTII,S$GLB,, | Performed by: INTERNAL MEDICINE

## 2025-07-22 PROCEDURE — 3288F FALL RISK ASSESSMENT DOCD: CPT | Mod: CPTII,S$GLB,, | Performed by: INTERNAL MEDICINE

## 2025-07-22 PROCEDURE — 85025 COMPLETE CBC W/AUTO DIFF WBC: CPT

## 2025-07-22 PROCEDURE — 80053 COMPREHEN METABOLIC PANEL: CPT

## 2025-07-22 PROCEDURE — 86304 IMMUNOASSAY TUMOR CA 125: CPT

## 2025-07-22 PROCEDURE — 99999 PR PBB SHADOW E&M-EST. PATIENT-LVL IV: CPT | Mod: PBBFAC,,, | Performed by: INTERNAL MEDICINE

## 2025-07-22 PROCEDURE — 36415 COLL VENOUS BLD VENIPUNCTURE: CPT

## 2025-07-22 PROCEDURE — G2211 COMPLEX E/M VISIT ADD ON: HCPCS | Mod: S$GLB,,, | Performed by: INTERNAL MEDICINE

## 2025-07-22 PROCEDURE — 1159F MED LIST DOCD IN RCRD: CPT | Mod: CPTII,S$GLB,, | Performed by: INTERNAL MEDICINE

## 2025-07-22 PROCEDURE — 1101F PT FALLS ASSESS-DOCD LE1/YR: CPT | Mod: CPTII,S$GLB,, | Performed by: INTERNAL MEDICINE

## 2025-07-22 PROCEDURE — 99215 OFFICE O/P EST HI 40 MIN: CPT | Mod: S$GLB,,, | Performed by: INTERNAL MEDICINE

## 2025-07-22 PROCEDURE — 3075F SYST BP GE 130 - 139MM HG: CPT | Mod: CPTII,S$GLB,, | Performed by: INTERNAL MEDICINE

## 2025-07-29 ENCOUNTER — LAB VISIT (OUTPATIENT)
Dept: LAB | Facility: HOSPITAL | Age: 75
End: 2025-07-29
Attending: INTERNAL MEDICINE
Payer: MEDICARE

## 2025-07-29 DIAGNOSIS — C57.01 CARCINOMA OF RIGHT FALLOPIAN TUBE: ICD-10-CM

## 2025-07-29 DIAGNOSIS — C78.7 SECONDARY MALIGNANT NEOPLASM OF LIVER: ICD-10-CM

## 2025-07-29 DIAGNOSIS — D70.1 AGRANULOCYTOSIS SECONDARY TO CANCER CHEMOTHERAPY: ICD-10-CM

## 2025-07-29 DIAGNOSIS — Z79.899 IMMUNODEFICIENCY DUE TO DRUGS: ICD-10-CM

## 2025-07-29 DIAGNOSIS — T45.1X5A AGRANULOCYTOSIS SECONDARY TO CANCER CHEMOTHERAPY: ICD-10-CM

## 2025-07-29 DIAGNOSIS — D84.821 IMMUNODEFICIENCY DUE TO DRUGS: ICD-10-CM

## 2025-07-29 DIAGNOSIS — C78.01 MALIGNANT NEOPLASM METASTATIC TO RIGHT LUNG: ICD-10-CM

## 2025-07-29 LAB
ALBUMIN SERPL-MCNC: 3.3 G/DL (ref 3.4–4.8)
ALBUMIN/GLOB SERPL: 1.1 RATIO (ref 1.1–2)
ALP SERPL-CCNC: 96 UNIT/L (ref 40–150)
ALT SERPL-CCNC: 19 UNIT/L (ref 0–55)
ANION GAP SERPL CALC-SCNC: 5 MEQ/L
AST SERPL-CCNC: 34 UNIT/L (ref 11–45)
BASOPHILS # BLD AUTO: 0.03 X10(3)/MCL
BASOPHILS NFR BLD AUTO: 0.2 %
BILIRUB SERPL-MCNC: 0.6 MG/DL
BUN SERPL-MCNC: 14.5 MG/DL (ref 9.8–20.1)
CALCIUM SERPL-MCNC: 8.5 MG/DL (ref 8.4–10.2)
CHLORIDE SERPL-SCNC: 108 MMOL/L (ref 98–107)
CO2 SERPL-SCNC: 27 MMOL/L (ref 23–31)
CREAT SERPL-MCNC: 0.67 MG/DL (ref 0.55–1.02)
CREAT/UREA NIT SERPL: 22
EOSINOPHIL # BLD AUTO: 0.03 X10(3)/MCL (ref 0–0.9)
EOSINOPHIL NFR BLD AUTO: 0.2 %
ERYTHROCYTE [DISTWIDTH] IN BLOOD BY AUTOMATED COUNT: 18.4 % (ref 11.5–17)
GFR SERPLBLD CREATININE-BSD FMLA CKD-EPI: >60 ML/MIN/1.73/M2
GLOBULIN SER-MCNC: 2.9 GM/DL (ref 2.4–3.5)
GLUCOSE SERPL-MCNC: 101 MG/DL (ref 82–115)
HCT VFR BLD AUTO: 28.5 % (ref 37–47)
HGB BLD-MCNC: 9.4 G/DL (ref 12–16)
IMM GRANULOCYTES # BLD AUTO: 0.08 X10(3)/MCL (ref 0–0.04)
IMM GRANULOCYTES NFR BLD AUTO: 0.7 %
LYMPHOCYTES # BLD AUTO: 2.56 X10(3)/MCL (ref 0.6–4.6)
LYMPHOCYTES NFR BLD AUTO: 21 %
MCH RBC QN AUTO: 34.4 PG (ref 27–31)
MCHC RBC AUTO-ENTMCNC: 33 G/DL (ref 33–36)
MCV RBC AUTO: 104.4 FL (ref 80–94)
MONOCYTES # BLD AUTO: 1.25 X10(3)/MCL (ref 0.1–1.3)
MONOCYTES NFR BLD AUTO: 10.3 %
NEUTROPHILS # BLD AUTO: 8.23 X10(3)/MCL (ref 2.1–9.2)
NEUTROPHILS NFR BLD AUTO: 67.6 %
PLATELET # BLD AUTO: 191 X10(3)/MCL (ref 130–400)
PMV BLD AUTO: 9.1 FL (ref 7.4–10.4)
POTASSIUM SERPL-SCNC: 4.4 MMOL/L (ref 3.5–5.1)
PROT SERPL-MCNC: 6.2 GM/DL (ref 5.8–7.6)
RBC # BLD AUTO: 2.73 X10(6)/MCL (ref 4.2–5.4)
SODIUM SERPL-SCNC: 140 MMOL/L (ref 136–145)
WBC # BLD AUTO: 12.18 X10(3)/MCL (ref 4.5–11.5)

## 2025-07-29 PROCEDURE — 80053 COMPREHEN METABOLIC PANEL: CPT

## 2025-07-29 PROCEDURE — 36415 COLL VENOUS BLD VENIPUNCTURE: CPT

## 2025-07-29 PROCEDURE — 85025 COMPLETE CBC W/AUTO DIFF WBC: CPT

## 2025-08-04 ENCOUNTER — TELEPHONE (OUTPATIENT)
Dept: INTERNAL MEDICINE | Facility: CLINIC | Age: 75
End: 2025-08-04
Payer: MEDICARE

## 2025-08-04 NOTE — TELEPHONE ENCOUNTER
----- Message from Nurse Chen sent at 7/22/2025  7:56 AM CDT -----  Regarding: PV for 8/11/25  Fasting labs for 8/11/25

## 2025-08-04 NOTE — TELEPHONE ENCOUNTER
----- Message from Gema sent at 8/4/2025  9:41 AM CDT -----  Regarding: labs  Patient just did cmp and cbc end of last month for another dr.  She doesn't think it was fasting. Is it ok to use those results for ov 8/11 instead of getting stuck for labs again?

## 2025-08-11 ENCOUNTER — OFFICE VISIT (OUTPATIENT)
Dept: INTERNAL MEDICINE | Facility: CLINIC | Age: 75
End: 2025-08-11
Payer: MEDICARE

## 2025-08-11 ENCOUNTER — LAB VISIT (OUTPATIENT)
Dept: LAB | Facility: HOSPITAL | Age: 75
End: 2025-08-11
Attending: INTERNAL MEDICINE
Payer: MEDICARE

## 2025-08-11 ENCOUNTER — TELEPHONE (OUTPATIENT)
Dept: INTERNAL MEDICINE | Facility: CLINIC | Age: 75
End: 2025-08-11

## 2025-08-11 VITALS
BODY MASS INDEX: 20.55 KG/M2 | OXYGEN SATURATION: 98 % | HEIGHT: 63 IN | SYSTOLIC BLOOD PRESSURE: 132 MMHG | HEART RATE: 68 BPM | DIASTOLIC BLOOD PRESSURE: 82 MMHG | WEIGHT: 116 LBS

## 2025-08-11 DIAGNOSIS — T45.1X5A AGRANULOCYTOSIS SECONDARY TO CANCER CHEMOTHERAPY: ICD-10-CM

## 2025-08-11 DIAGNOSIS — M79.674 PAIN OF TOE OF RIGHT FOOT: Primary | ICD-10-CM

## 2025-08-11 DIAGNOSIS — Z79.899 IMMUNODEFICIENCY DUE TO DRUGS: Chronic | ICD-10-CM

## 2025-08-11 DIAGNOSIS — D70.1 AGRANULOCYTOSIS SECONDARY TO CANCER CHEMOTHERAPY: ICD-10-CM

## 2025-08-11 DIAGNOSIS — D64.81 ANEMIA ASSOCIATED WITH CHEMOTHERAPY: Chronic | ICD-10-CM

## 2025-08-11 DIAGNOSIS — T45.1X5A ANEMIA ASSOCIATED WITH CHEMOTHERAPY: Chronic | ICD-10-CM

## 2025-08-11 DIAGNOSIS — C78.01 MALIGNANT NEOPLASM METASTATIC TO RIGHT LUNG: Chronic | ICD-10-CM

## 2025-08-11 DIAGNOSIS — Z79.899 IMMUNODEFICIENCY DUE TO DRUGS: ICD-10-CM

## 2025-08-11 DIAGNOSIS — I27.82 OTHER CHRONIC PULMONARY EMBOLISM WITHOUT ACUTE COR PULMONALE: Chronic | ICD-10-CM

## 2025-08-11 DIAGNOSIS — R60.0 EDEMA OF RIGHT LOWER EXTREMITY: ICD-10-CM

## 2025-08-11 DIAGNOSIS — J44.9 CHRONIC OBSTRUCTIVE PULMONARY DISEASE, UNSPECIFIED COPD TYPE: Chronic | ICD-10-CM

## 2025-08-11 DIAGNOSIS — D84.821 IMMUNODEFICIENCY DUE TO DRUGS: Chronic | ICD-10-CM

## 2025-08-11 DIAGNOSIS — M79.89 LEG SWELLING: ICD-10-CM

## 2025-08-11 DIAGNOSIS — Z90.81 H/O SPLENECTOMY: Chronic | ICD-10-CM

## 2025-08-11 DIAGNOSIS — D84.821 IMMUNODEFICIENCY DUE TO DRUGS: ICD-10-CM

## 2025-08-11 DIAGNOSIS — I10 PRIMARY HYPERTENSION: Chronic | ICD-10-CM

## 2025-08-11 DIAGNOSIS — C78.01 MALIGNANT NEOPLASM METASTATIC TO RIGHT LUNG: ICD-10-CM

## 2025-08-11 DIAGNOSIS — C57.01 CARCINOMA OF RIGHT FALLOPIAN TUBE: ICD-10-CM

## 2025-08-11 DIAGNOSIS — M81.6 LOCALIZED OSTEOPOROSIS WITHOUT CURRENT PATHOLOGICAL FRACTURE: Chronic | ICD-10-CM

## 2025-08-11 DIAGNOSIS — C78.7 SECONDARY MALIGNANT NEOPLASM OF LIVER: Chronic | ICD-10-CM

## 2025-08-11 DIAGNOSIS — C57.01 CARCINOMA OF RIGHT FALLOPIAN TUBE: Chronic | ICD-10-CM

## 2025-08-11 DIAGNOSIS — G45.9 TRANSIENT ISCHEMIC ATTACK: Chronic | ICD-10-CM

## 2025-08-11 DIAGNOSIS — Z79.01 LONG TERM (CURRENT) USE OF ANTICOAGULANTS: Chronic | ICD-10-CM

## 2025-08-11 DIAGNOSIS — C78.7 SECONDARY MALIGNANT NEOPLASM OF LIVER: ICD-10-CM

## 2025-08-11 PROBLEM — E87.1 ACUTE HYPONATREMIA: Status: RESOLVED | Noted: 2024-11-19 | Resolved: 2025-08-11

## 2025-08-11 LAB
ALBUMIN SERPL-MCNC: 3.4 G/DL (ref 3.4–4.8)
ALBUMIN/GLOB SERPL: 1.2 RATIO (ref 1.1–2)
ALP SERPL-CCNC: 55 UNIT/L (ref 40–150)
ALT SERPL-CCNC: 22 UNIT/L (ref 0–55)
ANION GAP SERPL CALC-SCNC: 7 MEQ/L
AST SERPL-CCNC: 36 UNIT/L (ref 11–45)
BASOPHILS # BLD AUTO: 0.03 X10(3)/MCL
BASOPHILS NFR BLD AUTO: 0.4 %
BILIRUB SERPL-MCNC: 0.7 MG/DL
BUN SERPL-MCNC: 16.3 MG/DL (ref 9.8–20.1)
CALCIUM SERPL-MCNC: 9.3 MG/DL (ref 8.4–10.2)
CANCER AG125 SERPL-ACNC: 14.4 UNIT/ML (ref 0–35)
CHLORIDE SERPL-SCNC: 104 MMOL/L (ref 98–107)
CO2 SERPL-SCNC: 28 MMOL/L (ref 23–31)
CREAT SERPL-MCNC: 0.69 MG/DL (ref 0.55–1.02)
CREAT/UREA NIT SERPL: 24
EOSINOPHIL # BLD AUTO: 0.11 X10(3)/MCL (ref 0–0.9)
EOSINOPHIL NFR BLD AUTO: 1.6 %
ERYTHROCYTE [DISTWIDTH] IN BLOOD BY AUTOMATED COUNT: 19.6 % (ref 11.5–17)
GFR SERPLBLD CREATININE-BSD FMLA CKD-EPI: >60 ML/MIN/1.73/M2
GLOBULIN SER-MCNC: 2.8 GM/DL (ref 2.4–3.5)
GLUCOSE SERPL-MCNC: 106 MG/DL (ref 82–115)
HCT VFR BLD AUTO: 29.6 % (ref 37–47)
HGB BLD-MCNC: 9.7 G/DL (ref 12–16)
IMM GRANULOCYTES # BLD AUTO: 0.02 X10(3)/MCL (ref 0–0.04)
IMM GRANULOCYTES NFR BLD AUTO: 0.3 %
LYMPHOCYTES # BLD AUTO: 2.41 X10(3)/MCL (ref 0.6–4.6)
LYMPHOCYTES NFR BLD AUTO: 35.9 %
MCH RBC QN AUTO: 34.4 PG (ref 27–31)
MCHC RBC AUTO-ENTMCNC: 32.8 G/DL (ref 33–36)
MCV RBC AUTO: 105 FL (ref 80–94)
MONOCYTES # BLD AUTO: 1.45 X10(3)/MCL (ref 0.1–1.3)
MONOCYTES NFR BLD AUTO: 21.6 %
NEUTROPHILS # BLD AUTO: 2.69 X10(3)/MCL (ref 2.1–9.2)
NEUTROPHILS NFR BLD AUTO: 40.2 %
PLATELET # BLD AUTO: 325 X10(3)/MCL (ref 130–400)
PMV BLD AUTO: 7.8 FL (ref 7.4–10.4)
POTASSIUM SERPL-SCNC: 4.9 MMOL/L (ref 3.5–5.1)
PROT SERPL-MCNC: 6.2 GM/DL (ref 5.8–7.6)
RBC # BLD AUTO: 2.82 X10(6)/MCL (ref 4.2–5.4)
SODIUM SERPL-SCNC: 139 MMOL/L (ref 136–145)
WBC # BLD AUTO: 6.71 X10(3)/MCL (ref 4.5–11.5)

## 2025-08-11 PROCEDURE — 1101F PT FALLS ASSESS-DOCD LE1/YR: CPT | Mod: CPTII,,,

## 2025-08-11 PROCEDURE — 85025 COMPLETE CBC W/AUTO DIFF WBC: CPT

## 2025-08-11 PROCEDURE — 99215 OFFICE O/P EST HI 40 MIN: CPT | Mod: ,,,

## 2025-08-11 PROCEDURE — 86304 IMMUNOASSAY TUMOR CA 125: CPT

## 2025-08-11 PROCEDURE — 36415 COLL VENOUS BLD VENIPUNCTURE: CPT

## 2025-08-11 PROCEDURE — 3288F FALL RISK ASSESSMENT DOCD: CPT | Mod: CPTII,,,

## 2025-08-11 PROCEDURE — 3075F SYST BP GE 130 - 139MM HG: CPT | Mod: CPTII,,,

## 2025-08-11 PROCEDURE — 1159F MED LIST DOCD IN RCRD: CPT | Mod: CPTII,,,

## 2025-08-11 PROCEDURE — 1160F RVW MEDS BY RX/DR IN RCRD: CPT | Mod: CPTII,,,

## 2025-08-11 PROCEDURE — 3079F DIAST BP 80-89 MM HG: CPT | Mod: CPTII,,,

## 2025-08-11 PROCEDURE — 1126F AMNT PAIN NOTED NONE PRSNT: CPT | Mod: CPTII,,,

## 2025-08-11 PROCEDURE — 80053 COMPREHEN METABOLIC PANEL: CPT

## 2025-08-11 RX ORDER — EPINEPHRINE 0.3 MG/.3ML
0.3 INJECTION SUBCUTANEOUS ONCE AS NEEDED
Status: CANCELLED | OUTPATIENT
Start: 2025-08-12

## 2025-08-11 RX ORDER — DIPHENHYDRAMINE HYDROCHLORIDE 50 MG/ML
50 INJECTION, SOLUTION INTRAMUSCULAR; INTRAVENOUS ONCE AS NEEDED
Status: CANCELLED | OUTPATIENT
Start: 2025-08-12

## 2025-08-11 RX ORDER — SODIUM CHLORIDE 0.9 % (FLUSH) 0.9 %
10 SYRINGE (ML) INJECTION
Status: CANCELLED | OUTPATIENT
Start: 2025-08-12

## 2025-08-11 RX ORDER — HEPARIN 100 UNIT/ML
500 SYRINGE INTRAVENOUS
Status: CANCELLED | OUTPATIENT
Start: 2025-08-12

## 2025-08-11 RX ORDER — PROCHLORPERAZINE EDISYLATE 5 MG/ML
5 INJECTION, SOLUTION INTRAMUSCULAR; INTRAVENOUS ONCE AS NEEDED
Status: CANCELLED | OUTPATIENT
Start: 2025-08-12

## 2025-08-12 ENCOUNTER — HOSPITAL ENCOUNTER (OUTPATIENT)
Dept: RADIOLOGY | Facility: HOSPITAL | Age: 75
Discharge: HOME | End: 2025-08-12
Payer: MEDICARE

## 2025-08-12 ENCOUNTER — INFUSION (OUTPATIENT)
Dept: INFUSION THERAPY | Facility: HOSPITAL | Age: 75
End: 2025-08-12
Attending: INTERNAL MEDICINE
Payer: MEDICARE

## 2025-08-12 VITALS
BODY MASS INDEX: 20.7 KG/M2 | RESPIRATION RATE: 16 BRPM | DIASTOLIC BLOOD PRESSURE: 78 MMHG | TEMPERATURE: 98 F | HEART RATE: 68 BPM | HEIGHT: 63 IN | SYSTOLIC BLOOD PRESSURE: 160 MMHG | WEIGHT: 116.81 LBS | OXYGEN SATURATION: 96 %

## 2025-08-12 DIAGNOSIS — C57.01 CARCINOMA OF RIGHT FALLOPIAN TUBE: Primary | ICD-10-CM

## 2025-08-12 DIAGNOSIS — M79.674 PAIN OF TOE OF RIGHT FOOT: ICD-10-CM

## 2025-08-12 PROCEDURE — 96413 CHEMO IV INFUSION 1 HR: CPT

## 2025-08-12 PROCEDURE — 96367 TX/PROPH/DG ADDL SEQ IV INF: CPT

## 2025-08-12 PROCEDURE — 96417 CHEMO IV INFUS EACH ADDL SEQ: CPT

## 2025-08-12 PROCEDURE — 25000003 PHARM REV CODE 250: Performed by: NURSE PRACTITIONER

## 2025-08-12 PROCEDURE — 63600175 PHARM REV CODE 636 W HCPCS: Performed by: NURSE PRACTITIONER

## 2025-08-12 PROCEDURE — 73660 X-RAY EXAM OF TOE(S): CPT | Mod: TC,RT

## 2025-08-12 RX ORDER — EPINEPHRINE 0.3 MG/.3ML
0.3 INJECTION SUBCUTANEOUS ONCE AS NEEDED
Status: DISCONTINUED | OUTPATIENT
Start: 2025-08-12 | End: 2025-08-12 | Stop reason: HOSPADM

## 2025-08-12 RX ORDER — SODIUM CHLORIDE 0.9 % (FLUSH) 0.9 %
10 SYRINGE (ML) INJECTION
Status: DISCONTINUED | OUTPATIENT
Start: 2025-08-12 | End: 2025-08-12 | Stop reason: HOSPADM

## 2025-08-12 RX ORDER — DIPHENHYDRAMINE HYDROCHLORIDE 50 MG/ML
50 INJECTION, SOLUTION INTRAMUSCULAR; INTRAVENOUS ONCE AS NEEDED
Status: DISCONTINUED | OUTPATIENT
Start: 2025-08-12 | End: 2025-08-12 | Stop reason: HOSPADM

## 2025-08-12 RX ORDER — PROCHLORPERAZINE EDISYLATE 5 MG/ML
5 INJECTION INTRAMUSCULAR; INTRAVENOUS ONCE AS NEEDED
Status: DISCONTINUED | OUTPATIENT
Start: 2025-08-12 | End: 2025-08-12 | Stop reason: HOSPADM

## 2025-08-12 RX ORDER — HEPARIN 100 UNIT/ML
500 SYRINGE INTRAVENOUS
Status: DISCONTINUED | OUTPATIENT
Start: 2025-08-12 | End: 2025-08-12 | Stop reason: HOSPADM

## 2025-08-12 RX ADMIN — DEXAMETHASONE SODIUM PHOSPHATE 0.25 MG: 4 INJECTION, SOLUTION INTRA-ARTICULAR; INTRALESIONAL; INTRAMUSCULAR; INTRAVENOUS; SOFT TISSUE at 01:08

## 2025-08-12 RX ADMIN — SODIUM CHLORIDE 150 MG: 900 INJECTION, SOLUTION INTRAVENOUS at 01:08

## 2025-08-12 RX ADMIN — CARBOPLATIN 350 MG: 10 INJECTION, SOLUTION INTRAVENOUS at 03:08

## 2025-08-12 RX ADMIN — HEPARIN 500 UNITS: 100 SYRINGE at 03:08

## 2025-08-12 RX ADMIN — DOXORUBICIN HYDROCHLORIDE 46 MG: 2 INJECTABLE, LIPOSOMAL INTRAVENOUS at 01:08

## 2025-08-13 ENCOUNTER — TELEPHONE (OUTPATIENT)
Dept: INTERNAL MEDICINE | Facility: CLINIC | Age: 75
End: 2025-08-13
Payer: MEDICARE

## 2025-08-13 ENCOUNTER — INFUSION (OUTPATIENT)
Dept: INFUSION THERAPY | Facility: HOSPITAL | Age: 75
End: 2025-08-13
Attending: INTERNAL MEDICINE
Payer: MEDICARE

## 2025-08-13 DIAGNOSIS — M81.0 OSTEOPOROSIS, UNSPECIFIED OSTEOPOROSIS TYPE, UNSPECIFIED PATHOLOGICAL FRACTURE PRESENCE: ICD-10-CM

## 2025-08-13 DIAGNOSIS — C57.01 CARCINOMA OF RIGHT FALLOPIAN TUBE: Primary | ICD-10-CM

## 2025-08-13 PROCEDURE — 96372 THER/PROPH/DIAG INJ SC/IM: CPT

## 2025-08-13 PROCEDURE — 63600175 PHARM REV CODE 636 W HCPCS: Mod: JZ,TB | Performed by: NURSE PRACTITIONER

## 2025-08-13 RX ORDER — ALENDRONATE SODIUM 70 MG/1
70 TABLET ORAL
Qty: 12 TABLET | Refills: 3 | Status: SHIPPED | OUTPATIENT
Start: 2025-08-13 | End: 2026-08-13

## 2025-08-13 RX ADMIN — PEGFILGRASTIM-CBQV 6 MG: 6 INJECTION, SOLUTION SUBCUTANEOUS at 02:08

## 2025-08-19 ENCOUNTER — OFFICE VISIT (OUTPATIENT)
Dept: HEMATOLOGY/ONCOLOGY | Facility: CLINIC | Age: 75
End: 2025-08-19
Payer: MEDICARE

## 2025-08-19 ENCOUNTER — LAB VISIT (OUTPATIENT)
Dept: LAB | Facility: HOSPITAL | Age: 75
End: 2025-08-19
Attending: INTERNAL MEDICINE
Payer: MEDICARE

## 2025-08-19 VITALS
HEART RATE: 63 BPM | TEMPERATURE: 98 F | HEIGHT: 63 IN | BODY MASS INDEX: 20.61 KG/M2 | WEIGHT: 116.31 LBS | DIASTOLIC BLOOD PRESSURE: 76 MMHG | RESPIRATION RATE: 14 BRPM | SYSTOLIC BLOOD PRESSURE: 157 MMHG | OXYGEN SATURATION: 99 %

## 2025-08-19 DIAGNOSIS — C78.01 MALIGNANT NEOPLASM METASTATIC TO RIGHT LUNG: Chronic | ICD-10-CM

## 2025-08-19 DIAGNOSIS — D70.1 AGRANULOCYTOSIS SECONDARY TO CANCER CHEMOTHERAPY: ICD-10-CM

## 2025-08-19 DIAGNOSIS — Z79.899 IMMUNODEFICIENCY DUE TO DRUGS: Chronic | ICD-10-CM

## 2025-08-19 DIAGNOSIS — D84.821 IMMUNODEFICIENCY DUE TO DRUGS: Chronic | ICD-10-CM

## 2025-08-19 DIAGNOSIS — T45.1X5A ANEMIA ASSOCIATED WITH CHEMOTHERAPY: Chronic | ICD-10-CM

## 2025-08-19 DIAGNOSIS — T45.1X5A AGRANULOCYTOSIS SECONDARY TO CANCER CHEMOTHERAPY: ICD-10-CM

## 2025-08-19 DIAGNOSIS — Z79.899 IMMUNODEFICIENCY DUE TO DRUGS: ICD-10-CM

## 2025-08-19 DIAGNOSIS — C57.01 CARCINOMA OF RIGHT FALLOPIAN TUBE: Primary | Chronic | ICD-10-CM

## 2025-08-19 DIAGNOSIS — C57.01 CARCINOMA OF RIGHT FALLOPIAN TUBE: ICD-10-CM

## 2025-08-19 DIAGNOSIS — Z79.01 LONG TERM (CURRENT) USE OF ANTICOAGULANTS: Chronic | ICD-10-CM

## 2025-08-19 DIAGNOSIS — C78.7 SECONDARY MALIGNANT NEOPLASM OF LIVER: Chronic | ICD-10-CM

## 2025-08-19 DIAGNOSIS — D84.821 IMMUNODEFICIENCY DUE TO DRUGS: ICD-10-CM

## 2025-08-19 DIAGNOSIS — C78.01 MALIGNANT NEOPLASM METASTATIC TO RIGHT LUNG: ICD-10-CM

## 2025-08-19 DIAGNOSIS — D64.81 ANEMIA ASSOCIATED WITH CHEMOTHERAPY: Chronic | ICD-10-CM

## 2025-08-19 DIAGNOSIS — C78.7 SECONDARY MALIGNANT NEOPLASM OF LIVER: ICD-10-CM

## 2025-08-19 LAB
ALBUMIN SERPL-MCNC: 3.2 G/DL (ref 3.4–4.8)
ALBUMIN/GLOB SERPL: 1.2 RATIO (ref 1.1–2)
ALP SERPL-CCNC: 125 UNIT/L (ref 40–150)
ALT SERPL-CCNC: 24 UNIT/L (ref 0–55)
ANION GAP SERPL CALC-SCNC: 6 MEQ/L
AST SERPL-CCNC: 41 UNIT/L (ref 11–45)
BASOPHILS # BLD AUTO: 0.02 X10(3)/MCL
BASOPHILS NFR BLD AUTO: 0.1 %
BILIRUB SERPL-MCNC: 0.9 MG/DL
BUN SERPL-MCNC: 15 MG/DL (ref 9.8–20.1)
CALCIUM SERPL-MCNC: 9 MG/DL (ref 8.4–10.2)
CHLORIDE SERPL-SCNC: 104 MMOL/L (ref 98–107)
CO2 SERPL-SCNC: 28 MMOL/L (ref 23–31)
CREAT SERPL-MCNC: 0.62 MG/DL (ref 0.55–1.02)
CREAT/UREA NIT SERPL: 24
EOSINOPHIL # BLD AUTO: 0.11 X10(3)/MCL (ref 0–0.9)
EOSINOPHIL NFR BLD AUTO: 0.4 %
ERYTHROCYTE [DISTWIDTH] IN BLOOD BY AUTOMATED COUNT: 18.4 % (ref 11.5–17)
GFR SERPLBLD CREATININE-BSD FMLA CKD-EPI: >60 ML/MIN/1.73/M2
GLOBULIN SER-MCNC: 2.7 GM/DL (ref 2.4–3.5)
GLUCOSE SERPL-MCNC: 87 MG/DL (ref 82–115)
HCT VFR BLD AUTO: 30.6 % (ref 37–47)
HGB BLD-MCNC: 10.2 G/DL (ref 12–16)
IMM GRANULOCYTES # BLD AUTO: 0.84 X10(3)/MCL (ref 0–0.04)
IMM GRANULOCYTES NFR BLD AUTO: 2.9 %
LYMPHOCYTES # BLD AUTO: 2.52 X10(3)/MCL (ref 0.6–4.6)
LYMPHOCYTES NFR BLD AUTO: 8.7 %
MCH RBC QN AUTO: 35.1 PG (ref 27–31)
MCHC RBC AUTO-ENTMCNC: 33.3 G/DL (ref 33–36)
MCV RBC AUTO: 105.2 FL (ref 80–94)
MONOCYTES # BLD AUTO: 2.27 X10(3)/MCL (ref 0.1–1.3)
MONOCYTES NFR BLD AUTO: 7.9 %
NEUTROPHILS # BLD AUTO: 23.05 X10(3)/MCL (ref 2.1–9.2)
NEUTROPHILS NFR BLD AUTO: 80 %
PLATELET # BLD AUTO: 283 X10(3)/MCL (ref 130–400)
PMV BLD AUTO: 8.5 FL (ref 7.4–10.4)
POTASSIUM SERPL-SCNC: 4.8 MMOL/L (ref 3.5–5.1)
PROT SERPL-MCNC: 5.9 GM/DL (ref 5.8–7.6)
RBC # BLD AUTO: 2.91 X10(6)/MCL (ref 4.2–5.4)
SODIUM SERPL-SCNC: 138 MMOL/L (ref 136–145)
WBC # BLD AUTO: 28.81 X10(3)/MCL (ref 4.5–11.5)

## 2025-08-19 PROCEDURE — 1126F AMNT PAIN NOTED NONE PRSNT: CPT | Mod: CPTII,S$GLB,, | Performed by: INTERNAL MEDICINE

## 2025-08-19 PROCEDURE — 1159F MED LIST DOCD IN RCRD: CPT | Mod: CPTII,S$GLB,, | Performed by: INTERNAL MEDICINE

## 2025-08-19 PROCEDURE — 3077F SYST BP >= 140 MM HG: CPT | Mod: CPTII,S$GLB,, | Performed by: INTERNAL MEDICINE

## 2025-08-19 PROCEDURE — 3078F DIAST BP <80 MM HG: CPT | Mod: CPTII,S$GLB,, | Performed by: INTERNAL MEDICINE

## 2025-08-19 PROCEDURE — 1160F RVW MEDS BY RX/DR IN RCRD: CPT | Mod: CPTII,S$GLB,, | Performed by: INTERNAL MEDICINE

## 2025-08-19 PROCEDURE — 99215 OFFICE O/P EST HI 40 MIN: CPT | Mod: S$GLB,,, | Performed by: INTERNAL MEDICINE

## 2025-08-19 PROCEDURE — 1101F PT FALLS ASSESS-DOCD LE1/YR: CPT | Mod: CPTII,S$GLB,, | Performed by: INTERNAL MEDICINE

## 2025-08-19 PROCEDURE — 99999 PR PBB SHADOW E&M-EST. PATIENT-LVL IV: CPT | Mod: PBBFAC,,, | Performed by: INTERNAL MEDICINE

## 2025-08-19 PROCEDURE — 3288F FALL RISK ASSESSMENT DOCD: CPT | Mod: CPTII,S$GLB,, | Performed by: INTERNAL MEDICINE

## 2025-08-19 PROCEDURE — 36415 COLL VENOUS BLD VENIPUNCTURE: CPT

## 2025-08-19 PROCEDURE — G2211 COMPLEX E/M VISIT ADD ON: HCPCS | Mod: S$GLB,,, | Performed by: INTERNAL MEDICINE

## 2025-08-19 PROCEDURE — 85025 COMPLETE CBC W/AUTO DIFF WBC: CPT

## 2025-08-19 PROCEDURE — 80053 COMPREHEN METABOLIC PANEL: CPT

## 2025-08-26 ENCOUNTER — LAB VISIT (OUTPATIENT)
Dept: LAB | Facility: HOSPITAL | Age: 75
End: 2025-08-26
Attending: INTERNAL MEDICINE
Payer: MEDICARE

## 2025-09-02 ENCOUNTER — TELEPHONE (OUTPATIENT)
Dept: INTERNAL MEDICINE | Facility: CLINIC | Age: 75
End: 2025-09-02
Payer: MEDICARE

## 2025-09-02 DIAGNOSIS — E78.2 MIXED HYPERLIPIDEMIA: ICD-10-CM

## 2025-09-02 RX ORDER — ATORVASTATIN CALCIUM 40 MG/1
40 TABLET, FILM COATED ORAL DAILY
Qty: 90 TABLET | Refills: 3 | Status: SHIPPED | OUTPATIENT
Start: 2025-09-02

## (undated) DEVICE — ELECTRODE BLADE INSULATED 1 IN

## (undated) DEVICE — BAG MEDI-PLAST DECANTER C-FLOW

## (undated) DEVICE — ADHESIVE DERMABOND ADVANCED

## (undated) DEVICE — Device

## (undated) DEVICE — GLOVE PROTEXIS PI SYN SURG 7.5

## (undated) DEVICE — COVER PROBE US 5.5X58L NON LTX

## (undated) DEVICE — NDL HYPO REG 25G X 1 1/2

## (undated) DEVICE — GLOVE PROTEXIS LTX MICRO 6.5

## (undated) DEVICE — ELECTRODE PATIENT RETURN DISP

## (undated) DEVICE — SOL NORMAL USPCA 0.9%

## (undated) DEVICE — BLADE SURG STAINLESS STEEL #11

## (undated) DEVICE — BOWL STERILE LARGE 32OZ

## (undated) DEVICE — SUT VICRYL PLUS 3-0 SH 18IN

## (undated) DEVICE — SYR 10CC LUER LOCK

## (undated) DEVICE — COVER C-ARM STRAP BAND 44X80IN

## (undated) DEVICE — SUT MCRYL PLUS 4-0 PS2 27IN

## (undated) DEVICE — KIT SURGICAL TURNOVER